# Patient Record
Sex: MALE | Race: WHITE | NOT HISPANIC OR LATINO | URBAN - METROPOLITAN AREA
[De-identification: names, ages, dates, MRNs, and addresses within clinical notes are randomized per-mention and may not be internally consistent; named-entity substitution may affect disease eponyms.]

---

## 2017-01-10 ENCOUNTER — EMERGENCY (EMERGENCY)
Facility: HOSPITAL | Age: 62
LOS: 1 days | Discharge: HOME | End: 2017-01-10
Admitting: EMERGENCY MEDICINE
Payer: COMMERCIAL

## 2017-01-10 DIAGNOSIS — T83.090A OTHER MECHANICAL COMPLICATION OF CYSTOSTOMY CATHETER, INITIAL ENCOUNTER: ICD-10-CM

## 2017-01-10 DIAGNOSIS — R10.30 LOWER ABDOMINAL PAIN, UNSPECIFIED: ICD-10-CM
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
## 2017-01-10 DIAGNOSIS — Z98.89 OTHER SPECIFIED POSTPROCEDURAL STATES: Chronic | ICD-10-CM

## 2017-01-10 DIAGNOSIS — F32.9 MAJOR DEPRESSIVE DISORDER, SINGLE EPISODE, UNSPECIFIED: ICD-10-CM

## 2017-01-10 DIAGNOSIS — Z93.59 OTHER CYSTOSTOMY STATUS: Chronic | ICD-10-CM
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
## 2017-01-10 DIAGNOSIS — I10 ESSENTIAL (PRIMARY) HYPERTENSION: ICD-10-CM
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
## 2017-01-10 PROCEDURE — 51705 CHANGE OF BLADDER TUBE: CPT

## 2017-01-10 PROCEDURE — 99282 EMERGENCY DEPT VISIT SF MDM: CPT | Mod: 25

## 2017-01-10 PROCEDURE — 99283 EMERGENCY DEPT VISIT LOW MDM: CPT | Mod: 25

## 2017-01-27 ENCOUNTER — APPOINTMENT (OUTPATIENT)
Dept: UROLOGY | Facility: CLINIC | Age: 62
End: 2017-01-27

## 2017-01-27 ENCOUNTER — OUTPATIENT (OUTPATIENT)
Dept: OUTPATIENT SERVICES | Facility: HOSPITAL | Age: 62
LOS: 1 days | End: 2017-01-27
Payer: MEDICARE

## 2017-01-27 DIAGNOSIS — Z93.59 OTHER CYSTOSTOMY STATUS: Chronic | ICD-10-CM

## 2017-01-27 DIAGNOSIS — Z98.89 OTHER SPECIFIED POSTPROCEDURAL STATES: Chronic | ICD-10-CM

## 2017-01-27 DIAGNOSIS — R35.0 FREQUENCY OF MICTURITION: ICD-10-CM

## 2017-01-27 PROCEDURE — 52000 CYSTOURETHROSCOPY: CPT

## 2017-02-01 DIAGNOSIS — N35.9 URETHRAL STRICTURE, UNSPECIFIED: ICD-10-CM

## 2017-02-01 DIAGNOSIS — C61 MALIGNANT NEOPLASM OF PROSTATE: ICD-10-CM

## 2017-02-01 DIAGNOSIS — R33.9 RETENTION OF URINE, UNSPECIFIED: ICD-10-CM

## 2017-02-01 DIAGNOSIS — C67.9 MALIGNANT NEOPLASM OF BLADDER, UNSPECIFIED: ICD-10-CM

## 2017-02-08 ENCOUNTER — EMERGENCY (EMERGENCY)
Facility: HOSPITAL | Age: 62
LOS: 1 days | Discharge: HOME | End: 2017-02-08
Admitting: EMERGENCY MEDICINE
Payer: COMMERCIAL

## 2017-02-08 DIAGNOSIS — K94.03 COLOSTOMY MALFUNCTION: ICD-10-CM

## 2017-02-08 DIAGNOSIS — Z98.89 OTHER SPECIFIED POSTPROCEDURAL STATES: Chronic | ICD-10-CM

## 2017-02-08 DIAGNOSIS — Z93.59 OTHER CYSTOSTOMY STATUS: Chronic | ICD-10-CM

## 2017-02-08 DIAGNOSIS — Z79.899 OTHER LONG TERM (CURRENT) DRUG THERAPY: ICD-10-CM

## 2017-02-08 DIAGNOSIS — F32.9 MAJOR DEPRESSIVE DISORDER, SINGLE EPISODE, UNSPECIFIED: ICD-10-CM

## 2017-02-08 DIAGNOSIS — I10 ESSENTIAL (PRIMARY) HYPERTENSION: ICD-10-CM

## 2017-02-08 DIAGNOSIS — R10.2 PELVIC AND PERINEAL PAIN: ICD-10-CM

## 2017-02-08 PROCEDURE — 87086 URINE CULTURE/COLONY COUNT: CPT

## 2017-02-08 PROCEDURE — 81003 URINALYSIS AUTO W/O SCOPE: CPT

## 2017-02-08 PROCEDURE — 51705 CHANGE OF BLADDER TUBE: CPT

## 2017-02-08 PROCEDURE — 99283 EMERGENCY DEPT VISIT LOW MDM: CPT | Mod: 25

## 2017-03-01 ENCOUNTER — APPOINTMENT (OUTPATIENT)
Dept: UROLOGY | Facility: CLINIC | Age: 62
End: 2017-03-01

## 2017-03-01 ENCOUNTER — OUTPATIENT (OUTPATIENT)
Dept: OUTPATIENT SERVICES | Facility: HOSPITAL | Age: 62
LOS: 1 days | End: 2017-03-01
Payer: MEDICARE

## 2017-03-01 VITALS
HEART RATE: 51 BPM | DIASTOLIC BLOOD PRESSURE: 84 MMHG | RESPIRATION RATE: 16 BRPM | SYSTOLIC BLOOD PRESSURE: 143 MMHG | TEMPERATURE: 97.8 F

## 2017-03-01 DIAGNOSIS — R35.0 FREQUENCY OF MICTURITION: ICD-10-CM

## 2017-03-01 DIAGNOSIS — Z93.59 OTHER CYSTOSTOMY STATUS: Chronic | ICD-10-CM

## 2017-03-01 DIAGNOSIS — Z98.89 OTHER SPECIFIED POSTPROCEDURAL STATES: Chronic | ICD-10-CM

## 2017-03-01 PROCEDURE — 51705 CHANGE OF BLADDER TUBE: CPT

## 2017-03-01 PROCEDURE — C2627: CPT

## 2017-03-02 LAB
PSA FREE FLD-MCNC: 26.8 %
PSA FREE SERPL-MCNC: 0.1 NG/ML
PSA SERPL-MCNC: 0.38 NG/ML

## 2017-03-10 DIAGNOSIS — C61 MALIGNANT NEOPLASM OF PROSTATE: ICD-10-CM

## 2017-03-10 DIAGNOSIS — C67.9 MALIGNANT NEOPLASM OF BLADDER, UNSPECIFIED: ICD-10-CM

## 2017-03-15 ENCOUNTER — RX RENEWAL (OUTPATIENT)
Age: 62
End: 2017-03-15

## 2017-03-16 ENCOUNTER — RX RENEWAL (OUTPATIENT)
Age: 62
End: 2017-03-16

## 2017-03-21 ENCOUNTER — RX RENEWAL (OUTPATIENT)
Age: 62
End: 2017-03-21

## 2017-03-22 ENCOUNTER — APPOINTMENT (OUTPATIENT)
Dept: UROLOGY | Facility: CLINIC | Age: 62
End: 2017-03-22

## 2017-03-22 ENCOUNTER — OUTPATIENT (OUTPATIENT)
Dept: OUTPATIENT SERVICES | Facility: HOSPITAL | Age: 62
LOS: 1 days | End: 2017-03-22
Payer: MEDICARE

## 2017-03-22 DIAGNOSIS — Z93.59 OTHER CYSTOSTOMY STATUS: Chronic | ICD-10-CM

## 2017-03-22 DIAGNOSIS — Z98.89 OTHER SPECIFIED POSTPROCEDURAL STATES: Chronic | ICD-10-CM

## 2017-03-22 PROCEDURE — C2627: CPT

## 2017-03-22 PROCEDURE — 51705 CHANGE OF BLADDER TUBE: CPT

## 2017-03-24 ENCOUNTER — APPOINTMENT (OUTPATIENT)
Dept: UROLOGY | Facility: CLINIC | Age: 62
End: 2017-03-24

## 2017-03-24 LAB
APPEARANCE: ABNORMAL
BACTERIA UR CULT: ABNORMAL
BACTERIA: ABNORMAL
BILIRUBIN URINE: NEGATIVE
BLOOD URINE: ABNORMAL
COLOR: YELLOW
GLUCOSE QUALITATIVE U: NORMAL MG/DL
HYALINE CASTS: 0 /LPF
KETONES URINE: NEGATIVE
LEUKOCYTE ESTERASE URINE: ABNORMAL
MICROSCOPIC-UA: NORMAL
NITRITE URINE: POSITIVE
PH URINE: >8.5
PROTEIN URINE: 100 MG/DL
RED BLOOD CELLS URINE: 10 /HPF
SPECIFIC GRAVITY URINE: 1.02
SQUAMOUS EPITHELIAL CELLS: 8 /HPF
TRIPLE PHOSPHATE CRYSTALS: ABNORMAL
UROBILINOGEN URINE: NORMAL MG/DL
WHITE BLOOD CELLS URINE: 381 /HPF

## 2017-03-28 DIAGNOSIS — C67.9 MALIGNANT NEOPLASM OF BLADDER, UNSPECIFIED: ICD-10-CM

## 2017-03-28 DIAGNOSIS — N35.9 URETHRAL STRICTURE, UNSPECIFIED: ICD-10-CM

## 2017-03-28 DIAGNOSIS — C61 MALIGNANT NEOPLASM OF PROSTATE: ICD-10-CM

## 2017-03-28 DIAGNOSIS — R33.9 RETENTION OF URINE, UNSPECIFIED: ICD-10-CM

## 2017-04-21 ENCOUNTER — APPOINTMENT (OUTPATIENT)
Dept: UROLOGY | Facility: CLINIC | Age: 62
End: 2017-04-21

## 2017-04-21 ENCOUNTER — OUTPATIENT (OUTPATIENT)
Dept: OUTPATIENT SERVICES | Facility: HOSPITAL | Age: 62
LOS: 1 days | End: 2017-04-21
Payer: MEDICARE

## 2017-04-21 VITALS — DIASTOLIC BLOOD PRESSURE: 82 MMHG | HEART RATE: 47 BPM | RESPIRATION RATE: 16 BRPM | SYSTOLIC BLOOD PRESSURE: 178 MMHG

## 2017-04-21 DIAGNOSIS — Z98.89 OTHER SPECIFIED POSTPROCEDURAL STATES: Chronic | ICD-10-CM

## 2017-04-21 DIAGNOSIS — Z93.59 OTHER CYSTOSTOMY STATUS: Chronic | ICD-10-CM

## 2017-04-21 DIAGNOSIS — R35.0 FREQUENCY OF MICTURITION: ICD-10-CM

## 2017-04-21 PROCEDURE — 88112 CYTOPATH CELL ENHANCE TECH: CPT | Mod: 26

## 2017-04-21 PROCEDURE — 52000 CYSTOURETHROSCOPY: CPT

## 2017-04-28 LAB — CORE LAB FLUID CYTOLOGY: NORMAL

## 2017-05-01 DIAGNOSIS — C67.9 MALIGNANT NEOPLASM OF BLADDER, UNSPECIFIED: ICD-10-CM

## 2017-05-01 DIAGNOSIS — N32.0 BLADDER-NECK OBSTRUCTION: ICD-10-CM

## 2017-05-10 ENCOUNTER — EMERGENCY (EMERGENCY)
Facility: HOSPITAL | Age: 62
LOS: 1 days | Discharge: HOME | End: 2017-05-10
Admitting: EMERGENCY MEDICINE
Payer: COMMERCIAL

## 2017-05-10 DIAGNOSIS — Z98.89 OTHER SPECIFIED POSTPROCEDURAL STATES: Chronic | ICD-10-CM

## 2017-05-10 DIAGNOSIS — I10 ESSENTIAL (PRIMARY) HYPERTENSION: ICD-10-CM

## 2017-05-10 DIAGNOSIS — T83.090A OTHER MECHANICAL COMPLICATION OF CYSTOSTOMY CATHETER, INITIAL ENCOUNTER: ICD-10-CM

## 2017-05-10 DIAGNOSIS — Z93.59 OTHER CYSTOSTOMY STATUS: Chronic | ICD-10-CM

## 2017-05-10 DIAGNOSIS — Z79.899 OTHER LONG TERM (CURRENT) DRUG THERAPY: ICD-10-CM

## 2017-05-10 DIAGNOSIS — F32.9 MAJOR DEPRESSIVE DISORDER, SINGLE EPISODE, UNSPECIFIED: ICD-10-CM

## 2017-05-10 PROCEDURE — 51705 CHANGE OF BLADDER TUBE: CPT

## 2017-05-10 PROCEDURE — 99282 EMERGENCY DEPT VISIT SF MDM: CPT | Mod: 25

## 2017-05-12 ENCOUNTER — APPOINTMENT (OUTPATIENT)
Dept: UROLOGY | Facility: CLINIC | Age: 62
End: 2017-05-12

## 2017-05-12 ENCOUNTER — OUTPATIENT (OUTPATIENT)
Dept: OUTPATIENT SERVICES | Facility: HOSPITAL | Age: 62
LOS: 1 days | End: 2017-05-12
Payer: MEDICARE

## 2017-05-12 DIAGNOSIS — Z98.89 OTHER SPECIFIED POSTPROCEDURAL STATES: Chronic | ICD-10-CM

## 2017-05-12 DIAGNOSIS — R33.9 RETENTION OF URINE, UNSPECIFIED: ICD-10-CM

## 2017-05-12 DIAGNOSIS — Z93.59 OTHER CYSTOSTOMY STATUS: Chronic | ICD-10-CM

## 2017-05-12 PROCEDURE — 51705 CHANGE OF BLADDER TUBE: CPT

## 2017-05-12 PROCEDURE — C2627: CPT

## 2017-05-15 ENCOUNTER — MEDICATION RENEWAL (OUTPATIENT)
Age: 62
End: 2017-05-15

## 2017-05-15 LAB — BACTERIA UR CULT: ABNORMAL

## 2017-05-29 ENCOUNTER — EMERGENCY (EMERGENCY)
Facility: HOSPITAL | Age: 62
LOS: 1 days | Discharge: HOME | End: 2017-05-29
Admitting: EMERGENCY MEDICINE
Payer: COMMERCIAL

## 2017-05-29 DIAGNOSIS — T83.010A BREAKDOWN (MECHANICAL) OF CYSTOSTOMY CATHETER, INITIAL ENCOUNTER: ICD-10-CM

## 2017-05-29 DIAGNOSIS — Z79.899 OTHER LONG TERM (CURRENT) DRUG THERAPY: ICD-10-CM
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
## 2017-05-29 DIAGNOSIS — Z98.89 OTHER SPECIFIED POSTPROCEDURAL STATES: Chronic | ICD-10-CM

## 2017-05-29 DIAGNOSIS — I10 ESSENTIAL (PRIMARY) HYPERTENSION: ICD-10-CM

## 2017-05-29 DIAGNOSIS — F32.9 MAJOR DEPRESSIVE DISORDER, SINGLE EPISODE, UNSPECIFIED: ICD-10-CM
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
## 2017-05-29 DIAGNOSIS — Z93.59 OTHER CYSTOSTOMY STATUS: ICD-10-CM

## 2017-05-29 DIAGNOSIS — Z93.59 OTHER CYSTOSTOMY STATUS: Chronic | ICD-10-CM
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
## 2017-05-29 PROCEDURE — 81003 URINALYSIS AUTO W/O SCOPE: CPT

## 2017-05-29 PROCEDURE — 87086 URINE CULTURE/COLONY COUNT: CPT

## 2017-05-29 PROCEDURE — 99283 EMERGENCY DEPT VISIT LOW MDM: CPT | Mod: 25

## 2017-05-29 PROCEDURE — 51705 CHANGE OF BLADDER TUBE: CPT

## 2017-05-30 ENCOUNTER — OUTPATIENT (OUTPATIENT)
Dept: OUTPATIENT SERVICES | Facility: HOSPITAL | Age: 62
LOS: 1 days | End: 2017-05-30
Payer: MEDICARE

## 2017-05-30 ENCOUNTER — RX RENEWAL (OUTPATIENT)
Age: 62
End: 2017-05-30

## 2017-05-30 ENCOUNTER — APPOINTMENT (OUTPATIENT)
Dept: UROLOGY | Facility: CLINIC | Age: 62
End: 2017-05-30

## 2017-05-30 VITALS
DIASTOLIC BLOOD PRESSURE: 91 MMHG | SYSTOLIC BLOOD PRESSURE: 159 MMHG | TEMPERATURE: 98.2 F | RESPIRATION RATE: 17 BRPM | HEART RATE: 61 BPM

## 2017-05-30 DIAGNOSIS — R31.0 GROSS HEMATURIA: ICD-10-CM

## 2017-05-30 DIAGNOSIS — Z93.59 OTHER CYSTOSTOMY STATUS: Chronic | ICD-10-CM

## 2017-05-30 DIAGNOSIS — Z98.89 OTHER SPECIFIED POSTPROCEDURAL STATES: Chronic | ICD-10-CM

## 2017-05-30 PROCEDURE — C2627: CPT

## 2017-05-30 PROCEDURE — 51705 CHANGE OF BLADDER TUBE: CPT

## 2017-06-01 DIAGNOSIS — R33.9 RETENTION OF URINE, UNSPECIFIED: ICD-10-CM

## 2017-06-01 DIAGNOSIS — R31.0 GROSS HEMATURIA: ICD-10-CM

## 2017-06-02 ENCOUNTER — APPOINTMENT (OUTPATIENT)
Dept: UROLOGY | Facility: CLINIC | Age: 62
End: 2017-06-02

## 2017-06-16 ENCOUNTER — OUTPATIENT (OUTPATIENT)
Dept: OUTPATIENT SERVICES | Facility: HOSPITAL | Age: 62
LOS: 1 days | End: 2017-06-16
Payer: MEDICARE

## 2017-06-16 ENCOUNTER — APPOINTMENT (OUTPATIENT)
Dept: UROLOGY | Facility: CLINIC | Age: 62
End: 2017-06-16

## 2017-06-16 VITALS
SYSTOLIC BLOOD PRESSURE: 132 MMHG | RESPIRATION RATE: 17 BRPM | DIASTOLIC BLOOD PRESSURE: 72 MMHG | TEMPERATURE: 98.3 F | HEART RATE: 56 BPM

## 2017-06-16 DIAGNOSIS — Z98.89 OTHER SPECIFIED POSTPROCEDURAL STATES: Chronic | ICD-10-CM

## 2017-06-16 DIAGNOSIS — Z93.59 OTHER CYSTOSTOMY STATUS: Chronic | ICD-10-CM

## 2017-06-16 DIAGNOSIS — R35.0 FREQUENCY OF MICTURITION: ICD-10-CM

## 2017-06-16 PROCEDURE — 51705 CHANGE OF BLADDER TUBE: CPT

## 2017-06-16 PROCEDURE — C2627: CPT

## 2017-06-20 DIAGNOSIS — N32.0 BLADDER-NECK OBSTRUCTION: ICD-10-CM

## 2017-06-20 DIAGNOSIS — N35.9 URETHRAL STRICTURE, UNSPECIFIED: ICD-10-CM

## 2017-06-20 DIAGNOSIS — N39.498 OTHER SPECIFIED URINARY INCONTINENCE: ICD-10-CM

## 2017-06-26 ENCOUNTER — RX RENEWAL (OUTPATIENT)
Age: 62
End: 2017-06-26

## 2017-07-04 ENCOUNTER — EMERGENCY (EMERGENCY)
Facility: HOSPITAL | Age: 62
LOS: 1 days | Discharge: HOME | End: 2017-07-04
Admitting: EMERGENCY MEDICINE
Payer: COMMERCIAL

## 2017-07-04 DIAGNOSIS — Z98.89 OTHER SPECIFIED POSTPROCEDURAL STATES: Chronic | ICD-10-CM

## 2017-07-04 DIAGNOSIS — T83.020A DISPLACEMENT OF CYSTOSTOMY CATHETER, INITIAL ENCOUNTER: ICD-10-CM

## 2017-07-04 DIAGNOSIS — Z93.59 OTHER CYSTOSTOMY STATUS: Chronic | ICD-10-CM
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
## 2017-07-04 DIAGNOSIS — F32.9 MAJOR DEPRESSIVE DISORDER, SINGLE EPISODE, UNSPECIFIED: ICD-10-CM

## 2017-07-04 DIAGNOSIS — I10 ESSENTIAL (PRIMARY) HYPERTENSION: ICD-10-CM

## 2017-07-04 DIAGNOSIS — Z79.899 OTHER LONG TERM (CURRENT) DRUG THERAPY: ICD-10-CM

## 2017-07-04 PROCEDURE — 99282 EMERGENCY DEPT VISIT SF MDM: CPT | Mod: 25

## 2017-07-04 PROCEDURE — 51702 INSERT TEMP BLADDER CATH: CPT

## 2017-07-04 PROCEDURE — 51705 CHANGE OF BLADDER TUBE: CPT | Mod: 77

## 2017-07-04 PROCEDURE — 51705 CHANGE OF BLADDER TUBE: CPT

## 2017-07-04 PROCEDURE — 99283 EMERGENCY DEPT VISIT LOW MDM: CPT | Mod: 25

## 2017-07-19 ENCOUNTER — APPOINTMENT (OUTPATIENT)
Dept: UROLOGY | Facility: CLINIC | Age: 62
End: 2017-07-19

## 2017-07-19 ENCOUNTER — OUTPATIENT (OUTPATIENT)
Dept: OUTPATIENT SERVICES | Facility: HOSPITAL | Age: 62
LOS: 1 days | End: 2017-07-19
Payer: MEDICARE

## 2017-07-19 VITALS
RESPIRATION RATE: 17 BRPM | HEART RATE: 48 BPM | DIASTOLIC BLOOD PRESSURE: 72 MMHG | SYSTOLIC BLOOD PRESSURE: 131 MMHG | TEMPERATURE: 97.7 F

## 2017-07-19 DIAGNOSIS — Z93.59 OTHER CYSTOSTOMY STATUS: Chronic | ICD-10-CM

## 2017-07-19 DIAGNOSIS — Z98.89 OTHER SPECIFIED POSTPROCEDURAL STATES: Chronic | ICD-10-CM

## 2017-07-19 DIAGNOSIS — R35.0 FREQUENCY OF MICTURITION: ICD-10-CM

## 2017-07-19 DIAGNOSIS — N39.498 OTHER SPECIFIED URINARY INCONTINENCE: ICD-10-CM

## 2017-07-19 PROCEDURE — 52000 CYSTOURETHROSCOPY: CPT

## 2017-07-26 LAB — CORE LAB FLUID CYTOLOGY: NORMAL

## 2017-07-31 DIAGNOSIS — C67.9 MALIGNANT NEOPLASM OF BLADDER, UNSPECIFIED: ICD-10-CM

## 2017-07-31 DIAGNOSIS — N32.0 BLADDER-NECK OBSTRUCTION: ICD-10-CM

## 2017-07-31 DIAGNOSIS — N39.498 OTHER SPECIFIED URINARY INCONTINENCE: ICD-10-CM

## 2017-08-06 ENCOUNTER — EMERGENCY (EMERGENCY)
Facility: HOSPITAL | Age: 62
LOS: 1 days | Discharge: HOME | End: 2017-08-06
Admitting: EMERGENCY MEDICINE
Payer: COMMERCIAL

## 2017-08-06 DIAGNOSIS — R33.8 OTHER RETENTION OF URINE: ICD-10-CM

## 2017-08-06 DIAGNOSIS — I10 ESSENTIAL (PRIMARY) HYPERTENSION: ICD-10-CM

## 2017-08-06 DIAGNOSIS — Z79.899 OTHER LONG TERM (CURRENT) DRUG THERAPY: ICD-10-CM

## 2017-08-06 DIAGNOSIS — Z98.89 OTHER SPECIFIED POSTPROCEDURAL STATES: Chronic | ICD-10-CM

## 2017-08-06 DIAGNOSIS — Z93.59 OTHER CYSTOSTOMY STATUS: Chronic | ICD-10-CM

## 2017-08-06 DIAGNOSIS — F32.9 MAJOR DEPRESSIVE DISORDER, SINGLE EPISODE, UNSPECIFIED: ICD-10-CM

## 2017-08-06 PROCEDURE — 99283 EMERGENCY DEPT VISIT LOW MDM: CPT | Mod: 25

## 2017-08-06 PROCEDURE — 51702 INSERT TEMP BLADDER CATH: CPT

## 2017-08-07 ENCOUNTER — OUTPATIENT (OUTPATIENT)
Dept: OUTPATIENT SERVICES | Facility: HOSPITAL | Age: 62
LOS: 1 days | End: 2017-08-07
Payer: MEDICARE

## 2017-08-07 ENCOUNTER — APPOINTMENT (OUTPATIENT)
Dept: UROLOGY | Facility: CLINIC | Age: 62
End: 2017-08-07
Payer: MEDICARE

## 2017-08-07 VITALS — HEART RATE: 62 BPM | SYSTOLIC BLOOD PRESSURE: 174 MMHG | RESPIRATION RATE: 17 BRPM | DIASTOLIC BLOOD PRESSURE: 85 MMHG

## 2017-08-07 DIAGNOSIS — Z98.89 OTHER SPECIFIED POSTPROCEDURAL STATES: Chronic | ICD-10-CM

## 2017-08-07 DIAGNOSIS — Z93.59 OTHER CYSTOSTOMY STATUS: Chronic | ICD-10-CM

## 2017-08-07 PROCEDURE — 51705 CHANGE OF BLADDER TUBE: CPT

## 2017-08-07 PROCEDURE — C2627: CPT

## 2017-08-16 ENCOUNTER — OUTPATIENT (OUTPATIENT)
Dept: OUTPATIENT SERVICES | Facility: HOSPITAL | Age: 62
LOS: 1 days | End: 2017-08-16
Payer: MEDICARE

## 2017-08-16 ENCOUNTER — APPOINTMENT (OUTPATIENT)
Dept: UROLOGY | Facility: CLINIC | Age: 62
End: 2017-08-16
Payer: MEDICARE

## 2017-08-16 VITALS
BODY MASS INDEX: 34.27 KG/M2 | TEMPERATURE: 98.3 F | HEIGHT: 72 IN | WEIGHT: 253 LBS | SYSTOLIC BLOOD PRESSURE: 131 MMHG | HEART RATE: 52 BPM | DIASTOLIC BLOOD PRESSURE: 78 MMHG

## 2017-08-16 DIAGNOSIS — Z98.89 OTHER SPECIFIED POSTPROCEDURAL STATES: Chronic | ICD-10-CM

## 2017-08-16 DIAGNOSIS — R35.0 FREQUENCY OF MICTURITION: ICD-10-CM

## 2017-08-16 DIAGNOSIS — Z93.59 OTHER CYSTOSTOMY STATUS: Chronic | ICD-10-CM

## 2017-08-16 PROCEDURE — 51705 CHANGE OF BLADDER TUBE: CPT

## 2017-08-16 PROCEDURE — C2627: CPT

## 2017-08-17 DIAGNOSIS — N31.9 NEUROMUSCULAR DYSFUNCTION OF BLADDER, UNSPECIFIED: ICD-10-CM

## 2017-09-01 ENCOUNTER — EMERGENCY (EMERGENCY)
Facility: HOSPITAL | Age: 62
LOS: 1 days | Discharge: HOME | End: 2017-09-01
Attending: INTERNAL MEDICINE | Admitting: EMERGENCY MEDICINE
Payer: MEDICARE

## 2017-09-01 VITALS
OXYGEN SATURATION: 96 % | DIASTOLIC BLOOD PRESSURE: 79 MMHG | HEART RATE: 60 BPM | TEMPERATURE: 98 F | WEIGHT: 240.08 LBS | SYSTOLIC BLOOD PRESSURE: 183 MMHG | RESPIRATION RATE: 20 BRPM

## 2017-09-01 VITALS
OXYGEN SATURATION: 98 % | SYSTOLIC BLOOD PRESSURE: 168 MMHG | HEART RATE: 60 BPM | TEMPERATURE: 98 F | RESPIRATION RATE: 14 BRPM | DIASTOLIC BLOOD PRESSURE: 72 MMHG

## 2017-09-01 DIAGNOSIS — Z98.89 OTHER SPECIFIED POSTPROCEDURAL STATES: Chronic | ICD-10-CM

## 2017-09-01 DIAGNOSIS — Z93.59 OTHER CYSTOSTOMY STATUS: Chronic | ICD-10-CM

## 2017-09-01 PROCEDURE — 51705 CHANGE OF BLADDER TUBE: CPT

## 2017-09-01 PROCEDURE — 99283 EMERGENCY DEPT VISIT LOW MDM: CPT | Mod: 25

## 2017-09-01 NOTE — ED PROVIDER NOTE - GENITOURINARY, MLM
pus at suprapubic orifice, suprapubic Eubanks catheter. pus at suprapubic orifice, suprapubic Eubanks catheter. some suprapubic tenderness. unable to appreciate bladder size.

## 2017-09-01 NOTE — ED PROVIDER NOTE - NS_ ATTENDINGSCRIBEDETAILS _ED_A_ED_FT
Royal Arceo MD - The scribe's documentation has been prepared under my direction and personally reviewed by me in its entirety. I confirm that the note above accurately reflects all work, treatment, procedures, and medical decision making performed by me.

## 2017-09-01 NOTE — ED PROVIDER NOTE - PSH
H/O cystoscopy  - multiple  History of prostatectomy  robotic, 2011  S/P Appendectomy  40 years ago  Suprapubic catheter  8/2015

## 2017-09-01 NOTE — ED PROVIDER NOTE - CONSTITUTIONAL, MLM
normal... Well appearing, well nourished, awake, alert, oriented to person, place, time/situation and in no apparent distress. W male, Well appearing, well nourished, awake, alert, oriented to person, place, time/situation and uncomfortable appearing.

## 2017-09-01 NOTE — ED PROVIDER NOTE - PMH
Anxiety    HTN (Hypertension)    Major depressive disorder    Prostate Cancer    Urethral stricture    Urinary (tract) obstruction    UTI (urinary tract infection)

## 2017-09-01 NOTE — ED PROVIDER NOTE - OBJECTIVE STATEMENT
61 y/o M presents to the ED for urinary retention and suprapubic Eubanks catheter change today. States that he noted the bag stop filling about 2 hours ago. Goes once a month to [facility] to get it changed. Denies fever, chills or any other complaints at this time. 61 y/o W M w/ chronic indwelling suprapubic Eubanks Catheter presents to the ED for urinary retention today. States that he noted his leg bag stop filling about 2 hours ago. Comes to ED with severe pain Goes once a month to [facility] to get it changed. Denies fever, chills or any other complaints at this time. 63 y/o WM w/ chronic indwelling suprapubic Eubanks Catheter presents to the ED for urinary retention today. States that he noted his leg bag stop filling about 2 hours ago. Comes to ED with severe pain. Goes once a month to [facility] to get it changed. Denies fever, chills or any other complaints at this time.

## 2017-09-01 NOTE — ED PROCEDURE NOTE - CPROC ED URIN DEVICE DETAIL1
The old cystostomy tube was removed. Using strict sterile technique, a new tube was inserted through the stoma into the bladder (see size above).

## 2017-09-01 NOTE — ED PROCEDURE NOTE - CPROC ED FINDINGS1
pus around suprapubic site pus around suprapubic site/positive return of urine in the collection bag/hematuria

## 2017-09-05 ENCOUNTER — EMERGENCY (EMERGENCY)
Facility: HOSPITAL | Age: 62
LOS: 1 days | Discharge: HOME | End: 2017-09-05
Admitting: EMERGENCY MEDICINE
Payer: COMMERCIAL

## 2017-09-05 DIAGNOSIS — N39.0 URINARY TRACT INFECTION, SITE NOT SPECIFIED: ICD-10-CM

## 2017-09-05 DIAGNOSIS — R63.0 ANOREXIA: ICD-10-CM

## 2017-09-05 DIAGNOSIS — Z98.89 OTHER SPECIFIED POSTPROCEDURAL STATES: Chronic | ICD-10-CM

## 2017-09-05 DIAGNOSIS — Z93.59 OTHER CYSTOSTOMY STATUS: Chronic | ICD-10-CM

## 2017-09-05 DIAGNOSIS — F32.9 MAJOR DEPRESSIVE DISORDER, SINGLE EPISODE, UNSPECIFIED: ICD-10-CM

## 2017-09-05 DIAGNOSIS — Z79.899 OTHER LONG TERM (CURRENT) DRUG THERAPY: ICD-10-CM

## 2017-09-05 DIAGNOSIS — I10 ESSENTIAL (PRIMARY) HYPERTENSION: ICD-10-CM

## 2017-09-05 PROCEDURE — 87086 URINE CULTURE/COLONY COUNT: CPT

## 2017-09-05 PROCEDURE — 99283 EMERGENCY DEPT VISIT LOW MDM: CPT | Mod: 25

## 2017-09-05 PROCEDURE — 81003 URINALYSIS AUTO W/O SCOPE: CPT

## 2017-09-05 PROCEDURE — 51705 CHANGE OF BLADDER TUBE: CPT

## 2017-09-05 PROCEDURE — 99283 EMERGENCY DEPT VISIT LOW MDM: CPT

## 2017-09-13 ENCOUNTER — OUTPATIENT (OUTPATIENT)
Dept: OUTPATIENT SERVICES | Facility: HOSPITAL | Age: 62
LOS: 1 days | End: 2017-09-13
Payer: MEDICARE

## 2017-09-13 ENCOUNTER — APPOINTMENT (OUTPATIENT)
Dept: UROLOGY | Facility: CLINIC | Age: 62
End: 2017-09-13
Payer: MEDICARE

## 2017-09-13 VITALS
RESPIRATION RATE: 17 BRPM | DIASTOLIC BLOOD PRESSURE: 68 MMHG | HEART RATE: 56 BPM | TEMPERATURE: 98.5 F | SYSTOLIC BLOOD PRESSURE: 129 MMHG

## 2017-09-13 DIAGNOSIS — R33.9 RETENTION OF URINE, UNSPECIFIED: ICD-10-CM

## 2017-09-13 DIAGNOSIS — C67.9 MALIGNANT NEOPLASM OF BLADDER, UNSPECIFIED: ICD-10-CM

## 2017-09-13 DIAGNOSIS — Z93.59 OTHER CYSTOSTOMY STATUS: Chronic | ICD-10-CM

## 2017-09-13 DIAGNOSIS — R35.0 FREQUENCY OF MICTURITION: ICD-10-CM

## 2017-09-13 DIAGNOSIS — Z98.89 OTHER SPECIFIED POSTPROCEDURAL STATES: Chronic | ICD-10-CM

## 2017-09-13 PROCEDURE — 51705 CHANGE OF BLADDER TUBE: CPT

## 2017-09-13 PROCEDURE — C2627: CPT

## 2017-09-29 LAB
25(OH)D3 SERPL-MCNC: 37.7 NG/ML
ALBUMIN SERPL ELPH-MCNC: 3.5 G/DL
ALP BLD-CCNC: 58 U/L
ALT SERPL-CCNC: 4 U/L
ANION GAP SERPL CALC-SCNC: 15 MMOL/L
AST SERPL-CCNC: 11 U/L
BASOPHILS # BLD AUTO: 0.01 K/UL
BASOPHILS NFR BLD AUTO: 0.1 %
BILIRUB SERPL-MCNC: 0.3 MG/DL
BUN SERPL-MCNC: 21 MG/DL
CALCIUM SERPL-MCNC: 10 MG/DL
CHLORIDE SERPL-SCNC: 103 MMOL/L
CHOLEST SERPL-MCNC: 139 MG/DL
CHOLEST/HDLC SERPL: 3.2 RATIO
CO2 SERPL-SCNC: 21 MMOL/L
CREAT SERPL-MCNC: 1.3 MG/DL
EOSINOPHIL # BLD AUTO: 0.1 K/UL
EOSINOPHIL NFR BLD AUTO: 1.4 %
FERRITIN SERPL-MCNC: 17 NG/ML
FOLATE SERPL-MCNC: 18.2 NG/ML
GGT SERPL-CCNC: 22 U/L
GLUCOSE SERPL-MCNC: 146 MG/DL
HBA1C MFR BLD HPLC: 6.2 %
HCT VFR BLD CALC: 37.5 %
HDLC SERPL-MCNC: 43 MG/DL
HGB BLD-MCNC: 11.7 G/DL
IMM GRANULOCYTES NFR BLD AUTO: 0.3 %
IRON SATN MFR SERPL: 12 %
IRON SERPL-MCNC: 33 UG/DL
LDLC SERPL CALC-MCNC: 72 MG/DL
LYMPHOCYTES # BLD AUTO: 1.59 K/UL
LYMPHOCYTES NFR BLD AUTO: 22.1 %
MAN DIFF?: NORMAL
MCHC RBC-ENTMCNC: 24.3 PG
MCHC RBC-ENTMCNC: 31.2 GM/DL
MCV RBC AUTO: 78 FL
MONOCYTES # BLD AUTO: 0.51 K/UL
MONOCYTES NFR BLD AUTO: 7.1 %
NEUTROPHILS # BLD AUTO: 4.98 K/UL
NEUTROPHILS NFR BLD AUTO: 69 %
PLATELET # BLD AUTO: 222 K/UL
POTASSIUM SERPL-SCNC: 4.2 MMOL/L
PROT SERPL-MCNC: 7.1 G/DL
PSA FREE FLD-MCNC: 22.9
PSA FREE SERPL-MCNC: 0.08 NG/ML
PSA SERPL-MCNC: 0.35 NG/ML
RBC # BLD: 4.81 M/UL
RBC # FLD: 19 %
SODIUM SERPL-SCNC: 139 MMOL/L
TIBC SERPL-MCNC: 286 UG/DL
TRIGL SERPL-MCNC: 119 MG/DL
TSH SERPL-ACNC: 1.27 UIU/ML
UIBC SERPL-MCNC: 253 UG/DL
VIT B12 SERPL-MCNC: 546 PG/ML
WBC # FLD AUTO: 7.21 K/UL

## 2017-10-02 ENCOUNTER — APPOINTMENT (OUTPATIENT)
Dept: FAMILY MEDICINE | Facility: CLINIC | Age: 62
End: 2017-10-02
Payer: MEDICARE

## 2017-10-02 VITALS
HEIGHT: 72 IN | TEMPERATURE: 98.2 F | RESPIRATION RATE: 15 BRPM | BODY MASS INDEX: 33.18 KG/M2 | WEIGHT: 245 LBS | SYSTOLIC BLOOD PRESSURE: 144 MMHG | OXYGEN SATURATION: 97 % | HEART RATE: 58 BPM | DIASTOLIC BLOOD PRESSURE: 80 MMHG

## 2017-10-02 DIAGNOSIS — Z23 ENCOUNTER FOR IMMUNIZATION: ICD-10-CM

## 2017-10-02 DIAGNOSIS — Z00.00 ENCOUNTER FOR GENERAL ADULT MEDICAL EXAMINATION W/OUT ABNORMAL FINDINGS: ICD-10-CM

## 2017-10-02 PROCEDURE — 99396 PREV VISIT EST AGE 40-64: CPT | Mod: 25

## 2017-10-02 PROCEDURE — 90688 IIV4 VACCINE SPLT 0.5 ML IM: CPT

## 2017-10-02 PROCEDURE — 90715 TDAP VACCINE 7 YRS/> IM: CPT

## 2017-10-02 PROCEDURE — 90472 IMMUNIZATION ADMIN EACH ADD: CPT

## 2017-10-02 PROCEDURE — G0008: CPT

## 2017-10-02 RX ORDER — CEPHALEXIN 500 MG/1
500 CAPSULE ORAL
Qty: 28 | Refills: 0 | Status: DISCONTINUED | COMMUNITY
Start: 2017-02-09 | End: 2017-10-02

## 2017-10-02 RX ORDER — SULFAMETHOXAZOLE AND TRIMETHOPRIM 400; 80 MG/1; MG/1
400-80 TABLET ORAL
Qty: 10 | Refills: 0 | Status: DISCONTINUED | COMMUNITY
Start: 2017-05-15 | End: 2017-10-02

## 2017-10-06 ENCOUNTER — EMERGENCY (EMERGENCY)
Facility: HOSPITAL | Age: 62
LOS: 1 days | Discharge: HOME | End: 2017-10-06
Admitting: INTERNAL MEDICINE
Payer: COMMERCIAL

## 2017-10-06 DIAGNOSIS — T83.511A INFECTION AND INFLAMMATORY REACTION DUE TO INDWELLING URETHRAL CATHETER, INITIAL ENCOUNTER: ICD-10-CM

## 2017-10-06 DIAGNOSIS — Z98.89 OTHER SPECIFIED POSTPROCEDURAL STATES: Chronic | ICD-10-CM

## 2017-10-06 DIAGNOSIS — R30.0 DYSURIA: ICD-10-CM

## 2017-10-06 DIAGNOSIS — N39.0 URINARY TRACT INFECTION, SITE NOT SPECIFIED: ICD-10-CM
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
## 2017-10-06 DIAGNOSIS — Z93.59 OTHER CYSTOSTOMY STATUS: Chronic | ICD-10-CM

## 2017-10-06 DIAGNOSIS — Z79.899 OTHER LONG TERM (CURRENT) DRUG THERAPY: ICD-10-CM
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
## 2017-10-06 DIAGNOSIS — I10 ESSENTIAL (PRIMARY) HYPERTENSION: ICD-10-CM
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
## 2017-10-06 PROCEDURE — 99283 EMERGENCY DEPT VISIT LOW MDM: CPT

## 2017-10-06 PROCEDURE — 81003 URINALYSIS AUTO W/O SCOPE: CPT

## 2017-10-06 PROCEDURE — 87086 URINE CULTURE/COLONY COUNT: CPT

## 2017-10-11 ENCOUNTER — APPOINTMENT (OUTPATIENT)
Dept: UROLOGY | Facility: CLINIC | Age: 62
End: 2017-10-11
Payer: MEDICARE

## 2017-10-11 ENCOUNTER — OUTPATIENT (OUTPATIENT)
Dept: OUTPATIENT SERVICES | Facility: HOSPITAL | Age: 62
LOS: 1 days | End: 2017-10-11
Payer: MEDICARE

## 2017-10-11 VITALS — DIASTOLIC BLOOD PRESSURE: 66 MMHG | SYSTOLIC BLOOD PRESSURE: 111 MMHG | HEART RATE: 92 BPM | TEMPERATURE: 98 F

## 2017-10-11 DIAGNOSIS — R35.0 FREQUENCY OF MICTURITION: ICD-10-CM

## 2017-10-11 DIAGNOSIS — Z98.89 OTHER SPECIFIED POSTPROCEDURAL STATES: Chronic | ICD-10-CM

## 2017-10-11 DIAGNOSIS — Z93.59 OTHER CYSTOSTOMY STATUS: Chronic | ICD-10-CM

## 2017-10-11 DIAGNOSIS — R33.9 RETENTION OF URINE, UNSPECIFIED: ICD-10-CM

## 2017-10-11 PROCEDURE — 51705 CHANGE OF BLADDER TUBE: CPT

## 2017-10-11 PROCEDURE — C2627: CPT

## 2017-10-19 DIAGNOSIS — C67.9 MALIGNANT NEOPLASM OF BLADDER, UNSPECIFIED: ICD-10-CM

## 2017-10-19 DIAGNOSIS — R33.9 RETENTION OF URINE, UNSPECIFIED: ICD-10-CM

## 2017-10-28 ENCOUNTER — EMERGENCY (EMERGENCY)
Facility: HOSPITAL | Age: 62
LOS: 1 days | Discharge: HOME | End: 2017-10-28
Admitting: EMERGENCY MEDICINE
Payer: COMMERCIAL

## 2017-10-28 DIAGNOSIS — R33.9 RETENTION OF URINE, UNSPECIFIED: ICD-10-CM

## 2017-10-28 DIAGNOSIS — I10 ESSENTIAL (PRIMARY) HYPERTENSION: ICD-10-CM

## 2017-10-28 DIAGNOSIS — Z93.59 OTHER CYSTOSTOMY STATUS: Chronic | ICD-10-CM

## 2017-10-28 DIAGNOSIS — R10.2 PELVIC AND PERINEAL PAIN: ICD-10-CM

## 2017-10-28 DIAGNOSIS — Z98.89 OTHER SPECIFIED POSTPROCEDURAL STATES: Chronic | ICD-10-CM

## 2017-10-28 DIAGNOSIS — Z79.899 OTHER LONG TERM (CURRENT) DRUG THERAPY: ICD-10-CM

## 2017-10-28 DIAGNOSIS — Z98.890 OTHER SPECIFIED POSTPROCEDURAL STATES: ICD-10-CM

## 2017-10-28 DIAGNOSIS — F32.9 MAJOR DEPRESSIVE DISORDER, SINGLE EPISODE, UNSPECIFIED: ICD-10-CM

## 2017-10-28 PROCEDURE — 99284 EMERGENCY DEPT VISIT MOD MDM: CPT

## 2017-10-28 PROCEDURE — 87086 URINE CULTURE/COLONY COUNT: CPT

## 2017-10-28 PROCEDURE — 81003 URINALYSIS AUTO W/O SCOPE: CPT

## 2017-10-28 PROCEDURE — 99283 EMERGENCY DEPT VISIT LOW MDM: CPT | Mod: 25

## 2017-10-28 PROCEDURE — 51702 INSERT TEMP BLADDER CATH: CPT

## 2017-11-03 ENCOUNTER — APPOINTMENT (OUTPATIENT)
Dept: UROLOGY | Facility: CLINIC | Age: 62
End: 2017-11-03
Payer: MEDICARE

## 2017-11-03 PROCEDURE — 99213 OFFICE O/P EST LOW 20 MIN: CPT

## 2017-11-08 DIAGNOSIS — R31.0 GROSS HEMATURIA: ICD-10-CM

## 2017-11-08 DIAGNOSIS — N32.0 BLADDER-NECK OBSTRUCTION: ICD-10-CM

## 2017-11-15 ENCOUNTER — APPOINTMENT (OUTPATIENT)
Dept: UROLOGY | Facility: CLINIC | Age: 62
End: 2017-11-15
Payer: MEDICARE

## 2017-11-15 ENCOUNTER — OUTPATIENT (OUTPATIENT)
Dept: OUTPATIENT SERVICES | Facility: HOSPITAL | Age: 62
LOS: 1 days | End: 2017-11-15
Payer: MEDICARE

## 2017-11-15 VITALS
DIASTOLIC BLOOD PRESSURE: 64 MMHG | TEMPERATURE: 97.6 F | RESPIRATION RATE: 16 BRPM | HEART RATE: 56 BPM | SYSTOLIC BLOOD PRESSURE: 118 MMHG

## 2017-11-15 DIAGNOSIS — Z98.89 OTHER SPECIFIED POSTPROCEDURAL STATES: Chronic | ICD-10-CM

## 2017-11-15 DIAGNOSIS — R35.0 FREQUENCY OF MICTURITION: ICD-10-CM

## 2017-11-15 DIAGNOSIS — Z93.59 OTHER CYSTOSTOMY STATUS: Chronic | ICD-10-CM

## 2017-11-15 PROCEDURE — C2627: CPT

## 2017-11-15 PROCEDURE — 51705 CHANGE OF BLADDER TUBE: CPT

## 2017-11-21 DIAGNOSIS — N32.0 BLADDER-NECK OBSTRUCTION: ICD-10-CM

## 2017-11-21 DIAGNOSIS — N35.9 URETHRAL STRICTURE, UNSPECIFIED: ICD-10-CM

## 2017-12-03 ENCOUNTER — EMERGENCY (EMERGENCY)
Facility: HOSPITAL | Age: 62
LOS: 1 days | Discharge: HOME | End: 2017-12-03
Admitting: EMERGENCY MEDICINE
Payer: COMMERCIAL

## 2017-12-03 DIAGNOSIS — Z98.89 OTHER SPECIFIED POSTPROCEDURAL STATES: Chronic | ICD-10-CM

## 2017-12-03 DIAGNOSIS — T83.011A BREAKDOWN (MECHANICAL) OF INDWELLING URETHRAL CATHETER, INITIAL ENCOUNTER: ICD-10-CM
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
## 2017-12-03 DIAGNOSIS — Z93.59 OTHER CYSTOSTOMY STATUS: Chronic | ICD-10-CM

## 2017-12-03 DIAGNOSIS — Z79.899 OTHER LONG TERM (CURRENT) DRUG THERAPY: ICD-10-CM
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
## 2017-12-03 DIAGNOSIS — I10 ESSENTIAL (PRIMARY) HYPERTENSION: ICD-10-CM

## 2017-12-03 DIAGNOSIS — F32.9 MAJOR DEPRESSIVE DISORDER, SINGLE EPISODE, UNSPECIFIED: ICD-10-CM

## 2017-12-03 PROCEDURE — 81003 URINALYSIS AUTO W/O SCOPE: CPT

## 2017-12-03 PROCEDURE — 99284 EMERGENCY DEPT VISIT MOD MDM: CPT

## 2017-12-03 PROCEDURE — 87086 URINE CULTURE/COLONY COUNT: CPT

## 2017-12-03 PROCEDURE — 81002 URINALYSIS NONAUTO W/O SCOPE: CPT

## 2017-12-03 PROCEDURE — 51701 INSERT BLADDER CATHETER: CPT

## 2017-12-03 PROCEDURE — 99283 EMERGENCY DEPT VISIT LOW MDM: CPT | Mod: 25

## 2017-12-06 ENCOUNTER — OUTPATIENT (OUTPATIENT)
Dept: OUTPATIENT SERVICES | Facility: HOSPITAL | Age: 62
LOS: 1 days | End: 2017-12-06
Payer: MEDICARE

## 2017-12-06 ENCOUNTER — APPOINTMENT (OUTPATIENT)
Dept: UROLOGY | Facility: CLINIC | Age: 62
End: 2017-12-06
Payer: MEDICARE

## 2017-12-06 DIAGNOSIS — Z98.89 OTHER SPECIFIED POSTPROCEDURAL STATES: Chronic | ICD-10-CM

## 2017-12-06 DIAGNOSIS — R35.0 FREQUENCY OF MICTURITION: ICD-10-CM

## 2017-12-06 DIAGNOSIS — Z93.59 OTHER CYSTOSTOMY STATUS: Chronic | ICD-10-CM

## 2017-12-06 PROCEDURE — 52000 CYSTOURETHROSCOPY: CPT

## 2017-12-06 PROCEDURE — 99213 OFFICE O/P EST LOW 20 MIN: CPT | Mod: 25

## 2017-12-08 DIAGNOSIS — N32.89 OTHER SPECIFIED DISORDERS OF BLADDER: ICD-10-CM

## 2017-12-08 DIAGNOSIS — N21.0 CALCULUS IN BLADDER: ICD-10-CM

## 2017-12-08 DIAGNOSIS — C67.9 MALIGNANT NEOPLASM OF BLADDER, UNSPECIFIED: ICD-10-CM

## 2017-12-08 DIAGNOSIS — N32.0 BLADDER-NECK OBSTRUCTION: ICD-10-CM

## 2017-12-08 LAB
APPEARANCE: ABNORMAL
BACTERIA UR CULT: NORMAL
BACTERIA: ABNORMAL
BILIRUBIN URINE: NEGATIVE
BLOOD URINE: ABNORMAL
COLOR: YELLOW
CORE LAB FLUID CYTOLOGY: NORMAL
GLUCOSE QUALITATIVE U: NEGATIVE
HYALINE CASTS: 0 /LPF
KETONES URINE: NEGATIVE
LEUKOCYTE ESTERASE URINE: ABNORMAL
MICROSCOPIC-UA: NORMAL
NITRITE URINE: NEGATIVE
PH URINE: 6.5
PROTEIN URINE: 300
RED BLOOD CELLS URINE: 20 /HPF
SPECIFIC GRAVITY URINE: 1.02
SQUAMOUS EPITHELIAL CELLS: 2 /HPF
UROBILINOGEN URINE: NEGATIVE
WHITE BLOOD CELLS URINE: >720 /HPF

## 2017-12-21 ENCOUNTER — APPOINTMENT (OUTPATIENT)
Dept: UROLOGY | Facility: CLINIC | Age: 62
End: 2017-12-21
Payer: MEDICARE

## 2017-12-21 ENCOUNTER — OUTPATIENT (OUTPATIENT)
Dept: OUTPATIENT SERVICES | Facility: HOSPITAL | Age: 62
LOS: 1 days | End: 2017-12-21
Payer: MEDICARE

## 2017-12-21 VITALS
TEMPERATURE: 97.4 F | HEART RATE: 56 BPM | SYSTOLIC BLOOD PRESSURE: 146 MMHG | RESPIRATION RATE: 17 BRPM | DIASTOLIC BLOOD PRESSURE: 73 MMHG

## 2017-12-21 DIAGNOSIS — R35.0 FREQUENCY OF MICTURITION: ICD-10-CM

## 2017-12-21 DIAGNOSIS — Z98.89 OTHER SPECIFIED POSTPROCEDURAL STATES: Chronic | ICD-10-CM

## 2017-12-21 DIAGNOSIS — Z01.818 ENCOUNTER FOR OTHER PREPROCEDURAL EXAMINATION: ICD-10-CM

## 2017-12-21 DIAGNOSIS — Z93.59 OTHER CYSTOSTOMY STATUS: Chronic | ICD-10-CM

## 2017-12-21 PROCEDURE — C2627: CPT

## 2017-12-21 PROCEDURE — 51705 CHANGE OF BLADDER TUBE: CPT

## 2017-12-22 ENCOUNTER — APPOINTMENT (OUTPATIENT)
Dept: FAMILY MEDICINE | Facility: CLINIC | Age: 62
End: 2017-12-22
Payer: MEDICARE

## 2017-12-22 ENCOUNTER — APPOINTMENT (OUTPATIENT)
Dept: FAMILY MEDICINE | Facility: CLINIC | Age: 62
End: 2017-12-22

## 2017-12-22 VITALS
HEIGHT: 72 IN | WEIGHT: 246 LBS | RESPIRATION RATE: 16 BRPM | DIASTOLIC BLOOD PRESSURE: 80 MMHG | HEART RATE: 54 BPM | BODY MASS INDEX: 33.32 KG/M2 | TEMPERATURE: 97.8 F | OXYGEN SATURATION: 97 % | SYSTOLIC BLOOD PRESSURE: 151 MMHG

## 2017-12-22 DIAGNOSIS — N32.0 BLADDER-NECK OBSTRUCTION: ICD-10-CM

## 2017-12-22 PROCEDURE — 82962 GLUCOSE BLOOD TEST: CPT

## 2017-12-22 PROCEDURE — 99212 OFFICE O/P EST SF 10 MIN: CPT

## 2017-12-27 LAB — GLUCOSE BLDC GLUCOMTR-MCNC: 103

## 2017-12-29 ENCOUNTER — OUTPATIENT (OUTPATIENT)
Dept: OUTPATIENT SERVICES | Facility: HOSPITAL | Age: 62
LOS: 1 days | End: 2017-12-29
Payer: MEDICARE

## 2017-12-29 VITALS
DIASTOLIC BLOOD PRESSURE: 84 MMHG | TEMPERATURE: 98 F | HEART RATE: 57 BPM | HEIGHT: 71 IN | SYSTOLIC BLOOD PRESSURE: 140 MMHG | RESPIRATION RATE: 16 BRPM | WEIGHT: 240.08 LBS | OXYGEN SATURATION: 97 %

## 2017-12-29 DIAGNOSIS — E66.9 OBESITY, UNSPECIFIED: ICD-10-CM

## 2017-12-29 DIAGNOSIS — N21.0 CALCULUS IN BLADDER: ICD-10-CM

## 2017-12-29 DIAGNOSIS — Z93.59 OTHER CYSTOSTOMY STATUS: Chronic | ICD-10-CM

## 2017-12-29 DIAGNOSIS — Z98.89 OTHER SPECIFIED POSTPROCEDURAL STATES: Chronic | ICD-10-CM

## 2017-12-29 LAB
APPEARANCE UR: SIGNIFICANT CHANGE UP
BACTERIA # UR AUTO: SIGNIFICANT CHANGE UP
BILIRUB UR-MCNC: NEGATIVE — SIGNIFICANT CHANGE UP
BLOOD UR QL VISUAL: HIGH
BUN SERPL-MCNC: 23 MG/DL — SIGNIFICANT CHANGE UP (ref 7–23)
CALCIUM SERPL-MCNC: 9.3 MG/DL — SIGNIFICANT CHANGE UP (ref 8.4–10.5)
CHLORIDE SERPL-SCNC: 102 MMOL/L — SIGNIFICANT CHANGE UP (ref 98–107)
CO2 SERPL-SCNC: 27 MMOL/L — SIGNIFICANT CHANGE UP (ref 22–31)
COLOR SPEC: YELLOW — SIGNIFICANT CHANGE UP
CREAT SERPL-MCNC: 1.31 MG/DL — HIGH (ref 0.5–1.3)
GLUCOSE SERPL-MCNC: 88 MG/DL — SIGNIFICANT CHANGE UP (ref 70–99)
GLUCOSE UR-MCNC: NEGATIVE — SIGNIFICANT CHANGE UP
HBA1C BLD-MCNC: 6.1 % — HIGH (ref 4–5.6)
HCT VFR BLD CALC: 44.3 % — SIGNIFICANT CHANGE UP (ref 39–50)
HGB BLD-MCNC: 13.6 G/DL — SIGNIFICANT CHANGE UP (ref 13–17)
KETONES UR-MCNC: NEGATIVE — SIGNIFICANT CHANGE UP
LEUKOCYTE ESTERASE UR-ACNC: HIGH
MCHC RBC-ENTMCNC: 25 PG — LOW (ref 27–34)
MCHC RBC-ENTMCNC: 30.7 % — LOW (ref 32–36)
MCV RBC AUTO: 81.6 FL — SIGNIFICANT CHANGE UP (ref 80–100)
MUCOUS THREADS # UR AUTO: SIGNIFICANT CHANGE UP
NITRITE UR-MCNC: POSITIVE — HIGH
NRBC # FLD: 0 — SIGNIFICANT CHANGE UP
PH UR: 6 — SIGNIFICANT CHANGE UP (ref 4.6–8)
PLATELET # BLD AUTO: 190 K/UL — SIGNIFICANT CHANGE UP (ref 150–400)
PMV BLD: 10.7 FL — SIGNIFICANT CHANGE UP (ref 7–13)
POTASSIUM SERPL-MCNC: 4.4 MMOL/L — SIGNIFICANT CHANGE UP (ref 3.5–5.3)
POTASSIUM SERPL-SCNC: 4.4 MMOL/L — SIGNIFICANT CHANGE UP (ref 3.5–5.3)
PROT UR-MCNC: 30 MG/DL — HIGH
RBC # BLD: 5.43 M/UL — SIGNIFICANT CHANGE UP (ref 4.2–5.8)
RBC # FLD: 17.3 % — HIGH (ref 10.3–14.5)
RBC CASTS # UR COMP ASSIST: SIGNIFICANT CHANGE UP (ref 0–?)
SODIUM SERPL-SCNC: 142 MMOL/L — SIGNIFICANT CHANGE UP (ref 135–145)
SP GR SPEC: 1.03 — SIGNIFICANT CHANGE UP (ref 1–1.04)
UROBILINOGEN FLD QL: NORMAL MG/DL — SIGNIFICANT CHANGE UP
WBC # BLD: 7.88 K/UL — SIGNIFICANT CHANGE UP (ref 3.8–10.5)
WBC # FLD AUTO: 7.88 K/UL — SIGNIFICANT CHANGE UP (ref 3.8–10.5)
WBC UR QL: >50 — HIGH (ref 0–?)

## 2017-12-29 PROCEDURE — 93010 ELECTROCARDIOGRAM REPORT: CPT

## 2017-12-29 RX ORDER — SODIUM CHLORIDE 9 MG/ML
1000 INJECTION, SOLUTION INTRAVENOUS
Qty: 0 | Refills: 0 | Status: DISCONTINUED | OUTPATIENT
Start: 2018-01-09 | End: 2018-01-24

## 2017-12-29 NOTE — H&P PST ADULT - NEGATIVE CARDIOVASCULAR SYMPTOMS
no chest pain/no palpitations/no dyspnea on exertion/no claudication/no orthopnea/no peripheral edema/no paroxysmal nocturnal dyspnea

## 2017-12-29 NOTE — H&P PST ADULT - GASTROINTESTINAL DETAILS
normal/soft/nontender no bruit/no guarding/no rigidity/no distention/soft/no masses palpable/no rebound tenderness/normal/nontender/bowel sounds normal/no organomegaly

## 2017-12-29 NOTE — H&P PST ADULT - MUSCULOSKELETAL
details… No joint pain, swelling or deformity; no limitation of movement detailed exam normal strength/ROM intact

## 2017-12-29 NOTE — H&P PST ADULT - PMH
Anxiety    HTN (Hypertension)    Major depressive disorder    Prostate Cancer    Urethral stricture    Urinary (tract) obstruction    UTI (urinary tract infection) Anxiety    Diabetes mellitus  pre diabetic  HTN (Hypertension)    Major depressive disorder    Obese    Other calculus in bladder    Prostate Cancer    Urethral stricture    Urinary (tract) obstruction    UTI (urinary tract infection)

## 2017-12-29 NOTE — H&P PST ADULT - RS GEN PE MLT RESP DETAILS PC
breath sounds equal/normal/airway patent breath sounds equal/no rales/respirations non-labored/clear to auscultation bilaterally/good air movement/no chest wall tenderness/no intercostal retractions/no wheezes/no rhonchi/no subcutaneous emphysema

## 2017-12-29 NOTE — H&P PST ADULT - GENITOURINARY COMMENTS
suprapubic cath secondary to ureter stricture, cystoscopy 2 weeks ago identified bladder stones Urinary catheter (SPT) in place

## 2017-12-29 NOTE — H&P PST ADULT - NEGATIVE BREAST SYMPTOMS
no breast tenderness R/no nipple discharge R/no breast lump R/no breast lump L/no nipple discharge L

## 2017-12-29 NOTE — H&P PST ADULT - HISTORY OF PRESENT ILLNESS
63y/o male scheduled for cystoscopy, laser lithopaxy, possible bladder biopsy and fulguration on 1/9/2018.  Pt states, " h/o HTN, prostate ca s/p robotic prostatectomy (2011) and adjuvant XRT has had continuous issues since surgery - mixed urinary incontinence, urethral stricture, hematuria, UTI, s/p multiple cystoscopies, last one in 12/2015, SPT placement (8/2015). Patient had cysto revealing suspicious areas. Plan for repeat Cystoscopy, Bladder Biopsy and Fulguration. 61y/o male scheduled for cystoscopy, laser lithopaxy, possible bladder biopsy and fulguration on 1/9/2018.  Pt states, " hx , prostate ca s/p robotic prostatectomy (2011) and adjuvant XRT has had continuous issues since surgery - mixed urinary incontinence, urethral stricture, hematuria, UTI, s/p multiple cystoscopies, last one in 12/2015, SPT placement (8/2015).  Two weeks ago underwent cystoscopy in the office identified bladder stones."

## 2017-12-29 NOTE — H&P PST ADULT - NEGATIVE GENERAL SYMPTOMS
no malaise/no weight loss/no fever/no polyphagia/no polyuria/no polydipsia/no fatigue/no weight gain/no chills/no sweating/no anorexia

## 2017-12-29 NOTE — H&P PST ADULT - PROBLEM SELECTOR PLAN 1
Pt scheduled for cystoscopy, laser litholopaxy, possible bladder biopsy and fulguration on 1/9/2018.  labs done results pending, ekg done.  Preop teaching done, pt able to verbalize understanding.

## 2017-12-29 NOTE — H&P PST ADULT - NSANTHOSAYNRD_GEN_A_CORE
No. DERICK screening performed.  STOP BANG Legend: 0-2 = LOW Risk; 3-4 = INTERMEDIATE Risk; 5-8 = HIGH Risk

## 2017-12-29 NOTE — H&P PST ADULT - PSH
H/O cystoscopy  - multiple  History of prostatectomy  robotic, 2011, s/p radiation  S/P Appendectomy  40 years ago  Suprapubic catheter  8/2015 H/O cystoscopy  - multiple, last  bladder bx fulguration 2016  History of prostatectomy  robotic, 2011, s/p radiation  S/P Appendectomy  40 years ago  Suprapubic catheter  8/2015

## 2017-12-30 LAB — SPECIMEN SOURCE: SIGNIFICANT CHANGE UP

## 2017-12-31 LAB
-  AMIKACIN: SIGNIFICANT CHANGE UP
-  AMPICILLIN/SULBACTAM: SIGNIFICANT CHANGE UP
-  AMPICILLIN: SIGNIFICANT CHANGE UP
-  AZTREONAM: SIGNIFICANT CHANGE UP
-  CEFAZOLIN: SIGNIFICANT CHANGE UP
-  CEFEPIME: SIGNIFICANT CHANGE UP
-  CEFOXITIN: SIGNIFICANT CHANGE UP
-  CEFTAZIDIME: SIGNIFICANT CHANGE UP
-  CEFTRIAXONE: SIGNIFICANT CHANGE UP
-  CIPROFLOXACIN: SIGNIFICANT CHANGE UP
-  ERTAPENEM: SIGNIFICANT CHANGE UP
-  GENTAMICIN: SIGNIFICANT CHANGE UP
-  IMIPENEM: SIGNIFICANT CHANGE UP
-  LEVOFLOXACIN: SIGNIFICANT CHANGE UP
-  MEROPENEM: SIGNIFICANT CHANGE UP
-  NITROFURANTOIN: SIGNIFICANT CHANGE UP
-  PIPERACILLIN/TAZOBACTAM: SIGNIFICANT CHANGE UP
-  TIGECYCLINE: SIGNIFICANT CHANGE UP
-  TOBRAMYCIN: SIGNIFICANT CHANGE UP
-  TRIMETHOPRIM/SULFAMETHOXAZOLE: SIGNIFICANT CHANGE UP
BACTERIA UR CULT: SIGNIFICANT CHANGE UP
METHOD TYPE: SIGNIFICANT CHANGE UP
ORGANISM # SPEC MICROSCOPIC CNT: SIGNIFICANT CHANGE UP
ORGANISM # SPEC MICROSCOPIC CNT: SIGNIFICANT CHANGE UP

## 2018-01-02 LAB
APPEARANCE: ABNORMAL
BACTERIA UR CULT: NORMAL
BACTERIA: ABNORMAL
BILIRUBIN URINE: NEGATIVE
BLOOD URINE: ABNORMAL
COLOR: YELLOW
GLUCOSE QUALITATIVE U: NEGATIVE MG/DL
HYALINE CASTS: 2 /LPF
KETONES URINE: NEGATIVE
LEUKOCYTE ESTERASE URINE: ABNORMAL
MICROSCOPIC-UA: NORMAL
NITRITE URINE: NEGATIVE
PH URINE: >8.5
PROTEIN URINE: NEGATIVE MG/DL
RED BLOOD CELLS URINE: 2 /HPF
SPECIFIC GRAVITY URINE: 1
SQUAMOUS EPITHELIAL CELLS: 3 /HPF
URINE COMMENTS: NORMAL
UROBILINOGEN URINE: NEGATIVE MG/DL
WHITE BLOOD CELLS URINE: 4 /HPF

## 2018-01-05 ENCOUNTER — RESULT REVIEW (OUTPATIENT)
Age: 63
End: 2018-01-05

## 2018-01-08 NOTE — ASU PATIENT PROFILE, ADULT - PSH
H/O cystoscopy  - multiple, last  bladder bx fulguration 2016  History of prostatectomy  robotic, 2011, s/p radiation  S/P Appendectomy  40 years ago  Suprapubic catheter  8/2015

## 2018-01-08 NOTE — ASU PATIENT PROFILE, ADULT - PMH
Anxiety    Diabetes mellitus  pre diabetic  HTN (Hypertension)    Major depressive disorder    Obese    Other calculus in bladder    Prostate Cancer    Urethral stricture    Urinary (tract) obstruction    UTI (urinary tract infection)

## 2018-01-09 ENCOUNTER — OUTPATIENT (OUTPATIENT)
Dept: OUTPATIENT SERVICES | Facility: HOSPITAL | Age: 63
LOS: 1 days | Discharge: HOME | End: 2018-01-09
Payer: MEDICARE

## 2018-01-09 ENCOUNTER — APPOINTMENT (OUTPATIENT)
Dept: UROLOGY | Facility: HOSPITAL | Age: 63
End: 2018-01-09

## 2018-01-09 ENCOUNTER — RESULT REVIEW (OUTPATIENT)
Age: 63
End: 2018-01-09

## 2018-01-09 ENCOUNTER — TRANSCRIPTION ENCOUNTER (OUTPATIENT)
Age: 63
End: 2018-01-09

## 2018-01-09 VITALS
RESPIRATION RATE: 16 BRPM | DIASTOLIC BLOOD PRESSURE: 71 MMHG | SYSTOLIC BLOOD PRESSURE: 130 MMHG | HEIGHT: 71 IN | OXYGEN SATURATION: 96 % | WEIGHT: 240.08 LBS | HEART RATE: 46 BPM | TEMPERATURE: 98 F

## 2018-01-09 VITALS
RESPIRATION RATE: 20 BRPM | OXYGEN SATURATION: 98 % | DIASTOLIC BLOOD PRESSURE: 77 MMHG | SYSTOLIC BLOOD PRESSURE: 151 MMHG | HEART RATE: 53 BPM

## 2018-01-09 DIAGNOSIS — N21.0 CALCULUS IN BLADDER: ICD-10-CM

## 2018-01-09 DIAGNOSIS — Z93.59 OTHER CYSTOSTOMY STATUS: Chronic | ICD-10-CM

## 2018-01-09 DIAGNOSIS — Z98.89 OTHER SPECIFIED POSTPROCEDURAL STATES: Chronic | ICD-10-CM

## 2018-01-09 LAB
BLD GP AB SCN SERPL QL: NEGATIVE — SIGNIFICANT CHANGE UP
GLUCOSE BLDC GLUCOMTR-MCNC: 95 MG/DL — SIGNIFICANT CHANGE UP (ref 70–99)
RH IG SCN BLD-IMP: POSITIVE — SIGNIFICANT CHANGE UP

## 2018-01-09 PROCEDURE — 52234 CYSTOSCOPY AND TREATMENT: CPT

## 2018-01-09 PROCEDURE — 88307 TISSUE EXAM BY PATHOLOGIST: CPT | Mod: 26

## 2018-01-09 PROCEDURE — 52318 REMOVE BLADDER STONE: CPT

## 2018-01-09 RX ORDER — FENTANYL CITRATE 50 UG/ML
50 INJECTION INTRAVENOUS
Qty: 0 | Refills: 0 | Status: DISCONTINUED | OUTPATIENT
Start: 2018-01-09 | End: 2018-01-09

## 2018-01-09 RX ORDER — ONDANSETRON 8 MG/1
4 TABLET, FILM COATED ORAL ONCE
Qty: 0 | Refills: 0 | Status: DISCONTINUED | OUTPATIENT
Start: 2018-01-09 | End: 2018-01-09

## 2018-01-09 RX ORDER — SODIUM CHLORIDE 9 MG/ML
1000 INJECTION, SOLUTION INTRAVENOUS
Qty: 0 | Refills: 0 | Status: DISCONTINUED | OUTPATIENT
Start: 2018-01-09 | End: 2018-01-24

## 2018-01-09 RX ADMIN — FENTANYL CITRATE 50 MICROGRAM(S): 50 INJECTION INTRAVENOUS at 14:26

## 2018-01-09 RX ADMIN — SODIUM CHLORIDE 150 MILLILITER(S): 9 INJECTION, SOLUTION INTRAVENOUS at 14:00

## 2018-01-09 RX ADMIN — FENTANYL CITRATE 50 MICROGRAM(S): 50 INJECTION INTRAVENOUS at 14:10

## 2018-01-09 NOTE — ASU DISCHARGE PLAN (ADULT/PEDIATRIC). - SPECIAL INSTRUCTIONS
You may take over the counter tylenol and/or ibuprofen as directed for pain.     Drink plenty of fluids. You may notice blood in your urine for several days, which is normal.    FINISH your course of bactrim (antibiotic) for your urinary tract infection.    Dr. Castillo would like to see you in 3 weeks . Please call the office to schedule/confirm an appointment.    [facility]  [address] [place], [place] [address] [phone]

## 2018-01-09 NOTE — ASU DISCHARGE PLAN (ADULT/PEDIATRIC). - MEDICATION SUMMARY - MEDICATIONS TO TAKE
I will START or STAY ON the medications listed below when I get home from the hospital:    lisinopril 10 mg oral tablet  -- 2 tab(s) by mouth once a day in morning ( total of 20 mg in morning )  -- Indication: For home med    lisinopril 10 mg oral tablet  -- 1 tab(s) by mouth once a day (at bedtime)  -- Indication: For home med    traZODone 50 mg oral tablet  -- 2 tab(s) by mouth once a day (at bedtime)  -- Indication: For home med    metFORMIN 500 mg oral tablet  -- 1 tab(s) by mouth once a day (at bedtime)  -- Indication: For home med    atenolol 50 mg oral tablet  -- 1 tab(s) by mouth once a day in morning  -- Indication: For home med    amLODIPine 5 mg oral tablet  -- 1 tab(s) by mouth once a day in morning  -- Indication: For home med    oxybutynin 5 mg oral tablet  -- 1 tab(s) by mouth 3 times a day, As Needed  -- Indication: For home med

## 2018-01-09 NOTE — ASU DISCHARGE PLAN (ADULT/PEDIATRIC). - NOTIFY
Bleeding that does not stop/Fever greater than 101/Persistent Nausea and Vomiting/Unable to Urinate/Pain not relieved by Medications Bleeding that does not stop/Fever greater than 101/Increased Irritability or Sluggishness/Excessive Diarrhea/Inability to Tolerate Liquids or Foods/Unable to Urinate/Pain not relieved by Medications/Persistent Nausea and Vomiting

## 2018-01-09 NOTE — BRIEF OPERATIVE NOTE - PROCEDURE
<<-----Click on this checkbox to enter Procedure Cystoscopy with biopsy and fulguration of bladder  01/09/2018    Active  ERENDIRA  Cystolitholapaxy  01/09/2018    Active  ERENDIRA

## 2018-01-11 LAB
STONE ANALYSIS-IMP: SIGNIFICANT CHANGE UP
SURGICAL PATHOLOGY STUDY: SIGNIFICANT CHANGE UP

## 2018-01-20 ENCOUNTER — EMERGENCY (EMERGENCY)
Facility: HOSPITAL | Age: 63
LOS: 1 days | Discharge: HOME | End: 2018-01-20
Admitting: EMERGENCY MEDICINE
Payer: COMMERCIAL

## 2018-01-20 DIAGNOSIS — I10 ESSENTIAL (PRIMARY) HYPERTENSION: ICD-10-CM

## 2018-01-20 DIAGNOSIS — Z93.59 OTHER CYSTOSTOMY STATUS: Chronic | ICD-10-CM

## 2018-01-20 DIAGNOSIS — N40.0 BENIGN PROSTATIC HYPERPLASIA WITHOUT LOWER URINARY TRACT SYMPTOMS: ICD-10-CM

## 2018-01-20 DIAGNOSIS — Z98.89 OTHER SPECIFIED POSTPROCEDURAL STATES: Chronic | ICD-10-CM

## 2018-01-20 DIAGNOSIS — F32.9 MAJOR DEPRESSIVE DISORDER, SINGLE EPISODE, UNSPECIFIED: ICD-10-CM

## 2018-01-20 DIAGNOSIS — N39.0 URINARY TRACT INFECTION, SITE NOT SPECIFIED: ICD-10-CM

## 2018-01-20 DIAGNOSIS — Z46.6 ENCOUNTER FOR FITTING AND ADJUSTMENT OF URINARY DEVICE: ICD-10-CM

## 2018-01-20 DIAGNOSIS — Z79.899 OTHER LONG TERM (CURRENT) DRUG THERAPY: ICD-10-CM

## 2018-01-20 PROCEDURE — 85027 COMPLETE CBC AUTOMATED: CPT

## 2018-01-20 PROCEDURE — 80048 BASIC METABOLIC PNL TOTAL CA: CPT

## 2018-01-20 PROCEDURE — 99283 EMERGENCY DEPT VISIT LOW MDM: CPT

## 2018-01-20 PROCEDURE — 99284 EMERGENCY DEPT VISIT MOD MDM: CPT

## 2018-01-20 PROCEDURE — 81003 URINALYSIS AUTO W/O SCOPE: CPT

## 2018-01-20 PROCEDURE — 87086 URINE CULTURE/COLONY COUNT: CPT

## 2018-01-22 ENCOUNTER — MESSAGE (OUTPATIENT)
Age: 63
End: 2018-01-22

## 2018-01-24 ENCOUNTER — RX RENEWAL (OUTPATIENT)
Age: 63
End: 2018-01-24

## 2018-01-24 RX ORDER — BLOOD SUGAR DIAGNOSTIC
STRIP MISCELLANEOUS TWICE DAILY
Qty: 1 | Refills: 5 | Status: ACTIVE | COMMUNITY
Start: 2018-01-24 | End: 1900-01-01

## 2018-01-24 RX ORDER — LANCETS
EACH MISCELLANEOUS
Qty: 1 | Refills: 5 | Status: ACTIVE | COMMUNITY
Start: 2018-01-24 | End: 1900-01-01

## 2018-01-26 ENCOUNTER — APPOINTMENT (OUTPATIENT)
Dept: UROLOGY | Facility: CLINIC | Age: 63
End: 2018-01-26
Payer: MEDICARE

## 2018-01-26 ENCOUNTER — OUTPATIENT (OUTPATIENT)
Dept: OUTPATIENT SERVICES | Facility: HOSPITAL | Age: 63
LOS: 1 days | End: 2018-01-26
Payer: MEDICARE

## 2018-01-26 VITALS
TEMPERATURE: 98.5 F | HEART RATE: 60 BPM | DIASTOLIC BLOOD PRESSURE: 78 MMHG | RESPIRATION RATE: 16 BRPM | SYSTOLIC BLOOD PRESSURE: 148 MMHG

## 2018-01-26 DIAGNOSIS — Z93.59 OTHER CYSTOSTOMY STATUS: Chronic | ICD-10-CM

## 2018-01-26 DIAGNOSIS — Z98.89 OTHER SPECIFIED POSTPROCEDURAL STATES: Chronic | ICD-10-CM

## 2018-01-26 PROCEDURE — 51705 CHANGE OF BLADDER TUBE: CPT

## 2018-01-26 PROCEDURE — C2627: CPT

## 2018-01-29 ENCOUNTER — APPOINTMENT (OUTPATIENT)
Dept: UROLOGY | Facility: CLINIC | Age: 63
End: 2018-01-29

## 2018-01-31 ENCOUNTER — APPOINTMENT (OUTPATIENT)
Dept: UROLOGY | Facility: CLINIC | Age: 63
End: 2018-01-31
Payer: MEDICARE

## 2018-01-31 ENCOUNTER — RX RENEWAL (OUTPATIENT)
Age: 63
End: 2018-01-31

## 2018-01-31 ENCOUNTER — MEDICATION RENEWAL (OUTPATIENT)
Age: 63
End: 2018-01-31

## 2018-01-31 DIAGNOSIS — N32.0 BLADDER-NECK OBSTRUCTION: ICD-10-CM

## 2018-01-31 DIAGNOSIS — N35.9 URETHRAL STRICTURE, UNSPECIFIED: ICD-10-CM

## 2018-01-31 PROCEDURE — 99024 POSTOP FOLLOW-UP VISIT: CPT

## 2018-02-09 ENCOUNTER — MESSAGE (OUTPATIENT)
Age: 63
End: 2018-02-09

## 2018-02-16 ENCOUNTER — RX RENEWAL (OUTPATIENT)
Age: 63
End: 2018-02-16

## 2018-02-21 ENCOUNTER — APPOINTMENT (OUTPATIENT)
Dept: UROLOGY | Facility: CLINIC | Age: 63
End: 2018-02-21
Payer: MEDICARE

## 2018-02-21 ENCOUNTER — OUTPATIENT (OUTPATIENT)
Dept: OUTPATIENT SERVICES | Facility: HOSPITAL | Age: 63
LOS: 1 days | End: 2018-02-21
Payer: MEDICARE

## 2018-02-21 VITALS
TEMPERATURE: 97.5 F | DIASTOLIC BLOOD PRESSURE: 72 MMHG | SYSTOLIC BLOOD PRESSURE: 127 MMHG | HEART RATE: 66 BPM | RESPIRATION RATE: 16 BRPM

## 2018-02-21 DIAGNOSIS — R35.0 FREQUENCY OF MICTURITION: ICD-10-CM

## 2018-02-21 DIAGNOSIS — Z98.89 OTHER SPECIFIED POSTPROCEDURAL STATES: Chronic | ICD-10-CM

## 2018-02-21 DIAGNOSIS — Z93.59 OTHER CYSTOSTOMY STATUS: Chronic | ICD-10-CM

## 2018-02-21 PROCEDURE — 51705 CHANGE OF BLADDER TUBE: CPT

## 2018-02-21 PROCEDURE — C2627: CPT

## 2018-02-27 DIAGNOSIS — N32.0 BLADDER-NECK OBSTRUCTION: ICD-10-CM

## 2018-03-02 ENCOUNTER — APPOINTMENT (OUTPATIENT)
Dept: UROLOGY | Facility: CLINIC | Age: 63
End: 2018-03-02
Payer: MEDICARE

## 2018-03-02 ENCOUNTER — APPOINTMENT (OUTPATIENT)
Dept: UROLOGY | Facility: CLINIC | Age: 63
End: 2018-03-02

## 2018-03-02 PROCEDURE — 99214 OFFICE O/P EST MOD 30 MIN: CPT

## 2018-03-08 ENCOUNTER — MEDICATION RENEWAL (OUTPATIENT)
Age: 63
End: 2018-03-08

## 2018-03-09 ENCOUNTER — APPOINTMENT (OUTPATIENT)
Dept: UROLOGY | Facility: CLINIC | Age: 63
End: 2018-03-09
Payer: MEDICARE

## 2018-03-09 PROCEDURE — 99214 OFFICE O/P EST MOD 30 MIN: CPT

## 2018-03-14 ENCOUNTER — APPOINTMENT (OUTPATIENT)
Dept: UROLOGY | Facility: CLINIC | Age: 63
End: 2018-03-14
Payer: MEDICARE

## 2018-03-14 ENCOUNTER — OUTPATIENT (OUTPATIENT)
Dept: OUTPATIENT SERVICES | Facility: HOSPITAL | Age: 63
LOS: 1 days | End: 2018-03-14
Payer: MEDICARE

## 2018-03-14 VITALS — HEART RATE: 64 BPM | DIASTOLIC BLOOD PRESSURE: 78 MMHG | SYSTOLIC BLOOD PRESSURE: 132 MMHG | TEMPERATURE: 97.7 F

## 2018-03-14 DIAGNOSIS — Z93.59 OTHER CYSTOSTOMY STATUS: Chronic | ICD-10-CM

## 2018-03-14 DIAGNOSIS — Z98.89 OTHER SPECIFIED POSTPROCEDURAL STATES: Chronic | ICD-10-CM

## 2018-03-14 DIAGNOSIS — R35.0 FREQUENCY OF MICTURITION: ICD-10-CM

## 2018-03-14 PROCEDURE — 51705 CHANGE OF BLADDER TUBE: CPT

## 2018-03-14 PROCEDURE — C2627: CPT

## 2018-03-15 DIAGNOSIS — N32.0 BLADDER-NECK OBSTRUCTION: ICD-10-CM

## 2018-04-02 ENCOUNTER — APPOINTMENT (OUTPATIENT)
Dept: FAMILY MEDICINE | Facility: CLINIC | Age: 63
End: 2018-04-02

## 2018-04-04 ENCOUNTER — OUTPATIENT (OUTPATIENT)
Dept: OUTPATIENT SERVICES | Facility: HOSPITAL | Age: 63
LOS: 1 days | End: 2018-04-04
Payer: MEDICARE

## 2018-04-04 ENCOUNTER — APPOINTMENT (OUTPATIENT)
Dept: UROLOGY | Facility: CLINIC | Age: 63
End: 2018-04-04
Payer: MEDICARE

## 2018-04-04 VITALS
RESPIRATION RATE: 18 BRPM | SYSTOLIC BLOOD PRESSURE: 144 MMHG | DIASTOLIC BLOOD PRESSURE: 80 MMHG | TEMPERATURE: 98.7 F | HEART RATE: 69 BPM

## 2018-04-04 DIAGNOSIS — Z98.89 OTHER SPECIFIED POSTPROCEDURAL STATES: Chronic | ICD-10-CM

## 2018-04-04 DIAGNOSIS — R35.0 FREQUENCY OF MICTURITION: ICD-10-CM

## 2018-04-04 DIAGNOSIS — Z93.59 OTHER CYSTOSTOMY STATUS: Chronic | ICD-10-CM

## 2018-04-04 PROCEDURE — 51705 CHANGE OF BLADDER TUBE: CPT

## 2018-04-04 PROCEDURE — C2627: CPT

## 2018-04-05 ENCOUNTER — RX RENEWAL (OUTPATIENT)
Age: 63
End: 2018-04-05

## 2018-04-05 DIAGNOSIS — C67.9 MALIGNANT NEOPLASM OF BLADDER, UNSPECIFIED: ICD-10-CM

## 2018-04-05 DIAGNOSIS — N32.0 BLADDER-NECK OBSTRUCTION: ICD-10-CM

## 2018-04-10 ENCOUNTER — RX RENEWAL (OUTPATIENT)
Age: 63
End: 2018-04-10

## 2018-04-16 ENCOUNTER — MEDICATION RENEWAL (OUTPATIENT)
Age: 63
End: 2018-04-16

## 2018-04-18 ENCOUNTER — MESSAGE (OUTPATIENT)
Age: 63
End: 2018-04-18

## 2018-04-25 ENCOUNTER — APPOINTMENT (OUTPATIENT)
Dept: UROLOGY | Facility: CLINIC | Age: 63
End: 2018-04-25
Payer: MEDICARE

## 2018-04-25 ENCOUNTER — OUTPATIENT (OUTPATIENT)
Dept: OUTPATIENT SERVICES | Facility: HOSPITAL | Age: 63
LOS: 1 days | End: 2018-04-25
Payer: MEDICARE

## 2018-04-25 VITALS
HEART RATE: 56 BPM | DIASTOLIC BLOOD PRESSURE: 72 MMHG | SYSTOLIC BLOOD PRESSURE: 132 MMHG | RESPIRATION RATE: 16 BRPM | TEMPERATURE: 97.4 F

## 2018-04-25 DIAGNOSIS — Z93.59 OTHER CYSTOSTOMY STATUS: Chronic | ICD-10-CM

## 2018-04-25 DIAGNOSIS — R35.0 FREQUENCY OF MICTURITION: ICD-10-CM

## 2018-04-25 DIAGNOSIS — Z98.89 OTHER SPECIFIED POSTPROCEDURAL STATES: Chronic | ICD-10-CM

## 2018-04-25 PROCEDURE — 51705 CHANGE OF BLADDER TUBE: CPT

## 2018-04-27 ENCOUNTER — OUTPATIENT (OUTPATIENT)
Dept: OUTPATIENT SERVICES | Facility: HOSPITAL | Age: 63
LOS: 1 days | End: 2018-04-27
Payer: MEDICARE

## 2018-04-27 VITALS
DIASTOLIC BLOOD PRESSURE: 90 MMHG | RESPIRATION RATE: 16 BRPM | WEIGHT: 235.89 LBS | HEIGHT: 70 IN | HEART RATE: 544 BPM | TEMPERATURE: 97 F | SYSTOLIC BLOOD PRESSURE: 150 MMHG

## 2018-04-27 DIAGNOSIS — Z98.89 OTHER SPECIFIED POSTPROCEDURAL STATES: Chronic | ICD-10-CM

## 2018-04-27 DIAGNOSIS — I10 ESSENTIAL (PRIMARY) HYPERTENSION: ICD-10-CM

## 2018-04-27 DIAGNOSIS — C67.9 MALIGNANT NEOPLASM OF BLADDER, UNSPECIFIED: ICD-10-CM

## 2018-04-27 DIAGNOSIS — Z93.59 OTHER CYSTOSTOMY STATUS: Chronic | ICD-10-CM

## 2018-04-27 DIAGNOSIS — E11.9 TYPE 2 DIABETES MELLITUS WITHOUT COMPLICATIONS: ICD-10-CM

## 2018-04-27 LAB
ALBUMIN SERPL ELPH-MCNC: 4 G/DL — SIGNIFICANT CHANGE UP (ref 3.3–5)
ALP SERPL-CCNC: 155 U/L — HIGH (ref 40–120)
ALT FLD-CCNC: 11 U/L — SIGNIFICANT CHANGE UP (ref 4–41)
APPEARANCE UR: SIGNIFICANT CHANGE UP
AST SERPL-CCNC: 12 U/L — SIGNIFICANT CHANGE UP (ref 4–40)
BILIRUB SERPL-MCNC: 0.3 MG/DL — SIGNIFICANT CHANGE UP (ref 0.2–1.2)
BILIRUB UR-MCNC: NEGATIVE — SIGNIFICANT CHANGE UP
BLD GP AB SCN SERPL QL: NEGATIVE — SIGNIFICANT CHANGE UP
BLOOD UR QL VISUAL: HIGH
BUN SERPL-MCNC: 28 MG/DL — HIGH (ref 7–23)
CALCIUM SERPL-MCNC: 9.3 MG/DL — SIGNIFICANT CHANGE UP (ref 8.4–10.5)
CHLORIDE SERPL-SCNC: 103 MMOL/L — SIGNIFICANT CHANGE UP (ref 98–107)
CO2 SERPL-SCNC: 27 MMOL/L — SIGNIFICANT CHANGE UP (ref 22–31)
COLOR SPEC: YELLOW — SIGNIFICANT CHANGE UP
CREAT SERPL-MCNC: 1.38 MG/DL — HIGH (ref 0.5–1.3)
GLUCOSE SERPL-MCNC: 102 MG/DL — HIGH (ref 70–99)
GLUCOSE UR-MCNC: NEGATIVE — SIGNIFICANT CHANGE UP
HBA1C BLD-MCNC: 5.7 % — HIGH (ref 4–5.6)
HCT VFR BLD CALC: 44.8 % — SIGNIFICANT CHANGE UP (ref 39–50)
HGB BLD-MCNC: 13.8 G/DL — SIGNIFICANT CHANGE UP (ref 13–17)
KETONES UR-MCNC: NEGATIVE — SIGNIFICANT CHANGE UP
LEUKOCYTE ESTERASE UR-ACNC: HIGH
MCHC RBC-ENTMCNC: 26.3 PG — LOW (ref 27–34)
MCHC RBC-ENTMCNC: 30.8 % — LOW (ref 32–36)
MCV RBC AUTO: 85.5 FL — SIGNIFICANT CHANGE UP (ref 80–100)
NITRITE UR-MCNC: POSITIVE — HIGH
NRBC # FLD: 0 — SIGNIFICANT CHANGE UP
PH UR: 7.5 — SIGNIFICANT CHANGE UP (ref 4.6–8)
PLATELET # BLD AUTO: 220 K/UL — SIGNIFICANT CHANGE UP (ref 150–400)
PMV BLD: 11.2 FL — SIGNIFICANT CHANGE UP (ref 7–13)
POTASSIUM SERPL-MCNC: 4.3 MMOL/L — SIGNIFICANT CHANGE UP (ref 3.5–5.3)
POTASSIUM SERPL-SCNC: 4.3 MMOL/L — SIGNIFICANT CHANGE UP (ref 3.5–5.3)
PROT SERPL-MCNC: 7.3 G/DL — SIGNIFICANT CHANGE UP (ref 6–8.3)
PROT UR-MCNC: 100 MG/DL — SIGNIFICANT CHANGE UP
RBC # BLD: 5.24 M/UL — SIGNIFICANT CHANGE UP (ref 4.2–5.8)
RBC # FLD: 16.5 % — HIGH (ref 10.3–14.5)
RBC CASTS # UR COMP ASSIST: SIGNIFICANT CHANGE UP (ref 0–?)
RH IG SCN BLD-IMP: POSITIVE — SIGNIFICANT CHANGE UP
SODIUM SERPL-SCNC: 141 MMOL/L — SIGNIFICANT CHANGE UP (ref 135–145)
SP GR SPEC: 1.02 — SIGNIFICANT CHANGE UP (ref 1–1.04)
UROBILINOGEN FLD QL: NORMAL MG/DL — SIGNIFICANT CHANGE UP
WBC # BLD: 7.6 K/UL — SIGNIFICANT CHANGE UP (ref 3.8–10.5)
WBC # FLD AUTO: 7.6 K/UL — SIGNIFICANT CHANGE UP (ref 3.8–10.5)
WBC UR QL: >50 — HIGH (ref 0–?)

## 2018-04-27 PROCEDURE — 93010 ELECTROCARDIOGRAM REPORT: CPT

## 2018-04-27 RX ORDER — TRAZODONE HCL 50 MG
2 TABLET ORAL
Qty: 0 | Refills: 0 | COMMUNITY

## 2018-04-27 RX ORDER — LISINOPRIL 2.5 MG/1
2 TABLET ORAL
Qty: 0 | Refills: 0 | COMMUNITY

## 2018-04-27 RX ORDER — METFORMIN HYDROCHLORIDE 850 MG/1
1 TABLET ORAL
Qty: 0 | Refills: 0 | COMMUNITY

## 2018-04-27 RX ORDER — AMLODIPINE BESYLATE 2.5 MG/1
1 TABLET ORAL
Qty: 0 | Refills: 0 | COMMUNITY

## 2018-04-27 RX ORDER — OXYBUTYNIN CHLORIDE 5 MG
1 TABLET ORAL
Qty: 0 | Refills: 0 | COMMUNITY

## 2018-04-27 RX ORDER — LISINOPRIL 2.5 MG/1
1 TABLET ORAL
Qty: 0 | Refills: 0 | COMMUNITY

## 2018-04-27 RX ORDER — METFORMIN HYDROCHLORIDE 850 MG/1
2 TABLET ORAL
Qty: 0 | Refills: 0 | COMMUNITY

## 2018-04-27 NOTE — H&P PST ADULT - NEGATIVE ENMT SYMPTOMS
no hearing difficulty/no dysphagia/no tinnitus/no vertigo/no sinus symptoms/no ear pain/no nasal congestion

## 2018-04-27 NOTE — H&P PST ADULT - PSH
H/O cystoscopy  - multiple  last cystoscopy, laser litholapaxy on 1/2018  History of prostatectomy  robotic, 2011, s/p radiation  S/P Appendectomy  40 years ago  Suprapubic catheter  8/2015

## 2018-04-27 NOTE — H&P PST ADULT - NEGATIVE CARDIOVASCULAR SYMPTOMS
no claudication/no chest pain/no paroxysmal nocturnal dyspnea/no orthopnea/no peripheral edema/no palpitations/no dyspnea on exertion

## 2018-04-27 NOTE — H&P PST ADULT - HISTORY OF PRESENT ILLNESS
62 y/o male scheduled for radical cystectomy and ileal conduit on 5/10/18 presents to pre surgical testing.  Pt reports he has a history of bladder cancer s/p suprapubic catheter placement 8/2015.  Pt reports he is s/p cystoscopy, laser litholapaxy on 1/2018. Pt reports he gets persistent bladder stones that causes his suprapubic catheter to not drain properly. 62 y/o male scheduled for radical cystectomy and ileal conduit on 5/10/18 presents to pre surgical testing.  Pt reports he has a history of bladder cancer s/p multiple cystoscopies and suprapubic catheter placement 8/2015.  Pt reports he is s/p cystoscopy, laser litholapaxy on 1/2018. Pt reports he gets persistent bladder stones that causes his suprapubic catheter to not drain properly. Pt also reports intermittent bladder spasms that are very painful.

## 2018-04-27 NOTE — H&P PST ADULT - PROBLEM SELECTOR PLAN 3
Pt instructed to take amlodipine and atenolol AM of surgery with a sip of water.   Pt met STOP BANG score for DERICK precaution. OR booking notified via fax.

## 2018-04-27 NOTE — H&P PST ADULT - PSYCHIATRIC COMMENTS
has weekly psych appointments Juan Antonio Estevez has weekly psych appointments with Juan Antonio Estevez

## 2018-04-27 NOTE — H&P PST ADULT - NEGATIVE NEUROLOGICAL SYMPTOMS
no transient paralysis/no paresthesias/no vertigo/no loss of sensation/no headache/no weakness/no generalized seizures/no difficulty walking

## 2018-04-27 NOTE — H&P PST ADULT - NEGATIVE MUSCULOSKELETAL SYMPTOMS
no joint swelling/no back pain/no muscle weakness/no muscle cramps/no arthralgia/no leg pain L/no myalgia/no stiffness/no arm pain L/no arm pain R/no leg pain R/no neck pain

## 2018-04-27 NOTE — H&P PST ADULT - CONSTITUTIONAL DETAILS
well-nourished/well-developed/obese/well-groomed/no distress well-nourished/well-groomed/well-developed

## 2018-04-27 NOTE — H&P PST ADULT - PMH
Anxiety    Diabetes mellitus  pre diabetic  HTN (Hypertension)    Major depressive disorder    Malignant neoplasm of bladder    Obese    Other calculus in bladder    Prostate Cancer    Urethral stricture    Urinary (tract) obstruction    UTI (urinary tract infection)

## 2018-04-27 NOTE — H&P PST ADULT - GASTROINTESTINAL COMMENTS
supra pubic cath to carreno bag draining clear yellow urine supra pubic cath to carreno bag draining clear yellow urine, no drainage at site

## 2018-04-27 NOTE — H&P PST ADULT - PROBLEM SELECTOR PLAN 1
Pt is scheduled for radical cystectomy and ileal conduit on 5/10/18.  Pre op instructions including chlorhexidine wash given and pt able to verbalize understanding.   Pt to obtain medical eval as instructed by surgeon, will obtain document.  Pt instructed to take tovias AM of surgery with a sip of water.

## 2018-04-27 NOTE — H&P PST ADULT - MUSCULOSKELETAL
detailed exam no joint warmth/normal strength/no joint erythema/no calf tenderness/no joint swelling/ROM intact details…

## 2018-04-28 LAB — SPECIMEN SOURCE: SIGNIFICANT CHANGE UP

## 2018-04-29 LAB
METHOD TYPE: SIGNIFICANT CHANGE UP
ORGANISM # SPEC MICROSCOPIC CNT: SIGNIFICANT CHANGE UP
ORGANISM # SPEC MICROSCOPIC CNT: SIGNIFICANT CHANGE UP

## 2018-04-30 LAB
-  AMIKACIN: SIGNIFICANT CHANGE UP
-  AMPICILLIN/SULBACTAM: SIGNIFICANT CHANGE UP
-  AMPICILLIN: SIGNIFICANT CHANGE UP
-  AZTREONAM: SIGNIFICANT CHANGE UP
-  CEFAZOLIN: SIGNIFICANT CHANGE UP
-  CEFEPIME: SIGNIFICANT CHANGE UP
-  CEFOXITIN: SIGNIFICANT CHANGE UP
-  CEFTAZIDIME: SIGNIFICANT CHANGE UP
-  CEFTRIAXONE: SIGNIFICANT CHANGE UP
-  CIPROFLOXACIN: SIGNIFICANT CHANGE UP
-  ERTAPENEM: SIGNIFICANT CHANGE UP
-  GENTAMICIN: SIGNIFICANT CHANGE UP
-  IMIPENEM: SIGNIFICANT CHANGE UP
-  LEVOFLOXACIN: SIGNIFICANT CHANGE UP
-  MEROPENEM: SIGNIFICANT CHANGE UP
-  NITROFURANTOIN: SIGNIFICANT CHANGE UP
-  PIPERACILLIN/TAZOBACTAM: SIGNIFICANT CHANGE UP
-  TIGECYCLINE: SIGNIFICANT CHANGE UP
-  TOBRAMYCIN: SIGNIFICANT CHANGE UP
-  TRIMETHOPRIM/SULFAMETHOXAZOLE: SIGNIFICANT CHANGE UP
BACTERIA UR CULT: SIGNIFICANT CHANGE UP
ORGANISM # SPEC MICROSCOPIC CNT: SIGNIFICANT CHANGE UP

## 2018-05-01 ENCOUNTER — APPOINTMENT (OUTPATIENT)
Dept: FAMILY MEDICINE | Facility: CLINIC | Age: 63
End: 2018-05-01
Payer: MEDICARE

## 2018-05-01 VITALS
RESPIRATION RATE: 17 BRPM | SYSTOLIC BLOOD PRESSURE: 140 MMHG | BODY MASS INDEX: 33.32 KG/M2 | HEIGHT: 72 IN | TEMPERATURE: 97.4 F | WEIGHT: 246 LBS | DIASTOLIC BLOOD PRESSURE: 80 MMHG | OXYGEN SATURATION: 97 % | HEART RATE: 55 BPM

## 2018-05-01 PROCEDURE — 99212 OFFICE O/P EST SF 10 MIN: CPT

## 2018-05-02 ENCOUNTER — RESULT REVIEW (OUTPATIENT)
Age: 63
End: 2018-05-02

## 2018-05-08 DIAGNOSIS — N32.0 BLADDER-NECK OBSTRUCTION: ICD-10-CM

## 2018-05-09 ENCOUNTER — TRANSCRIPTION ENCOUNTER (OUTPATIENT)
Age: 63
End: 2018-05-09

## 2018-05-10 ENCOUNTER — INPATIENT (INPATIENT)
Facility: HOSPITAL | Age: 63
LOS: 4 days | Discharge: NURSING, REHAB OR LONG-TERM CARE | End: 2018-05-15
Attending: SPECIALIST | Admitting: SPECIALIST
Payer: MEDICARE

## 2018-05-10 ENCOUNTER — APPOINTMENT (OUTPATIENT)
Dept: UROLOGY | Facility: HOSPITAL | Age: 63
End: 2018-05-10

## 2018-05-10 ENCOUNTER — RESULT REVIEW (OUTPATIENT)
Age: 63
End: 2018-05-10

## 2018-05-10 VITALS
SYSTOLIC BLOOD PRESSURE: 161 MMHG | OXYGEN SATURATION: 95 % | RESPIRATION RATE: 14 BRPM | DIASTOLIC BLOOD PRESSURE: 81 MMHG | TEMPERATURE: 98 F | HEIGHT: 70 IN | WEIGHT: 235.89 LBS | HEART RATE: 54 BPM

## 2018-05-10 DIAGNOSIS — N32.9 BLADDER DISORDER, UNSPECIFIED: Chronic | ICD-10-CM

## 2018-05-10 DIAGNOSIS — Z93.59 OTHER CYSTOSTOMY STATUS: Chronic | ICD-10-CM

## 2018-05-10 DIAGNOSIS — C67.9 MALIGNANT NEOPLASM OF BLADDER, UNSPECIFIED: ICD-10-CM

## 2018-05-10 DIAGNOSIS — Z98.89 OTHER SPECIFIED POSTPROCEDURAL STATES: Chronic | ICD-10-CM

## 2018-05-10 LAB
BASOPHILS # BLD AUTO: 0.02 K/UL — SIGNIFICANT CHANGE UP (ref 0–0.2)
BASOPHILS NFR BLD AUTO: 0.1 % — SIGNIFICANT CHANGE UP (ref 0–2)
BUN SERPL-MCNC: 21 MG/DL — SIGNIFICANT CHANGE UP (ref 7–23)
CALCIUM SERPL-MCNC: 8.1 MG/DL — LOW (ref 8.4–10.5)
CHLORIDE SERPL-SCNC: 104 MMOL/L — SIGNIFICANT CHANGE UP (ref 98–107)
CO2 SERPL-SCNC: 23 MMOL/L — SIGNIFICANT CHANGE UP (ref 22–31)
CREAT SERPL-MCNC: 1.46 MG/DL — HIGH (ref 0.5–1.3)
EOSINOPHIL # BLD AUTO: 0.02 K/UL — SIGNIFICANT CHANGE UP (ref 0–0.5)
EOSINOPHIL NFR BLD AUTO: 0.1 % — SIGNIFICANT CHANGE UP (ref 0–6)
GLUCOSE BLDC GLUCOMTR-MCNC: 108 MG/DL — HIGH (ref 70–99)
GLUCOSE SERPL-MCNC: 170 MG/DL — HIGH (ref 70–99)
HCT VFR BLD CALC: 40.5 % — SIGNIFICANT CHANGE UP (ref 39–50)
HGB BLD-MCNC: 12.6 G/DL — LOW (ref 13–17)
IMM GRANULOCYTES # BLD AUTO: 0.07 # — SIGNIFICANT CHANGE UP
IMM GRANULOCYTES NFR BLD AUTO: 0.4 % — SIGNIFICANT CHANGE UP (ref 0–1.5)
LYMPHOCYTES # BLD AUTO: 1.01 K/UL — SIGNIFICANT CHANGE UP (ref 1–3.3)
LYMPHOCYTES # BLD AUTO: 6.1 % — LOW (ref 13–44)
MCHC RBC-ENTMCNC: 26.8 PG — LOW (ref 27–34)
MCHC RBC-ENTMCNC: 31.1 % — LOW (ref 32–36)
MCV RBC AUTO: 86 FL — SIGNIFICANT CHANGE UP (ref 80–100)
MONOCYTES # BLD AUTO: 1.2 K/UL — HIGH (ref 0–0.9)
MONOCYTES NFR BLD AUTO: 7.2 % — SIGNIFICANT CHANGE UP (ref 2–14)
NEUTROPHILS # BLD AUTO: 14.26 K/UL — HIGH (ref 1.8–7.4)
NEUTROPHILS NFR BLD AUTO: 86.1 % — HIGH (ref 43–77)
NRBC # FLD: 0 — SIGNIFICANT CHANGE UP
PLATELET # BLD AUTO: 173 K/UL — SIGNIFICANT CHANGE UP (ref 150–400)
PMV BLD: 10 FL — SIGNIFICANT CHANGE UP (ref 7–13)
POTASSIUM SERPL-MCNC: 4.4 MMOL/L — SIGNIFICANT CHANGE UP (ref 3.5–5.3)
POTASSIUM SERPL-SCNC: 4.4 MMOL/L — SIGNIFICANT CHANGE UP (ref 3.5–5.3)
RBC # BLD: 4.71 M/UL — SIGNIFICANT CHANGE UP (ref 4.2–5.8)
RBC # FLD: 17.1 % — HIGH (ref 10.3–14.5)
SODIUM SERPL-SCNC: 139 MMOL/L — SIGNIFICANT CHANGE UP (ref 135–145)
WBC # BLD: 16.58 K/UL — HIGH (ref 3.8–10.5)
WBC # FLD AUTO: 16.58 K/UL — HIGH (ref 3.8–10.5)

## 2018-05-10 PROCEDURE — 88309 TISSUE EXAM BY PATHOLOGIST: CPT | Mod: 26

## 2018-05-10 PROCEDURE — 51590 REMOVE BLADDER/REVISE TRACT: CPT

## 2018-05-10 PROCEDURE — 88305 TISSUE EXAM BY PATHOLOGIST: CPT | Mod: 26

## 2018-05-10 RX ORDER — ONDANSETRON 8 MG/1
4 TABLET, FILM COATED ORAL EVERY 6 HOURS
Qty: 0 | Refills: 0 | Status: DISCONTINUED | OUTPATIENT
Start: 2018-05-10 | End: 2018-05-13

## 2018-05-10 RX ORDER — MORPHINE SULFATE 50 MG/1
4 CAPSULE, EXTENDED RELEASE ORAL
Qty: 0 | Refills: 0 | Status: DISCONTINUED | OUTPATIENT
Start: 2018-05-10 | End: 2018-05-10

## 2018-05-10 RX ORDER — MORPHINE SULFATE 50 MG/1
4 CAPSULE, EXTENDED RELEASE ORAL
Qty: 0 | Refills: 0 | Status: DISCONTINUED | OUTPATIENT
Start: 2018-05-10 | End: 2018-05-11

## 2018-05-10 RX ORDER — SENNA PLUS 8.6 MG/1
2 TABLET ORAL AT BEDTIME
Qty: 0 | Refills: 0 | Status: DISCONTINUED | OUTPATIENT
Start: 2018-05-10 | End: 2018-05-15

## 2018-05-10 RX ORDER — ALVIMOPAN 12 MG/1
12 CAPSULE ORAL
Qty: 0 | Refills: 0 | Status: DISCONTINUED | OUTPATIENT
Start: 2018-05-10 | End: 2018-05-15

## 2018-05-10 RX ORDER — HEPARIN SODIUM 5000 [USP'U]/ML
5000 INJECTION INTRAVENOUS; SUBCUTANEOUS EVERY 8 HOURS
Qty: 0 | Refills: 0 | Status: DISCONTINUED | OUTPATIENT
Start: 2018-05-10 | End: 2018-05-15

## 2018-05-10 RX ORDER — ATENOLOL 25 MG/1
50 TABLET ORAL DAILY
Qty: 0 | Refills: 0 | Status: DISCONTINUED | OUTPATIENT
Start: 2018-05-10 | End: 2018-05-15

## 2018-05-10 RX ORDER — CEFOTETAN DISODIUM 1 G
1 VIAL (EA) INJECTION EVERY 12 HOURS
Qty: 0 | Refills: 0 | Status: COMPLETED | OUTPATIENT
Start: 2018-05-10 | End: 2018-05-13

## 2018-05-10 RX ORDER — ONDANSETRON 8 MG/1
4 TABLET, FILM COATED ORAL ONCE
Qty: 0 | Refills: 0 | Status: DISCONTINUED | OUTPATIENT
Start: 2018-05-10 | End: 2018-05-11

## 2018-05-10 RX ORDER — ATENOLOL 25 MG/1
25 TABLET ORAL AT BEDTIME
Qty: 0 | Refills: 0 | Status: DISCONTINUED | OUTPATIENT
Start: 2018-05-10 | End: 2018-05-15

## 2018-05-10 RX ORDER — MORPHINE SULFATE 50 MG/1
30 CAPSULE, EXTENDED RELEASE ORAL
Qty: 0 | Refills: 0 | Status: DISCONTINUED | OUTPATIENT
Start: 2018-05-10 | End: 2018-05-13

## 2018-05-10 RX ORDER — LOSARTAN POTASSIUM 100 MG/1
100 TABLET, FILM COATED ORAL DAILY
Qty: 0 | Refills: 0 | Status: DISCONTINUED | OUTPATIENT
Start: 2018-05-10 | End: 2018-05-15

## 2018-05-10 RX ORDER — AMLODIPINE BESYLATE 2.5 MG/1
5 TABLET ORAL
Qty: 0 | Refills: 0 | Status: DISCONTINUED | OUTPATIENT
Start: 2018-05-10 | End: 2018-05-15

## 2018-05-10 RX ORDER — MORPHINE SULFATE 50 MG/1
3 CAPSULE, EXTENDED RELEASE ORAL
Qty: 0 | Refills: 0 | Status: DISCONTINUED | OUTPATIENT
Start: 2018-05-10 | End: 2018-05-13

## 2018-05-10 RX ORDER — TRAZODONE HCL 50 MG
150 TABLET ORAL AT BEDTIME
Qty: 0 | Refills: 0 | Status: DISCONTINUED | OUTPATIENT
Start: 2018-05-10 | End: 2018-05-15

## 2018-05-10 RX ORDER — SODIUM CHLORIDE 9 MG/ML
1000 INJECTION, SOLUTION INTRAVENOUS
Qty: 0 | Refills: 0 | Status: DISCONTINUED | OUTPATIENT
Start: 2018-05-10 | End: 2018-05-12

## 2018-05-10 RX ORDER — CLONAZEPAM 1 MG
1 TABLET ORAL
Qty: 0 | Refills: 0 | Status: DISCONTINUED | OUTPATIENT
Start: 2018-05-10 | End: 2018-05-15

## 2018-05-10 RX ORDER — NALOXONE HYDROCHLORIDE 4 MG/.1ML
0.1 SPRAY NASAL
Qty: 0 | Refills: 0 | Status: DISCONTINUED | OUTPATIENT
Start: 2018-05-10 | End: 2018-05-13

## 2018-05-10 RX ORDER — SODIUM CHLORIDE 9 MG/ML
1000 INJECTION, SOLUTION INTRAVENOUS
Qty: 0 | Refills: 0 | Status: DISCONTINUED | OUTPATIENT
Start: 2018-05-10 | End: 2018-05-10

## 2018-05-10 RX ORDER — ONDANSETRON 8 MG/1
4 TABLET, FILM COATED ORAL ONCE
Qty: 0 | Refills: 0 | Status: DISCONTINUED | OUTPATIENT
Start: 2018-05-10 | End: 2018-05-10

## 2018-05-10 RX ADMIN — SENNA PLUS 2 TABLET(S): 8.6 TABLET ORAL at 22:32

## 2018-05-10 RX ADMIN — MORPHINE SULFATE 3 MILLIGRAM(S): 50 CAPSULE, EXTENDED RELEASE ORAL at 22:57

## 2018-05-10 RX ADMIN — ATENOLOL 25 MILLIGRAM(S): 25 TABLET ORAL at 22:32

## 2018-05-10 RX ADMIN — Medication 150 MILLIGRAM(S): at 22:32

## 2018-05-10 RX ADMIN — HEPARIN SODIUM 5000 UNIT(S): 5000 INJECTION INTRAVENOUS; SUBCUTANEOUS at 22:19

## 2018-05-10 RX ADMIN — SODIUM CHLORIDE 125 MILLILITER(S): 9 INJECTION, SOLUTION INTRAVENOUS at 22:19

## 2018-05-10 RX ADMIN — MORPHINE SULFATE 30 MILLILITER(S): 50 CAPSULE, EXTENDED RELEASE ORAL at 22:02

## 2018-05-10 NOTE — ADVANCED PRACTICE NURSE CONSULT - REASON FOR CONSULT
Patient seen on skin care rounds after ostomy care referral received for stoma marking.Chart reviewed. Patient H/O  of bladder cancer s/p multiple cystoscopies and suprapubic catheter placement 8/2015. Pt reports he is s/p cystoscopy, laser litholapaxy on 1/2018. Patient scheduled for radical cystectomy and ileal conduit on 5/10/18. Patient verbalized understanding of surgical type and he was aware that he will wear a pouch.

## 2018-05-10 NOTE — BRIEF OPERATIVE NOTE - PROCEDURE
<<-----Click on this checkbox to enter Procedure Radical cystectomy with creation of ileal conduit  05/10/2018    Active  SGURRAM

## 2018-05-10 NOTE — ADVANCED PRACTICE NURSE CONSULT - ASSESSMENT
Patient A0X4. Patient reported being " scared" about surgery. Emotional support provided. Suprapubic catheter noted. Assessed Abdomen, Scar noted in right lower quadrant(no induration palpated), rectus muscle, creases and folds identified.

## 2018-05-10 NOTE — ADVANCED PRACTICE NURSE CONSULT - RECOMMEDATIONS
Cleanse skin with alcohol, Stoma evangelist placed in RLQ within rectus muscles, away from scars, within patients visual field on flat surface area, and below umbilicus using surgical marking pen. Tegaderm applied.     Will follow up for Post Op teaching.     Please contact Wound Care Service Line if we can be of further assistance (ext [phone]).

## 2018-05-11 DIAGNOSIS — D72.829 ELEVATED WHITE BLOOD CELL COUNT, UNSPECIFIED: ICD-10-CM

## 2018-05-11 DIAGNOSIS — D62 ACUTE POSTHEMORRHAGIC ANEMIA: ICD-10-CM

## 2018-05-11 DIAGNOSIS — F05 DELIRIUM DUE TO KNOWN PHYSIOLOGICAL CONDITION: ICD-10-CM

## 2018-05-11 DIAGNOSIS — F41.9 ANXIETY DISORDER, UNSPECIFIED: ICD-10-CM

## 2018-05-11 DIAGNOSIS — Z29.9 ENCOUNTER FOR PROPHYLACTIC MEASURES, UNSPECIFIED: ICD-10-CM

## 2018-05-11 DIAGNOSIS — F32.9 MAJOR DEPRESSIVE DISORDER, SINGLE EPISODE, UNSPECIFIED: ICD-10-CM

## 2018-05-11 DIAGNOSIS — F33.9 MAJOR DEPRESSIVE DISORDER, RECURRENT, UNSPECIFIED: ICD-10-CM

## 2018-05-11 DIAGNOSIS — R73.03 PREDIABETES: ICD-10-CM

## 2018-05-11 LAB
APTT BLD: 28.6 SEC — SIGNIFICANT CHANGE UP (ref 27.5–37.4)
BASOPHILS # BLD AUTO: 0.01 K/UL — SIGNIFICANT CHANGE UP (ref 0–0.2)
BASOPHILS NFR BLD AUTO: 0.1 % — SIGNIFICANT CHANGE UP (ref 0–2)
BLD GP AB SCN SERPL QL: NEGATIVE — SIGNIFICANT CHANGE UP
BUN SERPL-MCNC: 20 MG/DL — SIGNIFICANT CHANGE UP (ref 7–23)
CALCIUM SERPL-MCNC: 8.2 MG/DL — LOW (ref 8.4–10.5)
CHLORIDE SERPL-SCNC: 102 MMOL/L — SIGNIFICANT CHANGE UP (ref 98–107)
CO2 SERPL-SCNC: 22 MMOL/L — SIGNIFICANT CHANGE UP (ref 22–31)
CREAT SERPL-MCNC: 1.33 MG/DL — HIGH (ref 0.5–1.3)
EOSINOPHIL # BLD AUTO: 0.01 K/UL — SIGNIFICANT CHANGE UP (ref 0–0.5)
EOSINOPHIL NFR BLD AUTO: 0.1 % — SIGNIFICANT CHANGE UP (ref 0–6)
GLUCOSE BLDC GLUCOMTR-MCNC: 130 MG/DL — HIGH (ref 70–99)
GLUCOSE BLDC GLUCOMTR-MCNC: 131 MG/DL — HIGH (ref 70–99)
GLUCOSE SERPL-MCNC: 132 MG/DL — HIGH (ref 70–99)
HCT VFR BLD CALC: 31.9 % — LOW (ref 39–50)
HCT VFR BLD CALC: 33.7 % — LOW (ref 39–50)
HCT VFR BLD CALC: 38.1 % — LOW (ref 39–50)
HGB BLD-MCNC: 10.2 G/DL — LOW (ref 13–17)
HGB BLD-MCNC: 10.7 G/DL — LOW (ref 13–17)
HGB BLD-MCNC: 12 G/DL — LOW (ref 13–17)
IMM GRANULOCYTES # BLD AUTO: 0.05 # — SIGNIFICANT CHANGE UP
IMM GRANULOCYTES NFR BLD AUTO: 0.4 % — SIGNIFICANT CHANGE UP (ref 0–1.5)
INR BLD: 1.07 — SIGNIFICANT CHANGE UP (ref 0.88–1.17)
LYMPHOCYTES # BLD AUTO: 1.3 K/UL — SIGNIFICANT CHANGE UP (ref 1–3.3)
LYMPHOCYTES # BLD AUTO: 9.8 % — LOW (ref 13–44)
MCHC RBC-ENTMCNC: 26.7 PG — LOW (ref 27–34)
MCHC RBC-ENTMCNC: 26.8 PG — LOW (ref 27–34)
MCHC RBC-ENTMCNC: 26.8 PG — LOW (ref 27–34)
MCHC RBC-ENTMCNC: 31.5 % — LOW (ref 32–36)
MCHC RBC-ENTMCNC: 31.8 % — LOW (ref 32–36)
MCHC RBC-ENTMCNC: 32 % — SIGNIFICANT CHANGE UP (ref 32–36)
MCV RBC AUTO: 83.9 FL — SIGNIFICANT CHANGE UP (ref 80–100)
MCV RBC AUTO: 84 FL — SIGNIFICANT CHANGE UP (ref 80–100)
MCV RBC AUTO: 85 FL — SIGNIFICANT CHANGE UP (ref 80–100)
MONOCYTES # BLD AUTO: 0.94 K/UL — HIGH (ref 0–0.9)
MONOCYTES NFR BLD AUTO: 7.1 % — SIGNIFICANT CHANGE UP (ref 2–14)
NEUTROPHILS # BLD AUTO: 11.01 K/UL — HIGH (ref 1.8–7.4)
NEUTROPHILS NFR BLD AUTO: 82.5 % — HIGH (ref 43–77)
NRBC # FLD: 0 — SIGNIFICANT CHANGE UP
PLATELET # BLD AUTO: 161 K/UL — SIGNIFICANT CHANGE UP (ref 150–400)
PLATELET # BLD AUTO: 171 K/UL — SIGNIFICANT CHANGE UP (ref 150–400)
PLATELET # BLD AUTO: 180 K/UL — SIGNIFICANT CHANGE UP (ref 150–400)
PMV BLD: 10.1 FL — SIGNIFICANT CHANGE UP (ref 7–13)
PMV BLD: 10.4 FL — SIGNIFICANT CHANGE UP (ref 7–13)
PMV BLD: 10.6 FL — SIGNIFICANT CHANGE UP (ref 7–13)
POTASSIUM SERPL-MCNC: 4.5 MMOL/L — SIGNIFICANT CHANGE UP (ref 3.5–5.3)
POTASSIUM SERPL-SCNC: 4.5 MMOL/L — SIGNIFICANT CHANGE UP (ref 3.5–5.3)
PROTHROM AB SERPL-ACNC: 12.3 SEC — SIGNIFICANT CHANGE UP (ref 9.8–13.1)
RBC # BLD: 3.8 M/UL — LOW (ref 4.2–5.8)
RBC # BLD: 4.01 M/UL — LOW (ref 4.2–5.8)
RBC # BLD: 4.48 M/UL — SIGNIFICANT CHANGE UP (ref 4.2–5.8)
RBC # FLD: 17.1 % — HIGH (ref 10.3–14.5)
RBC # FLD: 17.2 % — HIGH (ref 10.3–14.5)
RBC # FLD: 17.2 % — HIGH (ref 10.3–14.5)
RH IG SCN BLD-IMP: POSITIVE — SIGNIFICANT CHANGE UP
SODIUM SERPL-SCNC: 139 MMOL/L — SIGNIFICANT CHANGE UP (ref 135–145)
WBC # BLD: 13.32 K/UL — HIGH (ref 3.8–10.5)
WBC # BLD: 15.19 K/UL — HIGH (ref 3.8–10.5)
WBC # BLD: 15.75 K/UL — HIGH (ref 3.8–10.5)
WBC # FLD AUTO: 13.32 K/UL — HIGH (ref 3.8–10.5)
WBC # FLD AUTO: 15.19 K/UL — HIGH (ref 3.8–10.5)
WBC # FLD AUTO: 15.75 K/UL — HIGH (ref 3.8–10.5)

## 2018-05-11 PROCEDURE — 99223 1ST HOSP IP/OBS HIGH 75: CPT

## 2018-05-11 RX ORDER — ACETAMINOPHEN 500 MG
1000 TABLET ORAL ONCE
Qty: 0 | Refills: 0 | Status: DISCONTINUED | OUTPATIENT
Start: 2018-05-11 | End: 2018-05-11

## 2018-05-11 RX ORDER — DEXTROSE 50 % IN WATER 50 %
25 SYRINGE (ML) INTRAVENOUS ONCE
Qty: 0 | Refills: 0 | Status: DISCONTINUED | OUTPATIENT
Start: 2018-05-11 | End: 2018-05-13

## 2018-05-11 RX ORDER — DEXTROSE 50 % IN WATER 50 %
12.5 SYRINGE (ML) INTRAVENOUS ONCE
Qty: 0 | Refills: 0 | Status: DISCONTINUED | OUTPATIENT
Start: 2018-05-11 | End: 2018-05-13

## 2018-05-11 RX ORDER — HALOPERIDOL DECANOATE 100 MG/ML
1 INJECTION INTRAMUSCULAR EVERY 6 HOURS
Qty: 0 | Refills: 0 | Status: DISCONTINUED | OUTPATIENT
Start: 2018-05-11 | End: 2018-05-15

## 2018-05-11 RX ORDER — ACETAMINOPHEN 500 MG
1000 TABLET ORAL ONCE
Qty: 0 | Refills: 0 | Status: COMPLETED | OUTPATIENT
Start: 2018-05-11 | End: 2018-05-11

## 2018-05-11 RX ORDER — ACETAMINOPHEN 500 MG
1000 TABLET ORAL ONCE
Qty: 0 | Refills: 0 | Status: COMPLETED | OUTPATIENT
Start: 2018-05-12 | End: 2018-05-12

## 2018-05-11 RX ORDER — INSULIN LISPRO 100/ML
VIAL (ML) SUBCUTANEOUS EVERY 6 HOURS
Qty: 0 | Refills: 0 | Status: DISCONTINUED | OUTPATIENT
Start: 2018-05-11 | End: 2018-05-13

## 2018-05-11 RX ORDER — SODIUM CHLORIDE 9 MG/ML
1000 INJECTION, SOLUTION INTRAVENOUS
Qty: 0 | Refills: 0 | Status: DISCONTINUED | OUTPATIENT
Start: 2018-05-11 | End: 2018-05-13

## 2018-05-11 RX ORDER — DEXTROSE 50 % IN WATER 50 %
15 SYRINGE (ML) INTRAVENOUS ONCE
Qty: 0 | Refills: 0 | Status: DISCONTINUED | OUTPATIENT
Start: 2018-05-11 | End: 2018-05-13

## 2018-05-11 RX ORDER — GLUCAGON INJECTION, SOLUTION 0.5 MG/.1ML
1 INJECTION, SOLUTION SUBCUTANEOUS ONCE
Qty: 0 | Refills: 0 | Status: DISCONTINUED | OUTPATIENT
Start: 2018-05-11 | End: 2018-05-13

## 2018-05-11 RX ADMIN — SODIUM CHLORIDE 125 MILLILITER(S): 9 INJECTION, SOLUTION INTRAVENOUS at 16:54

## 2018-05-11 RX ADMIN — MORPHINE SULFATE 30 MILLILITER(S): 50 CAPSULE, EXTENDED RELEASE ORAL at 20:30

## 2018-05-11 RX ADMIN — ALVIMOPAN 12 MILLIGRAM(S): 12 CAPSULE ORAL at 07:45

## 2018-05-11 RX ADMIN — Medication 100 GRAM(S): at 16:54

## 2018-05-11 RX ADMIN — SENNA PLUS 2 TABLET(S): 8.6 TABLET ORAL at 21:22

## 2018-05-11 RX ADMIN — ALVIMOPAN 12 MILLIGRAM(S): 12 CAPSULE ORAL at 17:30

## 2018-05-11 RX ADMIN — Medication 1 MILLIGRAM(S): at 17:31

## 2018-05-11 RX ADMIN — ATENOLOL 25 MILLIGRAM(S): 25 TABLET ORAL at 21:22

## 2018-05-11 RX ADMIN — LOSARTAN POTASSIUM 100 MILLIGRAM(S): 100 TABLET, FILM COATED ORAL at 07:06

## 2018-05-11 RX ADMIN — AMLODIPINE BESYLATE 5 MILLIGRAM(S): 2.5 TABLET ORAL at 20:11

## 2018-05-11 RX ADMIN — Medication 150 MILLIGRAM(S): at 21:31

## 2018-05-11 RX ADMIN — HEPARIN SODIUM 5000 UNIT(S): 5000 INJECTION INTRAVENOUS; SUBCUTANEOUS at 07:05

## 2018-05-11 RX ADMIN — Medication 1 MILLIGRAM(S): at 07:06

## 2018-05-11 RX ADMIN — Medication 400 MILLIGRAM(S): at 16:02

## 2018-05-11 RX ADMIN — MORPHINE SULFATE 30 MILLILITER(S): 50 CAPSULE, EXTENDED RELEASE ORAL at 08:31

## 2018-05-11 RX ADMIN — MORPHINE SULFATE 30 MILLILITER(S): 50 CAPSULE, EXTENDED RELEASE ORAL at 02:44

## 2018-05-11 RX ADMIN — ATENOLOL 50 MILLIGRAM(S): 25 TABLET ORAL at 07:06

## 2018-05-11 RX ADMIN — Medication 400 MILLIGRAM(S): at 21:22

## 2018-05-11 RX ADMIN — HEPARIN SODIUM 5000 UNIT(S): 5000 INJECTION INTRAVENOUS; SUBCUTANEOUS at 21:22

## 2018-05-11 RX ADMIN — HEPARIN SODIUM 5000 UNIT(S): 5000 INJECTION INTRAVENOUS; SUBCUTANEOUS at 16:03

## 2018-05-11 RX ADMIN — AMLODIPINE BESYLATE 5 MILLIGRAM(S): 2.5 TABLET ORAL at 07:31

## 2018-05-11 RX ADMIN — MORPHINE SULFATE 3 MILLIGRAM(S): 50 CAPSULE, EXTENDED RELEASE ORAL at 06:24

## 2018-05-11 RX ADMIN — Medication 100 GRAM(S): at 04:28

## 2018-05-11 NOTE — CONSULT NOTE ADULT - SUBJECTIVE AND OBJECTIVE BOX
HPI:  64 y/o male with h/o obesity, anxiety/depression, pre-DM-2, HTN, prostate cancer s/p prostatectomy 2011, bladder cancer s/p cystoscopy, fulguration, cystolitholapaxy on 1/9/18, admitted for radical cystectomy and ileal conduit creation on 5/10/18. Patient seen on POD1, feels ok, c/o incisional pain, no flatus, no nausea/vomiting/chest pain/sob. Patient is asking for toviaz for overactive bladder but  I explained to him that his bladder has been removed.    PAST MEDICAL & SURGICAL HISTORY:  Malignant neoplasm of bladder  Diabetes mellitus: pre diabetic  Obese  Other calculus in bladder  Urethral stricture  Urinary (tract) obstruction  UTI (urinary tract infection)  Major depressive disorder  Anxiety  HTN (Hypertension)  Prostate Cancer  Suprapubic catheter: 8/2015  History of prostatectomy: robotic, 2011, s/p radiation  H/O cystoscopy: - multiple  last cystoscopy, laser litholapaxy on 1/2018  S/P Appendectomy: 40 years ago      Review of Systems:   CONSTITUTIONAL: No fever, weight loss, or fatigue  EYES: No eye pain, visual disturbances, or discharge  ENMT:  No difficulty hearing, tinnitus, vertigo; No sinus or throat pain  NECK: No pain or stiffness  BREASTS: No pain, masses, or nipple discharge  RESPIRATORY: No cough, wheezing, chills or hemoptysis; No shortness of breath  CARDIOVASCULAR: No chest pain, palpitations, dizziness, or leg swelling  GASTROINTESTINAL: No abdominal or epigastric pain. No nausea, vomiting, or hematemesis; No diarrhea or constipation. No melena or hematochezia.  GENITOURINARY: No dysuria, frequency, hematuria, or incontinence  NEUROLOGICAL: No headaches, memory loss, loss of strength, numbness, or tremors  SKIN: No itching, burning, rashes, or lesions   LYMPH NODES: No enlarged glands  ENDOCRINE: No heat or cold intolerance; No hair loss  MUSCULOSKELETAL: No joint pain or swelling; No muscle, back, or extremity pain  PSYCHIATRIC: No depression, anxiety, mood swings, or difficulty sleeping  HEME/LYMPH: No easy bruising, or bleeding gums  ALLERY AND IMMUNOLOGIC: No hives or eczema    Allergies    No Known Allergies    Intolerances    Social History: denies smoking/etoh    FAMILY HISTORY:  No pertinent family history in first degree relatives      Home Medications:  Aleve 220 mg oral tablet: 1 tab(s) orally every 8 hours, As Needed last dose 5/2 (10 May 2018 12:56)  amLODIPine 5 mg oral tablet: 1 tab(s) orally 2 times a day (10 May 2018 12:56)  atenolol 25 mg oral tablet: 1 tab(s) orally once a day (at bedtime) (10 May 2018 12:56)  atenolol 50 mg oral tablet: 1 tab(s) orally once a day in morning (10 May 2018 12:56)  clonazePAM 1 mg oral tablet: 1 tab(s) orally 2 times a day (10 May 2018 12:56)  metFORMIN 500 mg oral tablet: 1 tab(s) orally 2 times a day (10 May 2018 12:56)  olmesartan 40 mg oral tablet: 1 tab(s) orally once a day (10 May 2018 12:56)  Omega-3 oral capsule: orally 2 times a day. Last dose 5/2/18 (10 May 2018 12:56)  Toviaz 8 mg oral tablet, extended release: 1 tab(s) orally once a day (10 May 2018 12:56)  traZODone 50 mg oral tablet: 3 tab(s) orally once a day (at bedtime) (10 May 2018 12:56)  Vitamin B Complex 100: injectable once a day (10 May 2018 12:56)      MEDICATIONS  (STANDING):  alvimopan 12 milliGRAM(s) Oral two times a day  amLODIPine   Tablet 5 milliGRAM(s) Oral <User Schedule>  ATENolol  Tablet 25 milliGRAM(s) Oral at bedtime  ATENolol  Tablet 50 milliGRAM(s) Oral daily  cefoTEtan  IVPB 1 Gram(s) IV Intermittent every 12 hours  clonazePAM Tablet 1 milliGRAM(s) Oral two times a day  dextrose 5%. 1000 milliLiter(s) (50 mL/Hr) IV Continuous <Continuous>  dextrose 50% Injectable 12.5 Gram(s) IV Push once  dextrose 50% Injectable 25 Gram(s) IV Push once  dextrose 50% Injectable 25 Gram(s) IV Push once  heparin  Injectable 5000 Unit(s) SubCutaneous every 8 hours  insulin lispro (HumaLOG) corrective regimen sliding scale   SubCutaneous every 6 hours  lactated ringers. 1000 milliLiter(s) (125 mL/Hr) IV Continuous <Continuous>  losartan 100 milliGRAM(s) Oral daily  morphine PCA (5 mG/mL) 30 milliLiter(s) PCA Continuous PCA Continuous  senna 2 Tablet(s) Oral at bedtime  traZODone 150 milliGRAM(s) Oral at bedtime    MEDICATIONS  (PRN):  dextrose 40% Gel 15 Gram(s) Oral once PRN Blood Glucose LESS THAN 70 milliGRAM(s)/deciliter  glucagon  Injectable 1 milliGRAM(s) IntraMuscular once PRN Glucose LESS THAN 70 milligrams/deciliter  morphine PCA (5 mG/mL) Rescue Clinician Bolus 3 milliGRAM(s) IV Push every 15 minutes PRN for Pain Scale GREATER THAN 6  naloxone Injectable 0.1 milliGRAM(s) IV Push every 3 minutes PRN For ANY of the following changes in patient status:  A. RR LESS THAN 10 breaths per minute, B. Oxygen saturation LESS THAN 90%, C. Sedation score of 6  ondansetron Injectable 4 milliGRAM(s) IV Push every 6 hours PRN Nausea      Vital Signs Last 24 Hrs  T(C): 36.8 (11 May 2018 09:23), Max: 37 (11 May 2018 02:45)  T(F): 98.2 (11 May 2018 09:23), Max: 98.6 (11 May 2018 02:45)  HR: 89 (11 May 2018 09:23) (54 - 92)  BP: 100/62 (11 May 2018 09:23) (100/62 - 161/81)  BP(mean): --  RR: 18 (11 May 2018 09:23) (10 - 20)  SpO2: 96% (11 May 2018 09:23) (94% - 100%)  CAPILLARY BLOOD GLUCOSE      POCT Blood Glucose.: 108 mg/dL (10 May 2018 13:07)    I&O's Summary    10 May 2018 07:01  -  11 May 2018 07:00  --------------------------------------------------------  IN: 625 mL / OUT: 705 mL / NET: -80 mL    11 May 2018 07:01  -  11 May 2018 10:23  --------------------------------------------------------  IN: 0 mL / OUT: 380 mL / NET: -380 mL        PHYSICAL EXAM:  GENERAL: NAD, well-developed  HEAD:  Atraumatic, Normocephalic  EYES: EOMI, PERRLA, conjunctiva and sclera clear  NECK: Supple, No JVD  CHEST/LUNG: Clear to auscultation bilaterally; No wheeze  HEART: Regular rate and rhythm; No murmurs, rubs, or gallops  ABDOMEN: Soft, incisional tenderness, mildly distended, +ileal conduit and POOJA drain  EXTREMITIES:  2+ Peripheral Pulses, No clubbing, cyanosis, or edema  PSYCH: AAOx3  NEUROLOGY: non-focal  SKIN: No rashes or lesions    LABS:                        10.7   15.19 )-----------( 180      ( 11 May 2018 10:00 )             33.7     05-11    139  |  102  |  20  ----------------------------<  132<H>  4.5   |  22  |  1.33<H>    Ca    8.2<L>      11 May 2018 06:30      PT/INR - ( 11 May 2018 10:00 )   PT: 12.3 SEC;   INR: 1.07          PTT - ( 11 May 2018 10:00 )  PTT:28.6 SEC    Microbiology     RADIOLOGY & ADDITIONAL TESTS:    Imaging Personally Reviewed:    Consultant(s) Notes Reviewed:      Care Discussed with Consultants/Other Providers: samir Field, c/w humalog ISS and psych meds

## 2018-05-11 NOTE — CONSULT NOTE ADULT - ASSESSMENT
64 y/o male with h/o obesity, anxiety/depression, pre-DM-2, HTN, prostate cancer s/p prostatectomy 2011, bladder cancer s/p cystoscopy, fulguration, cystolitholapaxy on 1/9/18, s/p radical cystectomy and ileal conduit creation on 5/10/18.

## 2018-05-11 NOTE — BEHAVIORAL HEALTH ASSESSMENT NOTE - AXIS III
obesity, pre-DM-2, HTN, prostate cancer s/p prostatectomy 2011, bladder cancer s/p cystoscopy, fulguration, cystolitholapaxy on 1/9/18

## 2018-05-11 NOTE — CONSULT NOTE ADULT - PROBLEM SELECTOR RECOMMENDATION 7
heparin sc -likely reactive postop  -monitor CBC and temp curve  -incentive spirometry  -on prophylactic abx (cefotetan) per primary team

## 2018-05-11 NOTE — CONSULT NOTE ADULT - PROBLEM SELECTOR RECOMMENDATION 2
-monitor CBC, transfuse prn, keep Hgb>7 -monitor CBC, transfuse prn, keep Hgb>7  -Hgb dropped from 13.8 preop to 10.7 postop

## 2018-05-11 NOTE — BEHAVIORAL HEALTH ASSESSMENT NOTE - OTHER
pt lying down, unable to assess perseverates on his pain impaired due to being distracted by his pain

## 2018-05-11 NOTE — BEHAVIORAL HEALTH ASSESSMENT NOTE - OTHER PAST PSYCHIATRIC HISTORY (INCLUDE DETAILS REGARDING ONSET, COURSE OF ILLNESS, INPATIENT/OUTPATIENT TREATMENT)
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 Reports he was started on klonopin and trazodone then, denies ever taking an anti-depressant

## 2018-05-11 NOTE — BEHAVIORAL HEALTH ASSESSMENT NOTE - HPI (INCLUDE ILLNESS QUALITY, SEVERITY, DURATION, TIMING, CONTEXT, MODIFYING FACTORS, ASSOCIATED SIGNS AND SYMPTOMS)
62 y/o male with h/o obesity, anxiety/depression, pre-DM-2, HTN, prostate cancer s/p prostatectomy 2011, bladder cancer s/p cystoscopy, fulguration, cystolitholapaxy on 1/9/18, admitted for radical cystectomy and ileal conduit creation on 5/10/18. 64 y/o male with h/o obesity, anxiety/depression, pre-DM-2, HTN, prostate cancer s/p prostatectomy 2011, bladder cancer s/p cystoscopy, fulguration, cystolitholapaxy on 1/9/18, admitted for radical cystectomy and ileal conduit creation on 5/10/18. Pt is POD #1 today and on exam c/o being in severe pain. He appears distracted throughout the interview 2/2 pain, at times closing his eyes to rest in bed, then distracted by his phone ringing and thinking it was his girlfriend and trying to call her back. Pt states he initially did not want this surgery, but now agrees that it was probably the best thing for him, upset though that currently he's unable to eat/drink anything and didn't realize the pain would be this severe. Pt reports that prior to the surgery his mood was fine, eating/sleeping well at home, and was taking his psych meds and following up with his therapist and psychiatrist at the [facility] in [place]. Pt denies any current SI/HI, no psychotic sx, and he is currently A&Ox3, although initially said year was 2011 and then corrected himself to 2018. Pt states he feels he "can't think straight in this place" and doesn't feel like his normal self.    [facility] record checked Ref # [id number], pt is prescribed klonopin 1mg po bid, last Rx given 4/4/18 by his psych NP Parish Iverson.

## 2018-05-11 NOTE — CONSULT NOTE ADULT - PROBLEM SELECTOR RECOMMENDATION 9
-s/p radical cystectomy, ileal conduit creation on 5/10/18  -postop management per   -perioperative abx (cefotetan) per primary team  -pain control (PCEA), mIVF, bowel regimen, await GI function, incentive spirometry, DVT prophylaxis -s/p radical cystectomy, ileal conduit creation on 5/10/18  -postop management per   -perioperative abx (cefotetan) per primary team  -pain control (PCA), mIVF, bowel regimen, await GI function, incentive spirometry, DVT prophylaxis

## 2018-05-11 NOTE — CONSULT NOTE ADULT - PROBLEM SELECTOR RECOMMENDATION 3
c/w atenolol , losartan and norvasc, monitor bp, hold antihypertensives for SBP<110  patient's preop EKG (4/27): sinus manuel HR 51, no ischemic change

## 2018-05-11 NOTE — PROVIDER CONTACT NOTE (OTHER) - SITUATION
This morning around 6am patient had dark tea colored urine from urostomy. Now drainage is bright/aniceto blood.

## 2018-05-11 NOTE — BEHAVIORAL HEALTH ASSESSMENT NOTE - SUMMARY
64 y/o male with h/o obesity, anxiety/depression, pre-DM-2, HTN, prostate cancer s/p prostatectomy 2011, bladder cancer s/p cystoscopy, fulguration, cystolitholapaxy on 1/9/18, admitted for radical cystectomy and ileal conduit creation on 5/10/18. 62 y/o male with h/o obesity, anxiety/depression, pre-DM-2, HTN, prostate cancer s/p prostatectomy 2011, bladder cancer s/p cystoscopy, fulguration, cystolitholapaxy on 1/9/18, admitted for radical cystectomy and ileal conduit creation on 5/10/18. Pt currently displaying a picture of a mild delirium with difficulty sustaining attention and some word-finding difficulties in the setting of being post-op and receiving opiate pain medication. He denies any current prominent depressive/anxiety sx and reports that prior to surgery he was doing well, need to corroborate that with his outpatient psychiatrist. Pt denies any SI/HI, psychotic sx and is A&Ox3 at this time.

## 2018-05-11 NOTE — BEHAVIORAL HEALTH ASSESSMENT NOTE - NSBHCONSULTRECOMMENDOTHER_PSY_A_CORE FT
- Avoid use of benzos and anticholinergics as they can worsen delirium    - Monitor EKG for qtc; if qtc >500ms, would need to discontinue use of antipsychotics    - If over the weekend pt is receiving Haldol prns, would start a standing Haldol dose of 1mg po qhs

## 2018-05-11 NOTE — BEHAVIORAL HEALTH ASSESSMENT NOTE - DETAILS
abdominal pain at surgical site He reports h/o suicidal thoughts 3-4 years ago when admitted to inpatient psych but never made any gestures or attempts

## 2018-05-11 NOTE — BEHAVIORAL HEALTH ASSESSMENT NOTE - NSBHCHARTREVIEWLAB_PSY_A_CORE FT
CBC Full  -  ( 11 May 2018 10:00 )  WBC Count : 15.19 K/uL  Hemoglobin : 10.7 g/dL  Hematocrit : 33.7 %  Platelet Count - Automated : 180 K/uL  Mean Cell Volume : 84.0 fL  Mean Cell Hemoglobin : 26.7 pg  Mean Cell Hemoglobin Concentration : 31.8 %  Auto Neutrophil # : x  Auto Lymphocyte # : x  Auto Monocyte # : x  Auto Eosinophil # : x  Auto Basophil # : x  Auto Neutrophil % : x  Auto Lymphocyte % : x  Auto Monocyte % : x  Auto Eosinophil % : x  Auto Basophil % : x    05-11    139  |  102  |  20  ----------------------------<  132<H>  4.5   |  22  |  1.33<H>    Ca    8.2<L>      11 May 2018 06:30

## 2018-05-12 DIAGNOSIS — N18.3 CHRONIC KIDNEY DISEASE, STAGE 3 (MODERATE): ICD-10-CM

## 2018-05-12 LAB
BUN SERPL-MCNC: 17 MG/DL — SIGNIFICANT CHANGE UP (ref 7–23)
CALCIUM SERPL-MCNC: 8 MG/DL — LOW (ref 8.4–10.5)
CHLORIDE SERPL-SCNC: 103 MMOL/L — SIGNIFICANT CHANGE UP (ref 98–107)
CO2 SERPL-SCNC: 27 MMOL/L — SIGNIFICANT CHANGE UP (ref 22–31)
CREAT SERPL-MCNC: 1.47 MG/DL — HIGH (ref 0.5–1.3)
GLUCOSE BLDC GLUCOMTR-MCNC: 116 MG/DL — HIGH (ref 70–99)
GLUCOSE BLDC GLUCOMTR-MCNC: 118 MG/DL — HIGH (ref 70–99)
GLUCOSE BLDC GLUCOMTR-MCNC: 121 MG/DL — HIGH (ref 70–99)
GLUCOSE BLDC GLUCOMTR-MCNC: 140 MG/DL — HIGH (ref 70–99)
GLUCOSE BLDC GLUCOMTR-MCNC: 143 MG/DL — HIGH (ref 70–99)
GLUCOSE SERPL-MCNC: 119 MG/DL — HIGH (ref 70–99)
HCT VFR BLD CALC: 25.3 % — LOW (ref 39–50)
HCT VFR BLD CALC: 27 % — LOW (ref 39–50)
HGB BLD-MCNC: 8.3 G/DL — LOW (ref 13–17)
HGB BLD-MCNC: 8.7 G/DL — LOW (ref 13–17)
MAGNESIUM SERPL-MCNC: 2 MG/DL — SIGNIFICANT CHANGE UP (ref 1.6–2.6)
MCHC RBC-ENTMCNC: 27.4 PG — SIGNIFICANT CHANGE UP (ref 27–34)
MCHC RBC-ENTMCNC: 27.9 PG — SIGNIFICANT CHANGE UP (ref 27–34)
MCHC RBC-ENTMCNC: 32.2 % — SIGNIFICANT CHANGE UP (ref 32–36)
MCHC RBC-ENTMCNC: 32.8 % — SIGNIFICANT CHANGE UP (ref 32–36)
MCV RBC AUTO: 84.9 FL — SIGNIFICANT CHANGE UP (ref 80–100)
MCV RBC AUTO: 85.2 FL — SIGNIFICANT CHANGE UP (ref 80–100)
NRBC # FLD: 0 — SIGNIFICANT CHANGE UP
NRBC # FLD: 0 — SIGNIFICANT CHANGE UP
PLATELET # BLD AUTO: 131 K/UL — LOW (ref 150–400)
PLATELET # BLD AUTO: 143 K/UL — LOW (ref 150–400)
PMV BLD: 10.6 FL — SIGNIFICANT CHANGE UP (ref 7–13)
PMV BLD: 10.9 FL — SIGNIFICANT CHANGE UP (ref 7–13)
POTASSIUM SERPL-MCNC: 4.2 MMOL/L — SIGNIFICANT CHANGE UP (ref 3.5–5.3)
POTASSIUM SERPL-SCNC: 4.2 MMOL/L — SIGNIFICANT CHANGE UP (ref 3.5–5.3)
RBC # BLD: 2.97 M/UL — LOW (ref 4.2–5.8)
RBC # BLD: 3.18 M/UL — LOW (ref 4.2–5.8)
RBC # FLD: 17.4 % — HIGH (ref 10.3–14.5)
RBC # FLD: 17.5 % — HIGH (ref 10.3–14.5)
SODIUM SERPL-SCNC: 138 MMOL/L — SIGNIFICANT CHANGE UP (ref 135–145)
WBC # BLD: 13.6 K/UL — HIGH (ref 3.8–10.5)
WBC # BLD: 14.51 K/UL — HIGH (ref 3.8–10.5)
WBC # FLD AUTO: 13.6 K/UL — HIGH (ref 3.8–10.5)
WBC # FLD AUTO: 14.51 K/UL — HIGH (ref 3.8–10.5)

## 2018-05-12 PROCEDURE — 99233 SBSQ HOSP IP/OBS HIGH 50: CPT

## 2018-05-12 RX ORDER — ACETAMINOPHEN 500 MG
1000 TABLET ORAL ONCE
Qty: 0 | Refills: 0 | Status: COMPLETED | OUTPATIENT
Start: 2018-05-12 | End: 2018-05-12

## 2018-05-12 RX ADMIN — HEPARIN SODIUM 5000 UNIT(S): 5000 INJECTION INTRAVENOUS; SUBCUTANEOUS at 06:48

## 2018-05-12 RX ADMIN — Medication 1000 MILLIGRAM(S): at 18:16

## 2018-05-12 RX ADMIN — Medication 400 MILLIGRAM(S): at 17:46

## 2018-05-12 RX ADMIN — Medication 100 GRAM(S): at 04:30

## 2018-05-12 RX ADMIN — HEPARIN SODIUM 5000 UNIT(S): 5000 INJECTION INTRAVENOUS; SUBCUTANEOUS at 13:46

## 2018-05-12 RX ADMIN — MORPHINE SULFATE 30 MILLILITER(S): 50 CAPSULE, EXTENDED RELEASE ORAL at 20:22

## 2018-05-12 RX ADMIN — AMLODIPINE BESYLATE 5 MILLIGRAM(S): 2.5 TABLET ORAL at 06:50

## 2018-05-12 RX ADMIN — ALVIMOPAN 12 MILLIGRAM(S): 12 CAPSULE ORAL at 17:47

## 2018-05-12 RX ADMIN — Medication 1 MILLIGRAM(S): at 17:49

## 2018-05-12 RX ADMIN — MORPHINE SULFATE 3 MILLIGRAM(S): 50 CAPSULE, EXTENDED RELEASE ORAL at 16:23

## 2018-05-12 RX ADMIN — ALVIMOPAN 12 MILLIGRAM(S): 12 CAPSULE ORAL at 06:48

## 2018-05-12 RX ADMIN — Medication 100 GRAM(S): at 16:29

## 2018-05-12 RX ADMIN — HALOPERIDOL DECANOATE 1 MILLIGRAM(S): 100 INJECTION INTRAMUSCULAR at 16:29

## 2018-05-12 RX ADMIN — HEPARIN SODIUM 5000 UNIT(S): 5000 INJECTION INTRAVENOUS; SUBCUTANEOUS at 23:11

## 2018-05-12 RX ADMIN — Medication 400 MILLIGRAM(S): at 03:09

## 2018-05-12 RX ADMIN — ATENOLOL 25 MILLIGRAM(S): 25 TABLET ORAL at 23:11

## 2018-05-12 RX ADMIN — Medication 1 MILLIGRAM(S): at 06:48

## 2018-05-12 RX ADMIN — LOSARTAN POTASSIUM 100 MILLIGRAM(S): 100 TABLET, FILM COATED ORAL at 12:33

## 2018-05-12 RX ADMIN — MORPHINE SULFATE 3 MILLIGRAM(S): 50 CAPSULE, EXTENDED RELEASE ORAL at 07:08

## 2018-05-12 RX ADMIN — AMLODIPINE BESYLATE 5 MILLIGRAM(S): 2.5 TABLET ORAL at 18:31

## 2018-05-12 RX ADMIN — ATENOLOL 50 MILLIGRAM(S): 25 TABLET ORAL at 06:49

## 2018-05-12 RX ADMIN — Medication 1000 MILLIGRAM(S): at 10:05

## 2018-05-12 RX ADMIN — MORPHINE SULFATE 30 MILLILITER(S): 50 CAPSULE, EXTENDED RELEASE ORAL at 08:23

## 2018-05-12 RX ADMIN — SENNA PLUS 2 TABLET(S): 8.6 TABLET ORAL at 23:10

## 2018-05-12 RX ADMIN — Medication 400 MILLIGRAM(S): at 09:50

## 2018-05-12 RX ADMIN — Medication 150 MILLIGRAM(S): at 23:11

## 2018-05-12 NOTE — PHYSICAL THERAPY INITIAL EVALUATION ADULT - PERTINENT HX OF CURRENT PROBLEM, REHAB EVAL
64 y/o male scheduled for above named surgery.  Patient reports he has a history of bladder cancer s/p multiple cystoscopies and suprapubic catheter placement 8/2015.  Pt reports he is s/p cystoscopy, laser litholapaxy on 1/2018.

## 2018-05-12 NOTE — PHYSICAL THERAPY INITIAL EVALUATION ADULT - PLANNED THERAPY INTERVENTIONS, PT EVAL
balance training/gait training/strengthening/Patient left supine in bed in NAD; call bell in reach; +PCA; nursing staff at bedside./bed mobility training/transfer training

## 2018-05-13 LAB
BUN SERPL-MCNC: 13 MG/DL — SIGNIFICANT CHANGE UP (ref 7–23)
CALCIUM SERPL-MCNC: 8.2 MG/DL — LOW (ref 8.4–10.5)
CHLORIDE SERPL-SCNC: 103 MMOL/L — SIGNIFICANT CHANGE UP (ref 98–107)
CO2 SERPL-SCNC: 25 MMOL/L — SIGNIFICANT CHANGE UP (ref 22–31)
CREAT SERPL-MCNC: 1.22 MG/DL — SIGNIFICANT CHANGE UP (ref 0.5–1.3)
GLUCOSE BLDC GLUCOMTR-MCNC: 117 MG/DL — HIGH (ref 70–99)
GLUCOSE BLDC GLUCOMTR-MCNC: 139 MG/DL — HIGH (ref 70–99)
GLUCOSE SERPL-MCNC: 129 MG/DL — HIGH (ref 70–99)
HCT VFR BLD CALC: 26 % — LOW (ref 39–50)
HGB BLD-MCNC: 8.5 G/DL — LOW (ref 13–17)
MCHC RBC-ENTMCNC: 27.6 PG — SIGNIFICANT CHANGE UP (ref 27–34)
MCHC RBC-ENTMCNC: 32.7 % — SIGNIFICANT CHANGE UP (ref 32–36)
MCV RBC AUTO: 84.4 FL — SIGNIFICANT CHANGE UP (ref 80–100)
NRBC # FLD: 0 — SIGNIFICANT CHANGE UP
PLATELET # BLD AUTO: 144 K/UL — LOW (ref 150–400)
PMV BLD: 10.6 FL — SIGNIFICANT CHANGE UP (ref 7–13)
POTASSIUM SERPL-MCNC: 3.9 MMOL/L — SIGNIFICANT CHANGE UP (ref 3.5–5.3)
POTASSIUM SERPL-SCNC: 3.9 MMOL/L — SIGNIFICANT CHANGE UP (ref 3.5–5.3)
RBC # BLD: 3.08 M/UL — LOW (ref 4.2–5.8)
RBC # FLD: 17.3 % — HIGH (ref 10.3–14.5)
SODIUM SERPL-SCNC: 139 MMOL/L — SIGNIFICANT CHANGE UP (ref 135–145)
WBC # BLD: 14.64 K/UL — HIGH (ref 3.8–10.5)
WBC # FLD AUTO: 14.64 K/UL — HIGH (ref 3.8–10.5)

## 2018-05-13 PROCEDURE — 99233 SBSQ HOSP IP/OBS HIGH 50: CPT

## 2018-05-13 RX ORDER — DEXTROSE 50 % IN WATER 50 %
25 SYRINGE (ML) INTRAVENOUS ONCE
Qty: 0 | Refills: 0 | Status: DISCONTINUED | OUTPATIENT
Start: 2018-05-13 | End: 2018-05-15

## 2018-05-13 RX ORDER — HYDROMORPHONE HYDROCHLORIDE 2 MG/ML
0.5 INJECTION INTRAMUSCULAR; INTRAVENOUS; SUBCUTANEOUS
Qty: 0 | Refills: 0 | Status: DISCONTINUED | OUTPATIENT
Start: 2018-05-13 | End: 2018-05-15

## 2018-05-13 RX ORDER — SODIUM CHLORIDE 9 MG/ML
1000 INJECTION, SOLUTION INTRAVENOUS
Qty: 0 | Refills: 0 | Status: DISCONTINUED | OUTPATIENT
Start: 2018-05-13 | End: 2018-05-15

## 2018-05-13 RX ORDER — DEXTROSE 50 % IN WATER 50 %
12.5 SYRINGE (ML) INTRAVENOUS ONCE
Qty: 0 | Refills: 0 | Status: DISCONTINUED | OUTPATIENT
Start: 2018-05-13 | End: 2018-05-15

## 2018-05-13 RX ORDER — ACETAMINOPHEN 500 MG
650 TABLET ORAL EVERY 6 HOURS
Qty: 0 | Refills: 0 | Status: DISCONTINUED | OUTPATIENT
Start: 2018-05-13 | End: 2018-05-15

## 2018-05-13 RX ORDER — INSULIN LISPRO 100/ML
VIAL (ML) SUBCUTANEOUS
Qty: 0 | Refills: 0 | Status: DISCONTINUED | OUTPATIENT
Start: 2018-05-13 | End: 2018-05-15

## 2018-05-13 RX ORDER — OXYCODONE HYDROCHLORIDE 5 MG/1
5 TABLET ORAL EVERY 4 HOURS
Qty: 0 | Refills: 0 | Status: DISCONTINUED | OUTPATIENT
Start: 2018-05-13 | End: 2018-05-15

## 2018-05-13 RX ORDER — DOCUSATE SODIUM 100 MG
100 CAPSULE ORAL THREE TIMES A DAY
Qty: 0 | Refills: 0 | Status: DISCONTINUED | OUTPATIENT
Start: 2018-05-13 | End: 2018-05-15

## 2018-05-13 RX ORDER — INSULIN LISPRO 100/ML
VIAL (ML) SUBCUTANEOUS AT BEDTIME
Qty: 0 | Refills: 0 | Status: DISCONTINUED | OUTPATIENT
Start: 2018-05-13 | End: 2018-05-15

## 2018-05-13 RX ORDER — HYDROMORPHONE HYDROCHLORIDE 2 MG/ML
1 INJECTION INTRAMUSCULAR; INTRAVENOUS; SUBCUTANEOUS
Qty: 0 | Refills: 0 | Status: DISCONTINUED | OUTPATIENT
Start: 2018-05-13 | End: 2018-05-13

## 2018-05-13 RX ORDER — GLUCAGON INJECTION, SOLUTION 0.5 MG/.1ML
1 INJECTION, SOLUTION SUBCUTANEOUS ONCE
Qty: 0 | Refills: 0 | Status: DISCONTINUED | OUTPATIENT
Start: 2018-05-13 | End: 2018-05-15

## 2018-05-13 RX ORDER — OXYCODONE HYDROCHLORIDE 5 MG/1
10 TABLET ORAL EVERY 4 HOURS
Qty: 0 | Refills: 0 | Status: DISCONTINUED | OUTPATIENT
Start: 2018-05-13 | End: 2018-05-14

## 2018-05-13 RX ORDER — DEXTROSE 50 % IN WATER 50 %
15 SYRINGE (ML) INTRAVENOUS ONCE
Qty: 0 | Refills: 0 | Status: DISCONTINUED | OUTPATIENT
Start: 2018-05-13 | End: 2018-05-15

## 2018-05-13 RX ADMIN — ALVIMOPAN 12 MILLIGRAM(S): 12 CAPSULE ORAL at 17:52

## 2018-05-13 RX ADMIN — OXYCODONE HYDROCHLORIDE 10 MILLIGRAM(S): 5 TABLET ORAL at 08:53

## 2018-05-13 RX ADMIN — Medication 100 GRAM(S): at 16:25

## 2018-05-13 RX ADMIN — SENNA PLUS 2 TABLET(S): 8.6 TABLET ORAL at 22:14

## 2018-05-13 RX ADMIN — ATENOLOL 25 MILLIGRAM(S): 25 TABLET ORAL at 22:14

## 2018-05-13 RX ADMIN — LOSARTAN POTASSIUM 100 MILLIGRAM(S): 100 TABLET, FILM COATED ORAL at 05:43

## 2018-05-13 RX ADMIN — HEPARIN SODIUM 5000 UNIT(S): 5000 INJECTION INTRAVENOUS; SUBCUTANEOUS at 05:43

## 2018-05-13 RX ADMIN — OXYCODONE HYDROCHLORIDE 10 MILLIGRAM(S): 5 TABLET ORAL at 18:54

## 2018-05-13 RX ADMIN — Medication 650 MILLIGRAM(S): at 12:36

## 2018-05-13 RX ADMIN — OXYCODONE HYDROCHLORIDE 10 MILLIGRAM(S): 5 TABLET ORAL at 13:15

## 2018-05-13 RX ADMIN — Medication 100 MILLIGRAM(S): at 22:15

## 2018-05-13 RX ADMIN — AMLODIPINE BESYLATE 5 MILLIGRAM(S): 2.5 TABLET ORAL at 17:52

## 2018-05-13 RX ADMIN — ATENOLOL 50 MILLIGRAM(S): 25 TABLET ORAL at 05:43

## 2018-05-13 RX ADMIN — OXYCODONE HYDROCHLORIDE 10 MILLIGRAM(S): 5 TABLET ORAL at 14:15

## 2018-05-13 RX ADMIN — Medication 1 MILLIGRAM(S): at 17:52

## 2018-05-13 RX ADMIN — OXYCODONE HYDROCHLORIDE 10 MILLIGRAM(S): 5 TABLET ORAL at 20:22

## 2018-05-13 RX ADMIN — Medication 100 GRAM(S): at 04:11

## 2018-05-13 RX ADMIN — HEPARIN SODIUM 5000 UNIT(S): 5000 INJECTION INTRAVENOUS; SUBCUTANEOUS at 13:15

## 2018-05-13 RX ADMIN — OXYCODONE HYDROCHLORIDE 10 MILLIGRAM(S): 5 TABLET ORAL at 09:53

## 2018-05-13 RX ADMIN — ALVIMOPAN 12 MILLIGRAM(S): 12 CAPSULE ORAL at 05:43

## 2018-05-13 RX ADMIN — AMLODIPINE BESYLATE 5 MILLIGRAM(S): 2.5 TABLET ORAL at 07:25

## 2018-05-13 RX ADMIN — Medication 650 MILLIGRAM(S): at 17:52

## 2018-05-13 RX ADMIN — Medication 1 MILLIGRAM(S): at 05:43

## 2018-05-13 RX ADMIN — HEPARIN SODIUM 5000 UNIT(S): 5000 INJECTION INTRAVENOUS; SUBCUTANEOUS at 22:13

## 2018-05-13 RX ADMIN — Medication 150 MILLIGRAM(S): at 22:14

## 2018-05-13 RX ADMIN — Medication 650 MILLIGRAM(S): at 23:54

## 2018-05-13 NOTE — PROVIDER CONTACT NOTE (OTHER) - ACTION/TREATMENT ORDERED:
MD Gaytan from Urology made aware that patient is refusing fingersticks. Pt educated on importance of monitoring blood sugar.

## 2018-05-13 NOTE — PROGRESS NOTE ADULT - ATTENDING COMMENTS
Patient seen and examined. Agree with assessment and plan.  OOB  Advance diet
Patient seen and examined. Agree with assessment and plan.

## 2018-05-14 LAB
BUN SERPL-MCNC: 13 MG/DL — SIGNIFICANT CHANGE UP (ref 7–23)
CALCIUM SERPL-MCNC: 8.5 MG/DL — SIGNIFICANT CHANGE UP (ref 8.4–10.5)
CHLORIDE SERPL-SCNC: 100 MMOL/L — SIGNIFICANT CHANGE UP (ref 98–107)
CO2 SERPL-SCNC: 26 MMOL/L — SIGNIFICANT CHANGE UP (ref 22–31)
CREAT SERPL-MCNC: 1.35 MG/DL — HIGH (ref 0.5–1.3)
GLUCOSE BLDC GLUCOMTR-MCNC: 112 MG/DL — HIGH (ref 70–99)
GLUCOSE BLDC GLUCOMTR-MCNC: 139 MG/DL — HIGH (ref 70–99)
GLUCOSE SERPL-MCNC: 129 MG/DL — HIGH (ref 70–99)
HCT VFR BLD CALC: 27.9 % — LOW (ref 39–50)
HGB BLD-MCNC: 8.8 G/DL — LOW (ref 13–17)
MCHC RBC-ENTMCNC: 26.6 PG — LOW (ref 27–34)
MCHC RBC-ENTMCNC: 31.5 % — LOW (ref 32–36)
MCV RBC AUTO: 84.3 FL — SIGNIFICANT CHANGE UP (ref 80–100)
NRBC # FLD: 0.03 — SIGNIFICANT CHANGE UP
PLATELET # BLD AUTO: 190 K/UL — SIGNIFICANT CHANGE UP (ref 150–400)
PMV BLD: 10.9 FL — SIGNIFICANT CHANGE UP (ref 7–13)
POTASSIUM SERPL-MCNC: 4 MMOL/L — SIGNIFICANT CHANGE UP (ref 3.5–5.3)
POTASSIUM SERPL-SCNC: 4 MMOL/L — SIGNIFICANT CHANGE UP (ref 3.5–5.3)
RBC # BLD: 3.31 M/UL — LOW (ref 4.2–5.8)
RBC # FLD: 17.4 % — HIGH (ref 10.3–14.5)
SODIUM SERPL-SCNC: 137 MMOL/L — SIGNIFICANT CHANGE UP (ref 135–145)
WBC # BLD: 14.8 K/UL — HIGH (ref 3.8–10.5)
WBC # FLD AUTO: 14.8 K/UL — HIGH (ref 3.8–10.5)

## 2018-05-14 PROCEDURE — 99233 SBSQ HOSP IP/OBS HIGH 50: CPT

## 2018-05-14 RX ORDER — OXYCODONE HYDROCHLORIDE 5 MG/1
15 TABLET ORAL EVERY 4 HOURS
Qty: 0 | Refills: 0 | Status: DISCONTINUED | OUTPATIENT
Start: 2018-05-14 | End: 2018-05-15

## 2018-05-14 RX ADMIN — HEPARIN SODIUM 5000 UNIT(S): 5000 INJECTION INTRAVENOUS; SUBCUTANEOUS at 21:35

## 2018-05-14 RX ADMIN — Medication 100 MILLIGRAM(S): at 21:35

## 2018-05-14 RX ADMIN — ATENOLOL 50 MILLIGRAM(S): 25 TABLET ORAL at 06:21

## 2018-05-14 RX ADMIN — OXYCODONE HYDROCHLORIDE 15 MILLIGRAM(S): 5 TABLET ORAL at 19:40

## 2018-05-14 RX ADMIN — HEPARIN SODIUM 5000 UNIT(S): 5000 INJECTION INTRAVENOUS; SUBCUTANEOUS at 06:21

## 2018-05-14 RX ADMIN — Medication 650 MILLIGRAM(S): at 18:10

## 2018-05-14 RX ADMIN — OXYCODONE HYDROCHLORIDE 10 MILLIGRAM(S): 5 TABLET ORAL at 15:30

## 2018-05-14 RX ADMIN — Medication 1 MILLIGRAM(S): at 06:21

## 2018-05-14 RX ADMIN — ALVIMOPAN 12 MILLIGRAM(S): 12 CAPSULE ORAL at 06:22

## 2018-05-14 RX ADMIN — SENNA PLUS 2 TABLET(S): 8.6 TABLET ORAL at 21:35

## 2018-05-14 RX ADMIN — Medication 650 MILLIGRAM(S): at 12:35

## 2018-05-14 RX ADMIN — OXYCODONE HYDROCHLORIDE 5 MILLIGRAM(S): 5 TABLET ORAL at 03:35

## 2018-05-14 RX ADMIN — OXYCODONE HYDROCHLORIDE 5 MILLIGRAM(S): 5 TABLET ORAL at 03:10

## 2018-05-14 RX ADMIN — AMLODIPINE BESYLATE 5 MILLIGRAM(S): 2.5 TABLET ORAL at 06:22

## 2018-05-14 RX ADMIN — HYDROMORPHONE HYDROCHLORIDE 0.5 MILLIGRAM(S): 2 INJECTION INTRAMUSCULAR; INTRAVENOUS; SUBCUTANEOUS at 10:17

## 2018-05-14 RX ADMIN — OXYCODONE HYDROCHLORIDE 15 MILLIGRAM(S): 5 TABLET ORAL at 18:52

## 2018-05-14 RX ADMIN — Medication 150 MILLIGRAM(S): at 21:35

## 2018-05-14 RX ADMIN — Medication 100 MILLIGRAM(S): at 06:21

## 2018-05-14 RX ADMIN — LOSARTAN POTASSIUM 100 MILLIGRAM(S): 100 TABLET, FILM COATED ORAL at 06:22

## 2018-05-14 RX ADMIN — Medication 650 MILLIGRAM(S): at 23:50

## 2018-05-14 RX ADMIN — Medication 100 MILLIGRAM(S): at 14:15

## 2018-05-14 RX ADMIN — Medication 1 MILLIGRAM(S): at 18:10

## 2018-05-14 RX ADMIN — HYDROMORPHONE HYDROCHLORIDE 0.5 MILLIGRAM(S): 2 INJECTION INTRAMUSCULAR; INTRAVENOUS; SUBCUTANEOUS at 10:30

## 2018-05-14 RX ADMIN — ALVIMOPAN 12 MILLIGRAM(S): 12 CAPSULE ORAL at 18:10

## 2018-05-14 RX ADMIN — OXYCODONE HYDROCHLORIDE 10 MILLIGRAM(S): 5 TABLET ORAL at 08:47

## 2018-05-14 RX ADMIN — AMLODIPINE BESYLATE 5 MILLIGRAM(S): 2.5 TABLET ORAL at 18:10

## 2018-05-14 RX ADMIN — HEPARIN SODIUM 5000 UNIT(S): 5000 INJECTION INTRAVENOUS; SUBCUTANEOUS at 14:15

## 2018-05-14 RX ADMIN — Medication 650 MILLIGRAM(S): at 23:49

## 2018-05-14 RX ADMIN — OXYCODONE HYDROCHLORIDE 10 MILLIGRAM(S): 5 TABLET ORAL at 14:45

## 2018-05-14 RX ADMIN — OXYCODONE HYDROCHLORIDE 10 MILLIGRAM(S): 5 TABLET ORAL at 09:30

## 2018-05-14 RX ADMIN — Medication 650 MILLIGRAM(S): at 06:21

## 2018-05-14 RX ADMIN — OXYCODONE HYDROCHLORIDE 5 MILLIGRAM(S): 5 TABLET ORAL at 23:50

## 2018-05-14 RX ADMIN — Medication 650 MILLIGRAM(S): at 06:22

## 2018-05-14 RX ADMIN — ATENOLOL 25 MILLIGRAM(S): 25 TABLET ORAL at 21:35

## 2018-05-14 NOTE — PROGRESS NOTE BEHAVIORAL HEALTH - CASE SUMMARY
Pt seen, agree with above. Pt calm on exam, appears mostly oriented but will not fully cooperate with orientation testing. Pt declines to speak with psychiatry further, will sign off, but please re-consult as needed.

## 2018-05-14 NOTE — PROGRESS NOTE BEHAVIORAL HEALTH - NSBHCONSULTRECOMMENDOTHER_PSY_A_CORE FT
- Avoid use of benzos and anticholinergics as they can worsen delirium    - Monitor EKG for qtc; if qtc >500ms, would need to discontinue use of antipsychotics    - If over the weekend pt is receiving Haldol prns, would start a standing Haldol dose of 1mg po qhs - Avoid use of benzos and anticholinergics as they can worsen delirium    - Monitor EKG for qtc; if qtc >500ms, would need to discontinue use of antipsychotics

## 2018-05-14 NOTE — PROGRESS NOTE BEHAVIORAL HEALTH - NSBHCHARTREVIEWVS_PSY_A_CORE FT
ICU Vital Signs Last 24 Hrs  T(C): 36.7 (14 May 2018 14:13), Max: 37.2 (13 May 2018 22:07)  T(F): 98 (14 May 2018 14:13), Max: 99 (13 May 2018 22:07)  HR: 66 (14 May 2018 14:13) (66 - 76)  BP: 138/60 (14 May 2018 14:13) (113/46 - 156/72)  BP(mean): --  ABP: --  ABP(mean): --  RR: 16 (14 May 2018 14:13) (16 - 18)  SpO2: 96% (14 May 2018 14:13) (95% - 99%)

## 2018-05-14 NOTE — PROGRESS NOTE BEHAVIORAL HEALTH - NSBHFUPINTERVALHXFT_PSY_A_CORE
No PRNs administered. Pt seen this AM reports that he does not need to see psychiatry because he will follow-up with his outpatient psychiatrist. Pt reports that he feels somewhat depressed because he has to go to rehab and he is in pain. Pt denied having suicidal thoughts. Pt oriented to year and month and refused to cooperate any further.

## 2018-05-14 NOTE — PROGRESS NOTE BEHAVIORAL HEALTH - NSBHCONSULTFOLLOWAFTERCARE_PSY_A_CORE FT
On discharge, pt will f/u with current outpt psych treatment @ [facility] in [place]: psychiatrist Barby Murray, therapist Juan Antonio Gill [phone]

## 2018-05-14 NOTE — PROGRESS NOTE BEHAVIORAL HEALTH - SUMMARY
64 y/o male with h/o obesity, anxiety/depression, pre-DM-2, HTN, prostate cancer s/p prostatectomy 2011, bladder cancer s/p cystoscopy, fulguration, cystolitholapaxy on 1/9/18, admitted for radical cystectomy and ileal conduit creation on 5/10/18. Pt currently displaying a picture of a mild delirium with difficulty sustaining attention and some word-finding difficulties in the setting of being post-op and receiving opiate pain medication. He denies any current prominent depressive/anxiety sx and reports that prior to surgery he was doing well, need to corroborate that with his outpatient psychiatrist. Pt denies any SI/HI, psychotic sx and unable to fully assess orientation at this time.

## 2018-05-14 NOTE — PROGRESS NOTE BEHAVIORAL HEALTH - NSBHCONSULTFOLLOWDETAILS_PSY_A_CORE FT
On discharge, pt will f/u with current outpt psych treatment @ [facility] in [place]: psychiatrist Barby Murray, therapist Juan Antonio Gill [phone]

## 2018-05-14 NOTE — PROGRESS NOTE BEHAVIORAL HEALTH - NSBHCHARTREVIEWLAB_PSY_A_CORE FT
Basic Metabolic Panel in AM (05.14.18 @ 05:50)    Sodium, Serum: 137 mmol/L    Potassium, Serum: 4.0 mmol/L    Chloride, Serum: 100 mmol/L    Carbon Dioxide, Serum: 26 mmol/L    Blood Urea Nitrogen, Serum: 13 mg/dL    Creatinine, Serum: 1.35 mg/dL    Glucose, Serum: 129 mg/dL    Calcium, Total Serum: 8.5 mg/dL

## 2018-05-15 ENCOUNTER — TRANSCRIPTION ENCOUNTER (OUTPATIENT)
Age: 63
End: 2018-05-15

## 2018-05-15 VITALS
HEART RATE: 68 BPM | RESPIRATION RATE: 16 BRPM | SYSTOLIC BLOOD PRESSURE: 147 MMHG | DIASTOLIC BLOOD PRESSURE: 67 MMHG | TEMPERATURE: 99 F

## 2018-05-15 LAB
GLUCOSE BLDC GLUCOMTR-MCNC: 133 MG/DL — HIGH (ref 70–99)
GLUCOSE BLDC GLUCOMTR-MCNC: 155 MG/DL — HIGH (ref 70–99)

## 2018-05-15 PROCEDURE — 99233 SBSQ HOSP IP/OBS HIGH 50: CPT

## 2018-05-15 RX ORDER — OXYCODONE HYDROCHLORIDE 5 MG/1
1 TABLET ORAL
Qty: 0 | Refills: 0 | COMMUNITY
Start: 2018-05-15

## 2018-05-15 RX ORDER — DOCUSATE SODIUM 100 MG
1 CAPSULE ORAL
Qty: 0 | Refills: 0 | COMMUNITY
Start: 2018-05-15

## 2018-05-15 RX ORDER — OXYCODONE HYDROCHLORIDE 5 MG/1
10 TABLET ORAL ONCE
Qty: 0 | Refills: 0 | Status: DISCONTINUED | OUTPATIENT
Start: 2018-05-15 | End: 2018-05-15

## 2018-05-15 RX ORDER — SENNA PLUS 8.6 MG/1
2 TABLET ORAL
Qty: 0 | Refills: 0 | COMMUNITY
Start: 2018-05-15

## 2018-05-15 RX ADMIN — OXYCODONE HYDROCHLORIDE 15 MILLIGRAM(S): 5 TABLET ORAL at 13:00

## 2018-05-15 RX ADMIN — OXYCODONE HYDROCHLORIDE 5 MILLIGRAM(S): 5 TABLET ORAL at 08:37

## 2018-05-15 RX ADMIN — OXYCODONE HYDROCHLORIDE 5 MILLIGRAM(S): 5 TABLET ORAL at 00:20

## 2018-05-15 RX ADMIN — Medication 1 MILLIGRAM(S): at 05:52

## 2018-05-15 RX ADMIN — OXYCODONE HYDROCHLORIDE 5 MILLIGRAM(S): 5 TABLET ORAL at 09:15

## 2018-05-15 RX ADMIN — Medication 650 MILLIGRAM(S): at 12:19

## 2018-05-15 RX ADMIN — OXYCODONE HYDROCHLORIDE 15 MILLIGRAM(S): 5 TABLET ORAL at 12:19

## 2018-05-15 RX ADMIN — HEPARIN SODIUM 5000 UNIT(S): 5000 INJECTION INTRAVENOUS; SUBCUTANEOUS at 05:49

## 2018-05-15 RX ADMIN — Medication 100 MILLIGRAM(S): at 05:48

## 2018-05-15 RX ADMIN — ATENOLOL 50 MILLIGRAM(S): 25 TABLET ORAL at 05:47

## 2018-05-15 RX ADMIN — Medication 650 MILLIGRAM(S): at 05:52

## 2018-05-15 RX ADMIN — ALVIMOPAN 12 MILLIGRAM(S): 12 CAPSULE ORAL at 05:48

## 2018-05-15 RX ADMIN — Medication 650 MILLIGRAM(S): at 05:48

## 2018-05-15 RX ADMIN — LOSARTAN POTASSIUM 100 MILLIGRAM(S): 100 TABLET, FILM COATED ORAL at 05:48

## 2018-05-15 NOTE — DISCHARGE NOTE ADULT - CARE PLAN
Principal Discharge DX:	Malignant neoplasm of bladder  Goal:	removal of bladder; ileo conduit creation  Assessment and plan of treatment:	as above; conduit care as instructed; drink plenty of fluids; see your doctor in one week to remove staples and stents Principal Discharge DX:	Malignant neoplasm of bladder  Goal:	removal of bladder; ileo conduit creation  Assessment and plan of treatment:	as above; conduit care as instructed; drink plenty of fluids; empty bulb drain as needed and change dressing daily;see your doctor in one week to remove staples and stents and bulb drain Principal Discharge DX:	Malignant neoplasm of bladder  Goal:	removal of bladder; ileo conduit creation  Assessment and plan of treatment:	as above; conduit care as instructed; drink plenty of fluids; empty bulb drain as needed and change dressing daily;see your doctor in one week to remove staples and stents; change dressing at drain site daily until dry

## 2018-05-15 NOTE — PROGRESS NOTE ADULT - PROBLEM SELECTOR PLAN 6
-c/w psych meds trazodone/clonazepam.   -haldol prn
-c/w psych meds trazodone/clonazepam.   -enhance supervision prn
-c/w psych meds trazodone/clonazepam.   -haldol prn
-c/w psych meds trazodone/clonazepam.   -haldol prn

## 2018-05-15 NOTE — PROGRESS NOTE ADULT - PROBLEM SELECTOR PROBLEM 5
CKD (chronic kidney disease), stage III

## 2018-05-15 NOTE — PROGRESS NOTE ADULT - PROBLEM SELECTOR PROBLEM 2
Postoperative anemia due to acute blood loss

## 2018-05-15 NOTE — DISCHARGE NOTE ADULT - CARE PROVIDER_API CALL
Ismael Esteban), Urology  [address] [place] [address]  Phone: [phone]  Fax: [phone]

## 2018-05-15 NOTE — PROGRESS NOTE ADULT - ASSESSMENT
62 y/o male with h/o obesity, anxiety/depression, pre-DM-2, HTN, prostate cancer s/p prostatectomy 2011, bladder cancer s/p cystoscopy, fulguration, cystolitholapaxy on 1/9/18, s/p radical cystectomy and ileal conduit creation on 5/10/18.
63M POD 3 s/p radical cystectomy, ileal conduit    --d/c PCA, PO pain control  -- continue clears, advance to regular as tolerated  -- monitor GI fxn  --cefotetan through today  -- Out of bed, pain control, incentive spirometry, DVT prophylaxis   -- enhanced supervision, f/up psych, haldol PRN
63M POD 4 s/p radical cystectomy, ileal conduit
63M POD# 5 s/p radical cystectomy, ileal conduit
64 yo M POD #1 cystectomy, ileal conduit
64 yo M POD #2 cystectomy, ileal conduit
62 y/o male with h/o obesity, anxiety/depression, pre-DM-2, HTN, prostate cancer s/p prostatectomy 2011, bladder cancer s/p cystoscopy, fulguration, cystolitholapaxy on 1/9/18, s/p radical cystectomy and ileal conduit creation on 5/10/18.

## 2018-05-15 NOTE — PROGRESS NOTE ADULT - PROBLEM SELECTOR PLAN 8
-likely reactive postop  -monitor CBC and temp curve  -incentive spirometry  -on prophylactic abx (cefotetan) per primary team.

## 2018-05-15 NOTE — PROGRESS NOTE ADULT - PROBLEM SELECTOR PLAN 1
-s/p radical cystectomy, ileal conduit creation on 5/10/18  -postop management per   -perioperative abx (cefotetan) per primary team  -progressing postop, pain control (now off PCA),  mIVF, bowel regimen, passed flatus, diet advanced to regular, bowel regimen, incentive spirometry, DVT prophylaxis.
-s/p radical cystectomy, ileal conduit creation on 5/10/18  -postop management per   -perioperative abx (cefotetan) per primary team  -pain control (PCA), mIVF, bowel regimen, await GI function, incentive spirometry, DVT prophylaxis.
-s/p radical cystectomy, ileal conduit creation on 5/10/18  -postop management per   -perioperative abx (cefotetan) per primary team  -progressing postop, pain control (now off PCA),  mIVF, bowel regimen, passed flatus, diet advanced to regular, bowel regimen, incentive spirometry, DVT prophylaxis.  Pain control as per 
Continue IVF, monitor outputs, color  Continue NPO, entereg,  Continue PCA  OOB and ambulate  Continue cefotetan  f/u medicine
Continue IVF, monitor outputs, color  Continue NPO, entereg, monitor GI function  Continue PCA  Continue cefotetan  AM labs reviewed  Ostomy consult  Hospitalist co-management  OOB, ambulate  DVT prophy, IS
OOB with P.T  f/u med  f/u psych  Rehab placement
OOB with P.T  f/u med  f/u psych  re-eval by [facility]
-s/p radical cystectomy, ileal conduit creation on 5/10/18  -postop management per   -perioperative abx (cefotetan) per primary team  -progressing postop, pain control (now off PCA),  mIVF, bowel regimen, passed flatus, diet advanced to regular, bowel regimen, incentive spirometry, DVT prophylaxis.  Pain control as per

## 2018-05-15 NOTE — PROGRESS NOTE ADULT - PROBLEM SELECTOR PLAN 5
-baseline creat 1.3-1.4 mg/dl  -creat at baseline creat 1.35, avoid nephrotoxins (nsaids, contrast), dose meds as per renal fxn
-baseline creat 1.3-1.4 mg/dl  -creat at baseline creat 1.2, avoid nephrotoxins (nsaids, contrast), dose meds as per renal fxn
-baseline creat 1.3-1.4 mg/dl  -creat at baseline creat 1.35, avoid nephrotoxins (nsaids, contrast), dose meds as per renal fxn
-baseline creat 1.3-1.4 mg/dl  -creat close to baseline, avoid nephrotoxins (nsaids, contrast), dose meds as per renal fxn

## 2018-05-15 NOTE — PROGRESS NOTE ADULT - PROVIDER SPECIALTY LIST ADULT
Hospitalist
Hospitalist
Pain Medicine
Urology
Hospitalist
Hospitalist

## 2018-05-15 NOTE — PROGRESS NOTE ADULT - SUBJECTIVE AND OBJECTIVE BOX
CHIEF COMPLAINT: Patient is a 63y old  male who presents with a chief complaint of "I'm removing my bladder and making an ileal conduit" (27 Apr 2018 10:47)      SUBJECTIVE / OVERNIGHT EVENTS:  pt feels ok, denies chest pain/sob, tolerating clears, c/o incisional pain    MEDICATIONS  (STANDING):  acetaminophen   Tablet. 650 milliGRAM(s) Oral every 6 hours  alvimopan 12 milliGRAM(s) Oral two times a day  amLODIPine   Tablet 5 milliGRAM(s) Oral <User Schedule>  ATENolol  Tablet 25 milliGRAM(s) Oral at bedtime  ATENolol  Tablet 50 milliGRAM(s) Oral daily  cefoTEtan  IVPB 1 Gram(s) IV Intermittent every 12 hours  clonazePAM Tablet 1 milliGRAM(s) Oral two times a day  dextrose 5%. 1000 milliLiter(s) (50 mL/Hr) IV Continuous <Continuous>  dextrose 50% Injectable 12.5 Gram(s) IV Push once  dextrose 50% Injectable 25 Gram(s) IV Push once  dextrose 50% Injectable 25 Gram(s) IV Push once  heparin  Injectable 5000 Unit(s) SubCutaneous every 8 hours  insulin lispro (HumaLOG) corrective regimen sliding scale   SubCutaneous three times a day before meals  insulin lispro (HumaLOG) corrective regimen sliding scale   SubCutaneous at bedtime  losartan 100 milliGRAM(s) Oral daily  senna 2 Tablet(s) Oral at bedtime  traZODone 150 milliGRAM(s) Oral at bedtime    MEDICATIONS  (PRN):  dextrose 40% Gel 15 Gram(s) Oral once PRN Blood Glucose LESS THAN 70 milliGRAM(s)/deciliter  glucagon  Injectable 1 milliGRAM(s) IntraMuscular once PRN Glucose LESS THAN 70 milligrams/deciliter  haloperidol     Tablet 1 milliGRAM(s) Oral every 6 hours PRN agitation or severe anxiety  haloperidol    Injectable 1 milliGRAM(s) IntraMuscular every 6 hours PRN Agitation or severe anxiety  haloperidol    Injectable 1 milliGRAM(s) IV Push every 6 hours PRN agitation or severe anxiety  HYDROmorphone  Injectable 1 milliGRAM(s) IV Push every 3 hours PRN breakthrough pain  oxyCODONE    IR 5 milliGRAM(s) Oral every 4 hours PRN Moderate Pain (4 - 6)  oxyCODONE    IR 10 milliGRAM(s) Oral every 4 hours PRN Severe Pain (7 - 10)      VITALS:  T(F): 98.8 (05-13-18 @ 10:45), Max: 99 (05-12-18 @ 13:48)  HR: 69 (05-13-18 @ 10:45) (68 - 80)  BP: 161/54 (05-13-18 @ 10:45) (124/53 - 161/54)  RR: 16 (05-13-18 @ 10:45) (15 - 18)  SpO2: 95% (05-13-18 @ 10:45)      CAPILLARY BLOOD GLUCOSE    Output     I&O's Summary  T(F): 98.8 (05-13-18 @ 10:45), Max: 99 (05-12-18 @ 13:48)  HR: 69 (05-13-18 @ 10:45) (68 - 80)  BP: 161/54 (05-13-18 @ 10:45) (124/53 - 161/54)  RR: 16 (05-13-18 @ 10:45) (15 - 18)  SpO2: 95% (05-13-18 @ 10:45)    PHYSICAL EXAM:  GENERAL: NAD, well-developed  HEAD:  Atraumatic, Normocephalic  EYES: EOMI, PERRLA, conjunctiva and sclera clear  NECK: Supple, No JVD  CHEST/LUNG: Clear to auscultation bilaterally; No wheeze  HEART: Regular rate and rhythm; No murmurs, rubs, or gallops  ABDOMEN: Soft, incisional tenderness,  distended, +ileal conduit  EXTREMITIES:  2+ Peripheral Pulses, No clubbing, cyanosis, or edema  PSYCH: AAOx3  NEUROLOGY: non-focal  SKIN: No rashes or lesions    LABS:              8.5                  139  | 25   | 13           14.64 >-----------< 144     ------------------------< 129                   26.0                 3.9  | 103  | 1.22                                         Ca 8.2   Mg x     Ph x        MICROBIOLOGY:    RADIOLOGY & ADDITIONAL TESTS:    Imaging Personally Reviewed:    [ ] Consultant(s) Notes Reviewed:  [ ] Care Discussed with Consultants/Other Providers:
Anesthesia Pain Management Service    SUBJECTIVE: "I'm still in some pain"     Pain Scale Score	At rest: _5__ 	With Activity: ___ 	[X ] Refer to charted pain scores    THERAPY:    [X ] IV PCA Morphine		[ X] 5 mg/mL	[ ] 1 mg/mL  [ ] IV PCA Hydromorphone	[ ] 5 mg/mL	[ ] 1 mg/mL  [ ] IV PCA Fentanyl		[ ] 50 micrograms/mL    Demand dose __1_ lockout __6_ (minutes) Continuous Rate _0__ Total: __27_  mg used (in past 24 hours)      MEDICATIONS  (STANDING):  acetaminophen  IVPB. 1000 milliGRAM(s) IV Intermittent once  alvimopan 12 milliGRAM(s) Oral two times a day  amLODIPine   Tablet 5 milliGRAM(s) Oral <User Schedule>  ATENolol  Tablet 25 milliGRAM(s) Oral at bedtime  ATENolol  Tablet 50 milliGRAM(s) Oral daily  cefoTEtan  IVPB 1 Gram(s) IV Intermittent every 12 hours  clonazePAM Tablet 1 milliGRAM(s) Oral two times a day  dextrose 5%. 1000 milliLiter(s) (50 mL/Hr) IV Continuous <Continuous>  dextrose 50% Injectable 12.5 Gram(s) IV Push once  dextrose 50% Injectable 25 Gram(s) IV Push once  dextrose 50% Injectable 25 Gram(s) IV Push once  heparin  Injectable 5000 Unit(s) SubCutaneous every 8 hours  insulin lispro (HumaLOG) corrective regimen sliding scale   SubCutaneous every 6 hours  lactated ringers. 1000 milliLiter(s) (125 mL/Hr) IV Continuous <Continuous>  losartan 100 milliGRAM(s) Oral daily  morphine PCA (5 mG/mL) 30 milliLiter(s) PCA Continuous PCA Continuous  senna 2 Tablet(s) Oral at bedtime  traZODone 150 milliGRAM(s) Oral at bedtime    MEDICATIONS  (PRN):  dextrose 40% Gel 15 Gram(s) Oral once PRN Blood Glucose LESS THAN 70 milliGRAM(s)/deciliter  glucagon  Injectable 1 milliGRAM(s) IntraMuscular once PRN Glucose LESS THAN 70 milligrams/deciliter  morphine PCA (5 mG/mL) Rescue Clinician Bolus 3 milliGRAM(s) IV Push every 15 minutes PRN for Pain Scale GREATER THAN 6  naloxone Injectable 0.1 milliGRAM(s) IV Push every 3 minutes PRN For ANY of the following changes in patient status:  A. RR LESS THAN 10 breaths per minute, B. Oxygen saturation LESS THAN 90%, C. Sedation score of 6  ondansetron Injectable 4 milliGRAM(s) IV Push every 6 hours PRN Nausea      OBJECTIVE: laying in bed     Sedation Score:	[ X] Alert	[ ] Drowsy 	[ ] Arousable	[ ] Asleep	[ ] Unresponsive    Side Effects:	[X ] None	[ ] Nausea	[ ] Vomiting	[ ] Pruritus  		[ ] Other:    Vital Signs Last 24 Hrs  T(C): 36.8 (11 May 2018 09:23), Max: 37 (11 May 2018 02:45)  T(F): 98.2 (11 May 2018 09:23), Max: 98.6 (11 May 2018 02:45)  HR: 89 (11 May 2018 09:23) (54 - 92)  BP: 100/62 (11 May 2018 09:23) (100/62 - 161/81)  BP(mean): --  RR: 18 (11 May 2018 09:23) (10 - 20)  SpO2: 96% (11 May 2018 09:23) (94% - 100%)    ASSESSMENT/ PLAN    Therapy to  be:	[ X] Continue   [ ] Discontinued   [ ] Change to prn Analgesics    Documentation and Verification of current medications:   [X] Done	[ ] Not done, not elligible    Comments: NPO continue current therapy
Anesthesia Pain Management Service    SUBJECTIVE: Patient is doing well with IV PCA and no significant problems reported.    Pain Scale Score	At rest: ___ 	With Activity: ___ 	[X ] Refer to charted pain scores    THERAPY:    [X ] IV PCA Morphine		[ X] 5 mg/mL	[ ] 1 mg/mL  [ ] IV PCA Hydromorphone	[ ] 5 mg/mL	[ ] 1 mg/mL  [ ] IV PCA Fentanyl		[ ] 50 micrograms/mL    Demand dose __1_ lockout __6_ (minutes) Continuous Rate _0__ Total: 28mg___  Daily      MEDICATIONS  (STANDING):  acetaminophen   Tablet. 650 milliGRAM(s) Oral every 6 hours  alvimopan 12 milliGRAM(s) Oral two times a day  amLODIPine   Tablet 5 milliGRAM(s) Oral <User Schedule>  ATENolol  Tablet 25 milliGRAM(s) Oral at bedtime  ATENolol  Tablet 50 milliGRAM(s) Oral daily  cefoTEtan  IVPB 1 Gram(s) IV Intermittent every 12 hours  clonazePAM Tablet 1 milliGRAM(s) Oral two times a day  dextrose 5%. 1000 milliLiter(s) (50 mL/Hr) IV Continuous <Continuous>  dextrose 50% Injectable 12.5 Gram(s) IV Push once  dextrose 50% Injectable 25 Gram(s) IV Push once  dextrose 50% Injectable 25 Gram(s) IV Push once  heparin  Injectable 5000 Unit(s) SubCutaneous every 8 hours  insulin lispro (HumaLOG) corrective regimen sliding scale   SubCutaneous every 6 hours  losartan 100 milliGRAM(s) Oral daily  senna 2 Tablet(s) Oral at bedtime  traZODone 150 milliGRAM(s) Oral at bedtime    MEDICATIONS  (PRN):  dextrose 40% Gel 15 Gram(s) Oral once PRN Blood Glucose LESS THAN 70 milliGRAM(s)/deciliter  glucagon  Injectable 1 milliGRAM(s) IntraMuscular once PRN Glucose LESS THAN 70 milligrams/deciliter  haloperidol     Tablet 1 milliGRAM(s) Oral every 6 hours PRN agitation or severe anxiety  haloperidol    Injectable 1 milliGRAM(s) IntraMuscular every 6 hours PRN Agitation or severe anxiety  haloperidol    Injectable 1 milliGRAM(s) IV Push every 6 hours PRN agitation or severe anxiety  oxyCODONE    IR 5 milliGRAM(s) Oral every 4 hours PRN Moderate Pain (4 - 6)  oxyCODONE    IR 10 milliGRAM(s) Oral every 4 hours PRN Severe Pain (7 - 10)      OBJECTIVE:    Sedation Score:	[ X] Alert	[ ] Drowsy 	[ ] Arousable	[ ] Asleep	[ ] Unresponsive    Side Effects:	[X ] None	[ ] Nausea	[ ] Vomiting	[ ] Pruritus  		[ ] Other:    Vital Signs Last 24 Hrs  T(C): 36.9 (13 May 2018 05:32), Max: 37.4 (12 May 2018 09:49)  T(F): 98.4 (13 May 2018 05:32), Max: 99.4 (12 May 2018 09:49)  HR: 69 (13 May 2018 07:23) (68 - 80)  BP: 146/58 (13 May 2018 07:23) (124/53 - 150/62)  BP(mean): --  RR: 17 (13 May 2018 05:32) (15 - 18)  SpO2: 94% (13 May 2018 07:23) (94% - 97%)    ASSESSMENT/ PLAN    Therapy to  be:	[ ] Continue   [X ] Discontinued   [ X] Change to prn Analgesics    Documentation and Verification of current medications:   [X] Done	[ ] Not done, not elligible    Comments: PRN Oral/IV opioids and/or Adjuvant medication to be ordered at this point.
Anesthesia Pain Management Service    SUBJECTIVE: Patient is doing well with IV PCA and no significant problems reported.    Pain Scale Score	At rest: ___ 	With Activity: ___ 	[X ] Refer to charted pain scores    THERAPY:    [X ] IV PCA Morphine		[ X] 5 mg/mL	[ ] 1 mg/mL  [ ] IV PCA Hydromorphone	[ ] 5 mg/mL	[ ] 1 mg/mL  [ ] IV PCA Fentanyl		[ ] 50 micrograms/mL    Demand dose __1_ lockout __6_ (minutes) Continuous Rate _0__ Total: _21mg__  Daily      MEDICATIONS  (STANDING):  acetaminophen  IVPB. 1000 milliGRAM(s) IV Intermittent once  alvimopan 12 milliGRAM(s) Oral two times a day  amLODIPine   Tablet 5 milliGRAM(s) Oral <User Schedule>  ATENolol  Tablet 25 milliGRAM(s) Oral at bedtime  ATENolol  Tablet 50 milliGRAM(s) Oral daily  cefoTEtan  IVPB 1 Gram(s) IV Intermittent every 12 hours  clonazePAM Tablet 1 milliGRAM(s) Oral two times a day  dextrose 5%. 1000 milliLiter(s) (50 mL/Hr) IV Continuous <Continuous>  dextrose 50% Injectable 12.5 Gram(s) IV Push once  dextrose 50% Injectable 25 Gram(s) IV Push once  dextrose 50% Injectable 25 Gram(s) IV Push once  heparin  Injectable 5000 Unit(s) SubCutaneous every 8 hours  insulin lispro (HumaLOG) corrective regimen sliding scale   SubCutaneous every 6 hours  lactated ringers. 1000 milliLiter(s) (125 mL/Hr) IV Continuous <Continuous>  losartan 100 milliGRAM(s) Oral daily  morphine PCA (5 mG/mL) 30 milliLiter(s) PCA Continuous PCA Continuous  senna 2 Tablet(s) Oral at bedtime  traZODone 150 milliGRAM(s) Oral at bedtime    MEDICATIONS  (PRN):  dextrose 40% Gel 15 Gram(s) Oral once PRN Blood Glucose LESS THAN 70 milliGRAM(s)/deciliter  glucagon  Injectable 1 milliGRAM(s) IntraMuscular once PRN Glucose LESS THAN 70 milligrams/deciliter  haloperidol     Tablet 1 milliGRAM(s) Oral every 6 hours PRN agitation or severe anxiety  haloperidol    Injectable 1 milliGRAM(s) IntraMuscular every 6 hours PRN Agitation or severe anxiety  haloperidol    Injectable 1 milliGRAM(s) IV Push every 6 hours PRN agitation or severe anxiety  morphine PCA (5 mG/mL) Rescue Clinician Bolus 3 milliGRAM(s) IV Push every 15 minutes PRN for Pain Scale GREATER THAN 6  naloxone Injectable 0.1 milliGRAM(s) IV Push every 3 minutes PRN For ANY of the following changes in patient status:  A. RR LESS THAN 10 breaths per minute, B. Oxygen saturation LESS THAN 90%, C. Sedation score of 6  ondansetron Injectable 4 milliGRAM(s) IV Push every 6 hours PRN Nausea      OBJECTIVE:    Sedation Score:	[ X] Alert	[ ] Drowsy 	[ ] Arousable	[ ] Asleep	[ ] Unresponsive    Side Effects:	[X ] None	[ ] Nausea	[ ] Vomiting	[ ] Pruritus  		[ ] Other:    Vital Signs Last 24 Hrs  T(C): 36.9 (12 May 2018 06:27), Max: 37.2 (11 May 2018 21:20)  T(F): 98.4 (12 May 2018 06:27), Max: 98.9 (11 May 2018 21:20)  HR: 67 (12 May 2018 06:27) (66 - 89)  BP: 110/47 (12 May 2018 06:27) (100/62 - 140/61)  BP(mean): --  RR: 18 (12 May 2018 06:27) (18 - 18)  SpO2: 95% (12 May 2018 06:27) (95% - 96%)    ASSESSMENT/ PLAN    Therapy to  be:	[ X] Continue   [ ] Discontinued   [ ] Change to prn Analgesics    Documentation and Verification of current medications:   [X] Done	[ ] Not done, not elligible    Comments:
Anesthesia Pain Management Service- Attending Addendum    SUBJECTIVE: Pt doing well with IV PCA without problems reported.    Therapy:	  [ X] IV PCA	   [ ] Epidural           [ ] s/p Spinal Opoid              [ ] Postpartum infusion	  [ ] Patient controlled regional anesthesia (PCRA)    [ ] prn Analgesics    Allergies    No Known Allergies    Intolerances      MEDICATIONS  (STANDING):  acetaminophen  IVPB. 1000 milliGRAM(s) IV Intermittent once  alvimopan 12 milliGRAM(s) Oral two times a day  amLODIPine   Tablet 5 milliGRAM(s) Oral <User Schedule>  ATENolol  Tablet 25 milliGRAM(s) Oral at bedtime  ATENolol  Tablet 50 milliGRAM(s) Oral daily  cefoTEtan  IVPB 1 Gram(s) IV Intermittent every 12 hours  clonazePAM Tablet 1 milliGRAM(s) Oral two times a day  dextrose 5%. 1000 milliLiter(s) (50 mL/Hr) IV Continuous <Continuous>  dextrose 50% Injectable 12.5 Gram(s) IV Push once  dextrose 50% Injectable 25 Gram(s) IV Push once  dextrose 50% Injectable 25 Gram(s) IV Push once  heparin  Injectable 5000 Unit(s) SubCutaneous every 8 hours  insulin lispro (HumaLOG) corrective regimen sliding scale   SubCutaneous every 6 hours  lactated ringers. 1000 milliLiter(s) (125 mL/Hr) IV Continuous <Continuous>  losartan 100 milliGRAM(s) Oral daily  morphine PCA (5 mG/mL) 30 milliLiter(s) PCA Continuous PCA Continuous  senna 2 Tablet(s) Oral at bedtime  traZODone 150 milliGRAM(s) Oral at bedtime    MEDICATIONS  (PRN):  dextrose 40% Gel 15 Gram(s) Oral once PRN Blood Glucose LESS THAN 70 milliGRAM(s)/deciliter  glucagon  Injectable 1 milliGRAM(s) IntraMuscular once PRN Glucose LESS THAN 70 milligrams/deciliter  haloperidol     Tablet 1 milliGRAM(s) Oral every 6 hours PRN agitation or severe anxiety  haloperidol    Injectable 1 milliGRAM(s) IntraMuscular every 6 hours PRN Agitation or severe anxiety  haloperidol    Injectable 1 milliGRAM(s) IV Push every 6 hours PRN agitation or severe anxiety  morphine PCA (5 mG/mL) Rescue Clinician Bolus 3 milliGRAM(s) IV Push every 15 minutes PRN for Pain Scale GREATER THAN 6  naloxone Injectable 0.1 milliGRAM(s) IV Push every 3 minutes PRN For ANY of the following changes in patient status:  A. RR LESS THAN 10 breaths per minute, B. Oxygen saturation LESS THAN 90%, C. Sedation score of 6  ondansetron Injectable 4 milliGRAM(s) IV Push every 6 hours PRN Nausea      OBJECTIVE:   [X] No new signs     [ ] Other:    Side Effects:  [X ] None			[ ] Other:    Assessment of Catheter Site:		[ ] Intact		[ ] Other:    ASSESSMENT/PLAN  [ X] Continue current therapy    [ ] Therapy changed to:    [ ] IV PCA       [ ] Epidural     [ ] prn Analgesics     Comments:
CHIEF COMPLAINT: Patient is a 63y old  male who presents with a chief complaint of "I'm removing my bladder and making an ileal conduit" (27 Apr 2018 10:47)      SUBJECTIVE / OVERNIGHT EVENTS:  pt feels ok, denies chest pain or sob, no flatus      MEDICATIONS  (STANDING):  acetaminophen  IVPB. 1000 milliGRAM(s) IV Intermittent once  alvimopan 12 milliGRAM(s) Oral two times a day  amLODIPine   Tablet 5 milliGRAM(s) Oral <User Schedule>  ATENolol  Tablet 25 milliGRAM(s) Oral at bedtime  ATENolol  Tablet 50 milliGRAM(s) Oral daily  cefoTEtan  IVPB 1 Gram(s) IV Intermittent every 12 hours  clonazePAM Tablet 1 milliGRAM(s) Oral two times a day  dextrose 5%. 1000 milliLiter(s) (50 mL/Hr) IV Continuous <Continuous>  dextrose 50% Injectable 12.5 Gram(s) IV Push once  dextrose 50% Injectable 25 Gram(s) IV Push once  dextrose 50% Injectable 25 Gram(s) IV Push once  heparin  Injectable 5000 Unit(s) SubCutaneous every 8 hours  insulin lispro (HumaLOG) corrective regimen sliding scale   SubCutaneous every 6 hours  lactated ringers. 1000 milliLiter(s) (125 mL/Hr) IV Continuous <Continuous>  losartan 100 milliGRAM(s) Oral daily  morphine PCA (5 mG/mL) 30 milliLiter(s) PCA Continuous PCA Continuous  senna 2 Tablet(s) Oral at bedtime  traZODone 150 milliGRAM(s) Oral at bedtime    MEDICATIONS  (PRN):  dextrose 40% Gel 15 Gram(s) Oral once PRN Blood Glucose LESS THAN 70 milliGRAM(s)/deciliter  glucagon  Injectable 1 milliGRAM(s) IntraMuscular once PRN Glucose LESS THAN 70 milligrams/deciliter  haloperidol     Tablet 1 milliGRAM(s) Oral every 6 hours PRN agitation or severe anxiety  haloperidol    Injectable 1 milliGRAM(s) IntraMuscular every 6 hours PRN Agitation or severe anxiety  haloperidol    Injectable 1 milliGRAM(s) IV Push every 6 hours PRN agitation or severe anxiety  morphine PCA (5 mG/mL) Rescue Clinician Bolus 3 milliGRAM(s) IV Push every 15 minutes PRN for Pain Scale GREATER THAN 6  naloxone Injectable 0.1 milliGRAM(s) IV Push every 3 minutes PRN For ANY of the following changes in patient status:  A. RR LESS THAN 10 breaths per minute, B. Oxygen saturation LESS THAN 90%, C. Sedation score of 6  ondansetron Injectable 4 milliGRAM(s) IV Push every 6 hours PRN Nausea      VITALS:  T(F): 98.4 (05-12-18 @ 06:27), Max: 98.9 (05-11-18 @ 21:20)  HR: 67 (05-12-18 @ 06:27) (66 - 88)  BP: 110/47 (05-12-18 @ 06:27) (105/51 - 140/61)  RR: 18 (05-12-18 @ 06:27) (18 - 18)  SpO2: 95% (05-12-18 @ 06:27)      CAPILLARY BLOOD GLUCOSE    Output     I&O's Summary  T(F): 98.4 (05-12-18 @ 06:27), Max: 98.9 (05-11-18 @ 21:20)  HR: 67 (05-12-18 @ 06:27) (66 - 88)  BP: 110/47 (05-12-18 @ 06:27) (105/51 - 140/61)  RR: 18 (05-12-18 @ 06:27) (18 - 18)  SpO2: 95% (05-12-18 @ 06:27)    PHYSICAL EXAM:  GENERAL: NAD, well-developed  HEAD:  Atraumatic, Normocephalic  EYES: EOMI, PERRLA, conjunctiva and sclera clear  NECK: Supple, No JVD  CHEST/LUNG: Clear to auscultation bilaterally; No wheeze  HEART: Regular rate and rhythm; No murmurs, rubs, or gallops  ABDOMEN: Soft, incisional tenderness, mildly distended, +ileal conduit  EXTREMITIES:  2+ Peripheral Pulses, No clubbing, cyanosis, or edema  PSYCH: AAOx3  NEUROLOGY: non-focal  SKIN: No rashes or lesions    LABS:              8.7                  138  | 27   | 17           13.60 >-----------< 143     ------------------------< 119                   27.0                 4.2  | 103  | 1.47                                         Ca 8.0   Mg 2.0   Ph x            INR: 1.07 ;    PT: 12.3 SEC;    PTT: 28.6 SEC      MICROBIOLOGY:    RADIOLOGY & ADDITIONAL TESTS:    Imaging Personally Reviewed:    [ ] Consultant(s) Notes Reviewed:  [ ] Care Discussed with Consultants/Other Providers:
Overnight events:  None    Subjective:  Pt c/o incisional pain, no N/V, no flatus yet, not OOB yet    Objective:    Vital signs  T(C): , Max: 37 (05-11-18 @ 02:45)  HR: 92 (05-11-18 @ 07:04)  BP: 153/68 (05-11-18 @ 07:04)  SpO2: 97% (05-11-18 @ 06:22)  Wt(kg): --    Output   IC: 480 tea colored  POOJA: 225 serosang      Gen: NAD  Abd: staples c/d/i, softly distended, nontender, stoma pink with stents visible      Labs                        12.0   13.32 )-----------( 171      ( 11 May 2018 06:30 )             38.1     11 May 2018 06:30    139    |  102    |  20     ----------------------------<  132    4.5     |  22     |  1.33     Ca    8.2        11 May 2018 06:30
POD #4    Afeb 113/46  66  95%RA    Pt has no c/o; no agitation (seen by psych); claims he can't walk    Abd- soft NT ND; + flatus     wounds C&D      POOJA 3.5    Shavon reg diet
POD #5    Afeb VSS    Pt has no c/o  Abd- soft NT ND; + flatus    wounds C&D       POOJA 2.5
Patient is a 63y old  Male who presents with a chief complaint of "I'm removing my bladder and making an ileal conduit" (27 Apr 2018 10:47)      SUBJECTIVE / OVERNIGHT EVENTS: Feels better today. No complaints.    MEDICATIONS  (STANDING):  acetaminophen   Tablet. 650 milliGRAM(s) Oral every 6 hours  alvimopan 12 milliGRAM(s) Oral two times a day  amLODIPine   Tablet 5 milliGRAM(s) Oral <User Schedule>  ATENolol  Tablet 25 milliGRAM(s) Oral at bedtime  ATENolol  Tablet 50 milliGRAM(s) Oral daily  clonazePAM Tablet 1 milliGRAM(s) Oral two times a day  dextrose 5%. 1000 milliLiter(s) (50 mL/Hr) IV Continuous <Continuous>  dextrose 50% Injectable 12.5 Gram(s) IV Push once  dextrose 50% Injectable 25 Gram(s) IV Push once  dextrose 50% Injectable 25 Gram(s) IV Push once  docusate sodium 100 milliGRAM(s) Oral three times a day  heparin  Injectable 5000 Unit(s) SubCutaneous every 8 hours  insulin lispro (HumaLOG) corrective regimen sliding scale   SubCutaneous three times a day before meals  insulin lispro (HumaLOG) corrective regimen sliding scale   SubCutaneous at bedtime  losartan 100 milliGRAM(s) Oral daily  senna 2 Tablet(s) Oral at bedtime  traZODone 150 milliGRAM(s) Oral at bedtime    MEDICATIONS  (PRN):  dextrose 40% Gel 15 Gram(s) Oral once PRN Blood Glucose LESS THAN 70 milliGRAM(s)/deciliter  glucagon  Injectable 1 milliGRAM(s) IntraMuscular once PRN Glucose LESS THAN 70 milligrams/deciliter  haloperidol     Tablet 1 milliGRAM(s) Oral every 6 hours PRN agitation or severe anxiety  haloperidol    Injectable 1 milliGRAM(s) IntraMuscular every 6 hours PRN Agitation or severe anxiety  haloperidol    Injectable 1 milliGRAM(s) IV Push every 6 hours PRN agitation or severe anxiety  HYDROmorphone  Injectable 0.5 milliGRAM(s) IV Push every 3 hours PRN Severe Pain (7 - 10)  oxyCODONE    IR 5 milliGRAM(s) Oral every 4 hours PRN Moderate Pain (4 - 6)  oxyCODONE    IR 15 milliGRAM(s) Oral every 4 hours PRN Severe Pain (7 - 10)      Vital Signs Last 24 Hrs  T(C): 37.4 (15 May 2018 09:10), Max: 37.5 (14 May 2018 21:30)  T(F): 99.3 (15 May 2018 09:10), Max: 99.5 (14 May 2018 21:30)  HR: 68 (15 May 2018 09:10) (59 - 71)  BP: 147/67 (15 May 2018 09:10) (125/55 - 147/67)  BP(mean): --  RR: 16 (15 May 2018 09:10) (16 - 16)  SpO2: 96% (15 May 2018 05:43) (96% - 98%)  CAPILLARY BLOOD GLUCOSE      POCT Blood Glucose.: 155 mg/dL (15 May 2018 08:49)  POCT Blood Glucose.: 112 mg/dL (14 May 2018 17:38)    I&O's Summary    14 May 2018 07:01  -  15 May 2018 07:00  --------------------------------------------------------  IN: 0 mL / OUT: 1612.5 mL / NET: -1612.5 mL    15 May 2018 07:01  -  15 May 2018 11:10  --------------------------------------------------------  IN: 0 mL / OUT: 682.5 mL / NET: -682.5 mL        PHYSICAL EXAM:  GENERAL: NAD, well-developed  HEAD:  Atraumatic, Normocephalic  EYES: EOMI, PERRLA, conjunctiva and sclera clear  NECK: Supple, No JVD  CHEST/LUNG: Clear to auscultation bilaterally; No wheeze  HEART: Regular rate and rhythm; No murmurs, rubs, or gallops  ABDOMEN: Soft, Nontender, Nondistended; Bowel sounds present, (+) Ileal conduit  EXTREMITIES:  2+ Peripheral Pulses, No clubbing, cyanosis, or edema  PSYCH: AAOx3  NEUROLOGY: non-focal  SKIN: No rashes or lesions    LABS:                        8.8    14.80 )-----------( 190      ( 14 May 2018 05:50 )             27.9     05-14    137  |  100  |  13  ----------------------------<  129<H>  4.0   |  26  |  1.35<H>    Ca    8.5      14 May 2018 05:50                RADIOLOGY & ADDITIONAL TESTS:    Imaging Personally Reviewed:    Consultant(s) Notes Reviewed:      Care Discussed with Consultants/Other Providers:
Subjective    Patient seen and examined. Continues to note poor pain control with PCA. Tolerating clears without n/v.  Ambulating with PT, recommended for LUZ MARINA.     Objective    Vital signs  T(F): , Max: 99.4 (05-12-18 @ 09:49)  HR: 69 (05-13-18 @ 07:23)  BP: 146/58 (05-13-18 @ 07:23)  SpO2: 94% (05-13-18 @ 07:23)  Wt(kg): --    Output     05-12 @ 07:01  -  05-13 @ 07:00  --------------------------------------------------------  IN: 0 mL / OUT: 4562.5 mL / NET: -4562.5 mL        General: NAD  Abdomen: soft/non-tender/non-distended, incisions c/d/i  : stoma mild congestion, stents in place, urine light pink    Labs      05-13 @ 06:20    WBC 14.64 / Hct 26.0  / SCr 1.22     05-12 @ 12:30    WBC 14.51 / Hct 25.3  / SCr --
Subjective:  Patient feels well, no Gifx yet. Patient denies nausea. Conduit output remains light red.    Objectives:  T(C): 37.4 (05-12-18 @ 09:49), Max: 37.4 (05-12-18 @ 09:49)  HR: 72 (05-12-18 @ 09:49) (66 - 72)  BP: 124/59 (05-12-18 @ 09:49) (105/51 - 124/59)  RR: 15 (05-12-18 @ 09:49) (15 - 18)  SpO2: 95% (05-12-18 @ 09:49) (95% - 95%)  Wt(kg): --    05-11 @ 07:01  -  05-12 @ 07:00  --------------------------------------------------------  IN:  Total IN: 0 mL    OUT:    Bulb: 85 mL    Urostomy: 2370 mL  Total OUT: 2455 mL    Total NET: -2455 mL      05-12 @ 07:01  -  05-12 @ 10:42  --------------------------------------------------------  IN:  Total IN: 0 mL    OUT:    Bulb: 2.5 mL    Urostomy: 400 mL  Total OUT: 402.5 mL    Total NET: -402.5 mL          Physcial Exam  GENERAL: NAD, well-developed  ABDOMEN: Soft, Nontender, mildly distended.  GENITOURINARY: conduit draining light red urine.      LABS:                        8.7    13.60 )-----------( 143      ( 12 May 2018 06:35 )             27.0     05-12    138  |  103  |  17  ----------------------------<  119<H>  4.2   |  27  |  1.47<H>    Ca    8.0<L>      12 May 2018 06:35  Mg     2.0     05-12      CAPILLARY BLOOD GLUCOSE      POCT Blood Glucose.: 121 mg/dL (12 May 2018 06:43)    PT/INR - ( 11 May 2018 10:00 )   PT: 12.3 SEC;   INR: 1.07          PTT - ( 11 May 2018 10:00 )  PTT:28.6 SEC  CAPILLARY BLOOD GLUCOSE      POCT Blood Glucose.: 121 mg/dL (12 May 2018 06:43)        ASSESSMENT:  63y Male with Malignant neoplasm of urinary bladder  Malignant neoplasm of overlapping sites of bladder  Malignant neoplasm of overlapping sites of bladder  CKD (chronic kidney disease), stage III  Recurrent major depressive disorder, remission status unspecified  Delirium due to multiple etiologies  Leukocytosis  Prophylactic measure  Major depressive disorder  Anxiety  Pre-diabetes  Postoperative anemia due to acute blood loss  HTN (Hypertension)  Diabetes mellitus  Malignant neoplasm of bladder  Radical cystectomy with creation of ileal conduit
Patient is a 63y old  Male who presents with a chief complaint of "I'm removing my bladder and making an ileal conduit" (27 Apr 2018 10:47)      SUBJECTIVE / OVERNIGHT EVENTS: C/O pain at surgical site, denies any CP or SOB. Passing gas, no BM  MEDICATIONS  (STANDING):  acetaminophen   Tablet. 650 milliGRAM(s) Oral every 6 hours  alvimopan 12 milliGRAM(s) Oral two times a day  amLODIPine   Tablet 5 milliGRAM(s) Oral <User Schedule>  ATENolol  Tablet 25 milliGRAM(s) Oral at bedtime  ATENolol  Tablet 50 milliGRAM(s) Oral daily  clonazePAM Tablet 1 milliGRAM(s) Oral two times a day  dextrose 5%. 1000 milliLiter(s) (50 mL/Hr) IV Continuous <Continuous>  dextrose 50% Injectable 12.5 Gram(s) IV Push once  dextrose 50% Injectable 25 Gram(s) IV Push once  dextrose 50% Injectable 25 Gram(s) IV Push once  docusate sodium 100 milliGRAM(s) Oral three times a day  heparin  Injectable 5000 Unit(s) SubCutaneous every 8 hours  insulin lispro (HumaLOG) corrective regimen sliding scale   SubCutaneous three times a day before meals  insulin lispro (HumaLOG) corrective regimen sliding scale   SubCutaneous at bedtime  losartan 100 milliGRAM(s) Oral daily  senna 2 Tablet(s) Oral at bedtime  traZODone 150 milliGRAM(s) Oral at bedtime    MEDICATIONS  (PRN):  dextrose 40% Gel 15 Gram(s) Oral once PRN Blood Glucose LESS THAN 70 milliGRAM(s)/deciliter  glucagon  Injectable 1 milliGRAM(s) IntraMuscular once PRN Glucose LESS THAN 70 milligrams/deciliter  haloperidol     Tablet 1 milliGRAM(s) Oral every 6 hours PRN agitation or severe anxiety  haloperidol    Injectable 1 milliGRAM(s) IntraMuscular every 6 hours PRN Agitation or severe anxiety  haloperidol    Injectable 1 milliGRAM(s) IV Push every 6 hours PRN agitation or severe anxiety  HYDROmorphone  Injectable 0.5 milliGRAM(s) IV Push every 3 hours PRN Severe Pain (7 - 10)  oxyCODONE    IR 5 milliGRAM(s) Oral every 4 hours PRN Moderate Pain (4 - 6)  oxyCODONE    IR 10 milliGRAM(s) Oral every 4 hours PRN Severe Pain (7 - 10)      Vital Signs Last 24 Hrs  T(C): 36.6 (14 May 2018 09:24), Max: 37.3 (13 May 2018 13:11)  T(F): 97.9 (14 May 2018 09:24), Max: 99.1 (13 May 2018 13:11)  HR: 68 (14 May 2018 09:24) (66 - 76)  BP: 123/50 (14 May 2018 09:24) (113/46 - 161/54)  BP(mean): --  RR: 16 (14 May 2018 09:24) (16 - 18)  SpO2: 98% (14 May 2018 09:24) (95% - 99%)  CAPILLARY BLOOD GLUCOSE      POCT Blood Glucose.: 139 mg/dL (14 May 2018 08:24)  POCT Blood Glucose.: 117 mg/dL (13 May 2018 12:30)    I&O's Summary    13 May 2018 07:01  -  14 May 2018 07:00  --------------------------------------------------------  IN: 0 mL / OUT: 2443.5 mL / NET: -2443.5 mL    14 May 2018 07:01  -  14 May 2018 10:08  --------------------------------------------------------  IN: 0 mL / OUT: 402.5 mL / NET: -402.5 mL        PHYSICAL EXAM:  GENERAL: NAD, well-developed  HEAD:  Atraumatic, Normocephalic  EYES: EOMI, PERRLA, conjunctiva and sclera clear  NECK: Supple, No JVD  CHEST/LUNG: Clear to auscultation bilaterally; No wheeze  HEART: Regular rate and rhythm; No murmurs, rubs, or gallops  ABDOMEN: Soft, mildly tender at periumbilical area , Nondistended; Bowel sounds present  EXTREMITIES:  2+ Peripheral Pulses, No clubbing, cyanosis, or edema  PSYCH: AAOx3  NEUROLOGY: non-focal  SKIN: No rashes or lesions    LABS:                        8.8    14.80 )-----------( 190      ( 14 May 2018 05:50 )             27.9     05-14    137  |  100  |  13  ----------------------------<  129<H>  4.0   |  26  |  1.35<H>    Ca    8.5      14 May 2018 05:50                RADIOLOGY & ADDITIONAL TESTS:    Imaging Personally Reviewed:    Consultant(s) Notes Reviewed:      Care Discussed with Consultants/Other Providers:

## 2018-05-15 NOTE — PROGRESS NOTE ADULT - PROBLEM SELECTOR PROBLEM 1
Malignant neoplasm of bladder

## 2018-05-15 NOTE — DISCHARGE NOTE ADULT - HOSPITAL COURSE
Pt had cystectomy, ileo conduit 5 days ago; did well; had one episode of agitation post op, then none since;  passed gas a few days ago and advanced to reg diet; slow with ambulation, needs P.T.;  d/c today to rehab; f/u in one week to remove staples and stents in conduit. Pt had cystectomy, ileo conduit 5 days ago; did well; had one episode of agitation post op, then none since;  passed gas a few days ago and advanced to reg diet; slow with ambulation, needs P.T.;  d/c today to rehab; f/u in one week to remove staples and stents in conduit, and bulb drain. Pt had cystectomy, ileo conduit 5 days ago; did well; had one episode of agitation post op, then none since;  passed gas a few days ago and advanced to reg diet; slow with ambulation, needs P.T.;  d/c today to rehab; f/u in one week to remove staples and stents in conduit, POOJA drain removed

## 2018-05-15 NOTE — DISCHARGE NOTE ADULT - NS AS DC FOLLOWUP STROKE INST
carenotes on cystectomy, ileal conduit, d/c medication and side effects pamphlet, current A1C and consistent carb meal plan/Smoking Cessation

## 2018-05-15 NOTE — DISCHARGE NOTE ADULT - MEDICATION SUMMARY - MEDICATIONS TO STOP TAKING
I will STOP taking the medications listed below when I get home from the hospital:    Aleve 220 mg oral tablet  -- 1 tab(s) by mouth every 8 hours, As Needed last dose 5/2 I will STOP taking the medications listed below when I get home from the hospital:  None

## 2018-05-15 NOTE — PROGRESS NOTE ADULT - PROBLEM SELECTOR PLAN 3
c/w atenolol , losartan and norvasc, monitor bp, hold antihypertensives for SBP<110  patient's preop EKG (4/27): sinus manuel HR 51, no ischemic change.

## 2018-05-15 NOTE — DISCHARGE NOTE ADULT - NS AS ACTIVITY OBS
Walking-Outdoors allowed/No Heavy lifting/straining/Showering allowed/Walking-Indoors allowed/Stairs allowed

## 2018-05-15 NOTE — DISCHARGE NOTE ADULT - INSTRUCTIONS
as tolerated Notify Dr Esteban if you experience increase in pain not relieved with pain medication, any redness, drainage or swelling around incision or if develop a fever >101.0  Drink plenty of fluids.  No heavy lifting or straining.  Use over the counter stool softeners to assist with constipation which can be a side effect of your pain medication.  Notify Dr Esteban if an bright red blood in urine.  Make follow up appointment for stent and staple  removal.  Continue to follow consistent carb diet.  Follow up with PMD for continued management of your diabetes.

## 2018-05-15 NOTE — PROGRESS NOTE ADULT - PROBLEM SELECTOR PROBLEM 7
Major depressive disorder

## 2018-05-15 NOTE — PROGRESS NOTE ADULT - PROBLEM SELECTOR PLAN 4
-c/w humalog ISS, monitor FS  -A1c 5.7%.

## 2018-05-15 NOTE — PROGRESS NOTE ADULT - PROBLEM SELECTOR PLAN 2
-monitor CBC, transfuse prn, keep Hgb>7  -baseline Hgb 13.8 preop, Hgb relatively stable 8.8  no indication for transfusion
-monitor CBC, transfuse prn, keep Hgb>7  -baseline Hgb 13.8 preop, Hgb relatively stable 8.5  no indication for transfusion
-monitor CBC, transfuse prn, keep Hgb>7  -baseline Hgb 13.8 preop, Hgb relatively stable 8.8 yesterday  no indication for transfusion
-monitor CBC, transfuse prn, keep Hgb>7  -baseline Hgb 13.8 preop, Hgb trending down from 10.2 to 8.7 today

## 2018-05-15 NOTE — DISCHARGE NOTE ADULT - CONDITIONS AT DISCHARGE
abdominal staples KARIME clean dry and intact, tolerating diet, oob with assist, abdominal staples KARIME clean dry and intact, RLQ ileal conduit in place, stents intact, appliance intact abdominal staples KARIME clean dry and intact, tolerating diet, oob with assist, abdominal staples KARIME clean dry and intact, RLQ ileal conduit in place, stents intact, appliance intact, LLQ luciana to self suction with brown/tan fluid, dressing intact abdominal staples KARIME clean dry and intact, tolerating diet, oob with assist, abdominal staples KARIME clean dry and intact, RLQ ileal conduit in place, stents intact, appliance intact, LLQ dressing intact at d/cd POOJA site

## 2018-05-15 NOTE — DISCHARGE NOTE ADULT - MEDICATION SUMMARY - MEDICATIONS TO TAKE
I will START or STAY ON the medications listed below when I get home from the hospital:    oxyCODONE 5 mg oral tablet  -- 1 tab(s) by mouth every 4 hours, As needed, Moderate Pain (4 - 6)  -- Indication: For Pain    oxyCODONE 15 mg oral tablet  -- 1 tab(s) by mouth every 4 hours, As needed, Severe Pain (7 - 10)  -- Indication: For same    olmesartan 40 mg oral tablet  -- 1 tab(s) by mouth once a day  -- Indication: For home med    clonazePAM 1 mg oral tablet  -- 1 tab(s) by mouth 2 times a day  -- Indication: For home med    traZODone 50 mg oral tablet  -- 3 tab(s) by mouth once a day (at bedtime)  -- Indication: For home med    metFORMIN 500 mg oral tablet  -- 1 tab(s) by mouth 2 times a day  -- Indication: For home med    atenolol 25 mg oral tablet  -- 1 tab(s) by mouth once a day (at bedtime)  -- Indication: For home med    atenolol 50 mg oral tablet  -- 1 tab(s) by mouth once a day in morning  -- Indication: For home med    amLODIPine 5 mg oral tablet  -- 1 tab(s) by mouth 2 times a day  -- Indication: For home med    docusate sodium 100 mg oral capsule  -- 1 cap(s) by mouth 3 times a day  -- Indication: For bowels    senna oral tablet  -- 2 tab(s) by mouth once a day (at bedtime)  -- Indication: For bowels    Omega-3 oral capsule  -- orally 2 times a day. Last dose 5/2/18  -- Indication: For home med    Toviaz 8 mg oral tablet, extended release  -- 1 tab(s) by mouth once a day  -- Indication: For home med    Vitamin B Complex 100  -- injectable once a day  -- Indication: For home med

## 2018-05-15 NOTE — DISCHARGE NOTE ADULT - PATIENT PORTAL LINK FT
You can access the [facility] Patient Portal, offered by [facility], by registering with the following website: [email or web address]

## 2018-05-15 NOTE — DISCHARGE NOTE ADULT - PLAN OF CARE
removal of bladder; ileo conduit creation as above; conduit care as instructed; drink plenty of fluids; see your doctor in one week to remove staples and stents as above; conduit care as instructed; drink plenty of fluids; empty bulb drain as needed and change dressing daily;see your doctor in one week to remove staples and stents and bulb drain as above; conduit care as instructed; drink plenty of fluids; empty bulb drain as needed and change dressing daily;see your doctor in one week to remove staples and stents; change dressing at drain site daily until dry

## 2018-05-16 ENCOUNTER — CLINICAL ADVICE (OUTPATIENT)
Age: 63
End: 2018-05-16

## 2018-05-16 LAB — SURGICAL PATHOLOGY STUDY: SIGNIFICANT CHANGE UP

## 2018-05-19 ENCOUNTER — OTHER (OUTPATIENT)
Age: 63
End: 2018-05-19

## 2018-05-22 ENCOUNTER — INPATIENT (INPATIENT)
Facility: HOSPITAL | Age: 63
LOS: 1 days | Discharge: HOME HEALTH | End: 2018-05-24
Attending: SPECIALIST | Admitting: SPECIALIST
Payer: MEDICARE

## 2018-05-22 ENCOUNTER — APPOINTMENT (OUTPATIENT)
Dept: UROLOGY | Facility: CLINIC | Age: 63
End: 2018-05-22
Payer: MEDICARE

## 2018-05-22 VITALS
HEART RATE: 73 BPM | OXYGEN SATURATION: 96 % | RESPIRATION RATE: 18 BRPM | HEIGHT: 72 IN | TEMPERATURE: 98 F | DIASTOLIC BLOOD PRESSURE: 61 MMHG | SYSTOLIC BLOOD PRESSURE: 155 MMHG | WEIGHT: 220.46 LBS

## 2018-05-22 VITALS — HEART RATE: 73 BPM | RESPIRATION RATE: 17 BRPM | SYSTOLIC BLOOD PRESSURE: 152 MMHG | DIASTOLIC BLOOD PRESSURE: 82 MMHG

## 2018-05-22 DIAGNOSIS — R39.81 FUNCTIONAL URINARY INCONTINENCE: ICD-10-CM

## 2018-05-22 DIAGNOSIS — N32.9 BLADDER DISORDER, UNSPECIFIED: Chronic | ICD-10-CM

## 2018-05-22 DIAGNOSIS — Z93.59 OTHER CYSTOSTOMY STATUS: Chronic | ICD-10-CM

## 2018-05-22 DIAGNOSIS — Z98.89 OTHER SPECIFIED POSTPROCEDURAL STATES: Chronic | ICD-10-CM

## 2018-05-22 PROCEDURE — 99024 POSTOP FOLLOW-UP VISIT: CPT

## 2018-05-22 RX ORDER — SENNA PLUS 8.6 MG/1
2 TABLET ORAL AT BEDTIME
Qty: 0 | Refills: 0 | Status: DISCONTINUED | OUTPATIENT
Start: 2018-05-22 | End: 2018-05-24

## 2018-05-22 RX ORDER — ACETAMINOPHEN 500 MG
650 TABLET ORAL EVERY 6 HOURS
Qty: 0 | Refills: 0 | Status: DISCONTINUED | OUTPATIENT
Start: 2018-05-22 | End: 2018-05-24

## 2018-05-22 RX ORDER — INSULIN LISPRO 100/ML
VIAL (ML) SUBCUTANEOUS
Qty: 0 | Refills: 0 | Status: DISCONTINUED | OUTPATIENT
Start: 2018-05-22 | End: 2018-05-24

## 2018-05-22 RX ORDER — OXYCODONE HYDROCHLORIDE 5 MG/1
5 TABLET ORAL EVERY 6 HOURS
Qty: 0 | Refills: 0 | Status: DISCONTINUED | OUTPATIENT
Start: 2018-05-22 | End: 2018-05-22

## 2018-05-22 RX ORDER — ATENOLOL 25 MG/1
25 TABLET ORAL AT BEDTIME
Qty: 0 | Refills: 0 | Status: DISCONTINUED | OUTPATIENT
Start: 2018-05-22 | End: 2018-05-24

## 2018-05-22 RX ORDER — DEXTROSE 50 % IN WATER 50 %
15 SYRINGE (ML) INTRAVENOUS ONCE
Qty: 0 | Refills: 0 | Status: DISCONTINUED | OUTPATIENT
Start: 2018-05-22 | End: 2018-05-24

## 2018-05-22 RX ORDER — OXYCODONE HYDROCHLORIDE 5 MG/1
5 TABLET ORAL EVERY 6 HOURS
Qty: 0 | Refills: 0 | Status: DISCONTINUED | OUTPATIENT
Start: 2018-05-22 | End: 2018-05-24

## 2018-05-22 RX ORDER — ATENOLOL 25 MG/1
50 TABLET ORAL DAILY
Qty: 0 | Refills: 0 | Status: DISCONTINUED | OUTPATIENT
Start: 2018-05-22 | End: 2018-05-24

## 2018-05-22 RX ORDER — GLUCAGON INJECTION, SOLUTION 0.5 MG/.1ML
1 INJECTION, SOLUTION SUBCUTANEOUS ONCE
Qty: 0 | Refills: 0 | Status: DISCONTINUED | OUTPATIENT
Start: 2018-05-22 | End: 2018-05-24

## 2018-05-22 RX ORDER — SODIUM CHLORIDE 9 MG/ML
1000 INJECTION, SOLUTION INTRAVENOUS
Qty: 0 | Refills: 0 | Status: DISCONTINUED | OUTPATIENT
Start: 2018-05-22 | End: 2018-05-24

## 2018-05-22 RX ORDER — DEXTROSE 50 % IN WATER 50 %
25 SYRINGE (ML) INTRAVENOUS ONCE
Qty: 0 | Refills: 0 | Status: DISCONTINUED | OUTPATIENT
Start: 2018-05-22 | End: 2018-05-24

## 2018-05-22 RX ORDER — TRAZODONE HCL 50 MG
150 TABLET ORAL AT BEDTIME
Qty: 0 | Refills: 0 | Status: DISCONTINUED | OUTPATIENT
Start: 2018-05-22 | End: 2018-05-24

## 2018-05-22 RX ORDER — TRAZODONE HCL 50 MG
50 TABLET ORAL AT BEDTIME
Qty: 0 | Refills: 0 | Status: DISCONTINUED | OUTPATIENT
Start: 2018-05-22 | End: 2018-05-22

## 2018-05-22 RX ORDER — DEXTROSE 50 % IN WATER 50 %
12.5 SYRINGE (ML) INTRAVENOUS ONCE
Qty: 0 | Refills: 0 | Status: DISCONTINUED | OUTPATIENT
Start: 2018-05-22 | End: 2018-05-24

## 2018-05-22 RX ORDER — CLONAZEPAM 1 MG
1 TABLET ORAL
Qty: 0 | Refills: 0 | Status: DISCONTINUED | OUTPATIENT
Start: 2018-05-22 | End: 2018-05-23

## 2018-05-22 RX ORDER — LOSARTAN POTASSIUM 100 MG/1
100 TABLET, FILM COATED ORAL DAILY
Qty: 0 | Refills: 0 | Status: DISCONTINUED | OUTPATIENT
Start: 2018-05-22 | End: 2018-05-24

## 2018-05-22 RX ORDER — HEPARIN SODIUM 5000 [USP'U]/ML
5000 INJECTION INTRAVENOUS; SUBCUTANEOUS EVERY 8 HOURS
Qty: 0 | Refills: 0 | Status: DISCONTINUED | OUTPATIENT
Start: 2018-05-22 | End: 2018-05-24

## 2018-05-22 RX ORDER — AMLODIPINE BESYLATE 2.5 MG/1
5 TABLET ORAL
Qty: 0 | Refills: 0 | Status: DISCONTINUED | OUTPATIENT
Start: 2018-05-22 | End: 2018-05-24

## 2018-05-22 RX ADMIN — OXYCODONE HYDROCHLORIDE 5 MILLIGRAM(S): 5 TABLET ORAL at 18:04

## 2018-05-22 RX ADMIN — ATENOLOL 25 MILLIGRAM(S): 25 TABLET ORAL at 22:32

## 2018-05-22 RX ADMIN — Medication 650 MILLIGRAM(S): at 15:20

## 2018-05-22 RX ADMIN — HEPARIN SODIUM 5000 UNIT(S): 5000 INJECTION INTRAVENOUS; SUBCUTANEOUS at 14:25

## 2018-05-22 RX ADMIN — SENNA PLUS 2 TABLET(S): 8.6 TABLET ORAL at 22:32

## 2018-05-22 RX ADMIN — Medication 650 MILLIGRAM(S): at 14:25

## 2018-05-22 RX ADMIN — OXYCODONE HYDROCHLORIDE 5 MILLIGRAM(S): 5 TABLET ORAL at 18:50

## 2018-05-22 RX ADMIN — Medication 1 MILLIGRAM(S): at 18:04

## 2018-05-22 RX ADMIN — Medication 150 MILLIGRAM(S): at 23:24

## 2018-05-22 RX ADMIN — AMLODIPINE BESYLATE 5 MILLIGRAM(S): 2.5 TABLET ORAL at 22:32

## 2018-05-22 RX ADMIN — HEPARIN SODIUM 5000 UNIT(S): 5000 INJECTION INTRAVENOUS; SUBCUTANEOUS at 22:32

## 2018-05-22 NOTE — PHYSICAL THERAPY INITIAL EVALUATION ADULT - PERTINENT HX OF CURRENT PROBLEM, REHAB EVAL
Patient  is a 63 year old male with a history of cystectomy of ileal conduit 5/10/2018 and was discharged to rehab. Patient reports feeling weaker during rehab and was especially concerned with lack of ostomy care while at rehab center.

## 2018-05-22 NOTE — ADVANCED PRACTICE NURSE CONSULT - REASON FOR CONSULT
Patient seen on skin care rounds after wound care referral received for assessment of skin impairment and recommendations of topical management. Chart reviewed: Sha Ac, BMI 29.9kg/m2. Patient H/O DM, malignant neoplasm of bladder, obesity, UTI, major depressive disorder, anxiety, HTN, prostate CA, cystectomy of ileal conduit 5/10/2018 and was discharged to rehab. As per H&P, patient reports feeling weaker during rehab and was especially concerned with lack of ostomy care while at rehab center, stating he feels dressing was always soaked. Admitted with failure to thrive. Patient known to [facility] service line from previous admission and ostomy care at that time.

## 2018-05-22 NOTE — CHART NOTE - NSCHARTNOTEFT_GEN_A_CORE
Patient spilled some tea on his right hand, he had some mild erythema by his wrist, no blisters, he feels better after cold compress was applied to area.

## 2018-05-22 NOTE — PHYSICAL THERAPY INITIAL EVALUATION ADULT - PATIENT PROFILE REVIEW, REHAB EVAL
PT orders received-->no formal activity order. Consult with RUBIO CARTER-->pt OK to participate in PT evaluation./yes

## 2018-05-22 NOTE — PHYSICAL THERAPY INITIAL EVALUATION ADULT - ADDITIONAL COMMENTS
Patient reports he lives alone in an apartment, no steps to negotiate. Patient reports he was previously independent in all ADLs and did not use an assistive device for ambulation.     Patient was left semi-supine in bed as found, all lines/tubes intact and call bacon within reach, RUBIO miner

## 2018-05-22 NOTE — PHYSICAL THERAPY INITIAL EVALUATION ADULT - GAIT DEVIATIONS NOTED, PT EVAL
increased time in double stance/decreased velocity of limb motion/decreased krystin/decreased stride length/decreased step length/decreased weight-shifting ability

## 2018-05-22 NOTE — ADVANCED PRACTICE NURSE CONSULT - ASSESSMENT
A&Ox3, difficulty with focusing on one topic at a time, in bed during assessment, PT services worked with patient after evaluation, continent of stool, ileal conduit in place RLQ. Skin warm, dry with increased moisture in intertriginous folds, adequate skin turgor.    Left lower quadrant, wound of previous POOJA site: measures 0.8mkm7dav4.3cm (unable to determine complete anatomical depth due to necrotic tissue). Wound base tissue type 100% yellow, dry, slough that is firmly attached to wound base. Wound borders are irregular. Periwound skin with circumferential blanchable erythema that extends 0.5cm, no induration, no increased warmth, no erythema. No drainage noted, dry wound base. Goal of care: add moisture to create environment of autolytic removal of slough, monitor for changes in tissue type, monitor erythema in periwound skin, protect periwound skin.    Midline abdominal incision from below umbilicus to pubic area measures 13cm in length with approximately 24 staples intact. Entire incisional line with circumferential blanchable erythema that extends 2cm, no increased warmth, At proximal end of incision line between staples 1 and 2 there is an opening in the epidermis that measures [id number]. When patient moves/or exerts self there is a moderate amount of serosanguinous-purulent drainage noted from this open area, no odor from drainage. Able to express scant drainage upon palpation of periwound skin between staples 1 and 2. There is induration noted in periwound skin of proximal end of wound along patients left side between staples 1-3 that extends 0.5cm. Goal of care: monitor erythema in periwound skin, manage exudate, monitor exudate, recommend imaging to assess for abscess formation.    Ileal conduit: RLQ: stoma size is 7/8", stoma moist, red, with superficial yellow necrosis that is located from 6-8 o'clock on stoma. The apex (opening) of the stoma points upward toward 1-2 o'clock and the stoma is retracted below skin level. Peristomal skin with circumferential mucocutaneous separation: from 9-3 o'clock the separation is 3.5cm deep and then from 3-9 o'clock the separation is 2cm deep. Circumferential blanchable erythema in peristomal skin that extends 1cm. Attempted to treat mucocutaneous separation as a wound but unable to provide seal due to apex of stoma direction and retraction of stoma. At this time used a light convex pouch with belt to assist with drainage of urine into pouching system instead of into mucocutaneous separation.     BRIAN Moreno and MD Plascencia at bedside during assessment, findings and recommendations discussed with urology team. A&Ox3, difficulty with focusing on one topic at a time, in bed during assessment, PT services worked with patient after evaluation, continent of stool, ileal conduit in place RLQ. Skin warm, dry with increased moisture in intertriginous folds, adequate skin turgor.    Left lower quadrant, wound of previous POOJA site: measures 0.2cil0eet3.3cm (unable to determine complete anatomical depth due to necrotic tissue). Wound base tissue type 100% yellow, dry, slough that is firmly attached to wound base. Wound borders are irregular. Periwound skin with circumferential blanchable erythema that extends 0.5cm, no induration, no increased warmth, no erythema. No drainage noted, dry wound base. Goal of care: add moisture to create environment of autolytic removal of slough, monitor for changes in tissue type, monitor erythema in periwound skin, protect periwound skin.    Midline abdominal incision from below umbilicus to pubic area measures 13cm in length with approximately 24 staples intact. Entire incisional line with circumferential blanchable erythema that extends 2cm, no increased warmth, At proximal end of incision line between staples 1 and 2 there is an opening in the epidermis that measures [id number]. When patient moves/or exerts self there is a moderate amount of serosanguinous-purulent drainage noted from this open area, no odor from drainage. Able to express scant drainage upon palpation of periwound skin between staples 1 and 2. There is induration noted in periwound skin of proximal end of wound along patients left side between staples 1-3 that extends 0.5cm. Goal of care: monitor erythema in periwound skin, manage exudate, monitor exudate, recommend imaging to assess for abscess formation.    Ileal conduit: RLQ: stoma size is 7/8", stoma moist, red, with superficial yellow necrosis that is located from 6-8 o'clock on stoma. The apex (opening) of the stoma points upward toward 1-2 o'clock and the stoma is retracted below skin level. Peristomal skin with circumferential mucocutaneous separation: from 9-3 o'clock the separation is 3.5cm deep and then from 3-9 o'clock the separation is 2cm deep. Circumferential blanchable erythema in peristomal skin that extends 1cm. Attempted to treat mucocutaneous separation as a wound but unable to provide seal due to apex of stoma direction and retraction of stoma. At this time used a light convex pouch with belt to assist with drainage of urine into pouching system instead of into mucocutaneous separation.     BRIAN Ramirez and MD Plascencia at bedside during assessment, findings and recommendations discussed with urology team.

## 2018-05-22 NOTE — H&P ADULT - HISTORY OF PRESENT ILLNESS
Pt is a 62 yo male with a history of cystectomy of ileal conduit 5/10/2018 and was discharged to rehab. Patient reports feeling weaker during rehab and was especially concerned with lack of ostomy care while at rehab center, stating he feels dressing was always soaked. Patient denies hematuria, constipation, fevers, states pain is currently under control.

## 2018-05-22 NOTE — ADVANCED PRACTICE NURSE CONSULT - RECOMMEDATIONS
LLQ: Cleanse with SAF-clens, rinse with NS, pat dry. Apply Liquid barrier film to periwound skin. Apply Medihoney gel to wound base, cover with foam with border. Change daily.    Midline incision Cleanse with NS, pat dry. Apply Liquid barrier film to periwound skin. Apply Gauze to open area (proximal end of wound) to assess for color of drainage for 24 hour, cover with thin film. Change daily.  *Alternative dressing after assessment of drainage for 24 hours complete and drainage is specified can apply Aquacel AG hydrofiber and cover with foam with border. Change daily.    Ileal conduit:  Stoma size: 7/8"  Appliance used:       Stoma powder- item #[id number]        Liquid barrier film        One piece Coloplast soft convex urostomy pouch- item #[id number]      Belt- item # 2611 Recommend CBC.    Recommend imaging to R/O abscess formation in abdomen (moderate amount of drainage noted from proximal end of surgical incision line)    LLQ: Cleanse with SAF-clens, rinse with NS, pat dry. Apply Liquid barrier film to periwound skin. Apply Medihoney gel to wound base, cover with foam with border. Change daily.    Midline incision Cleanse with NS, pat dry. Apply Liquid barrier film to periwound skin. Apply Gauze to open area (proximal end of wound) to assess for color of drainage for 24 hour, cover with thin film. Change daily.  *Alternative dressing after assessment of drainage for 24 hours complete and drainage is specified can apply Aquacel AG hydrofiber and cover with foam with border. Change daily.    Ileal conduit:  Stoma size: 7/8"  Appliance used:       Stoma powder- item #[id number]        Liquid barrier film        One piece Coloplast soft convex urostomy pouch- item #[id number]      Belt- item # [id number]    Ostomy team will continue to follow patient for ostomy care, in meantime if further assistance is needed call ([phone]).

## 2018-05-22 NOTE — H&P ADULT - ASSESSMENT
Pt is a 62 yo male with cystectomy s/p ileal conduit admitted for failure to thrive.  - PT consult  - Ostomy teaching.  - OOB/ambulate  - Check gifx

## 2018-05-22 NOTE — PHYSICAL THERAPY INITIAL EVALUATION ADULT - GENERAL OBSERVATIONS, REHAB EVAL
Patient was received semi-supine in bed in NAD, +[place]. Patient was received semi-supine in bed in NAD, +[place]. Pt anxious.

## 2018-05-23 DIAGNOSIS — E11.9 TYPE 2 DIABETES MELLITUS WITHOUT COMPLICATIONS: ICD-10-CM

## 2018-05-23 DIAGNOSIS — F32.9 MAJOR DEPRESSIVE DISORDER, SINGLE EPISODE, UNSPECIFIED: ICD-10-CM

## 2018-05-23 DIAGNOSIS — F41.9 ANXIETY DISORDER, UNSPECIFIED: ICD-10-CM

## 2018-05-23 DIAGNOSIS — C67.9 MALIGNANT NEOPLASM OF BLADDER, UNSPECIFIED: ICD-10-CM

## 2018-05-23 DIAGNOSIS — N39.0 URINARY TRACT INFECTION, SITE NOT SPECIFIED: ICD-10-CM

## 2018-05-23 DIAGNOSIS — I10 ESSENTIAL (PRIMARY) HYPERTENSION: ICD-10-CM

## 2018-05-23 DIAGNOSIS — Z29.9 ENCOUNTER FOR PROPHYLACTIC MEASURES, UNSPECIFIED: ICD-10-CM

## 2018-05-23 LAB — GLUCOSE BLDC GLUCOMTR-MCNC: 131 MG/DL — HIGH (ref 70–99)

## 2018-05-23 PROCEDURE — 99223 1ST HOSP IP/OBS HIGH 75: CPT

## 2018-05-23 RX ORDER — CLONAZEPAM 1 MG
1 TABLET ORAL THREE TIMES A DAY
Qty: 0 | Refills: 0 | Status: DISCONTINUED | OUTPATIENT
Start: 2018-05-23 | End: 2018-05-24

## 2018-05-23 RX ORDER — DOCUSATE SODIUM 100 MG
100 CAPSULE ORAL THREE TIMES A DAY
Qty: 0 | Refills: 0 | Status: DISCONTINUED | OUTPATIENT
Start: 2018-05-23 | End: 2018-05-24

## 2018-05-23 RX ORDER — POLYETHYLENE GLYCOL 3350 17 G/17G
17 POWDER, FOR SOLUTION ORAL DAILY
Qty: 0 | Refills: 0 | Status: DISCONTINUED | OUTPATIENT
Start: 2018-05-23 | End: 2018-05-24

## 2018-05-23 RX ADMIN — Medication 100 MILLIGRAM(S): at 13:32

## 2018-05-23 RX ADMIN — SENNA PLUS 2 TABLET(S): 8.6 TABLET ORAL at 21:45

## 2018-05-23 RX ADMIN — LOSARTAN POTASSIUM 100 MILLIGRAM(S): 100 TABLET, FILM COATED ORAL at 05:24

## 2018-05-23 RX ADMIN — ATENOLOL 50 MILLIGRAM(S): 25 TABLET ORAL at 05:24

## 2018-05-23 RX ADMIN — Medication 1 MILLIGRAM(S): at 05:24

## 2018-05-23 RX ADMIN — HEPARIN SODIUM 5000 UNIT(S): 5000 INJECTION INTRAVENOUS; SUBCUTANEOUS at 05:24

## 2018-05-23 RX ADMIN — Medication 100 MILLIGRAM(S): at 21:45

## 2018-05-23 RX ADMIN — Medication 650 MILLIGRAM(S): at 09:27

## 2018-05-23 RX ADMIN — Medication 1 MILLIGRAM(S): at 14:41

## 2018-05-23 RX ADMIN — AMLODIPINE BESYLATE 5 MILLIGRAM(S): 2.5 TABLET ORAL at 07:13

## 2018-05-23 RX ADMIN — AMLODIPINE BESYLATE 5 MILLIGRAM(S): 2.5 TABLET ORAL at 19:43

## 2018-05-23 RX ADMIN — Medication 1 MILLIGRAM(S): at 21:45

## 2018-05-23 RX ADMIN — POLYETHYLENE GLYCOL 3350 17 GRAM(S): 17 POWDER, FOR SOLUTION ORAL at 13:32

## 2018-05-23 RX ADMIN — ATENOLOL 25 MILLIGRAM(S): 25 TABLET ORAL at 21:45

## 2018-05-23 RX ADMIN — Medication 150 MILLIGRAM(S): at 21:45

## 2018-05-23 RX ADMIN — HEPARIN SODIUM 5000 UNIT(S): 5000 INJECTION INTRAVENOUS; SUBCUTANEOUS at 21:45

## 2018-05-23 RX ADMIN — Medication 650 MILLIGRAM(S): at 10:20

## 2018-05-23 NOTE — CONSULT NOTE ADULT - SUBJECTIVE AND OBJECTIVE BOX
Patient is a 63y old  Male who presents with a chief complaint of Failure to thrive (22 May 2018 13:26).      HPI:  Pt is a 64 yo male with a history of cystectomy of ileal conduit 5/10/2018 and was discharged to rehab. Patient reports feeling weaker during rehab and was especially concerned with lack of ostomy care while at rehab center, stating he feels dressing was always soaked. Patient denies hematuria, constipation, fevers, states pain is currently under control. Pt remains afebrile since admission. Currently only c/o Ostomy leaking.  Overall feeling better.      History limited due to: [ ] Dementia  [ ] Delirium  [ ] Condition    Pain Symptoms if applicable:             	                         none	     Pain:	                            0	     Location:	  Modifying factors:	  Associated symptoms:	    Function: [x ] Independent  [ ] Assistance  [ ] Total care  [ ] Non-ambulatory    Allergies    No Known Allergies    Intolerances        HOME MEDICATIONS: [ ] Reviewed    MEDICATIONS  (STANDING):  amLODIPine   Tablet 5 milliGRAM(s) Oral <User Schedule>  ATENolol  Tablet 25 milliGRAM(s) Oral at bedtime  ATENolol  Tablet 50 milliGRAM(s) Oral daily  clonazePAM Tablet 1 milliGRAM(s) Oral two times a day  dextrose 5%. 1000 milliLiter(s) (50 mL/Hr) IV Continuous <Continuous>  dextrose 50% Injectable 12.5 Gram(s) IV Push once  dextrose 50% Injectable 25 Gram(s) IV Push once  dextrose 50% Injectable 25 Gram(s) IV Push once  heparin  Injectable 5000 Unit(s) SubCutaneous every 8 hours  insulin lispro (HumaLOG) corrective regimen sliding scale   SubCutaneous Before meals and at bedtime  losartan 100 milliGRAM(s) Oral daily  senna 2 Tablet(s) Oral at bedtime  traZODone 150 milliGRAM(s) Oral at bedtime    MEDICATIONS  (PRN):  acetaminophen   Tablet. 650 milliGRAM(s) Oral every 6 hours PRN Mild to moderate pain  dextrose 40% Gel 15 Gram(s) Oral once PRN Blood Glucose LESS THAN 70 milliGRAM(s)/deciliter  glucagon  Injectable 1 milliGRAM(s) IntraMuscular once PRN Glucose LESS THAN 70 milligrams/deciliter  oxyCODONE    IR 5 milliGRAM(s) Oral every 6 hours PRN Severe Pain (7 - 10)      PAST MEDICAL & SURGICAL HISTORY:  Malignant neoplasm of bladder  Diabetes mellitus: pre diabetic  Obese  Other calculus in bladder  Urethral stricture  Urinary (tract) obstruction  UTI (urinary tract infection)  Major depressive disorder  Anxiety  HTN (Hypertension)  Prostate Cancer  Suprapubic catheter: 8/2015  History of prostatectomy: robotic, 2011, s/p radiation  H/O cystoscopy: - multiple  last cystoscopy, laser litholapaxy on 1/2018  S/P Appendectomy: 40 years ago  [x ] Reviewed     SOCIAL HISTORY:  Residence: [ ] [facility]  [ ] SNF  [x ] Community  [ ] Substance abuse: denies  [ ] Tobacco: denies  [ ] Alcohol use: denies    FAMILY HISTORY:  No pertinent family history in first degree relatives  [ ] No pertinent family history in first degree relatives     REVIEW OF SYSTEMS:    CONSTITUTIONAL: No fever, weight loss, or fatigue  EYES: No eye pain, visual disturbances, or discharge  ENMT:  No difficulty hearing, tinnitus, vertigo; No sinus or throat pain  NECK: No pain or stiffness  BREASTS: No pain, masses, or nipple discharge  RESPIRATORY: No cough, wheezing, chills or hemoptysis; No shortness of breath  CARDIOVASCULAR: No chest pain, palpitations, dizziness, or leg swelling  GASTROINTESTINAL: No abdominal or epigastric pain. No nausea, vomiting, or hematemesis; No diarrhea or constipation. No melena or hematochezia. (+) ostomy leaking  GENITOURINARY: (+) Ileal conduit, (+) ostomy leaking  NEUROLOGICAL: No headaches, memory loss, loss of strength, numbness, or tremors  SKIN: No itching, burning, rashes, or lesions   LYMPH NODES: No enlarged glands  ENDOCRINE: No heat or cold intolerance; No hair loss  MUSCULOSKELETAL: No muscle or back pain  PSYCHIATRIC: (+) depression due to current medical issues,(+)  anxiety, No mood swings, or difficulty sleeping.  HEME/LYMPH: No easy bruising, or bleeding gums  ALLERGY AND IMMUNOLOGIC: No hives or eczema    [  ] All other ROS negative  [  ] Unable to obtain due to poor mental status    Vital Signs Last 24 Hrs  T(C): 36.6 (23 May 2018 09:39), Max: 36.9 (22 May 2018 22:20)  T(F): 97.9 (23 May 2018 09:39), Max: 98.4 (22 May 2018 22:20)  HR: 62 (23 May 2018 09:39) (58 - 73)  BP: 123/47 (23 May 2018 09:39) (121/70 - 156/74)  BP(mean): --  RR: 16 (23 May 2018 09:39) (16 - 18)  SpO2: 99% (23 May 2018 09:39) (96% - 100%)    PHYSICAL EXAM:    GENERAL: NAD, well-groomed, well-developed  HEAD:  Atraumatic, Normocephalic  EYES: EOMI, PERRLA, conjunctiva and sclera clear  ENMT: Moist mucous membranes  NECK: Supple, No JVD  RESPIRATORY: Clear to auscultation bilaterally; No rales, rhonchi, wheezing, or rubs  CARDIOVASCULAR: Regular rate and rhythm; No murmurs, rubs, or gallops  GASTROINTESTINAL: Soft, Nontender, Nondistended; Bowel sounds present  GENITOURINARY: Not examined  EXTREMITIES:  2+ Peripheral Pulses, No clubbing, cyanosis, or edema  NERVOUS SYSTEM:  Alert & Oriented X3; Moving all 4 extremities; No gross sensory deficits  HEME/LYMPH: No lymphadenopathy noted  SKIN: No rashes or lesions; Incisions C/D/I    LABS:              CAPILLARY BLOOD GLUCOSE      POCT Blood Glucose.: 131 mg/dL (23 May 2018 08:24)      RADIOLOGY & ADDITIONAL STUDIES:    EKG:   Personally Reviewed:  [ ] YES     Imaging:   Personally Reviewed:  [ ] YES               Consultant(s) notes reviewed:    Care Discussed with Consultant(s)/Other Providers:    Advanced Directives: [ ] DNR  [ ] No feeding tube  [ ] MOLST in chart  [ ] MOLST completed today  [ ] Unknown

## 2018-05-23 NOTE — PROGRESS NOTE ADULT - SUBJECTIVE AND OBJECTIVE BOX
Afeb VSS    Pt has multiple questions and concerns; answered as best we could    Abd- soft NT ND   Staples ooze when pt sits up    IC - 1500 cc;  has new bag on; does not appear to be leaking

## 2018-05-23 NOTE — CONSULT NOTE ADULT - ASSESSMENT
Pt is a 64 yo male with cystectomy s/p ileal conduit admitted for leaking ostomy. No acute medical issues at this time.

## 2018-05-23 NOTE — CONSULT NOTE ADULT - PROBLEM SELECTOR RECOMMENDATION 9
S/P Cystectomy with IC, c/w leaking ostomy.  management as per .  Ostomy Nurse consults  PT   Encourage ambulation

## 2018-05-24 ENCOUNTER — TRANSCRIPTION ENCOUNTER (OUTPATIENT)
Age: 63
End: 2018-05-24

## 2018-05-24 VITALS — WEIGHT: 219.8 LBS

## 2018-05-24 LAB — GLUCOSE BLDC GLUCOMTR-MCNC: 144 MG/DL — HIGH (ref 70–99)

## 2018-05-24 PROCEDURE — 99232 SBSQ HOSP IP/OBS MODERATE 35: CPT

## 2018-05-24 RX ORDER — FESOTERODINE FUMARATE 8 MG/1
1 TABLET, FILM COATED, EXTENDED RELEASE ORAL
Qty: 0 | Refills: 0 | COMMUNITY

## 2018-05-24 RX ADMIN — AMLODIPINE BESYLATE 5 MILLIGRAM(S): 2.5 TABLET ORAL at 07:20

## 2018-05-24 RX ADMIN — Medication 650 MILLIGRAM(S): at 10:30

## 2018-05-24 RX ADMIN — Medication 650 MILLIGRAM(S): at 01:00

## 2018-05-24 RX ADMIN — Medication 1 MILLIGRAM(S): at 07:20

## 2018-05-24 RX ADMIN — Medication 650 MILLIGRAM(S): at 02:00

## 2018-05-24 RX ADMIN — ATENOLOL 50 MILLIGRAM(S): 25 TABLET ORAL at 07:20

## 2018-05-24 RX ADMIN — LOSARTAN POTASSIUM 100 MILLIGRAM(S): 100 TABLET, FILM COATED ORAL at 07:20

## 2018-05-24 RX ADMIN — Medication 650 MILLIGRAM(S): at 11:53

## 2018-05-24 NOTE — PROGRESS NOTE ADULT - SUBJECTIVE AND OBJECTIVE BOX
Patient is a 63y old  Male who presents with a chief complaint of Failure to thrive (22 May 2018 13:26)      SUBJECTIVE / OVERNIGHT EVENTS: No complaints. Ambulating.    MEDICATIONS  (STANDING):  amLODIPine   Tablet 5 milliGRAM(s) Oral <User Schedule>  ATENolol  Tablet 25 milliGRAM(s) Oral at bedtime  ATENolol  Tablet 50 milliGRAM(s) Oral daily  clonazePAM Tablet 1 milliGRAM(s) Oral three times a day  dextrose 5%. 1000 milliLiter(s) (50 mL/Hr) IV Continuous <Continuous>  dextrose 50% Injectable 12.5 Gram(s) IV Push once  dextrose 50% Injectable 25 Gram(s) IV Push once  dextrose 50% Injectable 25 Gram(s) IV Push once  docusate sodium 100 milliGRAM(s) Oral three times a day  heparin  Injectable 5000 Unit(s) SubCutaneous every 8 hours  insulin lispro (HumaLOG) corrective regimen sliding scale   SubCutaneous Before meals and at bedtime  losartan 100 milliGRAM(s) Oral daily  polyethylene glycol 3350 17 Gram(s) Oral daily  senna 2 Tablet(s) Oral at bedtime  traZODone 150 milliGRAM(s) Oral at bedtime    MEDICATIONS  (PRN):  acetaminophen   Tablet. 650 milliGRAM(s) Oral every 6 hours PRN Mild to moderate pain  dextrose 40% Gel 15 Gram(s) Oral once PRN Blood Glucose LESS THAN 70 milliGRAM(s)/deciliter  glucagon  Injectable 1 milliGRAM(s) IntraMuscular once PRN Glucose LESS THAN 70 milligrams/deciliter  oxyCODONE    IR 5 milliGRAM(s) Oral every 6 hours PRN Severe Pain (7 - 10)      Vital Signs Last 24 Hrs  T(C): 36.8 (24 May 2018 09:45), Max: 36.8 (24 May 2018 07:18)  T(F): 98.2 (24 May 2018 09:45), Max: 98.3 (24 May 2018 07:18)  HR: 57 (24 May 2018 09:45) (57 - 64)  BP: 122/53 (24 May 2018 09:45) (122/53 - 167/64)  BP(mean): --  RR: 16 (24 May 2018 09:45) (15 - 16)  SpO2: 99% (24 May 2018 09:45) (99% - 99%)  CAPILLARY BLOOD GLUCOSE      POCT Blood Glucose.: 144 mg/dL (24 May 2018 08:41)    I&O's Summary    23 May 2018 07:01  -  24 May 2018 07:00  --------------------------------------------------------  IN: 0 mL / OUT: 2550 mL / NET: -2550 mL    24 May 2018 07:01  -  24 May 2018 11:41  --------------------------------------------------------  IN: 0 mL / OUT: 400 mL / NET: -400 mL        PHYSICAL EXAM:  GENERAL: NAD, well-developed  HEAD:  Atraumatic, Normocephalic  EYES: EOMI, PERRLA, conjunctiva and sclera clear  NECK: Supple, No JVD  CHEST/LUNG: Clear to auscultation bilaterally; No wheeze  HEART: Regular rate and rhythm; No murmurs, rubs, or gallops  ABDOMEN: Soft, Nontender, Nondistended; Bowel sounds present, (+) IC, no leaking  EXTREMITIES:  2+ Peripheral Pulses, No clubbing, cyanosis, or edema  PSYCH: AAOx3  NEUROLOGY: non-focal  SKIN: No rashes or lesions    LABS:                    RADIOLOGY & ADDITIONAL TESTS:    Imaging Personally Reviewed:    Consultant(s) Notes Reviewed:      Care Discussed with Consultants/Other Providers:

## 2018-05-24 NOTE — DISCHARGE NOTE ADULT - HOME CARE AGENCY
[facility] - [phone]  Nurse to visit the day after hospital discharge

## 2018-05-24 NOTE — DIETITIAN INITIAL EVALUATION ADULT. - OTHER INFO
Pt states that his appetite has been good and he has been eating well. Pt was admitted for non-healing wound. Pt is "pre-diabetic" and is being followed by PCP. Consult was requested for instruction of high protein diet. Pt verbalized good understanding of it and written information was given to him. Pt had no complaints of GI distress nor of difficulty chewing or swallowing.

## 2018-05-24 NOTE — DIETITIAN INITIAL EVALUATION ADULT. - NUTRITION DIAGNOSTIC TERMINOLOGY #1
10/6/2017 Good morning. My name is Marichuy Marie. I work for [facility]s Outpatient case management department with . I wanted to call and review your disaster plan with you. I also wanted to go over some crucial information and provide you with emergency phone numbers for your [place]. Marichuy Marie RN, [facility] [place]    Emergencypreparedness- [] Please be sure you bring all of your medications with you. Bring at least a five day supply. It is best to bring your medication bottles, in case you are displaced for a longer period of time, therefore you can get your medication refilled from your temporary location.   [] Please bring sure to bring any DME that you may need. This includes walkers, wheelchairs, shower chairs, nebulizer machine, etc.   [] If you are a diabetic- be sure to bring your glucometer and all glucometer monitoring supplies.   [] If you have high blood pressure- be sure to bring your blood pressure cuff so you can continue to monitor.   [] If you have CHF-be sure to bring your scale so you can continue to monitor.  [] If on PEG feeding- be sure to bring tube feedings and feeding supplies.  [] If on oxygen- be sure to bring all of your oxygen supplies: Cannula, portable tanks, concentrator, etc. Also contact you Oxygen Supply Company to find out the nearest location of an oxygen supply company to where you will be located. ([employer]: [phone], [facility]: [phone], [facility]:[phone], [employer]: [phone]).   [] If you receive hemodialysis- you should already have a plan in place with your dialysis center. If you do not know where you need to evacuate to in order to be close to a dialysis center- reach out to your dialysis center for further direction. ([facility]  service line: [phone], [facility] service line [phone])   Office of Emergency Preparedness Phone number:  [] [facility]: [phone]  [] [facility]: [phone]  [] [facility]  [place]: [phone]  [] [facility] [place]: [phone]  [] [facility] [place]: [phone]  [] [facility]: [phone]  [] [facility]: [phone]  [] [facility]: [phone]  [] [facility]: [phone]  [] [facility]: [phone]  [] [facility]: [phone]  [] [facility] [place]: [phone]  [] [facility]: [phone]      Nutrient

## 2018-05-24 NOTE — DISCHARGE NOTE ADULT - CONDITIONS AT DISCHARGE
Right lower quadrant with ileal conduit clean, dry and intact, draining clear mucus yellow urine.  Abdominal midline healing incision clean, dry and intact.  Tolerated po and fluids.

## 2018-05-24 NOTE — PROGRESS NOTE ADULT - SUBJECTIVE AND OBJECTIVE BOX
Overnight events:  None    Subjective:  Pt offers no complaints, appliance has not leaked, he was able to change it with wound care today    Objective:    Vital signs  T(C): , Max: 36.8 (05-24-18 @ 07:18)  HR: 57 (05-24-18 @ 09:45)  BP: 122/53 (05-24-18 @ 09:45)  SpO2: 99% (05-24-18 @ 09:45)  Wt(kg): --    Output   IC: 400      Gen: NAD  Abd: wound healing without infection, soft, nontender, stoma pink

## 2018-05-24 NOTE — DISCHARGE NOTE ADULT - PROVIDER TOKENS
TOKEN:'[id number]',FREE:[LAST:[[facility]],PHONE:[phone]],FAX:[(   )    -],ADDRESS:[[address] [place] [address]]]

## 2018-05-24 NOTE — DISCHARGE NOTE ADULT - CARE PROVIDER_API CALL
Ismael Esteban), Urology  [address] [address]  Phone: [phone]  Fax: [phone]    [facility],   [address] [place] [address]  Phone: [phone]  Fax: (   )    -

## 2018-05-24 NOTE — PROGRESS NOTE ADULT - ASSESSMENT
Pt is a 62 yo male with cystectomy s/p ileal conduit admitted for leaking ostomy. No acute medical issues at this time.

## 2018-05-24 NOTE — DISCHARGE NOTE ADULT - PLAN OF CARE
Pain control Change ostomy appliance as directed  Call Dr. Esteban to schedule a follow up appointment.  Call the office if you have fever greater than 101, no urine in bag, pain not relieved with pain medication, nausea/vomiting. Continue current home medications and follow up with your primary care provider

## 2018-05-24 NOTE — ADVANCED PRACTICE NURSE CONSULT - RECOMMEDATIONS
Recommend follow up care at [facility] ([phone], [address] [place] [address]) for wound/incisional care or surgical team for incisional care.    LLQ: Cleanse with SAF-clens, rinse with NS, pat dry. Apply Liquid barrier film to periwound skin. Apply Medihoney gel to wound base, cover with foam with border. Change daily.    Midline incision Cleanse with NS, pat dry. Apply Liquid barrier film to periwound skin. Apply Aquacel hydrofiber and cover with foam with border. Change every other day.    Ileal conduit:  Stoma size: oval: 1"x3/4"    -Cleanse with water, pat dry.  -Apply Stoma powder (item #7906) and 'seal' in with Liquid barrier film to peristomal skin on erythema  -Apply thin layer of Coloplast barrier paste circumferentially in peristomal skin.  - Use One piece Coloplast soft convex urostomy pouch (item #98759) with  Skin barrier ring (item # [id number])  -Picture frame pouch with Coloplast hydrocolloid.   - Use Coloplast Ostomy Belt (item # 4247).  -Change every 3-4 days and PRN. Recommend follow up care at [facility] ([phone], [address] [place] [address]) for wound/incisional care or surgical team for incisional care.    LLQ: Cleanse with SAF-clens, rinse with NS, pat dry. Apply Liquid barrier film to periwound skin. Apply Medihoney gel to wound base, cover with foam with border. Change every 2-3 days.    Midline incision Cleanse with NS, pat dry. Apply Liquid barrier film to periwound skin. Apply Aquacel hydrofiber and cover with foam with border. Change every 2-3 days.    Ileal conduit:  Stoma size: oval: 1"x3/4"    -Cleanse with water, pat dry.  -Apply Stoma powder (item #7906) and 'seal' in with Liquid barrier film to peristomal skin on erythema  -Apply thin layer of Coloplast barrier paste circumferentially in peristomal skin.  - Use One piece Coloplast soft convex urostomy pouch (item #32027) with  Skin barrier ring (item # [id number])  -Picture frame pouch with Coloplast hydrocolloid.   - Use Coloplast Ostomy Belt (item # 4247).  -Change every 3-4 days and PRN.

## 2018-05-24 NOTE — DISCHARGE NOTE ADULT - PATIENT PORTAL LINK FT
You can access the [facility] Patient Portal, offered by [facility], by registering with the following website: [email or web address]

## 2018-05-24 NOTE — DISCHARGE NOTE ADULT - HOSPITAL COURSE
62 yo male with a history of cystectomy of ileal conduit 5/10/2018 and was discharged to rehab. Patient reports feeling weaker during rehab and was especially concerned with lack of ostomy care while at rehab center, stating he feels dressing was always soaked. Patient denies hematuria, constipation, fevers, states pain is currently under control.  Pt admitted for wound care.  Was evaluated by wound care specialists, was able to change his own appliance and was d/c to home on 5/24/18 to f/u with Dr. Esteban and [facility].  I-stop checked.

## 2018-05-24 NOTE — DISCHARGE NOTE ADULT - INSTRUCTIONS
Drink 3 cans of glucerna a day in addition to consistent carbohydrate diet Call MD for any c/o bloody urine or no urine to ileal conduit, nausea, vomit, fever and a return appointment.  Take over the counter stool softener to prevent constipation that can be a side effect from taking pain medication.

## 2018-05-24 NOTE — PROGRESS NOTE ADULT - ASSESSMENT
62 yo M admitted with leaking from IC, now appliance changed by wound care no further leaking, discharge today with home care

## 2018-05-24 NOTE — DISCHARGE NOTE ADULT - CARE PLAN
Principal Discharge DX:	Malignant neoplasm of bladder  Goal:	Pain control  Assessment and plan of treatment:	Change ostomy appliance as directed  Call Dr. Esteban to schedule a follow up appointment.  Call the office if you have fever greater than 101, no urine in bag, pain not relieved with pain medication, nausea/vomiting.  Secondary Diagnosis:	Diabetes mellitus  Assessment and plan of treatment:	Continue current home medications and follow up with your primary care provider  Secondary Diagnosis:	Essential hypertension  Assessment and plan of treatment:	Continue current home medications and follow up with your primary care provider  Secondary Diagnosis:	Anxiety  Assessment and plan of treatment:	Continue current home medications and follow up with your primary care provider  Secondary Diagnosis:	Major depressive disorder  Assessment and plan of treatment:	Continue current home medications and follow up with your primary care provider

## 2018-05-24 NOTE — DISCHARGE NOTE ADULT - MEDICATION SUMMARY - MEDICATIONS TO STOP TAKING
Cassy APPLE [place]ila   5/1/2017   Anticoagulation Therapy Visit    Description:  61 year old female   Provider:  Eliz Hartman NP   Department:  [facility] Anti Coagulation           INR as of 5/1/2017     Today's INR 2.2      Anticoagulation Summary as of 5/1/2017     INR goal 2.0-3.0   Today's INR 2.2   Full instructions 7.5 mg on Mon, Wed, Fri; 5 mg all other days   Next INR check 5/30/2017    Indications   Long-term (current) use of anticoagulants [Z79.01] [Z79.01]  Pulmonary embolism and infarction (H) [I26.99]  Deep vein thrombosis (DVT) (H) [I82.409] [I82.409]         May 2017 Details    Sun Mon Tue Wed Thu Fri Sat      1      7.5 mg   See details      2      5 mg         3      7.5 mg         4      5 mg         5      7.5 mg         6      5 mg           7      5 mg         8      7.5 mg         9      5 mg         10      7.5 mg         11      5 mg         12      7.5 mg         13      5 mg           14      5 mg         15      7.5 mg         16      5 mg         17      7.5 mg         18      5 mg         19      7.5 mg         20      5 mg           21      5 mg         22      7.5 mg         23      5 mg         24      7.5 mg         25      5 mg         26      7.5 mg         27      5 mg           28      5 mg         29      7.5 mg         30            31                   Date Details   05/01 This INR check       Date of next INR:  5/30/2017         How to take your warfarin dose     To take:  5 mg Take 1 of the 5 mg tablets.    To take:  7.5 mg Take 1.5 of the 5 mg tablets.            I will STOP taking the medications listed below when I get home from the hospital:    oxyCODONE 5 mg oral tablet  -- 1 tab(s) by mouth every 4 hours, As needed, Moderate Pain (4 - 6)    oxyCODONE 15 mg oral tablet  -- 1 tab(s) by mouth every 4 hours, As needed, Severe Pain (7 - 10)

## 2018-05-24 NOTE — DISCHARGE NOTE ADULT - MEDICATION SUMMARY - MEDICATIONS TO TAKE
I will START or STAY ON the medications listed below when I get home from the hospital:    oxyCODONE-acetaminophen 5 mg-325 mg oral tablet  -- 1 tab(s) by mouth every 6 hours MDD:4  -- Caution federal law prohibits the transfer of this drug to any person other  than the person for whom it was prescribed.  May cause drowsiness.  Alcohol may intensify this effect.  Use care when operating dangerous machinery.  This prescription cannot be refilled.  This product contains acetaminophen.  Do not use  with any other product containing acetaminophen to prevent possible liver damage.  Using more of this medication than prescribed may cause serious breathing problems.    -- Indication: For Pain    olmesartan 40 mg oral tablet  -- 1 tab(s) by mouth once a day  -- Indication: For Home med    clonazePAM 1 mg oral tablet  -- 1 tab(s) by mouth 2 times a day  -- Indication: For Home med    traZODone 50 mg oral tablet  -- 3 tab(s) by mouth once a day (at bedtime)  -- Indication: For Home med    metFORMIN 500 mg oral tablet  -- 1 tab(s) by mouth 2 times a day  -- Indication: For Home med    atenolol 25 mg oral tablet  -- 1 tab(s) by mouth once a day (at bedtime)  -- Indication: For Home med    atenolol 50 mg oral tablet  -- 1 tab(s) by mouth once a day in morning  -- Indication: For Home med    amLODIPine 5 mg oral tablet  -- 1 tab(s) by mouth 2 times a day  -- Indication: For Home med    docusate sodium 100 mg oral capsule  -- 1 cap(s) by mouth 3 times a day  -- Indication: For Constipation    senna oral tablet  -- 2 tab(s) by mouth once a day (at bedtime)  -- Indication: For Constipation    Omega-3 oral capsule  -- orally 2 times a day. Last dose 5/2/18  -- Indication: For Home med    Vitamin B Complex 100  -- injectable once a day  -- Indication: For Home med I will START or STAY ON the medications listed below when I get home from the hospital:    oxyCODONE-acetaminophen 5 mg-325 mg oral tablet  -- 1 tab(s) by mouth every 6 hours MDD:4  -- Caution federal law prohibits the transfer of this drug to any person other  than the person for whom it was prescribed.  May cause drowsiness.  Alcohol may intensify this effect.  Use care when operating dangerous machinery.  This prescription cannot be refilled.  This product contains acetaminophen.  Do not use  with any other product containing acetaminophen to prevent possible liver damage.  Using more of this medication than prescribed may cause serious breathing problems.    -- Indication: For As needed for pain    olmesartan 40 mg oral tablet  -- 1 tab(s) by mouth once a day  -- Indication: For Home med    clonazePAM 1 mg oral tablet  -- 1 tab(s) by mouth 2 times a day  -- Indication: For Home med    traZODone 50 mg oral tablet  -- 3 tab(s) by mouth once a day (at bedtime)  -- Indication: For Home med    metFORMIN 500 mg oral tablet  -- 1 tab(s) by mouth 2 times a day  -- Indication: For Home med    atenolol 25 mg oral tablet  -- 1 tab(s) by mouth once a day (at bedtime)  -- Indication: For Home med    atenolol 50 mg oral tablet  -- 1 tab(s) by mouth once a day in morning  -- Indication: For Home med    amLODIPine 5 mg oral tablet  -- 1 tab(s) by mouth 2 times a day  -- Indication: For Home med    docusate sodium 100 mg oral capsule  -- 1 cap(s) by mouth 3 times a day  -- Indication: For Constipation    senna oral tablet  -- 2 tab(s) by mouth once a day (at bedtime)  -- Indication: For Constipation    Omega-3 oral capsule  -- orally 2 times a day. Last dose 5/2/18  -- Indication: For Home med    Vitamin B Complex 100  -- injectable once a day  -- Indication: For Home med

## 2018-05-24 NOTE — ADVANCED PRACTICE NURSE CONSULT - ASSESSMENT
Ileal conduit: RLQ: stoma size oval 1"x3/4", stoma moist, red (superficial yellow necrosis noted on previous admission has autolytically sloughed off exposing 100% red, moist stoma). The apex (opening) of the stoma points upward toward 1-2 o'clock and the stoma is retracted below skin level. Peristomal skin with circumferential mucocutaneous separation: from 9-3 o'clock the separation is 3.5cm deep and then from 3-9 o'clock the separation is 2cm deep. Circumferential blanchable erythema in peristomal skin that extends 0.3cm (improved since previous assessment). Induration in peristomal skin circumferentially is also improving. At this time ostomy team recommends to not pack mucocutaneous separation to allow for drainage of the serosanguinous fluid that is draining from the tissue on the mucocutaneous separation. No hematuria noted, the blood tinged drainage in ostomy pouch is noted from the serosanguinous drainage of the mucocutaneous separation.   Patient able to empty pouching system and attached self to drainage bag for nighttime hours: he was able to teach back those two concepts. Patient remains unable to cut pouching system, able to assist with application of barrier ring to pouching system and re-apply pouch. He was taught the concepts of stoma powder/liquid barrier film and barrier paste application and was able to teach back verbally how to apply those products. During teaching sessions patient continues to be easily distracted and change the conversation to things other than the pouching system, re-focused patient during teaching session. Plan for discharge home, will need continued assistance with pouching system change. Supplies provided for 2-3 weeks and outpatient follow up information provided to patient.

## 2018-05-29 ENCOUNTER — APPOINTMENT (OUTPATIENT)
Dept: UROLOGY | Facility: CLINIC | Age: 63
End: 2018-05-29
Payer: MEDICARE

## 2018-05-29 PROCEDURE — 99024 POSTOP FOLLOW-UP VISIT: CPT

## 2018-05-30 ENCOUNTER — APPOINTMENT (OUTPATIENT)
Dept: FAMILY MEDICINE | Facility: CLINIC | Age: 63
End: 2018-05-30
Payer: MEDICARE

## 2018-05-30 VITALS
OXYGEN SATURATION: 98 % | SYSTOLIC BLOOD PRESSURE: 138 MMHG | RESPIRATION RATE: 16 BRPM | DIASTOLIC BLOOD PRESSURE: 80 MMHG | HEART RATE: 59 BPM | TEMPERATURE: 97.6 F

## 2018-05-30 PROCEDURE — 99496 TRANSJ CARE MGMT HIGH F2F 7D: CPT | Mod: 25

## 2018-05-30 PROCEDURE — 82962 GLUCOSE BLOOD TEST: CPT

## 2018-05-30 NOTE — ASSESSMENT
[FreeTextEntry1] : He presents to office for followup after his hospital admission for a cystectomy. Patient states that he has been monitoring his blood sugars which have been good. Patient states that he is feeling still very weak decreased appetite and some lightheadedness and dizziness especially when he gets up in the morning and goes from sitting to standing. Patient will be followed with the visiting nurse coming 2-3 days before changing  of his stoma bag and educating patient.\par \par \par Pt to monitor BS daily\par Pt to monitor BP daily\par Will keep pt on same dosage of BP meds however if BP lower will decrease meds\par Pt will have VN monitor BP weekly

## 2018-05-30 NOTE — HISTORY OF PRESENT ILLNESS
[de-identified] : He presents to office for followup after his hospital admission for a cystectomy. Patient states that he has been monitoring his blood sugars which have been good. Patient states that he is feeling still very weak decreased appetite and some lightheadedness and dizziness especially when he gets up in the morning and goes from sitting to standing. Patient will be followed with the visiting nurse coming 2-3 days before changing  of his stoma bag and educating patient.

## 2018-05-30 NOTE — REVIEW OF SYSTEMS
[Fatigue] : fatigue [Abdominal Pain] : abdominal pain [Dizziness] : dizziness [Anxiety] : anxiety [Depression] : depression [Negative] : Respiratory [FreeTextEntry8] : s

## 2018-05-30 NOTE — PHYSICAL EXAM
[No Acute Distress] : no acute distress [Clear to Auscultation] : lungs were clear to auscultation bilaterally [Regular Rhythm] : with a regular rhythm [No Rash] : no rash [Normal Gait] : normal gait [Coordination Grossly Intact] : coordination grossly intact [No Focal Deficits] : no focal deficits [Deep Tendon Reflexes (DTR)] : deep tendon reflexes were 2+ and symmetric [Alert and Oriented x3] : oriented to person, place, and time [de-identified] :  Urostomy bag noted leaking no redness    [de-identified] : depressed affect and mood

## 2018-06-03 LAB — GLUCOSE BLDC GLUCOMTR-MCNC: 127

## 2018-06-22 ENCOUNTER — APPOINTMENT (OUTPATIENT)
Dept: UROLOGY | Facility: CLINIC | Age: 63
End: 2018-06-22
Payer: MEDICARE

## 2018-06-22 PROCEDURE — 99024 POSTOP FOLLOW-UP VISIT: CPT

## 2018-07-20 ENCOUNTER — APPOINTMENT (OUTPATIENT)
Dept: UROLOGY | Facility: CLINIC | Age: 63
End: 2018-07-20
Payer: MEDICARE

## 2018-07-20 ENCOUNTER — LABORATORY RESULT (OUTPATIENT)
Age: 63
End: 2018-07-20

## 2018-07-20 PROBLEM — C67.9 MALIGNANT NEOPLASM OF BLADDER, UNSPECIFIED: Chronic | Status: ACTIVE | Noted: 2018-04-27

## 2018-07-20 PROBLEM — N21.0 CALCULUS IN BLADDER: Chronic | Status: ACTIVE | Noted: 2017-12-29

## 2018-07-20 PROBLEM — E66.9 OBESITY, UNSPECIFIED: Chronic | Status: ACTIVE | Noted: 2017-12-29

## 2018-07-20 PROCEDURE — 99024 POSTOP FOLLOW-UP VISIT: CPT

## 2018-07-24 ENCOUNTER — APPOINTMENT (OUTPATIENT)
Dept: ULTRASOUND IMAGING | Facility: IMAGING CENTER | Age: 63
End: 2018-07-24

## 2018-07-24 ENCOUNTER — APPOINTMENT (OUTPATIENT)
Dept: UROLOGY | Facility: CLINIC | Age: 63
End: 2018-07-24
Payer: MEDICARE

## 2018-07-24 LAB
ALBUMIN SERPL ELPH-MCNC: 3.4 G/DL
ALP BLD-CCNC: 223 U/L
ALT SERPL-CCNC: 71 U/L
ANION GAP SERPL CALC-SCNC: 14 MMOL/L
AST SERPL-CCNC: 32 U/L
BASOPHILS # BLD AUTO: 0 K/UL
BASOPHILS NFR BLD AUTO: 0 %
BILIRUB SERPL-MCNC: 0.2 MG/DL
BUN SERPL-MCNC: 26 MG/DL
CALCIUM SERPL-MCNC: 9.4 MG/DL
CHLORIDE SERPL-SCNC: 108 MMOL/L
CO2 SERPL-SCNC: 22 MMOL/L
CREAT SERPL-MCNC: 1.81 MG/DL
EOSINOPHIL # BLD AUTO: 0.26 K/UL
EOSINOPHIL NFR BLD AUTO: 2.7 %
GLUCOSE SERPL-MCNC: 121 MG/DL
HCT VFR BLD CALC: 29 %
HGB BLD-MCNC: 8.9 G/DL
LYMPHOCYTES # BLD AUTO: 1.09 K/UL
LYMPHOCYTES NFR BLD AUTO: 11.5 %
MAN DIFF?: NORMAL
MCHC RBC-ENTMCNC: 23.9 PG
MCHC RBC-ENTMCNC: 30.7 GM/DL
MCV RBC AUTO: 78 FL
MONOCYTES # BLD AUTO: 0.5 K/UL
MONOCYTES NFR BLD AUTO: 5.3 %
NEUTROPHILS # BLD AUTO: 7.52 K/UL
NEUTROPHILS NFR BLD AUTO: 79.6 %
PLATELET # BLD AUTO: 416 K/UL
POTASSIUM SERPL-SCNC: 5.2 MMOL/L
PROT SERPL-MCNC: 6.9 G/DL
RBC # BLD: 3.72 M/UL
RBC # FLD: 17.7 %
SODIUM SERPL-SCNC: 144 MMOL/L
WBC # FLD AUTO: 9.45 K/UL

## 2018-07-24 PROCEDURE — 99024 POSTOP FOLLOW-UP VISIT: CPT

## 2018-07-26 ENCOUNTER — APPOINTMENT (OUTPATIENT)
Dept: FAMILY MEDICINE | Facility: CLINIC | Age: 63
End: 2018-07-26
Payer: MEDICARE

## 2018-07-26 VITALS — SYSTOLIC BLOOD PRESSURE: 112 MMHG | DIASTOLIC BLOOD PRESSURE: 80 MMHG

## 2018-07-26 VITALS — SYSTOLIC BLOOD PRESSURE: 128 MMHG | DIASTOLIC BLOOD PRESSURE: 80 MMHG

## 2018-07-26 PROCEDURE — 82962 GLUCOSE BLOOD TEST: CPT

## 2018-07-26 PROCEDURE — 99214 OFFICE O/P EST MOD 30 MIN: CPT | Mod: 25

## 2018-07-26 NOTE — HISTORY OF PRESENT ILLNESS
[de-identified] : Patient presents to office for followup on his blood pressure and blood sugar. Patient s/p Cystectomy and ileal conduit. 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  Patient currently on Macrobid  . Patient currently taking atenolol twice a day and amlodipine twice a day.  Patient will be having a dilatation of left nephrostomy and dilatation of stricture in the next couple weeks. Patient extremely depressed and fatigued over  another surgery.Has been monitoring BS with readings in the 150's

## 2018-07-26 NOTE — ASSESSMENT
[FreeTextEntry1] : Patient presents to office for followup on his blood pressure and blood sugar. Patient s/p Cystectomy and ileal conduit. 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  Patient currently on Macrobid  . Patient currently taking atenolol twice a day and amlodipine twice a day.  Patient will be having a dilatation of left nephrostomy and dilatation of stricture in the next couple weeks. Patient extremely depressed and fatigued over  another surgery.Has been monitoring BS with readings in the 150's\par \par \par BP low today will decrease Amlodipine to nightly\par Pt to RTO in 2-3 weeks

## 2018-07-26 NOTE — REVIEW OF SYSTEMS
[Fatigue] : fatigue [Anxiety] : anxiety [Depression] : depression [Negative] : Respiratory [FreeTextEntry8] : s/p cystectomy

## 2018-07-26 NOTE — PHYSICAL EXAM
[No Acute Distress] : no acute distress [Clear to Auscultation] : lungs were clear to auscultation bilaterally [Regular Rhythm] : with a regular rhythm [Normal Gait] : normal gait [Alert and Oriented x3] : oriented to person, place, and time [de-identified] : stoma noted with urine  [de-identified] : depressed affect and mood

## 2018-07-30 ENCOUNTER — APPOINTMENT (OUTPATIENT)
Dept: UROLOGY | Facility: CLINIC | Age: 63
End: 2018-07-30
Payer: MEDICARE

## 2018-07-30 VITALS — OXYGEN SATURATION: 99 % | HEART RATE: 56 BPM | DIASTOLIC BLOOD PRESSURE: 85 MMHG | SYSTOLIC BLOOD PRESSURE: 145 MMHG

## 2018-07-30 DIAGNOSIS — R94.5 ABNORMAL RESULTS OF LIVER FUNCTION STUDIES: ICD-10-CM

## 2018-07-30 DIAGNOSIS — R53.83 OTHER FATIGUE: ICD-10-CM

## 2018-07-30 PROCEDURE — 99213 OFFICE O/P EST LOW 20 MIN: CPT | Mod: 24

## 2018-07-31 PROBLEM — R53.83 FATIGUE, UNSPECIFIED TYPE: Status: ACTIVE | Noted: 2018-07-20

## 2018-07-31 LAB — GLUCOSE BLDC GLUCOMTR-MCNC: 127

## 2018-08-01 ENCOUNTER — APPOINTMENT (OUTPATIENT)
Dept: INTERVENTIONAL RADIOLOGY/VASCULAR | Facility: CLINIC | Age: 63
End: 2018-08-01
Payer: MEDICARE

## 2018-08-01 VITALS
WEIGHT: 248 LBS | SYSTOLIC BLOOD PRESSURE: 96 MMHG | HEART RATE: 71 BPM | RESPIRATION RATE: 16 BRPM | HEIGHT: 71 IN | OXYGEN SATURATION: 96 % | DIASTOLIC BLOOD PRESSURE: 60 MMHG | BODY MASS INDEX: 34.72 KG/M2

## 2018-08-01 DIAGNOSIS — Z80.0 FAMILY HISTORY OF MALIGNANT NEOPLASM OF DIGESTIVE ORGANS: ICD-10-CM

## 2018-08-01 PROBLEM — R94.5 ABNORMAL LFTS: Status: ACTIVE | Noted: 2018-08-01

## 2018-08-01 LAB
ALBUMIN SERPL ELPH-MCNC: 3.9 G/DL
ALP BLD-CCNC: 219 U/L
ALT SERPL-CCNC: 19 U/L
ANION GAP SERPL CALC-SCNC: 17 MMOL/L
AST SERPL-CCNC: 12 U/L
BILIRUB SERPL-MCNC: <0.2 MG/DL
BUN SERPL-MCNC: 35 MG/DL
CALCIUM SERPL-MCNC: 10.6 MG/DL
CHLORIDE SERPL-SCNC: 104 MMOL/L
CO2 SERPL-SCNC: 22 MMOL/L
CREAT SERPL-MCNC: 1.59 MG/DL
GLUCOSE SERPL-MCNC: 113 MG/DL
POTASSIUM SERPL-SCNC: 4.8 MMOL/L
PROT SERPL-MCNC: 7.9 G/DL
SODIUM SERPL-SCNC: 143 MMOL/L

## 2018-08-01 PROCEDURE — 99215 OFFICE O/P EST HI 40 MIN: CPT

## 2018-08-01 RX ORDER — CIPROFLOXACIN HYDROCHLORIDE 500 MG/1
500 TABLET, FILM COATED ORAL TWICE DAILY
Qty: 14 | Refills: 0 | Status: COMPLETED | COMMUNITY
Start: 2018-05-02 | End: 2018-08-01

## 2018-08-01 RX ORDER — CIPROFLOXACIN HYDROCHLORIDE 500 MG/1
500 TABLET, FILM COATED ORAL TWICE DAILY
Qty: 6 | Refills: 0 | Status: COMPLETED | COMMUNITY
Start: 2018-05-01 | End: 2018-08-01

## 2018-08-01 RX ORDER — OXYBUTYNIN CHLORIDE 10 MG/1
10 TABLET, EXTENDED RELEASE ORAL
Qty: 30 | Refills: 11 | Status: COMPLETED | COMMUNITY
Start: 2018-03-02 | End: 2018-08-01

## 2018-08-01 RX ORDER — NITROFURANTOIN (MONOHYDRATE/MACROCRYSTALS) 25; 75 MG/1; MG/1
100 CAPSULE ORAL TWICE DAILY
Qty: 20 | Refills: 0 | Status: COMPLETED | COMMUNITY
Start: 2018-07-24 | End: 2018-08-01

## 2018-08-01 RX ORDER — SULFAMETHOXAZOLE AND TRIMETHOPRIM 800; 160 MG/1; MG/1
800-160 TABLET ORAL
Qty: 14 | Refills: 0 | Status: COMPLETED | COMMUNITY
Start: 2018-01-05 | End: 2018-08-01

## 2018-08-01 RX ORDER — LEVOFLOXACIN 500 MG/1
500 TABLET, FILM COATED ORAL
Qty: 7 | Refills: 0 | Status: COMPLETED | COMMUNITY
Start: 2018-01-25 | End: 2018-08-01

## 2018-08-01 RX ORDER — OMEGA-3-ACID ETHYL ESTERS CAPSULES 1 G/1
1 CAPSULE, LIQUID FILLED ORAL
Qty: 120 | Refills: 0 | Status: ACTIVE | COMMUNITY
Start: 2017-11-30

## 2018-08-01 RX ORDER — MAGNESIUM HYDROXIDE 1200 MG/15ML
0.9 LIQUID ORAL
Qty: 2000 | Refills: 2 | Status: COMPLETED | COMMUNITY
Start: 2018-03-08 | End: 2018-08-01

## 2018-08-01 RX ORDER — FESOTERODINE FUMARATE 8 MG/1
8 TABLET, FILM COATED, EXTENDED RELEASE ORAL DAILY
Qty: 30 | Refills: 3 | Status: COMPLETED | COMMUNITY
Start: 2018-03-14 | End: 2018-08-01

## 2018-08-01 RX ORDER — OLMESARTAN MEDOXOMIL 40 MG/1
40 TABLET, FILM COATED ORAL
Qty: 1 | Refills: 2 | Status: COMPLETED | COMMUNITY
Start: 2018-04-23 | End: 2018-08-01

## 2018-08-02 ENCOUNTER — MESSAGE (OUTPATIENT)
Age: 63
End: 2018-08-02

## 2018-08-02 ENCOUNTER — OUTPATIENT (OUTPATIENT)
Dept: OUTPATIENT SERVICES | Facility: HOSPITAL | Age: 63
LOS: 1 days | End: 2018-08-02

## 2018-08-02 VITALS
SYSTOLIC BLOOD PRESSURE: 114 MMHG | WEIGHT: 222.01 LBS | HEIGHT: 71 IN | DIASTOLIC BLOOD PRESSURE: 58 MMHG | TEMPERATURE: 99 F | OXYGEN SATURATION: 99 % | HEART RATE: 72 BPM | RESPIRATION RATE: 16 BRPM

## 2018-08-02 DIAGNOSIS — N13.30 UNSPECIFIED HYDRONEPHROSIS: ICD-10-CM

## 2018-08-02 DIAGNOSIS — I10 ESSENTIAL (PRIMARY) HYPERTENSION: ICD-10-CM

## 2018-08-02 DIAGNOSIS — E11.9 TYPE 2 DIABETES MELLITUS WITHOUT COMPLICATIONS: ICD-10-CM

## 2018-08-02 DIAGNOSIS — Z93.59 OTHER CYSTOSTOMY STATUS: Chronic | ICD-10-CM

## 2018-08-02 DIAGNOSIS — Z98.89 OTHER SPECIFIED POSTPROCEDURAL STATES: Chronic | ICD-10-CM

## 2018-08-02 DIAGNOSIS — Z85.51 PERSONAL HISTORY OF MALIGNANT NEOPLASM OF BLADDER: Chronic | ICD-10-CM

## 2018-08-02 DIAGNOSIS — Z93.6 OTHER ARTIFICIAL OPENINGS OF URINARY TRACT STATUS: Chronic | ICD-10-CM

## 2018-08-02 LAB
APPEARANCE UR: SIGNIFICANT CHANGE UP
BACTERIA # UR AUTO: SIGNIFICANT CHANGE UP
BILIRUB UR-MCNC: NEGATIVE — SIGNIFICANT CHANGE UP
BLOOD UR QL VISUAL: HIGH
BUN SERPL-MCNC: 41 MG/DL — HIGH (ref 7–23)
CALCIUM SERPL-MCNC: 9.9 MG/DL — SIGNIFICANT CHANGE UP (ref 8.4–10.5)
CHLORIDE SERPL-SCNC: 99 MMOL/L — SIGNIFICANT CHANGE UP (ref 98–107)
CO2 SERPL-SCNC: 20 MMOL/L — LOW (ref 22–31)
COLOR SPEC: YELLOW — SIGNIFICANT CHANGE UP
CREAT SERPL-MCNC: 2.72 MG/DL — HIGH (ref 0.5–1.3)
GLUCOSE SERPL-MCNC: 169 MG/DL — HIGH (ref 70–99)
GLUCOSE UR-MCNC: NEGATIVE — SIGNIFICANT CHANGE UP
HBA1C BLD-MCNC: 5.8 % — HIGH (ref 4–5.6)
HCT VFR BLD CALC: 28.5 % — LOW (ref 39–50)
HGB BLD-MCNC: 8.5 G/DL — LOW (ref 13–17)
HYALINE CASTS # UR AUTO: SIGNIFICANT CHANGE UP (ref 0–?)
KETONES UR-MCNC: NEGATIVE — SIGNIFICANT CHANGE UP
LEUKOCYTE ESTERASE UR-ACNC: HIGH
MCHC RBC-ENTMCNC: 22.8 PG — LOW (ref 27–34)
MCHC RBC-ENTMCNC: 29.8 % — LOW (ref 32–36)
MCV RBC AUTO: 76.6 FL — LOW (ref 80–100)
MUCOUS THREADS # UR AUTO: SIGNIFICANT CHANGE UP
NITRITE UR-MCNC: NEGATIVE — SIGNIFICANT CHANGE UP
NON-SQ EPI CELLS # UR AUTO: 1 — SIGNIFICANT CHANGE UP
NRBC # FLD: 0.02 — SIGNIFICANT CHANGE UP
PH UR: 7 — SIGNIFICANT CHANGE UP (ref 4.6–8)
PLATELET # BLD AUTO: 272 K/UL — SIGNIFICANT CHANGE UP (ref 150–400)
PMV BLD: 10.9 FL — SIGNIFICANT CHANGE UP (ref 7–13)
POTASSIUM SERPL-MCNC: 3.8 MMOL/L — SIGNIFICANT CHANGE UP (ref 3.5–5.3)
POTASSIUM SERPL-SCNC: 3.8 MMOL/L — SIGNIFICANT CHANGE UP (ref 3.5–5.3)
PROT UR-MCNC: 100 MG/DL — SIGNIFICANT CHANGE UP
RBC # BLD: 3.72 M/UL — LOW (ref 4.2–5.8)
RBC # FLD: 17.3 % — HIGH (ref 10.3–14.5)
RBC CASTS # UR COMP ASSIST: >50 — HIGH (ref 0–?)
SODIUM SERPL-SCNC: 135 MMOL/L — SIGNIFICANT CHANGE UP (ref 135–145)
SP GR SPEC: 1.01 — SIGNIFICANT CHANGE UP (ref 1–1.04)
SQUAMOUS # UR AUTO: SIGNIFICANT CHANGE UP
UROBILINOGEN FLD QL: NORMAL MG/DL — SIGNIFICANT CHANGE UP
WBC # BLD: 16.58 K/UL — HIGH (ref 3.8–10.5)
WBC # FLD AUTO: 16.58 K/UL — HIGH (ref 3.8–10.5)
WBC CLUMPS #/AREA URNS HPF: PRESENT — HIGH (ref 0–?)
WBC UR QL: >50 — HIGH (ref 0–?)
YEAST BUDDING # UR COMP ASSIST: SIGNIFICANT CHANGE UP

## 2018-08-02 RX ORDER — OLMESARTAN MEDOXOMIL 5 MG/1
1 TABLET, FILM COATED ORAL
Qty: 0 | Refills: 0 | COMMUNITY

## 2018-08-02 RX ORDER — ATENOLOL 25 MG/1
1 TABLET ORAL
Qty: 0 | Refills: 0 | COMMUNITY

## 2018-08-02 RX ORDER — TRAZODONE HCL 50 MG
3 TABLET ORAL
Qty: 0 | Refills: 0 | COMMUNITY

## 2018-08-02 RX ORDER — METFORMIN HYDROCHLORIDE 850 MG/1
1 TABLET ORAL
Qty: 0 | Refills: 0 | COMMUNITY

## 2018-08-02 RX ORDER — CLONAZEPAM 1 MG
1 TABLET ORAL
Qty: 0 | Refills: 0 | COMMUNITY

## 2018-08-02 RX ORDER — OMEGA-3 ACID ETHYL ESTERS 1 G
0 CAPSULE ORAL
Qty: 0 | Refills: 0 | COMMUNITY

## 2018-08-02 RX ORDER — AMLODIPINE BESYLATE 2.5 MG/1
1 TABLET ORAL
Qty: 0 | Refills: 0 | COMMUNITY

## 2018-08-02 RX ORDER — SODIUM CHLORIDE 9 MG/ML
3 INJECTION INTRAMUSCULAR; INTRAVENOUS; SUBCUTANEOUS EVERY 8 HOURS
Qty: 0 | Refills: 0 | Status: DISCONTINUED | OUTPATIENT
Start: 2018-08-08 | End: 2018-08-10

## 2018-08-02 NOTE — H&P PST ADULT - NEGATIVE NEUROLOGICAL SYMPTOMS
no paresthesias/no generalized seizures/no weakness/no vertigo/no loss of sensation/no difficulty walking/no transient paralysis/no headache

## 2018-08-02 NOTE — H&P PST ADULT - PSH
H/O cystoscopy  - multiple  last cystoscopy, laser litholapaxy on 1/2018  History of bladder cancer  bladder removal May 10, 2018  History of prostatectomy  robotic, 2011, s/p radiation  S/P Appendectomy  40 years ago  Suprapubic catheter  8/2015 H/O cystoscopy  - multiple  last cystoscopy, laser litholapaxy on 1/2018  History of bladder cancer  bladder removal May 10, 2018  History of prostatectomy  robotic, 2011, s/p radiation  Nephrostomy status  Left, 8/1/2018  S/P Appendectomy  40 years ago  S/P ileal conduit  May 2018  Suprapubic catheter  8/2015

## 2018-08-02 NOTE — H&P PST ADULT - MUSCULOSKELETAL
detailed exam no joint warmth/no calf tenderness/normal strength/no joint swelling/no joint erythema/ROM intact details…

## 2018-08-02 NOTE — H&P PST ADULT - PROBLEM SELECTOR PLAN 1
Pt is scheduled for dilation of left nephrostomy tract, renal endoscopy and laser incision of stricture, JJ stent on 8/8/18.  Pre-op instructions given, patient verbalized understanding.   Pepcid given to patient for GI prophylaxis.     Medical evaluation requested by surgeon.  Pt scheduled to see PCP 8/6/18.  Pending medical evaluation.     Notified Salomón BERGERON at [facility] that patient has elevated temp 99.3 at PST visit.   DERICK precautions. Pt >3 criteria on STOP-Bang questionnaire.  OR booking notified.

## 2018-08-02 NOTE — H&P PST ADULT - NEGATIVE OPHTHALMOLOGIC SYMPTOMS
no blurred vision L/no photophobia/no pain R/no blurred vision R/no diplopia/no lacrimation L/no lacrimation R/no pain L

## 2018-08-02 NOTE — H&P PST ADULT - HISTORY OF PRESENT ILLNESS
62 y/o male scheduled for dilation of left nephrostomy tract, renal endoscopy and laser incision of stricture, JJ stent on 8/8/18 presents to pre surgical testing. 64 y/o male presents to PST for unspecified hydronephrosis.   Pt is scheduled for dilation of left nephrostomy tract, renal endoscopy and laser incision of stricture, JJ stent on 8/8/18.  Pt is a poor historian.

## 2018-08-02 NOTE — H&P PST ADULT - NEGATIVE CARDIOVASCULAR SYMPTOMS
no palpitations/no chest pain/no claudication/no peripheral edema/no dyspnea on exertion/no orthopnea/no paroxysmal nocturnal dyspnea

## 2018-08-02 NOTE — H&P PST ADULT - PMH
Anxiety    Diabetes mellitus  pre diabetic  HTN (Hypertension)    Major depressive disorder    Malignant neoplasm of bladder    Obese    Other calculus in bladder    Prostate Cancer    Urethral stricture    Urinary (tract) obstruction    UTI (urinary tract infection) Anxiety    Diabetes mellitus  pre diabetic  HTN (Hypertension)    Major depressive disorder    Malignant neoplasm of bladder    Obese    Other calculus in bladder    Prostate Cancer    Unspecified hydronephrosis    Urethral stricture    Urinary (tract) obstruction    UTI (urinary tract infection)

## 2018-08-02 NOTE — H&P PST ADULT - NEGATIVE ENMT SYMPTOMS
no tinnitus/no vertigo/no hearing difficulty/no ear pain/no nasal congestion/no dysphagia/no sinus symptoms

## 2018-08-02 NOTE — H&P PST ADULT - PROBLEM SELECTOR PLAN 2
PMH diabetes, OR booking notified.     Patient instructed to hold metformin the night before and the day of surgery, pt verbalized understanding.

## 2018-08-02 NOTE — H&P PST ADULT - PSYCHIATRIC COMMENTS
has monly psych appointments with Dr. Juan Antonio Estevez has monthly psych appointments with Dr. Juan Antonio Estevez

## 2018-08-02 NOTE — H&P PST ADULT - LAB RESULTS AND INTERPRETATION
CBC, BMP, HgA1C done, pending results.  Pt sent to [facility]rology [facility] to collect UA and urine culture.  Spoke to Salomón BERGERON.

## 2018-08-02 NOTE — H&P PST ADULT - NEGATIVE MUSCULOSKELETAL SYMPTOMS
no muscle weakness/no arthralgia/no neck pain/no myalgia/no muscle cramps/no joint swelling/no stiffness/no arm pain R/no leg pain L/no arm pain L/no back pain/no leg pain R

## 2018-08-02 NOTE — H&P PST ADULT - LYMPHATIC
supraclavicular L/supraclavicular R/anterior cervical R/posterior cervical R/posterior cervical L/anterior cervical L

## 2018-08-02 NOTE — H&P PST ADULT - RS GEN PE MLT RESP DETAILS PC
respirations non-labored/clear to auscultation bilaterally/good air movement/airway patent/breath sounds equal respirations non-labored/no rales/no wheezes/airway patent/breath sounds equal/no rhonchi/clear to auscultation bilaterally/good air movement

## 2018-08-03 LAB — SPECIMEN SOURCE: SIGNIFICANT CHANGE UP

## 2018-08-05 LAB
-  AMIKACIN: SIGNIFICANT CHANGE UP
-  AMPICILLIN/SULBACTAM: SIGNIFICANT CHANGE UP
-  AMPICILLIN: SIGNIFICANT CHANGE UP
-  AZTREONAM: SIGNIFICANT CHANGE UP
-  CEFAZOLIN: SIGNIFICANT CHANGE UP
-  CEFEPIME: SIGNIFICANT CHANGE UP
-  CEFOXITIN: SIGNIFICANT CHANGE UP
-  CEFTAZIDIME: SIGNIFICANT CHANGE UP
-  CEFTRIAXONE: SIGNIFICANT CHANGE UP
-  CIPROFLOXACIN: SIGNIFICANT CHANGE UP
-  ERTAPENEM: SIGNIFICANT CHANGE UP
-  GENTAMICIN: SIGNIFICANT CHANGE UP
-  IMIPENEM: SIGNIFICANT CHANGE UP
-  LEVOFLOXACIN: SIGNIFICANT CHANGE UP
-  MEROPENEM: SIGNIFICANT CHANGE UP
-  PIPERACILLIN/TAZOBACTAM: SIGNIFICANT CHANGE UP
-  TIGECYCLINE: SIGNIFICANT CHANGE UP
-  TOBRAMYCIN: SIGNIFICANT CHANGE UP
-  TRIMETHOPRIM/SULFAMETHOXAZOLE: SIGNIFICANT CHANGE UP
BACTERIA UR CULT: SIGNIFICANT CHANGE UP
METHOD TYPE: SIGNIFICANT CHANGE UP
ORGANISM # SPEC MICROSCOPIC CNT: SIGNIFICANT CHANGE UP
ORGANISM # SPEC MICROSCOPIC CNT: SIGNIFICANT CHANGE UP

## 2018-08-06 ENCOUNTER — APPOINTMENT (OUTPATIENT)
Dept: FAMILY MEDICINE | Facility: CLINIC | Age: 63
End: 2018-08-06
Payer: MEDICARE

## 2018-08-06 VITALS
DIASTOLIC BLOOD PRESSURE: 58 MMHG | SYSTOLIC BLOOD PRESSURE: 100 MMHG | TEMPERATURE: 98.2 F | RESPIRATION RATE: 16 BRPM | HEART RATE: 66 BPM | OXYGEN SATURATION: 96 %

## 2018-08-06 VITALS — WEIGHT: 222 LBS | BODY MASS INDEX: 30.96 KG/M2

## 2018-08-06 VITALS — SYSTOLIC BLOOD PRESSURE: 108 MMHG | DIASTOLIC BLOOD PRESSURE: 60 MMHG

## 2018-08-06 PROCEDURE — 99215 OFFICE O/P EST HI 40 MIN: CPT

## 2018-08-06 NOTE — PHYSICAL EXAM

## 2018-08-06 NOTE — RESULTS/DATA
[] : results reviewed [de-identified] : Hb 8.5.   [de-identified] : Glu 169 , BUN 41, Creat 2.7  [de-identified] : samir jackson

## 2018-08-06 NOTE — REVIEW OF SYSTEMS
[Fatigue] : fatigue [Recent Change In Weight] : ~T recent weight change [Anxiety] : anxiety [Depression] : depression [Negative] : Heme/Lymph [FreeTextEntry2] : 25 pound weight loss in 2 months

## 2018-08-06 NOTE — ASSESSMENT
[Patient Optimized for Surgery] : Patient optimized for surgery [No Further Testing Recommended] : no further testing recommended [FreeTextEntry7] : pt instructed can take BP meds am of surgery hold Metformin

## 2018-08-06 NOTE — HISTORY OF PRESENT ILLNESS
[Diabetes] : diabetes [FreeTextEntry1] : nephrostomy, [place] of ureteral stricture [FreeTextEntry2] : 8/8/18 [FreeTextEntry3] :  [FreeTextEntry4] : Patient presents to office for preop medical clearance. Patient is status post cystectomy and ileal conduit in May 2018.  Patient was recently found to have serum creatinine elevated and ultrasound revealed left hydronephrosis. The patient here for medical clearance for a left sided nephrostomy and laser incision of stricture. Pt feelig  very weak and fatigued today. No fever or chills which he had been experiencing few weeks ago.  [FreeTextEntry5] : hypertension and diabetes

## 2018-08-07 ENCOUNTER — FORM ENCOUNTER (OUTPATIENT)
Age: 63
End: 2018-08-07

## 2018-08-07 NOTE — ASU PATIENT PROFILE, ADULT - PSH
Bladder disease  Bladder Removed  H/O cystoscopy  - multiple  last cystoscopy, laser litholapaxy on 1/2018  History of bladder cancer  bladder removal May 10, 2018  History of prostatectomy  robotic, 2011, s/p radiation  Nephrostomy status  Left, 8/1/2018  S/P Appendectomy  40 years ago  S/P ileal conduit  May 2018  Suprapubic catheter  8/2015

## 2018-08-07 NOTE — ASU PATIENT PROFILE, ADULT - PMH
Anxiety    Diabetes mellitus  pre diabetic  HTN (Hypertension)    Major depressive disorder    Malignant neoplasm of bladder    Obese    Other calculus in bladder    Prostate Cancer    Unspecified hydronephrosis    Urethral stricture    Urinary (tract) obstruction    UTI (urinary tract infection)

## 2018-08-08 ENCOUNTER — APPOINTMENT (OUTPATIENT)
Dept: UROLOGY | Facility: HOSPITAL | Age: 63
End: 2018-08-08

## 2018-08-08 ENCOUNTER — INPATIENT (INPATIENT)
Facility: HOSPITAL | Age: 63
LOS: 1 days | Discharge: HOME | End: 2018-08-10
Attending: SPECIALIST | Admitting: SPECIALIST
Payer: MEDICARE

## 2018-08-08 VITALS
HEART RATE: 78 BPM | SYSTOLIC BLOOD PRESSURE: 108 MMHG | OXYGEN SATURATION: 97 % | WEIGHT: 222.01 LBS | TEMPERATURE: 99 F | RESPIRATION RATE: 17 BRPM | DIASTOLIC BLOOD PRESSURE: 84 MMHG | HEIGHT: 71 IN

## 2018-08-08 DIAGNOSIS — Z93.6 OTHER ARTIFICIAL OPENINGS OF URINARY TRACT STATUS: Chronic | ICD-10-CM

## 2018-08-08 DIAGNOSIS — Z98.89 OTHER SPECIFIED POSTPROCEDURAL STATES: Chronic | ICD-10-CM

## 2018-08-08 DIAGNOSIS — Z93.59 OTHER CYSTOSTOMY STATUS: Chronic | ICD-10-CM

## 2018-08-08 DIAGNOSIS — N32.9 BLADDER DISORDER, UNSPECIFIED: Chronic | ICD-10-CM

## 2018-08-08 DIAGNOSIS — N13.30 UNSPECIFIED HYDRONEPHROSIS: ICD-10-CM

## 2018-08-08 DIAGNOSIS — Z85.51 PERSONAL HISTORY OF MALIGNANT NEOPLASM OF BLADDER: Chronic | ICD-10-CM

## 2018-08-08 LAB
ANISOCYTOSIS BLD QL: SLIGHT — SIGNIFICANT CHANGE UP
APTT BLD: 29.5 SEC — SIGNIFICANT CHANGE UP (ref 27.5–37.4)
BASOPHILS # BLD AUTO: 0 K/UL — SIGNIFICANT CHANGE UP (ref 0–0.2)
BASOPHILS NFR BLD AUTO: 0 % — SIGNIFICANT CHANGE UP (ref 0–2)
BASOPHILS NFR SPEC: 0 % — SIGNIFICANT CHANGE UP (ref 0–2)
BLASTS # FLD: 0 % — SIGNIFICANT CHANGE UP (ref 0–0)
BUN SERPL-MCNC: 28 MG/DL — HIGH (ref 7–23)
CALCIUM SERPL-MCNC: 9.1 MG/DL — SIGNIFICANT CHANGE UP (ref 8.4–10.5)
CHLORIDE SERPL-SCNC: 102 MMOL/L — SIGNIFICANT CHANGE UP (ref 98–107)
CO2 SERPL-SCNC: 18 MMOL/L — LOW (ref 22–31)
CREAT SERPL-MCNC: 1.99 MG/DL — HIGH (ref 0.5–1.3)
DACRYOCYTES BLD QL SMEAR: SLIGHT — SIGNIFICANT CHANGE UP
ELLIPTOCYTES BLD QL SMEAR: SLIGHT — SIGNIFICANT CHANGE UP
EOSINOPHIL # BLD AUTO: 0.01 K/UL — SIGNIFICANT CHANGE UP (ref 0–0.5)
EOSINOPHIL NFR BLD AUTO: 0.2 % — SIGNIFICANT CHANGE UP (ref 0–6)
EOSINOPHIL NFR FLD: 1.7 % — SIGNIFICANT CHANGE UP (ref 0–6)
GIANT PLATELETS BLD QL SMEAR: PRESENT — SIGNIFICANT CHANGE UP
GLUCOSE BLDC GLUCOMTR-MCNC: 211 MG/DL — HIGH (ref 70–99)
GLUCOSE SERPL-MCNC: 156 MG/DL — HIGH (ref 70–99)
HCT VFR BLD CALC: 25.6 % — LOW (ref 39–50)
HGB BLD-MCNC: 7.8 G/DL — LOW (ref 13–17)
IMM GRANULOCYTES # BLD AUTO: 0.04 # — SIGNIFICANT CHANGE UP
IMM GRANULOCYTES NFR BLD AUTO: 0.8 % — SIGNIFICANT CHANGE UP (ref 0–1.5)
INR BLD: 1.3 — HIGH (ref 0.88–1.17)
LYMPHOCYTES # BLD AUTO: 0.35 K/UL — LOW (ref 1–3.3)
LYMPHOCYTES # BLD AUTO: 7.4 % — LOW (ref 13–44)
LYMPHOCYTES NFR SPEC AUTO: 8.7 % — LOW (ref 13–44)
MCHC RBC-ENTMCNC: 23.1 PG — LOW (ref 27–34)
MCHC RBC-ENTMCNC: 30.5 % — LOW (ref 32–36)
MCV RBC AUTO: 75.7 FL — LOW (ref 80–100)
METAMYELOCYTES # FLD: 0.9 % — SIGNIFICANT CHANGE UP (ref 0–1)
MICROCYTES BLD QL: SLIGHT — SIGNIFICANT CHANGE UP
MONOCYTES # BLD AUTO: 0.04 K/UL — SIGNIFICANT CHANGE UP (ref 0–0.9)
MONOCYTES NFR BLD AUTO: 0.8 % — LOW (ref 2–14)
MONOCYTES NFR BLD: 0 % — LOW (ref 2–9)
MYELOCYTES NFR BLD: 0 % — SIGNIFICANT CHANGE UP (ref 0–0)
NEUTROPHIL AB SER-ACNC: 71.3 % — SIGNIFICANT CHANGE UP (ref 43–77)
NEUTROPHILS # BLD AUTO: 4.3 K/UL — SIGNIFICANT CHANGE UP (ref 1.8–7.4)
NEUTROPHILS NFR BLD AUTO: 90.8 % — HIGH (ref 43–77)
NEUTS BAND # BLD: 17.4 % — HIGH (ref 0–6)
NRBC # FLD: 0 — SIGNIFICANT CHANGE UP
OTHER - HEMATOLOGY %: 0 — SIGNIFICANT CHANGE UP
PLATELET # BLD AUTO: 171 K/UL — SIGNIFICANT CHANGE UP (ref 150–400)
PLATELET COUNT - ESTIMATE: NORMAL — SIGNIFICANT CHANGE UP
PMV BLD: 10.5 FL — SIGNIFICANT CHANGE UP (ref 7–13)
POIKILOCYTOSIS BLD QL AUTO: SLIGHT — SIGNIFICANT CHANGE UP
POTASSIUM SERPL-MCNC: 4.2 MMOL/L — SIGNIFICANT CHANGE UP (ref 3.5–5.3)
POTASSIUM SERPL-SCNC: 4.2 MMOL/L — SIGNIFICANT CHANGE UP (ref 3.5–5.3)
PROMYELOCYTES # FLD: 0 % — SIGNIFICANT CHANGE UP (ref 0–0)
PROTHROM AB SERPL-ACNC: 15 SEC — HIGH (ref 9.8–13.1)
RBC # BLD: 3.38 M/UL — LOW (ref 4.2–5.8)
RBC # FLD: 17.4 % — HIGH (ref 10.3–14.5)
SODIUM SERPL-SCNC: 136 MMOL/L — SIGNIFICANT CHANGE UP (ref 135–145)
VARIANT LYMPHS # BLD: 0 % — SIGNIFICANT CHANGE UP
WBC # BLD: 4.74 K/UL — SIGNIFICANT CHANGE UP (ref 3.8–10.5)
WBC # FLD AUTO: 4.74 K/UL — SIGNIFICANT CHANGE UP (ref 3.8–10.5)

## 2018-08-08 PROCEDURE — 50433 PLMT NEPHROURETERAL CATHETER: CPT | Mod: LT

## 2018-08-08 RX ORDER — SODIUM CHLORIDE 9 MG/ML
1000 INJECTION, SOLUTION INTRAVENOUS
Qty: 0 | Refills: 0 | Status: DISCONTINUED | OUTPATIENT
Start: 2018-08-08 | End: 2018-08-10

## 2018-08-08 RX ORDER — DEXTROSE 50 % IN WATER 50 %
12.5 SYRINGE (ML) INTRAVENOUS ONCE
Qty: 0 | Refills: 0 | Status: DISCONTINUED | OUTPATIENT
Start: 2018-08-08 | End: 2018-08-10

## 2018-08-08 RX ORDER — GLUCAGON INJECTION, SOLUTION 0.5 MG/.1ML
1 INJECTION, SOLUTION SUBCUTANEOUS ONCE
Qty: 0 | Refills: 0 | Status: DISCONTINUED | OUTPATIENT
Start: 2018-08-08 | End: 2018-08-10

## 2018-08-08 RX ORDER — SODIUM CHLORIDE 9 MG/ML
1000 INJECTION, SOLUTION INTRAVENOUS ONCE
Qty: 0 | Refills: 0 | Status: COMPLETED | OUTPATIENT
Start: 2018-08-08 | End: 2018-08-08

## 2018-08-08 RX ORDER — SENNA PLUS 8.6 MG/1
2 TABLET ORAL AT BEDTIME
Qty: 0 | Refills: 0 | Status: DISCONTINUED | OUTPATIENT
Start: 2018-08-08 | End: 2018-08-10

## 2018-08-08 RX ORDER — INSULIN LISPRO 100/ML
VIAL (ML) SUBCUTANEOUS
Qty: 0 | Refills: 0 | Status: DISCONTINUED | OUTPATIENT
Start: 2018-08-08 | End: 2018-08-10

## 2018-08-08 RX ORDER — DEXTROSE 50 % IN WATER 50 %
25 SYRINGE (ML) INTRAVENOUS ONCE
Qty: 0 | Refills: 0 | Status: DISCONTINUED | OUTPATIENT
Start: 2018-08-08 | End: 2018-08-10

## 2018-08-08 RX ORDER — HEPARIN SODIUM 5000 [USP'U]/ML
5000 INJECTION INTRAVENOUS; SUBCUTANEOUS EVERY 8 HOURS
Qty: 0 | Refills: 0 | Status: DISCONTINUED | OUTPATIENT
Start: 2018-08-08 | End: 2018-08-10

## 2018-08-08 RX ORDER — AMLODIPINE BESYLATE 2.5 MG/1
10 TABLET ORAL DAILY
Qty: 0 | Refills: 0 | Status: DISCONTINUED | OUTPATIENT
Start: 2018-08-08 | End: 2018-08-10

## 2018-08-08 RX ORDER — LISINOPRIL 2.5 MG/1
20 TABLET ORAL DAILY
Qty: 0 | Refills: 0 | Status: DISCONTINUED | OUTPATIENT
Start: 2018-08-08 | End: 2018-08-08

## 2018-08-08 RX ORDER — SODIUM CHLORIDE 9 MG/ML
1000 INJECTION, SOLUTION INTRAVENOUS
Qty: 0 | Refills: 0 | Status: DISCONTINUED | OUTPATIENT
Start: 2018-08-08 | End: 2018-08-09

## 2018-08-08 RX ORDER — ATENOLOL 25 MG/1
50 TABLET ORAL
Qty: 0 | Refills: 0 | Status: DISCONTINUED | OUTPATIENT
Start: 2018-08-08 | End: 2018-08-10

## 2018-08-08 RX ORDER — DEXTROSE 50 % IN WATER 50 %
15 SYRINGE (ML) INTRAVENOUS ONCE
Qty: 0 | Refills: 0 | Status: DISCONTINUED | OUTPATIENT
Start: 2018-08-08 | End: 2018-08-10

## 2018-08-08 RX ORDER — INSULIN LISPRO 100/ML
VIAL (ML) SUBCUTANEOUS AT BEDTIME
Qty: 0 | Refills: 0 | Status: DISCONTINUED | OUTPATIENT
Start: 2018-08-08 | End: 2018-08-10

## 2018-08-08 RX ORDER — HYDROMORPHONE HYDROCHLORIDE 2 MG/ML
0.5 INJECTION INTRAMUSCULAR; INTRAVENOUS; SUBCUTANEOUS
Qty: 0 | Refills: 0 | Status: DISCONTINUED | OUTPATIENT
Start: 2018-08-08 | End: 2018-08-08

## 2018-08-08 RX ORDER — OXYCODONE AND ACETAMINOPHEN 5; 325 MG/1; MG/1
1 TABLET ORAL EVERY 4 HOURS
Qty: 0 | Refills: 0 | Status: DISCONTINUED | OUTPATIENT
Start: 2018-08-08 | End: 2018-08-09

## 2018-08-08 RX ORDER — SODIUM CHLORIDE 9 MG/ML
1000 INJECTION, SOLUTION INTRAVENOUS
Qty: 0 | Refills: 0 | Status: DISCONTINUED | OUTPATIENT
Start: 2018-08-08 | End: 2018-08-08

## 2018-08-08 RX ORDER — DOCUSATE SODIUM 100 MG
100 CAPSULE ORAL THREE TIMES A DAY
Qty: 0 | Refills: 0 | Status: DISCONTINUED | OUTPATIENT
Start: 2018-08-08 | End: 2018-08-10

## 2018-08-08 RX ORDER — TRAZODONE HCL 50 MG
150 TABLET ORAL AT BEDTIME
Qty: 0 | Refills: 0 | Status: DISCONTINUED | OUTPATIENT
Start: 2018-08-08 | End: 2018-08-10

## 2018-08-08 RX ORDER — ACETAMINOPHEN 500 MG
650 TABLET ORAL EVERY 6 HOURS
Qty: 0 | Refills: 0 | Status: DISCONTINUED | OUTPATIENT
Start: 2018-08-08 | End: 2018-08-10

## 2018-08-08 RX ORDER — LISINOPRIL 2.5 MG/1
20 TABLET ORAL
Qty: 0 | Refills: 0 | Status: DISCONTINUED | OUTPATIENT
Start: 2018-08-08 | End: 2018-08-10

## 2018-08-08 RX ORDER — CLONAZEPAM 1 MG
1 TABLET ORAL THREE TIMES A DAY
Qty: 0 | Refills: 0 | Status: DISCONTINUED | OUTPATIENT
Start: 2018-08-08 | End: 2018-08-10

## 2018-08-08 RX ORDER — PIPERACILLIN AND TAZOBACTAM 4; .5 G/20ML; G/20ML
3.38 INJECTION, POWDER, LYOPHILIZED, FOR SOLUTION INTRAVENOUS EVERY 8 HOURS
Qty: 0 | Refills: 0 | Status: DISCONTINUED | OUTPATIENT
Start: 2018-08-08 | End: 2018-08-10

## 2018-08-08 RX ORDER — OXYCODONE AND ACETAMINOPHEN 5; 325 MG/1; MG/1
2 TABLET ORAL EVERY 6 HOURS
Qty: 0 | Refills: 0 | Status: DISCONTINUED | OUTPATIENT
Start: 2018-08-08 | End: 2018-08-09

## 2018-08-08 RX ADMIN — AMLODIPINE BESYLATE 10 MILLIGRAM(S): 2.5 TABLET ORAL at 18:21

## 2018-08-08 RX ADMIN — SODIUM CHLORIDE 3 MILLILITER(S): 9 INJECTION INTRAMUSCULAR; INTRAVENOUS; SUBCUTANEOUS at 14:53

## 2018-08-08 RX ADMIN — HYDROMORPHONE HYDROCHLORIDE 0.5 MILLIGRAM(S): 2 INJECTION INTRAMUSCULAR; INTRAVENOUS; SUBCUTANEOUS at 13:53

## 2018-08-08 RX ADMIN — ATENOLOL 50 MILLIGRAM(S): 25 TABLET ORAL at 18:21

## 2018-08-08 RX ADMIN — Medication 150 MILLIGRAM(S): at 22:09

## 2018-08-08 RX ADMIN — OXYCODONE AND ACETAMINOPHEN 2 TABLET(S): 5; 325 TABLET ORAL at 17:55

## 2018-08-08 RX ADMIN — HYDROMORPHONE HYDROCHLORIDE 0.5 MILLIGRAM(S): 2 INJECTION INTRAMUSCULAR; INTRAVENOUS; SUBCUTANEOUS at 13:38

## 2018-08-08 RX ADMIN — SODIUM CHLORIDE 100 MILLILITER(S): 9 INJECTION, SOLUTION INTRAVENOUS at 13:12

## 2018-08-08 RX ADMIN — SODIUM CHLORIDE 100 MILLILITER(S): 9 INJECTION, SOLUTION INTRAVENOUS at 22:09

## 2018-08-08 RX ADMIN — OXYCODONE AND ACETAMINOPHEN 2 TABLET(S): 5; 325 TABLET ORAL at 23:20

## 2018-08-08 RX ADMIN — PIPERACILLIN AND TAZOBACTAM 25 GRAM(S): 4; .5 INJECTION, POWDER, LYOPHILIZED, FOR SOLUTION INTRAVENOUS at 15:06

## 2018-08-08 RX ADMIN — SODIUM CHLORIDE 2000 MILLILITER(S): 9 INJECTION, SOLUTION INTRAVENOUS at 13:34

## 2018-08-08 RX ADMIN — SODIUM CHLORIDE 3 MILLILITER(S): 9 INJECTION INTRAMUSCULAR; INTRAVENOUS; SUBCUTANEOUS at 21:54

## 2018-08-08 RX ADMIN — HEPARIN SODIUM 5000 UNIT(S): 5000 INJECTION INTRAVENOUS; SUBCUTANEOUS at 15:07

## 2018-08-08 RX ADMIN — OXYCODONE AND ACETAMINOPHEN 2 TABLET(S): 5; 325 TABLET ORAL at 16:40

## 2018-08-08 RX ADMIN — PIPERACILLIN AND TAZOBACTAM 25 GRAM(S): 4; .5 INJECTION, POWDER, LYOPHILIZED, FOR SOLUTION INTRAVENOUS at 22:09

## 2018-08-08 RX ADMIN — LISINOPRIL 20 MILLIGRAM(S): 2.5 TABLET ORAL at 22:09

## 2018-08-08 RX ADMIN — SODIUM CHLORIDE 2000 MILLILITER(S): 9 INJECTION, SOLUTION INTRAVENOUS at 14:19

## 2018-08-08 RX ADMIN — Medication 1 MILLIGRAM(S): at 22:09

## 2018-08-08 RX ADMIN — OXYCODONE AND ACETAMINOPHEN 2 TABLET(S): 5; 325 TABLET ORAL at 22:23

## 2018-08-08 RX ADMIN — Medication 4: at 16:54

## 2018-08-08 NOTE — CHART NOTE - NSCHARTNOTEFT_GEN_A_CORE
Immediately following IR procedure patient developed rigors, tachycardia to 120s and fever to 101, blood pressures adequate so far. Cultures sent from bladder, blood x2, nephrostomy tube. Start on Zosyn. Urology case will be canceled today. Will follow closely.

## 2018-08-08 NOTE — ASU PREOP CHECKLIST - SELECT TESTS ORDERED
BMP/CBC POCT Blood Glucose/BMP/CBC 180/POCT Blood Glucose/EKG/BMP/CBC FS =180 @ 0655/EKG/CBC/INR/POCT Blood Glucose/PT/PTT/BMP

## 2018-08-09 DIAGNOSIS — Z29.9 ENCOUNTER FOR PROPHYLACTIC MEASURES, UNSPECIFIED: ICD-10-CM

## 2018-08-09 DIAGNOSIS — F32.9 MAJOR DEPRESSIVE DISORDER, SINGLE EPISODE, UNSPECIFIED: ICD-10-CM

## 2018-08-09 DIAGNOSIS — N13.5 CROSSING VESSEL AND STRICTURE OF URETER WITHOUT HYDRONEPHROSIS: ICD-10-CM

## 2018-08-09 LAB
GLUCOSE BLDC GLUCOMTR-MCNC: 117 MG/DL — HIGH (ref 70–99)
GLUCOSE BLDC GLUCOMTR-MCNC: 163 MG/DL — HIGH (ref 70–99)
SPECIMEN SOURCE: SIGNIFICANT CHANGE UP
SPECIMEN SOURCE: SIGNIFICANT CHANGE UP

## 2018-08-09 PROCEDURE — 99231 SBSQ HOSP IP/OBS SF/LOW 25: CPT

## 2018-08-09 PROCEDURE — 99223 1ST HOSP IP/OBS HIGH 75: CPT

## 2018-08-09 RX ORDER — OXYCODONE HYDROCHLORIDE 5 MG/1
5 TABLET ORAL EVERY 6 HOURS
Qty: 0 | Refills: 0 | Status: DISCONTINUED | OUTPATIENT
Start: 2018-08-09 | End: 2018-08-10

## 2018-08-09 RX ORDER — ACETAMINOPHEN 500 MG
650 TABLET ORAL ONCE
Qty: 0 | Refills: 0 | Status: COMPLETED | OUTPATIENT
Start: 2018-08-09 | End: 2018-08-09

## 2018-08-09 RX ADMIN — OXYCODONE AND ACETAMINOPHEN 2 TABLET(S): 5; 325 TABLET ORAL at 06:19

## 2018-08-09 RX ADMIN — OXYCODONE HYDROCHLORIDE 5 MILLIGRAM(S): 5 TABLET ORAL at 21:09

## 2018-08-09 RX ADMIN — ATENOLOL 50 MILLIGRAM(S): 25 TABLET ORAL at 06:19

## 2018-08-09 RX ADMIN — HEPARIN SODIUM 5000 UNIT(S): 5000 INJECTION INTRAVENOUS; SUBCUTANEOUS at 13:35

## 2018-08-09 RX ADMIN — Medication 650 MILLIGRAM(S): at 17:34

## 2018-08-09 RX ADMIN — AMLODIPINE BESYLATE 10 MILLIGRAM(S): 2.5 TABLET ORAL at 06:19

## 2018-08-09 RX ADMIN — LISINOPRIL 20 MILLIGRAM(S): 2.5 TABLET ORAL at 17:32

## 2018-08-09 RX ADMIN — PIPERACILLIN AND TAZOBACTAM 25 GRAM(S): 4; .5 INJECTION, POWDER, LYOPHILIZED, FOR SOLUTION INTRAVENOUS at 07:15

## 2018-08-09 RX ADMIN — SODIUM CHLORIDE 3 MILLILITER(S): 9 INJECTION INTRAMUSCULAR; INTRAVENOUS; SUBCUTANEOUS at 06:18

## 2018-08-09 RX ADMIN — OXYCODONE AND ACETAMINOPHEN 2 TABLET(S): 5; 325 TABLET ORAL at 07:15

## 2018-08-09 RX ADMIN — OXYCODONE HYDROCHLORIDE 5 MILLIGRAM(S): 5 TABLET ORAL at 22:00

## 2018-08-09 RX ADMIN — Medication 150 MILLIGRAM(S): at 21:09

## 2018-08-09 RX ADMIN — Medication 1 MILLIGRAM(S): at 06:18

## 2018-08-09 RX ADMIN — PIPERACILLIN AND TAZOBACTAM 25 GRAM(S): 4; .5 INJECTION, POWDER, LYOPHILIZED, FOR SOLUTION INTRAVENOUS at 21:21

## 2018-08-09 RX ADMIN — Medication 1 MILLIGRAM(S): at 13:35

## 2018-08-09 RX ADMIN — SODIUM CHLORIDE 3 MILLILITER(S): 9 INJECTION INTRAMUSCULAR; INTRAVENOUS; SUBCUTANEOUS at 13:35

## 2018-08-09 RX ADMIN — Medication 2: at 08:30

## 2018-08-09 RX ADMIN — PIPERACILLIN AND TAZOBACTAM 25 GRAM(S): 4; .5 INJECTION, POWDER, LYOPHILIZED, FOR SOLUTION INTRAVENOUS at 13:35

## 2018-08-09 RX ADMIN — ATENOLOL 50 MILLIGRAM(S): 25 TABLET ORAL at 17:32

## 2018-08-09 RX ADMIN — SODIUM CHLORIDE 3 MILLILITER(S): 9 INJECTION INTRAMUSCULAR; INTRAVENOUS; SUBCUTANEOUS at 21:06

## 2018-08-09 NOTE — PROGRESS NOTE ADULT - PROBLEM SELECTOR PLAN 1
Continue NT to drainage  IVL  Monitor VS  Continue Zosyn  F/U cultures  F/U labs  DVT prophy  OOB, ambulate Continue NT to drainage  IVL  Monitor VS -   IV antibiotics pending culture results  Continue Zosyn  F/U cultures  F/U labs  DVT prophy  OOB, ambulate

## 2018-08-09 NOTE — CONSULT NOTE ADULT - PROBLEM SELECTOR RECOMMENDATION 3
A1C WNL   in Am.  Continue FSSS  Monitor FS  Metformin will be contraindicated if his GFR<30 A1C WNL-5.8   in Am.  Continue FSSS  Monitor FS  Metformin will be contraindicated if his GFR<30

## 2018-08-09 NOTE — CONSULT NOTE ADULT - SUBJECTIVE AND OBJECTIVE BOX
Patient is a 63y old  Male who presents with a chief complaint of fever and rigors after NT by IR    HPI: 63 y.o. M with h/o Bladder ca, s/p cystectomy with Ileal conduit, anxiety/depression, DM, HTN, admitted for fever and rigors after IR procedure.  pt was started on Zosyn. Since admission, no more fever or chills. Currently pt has no complaints.       History limited due to: [ ] Dementia  [ ] Delirium  [ ] Condition    Pain Symptoms if applicable:             	                         none	    Pain:	                            0	     Location:	  Modifying factors:	  Associated symptoms:	    Function: [x ] Independent  [ ] Assistance  [ ] Total care  [ ] Non-ambulatory    Allergies    No Known Allergies    Intolerances        HOME MEDICATIONS: [x ] Reviewed    MEDICATIONS  (STANDING):  amLODIPine   Tablet 10 milliGRAM(s) Oral daily  ATENolol  Tablet 50 milliGRAM(s) Oral two times a day  clonazePAM Tablet 1 milliGRAM(s) Oral three times a day  dextrose 5%. 1000 milliLiter(s) (50 mL/Hr) IV Continuous <Continuous>  dextrose 50% Injectable 12.5 Gram(s) IV Push once  dextrose 50% Injectable 25 Gram(s) IV Push once  dextrose 50% Injectable 25 Gram(s) IV Push once  heparin  Injectable 5000 Unit(s) SubCutaneous every 8 hours  insulin lispro (HumaLOG) corrective regimen sliding scale   SubCutaneous three times a day before meals  insulin lispro (HumaLOG) corrective regimen sliding scale   SubCutaneous at bedtime  lisinopril 20 milliGRAM(s) Oral two times a day  piperacillin/tazobactam IVPB. 3.375 Gram(s) IV Intermittent every 8 hours  sodium chloride 0.9% lock flush 3 milliLiter(s) IV Push every 8 hours  traZODone 150 milliGRAM(s) Oral at bedtime    MEDICATIONS  (PRN):  acetaminophen   Tablet. 650 milliGRAM(s) Oral every 6 hours PRN Mild Pain (1 - 3)  dextrose 40% Gel 15 Gram(s) Oral once PRN Blood Glucose LESS THAN 70 milliGRAM(s)/deciliter  docusate sodium 100 milliGRAM(s) Oral three times a day PRN Constipation  glucagon  Injectable 1 milliGRAM(s) IntraMuscular once PRN Glucose LESS THAN 70 milligrams/deciliter  oxyCODONE    5 mG/acetaminophen 325 mG 1 Tablet(s) Oral every 4 hours PRN Moderate Pain  oxyCODONE    5 mG/acetaminophen 325 mG 2 Tablet(s) Oral every 6 hours PRN Severe Pain  senna 2 Tablet(s) Oral at bedtime PRN Constipation      PAST MEDICAL & SURGICAL HISTORY:  Unspecified hydronephrosis  Malignant neoplasm of bladder  Diabetes mellitus: pre diabetic  Obese  Other calculus in bladder  Urethral stricture  Urinary (tract) obstruction  UTI (urinary tract infection)  Major depressive disorder  Anxiety  HTN (Hypertension)  Prostate Cancer  Bladder disease: Bladder Removed  Nephrostomy status: Left, 8/1/2018  S/P ileal conduit: May 2018  History of bladder cancer: bladder removal May 10, 2018  Suprapubic catheter: 8/2015  History of prostatectomy: robotic, 2011, s/p radiation  H/O cystoscopy: - multiple  last cystoscopy, laser litholapaxy on 1/2018  S/P Appendectomy: 40 years ago  [ x] Reviewed     SOCIAL HISTORY:  Residence: [ ] [facility]  [ ] [facility]  [ x] Community  [ ] Substance abuse: denies  [ ] Tobacco: denies  [ ] Alcohol use: denies    FAMILY HISTORY:  No pertinent family history in first degree relatives  [ ] No pertinent family history in first degree relatives     REVIEW OF SYSTEMS:    CONSTITUTIONAL: (+) Chills, fever  EYES: No eye pain, visual disturbances, or discharge  ENMT:  No difficulty hearing, tinnitus, vertigo; No sinus or throat pain  NECK: No pain or stiffness  BREASTS: No pain, masses, or nipple discharge  RESPIRATORY: No cough, wheezing, chills or hemoptysis; No shortness of breath  CARDIOVASCULAR: No chest pain, palpitations, dizziness, or leg swelling  GASTROINTESTINAL: No abdominal or epigastric pain. No nausea, vomiting, or hematemesis; No diarrhea or constipation. No melena or hematochezia.  GENITOURINARY: (+) Ileal conduit  NEUROLOGICAL: No headaches, memory loss, loss of strength, numbness, or tremors  SKIN: No itching, burning, rashes, or lesions   LYMPH NODES: No enlarged glands  ENDOCRINE: No heat or cold intolerance; No hair loss  MUSCULOSKELETAL: No muscle or back pain  PSYCHIATRIC: No depression, anxiety, mood swings, or difficulty sleeping  HEME/LYMPH: No easy bruising, or bleeding gums  ALLERGY AND IMMUNOLOGIC: No hives or eczema    [  ] All other ROS negative  [  ] Unable to obtain due to poor mental status    Vital Signs Last 24 Hrs  T(C): 37.2 (09 Aug 2018 09:50), Max: 38.5 (08 Aug 2018 11:00)  T(F): 98.9 (09 Aug 2018 09:50), Max: 101.3 (08 Aug 2018 11:00)  HR: 84 (09 Aug 2018 09:50) (76 - 107)  BP: 119/666 (09 Aug 2018 09:50) (119/666 - 174/62)  BP(mean): --  RR: 17 (09 Aug 2018 09:50) (15 - 22)  SpO2: 97% (09 Aug 2018 09:50) (94% - 98%)    PHYSICAL EXAM:    GENERAL: NAD, well-groomed, well-developed  HEAD:  Atraumatic, Normocephalic  EYES: EOMI, PERRLA, conjunctiva and sclera clear  ENMT: Moist mucous membranes  NECK: Supple, No JVD  RESPIRATORY: Clear to auscultation bilaterally; No rales, rhonchi, wheezing, or rubs  CARDIOVASCULAR: Regular rate and rhythm; No murmurs, rubs, or gallops  GASTROINTESTINAL: Soft, Nontender, Nondistended; Bowel sounds present  GENITOURINARY: (+) Ileal conduit  EXTREMITIES:  2+ Peripheral Pulses, No clubbing, cyanosis, or edema  NERVOUS SYSTEM:  Alert & Oriented X3; Moving all 4 extremities; No gross sensory deficits  HEME/LYMPH: No lymphadenopathy noted  SKIN: No rashes or lesions; Incisions C/D/I    LABS:                        7.8    4.74  )-----------( 171      ( 08 Aug 2018 12:36 )             25.6     08-08    136  |  102  |  28<H>  ----------------------------<  156<H>  4.2   |  18<L>  |  1.99<H>    Ca    9.1      08 Aug 2018 12:36      PT/INR - ( 08 Aug 2018 08:30 )   PT: 15.0 SEC;   INR: 1.30          PTT - ( 08 Aug 2018 08:30 )  PTT:29.5 SEC    CAPILLARY BLOOD GLUCOSE      POCT Blood Glucose.: 163 mg/dL (09 Aug 2018 08:11)      RADIOLOGY & ADDITIONAL STUDIES:    EKG:   Personally Reviewed:  [ ] YES     Imaging:   Personally Reviewed:  [ ] YES               Consultant(s) notes reviewed:    Care Discussed with Consultant(s)/Other Providers:    Advanced Directives: [ ] DNR  [ ] No feeding tube  [ ] MOLST in chart  [ ] MOLST completed today  [x ] Unknown

## 2018-08-09 NOTE — CONSULT NOTE ADULT - ASSESSMENT
63 y.o. M with h/o bladder ca, s/p cystectomy with ileal conduit, DM2, HTN, Anxiety/depression admitted for fever/Chills after IR procedure, r/o bacteremia.

## 2018-08-10 ENCOUNTER — TRANSCRIPTION ENCOUNTER (OUTPATIENT)
Age: 63
End: 2018-08-10

## 2018-08-10 VITALS
TEMPERATURE: 99 F | SYSTOLIC BLOOD PRESSURE: 142 MMHG | DIASTOLIC BLOOD PRESSURE: 78 MMHG | OXYGEN SATURATION: 96 % | HEART RATE: 95 BPM | RESPIRATION RATE: 16 BRPM

## 2018-08-10 LAB — BACTERIA UR CULT: SIGNIFICANT CHANGE UP

## 2018-08-10 PROCEDURE — 99233 SBSQ HOSP IP/OBS HIGH 50: CPT

## 2018-08-10 RX ORDER — DOCUSATE SODIUM 100 MG
1 CAPSULE ORAL
Qty: 0 | Refills: 0 | COMMUNITY
Start: 2018-08-10

## 2018-08-10 RX ORDER — SENNA PLUS 8.6 MG/1
2 TABLET ORAL
Qty: 0 | Refills: 0 | DISCHARGE
Start: 2018-08-10

## 2018-08-10 RX ORDER — CEFPODOXIME PROXETIL 100 MG
1 TABLET ORAL
Qty: 14 | Refills: 0 | OUTPATIENT
Start: 2018-08-10 | End: 2018-08-16

## 2018-08-10 RX ORDER — OXYCODONE HYDROCHLORIDE 5 MG/1
1 TABLET ORAL
Qty: 8 | Refills: 0 | OUTPATIENT
Start: 2018-08-10 | End: 2018-08-11

## 2018-08-10 RX ORDER — METFORMIN HYDROCHLORIDE 850 MG/1
1 TABLET ORAL
Qty: 0 | Refills: 0 | COMMUNITY

## 2018-08-10 RX ADMIN — Medication 1 MILLIGRAM(S): at 13:52

## 2018-08-10 RX ADMIN — PIPERACILLIN AND TAZOBACTAM 25 GRAM(S): 4; .5 INJECTION, POWDER, LYOPHILIZED, FOR SOLUTION INTRAVENOUS at 13:52

## 2018-08-10 RX ADMIN — LISINOPRIL 20 MILLIGRAM(S): 2.5 TABLET ORAL at 06:55

## 2018-08-10 RX ADMIN — PIPERACILLIN AND TAZOBACTAM 25 GRAM(S): 4; .5 INJECTION, POWDER, LYOPHILIZED, FOR SOLUTION INTRAVENOUS at 06:55

## 2018-08-10 RX ADMIN — ATENOLOL 50 MILLIGRAM(S): 25 TABLET ORAL at 06:55

## 2018-08-10 RX ADMIN — Medication 1 MILLIGRAM(S): at 08:57

## 2018-08-10 RX ADMIN — OXYCODONE HYDROCHLORIDE 5 MILLIGRAM(S): 5 TABLET ORAL at 10:50

## 2018-08-10 RX ADMIN — OXYCODONE HYDROCHLORIDE 5 MILLIGRAM(S): 5 TABLET ORAL at 09:58

## 2018-08-10 RX ADMIN — Medication 100 MILLIGRAM(S): at 10:02

## 2018-08-10 RX ADMIN — SODIUM CHLORIDE 3 MILLILITER(S): 9 INJECTION INTRAMUSCULAR; INTRAVENOUS; SUBCUTANEOUS at 06:52

## 2018-08-10 RX ADMIN — SODIUM CHLORIDE 3 MILLILITER(S): 9 INJECTION INTRAMUSCULAR; INTRAVENOUS; SUBCUTANEOUS at 13:52

## 2018-08-10 NOTE — PROGRESS NOTE ADULT - SUBJECTIVE AND OBJECTIVE BOX
63y Male s/p left sided percutaneous nephroureterostomy  on 8/8/18 in Interventional Radiology.     Patient seen and examined bedside resting comfortably.  No complaints offered.    T(F): 99.8 (08-09-18 @ 13:45), Max: 101 (08-08-18 @ 17:00)  HR: 88 (08-09-18 @ 13:45) (76 - 107)  BP: 125/69 (08-09-18 @ 13:45) (119/66 - 162/70)  RR: 18 (08-09-18 @ 13:45) (16 - 22)  SpO2: 98% (08-09-18 @ 13:45) (96% - 98%)      LABS:                        7.8    4.74  )-----------( 171      ( 08 Aug 2018 12:36 )             25.6     08-08    136  |  102  |  28<H>  ----------------------------<  156<H>  4.2   |  18<L>  |  1.99<H>    Ca    9.1      08 Aug 2018 12:36      PT/INR - ( 08 Aug 2018 08:30 )   PT: 15.0 SEC;   INR: 1.30          PTT - ( 08 Aug 2018 08:30 )  PTT:29.5 SEC  I&O's Detail    08 Aug 2018 07:01  -  09 Aug 2018 07:00  --------------------------------------------------------  IN:    Lactated Ringers IV Bolus: 2000 mL    lactated ringers.: 600 mL    Oral Fluid: 120 mL  Total IN: 2720 mL    OUT:    Nephrostomy Tube: 1040 mL    Urostomy: 1370 mL  Total OUT: 2410 mL    Total NET: 310 mL      09 Aug 2018 07:01  -  09 Aug 2018 14:18  --------------------------------------------------------  IN:  Total IN: 0 mL    OUT:    Nephrostomy Tube: 375 mL    Urostomy: 350 mL  Total OUT: 725 mL    Total NET: -725 mL            PHYSICAL EXAM:  General: Nontoxic, in NAD  Neuro:  Alert & oriented x 3  Catheter in tact
Overnight events:  Pt febrile after placement of left NR in IR, stable BP    Subjective:  Pt offers no complaints, wants to go home, refusing heparin and AM labs    Objective:    Vital signs  T(C): , Max: 38.5 (08-08-18 @ 11:00)  HR: 85 (08-09-18 @ 06:11)  BP: 149/60 (08-09-18 @ 06:11)  SpO2: 97% (08-09-18 @ 06:11)  Wt(kg): --    Output   IC: 650  NT: 625 yellow      Gen: NAD  Abd: stoma pink, soft, nontender      Labs: P                 IR Cx: P  Blood Cx: P
Overnight events:  Pt febrile to 101.4 at 4:30pm yesterday with stable BP, cultures NGTD    Subjective:  Pt has no physical complaints, wants to go home, refusing blood draws and FS    Objective:    Vital signs  T(C): , Max: 38.6 (08-09-18 @ 16:30)  HR: 66 (08-10-18 @ 06:52)  BP: 153/98 (08-10-18 @ 06:52)  SpO2: 98% (08-10-18 @ 06:52)  Wt(kg): --    Output   IC: 575  NT: 750      Gen: NAD  Abd: stoma pink, soft, nontender, left NT dressing c/d/i      Labs: none        NT Cx:   Culture - Urine (08.08.18 @ 15:59)    Culture - Urine:   NO GROWTH TO DATE    Specimen Source: NEPHROSTOMY - LEFT    Blood Cx:   Culture - Blood (08.08.18 @ 15:42)    Culture - Blood:   NO ORGANISMS ISOLATED  NO ORGANISMS ISOLATED AT 24 HOURS    Specimen Source: BLOOD PERIPHERAL
Subjective    Interm: Immediately following IR procedure patient developed rigors, tachycardia to 120s and fever to 101, blood pressures adequate so far. Cultures sent from bladder, blood x2, nephrostomy tube. Start on Zosyn. Urology case will be canceled today. Will follow closely.    Patient seen on floor. Feeling well, denies chills/rigors since the immediate postoperative period.     Objective    Vital signs  T(F): , Max: 101.3 (08-08-18 @ 11:00)  HR: 102 (08-08-18 @ 17:57)  BP: 135/75 (08-08-18 @ 17:57)  SpO2: 97% (08-08-18 @ 17:57)  Wt(kg): --    Output     OUT:    Nephrostomy Tube: 415 mL    Urostomy: 720 mL  Total OUT: 1135 mL    Total NET: -1135 mL          Physical Exam  Gen: NAD, AAOx3  Abd: soft, nt, ND, ostomy draining clear pink urine, nephrostomy tube draining clear pink urine    Labs      08-08 @ 12:36    WBC 4.74  / Hct 25.6  / SCr 1.99       Urine Cx: pending  Blood Cx: pending    Imaging
Patient is a 63y old  Male who presents with a chief complaint of fever/rigors after the procedure    SUBJECTIVE / OVERNIGHT EVENTS:  Only c/o mild pain at left NT site. denies fever or chills.    MEDICATIONS  (STANDING):  amLODIPine   Tablet 10 milliGRAM(s) Oral daily  ATENolol  Tablet 50 milliGRAM(s) Oral two times a day  clonazePAM Tablet 1 milliGRAM(s) Oral three times a day  dextrose 5%. 1000 milliLiter(s) (50 mL/Hr) IV Continuous <Continuous>  dextrose 50% Injectable 12.5 Gram(s) IV Push once  dextrose 50% Injectable 25 Gram(s) IV Push once  dextrose 50% Injectable 25 Gram(s) IV Push once  heparin  Injectable 5000 Unit(s) SubCutaneous every 8 hours  insulin lispro (HumaLOG) corrective regimen sliding scale   SubCutaneous three times a day before meals  insulin lispro (HumaLOG) corrective regimen sliding scale   SubCutaneous at bedtime  lisinopril 20 milliGRAM(s) Oral two times a day  piperacillin/tazobactam IVPB. 3.375 Gram(s) IV Intermittent every 8 hours  sodium chloride 0.9% lock flush 3 milliLiter(s) IV Push every 8 hours  traZODone 150 milliGRAM(s) Oral at bedtime    MEDICATIONS  (PRN):  acetaminophen   Tablet. 650 milliGRAM(s) Oral every 6 hours PRN Mild Pain (1 - 3)  dextrose 40% Gel 15 Gram(s) Oral once PRN Blood Glucose LESS THAN 70 milliGRAM(s)/deciliter  docusate sodium 100 milliGRAM(s) Oral three times a day PRN Constipation  glucagon  Injectable 1 milliGRAM(s) IntraMuscular once PRN Glucose LESS THAN 70 milligrams/deciliter  oxyCODONE    IR 5 milliGRAM(s) Oral every 6 hours PRN moderate to severe  senna 2 Tablet(s) Oral at bedtime PRN Constipation      Vital Signs Last 24 Hrs  T(C): 37.2 (10 Aug 2018 09:23), Max: 38.6 (09 Aug 2018 16:30)  T(F): 98.9 (10 Aug 2018 09:23), Max: 101.4 (09 Aug 2018 16:30)  HR: 69 (10 Aug 2018 09:23) (66 - 88)  BP: 145/82 (10 Aug 2018 09:23) (111/54 - 153/98)  BP(mean): --  RR: 17 (10 Aug 2018 09:23) (17 - 19)  SpO2: 98% (10 Aug 2018 09:23) (96% - 99%)  CAPILLARY BLOOD GLUCOSE  141 (10 Aug 2018 08:05)      POCT Blood Glucose.: 117 mg/dL (09 Aug 2018 12:11)    I&O's Summary    09 Aug 2018 07:01  -  10 Aug 2018 07:00  --------------------------------------------------------  IN: 0 mL / OUT: 2750 mL / NET: -2750 mL        PHYSICAL EXAM:  GENERAL: NAD, well-developed  HEAD:  Atraumatic, Normocephalic  EYES: EOMI, PERRLA, conjunctiva and sclera clear  NECK: Supple, No JVD  CHEST/LUNG: Clear to auscultation bilaterally; No wheeze  HEART: Regular rate and rhythm; No murmurs, rubs, or gallops  ABDOMEN: Soft, Nontender, Nondistended; Bowel sounds present  : (+) Ileal conduit, left NT  EXTREMITIES:  2+ Peripheral Pulses, No clubbing, cyanosis, or edema  PSYCH: AAOx3  NEUROLOGY: non-focal  SKIN: No rashes or lesions    LABS:                        7.8    4.74  )-----------( 171      ( 08 Aug 2018 12:36 )             25.6     08-08    136  |  102  |  28<H>  ----------------------------<  156<H>  4.2   |  18<L>  |  1.99<H>    Ca    9.1      08 Aug 2018 12:36    Culture - Blood (08.08.18 @ 15:42)    Culture - Blood:   NO ORGANISMS ISOLATED  NO ORGANISMS ISOLATED AT 24 HOURS    Specimen Source: BLOOD PERIPHERAL        Specimen Source: NEPHROSTOMY - LEFT (08.08.18 @ 15:59)  Culture - Urine:   NO GROWTH AT 24 HOURS (08.08.18 @ 15:59)                RADIOLOGY & ADDITIONAL TESTS:    Imaging Personally Reviewed:    Consultant(s) Notes Reviewed:      Care Discussed with Consultants/Other Providers:

## 2018-08-10 NOTE — PROGRESS NOTE ADULT - PROBLEM SELECTOR PLAN 1
All cultures negative so far. Clinically not septic.  Still febrile yesterday, asymptomatic. No rigors.  ? fever due to the procedure.  -Continue Zosyn  -f/u cultures

## 2018-08-10 NOTE — DISCHARGE NOTE ADULT - CONDITIONS AT DISCHARGE
Pt. is afebrile and offers no complaints. In no acute distress. Left nephrostomy tube capped- patent. Right ileal conduit patent and draining adequate amounts of urine. Pt is ambulating  tolerating diet well, and voiding in adequate amounts.

## 2018-08-10 NOTE — PROGRESS NOTE ADULT - PROBLEM SELECTOR PLAN 1
Continue NT to drainage  IVL  Monitor VS   Continue Zosyn pending final culture results  F/U cultures  DVT prophy  OOB, ambulate

## 2018-08-10 NOTE — DISCHARGE NOTE ADULT - MEDICATION SUMMARY - MEDICATIONS TO STOP TAKING
I will STOP taking the medications listed below when I get home from the hospital:  None I will STOP taking the medications listed below when I get home from the hospital:    metFORMIN 500 mg oral tablet  -- 1 tab(s) by mouth 2 times a day

## 2018-08-10 NOTE — PROGRESS NOTE ADULT - ASSESSMENT
63 year old man who developed fever, tachycardia, rigors following IR placement of nephroureterostomy tube.    -continue Abx with Zosyn  -monitor for fevers  -trend vitals  -Abd US ordered this week outpatient, proceed with study in house if time permits  -follow up cultures
64 yo M s/p cystectomy IC 5/2018, febrile after IR placement of left NT on 8/8/18
64 yo M s/p cystectomy IC 5/2018, febrile after IR placement of left NT on 8/8/18
S/P left sided percutaneous nephroureterostomy tube placement complicated by transient urosepsis, which improved with supportive therapy.    nephroureterostomy tube may be capped once acute infection resolves  continue care per urology service  will follow as needed
63 y.o. M with h/o bladder ca, s/p cystectomy with ileal conduit, DM2, HTN, Anxiety/depression admitted for fever/Chills after IR procedure, r/o bacteremia.

## 2018-08-10 NOTE — PROGRESS NOTE ADULT - PROBLEM SELECTOR PLAN 3
Blood sugar well controlled.  -D/c metformin at discharge due to worsening renal function.  -f/u with PCP for diabetic control Blood sugar well controlled. A1C 5.8  -D/c metformin at discharge due to worsening renal function.  -diet control  -f/u with PCP for diabetic control

## 2018-08-10 NOTE — DISCHARGE NOTE ADULT - PLAN OF CARE
Resolution of Infection Take full course of antibiotics   Follow up on Take full course of antibiotics   Follow up on Monday 8/13 for your surgery with Dr. Esteban   [phone] Take full course of antibiotics   Follow up on Monday 8/13 for your surgery with Dr. Esteban   [phone]  The nephrostomy tube is capped, you don't need to touch the dressing. Continue current home medications and follow up with your primary care provider Do not restart your metformin, just follow a consistent carbohydrate diet and follow up with your primary care provider

## 2018-08-10 NOTE — DISCHARGE NOTE ADULT - CARE PROVIDER_API CALL
Ismael Esteban), Urology  [address] [place] [address]  Phone: [phone]  Fax: [phone]

## 2018-08-10 NOTE — DISCHARGE NOTE ADULT - HOSPITAL COURSE
Admitted on 8/8/18 and underwent Left Nephrostomy Tube placement in preparation for an Antegrade nephrostomy that same day.  Pt developed fever, chills, and tachycardia immediately after the IR placement of Nephrostomy tube.  Pt was admitted to [address] [place] [address] and had blood and nephrostomy cultures sent which are currently negative.  Pt treated w Zosyn and will .. Admitted on 8/8/18 and underwent Left Nephrostomy Tube placement in preparation for an Antegrade nephrostomy that same day.  Pt developed fever, chills, and tachycardia immediately after the IR placement of Nephrostomy tube.  Pt was admitted to [address] [place] [address] and had blood and nephrostomy cultures sent which are currently negative.  Pt treated w Zosyn and will take 7 days of Vantin as an outpatient.  He is currently stable will follow up on Monday 8/13 at [facility] for his scheduled procedure  with Dr. Esteban.   Call [phone] for any questions or details. Admitted on 8/8/18 and underwent Left Nephrostomy Tube placement in preparation for an Antegrade nephrostomy that same day.  Pt developed fever, chills, and tachycardia immediately after the IR placement of Nephrostomy tube.  Pt was admitted to [address] [place] [address] and had blood and nephrostomy cultures sent which are currently negative, pt remained afebrile throughout the remainder of his hospital course.  Pt treated w Zosyn and will take 7 days of Vantin as an outpatient.  The nephrostomy tube was capped prior to d/c.He is currently stable will follow up on Monday 8/13 at [facility] for his scheduled procedure  with Dr. Esteban.   Call [phone] for any questions or details.

## 2018-08-10 NOTE — DISCHARGE NOTE ADULT - INSTRUCTIONS
Continue previous diet Make a follow up appointment with Dr. Esteban. Call him if you develop a fever, or if there is any pain not relieved by medication. Ileal conduit care and nephrostomy tube care as instructed.

## 2018-08-10 NOTE — DISCHARGE NOTE ADULT - PATIENT PORTAL LINK FT
You can access the [facility] Patient Portal, offered by [facility], by registering with the following website: [email or web address]

## 2018-08-10 NOTE — DISCHARGE NOTE ADULT - MEDICATION SUMMARY - MEDICATIONS TO TAKE
I will START or STAY ON the medications listed below when I get home from the hospital:    vitamin b complex daily PO  -- Indication: For Home    oxyCODONE 5 mg oral tablet  -- 1 tab(s) by mouth every 6 hours, As needed, moderate to severe pain MDD:4  -- Indication: For Post procedure pain    lisinopril 20 mg oral tablet  -- 1 tab(s) by mouth 2 times a day  -- Indication: For Home    clonazePAM 1 mg oral tablet  -- 1 tab(s) by mouth 3 times a day  -- Indication: For Home    traZODone 150 mg oral tablet  -- 3 tab(s) by mouth once a day (at bedtime)  -- Indication: For Home    metFORMIN 500 mg oral tablet  -- 1 tab(s) by mouth 2 times a day  -- Indication: For Home    atenolol 50 mg oral tablet  -- 1 tab(s) by mouth 2 times a day  -- Indication: For Home    amLODIPine 5 mg oral tablet  -- 2 tab(s) by mouth once a day  -- Indication: For Home    cefpodoxime 200 mg oral tablet  -- 1 tab(s) by mouth every 12 hours Finish the entire prescription  -- Finish all this medication unless otherwise directed by prescriber.  Take with food or milk.    -- Indication: For Antibiotics for Infection    senna oral tablet  -- 2 tab(s) by mouth once a day (at bedtime), As needed, Constipation  -- Indication: For constipation avidance    docusate sodium 100 mg oral capsule  -- 1 cap(s) by mouth 3 times a day, As needed, Constipation  -- Indication: For constipation avidance    Omega-3 oral capsule  -- 1 tab(s) by mouth once a day  -- Indication: For Home I will START or STAY ON the medications listed below when I get home from the hospital:    vitamin b complex daily PO  -- Indication: For Home    oxyCODONE 5 mg oral tablet  -- 1 tab(s) by mouth every 6 hours, As needed, moderate to severe pain MDD:4  -- Indication: For Post procedure pain    lisinopril 20 mg oral tablet  -- 1 tab(s) by mouth 2 times a day  -- Indication: For Home    clonazePAM 1 mg oral tablet  -- 1 tab(s) by mouth 3 times a day  -- Indication: For Home    traZODone 150 mg oral tablet  -- 3 tab(s) by mouth once a day (at bedtime)  -- Indication: For Home    atenolol 50 mg oral tablet  -- 1 tab(s) by mouth 2 times a day  -- Indication: For Home    amLODIPine 5 mg oral tablet  -- 2 tab(s) by mouth once a day  -- Indication: For Home    cefpodoxime 200 mg oral tablet  -- 1 tab(s) by mouth every 12 hours Finish the entire prescription  -- Finish all this medication unless otherwise directed by prescriber.  Take with food or milk.    -- Indication: For Antibiotics for Infection    senna oral tablet  -- 2 tab(s) by mouth once a day (at bedtime), As needed, Constipation  -- Indication: For constipation aviodance    docusate sodium 100 mg oral capsule  -- 1 cap(s) by mouth 3 times a day, As needed, Constipation  -- Indication: For constipation aviodance    Omega-3 oral capsule  -- 1 tab(s) by mouth once a day  -- Indication: For Home

## 2018-08-10 NOTE — DISCHARGE NOTE ADULT - NS AS DC FOLLOWUP STROKE INST
nephrostomy tube, fever, ileal conduit, cefpodoxime, oxy ir/Influenza vaccination (VIS Pub Date: August 7, 2015)/Smoking Cessation

## 2018-08-10 NOTE — DISCHARGE NOTE ADULT - CARE PLAN
Principal Discharge DX:	UTI (urinary tract infection)  Goal:	Resolution of Infection  Assessment and plan of treatment:	Take full course of antibiotics   Follow up on Principal Discharge DX:	UTI (urinary tract infection)  Goal:	Resolution of Infection  Assessment and plan of treatment:	Take full course of antibiotics   Follow up on Monday 8/13 for your surgery with Dr. Esteban   [phone] Principal Discharge DX:	UTI (urinary tract infection)  Goal:	Resolution of Infection  Assessment and plan of treatment:	Take full course of antibiotics   Follow up on Monday 8/13 for your surgery with Dr. Esteban   [phone]  The nephrostomy tube is capped, you don't need to touch the dressing.  Secondary Diagnosis:	Major depressive disorder  Assessment and plan of treatment:	Continue current home medications and follow up with your primary care provider  Secondary Diagnosis:	Diabetes mellitus  Assessment and plan of treatment:	Do not restart your metformin, just follow a consistent carbohydrate diet and follow up with your primary care provider

## 2018-08-11 ENCOUNTER — APPOINTMENT (OUTPATIENT)
Dept: ULTRASOUND IMAGING | Facility: HOSPITAL | Age: 63
End: 2018-08-11

## 2018-08-13 LAB — BACTERIA BLD CULT: SIGNIFICANT CHANGE UP

## 2018-08-14 ENCOUNTER — TRANSCRIPTION ENCOUNTER (OUTPATIENT)
Age: 63
End: 2018-08-14

## 2018-08-14 NOTE — ASU PATIENT PROFILE, ADULT - PSH
H/O cystoscopy  - multiple  last cystoscopy, laser litholapaxy on 1/2018  History of bladder cancer  bladder removal May 10, 2018  History of prostatectomy  robotic, 2011, s/p radiation  Nephrostomy status  Left, 8/1/2018  S/P Appendectomy  40 years ago  S/P ileal conduit  May 2018  Suprapubic catheter  8/2015

## 2018-08-15 ENCOUNTER — INPATIENT (INPATIENT)
Facility: HOSPITAL | Age: 63
LOS: 0 days | Discharge: HOME | End: 2018-08-16
Attending: SPECIALIST | Admitting: SPECIALIST
Payer: MEDICARE

## 2018-08-15 ENCOUNTER — APPOINTMENT (OUTPATIENT)
Dept: UROLOGY | Facility: HOSPITAL | Age: 63
End: 2018-08-15

## 2018-08-15 VITALS
TEMPERATURE: 98 F | RESPIRATION RATE: 16 BRPM | WEIGHT: 222.01 LBS | SYSTOLIC BLOOD PRESSURE: 152 MMHG | HEIGHT: 71 IN | DIASTOLIC BLOOD PRESSURE: 67 MMHG | HEART RATE: 51 BPM | OXYGEN SATURATION: 96 %

## 2018-08-15 DIAGNOSIS — Z93.6 OTHER ARTIFICIAL OPENINGS OF URINARY TRACT STATUS: Chronic | ICD-10-CM

## 2018-08-15 DIAGNOSIS — Z85.51 PERSONAL HISTORY OF MALIGNANT NEOPLASM OF BLADDER: Chronic | ICD-10-CM

## 2018-08-15 DIAGNOSIS — Z98.89 OTHER SPECIFIED POSTPROCEDURAL STATES: Chronic | ICD-10-CM

## 2018-08-15 DIAGNOSIS — Z93.59 OTHER CYSTOSTOMY STATUS: Chronic | ICD-10-CM

## 2018-08-15 DIAGNOSIS — N32.9 BLADDER DISORDER, UNSPECIFIED: Chronic | ICD-10-CM

## 2018-08-15 DIAGNOSIS — N13.30 UNSPECIFIED HYDRONEPHROSIS: ICD-10-CM

## 2018-08-15 LAB
BLD GP AB SCN SERPL QL: NEGATIVE — SIGNIFICANT CHANGE UP
GAS PNL BLDV: 139 MMOL/L — SIGNIFICANT CHANGE UP (ref 136–146)
GLUCOSE BLDV-MCNC: 120 — HIGH (ref 70–99)
HCT VFR BLDV CALC: 29.1 % — LOW (ref 39–51)
HGB BLDV-MCNC: 9.4 G/DL — LOW (ref 13–17)
POTASSIUM BLDV-SCNC: 4.2 MMOL/L — SIGNIFICANT CHANGE UP (ref 3.4–4.5)
RH IG SCN BLD-IMP: POSITIVE — SIGNIFICANT CHANGE UP

## 2018-08-15 PROCEDURE — 50395: CPT | Mod: 59

## 2018-08-15 PROCEDURE — 50382 CHANGE URETER STENT PERCUT: CPT

## 2018-08-15 PROCEDURE — 50557 KIDNEY ENDOSCOPY & TREATMENT: CPT

## 2018-08-15 RX ORDER — ATENOLOL 25 MG/1
50 TABLET ORAL
Qty: 0 | Refills: 0 | Status: DISCONTINUED | OUTPATIENT
Start: 2018-08-15 | End: 2018-08-16

## 2018-08-15 RX ORDER — SODIUM CHLORIDE 9 MG/ML
1000 INJECTION, SOLUTION INTRAVENOUS
Qty: 0 | Refills: 0 | Status: DISCONTINUED | OUTPATIENT
Start: 2018-08-15 | End: 2018-08-15

## 2018-08-15 RX ORDER — ONDANSETRON 8 MG/1
4 TABLET, FILM COATED ORAL ONCE
Qty: 0 | Refills: 0 | Status: DISCONTINUED | OUTPATIENT
Start: 2018-08-15 | End: 2018-08-15

## 2018-08-15 RX ORDER — SENNA PLUS 8.6 MG/1
2 TABLET ORAL AT BEDTIME
Qty: 0 | Refills: 0 | Status: DISCONTINUED | OUTPATIENT
Start: 2018-08-15 | End: 2018-08-16

## 2018-08-15 RX ORDER — OXYCODONE HYDROCHLORIDE 5 MG/1
5 TABLET ORAL EVERY 4 HOURS
Qty: 0 | Refills: 0 | Status: DISCONTINUED | OUTPATIENT
Start: 2018-08-15 | End: 2018-08-16

## 2018-08-15 RX ORDER — ASPIRIN/CALCIUM CARB/MAGNESIUM 324 MG
81 TABLET ORAL DAILY
Qty: 0 | Refills: 0 | Status: DISCONTINUED | OUTPATIENT
Start: 2018-08-15 | End: 2018-08-16

## 2018-08-15 RX ORDER — LISINOPRIL 2.5 MG/1
20 TABLET ORAL EVERY 12 HOURS
Qty: 0 | Refills: 0 | Status: DISCONTINUED | OUTPATIENT
Start: 2018-08-15 | End: 2018-08-16

## 2018-08-15 RX ORDER — FENTANYL CITRATE 50 UG/ML
25 INJECTION INTRAVENOUS
Qty: 0 | Refills: 0 | Status: DISCONTINUED | OUTPATIENT
Start: 2018-08-15 | End: 2018-08-15

## 2018-08-15 RX ORDER — CEFAZOLIN SODIUM 1 G
1000 VIAL (EA) INJECTION EVERY 8 HOURS
Qty: 0 | Refills: 0 | Status: DISCONTINUED | OUTPATIENT
Start: 2018-08-15 | End: 2018-08-16

## 2018-08-15 RX ORDER — ACETAMINOPHEN 500 MG
650 TABLET ORAL EVERY 6 HOURS
Qty: 0 | Refills: 0 | Status: DISCONTINUED | OUTPATIENT
Start: 2018-08-15 | End: 2018-08-16

## 2018-08-15 RX ORDER — AMLODIPINE BESYLATE 2.5 MG/1
10 TABLET ORAL DAILY
Qty: 0 | Refills: 0 | Status: DISCONTINUED | OUTPATIENT
Start: 2018-08-15 | End: 2018-08-16

## 2018-08-15 RX ORDER — FENTANYL CITRATE 50 UG/ML
50 INJECTION INTRAVENOUS
Qty: 0 | Refills: 0 | Status: DISCONTINUED | OUTPATIENT
Start: 2018-08-15 | End: 2018-08-15

## 2018-08-15 RX ORDER — OXYCODONE HYDROCHLORIDE 5 MG/1
10 TABLET ORAL EVERY 4 HOURS
Qty: 0 | Refills: 0 | Status: DISCONTINUED | OUTPATIENT
Start: 2018-08-15 | End: 2018-08-16

## 2018-08-15 RX ORDER — DOCUSATE SODIUM 100 MG
100 CAPSULE ORAL THREE TIMES A DAY
Qty: 0 | Refills: 0 | Status: DISCONTINUED | OUTPATIENT
Start: 2018-08-15 | End: 2018-08-16

## 2018-08-15 RX ORDER — ONDANSETRON 8 MG/1
4 TABLET, FILM COATED ORAL EVERY 6 HOURS
Qty: 0 | Refills: 0 | Status: DISCONTINUED | OUTPATIENT
Start: 2018-08-15 | End: 2018-08-16

## 2018-08-15 RX ORDER — HEPARIN SODIUM 5000 [USP'U]/ML
5000 INJECTION INTRAVENOUS; SUBCUTANEOUS EVERY 8 HOURS
Qty: 0 | Refills: 0 | Status: DISCONTINUED | OUTPATIENT
Start: 2018-08-15 | End: 2018-08-16

## 2018-08-15 RX ORDER — TRAZODONE HCL 50 MG
450 TABLET ORAL AT BEDTIME
Qty: 0 | Refills: 0 | Status: DISCONTINUED | OUTPATIENT
Start: 2018-08-15 | End: 2018-08-16

## 2018-08-15 RX ADMIN — Medication 450 MILLIGRAM(S): at 21:31

## 2018-08-15 RX ADMIN — SODIUM CHLORIDE 100 MILLILITER(S): 9 INJECTION, SOLUTION INTRAVENOUS at 13:50

## 2018-08-15 RX ADMIN — SODIUM CHLORIDE 125 MILLILITER(S): 9 INJECTION, SOLUTION INTRAVENOUS at 18:08

## 2018-08-15 RX ADMIN — Medication 81 MILLIGRAM(S): at 21:26

## 2018-08-15 RX ADMIN — Medication 100 MILLIGRAM(S): at 21:30

## 2018-08-15 NOTE — BRIEF OPERATIVE NOTE - PROCEDURE
<<-----Click on this checkbox to enter Procedure Surgical removal of stricture of ureter  08/15/2018    Active  [id number]

## 2018-08-15 NOTE — BRIEF OPERATIVE NOTE - OPERATION/FINDINGS
Dilation of L nephrostomy tract, laser incision of ureteral stricture, insertion of L ureteral stent, removal of L NT

## 2018-08-16 ENCOUNTER — TRANSCRIPTION ENCOUNTER (OUTPATIENT)
Age: 63
End: 2018-08-16

## 2018-08-16 VITALS
OXYGEN SATURATION: 98 % | SYSTOLIC BLOOD PRESSURE: 124 MMHG | TEMPERATURE: 99 F | DIASTOLIC BLOOD PRESSURE: 61 MMHG | HEART RATE: 61 BPM | RESPIRATION RATE: 17 BRPM

## 2018-08-16 DIAGNOSIS — N35.9 URETHRAL STRICTURE, UNSPECIFIED: ICD-10-CM

## 2018-08-16 RX ORDER — CEPHALEXIN 500 MG
1 CAPSULE ORAL
Qty: 6 | Refills: 0 | OUTPATIENT
Start: 2018-08-16 | End: 2018-08-18

## 2018-08-16 RX ORDER — CLONAZEPAM 1 MG
1 TABLET ORAL ONCE
Qty: 0 | Refills: 0 | Status: DISCONTINUED | OUTPATIENT
Start: 2018-08-16 | End: 2018-08-16

## 2018-08-16 RX ORDER — OXYCODONE HYDROCHLORIDE 5 MG/1
1 TABLET ORAL
Qty: 8 | Refills: 0 | OUTPATIENT
Start: 2018-08-16 | End: 2018-08-17

## 2018-08-16 RX ORDER — ACETAMINOPHEN 500 MG
2 TABLET ORAL
Qty: 0 | Refills: 0 | COMMUNITY
Start: 2018-08-16

## 2018-08-16 RX ORDER — ASPIRIN/CALCIUM CARB/MAGNESIUM 324 MG
1 TABLET ORAL
Qty: 0 | Refills: 0 | COMMUNITY
Start: 2018-08-16

## 2018-08-16 RX ADMIN — Medication 100 MILLIGRAM(S): at 06:17

## 2018-08-16 RX ADMIN — ATENOLOL 50 MILLIGRAM(S): 25 TABLET ORAL at 06:16

## 2018-08-16 RX ADMIN — AMLODIPINE BESYLATE 10 MILLIGRAM(S): 2.5 TABLET ORAL at 06:16

## 2018-08-16 RX ADMIN — Medication 1 MILLIGRAM(S): at 07:04

## 2018-08-16 RX ADMIN — Medication 100 MILLIGRAM(S): at 06:16

## 2018-08-16 RX ADMIN — LISINOPRIL 20 MILLIGRAM(S): 2.5 TABLET ORAL at 06:16

## 2018-08-16 NOTE — DISCHARGE NOTE ADULT - MEDICATION SUMMARY - MEDICATIONS TO STOP TAKING
I will STOP taking the medications listed below when I get home from the hospital:    cefpodoxime 200 mg oral tablet  -- 1 tab(s) by mouth every 12 hours Finish the entire prescription  -- Finish all this medication unless otherwise directed by prescriber.  Take with food or milk.

## 2018-08-16 NOTE — DISCHARGE NOTE ADULT - PLAN OF CARE
Pain control No heavy lifting or straining No heavy lifting or straining  You may have intermittent pink tinged urine.  This is normal and due to the stent in your ureter.   If your urine becomes bright red or with clots, please call the office.  Change bandage on back if soiled.  Call Dr. Esteban to schedule a follow up appointment in 4 weeks. Continue current home medications and follow up with your primary care provider

## 2018-08-16 NOTE — DISCHARGE NOTE ADULT - CARE PROVIDER_API CALL
Ismael Esteban), Urology  [address] [place] [address]  Phone: [phone]  Fax: [phone]

## 2018-08-16 NOTE — DISCHARGE NOTE ADULT - PATIENT PORTAL LINK FT
You can access the [facility] Patient Portal, offered by [facility], by registering with the following website: [email or web address]

## 2018-08-16 NOTE — PROGRESS NOTE ADULT - SUBJECTIVE AND OBJECTIVE BOX
Subjective    Post op check    Patient feeling fine post op. No nausea/vomiting, pain controlled. Patient wanting to leave but did not secure ride or observation at home.      Objective    Vital signs  T(F): , Max: 98.2 (08-15-18 @ 21:26)  HR: 55 (08-15-18 @ 21:26)  BP: 152/68 (08-15-18 @ 21:26)  SpO2: 99% (08-15-18 @ 21:26)  Wt(kg): --    Output     OUT:    Urostomy: 875 mL  Total OUT: 875 mL    Total NET: -875 mL          Physical Exam  Gen: NAD  Abd: soft, NT, ND  : Urostomy 550    Labs    Imaging

## 2018-08-16 NOTE — DISCHARGE NOTE ADULT - CARE PLAN
Principal Discharge DX:	Urethral stricture  Goal:	Pain control  Assessment and plan of treatment:	No heavy lifting or straining  Secondary Diagnosis:	HTN (hypertension)  Secondary Diagnosis:	Anxiety Principal Discharge DX:	Urethral stricture  Goal:	Pain control  Assessment and plan of treatment:	No heavy lifting or straining  You may have intermittent pink tinged urine.  This is normal and due to the stent in your ureter.   If your urine becomes bright red or with clots, please call the office.  Change bandage on back if soiled.  Call Dr. Esteban to schedule a follow up appointment in 4 weeks.  Secondary Diagnosis:	HTN (hypertension)  Assessment and plan of treatment:	Continue current home medications and follow up with your primary care provider  Secondary Diagnosis:	Anxiety  Assessment and plan of treatment:	Continue current home medications and follow up with your primary care provider

## 2018-08-16 NOTE — DISCHARGE NOTE ADULT - INSTRUCTIONS
Keep well hydrated Keep well hydrated, consistent carbohydrate diet Maintain incision with clean and dry, call MD with any signs of infection such as fever, redness or drainage from site. Call MD with any signs of infection ie. fever/shaking chills, cloudy foul-smelling urine, or with signs of bleeding, or persistent nausea, or with increased unrelieved flank pain. Continue to drink plenty of fluids and avoid straining as well as constipation which may be a side effect from taking narcotic pain meds. Follow-up with MD in office for post-op check as well as with PMD as advised for continuity of care. Continue Diabetes management, diet and glucose monitoring, know your A1C blood level and follow up with PMD for continuity of care. Remember hand hygiene, skin inspection for prevention of infection.

## 2018-08-16 NOTE — DISCHARGE NOTE ADULT - MEDICATION SUMMARY - MEDICATIONS TO TAKE
I will START or STAY ON the medications listed below when I get home from the hospital:    vitamin b complex daily PO  -- Indication: For Home med    acetaminophen 325 mg oral tablet  -- 2 tab(s) by mouth every 6 hours, As needed, Mild Pain (1 - 3)  -- Indication: For Pain    oxyCODONE 5 mg oral tablet  -- 1 tab(s) by mouth every 6 hours, As needed, moderate to severe pain MDD:4  -- Indication: For Pain    aspirin 81 mg oral delayed release tablet  -- 1 tab(s) by mouth once a day  -- Indication: For Home med    lisinopril 20 mg oral tablet  -- 1 tab(s) by mouth 2 times a day  -- Indication: For Home med    clonazePAM 1 mg oral tablet  -- 1 tab(s) by mouth 3 times a day  -- Indication: For Home med    traZODone 150 mg oral tablet  -- 3 tab(s) by mouth once a day (at bedtime)  -- Indication: For Home med    atenolol 50 mg oral tablet  -- 1 tab(s) by mouth 2 times a day  -- Indication: For Home med    amLODIPine 5 mg oral tablet  -- 2 tab(s) by mouth once a day  -- Indication: For Home med    cephalexin 500 mg oral capsule  -- 1 cap(s) by mouth every 12 hours   -- Finish all this medication unless otherwise directed by prescriber.    -- Indication: For Antibiotic    senna oral tablet  -- 2 tab(s) by mouth once a day (at bedtime), As needed, Constipation  -- Indication: For Home med     docusate sodium 100 mg oral capsule  -- 1 cap(s) by mouth 3 times a day, As needed, Constipation  -- Indication: For Home med    Omega-3 oral capsule  -- 1 tab(s) by mouth once a day  -- Indication: For Home med

## 2018-08-16 NOTE — PROGRESS NOTE ADULT - ASSESSMENT
64 yo M POD #1 dilation of left nephrostomy tract, renal endoscopy, laser incision of ureteral stricture, stent placement

## 2018-08-16 NOTE — DISCHARGE NOTE ADULT - NS AS DC FOLLOWUP STROKE INST
Medline and carenotes for surgical procedure, Diabetes review,  as well as DC Medications and side effects literature for patient reference.

## 2018-08-16 NOTE — PROVIDER CONTACT NOTE (OTHER) - BACKGROUND
Pt admitted on 8/15/18 for Lt nephrostomy tube removal and Lt stent placement. PMH of pre-DM, hydronephrosis, UTI, prostate cancer, and HTN.

## 2018-08-16 NOTE — DISCHARGE NOTE ADULT - CONDITIONS AT DISCHARGE
DSD to incision NT site CDI, Pt len po diet well. VS stable, afebrile. RLQ Ileal conduit  functioning, appliance intact. Pt seen by MD and cleared for Dc to home as per safe Dc plan.

## 2018-08-16 NOTE — DISCHARGE NOTE ADULT - NS AS ACTIVITY OBS
Walking-Outdoors allowed/Showering allowed/No Heavy lifting/straining/Walking-Indoors allowed/Stairs allowed

## 2018-08-16 NOTE — PROGRESS NOTE ADULT - ASSESSMENT
63 y.o. male s/p dilation of left nephrostomy tract, laser removal of stricture and stent placement.  -discharge planning tomorrow

## 2018-08-16 NOTE — PROVIDER CONTACT NOTE (OTHER) - ASSESSMENT
Pt is refusing 10pm BP meds despite BP being 152/64 and HR of 55. Pt is also refusing venodynes, incentive spirometer, Heparin, 2AM VS, and hourly rounding.

## 2018-08-16 NOTE — PROGRESS NOTE ADULT - SUBJECTIVE AND OBJECTIVE BOX
Overnight events:  none    Subjective:  Pt offers no complaints    Objective:    Vital signs  T(C): , Max: 36.8 (08-15-18 @ 21:26)  HR: 57 (08-16-18 @ 06:06)  BP: 154/65 (08-16-18 @ 06:06)  SpO2: 99% (08-16-18 @ 06:06)  Wt(kg): --    Output   IC: 550 yellow      Gen; NAD  Abd: stoma pink, soft, nontender

## 2018-08-16 NOTE — PROGRESS NOTE ADULT - SUBJECTIVE AND OBJECTIVE BOX
ANESTHESIA POSTOP CHECK    63y Male POSTOP DAY 1 S/P     [ X] NO APPARENT ANESTHESIA COMPLICATIONS      Comments:

## 2018-08-16 NOTE — DISCHARGE NOTE ADULT - HOSPITAL COURSE
64 yo M underwent laser incision of stricture, ureteral stent placement, removal of nephrostomy tube on 8/15/18.  Postoperative course uneventful, pain controlled, tolerating diet, ambulating.  Pt d/c to f/u with Dr. Esteban.  I-shira checked.

## 2018-08-20 ENCOUNTER — APPOINTMENT (OUTPATIENT)
Dept: ULTRASOUND IMAGING | Facility: HOSPITAL | Age: 63
End: 2018-08-20

## 2018-08-27 ENCOUNTER — RX RENEWAL (OUTPATIENT)
Age: 63
End: 2018-08-27

## 2018-08-29 ENCOUNTER — FORM ENCOUNTER (OUTPATIENT)
Age: 63
End: 2018-08-29

## 2018-08-30 ENCOUNTER — OUTPATIENT (OUTPATIENT)
Dept: OUTPATIENT SERVICES | Facility: HOSPITAL | Age: 63
LOS: 1 days | End: 2018-08-30
Payer: COMMERCIAL

## 2018-08-30 ENCOUNTER — APPOINTMENT (OUTPATIENT)
Dept: ULTRASOUND IMAGING | Facility: HOSPITAL | Age: 63
End: 2018-08-30
Payer: MEDICARE

## 2018-08-30 DIAGNOSIS — Z93.6 OTHER ARTIFICIAL OPENINGS OF URINARY TRACT STATUS: Chronic | ICD-10-CM

## 2018-08-30 DIAGNOSIS — Z98.89 OTHER SPECIFIED POSTPROCEDURAL STATES: Chronic | ICD-10-CM

## 2018-08-30 DIAGNOSIS — N32.9 BLADDER DISORDER, UNSPECIFIED: Chronic | ICD-10-CM

## 2018-08-30 DIAGNOSIS — Z93.59 OTHER CYSTOSTOMY STATUS: Chronic | ICD-10-CM

## 2018-08-30 DIAGNOSIS — R94.5 ABNORMAL RESULTS OF LIVER FUNCTION STUDIES: ICD-10-CM

## 2018-08-30 DIAGNOSIS — Z85.51 PERSONAL HISTORY OF MALIGNANT NEOPLASM OF BLADDER: Chronic | ICD-10-CM

## 2018-08-30 PROCEDURE — 76700 US EXAM ABDOM COMPLETE: CPT

## 2018-08-30 PROCEDURE — 76700 US EXAM ABDOM COMPLETE: CPT | Mod: 26

## 2018-09-04 ENCOUNTER — RX RENEWAL (OUTPATIENT)
Age: 63
End: 2018-09-04

## 2018-09-13 ENCOUNTER — MEDICATION RENEWAL (OUTPATIENT)
Age: 63
End: 2018-09-13

## 2018-09-18 ENCOUNTER — APPOINTMENT (OUTPATIENT)
Dept: UROLOGY | Facility: CLINIC | Age: 63
End: 2018-09-18
Payer: MEDICARE

## 2018-09-18 ENCOUNTER — OUTPATIENT (OUTPATIENT)
Dept: OUTPATIENT SERVICES | Facility: HOSPITAL | Age: 63
LOS: 1 days | End: 2018-09-18
Payer: MEDICARE

## 2018-09-18 VITALS
HEART RATE: 65 BPM | DIASTOLIC BLOOD PRESSURE: 70 MMHG | TEMPERATURE: 97.6 F | SYSTOLIC BLOOD PRESSURE: 112 MMHG | RESPIRATION RATE: 16 BRPM

## 2018-09-18 DIAGNOSIS — Z93.6 OTHER ARTIFICIAL OPENINGS OF URINARY TRACT STATUS: Chronic | ICD-10-CM

## 2018-09-18 DIAGNOSIS — Z85.51 PERSONAL HISTORY OF MALIGNANT NEOPLASM OF BLADDER: Chronic | ICD-10-CM

## 2018-09-18 DIAGNOSIS — Z93.59 OTHER CYSTOSTOMY STATUS: Chronic | ICD-10-CM

## 2018-09-18 DIAGNOSIS — N32.9 BLADDER DISORDER, UNSPECIFIED: Chronic | ICD-10-CM

## 2018-09-18 DIAGNOSIS — Z98.89 OTHER SPECIFIED POSTPROCEDURAL STATES: Chronic | ICD-10-CM

## 2018-09-18 DIAGNOSIS — R35.0 FREQUENCY OF MICTURITION: ICD-10-CM

## 2018-09-18 PROCEDURE — 52000 CYSTOURETHROSCOPY: CPT

## 2018-09-19 DIAGNOSIS — N13.30 UNSPECIFIED HYDRONEPHROSIS: ICD-10-CM

## 2018-09-19 LAB
ANION GAP SERPL CALC-SCNC: 17 MMOL/L
BUN SERPL-MCNC: 38 MG/DL
CALCIUM SERPL-MCNC: 10 MG/DL
CHLORIDE SERPL-SCNC: 103 MMOL/L
CO2 SERPL-SCNC: 23 MMOL/L
CREAT SERPL-MCNC: 1.95 MG/DL
GLUCOSE SERPL-MCNC: 152 MG/DL
POTASSIUM SERPL-SCNC: 4.6 MMOL/L
SODIUM SERPL-SCNC: 143 MMOL/L

## 2018-10-19 ENCOUNTER — APPOINTMENT (OUTPATIENT)
Dept: UROLOGY | Facility: CLINIC | Age: 63
End: 2018-10-19
Payer: MEDICARE

## 2018-10-19 PROCEDURE — 99213 OFFICE O/P EST LOW 20 MIN: CPT

## 2018-10-22 LAB
ANION GAP SERPL CALC-SCNC: 18 MMOL/L
BACTERIA SPEC CULT: ABNORMAL
BUN SERPL-MCNC: 29 MG/DL
CALCIUM SERPL-MCNC: 9.6 MG/DL
CHLORIDE SERPL-SCNC: 105 MMOL/L
CO2 SERPL-SCNC: 21 MMOL/L
CREAT SERPL-MCNC: 1.89 MG/DL
GLUCOSE SERPL-MCNC: 116 MG/DL
POTASSIUM SERPL-SCNC: 4.8 MMOL/L
SODIUM SERPL-SCNC: 144 MMOL/L

## 2018-10-26 ENCOUNTER — APPOINTMENT (OUTPATIENT)
Dept: UROLOGY | Facility: CLINIC | Age: 63
End: 2018-10-26
Payer: MEDICARE

## 2018-10-26 ENCOUNTER — INPATIENT (INPATIENT)
Facility: HOSPITAL | Age: 63
LOS: 3 days | Discharge: HOME HEALTH | End: 2018-10-30
Attending: INTERNAL MEDICINE | Admitting: INTERNAL MEDICINE
Payer: MEDICARE

## 2018-10-26 VITALS
TEMPERATURE: 97 F | HEART RATE: 60 BPM | RESPIRATION RATE: 16 BRPM | OXYGEN SATURATION: 100 % | SYSTOLIC BLOOD PRESSURE: 133 MMHG | DIASTOLIC BLOOD PRESSURE: 69 MMHG

## 2018-10-26 DIAGNOSIS — Z98.89 OTHER SPECIFIED POSTPROCEDURAL STATES: Chronic | ICD-10-CM

## 2018-10-26 DIAGNOSIS — Z85.51 PERSONAL HISTORY OF MALIGNANT NEOPLASM OF BLADDER: Chronic | ICD-10-CM

## 2018-10-26 DIAGNOSIS — Z93.6 OTHER ARTIFICIAL OPENINGS OF URINARY TRACT STATUS: Chronic | ICD-10-CM

## 2018-10-26 DIAGNOSIS — N32.9 BLADDER DISORDER, UNSPECIFIED: Chronic | ICD-10-CM

## 2018-10-26 DIAGNOSIS — Z93.59 OTHER CYSTOSTOMY STATUS: Chronic | ICD-10-CM

## 2018-10-26 LAB
ALBUMIN SERPL ELPH-MCNC: 2.8 G/DL — LOW (ref 3.3–5)
ALP SERPL-CCNC: 289 U/L — HIGH (ref 40–120)
ALT FLD-CCNC: 20 U/L — SIGNIFICANT CHANGE UP (ref 4–41)
AST SERPL-CCNC: 24 U/L — SIGNIFICANT CHANGE UP (ref 4–40)
BASE EXCESS BLDV CALC-SCNC: -0.7 MMOL/L — SIGNIFICANT CHANGE UP
BASOPHILS # BLD AUTO: 0.03 K/UL — SIGNIFICANT CHANGE UP (ref 0–0.2)
BASOPHILS NFR BLD AUTO: 0.3 % — SIGNIFICANT CHANGE UP (ref 0–2)
BILIRUB SERPL-MCNC: 0.2 MG/DL — SIGNIFICANT CHANGE UP (ref 0.2–1.2)
BLOOD GAS VENOUS - CREATININE: 2.24 MG/DL — HIGH (ref 0.5–1.3)
BUN SERPL-MCNC: 41 MG/DL — HIGH (ref 7–23)
CALCIUM SERPL-MCNC: 9.7 MG/DL — SIGNIFICANT CHANGE UP (ref 8.4–10.5)
CHLORIDE BLDV-SCNC: 107 MMOL/L — SIGNIFICANT CHANGE UP (ref 96–108)
CHLORIDE SERPL-SCNC: 103 MMOL/L — SIGNIFICANT CHANGE UP (ref 98–107)
CK SERPL-CCNC: 51 U/L — SIGNIFICANT CHANGE UP (ref 30–200)
CO2 SERPL-SCNC: 16 MMOL/L — LOW (ref 22–31)
CREAT SERPL-MCNC: 2.11 MG/DL — HIGH (ref 0.5–1.3)
EOSINOPHIL # BLD AUTO: 0.19 K/UL — SIGNIFICANT CHANGE UP (ref 0–0.5)
EOSINOPHIL NFR BLD AUTO: 1.8 % — SIGNIFICANT CHANGE UP (ref 0–6)
GAS PNL BLDV: 135 MMOL/L — LOW (ref 136–146)
GLUCOSE BLDV-MCNC: 91 — SIGNIFICANT CHANGE UP (ref 70–99)
GLUCOSE SERPL-MCNC: 93 MG/DL — SIGNIFICANT CHANGE UP (ref 70–99)
HCO3 BLDV-SCNC: 23 MMOL/L — SIGNIFICANT CHANGE UP (ref 20–27)
HCT VFR BLD CALC: 27.2 % — LOW (ref 39–50)
HCT VFR BLDV CALC: 25.5 % — LOW (ref 39–51)
HGB BLD-MCNC: 8.1 G/DL — LOW (ref 13–17)
HGB BLDV-MCNC: 8.2 G/DL — LOW (ref 13–17)
IMM GRANULOCYTES # BLD AUTO: 0.14 # — SIGNIFICANT CHANGE UP
IMM GRANULOCYTES NFR BLD AUTO: 1.3 % — SIGNIFICANT CHANGE UP (ref 0–1.5)
LACTATE BLDV-MCNC: 1.1 MMOL/L — SIGNIFICANT CHANGE UP (ref 0.5–2)
LYMPHOCYTES # BLD AUTO: 1.67 K/UL — SIGNIFICANT CHANGE UP (ref 1–3.3)
LYMPHOCYTES # BLD AUTO: 16.1 % — SIGNIFICANT CHANGE UP (ref 13–44)
MCHC RBC-ENTMCNC: 23.2 PG — LOW (ref 27–34)
MCHC RBC-ENTMCNC: 29.8 % — LOW (ref 32–36)
MCV RBC AUTO: 77.9 FL — LOW (ref 80–100)
MONOCYTES # BLD AUTO: 0.82 K/UL — SIGNIFICANT CHANGE UP (ref 0–0.9)
MONOCYTES NFR BLD AUTO: 7.9 % — SIGNIFICANT CHANGE UP (ref 2–14)
NEUTROPHILS # BLD AUTO: 7.54 K/UL — HIGH (ref 1.8–7.4)
NEUTROPHILS NFR BLD AUTO: 72.6 % — SIGNIFICANT CHANGE UP (ref 43–77)
NRBC # FLD: 0 — SIGNIFICANT CHANGE UP
PCO2 BLDV: 43 MMHG — SIGNIFICANT CHANGE UP (ref 41–51)
PH BLDV: 7.36 PH — SIGNIFICANT CHANGE UP (ref 7.32–7.43)
PLATELET # BLD AUTO: 338 K/UL — SIGNIFICANT CHANGE UP (ref 150–400)
PMV BLD: 10 FL — SIGNIFICANT CHANGE UP (ref 7–13)
PO2 BLDV: 28 MMHG — LOW (ref 35–40)
POTASSIUM BLDV-SCNC: 5.1 MMOL/L — HIGH (ref 3.4–4.5)
POTASSIUM SERPL-MCNC: 5.4 MMOL/L — HIGH (ref 3.5–5.3)
POTASSIUM SERPL-SCNC: 5.4 MMOL/L — HIGH (ref 3.5–5.3)
PROT SERPL-MCNC: 7.5 G/DL — SIGNIFICANT CHANGE UP (ref 6–8.3)
RBC # BLD: 3.49 M/UL — LOW (ref 4.2–5.8)
RBC # FLD: 19.3 % — HIGH (ref 10.3–14.5)
SAO2 % BLDV: 44.1 % — LOW (ref 60–85)
SODIUM SERPL-SCNC: 135 MMOL/L — SIGNIFICANT CHANGE UP (ref 135–145)
WBC # BLD: 10.39 K/UL — SIGNIFICANT CHANGE UP (ref 3.8–10.5)
WBC # FLD AUTO: 10.39 K/UL — SIGNIFICANT CHANGE UP (ref 3.8–10.5)

## 2018-10-26 PROCEDURE — 74176 CT ABD & PELVIS W/O CONTRAST: CPT | Mod: 26

## 2018-10-26 PROCEDURE — 99213 OFFICE O/P EST LOW 20 MIN: CPT

## 2018-10-26 RX ORDER — SODIUM CHLORIDE 9 MG/ML
1000 INJECTION INTRAMUSCULAR; INTRAVENOUS; SUBCUTANEOUS ONCE
Qty: 0 | Refills: 0 | Status: COMPLETED | OUTPATIENT
Start: 2018-10-26 | End: 2018-10-26

## 2018-10-26 RX ORDER — ACETAMINOPHEN 500 MG
1000 TABLET ORAL ONCE
Qty: 0 | Refills: 0 | Status: COMPLETED | OUTPATIENT
Start: 2018-10-26 | End: 2018-10-26

## 2018-10-26 RX ORDER — VANCOMYCIN HCL 1 G
1000 VIAL (EA) INTRAVENOUS ONCE
Qty: 0 | Refills: 0 | Status: COMPLETED | OUTPATIENT
Start: 2018-10-26 | End: 2018-10-26

## 2018-10-26 RX ORDER — MORPHINE SULFATE 50 MG/1
4 CAPSULE, EXTENDED RELEASE ORAL ONCE
Qty: 0 | Refills: 0 | Status: DISCONTINUED | OUTPATIENT
Start: 2018-10-26 | End: 2018-10-26

## 2018-10-26 RX ORDER — PIPERACILLIN AND TAZOBACTAM 4; .5 G/20ML; G/20ML
3.38 INJECTION, POWDER, LYOPHILIZED, FOR SOLUTION INTRAVENOUS ONCE
Qty: 0 | Refills: 0 | Status: COMPLETED | OUTPATIENT
Start: 2018-10-26 | End: 2018-10-26

## 2018-10-26 RX ADMIN — SODIUM CHLORIDE 1000 MILLILITER(S): 9 INJECTION INTRAMUSCULAR; INTRAVENOUS; SUBCUTANEOUS at 18:57

## 2018-10-26 RX ADMIN — PIPERACILLIN AND TAZOBACTAM 200 GRAM(S): 4; .5 INJECTION, POWDER, LYOPHILIZED, FOR SOLUTION INTRAVENOUS at 19:32

## 2018-10-26 RX ADMIN — Medication 1000 MILLIGRAM(S): at 22:26

## 2018-10-26 RX ADMIN — SODIUM CHLORIDE 1000 MILLILITER(S): 9 INJECTION INTRAMUSCULAR; INTRAVENOUS; SUBCUTANEOUS at 20:00

## 2018-10-26 RX ADMIN — Medication 250 MILLIGRAM(S): at 21:03

## 2018-10-26 RX ADMIN — Medication 400 MILLIGRAM(S): at 18:57

## 2018-10-26 RX ADMIN — PIPERACILLIN AND TAZOBACTAM 3.38 GRAM(S): 4; .5 INJECTION, POWDER, LYOPHILIZED, FOR SOLUTION INTRAVENOUS at 20:12

## 2018-10-26 RX ADMIN — Medication 1000 MILLIGRAM(S): at 19:15

## 2018-10-26 NOTE — CONSULT NOTE ADULT - ASSESSMENT
This is a 63 year old male with a PMHx of bladder cancer s/p bladder resection with ileal conduit, HTN, DM pw left sided medial thigh abscess for several days., s/p I&D in the ED.     - will need VNS for daily packing changes.   - no need for abx   - dispo per ED       [id number]

## 2018-10-26 NOTE — ED ADULT TRIAGE NOTE - CHIEF COMPLAINT QUOTE
Pt states that he has abscess to left groin x few days.  Pt sent by urologist for eval.  PMH bladder cancer, DM HTN

## 2018-10-26 NOTE — ED PROVIDER NOTE - MEDICAL DECISION MAKING DETAILS
63 year old male with a PMHx of bladder cancer s/p bladder resection with ileal conduit, HTN, DM pw left sided groin abscess for several days.  Plan: ct pelvis - assess groin abscess

## 2018-10-26 NOTE — ED PROVIDER NOTE - ATTENDING CONTRIBUTION TO CARE
63M p/w L groin swelling x days.  Initially had a penile abscess that had been seen by Uro by Dr Esteban that had impv spont.  Subsequently – pt with L side large swelling, went to Uro Dr Esteban again, who referred pt to ED for eval.  Abscess to L groin 13 x 6cm, not involving scrotum or extending to rectum.  DDx includes abscess, simple cellulitis, hernia.  Plan to get CT to eval, will need to be noncon due to pt's CRI.  Rx pain meds, fluids, abx, likely will need surgical c/s.  Plan for at least CDU for obs O/N.  VS:  unremarkable    GEN - NAD; malaise; A+O x3   HEAD - NC/AT     ENT - PEERL, EOMI, mucous membranes ailyn, no discharge      NECK: Neck supple, non-tender without lymphadenopathy, no masses, no JVD  PULM - CTA b/l,  symmetric breath sounds  COR -  normal heart sounds    ABD - , ND, NT, soft,  RLQ ileostomy site c/d/i draining yellow urine.   Abscess to L groin 13 x 6cm, not involving scrotum or extending to rectum.  [place]  BACK - no CVA tenderness, nontender spine     EXTREMS - no edema, no deformity, warm and well perfused    SKIN - no rash or bruising      NEUROLOGIC - alert, CN 2-12 intact, sensation nl, motor no focal deficit. 63M p/w L groin swelling x days.  Initially had a penile abscess that had been seen by Uro by Dr Esteban that had impv spont.  Subsequently – pt with L side large swelling, went to Uro Dr Esteban again, who referred pt to ED for eval.  Abscess to L groin 13 x 6cm, not involving scrotum or extending to rectum.  DDx includes abscess, simple cellulitis, hernia.  Plan to get CT to eval, will need to be noncon due to pt's CRI.  Rx pain meds, fluids, abx, likely will need surgical c/s.  Plan for at least CDU for obs O/N.  Update - Surg drained a large amount of pus from abscess.  OM with pelvic fluid collection noted on CT - will need admission for further w/u and treat.   VS:  unremarkable    GEN - NAD; malaise; A+O x3   HEAD - NC/AT     ENT - PEERL, EOMI, mucous membranes ailyn, no discharge      NECK: Neck supple, non-tender without lymphadenopathy, no masses, no JVD  PULM - CTA b/l,  symmetric breath sounds  COR -  normal heart sounds    ABD - , ND, NT, soft,  RLQ ileostomy site c/d/i draining yellow urine.   Abscess to L groin 13 x 6cm, not involving scrotum or extending to rectum.  [place]  BACK - no CVA tenderness, nontender spine     EXTREMS - no edema, no deformity, warm and well perfused    SKIN - no rash or bruising      NEUROLOGIC - alert, CN 2-12 intact, sensation nl, motor no focal deficit.

## 2018-10-26 NOTE — CONSULT NOTE ADULT - SUBJECTIVE AND OBJECTIVE BOX
[facility] General Surgery Consultation     Patient is a 63y old  Male who presents with a chief complaint of left thigh swelling     HPI:  This is a 63 year old male with a PMHx of bladder cancer s/p bladder resection with ileal conduit, HTN, DM pw left sided medial thigh abscess for several days. He denies trauma, no fevers or chills. He states that he had a similar swelling at the bottom of his abdominal wall which popped open. Pt states that he saw his urologist Dr. Esteban who sent him to the ED for further evaluation. Denies n/v/f/c, CP, SOB, abdominal pain.    PAST MEDICAL & SURGICAL HISTORY:  Unspecified hydronephrosis  Malignant neoplasm of bladder  Diabetes mellitus: pre diabetic  Obese  Other calculus in bladder  Urethral stricture  Urinary (tract) obstruction  UTI (urinary tract infection)  Major depressive disorder  Anxiety  HTN (Hypertension)  Prostate Cancer  Bladder disease: Bladder Removed  Nephrostomy status: Left, 8/1/2018  S/P ileal conduit: May 2018  History of bladder cancer: bladder removal May 10, 2018  Suprapubic catheter: 8/2015  History of prostatectomy: robotic, 2011, s/p radiation  H/O cystoscopy: - multiple  last cystoscopy, laser litholapaxy on 1/2018  S/P Appendectomy: 40 years ago    FAMILY HISTORY:  No pertinent family history in first degree relatives    SOCIAL HISTORY: Never smoker    MEDICATIONS:   vitamin b complex daily PO  acetaminophen 325 mg oral tablet  aspirin 81 mg oral delayed release tablet  lisinopril 20 mg oral tablet  clonazePAM 1 mg oral tablet  traZODone 150 mg oral tablet  atenolol 50 mg oral tablet  amLODIPine 5 mg oral tablet  senna oral tablet  docusate sodium 100 mg oral capsule  Omega-3 oral capsule      Allergies: No Known Allergies    Vital Signs Last 24 Hrs  T(C): 36.6 (26 Oct 2018 21:16), Max: 36.6 (26 Oct 2018 21:16)  T(F): 97.8 (26 Oct 2018 21:16), Max: 97.8 (26 Oct 2018 21:16)  HR: 54 (26 Oct 2018 21:16) (54 - 60)  BP: 132/75 (26 Oct 2018 21:16) (132/75 - 133/69)  BP(mean): --  RR: 16 (26 Oct 2018 21:16) (16 - 16)  SpO2: 100% (26 Oct 2018 21:16) (100% - 100%)    Gen: NAD  Resp: CTA b/l   CV: RRR  Abd: soft, ND, NTTP, ileal conduit.   Ext: left medial thigh swelling with erythema, mildly tender to palpation, incised and drained, please see separate procedure note.                             8.1    10.39 )-----------( 338      ( 26 Oct 2018 18:24 )             27.2     10-26    135  |  103  |  41<H>  ----------------------------<  93  5.4<H>   |  16<L>  |  2.11<H>    Ca    9.7      26 Oct 2018 17:40    TPro  7.5  /  Alb  2.8<L>  /  TBili  0.2  /  DBili  x   /  AST  24  /  ALT  20  /  AlkPhos  289<H>  10-26        Radiographic Findings:   CT abd/pelvis:  LOWER CHEST: Trace bilateral pleural effusions with adjacent atelectasis.   Trace pericardial effusion.    LIVER: Within normal limits.  BILE DUCTS: Normal caliber.  GALLBLADDER: Within normal limits.  SPLEEN: Within normal limits.  PANCREAS: Within normal limits.  ADRENALS: Within normal limits.  KIDNEYS/URETERS: Moderate left and mild right hydronephrosis to the level   of the ileal conduit. No urolithiasis.    BLADDER: Status post cystectomy with ileal conduit.  REPRODUCTIVE ORGANS: Status post prostatectomy.     BOWEL: No bowel obstruction. Status post ileal conduit formation.  PERITONEUM: No ascites.  VESSELS:  Atherosclerotic disease.  RETROPERITONEUM: No lymphadenopathy.    ABDOMINAL WALL: Postsurgical ventral abdominal wall changes. There is a   small gas-liquid pocket in the anterior abdominal wall below the level of   the umbilicus which measures 1.7 x 1.5 cm. Left medial upper thigh fluid   collection with a connection to the abductor musculature. This measures   11 x 5.8 x 7.6 cm. This may be connected to the pubic symphysis   collection.  BONES: Erosion of the pubic symphysis with and adjacent fluid collection   measuring 6.3 x 5.1 cm.. Sclerosis of the L3 vertebral body, new from   prior study.    IMPRESSION: Study is limited by lack of intravenous contrast.     Pubic symphyseal osteomyelitis with a thick-walled fluid collection as   measured above.   Left groin abscess with involvement of the left abductor muscle.   Questionable connection to the pubic symphyseal collection.  Tiny gas-liquid collection in the anterior abdominal wall. Correlate for   recent surgery versus infected collection.    Moderate left and mild right hydronephrosis. Patient is status post ileal   conduit formation.

## 2018-10-26 NOTE — PROCEDURE NOTE - PROCEDURE
<<-----Click on this checkbox to enter Procedure Incision and drainage abscess  10/26/2018    Active  [facility]

## 2018-10-26 NOTE — ED ADULT NURSE REASSESSMENT NOTE - NS ED NURSE REASSESS COMMENT FT1
Pt rcvd following shift change, started by day shift intake RN - vitally stable, afebrile, came to ED for c/o L sided groin abscess for multiple days.  Saw outpatient Urologist who sent to ED for further eval and possible I&D.  PMHx Bladder CA s/p resection and ileal conduit, HTN, DM2.  Denies abd pain/nausea/vomiting/fevers/chills/cp/sob.  IV in place to L AC #[id number] by prior RN. Previously medicated w/ IVF and antibiotics, Pt awaiting surg c/s, poss I&D.  Will monitor.

## 2018-10-26 NOTE — ED PROVIDER NOTE - OBJECTIVE STATEMENT
63 year old male with a PMHx of bladder cancer s/p bladder resection with ileal conduit, HTN, DM pw left sided groin abscess for several days. Pt states that he saw his urologist Dr. Esteban who sent him to the ED for further evaluation. Denies n/v/f/c, CP, SOB, abdominal pain.

## 2018-10-27 DIAGNOSIS — R93.5 ABNORMAL FINDINGS ON DIAGNOSTIC IMAGING OF OTHER ABDOMINAL REGIONS, INCLUDING RETROPERITONEUM: ICD-10-CM

## 2018-10-27 DIAGNOSIS — F41.9 ANXIETY DISORDER, UNSPECIFIED: ICD-10-CM

## 2018-10-27 DIAGNOSIS — C67.9 MALIGNANT NEOPLASM OF BLADDER, UNSPECIFIED: ICD-10-CM

## 2018-10-27 DIAGNOSIS — M86.9 OSTEOMYELITIS, UNSPECIFIED: ICD-10-CM

## 2018-10-27 DIAGNOSIS — E11.9 TYPE 2 DIABETES MELLITUS WITHOUT COMPLICATIONS: ICD-10-CM

## 2018-10-27 DIAGNOSIS — F32.9 MAJOR DEPRESSIVE DISORDER, SINGLE EPISODE, UNSPECIFIED: ICD-10-CM

## 2018-10-27 DIAGNOSIS — L02.214 CUTANEOUS ABSCESS OF GROIN: ICD-10-CM

## 2018-10-27 DIAGNOSIS — Z29.9 ENCOUNTER FOR PROPHYLACTIC MEASURES, UNSPECIFIED: ICD-10-CM

## 2018-10-27 DIAGNOSIS — I10 ESSENTIAL (PRIMARY) HYPERTENSION: ICD-10-CM

## 2018-10-27 LAB
ALBUMIN SERPL ELPH-MCNC: 3 G/DL — LOW (ref 3.3–5)
ALP SERPL-CCNC: 281 U/L — HIGH (ref 40–120)
ALT FLD-CCNC: 16 U/L — SIGNIFICANT CHANGE UP (ref 4–41)
AST SERPL-CCNC: 8 U/L — SIGNIFICANT CHANGE UP (ref 4–40)
BASOPHILS # BLD AUTO: 0.04 K/UL — SIGNIFICANT CHANGE UP (ref 0–0.2)
BASOPHILS NFR BLD AUTO: 0.5 % — SIGNIFICANT CHANGE UP (ref 0–2)
BILIRUB SERPL-MCNC: 0.2 MG/DL — SIGNIFICANT CHANGE UP (ref 0.2–1.2)
BUN SERPL-MCNC: 33 MG/DL — HIGH (ref 7–23)
CALCIUM SERPL-MCNC: 9.5 MG/DL — SIGNIFICANT CHANGE UP (ref 8.4–10.5)
CHLORIDE SERPL-SCNC: 104 MMOL/L — SIGNIFICANT CHANGE UP (ref 98–107)
CO2 SERPL-SCNC: 21 MMOL/L — LOW (ref 22–31)
CREAT SERPL-MCNC: 1.9 MG/DL — HIGH (ref 0.5–1.3)
EOSINOPHIL # BLD AUTO: 0.19 K/UL — SIGNIFICANT CHANGE UP (ref 0–0.5)
EOSINOPHIL NFR BLD AUTO: 2.3 % — SIGNIFICANT CHANGE UP (ref 0–6)
GLUCOSE SERPL-MCNC: 113 MG/DL — HIGH (ref 70–99)
HCT VFR BLD CALC: 27 % — LOW (ref 39–50)
HGB BLD-MCNC: 8.2 G/DL — LOW (ref 13–17)
IMM GRANULOCYTES # BLD AUTO: 0.07 # — SIGNIFICANT CHANGE UP
IMM GRANULOCYTES NFR BLD AUTO: 0.8 % — SIGNIFICANT CHANGE UP (ref 0–1.5)
LYMPHOCYTES # BLD AUTO: 1.05 K/UL — SIGNIFICANT CHANGE UP (ref 1–3.3)
LYMPHOCYTES # BLD AUTO: 12.6 % — LOW (ref 13–44)
MAGNESIUM SERPL-MCNC: 1.9 MG/DL — SIGNIFICANT CHANGE UP (ref 1.6–2.6)
MCHC RBC-ENTMCNC: 22.8 PG — LOW (ref 27–34)
MCHC RBC-ENTMCNC: 30.4 % — LOW (ref 32–36)
MCV RBC AUTO: 75 FL — LOW (ref 80–100)
MONOCYTES # BLD AUTO: 0.59 K/UL — SIGNIFICANT CHANGE UP (ref 0–0.9)
MONOCYTES NFR BLD AUTO: 7.1 % — SIGNIFICANT CHANGE UP (ref 2–14)
NEUTROPHILS # BLD AUTO: 6.42 K/UL — SIGNIFICANT CHANGE UP (ref 1.8–7.4)
NEUTROPHILS NFR BLD AUTO: 76.7 % — SIGNIFICANT CHANGE UP (ref 43–77)
NRBC # FLD: 0 — SIGNIFICANT CHANGE UP
PHOSPHATE SERPL-MCNC: 3.2 MG/DL — SIGNIFICANT CHANGE UP (ref 2.5–4.5)
PLATELET # BLD AUTO: 328 K/UL — SIGNIFICANT CHANGE UP (ref 150–400)
PMV BLD: 10.1 FL — SIGNIFICANT CHANGE UP (ref 7–13)
POTASSIUM SERPL-MCNC: 4.7 MMOL/L — SIGNIFICANT CHANGE UP (ref 3.5–5.3)
POTASSIUM SERPL-SCNC: 4.7 MMOL/L — SIGNIFICANT CHANGE UP (ref 3.5–5.3)
PROT SERPL-MCNC: 7.4 G/DL — SIGNIFICANT CHANGE UP (ref 6–8.3)
RBC # BLD: 3.6 M/UL — LOW (ref 4.2–5.8)
RBC # FLD: 18.6 % — HIGH (ref 10.3–14.5)
SODIUM SERPL-SCNC: 139 MMOL/L — SIGNIFICANT CHANGE UP (ref 135–145)
SPECIMEN SOURCE: SIGNIFICANT CHANGE UP
SPECIMEN SOURCE: SIGNIFICANT CHANGE UP
WBC # BLD: 8.36 K/UL — SIGNIFICANT CHANGE UP (ref 3.8–10.5)
WBC # FLD AUTO: 8.36 K/UL — SIGNIFICANT CHANGE UP (ref 3.8–10.5)

## 2018-10-27 PROCEDURE — 12345: CPT | Mod: NC,GC

## 2018-10-27 PROCEDURE — 99232 SBSQ HOSP IP/OBS MODERATE 35: CPT | Mod: GC

## 2018-10-27 PROCEDURE — 99223 1ST HOSP IP/OBS HIGH 75: CPT | Mod: GC

## 2018-10-27 RX ORDER — ATENOLOL 25 MG/1
1 TABLET ORAL
Qty: 0 | Refills: 0 | COMMUNITY

## 2018-10-27 RX ORDER — INSULIN LISPRO 100/ML
VIAL (ML) SUBCUTANEOUS AT BEDTIME
Qty: 0 | Refills: 0 | Status: DISCONTINUED | OUTPATIENT
Start: 2018-10-27 | End: 2018-10-30

## 2018-10-27 RX ORDER — SENNA PLUS 8.6 MG/1
2 TABLET ORAL AT BEDTIME
Qty: 0 | Refills: 0 | Status: DISCONTINUED | OUTPATIENT
Start: 2018-10-27 | End: 2018-10-30

## 2018-10-27 RX ORDER — DOCUSATE SODIUM 100 MG
100 CAPSULE ORAL THREE TIMES A DAY
Qty: 0 | Refills: 0 | Status: DISCONTINUED | OUTPATIENT
Start: 2018-10-27 | End: 2018-10-30

## 2018-10-27 RX ORDER — AMLODIPINE BESYLATE 2.5 MG/1
10 TABLET ORAL DAILY
Qty: 0 | Refills: 0 | Status: DISCONTINUED | OUTPATIENT
Start: 2018-10-27 | End: 2018-10-30

## 2018-10-27 RX ORDER — DEXTROSE 50 % IN WATER 50 %
12.5 SYRINGE (ML) INTRAVENOUS ONCE
Qty: 0 | Refills: 0 | Status: DISCONTINUED | OUTPATIENT
Start: 2018-10-27 | End: 2018-10-30

## 2018-10-27 RX ORDER — DEXTROSE 50 % IN WATER 50 %
25 SYRINGE (ML) INTRAVENOUS ONCE
Qty: 0 | Refills: 0 | Status: DISCONTINUED | OUTPATIENT
Start: 2018-10-27 | End: 2018-10-30

## 2018-10-27 RX ORDER — SODIUM CHLORIDE 9 MG/ML
1000 INJECTION, SOLUTION INTRAVENOUS
Qty: 0 | Refills: 0 | Status: DISCONTINUED | OUTPATIENT
Start: 2018-10-27 | End: 2018-10-30

## 2018-10-27 RX ORDER — PIPERACILLIN AND TAZOBACTAM 4; .5 G/20ML; G/20ML
3.38 INJECTION, POWDER, LYOPHILIZED, FOR SOLUTION INTRAVENOUS EVERY 8 HOURS
Qty: 0 | Refills: 0 | Status: DISCONTINUED | OUTPATIENT
Start: 2018-10-27 | End: 2018-10-29

## 2018-10-27 RX ORDER — INFLUENZA VIRUS VACCINE 15; 15; 15; 15 UG/.5ML; UG/.5ML; UG/.5ML; UG/.5ML
0.5 SUSPENSION INTRAMUSCULAR ONCE
Qty: 0 | Refills: 0 | Status: COMPLETED | OUTPATIENT
Start: 2018-10-27 | End: 2018-10-27

## 2018-10-27 RX ORDER — CLONAZEPAM 1 MG
1 TABLET ORAL THREE TIMES A DAY
Qty: 0 | Refills: 0 | Status: DISCONTINUED | OUTPATIENT
Start: 2018-10-27 | End: 2018-10-30

## 2018-10-27 RX ORDER — TRAZODONE HCL 50 MG
450 TABLET ORAL AT BEDTIME
Qty: 0 | Refills: 0 | Status: DISCONTINUED | OUTPATIENT
Start: 2018-10-27 | End: 2018-10-30

## 2018-10-27 RX ORDER — OXYCODONE AND ACETAMINOPHEN 5; 325 MG/1; MG/1
1 TABLET ORAL EVERY 4 HOURS
Qty: 0 | Refills: 0 | Status: DISCONTINUED | OUTPATIENT
Start: 2018-10-27 | End: 2018-10-30

## 2018-10-27 RX ORDER — INSULIN LISPRO 100/ML
VIAL (ML) SUBCUTANEOUS
Qty: 0 | Refills: 0 | Status: DISCONTINUED | OUTPATIENT
Start: 2018-10-27 | End: 2018-10-30

## 2018-10-27 RX ORDER — OMEGA-3 ACID ETHYL ESTERS 1 G
2 CAPSULE ORAL DAILY
Qty: 0 | Refills: 0 | Status: DISCONTINUED | OUTPATIENT
Start: 2018-10-27 | End: 2018-10-30

## 2018-10-27 RX ORDER — DEXTROSE 50 % IN WATER 50 %
15 SYRINGE (ML) INTRAVENOUS ONCE
Qty: 0 | Refills: 0 | Status: DISCONTINUED | OUTPATIENT
Start: 2018-10-27 | End: 2018-10-30

## 2018-10-27 RX ORDER — VANCOMYCIN HCL 1 G
1000 VIAL (EA) INTRAVENOUS EVERY 24 HOURS
Qty: 0 | Refills: 0 | Status: DISCONTINUED | OUTPATIENT
Start: 2018-10-27 | End: 2018-10-28

## 2018-10-27 RX ORDER — GLUCAGON INJECTION, SOLUTION 0.5 MG/.1ML
1 INJECTION, SOLUTION SUBCUTANEOUS ONCE
Qty: 0 | Refills: 0 | Status: DISCONTINUED | OUTPATIENT
Start: 2018-10-27 | End: 2018-10-30

## 2018-10-27 RX ORDER — LISINOPRIL 2.5 MG/1
20 TABLET ORAL EVERY 12 HOURS
Qty: 0 | Refills: 0 | Status: DISCONTINUED | OUTPATIENT
Start: 2018-10-27 | End: 2018-10-30

## 2018-10-27 RX ADMIN — PIPERACILLIN AND TAZOBACTAM 25 GRAM(S): 4; .5 INJECTION, POWDER, LYOPHILIZED, FOR SOLUTION INTRAVENOUS at 06:27

## 2018-10-27 RX ADMIN — LISINOPRIL 20 MILLIGRAM(S): 2.5 TABLET ORAL at 06:27

## 2018-10-27 RX ADMIN — OXYCODONE AND ACETAMINOPHEN 1 TABLET(S): 5; 325 TABLET ORAL at 16:51

## 2018-10-27 RX ADMIN — MORPHINE SULFATE 4 MILLIGRAM(S): 50 CAPSULE, EXTENDED RELEASE ORAL at 00:15

## 2018-10-27 RX ADMIN — MORPHINE SULFATE 4 MILLIGRAM(S): 50 CAPSULE, EXTENDED RELEASE ORAL at 00:00

## 2018-10-27 RX ADMIN — PIPERACILLIN AND TAZOBACTAM 25 GRAM(S): 4; .5 INJECTION, POWDER, LYOPHILIZED, FOR SOLUTION INTRAVENOUS at 13:31

## 2018-10-27 RX ADMIN — PIPERACILLIN AND TAZOBACTAM 25 GRAM(S): 4; .5 INJECTION, POWDER, LYOPHILIZED, FOR SOLUTION INTRAVENOUS at 21:53

## 2018-10-27 RX ADMIN — Medication 250 MILLIGRAM(S): at 06:27

## 2018-10-27 RX ADMIN — AMLODIPINE BESYLATE 10 MILLIGRAM(S): 2.5 TABLET ORAL at 06:27

## 2018-10-27 RX ADMIN — OXYCODONE AND ACETAMINOPHEN 1 TABLET(S): 5; 325 TABLET ORAL at 17:51

## 2018-10-27 NOTE — PROGRESS NOTE ADULT - PROBLEM SELECTOR PLAN 7
Very anxious on exam.   - Continue home Clonazepam  ISTOP Reference #: [id number]  10/15/2018 clonazepam 1 mg tablet 90 tabs for 30 days Parish Gill NP

## 2018-10-27 NOTE — H&P ADULT - PROBLEM SELECTOR PLAN 7
Very anxious on exam.   - Continue home Clonazepam  ISTOP Reference #: [id number]  10/15/2018 clonazepam 1 mg tablet 90 tabs for 30 days Parish Gill NP

## 2018-10-27 NOTE — ED ADULT NURSE REASSESSMENT NOTE - NS ED NURSE REASSESS COMMENT FT1
Pt s/p surg c/s, I&D performed.  Pt medicated for pain, vitally stable, in no acute distress. Pt admitted to MED/SURG - awaiting admission orders/bed assignment.  Will monitor.

## 2018-10-27 NOTE — H&P ADULT - ASSESSMENT
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

## 2018-10-27 NOTE — PROGRESS NOTE ADULT - PROBLEM SELECTOR PLAN 2
(1) Has pubic symphyseal collection measuring 6.3 x 5.1 cm w/ questionable connection to groin abscess  (2) Tiny gas-liquid collection in the anterior abdominal wall. Correlate for   recent surgery versus infected collection.    -Await input from surgery regarding need for further I&D.  - Continue antibiotics for now  - Will likely need second drainage of pubic symphysis collection for sample of tissue/culture

## 2018-10-27 NOTE — PROVIDER CONTACT NOTE (OTHER) - BACKGROUND
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

## 2018-10-27 NOTE — CONSULT NOTE ADULT - ATTENDING COMMENTS
agree with consult resident's assessment and recommendations
63M with prostate cancer. bladder cancer, previous Radiation Therapy, cystectomy and ileal conduit placement.  His medical history is remarkable for  DM, HTN, HLD, obesity    The CT scan demonstrates: Erosion of the pubic symphysis with and adjacent fluid collection   measuring 6.3 x 5.1 cm - IR drainage of the fluid collection would be both therapeutic and diagnostic. He will require prolonged antibiotic therapy for pubic symphysis osteomyelitis      Suggest:  Continue Zosyn/Vanco  10/26-->  blood cultures x2: PICC line placement if blood cultures negative x 72 hours  check ESR/CRP  6 weeks duration of IV antbiotics anticipated

## 2018-10-27 NOTE — H&P ADULT - NSHPREVIEWOFSYSTEMS_GEN_ALL_CORE
CONSTITUTIONAL: No fever, chills, + weight loss  EYES: No visual changes   ENMT: No difficulty hearing  RESPIRATORY: No shortness of breath.   CARDIOVASCULAR: No chest pain or palpitations.   GASTROINTESTINAL: No abdominal or epigastric pain. Has ileal conduit  GENITOURINARY: No dysuria, frequency, hematuria or discharge  NEUROLOGICAL: No headaches  SKIN: No rashes   LYMPH NODES: Groin was enlarged  PSYCHIATRIC: Anxiety and difficulty sleeping. CONSTITUTIONAL: No fever, chills, + weight loss  EYES: No visual changes, no jaundice  ENMT: No difficulty hearing, no tinnitus   RESPIRATORY: No shortness of breath, no cough  CARDIOVASCULAR: No chest pain or palpitations.   GASTROINTESTINAL: No abdominal or epigastric pain. Has ileal conduit  GENITOURINARY: No dysuria, frequency, hematuria or discharge  NEUROLOGICAL: No headaches, no focal weakness  SKIN: No rashes, no wounds, no itching  LYMPH NODES: Groin was enlarged  PSYCHIATRIC: Anxiety and difficulty sleeping.  ENDOCRINE: No heat or cold intolerance

## 2018-10-27 NOTE — CONSULT NOTE ADULT - ASSESSMENT
63M   bladder cancer s/p ileal conduit  left groin and pub symphysis abscesses with associated OM   s/p I&D without cultures sent   blood cultures in lab   on vancomycin and Zosyn   based on size should have culture and antibiotics 63M s/p prostatectomy for prostate cancer, radical cystectomy with ileal conduit creation in May for bladder obstruction   Left groin and pub symphysis abscesses with associated OM. s/p I&D of left groin/thigh without cultures sent.     Recommend   -continue Vancomycin and Zosyn   -check Vancomycin level tomorrow   -f/u blood cultures in lab   -needs further I&D of pubic symphysis collection with culture   -ESR CRP ordered for tomorrow     Discussed with Dr Romo and primary tea [id number]

## 2018-10-27 NOTE — H&P ADULT - PROBLEM SELECTOR PLAN 9
DVT ppx: SCDs given possible intervention on second fluid collection  Diet: CC diet/DASH    Roxana Pepe MD  Internal Medicine, PGY-2  Pager [phone]

## 2018-10-27 NOTE — H&P ADULT - PROBLEM SELECTOR PLAN 2
(1) Has pubic symphyseal collection measuring 6.3 x 5.1 cm w/ questionable connection to groin abscess  (2) Tiny gas-liquid collection in the anterior abdominal wall. Correlate for   recent surgery versus infected collection.    Please touch base with surgery in AM regarding both findings and if surgical intervention will be required for second abscess and tiny gas-liquid collection.  - Continue antibiotics for now  - May need second drainage of pubic symphysis collection for sample of tissue/culture (1) Has pubic symphyseal collection measuring 6.3 x 5.1 cm w/ questionable connection to groin abscess  (2) Tiny gas-liquid collection in the anterior abdominal wall. Correlate for   recent surgery versus infected collection.    Please touch base with surgery in AM regarding both findings and if surgical intervention will be required for second abscess and tiny gas-liquid collection.  - Continue antibiotics for now  - Will likely need second drainage of pubic symphysis collection for sample of tissue/culture

## 2018-10-27 NOTE — H&P ADULT - NSHPLABSRESULTS_GEN_ALL_CORE
IMPRESSION: Study is limited by lack of intravenous contrast.     Pubic symphyseal osteomyelitis with a thick-walled fluid collection as   measured above.   Left groin abscess with involvement of the left abductor muscle.   Questionable connection to the pubic symphyseal collection.  Tiny gas-liquid collection in the anterior abdominal wall. Correlate for   recent surgery versus infected collection.    Moderate left and mild right hydronephrosis. Patient is status post ileal   conduit formation. 8.1    10.39 )-----------( 338      ( 26 Oct 2018 18:24 )             27.2     CBC Full  -  ( 26 Oct 2018 18:24 )  WBC Count : 10.39 K/uL  Hemoglobin : 8.1 g/dL  Hematocrit : 27.2 %  Platelet Count - Automated : 338 K/uL  Mean Cell Volume : 77.9 fL  Mean Cell Hemoglobin : 23.2 pg  Mean Cell Hemoglobin Concentration : 29.8 %  Auto Neutrophil # : 7.54 K/uL  Auto Lymphocyte # : 1.67 K/uL  Auto Monocyte # : 0.82 K/uL  Auto Eosinophil # : 0.19 K/uL  Auto Basophil # : 0.03 K/uL  Auto Neutrophil % : 72.6 %  Auto Lymphocyte % : 16.1 %  Auto Monocyte % : 7.9 %  Auto Eosinophil % : 1.8 %  Auto Basophil % : 0.3 %    10-26    135  |  103  |  41<H>  ----------------------------<  93  5.4<H>   |  16<L>  |  2.11<H>    Ca    9.7      26 Oct 2018 17:40    TPro  7.5  /  Alb  2.8<L>  /  TBili  0.2  /  DBili  x   /  AST  24  /  ALT  20  /  AlkPhos  289<H>  10-26      CARDIAC MARKERS ( 26 Oct 2018 17:40 )  x     / x     / 51 u/L / x     / x        VBG lactate: Blood Gas Venous - Lactate: 1.1 mmol/L (10-26-18 @ 18:24)          IMPRESSION: Study is limited by lack of intravenous contrast.     Pubic symphyseal osteomyelitis with a thick-walled fluid collection as   measured above.   Left groin abscess with involvement of the left abductor muscle.   Questionable connection to the pubic symphyseal collection.  Tiny gas-liquid collection in the anterior abdominal wall. Correlate for   recent surgery versus infected collection.    Moderate left and mild right hydronephrosis. Patient is status post ileal   conduit formation.

## 2018-10-27 NOTE — PROGRESS NOTE ADULT - PROBLEM SELECTOR PLAN 1
CT scan with erosion of the pubic symphysis with and adjacent fluid collection   measuring 6.3 x 5.1 cm. Sclerosis of the L3 vertebral body, new from   prior study.  - Holding off MRI given evidence of osteomyelitis on CT  - Continue vancomycin and zosyn (may need to renally dose zosyn once weight is obtained).   - ID consult and orthopedic surgery consult in AM given CT evidence  - Will likely drainage and sampling of second collection (cytology and cultures given cancer history and infection concern)  - blood cultures pending.

## 2018-10-27 NOTE — PROGRESS NOTE ADULT - PROBLEM SELECTOR PLAN 9
DVT ppx: SCDs given possible intervention on second fluid collection  Diet: CC diet/DASH    Roxana Pepe MD  Internal Medicine, PGY-2  Pager [phone]

## 2018-10-27 NOTE — PROGRESS NOTE ADULT - PROBLEM SELECTOR PLAN 4
Has history of high grade cancer s/p surgery, radiation and multiple surgeries  - Will need urology consult in AM given multiple fluid collections and possible post surgical changes in abdomen  - Has sclerosis of L3 (unclear etiology, please t/b with urology)

## 2018-10-27 NOTE — H&P ADULT - PROBLEM SELECTOR PLAN 4
Has history of high grade cancer s/p surgery, radiation and multiple surgeries  - Will need urology consult in AM given multiple fluid collections and possible post surgical changes in abdomen Has history of high grade cancer s/p surgery, radiation and multiple surgeries  - Will need urology consult in AM given multiple fluid collections and possible post surgical changes in abdomen  - Has sclerosis of L3 (unclear etiology, please t/b with urology)

## 2018-10-27 NOTE — H&P ADULT - PROBLEM SELECTOR PLAN 1
CT scan with erosion of the pubic symphysis with and adjacent fluid collection   measuring 6.3 x 5.1 cm. Sclerosis of the L3 vertebral body, new from   prior study.  - Pending MRI w/o contrast. Patient is able to sign consent and denies any metal.   - Continue vancomycin and zosyn (may need to renally dose zosyn once weight is obtained).   - Consider ID consult in AM  - Consider orthopedic surgery consult for further evaluation  - Unclear if culture was sent from I+D (no specimen received), blood cultures pending. CT scan with erosion of the pubic symphysis with and adjacent fluid collection   measuring 6.3 x 5.1 cm. Sclerosis of the L3 vertebral body, new from   prior study.  - Holding off MRI given evidence of osteomyelitis on CT  - Continue vancomycin and zosyn (may need to renally dose zosyn once weight is obtained).   - ID consult and orthopedic surgery consult in AM given CT evidence  - Will likely drainage and sampling of second collection (cytology and cultures given cancer history and infection concern)  - Unclear if culture was sent from I+D (no specimen received), blood cultures pending.

## 2018-10-27 NOTE — CONSULT NOTE ADULT - SUBJECTIVE AND OBJECTIVE BOX
HPI:  64 yo M PM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     Initially patient had noticed a penile cystic lesion about a week ago which had self-drained with blood. He then noted swelling two days ago. He ha snot had blood, pus or discharge from his penis. He denied any dysuria, rashes, ulcers or fevers. He reports seeing his outpatient urologist who told him to go to the emergency department.     While in the ED: T97.3, 60, 133/69, 18, 100% RA. He received a 1 L IVF, tylenol, vancomycin, zosyn and morphine. He had a non-con CT with pubic symphyseal osteomyelitis with a thick walled fluid collection, left groin abscess with involvement of L abductor muscle, questionable connection to the pubic symphyseal collection, tiny gas-liquid collection of anterior abdominal wall. Moderate L and mild R hydronephrosis w/ ileal conduit. He was evaluated by surgery who performed an I+D for the thigh abscess by surgery. (27 Oct 2018 03:39)      PAST MEDICAL & SURGICAL HISTORY:  Unspecified hydronephrosis  Malignant neoplasm of bladder  Diabetes mellitus: pre diabetic  Obese  Other calculus in bladder  Urethral stricture  Urinary (tract) obstruction  UTI (urinary tract infection)  Major depressive disorder  Anxiety  HTN (Hypertension)  Prostate Cancer  Bladder disease: Bladder Removed  Nephrostomy status: Left, 8/1/2018  S/P ileal conduit: May 2018  History of bladder cancer: bladder removal May 10, 2018  Suprapubic catheter: 8/2015  History of prostatectomy: robotic, 2011, s/p radiation  H/O cystoscopy: - multiple  last cystoscopy, laser litholapaxy on 1/2018  S/P Appendectomy: 40 years ago      Allergies    No Known Allergies    Intolerances        ANTIMICROBIALS:  piperacillin/tazobactam IVPB. 3.375 every 8 hours  vancomycin  IVPB 1000 every 24 hours      OTHER MEDS:  amLODIPine   Tablet 10 milliGRAM(s) Oral daily  clonazePAM Tablet 1 milliGRAM(s) Oral three times a day PRN  dextrose 40% Gel 15 Gram(s) Oral once PRN  dextrose 5%. 1000 milliLiter(s) IV Continuous <Continuous>  dextrose 50% Injectable 12.5 Gram(s) IV Push once  dextrose 50% Injectable 25 Gram(s) IV Push once  dextrose 50% Injectable 25 Gram(s) IV Push once  docusate sodium 100 milliGRAM(s) Oral three times a day PRN  glucagon  Injectable 1 milliGRAM(s) IntraMuscular once PRN  influenza   Vaccine 0.5 milliLiter(s) IntraMuscular once  insulin lispro (HumaLOG) corrective regimen sliding scale   SubCutaneous three times a day before meals  insulin lispro (HumaLOG) corrective regimen sliding scale   SubCutaneous at bedtime  lisinopril 20 milliGRAM(s) Oral every 12 hours  Nephro-kunal 1 Tablet(s) Oral daily  omega-3-Acid Ethyl Esters 2 Gram(s) Oral daily  senna 2 Tablet(s) Oral at bedtime PRN  traZODone 450 milliGRAM(s) Oral at bedtime      SOCIAL HISTORY:    FAMILY HISTORY:  No pertinent family history in first degree relatives    No pertinent family history in relation to chief complaint     ROS:  Unobtainable because:   All other systems negative   Constitutional: no fever, no chills, no weight loss, no night sweats  HEENT:  no vision changes, no sore throat, no rhinorrhea  Cardiovascular:  no chest pain, no palpitation  Respiratory:  no SOB, no cough  GI:  no abd pain, no vomiting, no diarrhea  urinary: no dysuria, no hematuria, no flank pain  musculoskeletal:  no joint pain, no joint swelling  skin:  no rash  neurology:  no headache, no seizure, no change in mental status  heme: no bleeding    Physical Exam:  General:    NAD, non toxic, A&O x3  HEENT:   no oropharyngeal lesions, no LAD, neck supple  Cardiovascular:    regular rate and rhythm   Respiratory: nonlabored on room air, clear bilaterally, no wheezing  abd:   soft, bowel sounds present, not tender, no hepatosplenomegaly  :     no CVAT, no spurapubic tenderness, no carreno  Musculoskeletal : no joint swelling  Skin:    no rash  Neurologic:     no focal deficits  Vascular: no edema, no phlebitis   Psych: normal affect    Drug Dosing Weight  Height (cm): 182.88 (27 Oct 2018 03:36)  Weight (kg): 101.6 (27 Oct 2018 03:36)  BMI (kg/m2): 30.4 (27 Oct 2018 03:36)  BSA (m2): 2.24 (27 Oct 2018 03:36)    Vital Signs Last 24 Hrs  T(F): 98 (10-27-18 @ 06:21), Max: 98 (10-27-18 @ 00:27)    Vital Signs Last 24 Hrs  HR: 55 (10-27-18 @ 06:21) (54 - 62)  BP: 145/65 (10-27-18 @ 06:21) (132/75 - 151/78)  RR: 18 (10-27-18 @ 06:21)  SpO2: 98% (10-27-18 @ 06:21) (98% - 100%)  Wt(kg): --                          8.1    10.39 )-----------( 338      ( 26 Oct 2018 18:24 )             27.2       10-26    135  |  103  |  41<H>  ----------------------------<  93  5.4<H>   |  16<L>  |  2.11<H>    Ca    9.7      26 Oct 2018 17:40    TPro  7.5  /  Alb  2.8<L>  /  TBili  0.2  /  DBili  x   /  AST  24  /  ALT  20  /  AlkPhos  289<H>  10-26          MICROBIOLOGY:  v              RADIOLOGY: HPI:  63M with history of Prostate cancer s/p prostatectomy with resultant urinary obstruction at the bladder neck, eventually underwent radical cystectomy and ileal conduit creation 5/2018 with findings of Urothelial carcinoma on pathology. In August required incision of a Left ureter stricture with stent placement which has since been removed.   One week ago developed a small pimple-like lesion to his suprapubic region which popped, bled and got better on its own. A few days ago he developed swelling     HTN, HLD, T2DM, who presents with groin abscess.     Initially patient had noticed a penile cystic lesion about a week ago which had self-drained with blood. He then noted swelling two days ago. He ha snot had blood, pus or discharge from his penis. He denied any dysuria, rashes, ulcers or fevers. He reports seeing his outpatient urologist who told him to go to the emergency department.     While in the ED: T97.3, 60, 133/69, 18, 100% RA. He received a 1 L IVF, tylenol, vancomycin, zosyn and morphine. He had a non-con CT with pubic symphyseal osteomyelitis with a thick walled fluid collection, left groin abscess with involvement of L abductor muscle, questionable connection to the pubic symphyseal collection, tiny gas-liquid collection of anterior abdominal wall. Moderate L and mild R hydronephrosis w/ ileal conduit. He was evaluated by surgery who performed an I+D for the thigh abscess by surgery. (27 Oct 2018 03:39)      PAST MEDICAL & SURGICAL HISTORY:  Unspecified hydronephrosis  Malignant neoplasm of bladder  Diabetes mellitus: pre diabetic  Obese  Other calculus in bladder  Urethral stricture  Urinary (tract) obstruction  UTI (urinary tract infection)  Major depressive disorder  Anxiety  HTN (Hypertension)  Prostate Cancer  Bladder disease: Bladder Removed  Nephrostomy status: Left, 8/1/2018  S/P ileal conduit: May 2018  History of bladder cancer: bladder removal May 10, 2018  Suprapubic catheter: 8/2015  History of prostatectomy: robotic, 2011, s/p radiation  H/O cystoscopy: - multiple  last cystoscopy, laser litholapaxy on 1/2018  S/P Appendectomy: 40 years ago      Allergies    No Known Allergies    Intolerances        ANTIMICROBIALS:  piperacillin/tazobactam IVPB. 3.375 every 8 hours  vancomycin  IVPB 1000 every 24 hours      OTHER MEDS:  amLODIPine   Tablet 10 milliGRAM(s) Oral daily  clonazePAM Tablet 1 milliGRAM(s) Oral three times a day PRN  dextrose 40% Gel 15 Gram(s) Oral once PRN  dextrose 5%. 1000 milliLiter(s) IV Continuous <Continuous>  dextrose 50% Injectable 12.5 Gram(s) IV Push once  dextrose 50% Injectable 25 Gram(s) IV Push once  dextrose 50% Injectable 25 Gram(s) IV Push once  docusate sodium 100 milliGRAM(s) Oral three times a day PRN  glucagon  Injectable 1 milliGRAM(s) IntraMuscular once PRN  influenza   Vaccine 0.5 milliLiter(s) IntraMuscular once  insulin lispro (HumaLOG) corrective regimen sliding scale   SubCutaneous three times a day before meals  insulin lispro (HumaLOG) corrective regimen sliding scale   SubCutaneous at bedtime  lisinopril 20 milliGRAM(s) Oral every 12 hours  Nephro-kunal 1 Tablet(s) Oral daily  omega-3-Acid Ethyl Esters 2 Gram(s) Oral daily  senna 2 Tablet(s) Oral at bedtime PRN  traZODone 450 milliGRAM(s) Oral at bedtime      SOCIAL HISTORY:    FAMILY HISTORY:  No pertinent family history in first degree relatives    No pertinent family history in relation to chief complaint     ROS:  Unobtainable because:   All other systems negative   Constitutional: no fever, no chills, no weight loss, no night sweats  HEENT:  no vision changes, no sore throat, no rhinorrhea  Cardiovascular:  no chest pain, no palpitation  Respiratory:  no SOB, no cough  GI:  no abd pain, no vomiting, no diarrhea  urinary: no dysuria, no hematuria, no flank pain  musculoskeletal:  no joint pain, no joint swelling  skin:  no rash  neurology:  no headache, no seizure, no change in mental status  heme: no bleeding    Physical Exam:  General:    NAD, non toxic, A&O x3  HEENT:   no oropharyngeal lesions, no LAD, neck supple  Cardiovascular:    regular rate and rhythm   Respiratory: nonlabored on room air, clear bilaterally, no wheezing  abd:   soft, bowel sounds present, not tender, no hepatosplenomegaly  :     no CVAT, no spurapubic tenderness, no carreno  Musculoskeletal : no joint swelling  Skin:    no rash  Neurologic:     no focal deficits  Vascular: no edema, no phlebitis   Psych: normal affect    Drug Dosing Weight  Height (cm): 182.88 (27 Oct 2018 03:36)  Weight (kg): 101.6 (27 Oct 2018 03:36)  BMI (kg/m2): 30.4 (27 Oct 2018 03:36)  BSA (m2): 2.24 (27 Oct 2018 03:36)    Vital Signs Last 24 Hrs  T(F): 98 (10-27-18 @ 06:21), Max: 98 (10-27-18 @ 00:27)    Vital Signs Last 24 Hrs  HR: 55 (10-27-18 @ 06:21) (54 - 62)  BP: 145/65 (10-27-18 @ 06:21) (132/75 - 151/78)  RR: 18 (10-27-18 @ 06:21)  SpO2: 98% (10-27-18 @ 06:21) (98% - 100%)  Wt(kg): --                          8.1    10.39 )-----------( 338      ( 26 Oct 2018 18:24 )             27.2       10-26    135  |  103  |  41<H>  ----------------------------<  93  5.4<H>   |  16<L>  |  2.11<H>    Ca    9.7      26 Oct 2018 17:40    TPro  7.5  /  Alb  2.8<L>  /  TBili  0.2  /  DBili  x   /  AST  24  /  ALT  20  /  AlkPhos  289<H>  10-26          MICROBIOLOGY:  v              RADIOLOGY: HPI:  63M with history of Prostate cancer s/p prostatectomy with resultant urinary obstruction at the bladder neck, eventually underwent radical cystectomy and ileal conduit creation 5/2018 with findings of Urothelial carcinoma on pathology. In August required incision of a Left ureter stricture with stent placement which has since been removed.   One week ago developed a small pimple-like lesion to his suprapubic region which popped, bled and got better on its own. A few days ago he developed swelling     HTN, HLD, T2DM, who presents with groin abscess.     Initially patient had noticed a penile cystic lesion about a week ago which had self-drained with blood. He then noted swelling two days ago. He ha snot had blood, pus or discharge from his penis. He denied any dysuria, rashes, ulcers or fevers. He reports seeing his outpatient urologist who told him to go to the emergency department.     While in the ED: T97.3, 60, 133/69, 18, 100% RA. He received a 1 L IVF, tylenol, vancomycin, zosyn and morphine. He had a non-con CT with pubic symphyseal osteomyelitis with a thick walled fluid collection, left groin abscess with involvement of L abductor muscle, questionable connection to the pubic symphyseal collection, tiny gas-liquid collection of anterior abdominal wall. Moderate L and mild R hydronephrosis w/ ileal conduit. He was evaluated by surgery who performed an I+D for the thigh abscess by surgery. (27 Oct 2018 03:39)      PAST MEDICAL & SURGICAL HISTORY:  Unspecified hydronephrosis  Malignant neoplasm of bladder  Diabetes mellitus: pre diabetic  Obese  Other calculus in bladder  Urethral stricture  Urinary (tract) obstruction  UTI (urinary tract infection)  Major depressive disorder  Anxiety  HTN (Hypertension)  Prostate Cancer  Bladder disease: Bladder Removed  Nephrostomy status: Left, 8/1/2018  S/P ileal conduit: May 2018  History of bladder cancer: bladder removal May 10, 2018  Suprapubic catheter: 8/2015  History of prostatectomy: robotic, 2011, s/p radiation  H/O cystoscopy: - multiple  last cystoscopy, laser litholapaxy on 1/2018  S/P Appendectomy: 40 years ago      Allergies  No Known Allergies        ANTIMICROBIALS:  piperacillin/tazobactam IVPB. 3.375 every 8 hours  vancomycin  IVPB 1000 every 24 hours      OTHER MEDS:  amLODIPine   Tablet 10 milliGRAM(s) Oral daily  clonazePAM Tablet 1 milliGRAM(s) Oral three times a day PRN  dextrose 40% Gel 15 Gram(s) Oral once PRN  dextrose 5%. 1000 milliLiter(s) IV Continuous <Continuous>  dextrose 50% Injectable 12.5 Gram(s) IV Push once  dextrose 50% Injectable 25 Gram(s) IV Push once  dextrose 50% Injectable 25 Gram(s) IV Push once  docusate sodium 100 milliGRAM(s) Oral three times a day PRN  glucagon  Injectable 1 milliGRAM(s) IntraMuscular once PRN  influenza   Vaccine 0.5 milliLiter(s) IntraMuscular once  insulin lispro (HumaLOG) corrective regimen sliding scale   SubCutaneous three times a day before meals  insulin lispro (HumaLOG) corrective regimen sliding scale   SubCutaneous at bedtime  lisinopril 20 milliGRAM(s) Oral every 12 hours  Nephro-kunal 1 Tablet(s) Oral daily  omega-3-Acid Ethyl Esters 2 Gram(s) Oral daily  senna 2 Tablet(s) Oral at bedtime PRN  traZODone 450 milliGRAM(s) Oral at bedtime    SOCIAL HISTORY:    FAMILY HISTORY:  No pertinent family history in first degree relatives    No pertinent family history in relation to chief complaint     ROS:  Unobtainable because:   All other systems negative   Constitutional: no fever, no chills, no weight loss, no night sweats  HEENT:  no vision changes, no sore throat, no rhinorrhea  Cardiovascular:  no chest pain, no palpitation  Respiratory:  no SOB, no cough  GI:  no abd pain, no vomiting, no diarrhea  urinary: no dysuria, no hematuria, no flank pain  musculoskeletal:  no joint pain, no joint swelling  skin:  no rash  neurology:  no headache, no seizure, no change in mental status  heme: no bleeding    Physical Exam:  General:    NAD, non toxic, A&O x3  HEENT:   no oropharyngeal lesions, no LAD, neck supple  Cardiovascular:    regular rate and rhythm   Respiratory: nonlabored on room air, clear bilaterally, no wheezing  abd:   soft, bowel sounds present, not tender, no hepatosplenomegaly  :     induration in superpubic region, functing urostomy; no CVAT, no spurapubic tenderness, no carreno  Musculoskeletal : no joint swelling  Skin:    no rash  Neurologic:     no focal deficits  Vascular: no edema, no phlebitis   Psych: normal affect    Drug Dosing Weight  Height (cm): 182.88 (27 Oct 2018 03:36)  Weight (kg): 101.6 (27 Oct 2018 03:36)  BMI (kg/m2): 30.4 (27 Oct 2018 03:36)  BSA (m2): 2.24 (27 Oct 2018 03:36)    Vital Signs Last 24 Hrs  T(F): 98 (10-27-18 @ 06:21), Max: 98 (10-27-18 @ 00:27)    Vital Signs Last 24 Hrs  HR: 55 (10-27-18 @ 06:21) (54 - 62)  BP: 145/65 (10-27-18 @ 06:21) (132/75 - 151/78)  RR: 18 (10-27-18 @ 06:21)  SpO2: 98% (10-27-18 @ 06:21) (98% - 100%)  Wt(kg): --                          8.1    10.39 )-----------( 338      ( 26 Oct 2018 18:24 )             27.2       10-26    135  |  103  |  41<H>  ----------------------------<  93  5.4<H>   |  16<L>  |  2.11<H>    Ca    9.7      26 Oct 2018 17:40    TPro  7.5  /  Alb  2.8<L>  /  TBili  0.2  /  DBili  x   /  AST  24  /  ALT  20  /  AlkPhos  289<H>  10-26          MICROBIOLOGY:  none    RADIOLOGY: images viewed  < from: CT Abdomen and Pelvis No Cont (10.26.18 @ 20:25) >  FINDINGS:    LOWER CHEST: Trace bilateral pleural effusions with adjacent atelectasis.   Trace pericardial effusion.    LIVER: Within normal limits.  BILE DUCTS: Normal caliber.  GALLBLADDER: Within normal limits.  SPLEEN: Within normal limits.  PANCREAS: Within normal limits.  ADRENALS: Within normal limits.  KIDNEYS/URETERS: Moderate left and mild right hydronephrosis to the level   of the ileal conduit. No urolithiasis.    BLADDER: Status post cystectomy with ileal conduit.  REPRODUCTIVE ORGANS: Status post prostatectomy.     BOWEL: No bowel obstruction. Status post ileal conduit formation.  PERITONEUM: No ascites.  VESSELS:  Atherosclerotic disease.  RETROPERITONEUM: No lymphadenopathy.    ABDOMINAL WALL: Postsurgical ventral abdominal wall changes. There is a   small gas-liquid pocket in the anterior abdominal wall below the level of   the umbilicus which measures 1.7 x 1.5 cm. Left medial upper thigh fluid   collection with a connection to the abductor musculature. This measures   11 x 5.8 x 7.6 cm. This may be connected to the pubic symphysis   collection.  BONES: Erosion of the pubic symphysis with and adjacent fluid collection   measuring 6.3 x 5.1 cm.. Sclerosis of the L3 vertebral body, new from   prior study.    IMPRESSION: Study is limited by lack of intravenous contrast.     Pubic symphyseal osteomyelitis with a thick-walled fluid collection as   measured above.   Left groin abscess with involvement of the left abductor muscle.   Questionable connection to the pubic symphyseal collection.  Tiny gas-liquid collection in the anterior abdominal wall. Correlate for   recent surgery versus infected collection.    Moderate left and mild right hydronephrosis. Patient is status post ileal   conduit formation.    < end of copied text > HPI:  63M with history of Prostate cancer s/p prostatectomy and radiation therapy with resultant urinary obstruction at the bladder neck, eventually underwent radical cystectomy and ileal conduit creation 5/2018 with findings of Urothelial carcinoma on pathology. In August required incision of a Left ureter stricture with stent placement (since removed).   One week ago he developed a small pimple-like lesion to his suprapubic region which popped, bled and got better on its own. A few days ago he developed swelling to his left medial thigh/groin that was not very painful but uncomfortable and irritated by clothes. CT here with a left upper thigh collection involving the adductor muscle and another collection at the pubic symphysis with associated osteomyelitis. No fevers or chills. Left thigh abscess drained by surgery but no cultures sent.   Feels fine today, just very tired from a lack of sleep.     PAST MEDICAL & SURGICAL HISTORY:  Unspecified hydronephrosis  Malignant neoplasm of bladder  Diabetes mellitus: pre diabetic  Obese  Other calculus in bladder  Urethral stricture  Urinary (tract) obstruction  UTI (urinary tract infection)  Major depressive disorder  Anxiety  HTN (Hypertension)  Prostate Cancer  Bladder disease: Bladder Removed  Nephrostomy status: Left, 8/1/2018  S/P ileal conduit: May 2018  History of bladder cancer: bladder removal May 10, 2018  Suprapubic catheter: 8/2015  History of prostatectomy: robotic, 2011, s/p radiation  H/O cystoscopy: - multiple  last cystoscopy, laser litholapaxy on 1/2018  S/P Appendectomy: 40 years ago      Allergies  No Known Allergies        ANTIMICROBIALS:  piperacillin/tazobactam IVPB. 3.375 every 8 hours  vancomycin  IVPB 1000 every 24 hours      OTHER MEDS:  amLODIPine   Tablet 10 milliGRAM(s) Oral daily  clonazePAM Tablet 1 milliGRAM(s) Oral three times a day PRN  dextrose 40% Gel 15 Gram(s) Oral once PRN  dextrose 5%. 1000 milliLiter(s) IV Continuous <Continuous>  dextrose 50% Injectable 12.5 Gram(s) IV Push once  dextrose 50% Injectable 25 Gram(s) IV Push once  dextrose 50% Injectable 25 Gram(s) IV Push once  docusate sodium 100 milliGRAM(s) Oral three times a day PRN  glucagon  Injectable 1 milliGRAM(s) IntraMuscular once PRN  influenza   Vaccine 0.5 milliLiter(s) IntraMuscular once  insulin lispro (HumaLOG) corrective regimen sliding scale   SubCutaneous three times a day before meals  insulin lispro (HumaLOG) corrective regimen sliding scale   SubCutaneous at bedtime  lisinopril 20 milliGRAM(s) Oral every 12 hours  Nephro-kunal 1 Tablet(s) Oral daily  omega-3-Acid Ethyl Esters 2 Gram(s) Oral daily  senna 2 Tablet(s) Oral at bedtime PRN  traZODone 450 milliGRAM(s) Oral at bedtime    SOCIAL HISTORY: Worked as a . Lives alone, has a girlfriend. Nonsmoker.     FAMILY HISTORY:  No pertinent family history in relation to chief complaint     ROS:  All other systems negative   Constitutional: no fever, no chills, no weight loss, no night sweats  HEENT:  no vision changes, no sore throat, no rhinorrhea  Cardiovascular:  no chest pain, no palpitation  Respiratory:  no SOB, no cough  GI:  no abd pain, no vomiting, no diarrhea  urinary: urinates through ileal conduit. no hematuria, no suprapubic pain, no flank pain  musculoskeletal:  no joint pain, no joint swelling  skin:  wound left thigh, still somewhat painful   neurology:  no headache, no change in mental status  heme: no bleeding    Physical Exam:  General:    NAD, non toxic, A&O x3  HEENT:   no oropharyngeal lesions, no LAD, neck supple  Cardiovascular:    regular rate and rhythm   Respiratory: nonlabored on room air, clear bilaterally, no wheezing  abd:   soft, bowel sounds present, not tender, no hepatosplenomegaly  :     induration in superpubic region, functing urostomy; no CVAT, no spurapubic tenderness, no carreno  Musculoskeletal : no joint swelling  Skin:    no rash. left upper medial thigh with wound s/p I&D, packed, residual palpable induration and firmness   Neurologic:     no focal deficits  Vascular: no edema, no phlebitis   Psych: normal affect    Drug Dosing Weight  Height (cm): 182.88 (27 Oct 2018 03:36)  Weight (kg): 101.6 (27 Oct 2018 03:36)  BMI (kg/m2): 30.4 (27 Oct 2018 03:36)  BSA (m2): 2.24 (27 Oct 2018 03:36)    Vital Signs Last 24 Hrs  T(F): 98 (10-27-18 @ 06:21), Max: 98 (10-27-18 @ 00:27)    Vital Signs Last 24 Hrs  HR: 55 (10-27-18 @ 06:21) (54 - 62)  BP: 145/65 (10-27-18 @ 06:21) (132/75 - 151/78)  RR: 18 (10-27-18 @ 06:21)  SpO2: 98% (10-27-18 @ 06:21) (98% - 100%)  Wt(kg): --                          8.1    10.39 )-----------( 338      ( 26 Oct 2018 18:24 )             27.2       10-26    135  |  103  |  41<H>  ----------------------------<  93  5.4<H>   |  16<L>  |  2.11<H>    Ca    9.7      26 Oct 2018 17:40    TPro  7.5  /  Alb  2.8<L>  /  TBili  0.2  /  DBili  x   /  AST  24  /  ALT  20  /  AlkPhos  289<H>  10-26          MICROBIOLOGY:  none    RADIOLOGY: images viewed  < from: CT Abdomen and Pelvis No Cont (10.26.18 @ 20:25) >  FINDINGS:    LOWER CHEST: Trace bilateral pleural effusions with adjacent atelectasis.   Trace pericardial effusion.    LIVER: Within normal limits.  BILE DUCTS: Normal caliber.  GALLBLADDER: Within normal limits.  SPLEEN: Within normal limits.  PANCREAS: Within normal limits.  ADRENALS: Within normal limits.  KIDNEYS/URETERS: Moderate left and mild right hydronephrosis to the level   of the ileal conduit. No urolithiasis.    BLADDER: Status post cystectomy with ileal conduit.  REPRODUCTIVE ORGANS: Status post prostatectomy.     BOWEL: No bowel obstruction. Status post ileal conduit formation.  PERITONEUM: No ascites.  VESSELS:  Atherosclerotic disease.  RETROPERITONEUM: No lymphadenopathy.    ABDOMINAL WALL: Postsurgical ventral abdominal wall changes. There is a   small gas-liquid pocket in the anterior abdominal wall below the level of   the umbilicus which measures 1.7 x 1.5 cm. Left medial upper thigh fluid   collection with a connection to the abductor musculature. This measures   11 x 5.8 x 7.6 cm. This may be connected to the pubic symphysis   collection.  BONES: Erosion of the pubic symphysis with and adjacent fluid collection   measuring 6.3 x 5.1 cm.. Sclerosis of the L3 vertebral body, new from   prior study.    IMPRESSION: Study is limited by lack of intravenous contrast.     Pubic symphyseal osteomyelitis with a thick-walled fluid collection as   measured above.   Left groin abscess with involvement of the left abductor muscle.   Questionable connection to the pubic symphyseal collection.  Tiny gas-liquid collection in the anterior abdominal wall. Correlate for   recent surgery versus infected collection.    Moderate left and mild right hydronephrosis. Patient is status post ileal   conduit formation.    < end of copied text >

## 2018-10-27 NOTE — H&P ADULT - HISTORY OF PRESENT ILLNESS
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     Initially patient had noticed a penile cystic lesion about a week ago which had self-drained with blood. He then noted swelling two days ago. He ha snot had blood, pus or discharge from his penis. He denied any dysuria, rashes, ulcers or fevers. He reports seeing his outpatient urologist who told him to go to the emergency department.     While in the ED: T97.3, 60, 133/69, 18, 100% RA. 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     Initially patient had noticed a penile cystic lesion about a week ago which had self-drained with blood. He then noted swelling two days ago. He ha snot had blood, pus or discharge from his penis. He denied any dysuria, rashes, ulcers or fevers. He reports seeing his outpatient urologist who told him to go to the emergency department.     While in the ED: T97.3, 60, 133/69, 18, 100% RA. He received a 1 L IVF, tylenol, vancomycin, zosyn and morphine. He had a non-con CT with pubic symphyseal osteomyelitis with a thick walled fluid collection, left groin abscess with involvement of L abductor muscle, questionable connection to the pubic symphyseal collection, tiny gas-liquid collection of anterior abdominal wall. Moderate L and mild R hydronephrosis w/ ileal conduit. He was evaluated by surgery who performed an I+D for the thigh abscess by surgery. 64 yo M PM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     Initially patient had noticed a penile cystic lesion about a week ago which had self-drained with blood. He then noted swelling two days ago. He ha snot had blood, pus or discharge from his penis. He denied any dysuria, rashes, ulcers or fevers. He reports seeing his outpatient urologist who told him to go to the emergency department.     While in the ED: T97.3, 60, 133/69, 18, 100% RA. He received a 1 L IVF, tylenol, vancomycin, zosyn and morphine. He had a non-con CT with pubic symphyseal osteomyelitis with a thick walled fluid collection, left groin abscess with involvement of L abductor muscle, questionable connection to the pubic symphyseal collection, tiny gas-liquid collection of anterior abdominal wall. Moderate L and mild R hydronephrosis w/ ileal conduit. He was evaluated by surgery who performed an I+D for the thigh abscess by surgery.

## 2018-10-27 NOTE — ED ADULT NURSE REASSESSMENT NOTE - NS ED NURSE REASSESS COMMENT FT1
Pt in no acute distress. Hand off report given to [facility] RN.  Awaiting admission bed.  Vitally stable.  Dressing CDI to L Groin abscess s/p I&D by Surg team.  Pt has ext tube from Urostomy bag to large drainage urinary bag to bedside.  Transported in stretcher to [facility] by EDT

## 2018-10-27 NOTE — H&P ADULT - PROBLEM SELECTOR PLAN 3
Initially came in for groin abscess w/ connection to the abductor musculature. Measuring 11 x 5.8 x 7.6 cm. Now s/p I+D by surgery.

## 2018-10-27 NOTE — H&P ADULT - NSHPPHYSICALEXAM_GEN_ALL_CORE
Vital Signs Last 24 Hrs  T(C): 36.7 (27 Oct 2018 03:36), Max: 36.7 (27 Oct 2018 00:27)  T(F): 98 (27 Oct 2018 03:36), Max: 98 (27 Oct 2018 00:27)  HR: 58 (27 Oct 2018 03:36) (54 - 62)  BP: 141/56 (27 Oct 2018 03:36) (132/75 - 151/78)  BP(mean): --  RR: 18 (27 Oct 2018 03:36) (14 - 18)  SpO2: 99% (27 Oct 2018 03:36) (99% - 100%)  I&O's Summary      General: No acute distress, well groomed, well developed, comfortable appearing  Head: Atraumatic, normocephalic  Eyes: Conjunctiva and sclera clear  ENMT: No cervical or supraclavicular lymphadenopathy, moist mucous membranes  Chest/lung: Normal appearance, no accessory muscle use, speaking full sentences, clear to auscultation bilaterally, no rales/rhonchi or wheezing  Cardio: Regular rate and rhythm, no murmurs, rubs or gallops.   Abdomen: Soft, nontender, nondistended, bowel sounds present, no guarding or rigidity, ileal conduit with yellow urine  : carreno catheter  Extremities: No edema   Neuro: answering questions appropriately   Psych: anxious  Skin: No rashes or lesions  Groin: wrapped with tape after I+D with blood Vital Signs Last 24 Hrs  T(C): 36.7 (27 Oct 2018 03:36), Max: 36.7 (27 Oct 2018 00:27)  T(F): 98 (27 Oct 2018 03:36), Max: 98 (27 Oct 2018 00:27)  HR: 58 (27 Oct 2018 03:36) (54 - 62)  BP: 141/56 (27 Oct 2018 03:36) (132/75 - 151/78)  BP(mean): --  RR: 18 (27 Oct 2018 03:36) (14 - 18)  SpO2: 99% (27 Oct 2018 03:36) (99% - 100%)  I&O's Summary      General: No acute distress, well groomed, well developed, comfortable appearing  Head: Atraumatic, normocephalic  Eyes: Conjunctiva and sclera clear  ENMT: No cervical or supraclavicular lymphadenopathy, moist mucous membranes  Chest/lung: Normal appearance, no accessory muscle use, speaking full sentences, clear to auscultation bilaterally, no rales/rhonchi or wheezing  Cardio: Regular rate and rhythm, no murmurs, rubs or gallops.   Abdomen: Soft, nontender, nondistended, bowel sounds present, no guarding or rigidity, ileal conduit with yellow urine  : carreno catheter  Extremities: No edema, warm and well perfused   Neuro: answering questions appropriately   Psych: anxious, no depression  Skin: No rashes or lesions  Groin: wrapped with tape after I+D with blood

## 2018-10-28 ENCOUNTER — TRANSCRIPTION ENCOUNTER (OUTPATIENT)
Age: 63
End: 2018-10-28

## 2018-10-28 LAB
CRP SERPL-MCNC: 83.5 MG/L — HIGH
ERYTHROCYTE [SEDIMENTATION RATE] IN BLOOD: > 140 MM/HR — HIGH (ref 1–15)
HBA1C BLD-MCNC: 6 % — HIGH (ref 4–5.6)
VANCOMYCIN TROUGH SERPL-MCNC: 9.4 UG/ML — LOW (ref 10–20)

## 2018-10-28 PROCEDURE — 99232 SBSQ HOSP IP/OBS MODERATE 35: CPT

## 2018-10-28 PROCEDURE — 99233 SBSQ HOSP IP/OBS HIGH 50: CPT

## 2018-10-28 RX ORDER — VANCOMYCIN HCL 1 G
750 VIAL (EA) INTRAVENOUS EVERY 12 HOURS
Qty: 0 | Refills: 0 | Status: DISCONTINUED | OUTPATIENT
Start: 2018-10-28 | End: 2018-10-30

## 2018-10-28 RX ORDER — VANCOMYCIN HCL 1 G
1500 VIAL (EA) INTRAVENOUS DAILY
Qty: 0 | Refills: 0 | Status: DISCONTINUED | OUTPATIENT
Start: 2018-10-28 | End: 2018-10-28

## 2018-10-28 RX ADMIN — PIPERACILLIN AND TAZOBACTAM 25 GRAM(S): 4; .5 INJECTION, POWDER, LYOPHILIZED, FOR SOLUTION INTRAVENOUS at 22:01

## 2018-10-28 RX ADMIN — OXYCODONE AND ACETAMINOPHEN 1 TABLET(S): 5; 325 TABLET ORAL at 15:08

## 2018-10-28 RX ADMIN — PIPERACILLIN AND TAZOBACTAM 25 GRAM(S): 4; .5 INJECTION, POWDER, LYOPHILIZED, FOR SOLUTION INTRAVENOUS at 14:19

## 2018-10-28 RX ADMIN — LISINOPRIL 20 MILLIGRAM(S): 2.5 TABLET ORAL at 06:03

## 2018-10-28 RX ADMIN — AMLODIPINE BESYLATE 10 MILLIGRAM(S): 2.5 TABLET ORAL at 06:04

## 2018-10-28 RX ADMIN — PIPERACILLIN AND TAZOBACTAM 25 GRAM(S): 4; .5 INJECTION, POWDER, LYOPHILIZED, FOR SOLUTION INTRAVENOUS at 06:05

## 2018-10-28 RX ADMIN — LISINOPRIL 20 MILLIGRAM(S): 2.5 TABLET ORAL at 22:02

## 2018-10-28 RX ADMIN — Medication 250 MILLIGRAM(S): at 18:12

## 2018-10-28 RX ADMIN — OXYCODONE AND ACETAMINOPHEN 1 TABLET(S): 5; 325 TABLET ORAL at 14:38

## 2018-10-28 NOTE — DISCHARGE NOTE ADULT - ADDITIONAL INSTRUCTIONS
Ertapenem 1 gram IVSS 10/29 -->  Continue Vanco  10/26-->  6 weeks duration of IV antbiotics though Decmber 7, 2018  weekly ESR,CRP, CBC, CMP, Vanco trough 6 weeks duration of IV antbiotics though Decmber 7, 2018  Please call to make a follow up appt with Dr Demetrius Shook in 2-3 weeks after discharge from the hospital.   Follow up with PCP within 1 week of discharge from the hosptial

## 2018-10-28 NOTE — DISCHARGE NOTE ADULT - MEDICATION SUMMARY - MEDICATIONS TO TAKE
I will START or STAY ON the medications listed below when I get home from the hospital:    vitamin b complex daily PO  -- Indication: For Supplement    CBC, SMA 18, Vanco trough weekly, sent results to Dr Demetrius Shook  -- Indication: For Blood work    lisinopril 20 mg oral tablet  -- 1 tab(s) by mouth 2 times a day  -- Indication: For HTN (Hypertension)    clonazePAM 1 mg oral tablet  -- 1 tab(s) by mouth 3 times a day  -- Indication: For Anxiety    traZODone 150 mg oral tablet  -- 3 tab(s) by mouth once a day (at bedtime)  -- Indication: For Major depressive disorder    amLODIPine 10 mg oral tablet  -- 1 tab(s) by mouth once a day  -- Indication: For HTN (Hypertension)    ertapenem 1 g injection  -- 1 gram(s) injectable once a day   -- Indication: For Osteomyelitis    vancomycin 750 mg intravenous injection  -- 750 milligram(s) intravenous every 12 hours  -- Indication: For Osteomyelitis    senna oral tablet  -- 2 tab(s) by mouth once a day (at bedtime), As needed, Constipation  -- Indication: For Constipation    docusate sodium 100 mg oral capsule  -- 1 cap(s) by mouth 3 times a day, As needed, Constipation  -- Indication: For Constipation    Omega-3 oral capsule  -- 1 tab(s) by mouth once a day  -- Indication: For Supplement    multivitamin  -- 1 tab(s) by mouth once a day  -- Indication: For Supplement

## 2018-10-28 NOTE — PROGRESS NOTE ADULT - PROBLEM SELECTOR PLAN 2
(1) Has pubic symphyseal collection measuring 6.3 x 5.1 cm w/ questionable connection to groin abscess  (2) Tiny gas-liquid collection in the anterior abdominal wall. Correlate for   recent surgery versus infected collection.    - Continue antibiotics for now as above

## 2018-10-28 NOTE — DISCHARGE NOTE ADULT - CARE PROVIDER_API CALL
Ismael Esteban), Urology  [address] [place] [address]  Phone: [phone]  Fax: [phone] Ismael Esteban), Urology  [address] [place] [address]  Phone: [phone]  Fax: [phone]    Sebas Mcadams), Dietitian nutritionist; Surgery; Surgical Critical Care  [address] [place] [address]  [address] [address]  Phone: [phone]  Fax: [phone]    Boone Romo), Geriatric Medicine; Infectious Disease; Internal Medicine  [address] [place] [address]  Phone: [phone]  Fax: [phone]

## 2018-10-28 NOTE — DISCHARGE NOTE ADULT - SECONDARY DIAGNOSIS.
Groin abscess Malignant neoplasm of bladder Diabetes mellitus HTN (Hypertension) Major depressive disorder Anxiety

## 2018-10-28 NOTE — PROGRESS NOTE ADULT - PROBLEM SELECTOR PLAN 4
Has history of high grade cancer s/p surgery, radiation and multiple surgeries  - Appreciate Urology recommendations

## 2018-10-28 NOTE — PROGRESS NOTE ADULT - PROBLEM SELECTOR PLAN 7
Very anxious on exam.   - Continue home Clonazepam  [facility] Reference #: [id number]  10/15/2018 clonazepam 1 mg tablet 90 tabs for 30 days Parish Gill NP

## 2018-10-28 NOTE — DISCHARGE NOTE ADULT - CARE PLAN
Principal Discharge DX:	Osteomyelitis  Goal:	resolution of infection  Assessment and plan of treatment:	Complete antibiotics as ordered  Secondary Diagnosis:	Groin abscess  Goal:	wound healing  Assessment and plan of treatment:	Wound care as follow: _________  Secondary Diagnosis:	Malignant neoplasm of bladder  Goal:	symptoms management  Secondary Diagnosis:	Diabetes mellitus  Goal:	Glucose control  Assessment and plan of treatment:	Follow consistent carbohydrate diet. Hg A1c 6% -well controlled DM Principal Discharge DX:	Osteomyelitis  Goal:	resolution of infection  Assessment and plan of treatment:	Complete antibiotics as ordered: Ertapenem 1 g daily and Vancomycin 750 mg 2 x day plan to complete 6 weeks of antibiotics. Please call to make a follow up appt with Dr Demetrius Shook in 2-3 weeks   *****Weeky CBC, SMA18 and Vanco trough, results to be sent to Dr Demetrius Shook.  Secondary Diagnosis:	Groin abscess  Goal:	wound healing  Assessment and plan of treatment:	Wound care as follow:  Secondary Diagnosis:	Malignant neoplasm of bladder  Goal:	symptoms management  Secondary Diagnosis:	Diabetes mellitus  Goal:	Glucose control  Assessment and plan of treatment:	Follow consistent carbohydrate diet. Hg A1c 6% -well controlled DM Principal Discharge DX:	Osteomyelitis  Goal:	resolution of infection  Assessment and plan of treatment:	Complete antibiotics as ordered: Ertapenem 1 g daily and Vancomycin 750 mg 2 x day plan to complete 6 weeks of antibiotics. Please call to make a follow up appt with Dr Demetrius Shook in 2-3 weeks   *****Weeky CBC, SMA18, ESR, CRP and Vanco trough, results to be sent to Dr Demetrius Shook.  Ertapenem 1 gram IVSS 10/29 -->  Continue Vanco  10/26-->  6 weeks duration of IV antbiotics though [facility] 7, 2018  Secondary Diagnosis:	Groin abscess  Goal:	wound healing  Assessment and plan of treatment:	Wound care as follow: Left groin irrigate with NS, lightly pack with moist packing 2 x day, cover with DSD  Secondary Diagnosis:	Malignant neoplasm of bladder  Goal:	symptoms management  Secondary Diagnosis:	Diabetes mellitus  Goal:	Glucose control  Assessment and plan of treatment:	Follow consistent carbohydrate diet. Hg A1c 6% -well controlled DM Principal Discharge DX:	Osteomyelitis  Goal:	resolution of infection  Assessment and plan of treatment:	You were treated with IV antibiotics during your hospital stay. Complete antibiotics as ordered: Ertapenem 1 g daily and Vancomycin 750 mg 2 x day plan to complete 6 weeks of antibiotics. Please call to make a follow up appt with Dr Demetrius Shook in 2-3 weeks after discharge from the hospital.   *****Weeky CBC, SMA18, ESR, CRP and Vanco trough, results to be sent to Dr Demetrius Shook.  Follow up with PCP within one week of discharge from the hospital.  Secondary Diagnosis:	Groin abscess  Goal:	wound healing  Assessment and plan of treatment:	You were seen by the surgery team during hospitalization and wound care was consulted. Wound care as follow: Left groin irrigate with NS, lightly pack with moist packing 2 x day, cover with DSD  Secondary Diagnosis:	Malignant neoplasm of bladder  Goal:	symptoms management  Secondary Diagnosis:	Diabetes mellitus  Goal:	Glucose control  Assessment and plan of treatment:	Follow consistent carbohydrate diet. Hg A1c 6% -well controlled DM Principal Discharge DX:	Osteomyelitis  Goal:	resolution of infection  Assessment and plan of treatment:	You were treated with IV antibiotics during your hospital stay. Complete antibiotics as ordered: Ertapenem 1 g daily and Vancomycin 750 mg 2 x day plan to complete 6 weeks of antibiotics. Please call to make a follow up appt with Dr Demetrius Shook in 2-3 weeks after discharge from the hospital.   *****Weeky CBC, SMA18, ESR, CRP and Vanco trough, results to be sent to Dr Demetrius Shook.  Follow up with PCP within one week of discharge from the hospital.  Secondary Diagnosis:	Groin abscess  Goal:	wound healing  Assessment and plan of treatment:	You were seen by the surgery team during hospitalization and wound care was consulted. Wound care as follow: Left groin irrigate with NS, lightly pack with moist packing 2 x day, cover with DSD  Secondary Diagnosis:	Malignant neoplasm of bladder  Goal:	symptoms management  Assessment and plan of treatment:	Urology was consulted during hospitalization and recommend no further inpatient intervention  Secondary Diagnosis:	Diabetes mellitus  Goal:	Glucose control  Assessment and plan of treatment:	Follow consistent carbohydrate diet. Hg A1c 6% -well controlled DM Principal Discharge DX:	Osteomyelitis  Goal:	resolution of infection  Assessment and plan of treatment:	You were treated with IV antibiotics during your hospital stay. Complete antibiotics as ordered: Ertapenem 1 g daily and Vancomycin 750 mg 2 x day plan to complete 6 weeks of antibiotics. Please call to make a follow up appt with Dr Demetrius Shook in 2-3 weeks after discharge from the hospital.   *****Weeky CBC, SMA18, ESR, CRP and Vanco trough, results to be sent to Dr Demetrius Shook.  Follow up with PCP within one week of discharge from the hospital.  Secondary Diagnosis:	Groin abscess  Goal:	wound healing  Assessment and plan of treatment:	You were seen by the surgery team during hospitalization and wound care was consulted. Wound care as follow: Topical Recommendations: Left medial thigh: Clean with NS. Pat dry. Apply Liquid barrier film to periwound skin. Lightly pack depth with Aquacel AG, leave 2 inches out, Cover with silicone foam with borders. Change daily. Please call to make a follow up appt with Dr Demetrius Shook in 2-3 weeks after discharge from the hospital. Follow up with PCP within one week of discharge from the hospital.  Secondary Diagnosis:	Malignant neoplasm of bladder  Goal:	symptoms management  Assessment and plan of treatment:	Urology was consulted during hospitalization and recommend no further inpatient intervention  Secondary Diagnosis:	Diabetes mellitus  Goal:	Glucose control  Assessment and plan of treatment:	Follow consistent carbohydrate diet. Hg A1c 6% -well controlled DM  Continue your medication regimen and a consistent carbohydrate diet (Meaning eating the same amount of carbohydrates at the same time each day). Monitor blood glucose levels throughout the day before meals and at bedtime. Record blood sugars and bring to outpatient providers appointment in order to be reviewed by your doctor for management modifications. Monitor for signs/symptoms of low blood glucose, such as, dizziness, altered mental status, or cool/clammy skin. In addition, monitor for signs/symptoms of high blood glucose, such as, feeling hot, dry, fatigued, or with increased thirst/urination. Make regular podiatry appointments in order to have feet checked for wounds and uncontrolled toe nail growth to prevent infections, as well as, appointments with an ophthalmologist to monitor your vision.  Secondary Diagnosis:	HTN (Hypertension)  Assessment and plan of treatment:	Continue blood pressure medication regimen as directed. Monitor for any visual changes, headaches or dizziness.  Monitor blood pressure regularly.  Follow up with your PCP for further management for high blood pressure.  Secondary Diagnosis:	Major depressive disorder  Assessment and plan of treatment:	Continue your medications as directed and follow up with your PCP and psychiatrist for further evaluation and medical management. If you are ever in need of immediate psychiatric assistance you may reach out to the [facility] [phone].  Secondary Diagnosis:	Anxiety  Assessment and plan of treatment:	Continue your medications as directed and follow up with your PCP and psychiatrist for further evaluation and medical management. If you are ever in need of immediate psychiatric assistance you may reach out to the [facility] [phone]. Principal Discharge DX:	Osteomyelitis  Goal:	resolution of infection  Assessment and plan of treatment:	You were treated with IV antibiotics during your hospital stay. Complete antibiotics as ordered: Ertapenem 1 g daily and Vancomycin 750 mg 2 x day plan to complete 6 weeks of antibiotics via PICC line. Please call to make a follow up appt with Dr Demetrius Shook in 2-3 weeks after discharge from the hospital.   *****Weeky CBC, SMA18, ESR, CRP and Vanco trough, results to be sent to Dr Demetrius Shook.  Follow up with PCP within one week of discharge from the hospital.  Secondary Diagnosis:	Groin abscess  Goal:	wound healing  Assessment and plan of treatment:	You were seen by the surgery team during hospitalization and wound care was consulted. Wound care as follow: Topical Recommendations: Left medial thigh: Clean with NS. Pat dry. Apply Liquid barrier film to periwound skin. Lightly pack depth with Aquacel AG, leave 2 inches out, Cover with silicone foam with borders. Change daily. Please call to make a follow up appt with Dr Demetrius Shook in 2-3 weeks after discharge from the hospital. Follow up with PCP within one week of discharge from the hospital.  Secondary Diagnosis:	Malignant neoplasm of bladder  Goal:	symptoms management  Assessment and plan of treatment:	Urology was consulted during hospitalization and recommend no further inpatient intervention  Secondary Diagnosis:	Diabetes mellitus  Goal:	Glucose control  Assessment and plan of treatment:	Follow consistent carbohydrate diet. Hg A1c 6% -well controlled DM  Continue your medication regimen and a consistent carbohydrate diet (Meaning eating the same amount of carbohydrates at the same time each day). Monitor blood glucose levels throughout the day before meals and at bedtime. Record blood sugars and bring to outpatient providers appointment in order to be reviewed by your doctor for management modifications. Monitor for signs/symptoms of low blood glucose, such as, dizziness, altered mental status, or cool/clammy skin. In addition, monitor for signs/symptoms of high blood glucose, such as, feeling hot, dry, fatigued, or with increased thirst/urination. Make regular podiatry appointments in order to have feet checked for wounds and uncontrolled toe nail growth to prevent infections, as well as, appointments with an ophthalmologist to monitor your vision.  Secondary Diagnosis:	HTN (Hypertension)  Assessment and plan of treatment:	Continue blood pressure medication regimen as directed. Monitor for any visual changes, headaches or dizziness.  Monitor blood pressure regularly.  Follow up with your PCP for further management for high blood pressure.  Secondary Diagnosis:	Major depressive disorder  Assessment and plan of treatment:	Continue your medications as directed and follow up with your PCP and psychiatrist for further evaluation and medical management. If you are ever in need of immediate psychiatric assistance you may reach out to the [facility] [phone].  Secondary Diagnosis:	Anxiety  Assessment and plan of treatment:	Continue your medications as directed and follow up with your PCP and psychiatrist for further evaluation and medical management. If you are ever in need of immediate psychiatric assistance you may reach out to the [facility] [phone].

## 2018-10-28 NOTE — DISCHARGE NOTE ADULT - PATIENT PORTAL LINK FT
You can access the [facility] Patient Portal, offered by [facility], by registering with the following website: [email or web address]

## 2018-10-28 NOTE — DISCHARGE NOTE ADULT - PLAN OF CARE
resolution of infection Complete antibiotics as ordered wound healing Wound care as follow: _________ symptoms management Glucose control Follow consistent carbohydrate diet. Hg A1c 6% -well controlled DM Complete antibiotics as ordered: Ertapenem 1 g daily and Vancomycin 750 mg 2 x day plan to complete 6 weeks of antibiotics. Please call to make a follow up appt with Dr Demetrius Shook in 2-3 weeks   *****Weeky CBC, SMA18 and Vanco trough, results to be sent to Dr Demetrius Shook. Wound care as follow: Complete antibiotics as ordered: Ertapenem 1 g daily and Vancomycin 750 mg 2 x day plan to complete 6 weeks of antibiotics. Please call to make a follow up appt with Dr Demetrius Shook in 2-3 weeks   *****Weeky CBC, SMA18, ESR, CRP and Vanco trough, results to be sent to Dr Demetrius Shook.  Ertapenem 1 gram IVSS 10/29 -->  Continue Vanco  10/26-->  6 weeks duration of IV antbiotics though Decmber 7, 2018 Wound care as follow: Left groin irrigate with NS, lightly pack with moist packing 2 x day, cover with DSD You were treated with IV antibiotics during your hospital stay. Complete antibiotics as ordered: Ertapenem 1 g daily and Vancomycin 750 mg 2 x day plan to complete 6 weeks of antibiotics. Please call to make a follow up appt with Dr Demetrius Shook in 2-3 weeks after discharge from the hospital.   *****Weeky CBC, SMA18, ESR, CRP and Vanco trough, results to be sent to Dr Demetrius Shook.  Follow up with PCP within one week of discharge from the hospital. You were seen by the surgery team during hospitalization and wound care was consulted. Wound care as follow: Left groin irrigate with NS, lightly pack with moist packing 2 x day, cover with DSD Urology was consulted during hospitalization and recommend no further inpatient intervention You were seen by the surgery team during hospitalization and wound care was consulted. Wound care as follow: Topical Recommendations: Left medial thigh: Clean with NS. Pat dry. Apply Liquid barrier film to periwound skin. Lightly pack depth with Aquacel AG, leave 2 inches out, Cover with silicone foam with borders. Change daily. Please call to make a follow up appt with Dr Demetrius Shook in 2-3 weeks after discharge from the hospital. Follow up with PCP within one week of discharge from the hospital. Follow consistent carbohydrate diet. Hg A1c 6% -well controlled DM  Continue your medication regimen and a consistent carbohydrate diet (Meaning eating the same amount of carbohydrates at the same time each day). Monitor blood glucose levels throughout the day before meals and at bedtime. Record blood sugars and bring to outpatient providers appointment in order to be reviewed by your doctor for management modifications. Monitor for signs/symptoms of low blood glucose, such as, dizziness, altered mental status, or cool/clammy skin. In addition, monitor for signs/symptoms of high blood glucose, such as, feeling hot, dry, fatigued, or with increased thirst/urination. Make regular podiatry appointments in order to have feet checked for wounds and uncontrolled toe nail growth to prevent infections, as well as, appointments with an ophthalmologist to monitor your vision. Continue blood pressure medication regimen as directed. Monitor for any visual changes, headaches or dizziness.  Monitor blood pressure regularly.  Follow up with your PCP for further management for high blood pressure. Continue your medications as directed and follow up with your PCP and psychiatrist for further evaluation and medical management. If you are ever in need of immediate psychiatric assistance you may reach out to the [facility] [phone]. Continue your medications as directed and follow up with your PCP and psychiatrist for further evaluation and medical management. If you are ever in need of immediate psychiatric assistance you may reach out to the [facility] [phone]. You were treated with IV antibiotics during your hospital stay. Complete antibiotics as ordered: Ertapenem 1 g daily and Vancomycin 750 mg 2 x day plan to complete 6 weeks of antibiotics via PICC line. Please call to make a follow up appt with Dr Demetrius Shook in 2-3 weeks after discharge from the hospital.   *****Weeky CBC, SMA18, ESR, CRP and Vanco trough, results to be sent to Dr Demetrius Shook.  Follow up with PCP within one week of discharge from the hospital.

## 2018-10-28 NOTE — PROGRESS NOTE ADULT - PROBLEM SELECTOR PLAN 1
CT scan with erosion of the pubic symphysis with and adjacent fluid collection   measuring 6.3 x 5.1 cm. Sclerosis of the L3 vertebral body, new from   prior study.  - Continue vancomycin and zosyn   - Plan IR drainage and sampling of second collection tomorrow

## 2018-10-28 NOTE — CHART NOTE - NSCHARTNOTEFT_GEN_A_CORE
ADS NIGHT COVERAGE:    VT subtherapeutic at 9.4. S/w pharmacy re: dosage with elevated Cr. Recommended 1500mg q24.     S. Karlos ADS PA-C  [id number]

## 2018-10-28 NOTE — PROGRESS NOTE ADULT - PROBLEM SELECTOR PLAN 9
DVT ppx: SCDs given possible intervention on second fluid collection  Diet: CC diet/DASH    Roxana Pepe MD  Internal Medicine, PGY-2  Pager [phone]

## 2018-10-28 NOTE — DISCHARGE NOTE ADULT - CARE PROVIDERS DIRECT ADDRESSES
[email or web address]

## 2018-10-28 NOTE — CHART NOTE - NSCHARTNOTEFT_GEN_A_CORE
PRE-INTERVENTIONAL RADIOLOGY PROCEDURE NOTE    Patient Age: 62 y/o    Patient Gender: Male     Procedure: I&D of pubic symphysis collection with culture     Diagnosis / Indication: Erosion of the pubic symphysis with and adjacent fluid collection   measuring 6.3 x 5.1 cm.    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     Patient aware? Yes           Pertinent labs:                       8.2    8.36  )-----------( 328      ( 27 Oct 2018 12:00 )             27.0     10-27    139  |  104  |  33<H>  ----------------------------<  113<H>  4.7   |  21<L>  |  1.90<H>    Ca    9.5      27 Oct 2018 12:00  Phos  3.2     10-27  Mg     1.9     10-27    TPro  7.4  /  Alb  3.0<L>  /  TBili  0.2  /  DBili  x   /  AST  8   /  ALT  16  /  AlkPhos  281<H>  10-27            Delfina Hastings [id number] PRE-INTERVENTIONAL RADIOLOGY PROCEDURE NOTE    Patient Age: 64 y/o    Patient Gender: Male     Procedure: Pic line for IV ABx 6 weeks     Diagnosis / Indication: Osteomyelitis     Interventional Radiology Attending Physician:     Ordering Attending Physician: Dr Danny Marcano     Pertinent medical history: 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     Patient aware? Yes           Pertinent labs:                                               8.1    8.26  )-----------( 332      ( 29 Oct 2018 05:45 )             27.1     10-29    139  |  104  |  31<H>  ----------------------------<  122<H>  4.2   |  22  |  2.18<H>    Ca    9.8      29 Oct 2018 05:45    TPro  7.3  /  Alb  3.1<L>  /  TBili  < 0.2<L>  /  DBili  x   /  AST  10  /  ALT  11  /  AlkPhos  292<H>  10-29      PT/INR - ( 29 Oct 2018 05:45 )   PT: 12.4 SEC;   INR: 1.11                Delfina Hastings [id number]

## 2018-10-28 NOTE — DISCHARGE NOTE ADULT - HOME CARE AGENCY
[facility], Phone [phone].  Initial visit by nurse/therapist will be made day after discharge.  If you have any problems, please contact the Homecare agency directly.    IV infusion set up with [facility] at home [phone]

## 2018-10-28 NOTE — DISCHARGE NOTE ADULT - HOSPITAL COURSE
62 yo M PMHx high grade cancer (TCC w/ squamous differentiation) s/p laparoscopic prostatectomy c/b recalcitrant bladder neck contracture which failed over 8 procedures and previously managed with suprapubic catheter now s/p cystectomy and ileal conduit, hydronephrosis w/ uterero-ileal stricture (stent removed), and most recent surgery in August for ureteral stricture, HTN, HLD, T2DM, who presents with groin abscess. 62 yo M PMHx high grade cancer (TCC w/ squamous differentiation) s/p laparoscopic prostatectomy c/b recalcitrant bladder neck contracture which failed over 8 procedures and previously managed with suprapubic catheter now s/p cystectomy and ileal conduit, hydronephrosis w/ uterero-ileal stricture (stent removed), and most recent surgery in August for ureteral stricture, HTN, HLD, T2DM, who presents with groin abscess.    Osteomyelitis.    -CT w/erosion of the pubic symphysis with and adjacent fluid collection  6.3 x 5.1 cm. Sclerosis of the L3 vertebral body, new from prior study.  - vancomycin/zosyn     Left sided medial thigh abscess   -s/p I&D in the ED.   - will need VNS for daily packing changes.    - blood cultures  NTD    Abnormal abdominal CT scan.    -Pubic symphyseal collection measuring 6.3 x 5.1 cm w/ questionable connection to groin abscess. Tiny gas-liquid collection in the anterior abdominal wall.  - Discussed with Surgery and Urology, no further intervention     Groin abscess.    -groin abscess w/ connection to the abductor musculature. Measuring 11 x 5.8 x 7.6 cm. s/p I+D by surgery no cx sent    Malignant neoplasm of bladder.    - Has history of high grade cancer s/p surgery, radiation and multiple surgeries  - urology consult ----given multiple fluid collections and possible post surgical changes in abdomen  - Has sclerosis of L3 (unclear etiology, please t/b with urology).     Diabetes mellitus. Hg A1c 6%  - FSGs pre-meal with SSI.     Major depressive disorder.   -  trazodone continued    Anxiety.    - Clonazepam continued     HTN   - lisinopril 20 BID and amlodipine 10.     Dispo: home with VNS 62 yo M PMHx high grade cancer (TCC w/ squamous differentiation) s/p laparoscopic prostatectomy c/b recalcitrant bladder neck contracture which failed over 8 procedures and previously managed with suprapubic catheter now s/p cystectomy and ileal conduit, hydronephrosis w/ uterero-ileal stricture (stent removed), and most recent surgery in August for ureteral stricture, HTN, HLD, T2DM, who presents with groin abscess.    Osteomyelitis.    -CT w/erosion of the pubic symphysis with and adjacent fluid collection  6.3 x 5.1 cm. Sclerosis of the L3 vertebral body, new from prior study.  - vancomycin/zosyn     Left sided medial thigh abscess   -s/p I&D in the ED.   - will need VNS for daily packing changes.    - blood cultures  NTD    Abnormal abdominal CT scan.    -Pubic symphyseal collection measuring 6.3 x 5.1 cm w/ questionable connection to groin abscess. Tiny gas-liquid collection in the anterior abdominal wall.  - Discussed CT finding with Surgery team, as per surgery no surgical intervention at this time as changes likely secondary to procedure. As per Urology note, no further intervention at this time    Groin abscess.    -groin abscess w/ connection to the abductor musculature. Measuring 11 x 5.8 x 7.6 cm. s/p I+D by surgery no cx sent    Malignant neoplasm of bladder.    - Has history of high grade cancer s/p surgery, radiation and multiple surgeries  - urology consult ----given multiple fluid collections and possible post surgical changes in abdomen  - Has sclerosis of L3 (unclear etiology, please t/b with urology).     Diabetes mellitus. Hg A1c 6%  - FSGs pre-meal with SSI.     Major depressive disorder.   -  trazodone continued during hospitalization    Anxiety.    - Clonazepam continued during hospitalization     HTN   - lisinopril 20 BID and amlodipine 10.     Dispo: home with VNS

## 2018-10-29 LAB
ALBUMIN SERPL ELPH-MCNC: 3.1 G/DL — LOW (ref 3.3–5)
ALP SERPL-CCNC: 292 U/L — HIGH (ref 40–120)
ALT FLD-CCNC: 11 U/L — SIGNIFICANT CHANGE UP (ref 4–41)
AST SERPL-CCNC: 10 U/L — SIGNIFICANT CHANGE UP (ref 4–40)
BASOPHILS # BLD AUTO: 0.02 K/UL — SIGNIFICANT CHANGE UP (ref 0–0.2)
BASOPHILS NFR BLD AUTO: 0.2 % — SIGNIFICANT CHANGE UP (ref 0–2)
BILIRUB SERPL-MCNC: < 0.2 MG/DL — LOW (ref 0.2–1.2)
BUN SERPL-MCNC: 31 MG/DL — HIGH (ref 7–23)
CALCIUM SERPL-MCNC: 9.8 MG/DL — SIGNIFICANT CHANGE UP (ref 8.4–10.5)
CHLORIDE SERPL-SCNC: 104 MMOL/L — SIGNIFICANT CHANGE UP (ref 98–107)
CO2 SERPL-SCNC: 22 MMOL/L — SIGNIFICANT CHANGE UP (ref 22–31)
CREAT SERPL-MCNC: 2.18 MG/DL — HIGH (ref 0.5–1.3)
EOSINOPHIL # BLD AUTO: 0.39 K/UL — SIGNIFICANT CHANGE UP (ref 0–0.5)
EOSINOPHIL NFR BLD AUTO: 4.7 % — SIGNIFICANT CHANGE UP (ref 0–6)
GLUCOSE SERPL-MCNC: 122 MG/DL — HIGH (ref 70–99)
HCT VFR BLD CALC: 27.1 % — LOW (ref 39–50)
HGB BLD-MCNC: 8.1 G/DL — LOW (ref 13–17)
IMM GRANULOCYTES # BLD AUTO: 0.1 # — SIGNIFICANT CHANGE UP
IMM GRANULOCYTES NFR BLD AUTO: 1.2 % — SIGNIFICANT CHANGE UP (ref 0–1.5)
INR BLD: 1.11 — SIGNIFICANT CHANGE UP (ref 0.88–1.17)
LYMPHOCYTES # BLD AUTO: 1.71 K/UL — SIGNIFICANT CHANGE UP (ref 1–3.3)
LYMPHOCYTES # BLD AUTO: 20.7 % — SIGNIFICANT CHANGE UP (ref 13–44)
MCHC RBC-ENTMCNC: 22.8 PG — LOW (ref 27–34)
MCHC RBC-ENTMCNC: 29.9 % — LOW (ref 32–36)
MCV RBC AUTO: 76.3 FL — LOW (ref 80–100)
MONOCYTES # BLD AUTO: 0.57 K/UL — SIGNIFICANT CHANGE UP (ref 0–0.9)
MONOCYTES NFR BLD AUTO: 6.9 % — SIGNIFICANT CHANGE UP (ref 2–14)
NEUTROPHILS # BLD AUTO: 5.47 K/UL — SIGNIFICANT CHANGE UP (ref 1.8–7.4)
NEUTROPHILS NFR BLD AUTO: 66.3 % — SIGNIFICANT CHANGE UP (ref 43–77)
NRBC # FLD: 0 — SIGNIFICANT CHANGE UP
PLATELET # BLD AUTO: 332 K/UL — SIGNIFICANT CHANGE UP (ref 150–400)
PMV BLD: 9.8 FL — SIGNIFICANT CHANGE UP (ref 7–13)
POTASSIUM SERPL-MCNC: 4.2 MMOL/L — SIGNIFICANT CHANGE UP (ref 3.5–5.3)
POTASSIUM SERPL-SCNC: 4.2 MMOL/L — SIGNIFICANT CHANGE UP (ref 3.5–5.3)
PROT SERPL-MCNC: 7.3 G/DL — SIGNIFICANT CHANGE UP (ref 6–8.3)
PROTHROM AB SERPL-ACNC: 12.4 SEC — SIGNIFICANT CHANGE UP (ref 9.8–13.1)
RBC # BLD: 3.55 M/UL — LOW (ref 4.2–5.8)
RBC # FLD: 18.5 % — HIGH (ref 10.3–14.5)
SODIUM SERPL-SCNC: 139 MMOL/L — SIGNIFICANT CHANGE UP (ref 135–145)
WBC # BLD: 8.26 K/UL — SIGNIFICANT CHANGE UP (ref 3.8–10.5)
WBC # FLD AUTO: 8.26 K/UL — SIGNIFICANT CHANGE UP (ref 3.8–10.5)

## 2018-10-29 PROCEDURE — 99233 SBSQ HOSP IP/OBS HIGH 50: CPT

## 2018-10-29 RX ORDER — ERTAPENEM SODIUM 1 G/1
1000 INJECTION, POWDER, LYOPHILIZED, FOR SOLUTION INTRAMUSCULAR; INTRAVENOUS EVERY 24 HOURS
Qty: 0 | Refills: 0 | Status: DISCONTINUED | OUTPATIENT
Start: 2018-10-29 | End: 2018-10-30

## 2018-10-29 RX ORDER — AMLODIPINE BESYLATE 2.5 MG/1
2 TABLET ORAL
Qty: 0 | Refills: 0 | DISCHARGE
Start: 2018-10-29

## 2018-10-29 RX ORDER — VANCOMYCIN HCL 1 G
750 VIAL (EA) INTRAVENOUS
Qty: 1 | Refills: 0 | OUTPATIENT
Start: 2018-10-29 | End: 2018-12-07

## 2018-10-29 RX ORDER — AMLODIPINE BESYLATE 2.5 MG/1
1 TABLET ORAL
Qty: 0 | Refills: 0 | DISCHARGE
Start: 2018-10-29

## 2018-10-29 RX ORDER — AMLODIPINE BESYLATE 2.5 MG/1
2 TABLET ORAL
Qty: 0 | Refills: 0 | COMMUNITY

## 2018-10-29 RX ORDER — ERTAPENEM SODIUM 1 G/1
1 INJECTION, POWDER, LYOPHILIZED, FOR SOLUTION INTRAMUSCULAR; INTRAVENOUS
Qty: 42 | Refills: 0 | OUTPATIENT
Start: 2018-10-29 | End: 2018-12-09

## 2018-10-29 RX ADMIN — AMLODIPINE BESYLATE 10 MILLIGRAM(S): 2.5 TABLET ORAL at 05:33

## 2018-10-29 RX ADMIN — Medication 100 MILLIGRAM(S): at 12:45

## 2018-10-29 RX ADMIN — PIPERACILLIN AND TAZOBACTAM 25 GRAM(S): 4; .5 INJECTION, POWDER, LYOPHILIZED, FOR SOLUTION INTRAVENOUS at 05:19

## 2018-10-29 RX ADMIN — Medication 2 GRAM(S): at 12:45

## 2018-10-29 RX ADMIN — LISINOPRIL 20 MILLIGRAM(S): 2.5 TABLET ORAL at 18:26

## 2018-10-29 RX ADMIN — ERTAPENEM SODIUM 120 MILLIGRAM(S): 1 INJECTION, POWDER, LYOPHILIZED, FOR SOLUTION INTRAMUSCULAR; INTRAVENOUS at 16:00

## 2018-10-29 RX ADMIN — Medication 250 MILLIGRAM(S): at 05:32

## 2018-10-29 RX ADMIN — Medication 250 MILLIGRAM(S): at 17:29

## 2018-10-29 RX ADMIN — LISINOPRIL 20 MILLIGRAM(S): 2.5 TABLET ORAL at 05:33

## 2018-10-29 NOTE — PROGRESS NOTE ADULT - PROBLEM SELECTOR PLAN 8
DVT ppx: SCDs given possible intervention on second fluid collection  Diet: CC diet/DASH    Roxana Pepe MD  Internal Medicine, PGY-2  Pager [phone]

## 2018-10-29 NOTE — PROGRESS NOTE ADULT - PROBLEM SELECTOR PLAN 1
CT scan with erosion of the pubic symphysis with and adjacent fluid collection   measuring 6.3 x 5.1 cm. Sclerosis of the L3 vertebral body, new from   prior study.  - ID Vanco/ertapenem as per ID bcx NGTD- IR PICC line in AM   - IR unable to drain abscess  - wound care CT scan with erosion of the pubic symphysis with and adjacent fluid collection   measuring 6.3 x 5.1 cm. Sclerosis of the L3 vertebral body, new from   prior study.  - ID Vanco/ertapenem as per ID bcx NGTD- IR PICC line in AM. vanco trough subtherapeutic increased to 750mg  BID   - IR unable to drain abscess  - wound care

## 2018-10-29 NOTE — PROGRESS NOTE ADULT - PROBLEM SELECTOR PLAN 6
Very anxious on exam.   - Continue home Clonazepam  ISTOP Reference #: [id number]  10/15/2018 clonazepam 1 mg tablet 90 tabs for 30 days Parish Gill NP

## 2018-10-30 VITALS
TEMPERATURE: 98 F | RESPIRATION RATE: 18 BRPM | OXYGEN SATURATION: 98 % | SYSTOLIC BLOOD PRESSURE: 135 MMHG | HEART RATE: 59 BPM | DIASTOLIC BLOOD PRESSURE: 83 MMHG

## 2018-10-30 LAB — VANCOMYCIN TROUGH SERPL-MCNC: 14.4 UG/ML — SIGNIFICANT CHANGE UP (ref 10–20)

## 2018-10-30 PROCEDURE — 99232 SBSQ HOSP IP/OBS MODERATE 35: CPT

## 2018-10-30 PROCEDURE — 99239 HOSP IP/OBS DSCHRG MGMT >30: CPT

## 2018-10-30 PROCEDURE — 76937 US GUIDE VASCULAR ACCESS: CPT | Mod: 26

## 2018-10-30 PROCEDURE — 77001 FLUOROGUIDE FOR VEIN DEVICE: CPT | Mod: 26,GC

## 2018-10-30 PROCEDURE — 36569 INSJ PICC 5 YR+ W/O IMAGING: CPT

## 2018-10-30 RX ADMIN — Medication 250 MILLIGRAM(S): at 06:39

## 2018-10-30 RX ADMIN — Medication 2 GRAM(S): at 12:27

## 2018-10-30 RX ADMIN — AMLODIPINE BESYLATE 10 MILLIGRAM(S): 2.5 TABLET ORAL at 06:30

## 2018-10-30 RX ADMIN — LISINOPRIL 20 MILLIGRAM(S): 2.5 TABLET ORAL at 06:30

## 2018-10-30 NOTE — CHART NOTE - NSCHARTNOTEFT_GEN_A_CORE
Abdominal CT scan: Pubic symphyseal collection measuring 6.3 x 5.1 cm w/ questionable connection to groin abscess. Tiny gas-liquid collection in the anterior abdominal wall.  - Discussed CT finding with Surgery team, as per surgery no surgical intervention at this time as changes likely secondary to procedure. As per Urology, no further intervention at this time. Patient to follow up as outpatient.

## 2018-10-30 NOTE — PROGRESS NOTE ADULT - SUBJECTIVE AND OBJECTIVE BOX
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 3.375 every 8 hours  vancomycin  IVPB 750 every 12 hours    OTHER MEDS:  MEDICATIONS  (STANDING):  amLODIPine   Tablet 10 daily  clonazePAM Tablet 1 three times a day PRN  dextrose 40% Gel 15 once PRN  dextrose 50% Injectable 12.5 once  dextrose 50% Injectable 25 once  dextrose 50% Injectable 25 once  docusate sodium 100 three times a day PRN  glucagon  Injectable 1 once PRN  influenza   Vaccine 0.5 once  insulin lispro (HumaLOG) corrective regimen sliding scale  three times a day before meals  insulin lispro (HumaLOG) corrective regimen sliding scale  at bedtime  lisinopril 20 every 12 hours  oxyCODONE    5 mG/acetaminophen 325 mG 1 every 4 hours PRN  senna 2 at bedtime PRN  traZODone 450 at bedtime      Vital Signs Last 24 Hrs  T(C): 36.3 (28 Oct 2018 05:17), Max: 36.8 (27 Oct 2018 12:59)  T(F): 97.3 (28 Oct 2018 05:17), Max: 98.2 (27 Oct 2018 12:59)  HR: 85 (28 Oct 2018 05:17) (58 - 85)  BP: 127/55 (28 Oct 2018 05:17) (120/65 - 137/65)  BP(mean): --  RR: 18 (28 Oct 2018 05:17) (18 - 18)  SpO2: 98% (28 Oct 2018 05:17) (96% - 98%)    PHYSICAL EXAM:  General: WN/WD NAD, Non-toxic  Neurology: A&Ox3, nonfocal  Respiratory: no resp distress  Abdominal: Soft, Non-tender, non-distended, urostomy in place  Extremities: No edema, + peripheral pulses  Line Sites: Clear  Skin: No rash                        8.2    8.36  )-----------( 328      ( 27 Oct 2018 12:00 )             27.0   WBC Count: 8.36 (10-27 @ 12:00)  WBC Count: 10.39 (10-26 @ 18:24)    10-27    139  |  104  |  33<H>  ----------------------------<  113<H>  4.7   |  21<L>  |  1.90<H>  Creatinine: 1.90 (10-27 @ 12:00)    Creatinine: 2.11 (10-26 @ 17:40)     Ca    9.5      27 Oct 2018 12:00  Phos  3.2     10-27  Mg     1.9     10-27    TPro  7.4  /  Alb  3.0<L>  /  TBili  0.2  /  DBili  x   /  AST  8   /  ALT  16  /  AlkPhos  281<H>  10-27          MICROBIOLOGY:  BLOOD PERIPHERAL  10-26-18 --  --  --      Vancomycin Level, Trough: 9.4 ug/mL (10-28-18 @ 03:50)    RADIOLOGY:  < from: CT Abdomen and Pelvis No Cont (10.26.18 @ 20:25) >  FINDINGS:    LOWER CHEST: Trace bilateral pleural effusions with adjacent atelectasis.   Trace pericardial effusion.    LIVER: Within normal limits.  BILE DUCTS: Normal caliber.  GALLBLADDER: Within normal limits.  SPLEEN: Within normal limits.  PANCREAS: Within normal limits.  ADRENALS: Within normal limits.  KIDNEYS/URETERS: Moderate left and mild right hydronephrosis to the level   of the ileal conduit. No urolithiasis.    BLADDER: Status post cystectomy with ileal conduit.  REPRODUCTIVE ORGANS: Status post prostatectomy.     BOWEL: No bowel obstruction. Status post ileal conduit formation.  PERITONEUM: No ascites.  VESSELS:  Atherosclerotic disease.  RETROPERITONEUM: No lymphadenopathy.    ABDOMINAL WALL: Postsurgical ventral abdominal wall changes. There is a   small gas-liquid pocket in the anterior abdominal wall below the level of   the umbilicus which measures 1.7 x 1.5 cm. Left medial upper thigh fluid   collection with a connection to the abductor musculature. This measures   11 x 5.8 x 7.6 cm. This may be connected to the pubic symphysis   collection.  BONES: Erosion of the pubic symphysis with and adjacent fluid collection   measuring 6.3 x 5.1 cm.. Sclerosis of the L3 vertebral body, new from   prior study.    IMPRESSION: Study is limited by lack of intravenous contrast.     Pubic symphyseal osteomyelitis with a thick-walled fluid collection as   measured above.   Left groin abscess with involvement of the left abductor muscle.   Questionable connection to the pubic symphyseal collection.  Tiny gas-liquid collection in the anterior abdominal wall. Correlate for   recent surgery versus infected collection.    Moderate left and mild right hydronephrosis. Patient is status post ileal   conduit formation.    < end of copied text >      Boone Romo MD; Division of Infectious Disease; Pager: [phone]; nights and weekends: [phone]
Follow Up:  pubic symphysis osteo    Interval History/ROS: anxious abot arrangeents    Allergies  No Known Allergies    ANTIMICROBIALS:  ertapenem  IVPB 1000 every 24 hours  vancomycin  IVPB 750 every 12 hours    OTHER MEDS:  MEDICATIONS  (STANDING):  amLODIPine   Tablet 10 daily  clonazePAM Tablet 1 three times a day PRN  dextrose 40% Gel 15 once PRN  dextrose 50% Injectable 12.5 once  dextrose 50% Injectable 25 once  dextrose 50% Injectable 25 once  docusate sodium 100 three times a day PRN  glucagon  Injectable 1 once PRN  insulin lispro (HumaLOG) corrective regimen sliding scale  three times a day before meals  insulin lispro (HumaLOG) corrective regimen sliding scale  at bedtime  lisinopril 20 every 12 hours  oxyCODONE    5 mG/acetaminophen 325 mG 1 every 4 hours PRN  senna 2 at bedtime PRN  traZODone 450 at bedtime    Vital Signs Last 24 Hrs  T(C): 36.6 (30 Oct 2018 06:27), Max: 36.6 (30 Oct 2018 06:27)  T(F): 97.9 (30 Oct 2018 06:27), Max: 97.9 (30 Oct 2018 06:27)  HR: 59 (30 Oct 2018 06:27) (58 - 62)  BP: 135/83 (30 Oct 2018 06:27) (121/59 - 151/69)  BP(mean): --  RR: 18 (30 Oct 2018 06:27) (18 - 18)  SpO2: 98% (30 Oct 2018 06:27) (98% - 98%)    PHYSICAL EXAM:  General: WN/WD NAD, Non-toxic  Neurology: A&Ox3, nonfocal  Respiratory: Clear to auscultation bilaterally  CV: RRR, S1S2, no murmurs, rubs or gallops  Abdominal: Soft, Non-tender, non-distended, ileostomy functioning. superpubic induration  : No edema,   Line Sites: Clear  Skin: No rash                        8.1    8.26  )-----------( 332      ( 29 Oct 2018 05:45 )             27.1   WBC Count: 8.26 (10-29 @ 05:45)  WBC Count: 8.36 (10-27 @ 12:00)  WBC Count: 10.39 (10-26 @ 18:24)    10-29    139  |  104  |  31<H>  ----------------------------<  122<H>  4.2   |  22  |  2.18<H>  Creatinine: 2.18 (10-29 @ 05:45)    Creatinine: 1.90 (10-27 @ 12:00)    Creatinine: 2.11 (10-26 @ 17:40)     Ca    9.8      29 Oct 2018 05:45    TPro  7.3  /  Alb  3.1<L>  /  TBili  < 0.2<L>  /  DBili  x   /  AST  10  /  ALT  11  /  AlkPhos  292<H>  10-29    MICROBIOLOGY:  BLOOD PERIPHERAL  10-26-18 --  --  --  Vancomycin Level, Trough: 14.4 ug/mL (10-30-18 @ 04:40)    RADIOLOGY:  < from: CT Abdomen and Pelvis No Cont (10.26.18 @ 20:25) >  FINDINGS:    LOWER CHEST: Trace bilateral pleural effusions with adjacent atelectasis.   Trace pericardial effusion.    LIVER: Within normal limits.  BILE DUCTS: Normal caliber.  GALLBLADDER: Within normal limits.  SPLEEN: Within normal limits.  PANCREAS: Within normal limits.  ADRENALS: Within normal limits.  KIDNEYS/URETERS: Moderate left and mild right hydronephrosis to the level   of the ileal conduit. No urolithiasis.    BLADDER: Status post cystectomy with ileal conduit.  REPRODUCTIVE ORGANS: Status post prostatectomy.     BOWEL: No bowel obstruction. Status post ileal conduit formation.  PERITONEUM: No ascites.  VESSELS:  Atherosclerotic disease.  RETROPERITONEUM: No lymphadenopathy.    ABDOMINAL WALL: Postsurgical ventral abdominal wall changes. There is a   small gas-liquid pocket in the anterior abdominal wall below the level of   the umbilicus which measures 1.7 x 1.5 cm. Left medial upper thigh fluid   collection with a connection to the abductor musculature. This measures   11 x 5.8 x 7.6 cm. This may be connected to the pubic symphysis   collection.  BONES: Erosion of the pubic symphysis with and adjacent fluid collection   measuring 6.3 x 5.1 cm.. Sclerosis of the L3 vertebral body, new from   prior study.    IMPRESSION: Study is limited by lack of intravenous contrast.     Pubic symphyseal osteomyelitis with a thick-walled fluid collection as   measured above.   Left groin abscess with involvement of the left abductor muscle.   Questionable connection to the pubic symphyseal collection.  Tiny gas-liquid collection in the anterior abdominal wall. Correlate for   recent surgery versus infected collection.    Moderate left and mild right hydronephrosis. Patient is status post ileal   conduit formation.    < end of copied text >      Boone Romo MD; Division of Infectious Disease; Pager: [phone]; nights and weekends: [phone]
Patient is a 63y old  Male who presents with a chief complaint of Abscess (27 Oct 2018 09:15)        SUBJECTIVE / OVERNIGHT EVENTS: Pt tolerated I&D well. No current pain. Denies any symptom other than being very tired.      MEDICATIONS  (STANDING):  amLODIPine   Tablet 10 milliGRAM(s) Oral daily  dextrose 5%. 1000 milliLiter(s) (50 mL/Hr) IV Continuous <Continuous>  dextrose 50% Injectable 12.5 Gram(s) IV Push once  dextrose 50% Injectable 25 Gram(s) IV Push once  dextrose 50% Injectable 25 Gram(s) IV Push once  influenza   Vaccine 0.5 milliLiter(s) IntraMuscular once  insulin lispro (HumaLOG) corrective regimen sliding scale   SubCutaneous three times a day before meals  insulin lispro (HumaLOG) corrective regimen sliding scale   SubCutaneous at bedtime  lisinopril 20 milliGRAM(s) Oral every 12 hours  Nephro-kunal 1 Tablet(s) Oral daily  omega-3-Acid Ethyl Esters 2 Gram(s) Oral daily  piperacillin/tazobactam IVPB. 3.375 Gram(s) IV Intermittent every 8 hours  traZODone 450 milliGRAM(s) Oral at bedtime  vancomycin  IVPB 1000 milliGRAM(s) IV Intermittent every 24 hours    MEDICATIONS  (PRN):  clonazePAM Tablet 1 milliGRAM(s) Oral three times a day PRN anxiety  dextrose 40% Gel 15 Gram(s) Oral once PRN Blood Glucose LESS THAN 70 milliGRAM(s)/deciLiter  docusate sodium 100 milliGRAM(s) Oral three times a day PRN Constipation  glucagon  Injectable 1 milliGRAM(s) IntraMuscular once PRN Glucose <70 milliGRAM(s)/deciLiter  senna 2 Tablet(s) Oral at bedtime PRN Constipation      Vital Signs Last 24 Hrs  T(C): 36.7 (27 Oct 2018 06:21), Max: 36.7 (27 Oct 2018 00:27)  T(F): 98 (27 Oct 2018 06:21), Max: 98 (27 Oct 2018 00:27)  HR: 55 (27 Oct 2018 06:21) (54 - 62)  BP: 145/65 (27 Oct 2018 06:21) (132/75 - 151/78)  BP(mean): --  RR: 18 (27 Oct 2018 06:21) (14 - 18)  SpO2: 98% (27 Oct 2018 06:21) (98% - 100%)  CAPILLARY BLOOD GLUCOSE      POCT Blood Glucose.: 137 mg/dL (27 Oct 2018 08:40)  POCT Blood Glucose.: 116 mg/dL (26 Oct 2018 15:47)    I&O's Summary    26 Oct 2018 07:01  -  27 Oct 2018 07:00  --------------------------------------------------------  IN: 0 mL / OUT: 625 mL / NET: -625 mL    27 Oct 2018 07:01  -  27 Oct 2018 10:15  --------------------------------------------------------  IN: 0 mL / OUT: 200 mL / NET: -200 mL          PHYSICAL EXAM  GENERAL: NAD, well-developed  HEAD:  Atraumatic, Normocephalic  EYES: EOMI, PERRLA, conjunctiva and sclera clear  NECK: Supple, No JVD  CHEST/LUNG: Clear to auscultation bilaterally; No wheeze  HEART: Regular rate and rhythm; No murmurs, rubs, or gallops  ABDOMEN: Soft, Nontender, Nondistended; Bowel sounds present; ostomy healthy-appearing with conduit bag containing pale yellow urine  EXTREMITIES:  2+ Peripheral Pulses, No clubbing, cyanosis, or edema  PSYCH: AAOx3  SKIN: No rashes or lesions    LABS:                        8.1    10.39 )-----------( 338      ( 26 Oct 2018 18:24 )             27.2     10-26    135  |  103  |  41<H>  ----------------------------<  93  5.4<H>   |  16<L>  |  2.11<H>    Ca    9.7      26 Oct 2018 17:40    TPro  7.5  /  Alb  2.8<L>  /  TBili  0.2  /  DBili  x   /  AST  24  /  ALT  20  /  AlkPhos  289<H>  10-26      CARDIAC MARKERS ( 26 Oct 2018 17:40 )  x     / x     / 51 u/L / x     / x
Patient is a 63y old  Male who presents with a chief complaint of Abscess (28 Oct 2018 08:15)        SUBJECTIVE / OVERNIGHT EVENTS: Pt comfortable without complaint.      MEDICATIONS  (STANDING):  amLODIPine   Tablet 10 milliGRAM(s) Oral daily  dextrose 5%. 1000 milliLiter(s) (50 mL/Hr) IV Continuous <Continuous>  dextrose 50% Injectable 12.5 Gram(s) IV Push once  dextrose 50% Injectable 25 Gram(s) IV Push once  dextrose 50% Injectable 25 Gram(s) IV Push once  influenza   Vaccine 0.5 milliLiter(s) IntraMuscular once  insulin lispro (HumaLOG) corrective regimen sliding scale   SubCutaneous three times a day before meals  insulin lispro (HumaLOG) corrective regimen sliding scale   SubCutaneous at bedtime  lisinopril 20 milliGRAM(s) Oral every 12 hours  Nephro-kunal 1 Tablet(s) Oral daily  omega-3-Acid Ethyl Esters 2 Gram(s) Oral daily  piperacillin/tazobactam IVPB. 3.375 Gram(s) IV Intermittent every 8 hours  traZODone 450 milliGRAM(s) Oral at bedtime  vancomycin  IVPB 750 milliGRAM(s) IV Intermittent every 12 hours    MEDICATIONS  (PRN):  clonazePAM Tablet 1 milliGRAM(s) Oral three times a day PRN anxiety  dextrose 40% Gel 15 Gram(s) Oral once PRN Blood Glucose LESS THAN 70 milliGRAM(s)/deciLiter  docusate sodium 100 milliGRAM(s) Oral three times a day PRN Constipation  glucagon  Injectable 1 milliGRAM(s) IntraMuscular once PRN Glucose <70 milliGRAM(s)/deciLiter  oxyCODONE    5 mG/acetaminophen 325 mG 1 Tablet(s) Oral every 4 hours PRN moderate and severe pain  senna 2 Tablet(s) Oral at bedtime PRN Constipation      Vital Signs Last 24 Hrs  T(C): 36.3 (28 Oct 2018 05:17), Max: 36.8 (27 Oct 2018 12:59)  T(F): 97.3 (28 Oct 2018 05:17), Max: 98.2 (27 Oct 2018 12:59)  HR: 85 (28 Oct 2018 05:17) (58 - 85)  BP: 127/55 (28 Oct 2018 05:17) (120/65 - 137/65)  BP(mean): --  RR: 18 (28 Oct 2018 05:17) (18 - 18)  SpO2: 98% (28 Oct 2018 05:17) (96% - 98%)  CAPILLARY BLOOD GLUCOSE      POCT Blood Glucose.: 119 mg/dL (28 Oct 2018 09:11)    I&O's Summary    27 Oct 2018 07:01  -  28 Oct 2018 07:00  --------------------------------------------------------  IN: 0 mL / OUT: 200 mL / NET: -200 mL          PHYSICAL EXAM  GENERAL: NAD, well-developed  HEAD:  Atraumatic, Normocephalic  EYES: EOMI, PERRLA, conjunctiva and sclera clear  NECK: Supple, No JVD  CHEST/LUNG: Clear to auscultation bilaterally; No wheeze  HEART: Regular rate and rhythm; No murmurs, rubs, or gallops  ABDOMEN: Soft, Nontender, Nondistended; Bowel sounds present; ostomy health appearing, urine pale yellow in bag  EXTREMITIES:  2+ Peripheral Pulses, No clubbing, cyanosis, or edema  PSYCH: AAOx3  SKIN: No rashes or lesions    LABS:                        8.2    8.36  )-----------( 328      ( 27 Oct 2018 12:00 )             27.0     10-27    139  |  104  |  33<H>  ----------------------------<  113<H>  4.7   |  21<L>  |  1.90<H>    Ca    9.5      27 Oct 2018 12:00  Phos  3.2     10-27  Mg     1.9     10-27    TPro  7.4  /  Alb  3.0<L>  /  TBili  0.2  /  DBili  x   /  AST  8   /  ALT  16  /  AlkPhos  281<H>  10-27      CARDIAC MARKERS ( 26 Oct 2018 17:40 )  x     / x     / 51 u/L / x     / x              Culture - Blood (collected 26 Oct 2018 20:09)  Source: BLOOD VENOUS  Preliminary Report (27 Oct 2018 20:09):    NO ORGANISMS ISOLATED    NO ORGANISMS ISOLATED AT 24 HOURS    Culture - Blood (collected 26 Oct 2018 20:09)  Source: BLOOD PERIPHERAL  Preliminary Report (27 Oct 2018 20:09):    NO ORGANISMS ISOLATED    NO ORGANISMS ISOLATED AT 24 HOURS        RADIOLOGY & ADDITIONAL TESTS:    Imaging Personally Reviewed:  Consultant(s) Notes Reviewed:    Care Discussed with Consultants/Other Providers:
Patient is a 63y old  Male who presents with a chief complaint of Abscess (29 Oct 2018 12:05)      SUBJECTIVE / OVERNIGHT EVENTS: Pt upset was told he was supposed to have surgery and now being told no need for surgery. denies pain/fever/SOB/N/V    MEDICATIONS  (STANDING):  amLODIPine   Tablet 10 milliGRAM(s) Oral daily  dextrose 5%. 1000 milliLiter(s) (50 mL/Hr) IV Continuous <Continuous>  dextrose 50% Injectable 12.5 Gram(s) IV Push once  dextrose 50% Injectable 25 Gram(s) IV Push once  dextrose 50% Injectable 25 Gram(s) IV Push once  ertapenem  IVPB 1000 milliGRAM(s) IV Intermittent every 24 hours  influenza   Vaccine 0.5 milliLiter(s) IntraMuscular once  insulin lispro (HumaLOG) corrective regimen sliding scale   SubCutaneous three times a day before meals  insulin lispro (HumaLOG) corrective regimen sliding scale   SubCutaneous at bedtime  lisinopril 20 milliGRAM(s) Oral every 12 hours  Nephro-kunal 1 Tablet(s) Oral daily  omega-3-Acid Ethyl Esters 2 Gram(s) Oral daily  traZODone 450 milliGRAM(s) Oral at bedtime  vancomycin  IVPB 750 milliGRAM(s) IV Intermittent every 12 hours    MEDICATIONS  (PRN):  clonazePAM Tablet 1 milliGRAM(s) Oral three times a day PRN anxiety  dextrose 40% Gel 15 Gram(s) Oral once PRN Blood Glucose LESS THAN 70 milliGRAM(s)/deciLiter  docusate sodium 100 milliGRAM(s) Oral three times a day PRN Constipation  glucagon  Injectable 1 milliGRAM(s) IntraMuscular once PRN Glucose <70 milliGRAM(s)/deciLiter  oxyCODONE    5 mG/acetaminophen 325 mG 1 Tablet(s) Oral every 4 hours PRN moderate and severe pain  senna 2 Tablet(s) Oral at bedtime PRN Constipation        CAPILLARY BLOOD GLUCOSE      POCT Blood Glucose.: 124 mg/dL (29 Oct 2018 09:14)    I&O's Summary      T(C): 36.5 (10-29-18 @ 12:28), Max: 36.5 (10-29-18 @ 12:28)  HR: 66 (10-29-18 @ 12:28) (54 - 66)  BP: 116/58 (10-29-18 @ 12:28) (116/58 - 143/70)  RR: 17 (10-29-18 @ 12:28) (17 - 18)  SpO2: 99% (10-29-18 @ 12:28) (97% - 99%)    PHYSICAL EXAM:  GENERAL: NAD, well-developed  HEAD:  Atraumatic, Normocephalic  EYES: EOMI, PERRLA, conjunctiva and sclera clear  NECK: Supple, No JVD  CHEST/LUNG: Clear to auscultation bilaterally; No wheeze  HEART: Regular rate and rhythm; No murmurs, rubs, or gallops  ABDOMEN: Soft, Nontender, Nondistended; Bowel sounds present +ileal conduit   EXTREMITIES:  +LT groin wound no overt erythema   PSYCH: AAOx3  NEUROLOGY: non-focal  SKIN: No rashes or lesions    LABS:                        8.1    8.26  )-----------( 332      ( 29 Oct 2018 05:45 )             27.1     10-29    139  |  104  |  31<H>  ----------------------------<  122<H>  4.2   |  22  |  2.18<H>    Ca    9.8      29 Oct 2018 05:45    TPro  7.3  /  Alb  3.1<L>  /  TBili  < 0.2<L>  /  DBili  x   /  AST  10  /  ALT  11  /  AlkPhos  292<H>  10-29    PT/INR - ( 29 Oct 2018 05:45 )   PT: 12.4 SEC;   INR: 1.11            Culture - Blood (collected 26 Oct 2018 20:09)  Source: BLOOD VENOUS  Preliminary Report (28 Oct 2018 20:09):    NO ORGANISMS ISOLATED    NO ORGANISMS ISOLATED AT 48 HRS.    Culture - Blood (collected 26 Oct 2018 20:09)  Source: BLOOD PERIPHERAL  Preliminary Report (28 Oct 2018 20:09):    NO ORGANISMS ISOLATED    NO ORGANISMS ISOLATED AT 48 HRS.                RADIOLOGY & ADDITIONAL TESTS:    Imaging Personally Reviewed:< from: CT Abdomen and Pelvis No Cont (10.26.18 @ 20:25) >  IMPRESSION: Study is limited by lack of intravenous contrast.     Pubic symphyseal osteomyelitis with a thick-walled fluid collection as   measured above.   Left groin abscess with involvement of the left abductor muscle.   Questionable connection to the pubic symphyseal collection.  Tiny gas-liquid collection in the anterior abdominal wall. Correlate for   recent surgery versus infected collection.    Moderate left and mild right hydronephrosis. Patient is status post ileal   conduit formation.    < end of copied text >      Consultant(s) Notes Reviewed:      Care Discussed with Consultants/Other Providers:
Patient is a 63y old  Male who presents with a chief complaint of Abscess (30 Oct 2018 12:45)      SUBJECTIVE / OVERNIGHT EVENTS: pt in NAD. s/p PICC line. no CP/SOB/afebrile    MEDICATIONS  (STANDING):  amLODIPine   Tablet 10 milliGRAM(s) Oral daily  dextrose 5%. 1000 milliLiter(s) (50 mL/Hr) IV Continuous <Continuous>  dextrose 50% Injectable 12.5 Gram(s) IV Push once  dextrose 50% Injectable 25 Gram(s) IV Push once  dextrose 50% Injectable 25 Gram(s) IV Push once  ertapenem  IVPB 1000 milliGRAM(s) IV Intermittent every 24 hours  insulin lispro (HumaLOG) corrective regimen sliding scale   SubCutaneous three times a day before meals  insulin lispro (HumaLOG) corrective regimen sliding scale   SubCutaneous at bedtime  lisinopril 20 milliGRAM(s) Oral every 12 hours  Nephro-kunal 1 Tablet(s) Oral daily  omega-3-Acid Ethyl Esters 2 Gram(s) Oral daily  traZODone 450 milliGRAM(s) Oral at bedtime  vancomycin  IVPB 750 milliGRAM(s) IV Intermittent every 12 hours    MEDICATIONS  (PRN):  clonazePAM Tablet 1 milliGRAM(s) Oral three times a day PRN anxiety  dextrose 40% Gel 15 Gram(s) Oral once PRN Blood Glucose LESS THAN 70 milliGRAM(s)/deciLiter  docusate sodium 100 milliGRAM(s) Oral three times a day PRN Constipation  glucagon  Injectable 1 milliGRAM(s) IntraMuscular once PRN Glucose <70 milliGRAM(s)/deciLiter  oxyCODONE    5 mG/acetaminophen 325 mG 1 Tablet(s) Oral every 4 hours PRN moderate and severe pain  senna 2 Tablet(s) Oral at bedtime PRN Constipation        CAPILLARY BLOOD GLUCOSE      POCT Blood Glucose.: 137 mg/dL (30 Oct 2018 08:42)    I&O's Summary    29 Oct 2018 07:01  -  30 Oct 2018 07:00  --------------------------------------------------------  IN: 0 mL / OUT: 2150 mL / NET: -2150 mL        T(C): 36.6 (10-30-18 @ 06:27), Max: 36.6 (10-30-18 @ 06:27)  HR: 59 (10-30-18 @ 06:27) (58 - 62)  BP: 135/83 (10-30-18 @ 06:27) (121/59 - 151/69)  RR: 18 (10-30-18 @ 06:27) (18 - 18)  SpO2: 98% (10-30-18 @ 06:27) (98% - 98%)    PHYSICAL EXAM:  GENERAL: NAD, well-developed  HEAD:  Atraumatic, Normocephalic  EYES: EOMI, PERRLA, conjunctiva and sclera clear  NECK: Supple, No JVD  CHEST/LUNG: Clear to auscultation bilaterally; No wheeze  HEART: Regular rate and rhythm; No murmurs, rubs, or gallops  ABDOMEN: Soft, Nontender, Nondistended; Bowel sounds present +ileal conduit   EXTREMITIES:  +LT groin wound no overt erythema no crepitus  PSYCH: AAOx3  NEUROLOGY: non-focal      LABS:                        8.1    8.26  )-----------( 332      ( 29 Oct 2018 05:45 )             27.1     10-29    139  |  104  |  31<H>  ----------------------------<  122<H>  4.2   |  22  |  2.18<H>    Ca    9.8      29 Oct 2018 05:45    TPro  7.3  /  Alb  3.1<L>  /  TBili  < 0.2<L>  /  DBili  x   /  AST  10  /  ALT  11  /  AlkPhos  292<H>  10-29    PT/INR - ( 29 Oct 2018 05:45 )   PT: 12.4 SEC;   INR: 1.11                      RADIOLOGY & ADDITIONAL TESTS:    Imaging Personally Reviewed:    Consultant(s) Notes Reviewed:      Care Discussed with Consultants/Other Providers:
SUBJECTIVE:  No acute overnight events. Patient's left groin abscess drained producing approx 500 cc of pus. He reports improved pain and denies fever, chills, N/V. He reports that many members of the internal medicine team have told him he will have surgery on Monday.    OBJECTIVE:  Vital Signs Last 24 Hrs  T(C): 36.8 (27 Oct 2018 12:59), Max: 36.8 (27 Oct 2018 12:59)  T(F): 98.2 (27 Oct 2018 12:59), Max: 98.2 (27 Oct 2018 12:59)  HR: 59 (27 Oct 2018 12:59) (54 - 62)  BP: 137/65 (27 Oct 2018 12:59) (132/75 - 151/78)  BP(mean): --  RR: 18 (27 Oct 2018 12:59) (14 - 18)  SpO2: 98% (27 Oct 2018 12:59) (98% - 100%)    I&O's Detail    26 Oct 2018 07:01  -  27 Oct 2018 07:00  --------------------------------------------------------  IN:  Total IN: 0 mL    OUT:    Urostomy: 625 mL  Total OUT: 625 mL    Total NET: -625 mL      27 Oct 2018 07:01  -  27 Oct 2018 17:02  --------------------------------------------------------  IN:  Total IN: 0 mL    OUT:    Urostomy: 200 mL  Total OUT: 200 mL    Total NET: -200 mL          Inpatient Medications:  MEDICATIONS  (STANDING):  amLODIPine   Tablet 10 milliGRAM(s) Oral daily  dextrose 5%. 1000 milliLiter(s) (50 mL/Hr) IV Continuous <Continuous>  dextrose 50% Injectable 12.5 Gram(s) IV Push once  dextrose 50% Injectable 25 Gram(s) IV Push once  dextrose 50% Injectable 25 Gram(s) IV Push once  influenza   Vaccine 0.5 milliLiter(s) IntraMuscular once  insulin lispro (HumaLOG) corrective regimen sliding scale   SubCutaneous three times a day before meals  insulin lispro (HumaLOG) corrective regimen sliding scale   SubCutaneous at bedtime  lisinopril 20 milliGRAM(s) Oral every 12 hours  Nephro-kunal 1 Tablet(s) Oral daily  omega-3-Acid Ethyl Esters 2 Gram(s) Oral daily  piperacillin/tazobactam IVPB. 3.375 Gram(s) IV Intermittent every 8 hours  traZODone 450 milliGRAM(s) Oral at bedtime  vancomycin  IVPB 1000 milliGRAM(s) IV Intermittent every 24 hours    MEDICATIONS  (PRN):  clonazePAM Tablet 1 milliGRAM(s) Oral three times a day PRN anxiety  dextrose 40% Gel 15 Gram(s) Oral once PRN Blood Glucose LESS THAN 70 milliGRAM(s)/deciLiter  docusate sodium 100 milliGRAM(s) Oral three times a day PRN Constipation  glucagon  Injectable 1 milliGRAM(s) IntraMuscular once PRN Glucose <70 milliGRAM(s)/deciLiter  oxyCODONE    5 mG/acetaminophen 325 mG 1 Tablet(s) Oral every 4 hours PRN moderate and severe pain  senna 2 Tablet(s) Oral at bedtime PRN Constipation      Physical Examination:  GEN: NAD, resting quietly  NEURO: AAOx3, CN II-XII grossly intact, no focal deficits  PULM: symmetric chest rise bilaterally, no increased WOB  CV: regular rate, peripheral pulses intact  ABD: soft, NTND, ostomy bag containing urine; L medial thigh incision packed, no purulence, no surrounding erythema, no induration, minimal TTP  EXTR: no cyanosis or edema, moving all extremities    LABS:                        8.2    8.36  )-----------( 328      ( 27 Oct 2018 12:00 )             27.0       10-27    139  |  104  |  33<H>  ----------------------------<  113<H>  4.7   |  21<L>  |  1.90<H>    Ca    9.5      27 Oct 2018 12:00  Phos  3.2     10-27  Mg     1.9     10-27    TPro  7.4  /  Alb  3.0<L>  /  TBili  0.2  /  DBili  x   /  AST  8   /  ALT  16  /  AlkPhos  281<H>  10-27      CULTURES:  NEPHROSTOMY - LEFT  08-08 @ 15:59 --  --  --      BLOOD PERIPHERAL  08-08 @ 15:42 --    NO ORGANISMS ISOLATED  --      ILEAL CONDUIT  08-02 @ 16:16 --  --  Klebsiella pneumoniae    IMAGING:  no new imaging. CT A/P findings noted in original consult note
Follow Up:  osteomyelitis of pubic symphysis    Interval History/ROS: patient not seen    Allergies  No Known Allergies        ANTIMICROBIALS:  ertapenem  IVPB 1000 every 24 hours  vancomycin  IVPB 750 every 12 hours      OTHER MEDS:  MEDICATIONS  (STANDING):  amLODIPine   Tablet 10 daily  clonazePAM Tablet 1 three times a day PRN  dextrose 40% Gel 15 once PRN  dextrose 50% Injectable 12.5 once  dextrose 50% Injectable 25 once  dextrose 50% Injectable 25 once  docusate sodium 100 three times a day PRN  glucagon  Injectable 1 once PRN  influenza   Vaccine 0.5 once  insulin lispro (HumaLOG) corrective regimen sliding scale  three times a day before meals  insulin lispro (HumaLOG) corrective regimen sliding scale  at bedtime  lisinopril 20 every 12 hours  oxyCODONE    5 mG/acetaminophen 325 mG 1 every 4 hours PRN  senna 2 at bedtime PRN  traZODone 450 at bedtime      Vital Signs Last 24 Hrs  T(C): 36.4 (29 Oct 2018 05:17), Max: 36.4 (29 Oct 2018 05:17)  T(F): 97.5 (29 Oct 2018 05:17), Max: 97.5 (29 Oct 2018 05:17)  HR: 54 (29 Oct 2018 05:17) (54 - 58)  BP: 143/70 (29 Oct 2018 05:17) (125/62 - 143/70)  BP(mean): --  RR: 18 (29 Oct 2018 05:17) (18 - 18)  SpO2: 97% (29 Oct 2018 05:17) (97% - 100%)    PHYSICAL EXAM:  not seen                          8.1    8.26  )-----------( 332      ( 29 Oct 2018 05:45 )             27.1   WBC Count: 8.26 (10-29 @ 05:45)  WBC Count: 8.36 (10-27 @ 12:00)  WBC Count: 10.39 (10-26 @ 18:24)    10-29    139  |  104  |  31<H>  ----------------------------<  122<H>  4.2   |  22  |  2.18<H>  Creatinine: 2.18 (10-29 @ 05:45)    Creatinine: 1.90 (10-27 @ 12:00)    Creatinine: 2.11 (10-26 @ 17:40)      Ca    9.8      29 Oct 2018 05:45    TPro  7.3  /  Alb  3.1<L>  /  TBili  < 0.2<L>  /  DBili  x   /  AST  10  /  ALT  11  /  AlkPhos  292<H>  10-29    MICROBIOLOGY:  BLOOD PERIPHERAL  10-26-18 --  --  --      RADIOLOGY:  < from: CT Abdomen and Pelvis No Cont (10.26.18 @ 20:25) >  Pubic symphyseal osteomyelitis with a thick-walled fluid collection as   measured above.   Left groin abscess with involvement of the left abductor muscle.   Questionable connection to the pubic symphyseal collection.  Tiny gas-liquid collection in the anterior abdominal wall. Correlate for   recent surgery versus infected collection.    Moderate left and mild right hydronephrosis. Patient is status post ileal   conduit formation.    < end of copied text >      Boone Romo MD; Division of Infectious Disease; Pager: [phone]; nights and weekends: [phone]

## 2018-10-30 NOTE — PROVIDER CONTACT NOTE (MEDICATION) - SITUATION
Patient states he has own trazadone at this bedside because "the hospital does not know the right dosage".

## 2018-10-30 NOTE — PROGRESS NOTE ADULT - ATTENDING COMMENTS
consider wound care nurse consult    may f/u with me as outpatient    The Acute Care Surgery (B Team) Attending Group Practice:  Dr. Eron Ramos, Dr. Sebas Mcadams, Dr. Moreno Sainz, Dr. Dilma Mckeon, Dr. Musa Holley    urgent issues - spectra [phone] or [phone]  nonurgent issues - [phone]  patient appointments or afterhours - [phone]
discharge home 36 min with outpt f/u with ID

## 2018-10-30 NOTE — PROGRESS NOTE ADULT - PROVIDER SPECIALTY LIST ADULT
Hospitalist
Infectious Disease
Infectious Disease
Surgery
Infectious Disease

## 2018-10-30 NOTE — PROGRESS NOTE ADULT - PROBLEM SELECTOR PLAN 1
CT scan with erosion of the pubic symphysis with and adjacent fluid collection   measuring 6.3 x 5.1 cm. Sclerosis of the L3 vertebral body, new from   prior study.  - ID Vanco/ertapenem as per ID bcx NGTD- IR PICC line in AM. vanco trough subtherapeutic increased to 750mg  BID   - IR unable to drain abscess  - wound care- as per wound care CT scan with erosion of the pubic symphysis with and adjacent fluid collection   measuring 6.3 x 5.1 cm. Sclerosis of the L3 vertebral body, new from   prior study.  - ID Vanco/ertapenem as per ID bcx NGTD- IR PICC line in AM. vanco trough-14.4  subtherapeutic increased to 750mg  BID   - IR unable to drain abscess  - wound care- as per wound care

## 2018-10-30 NOTE — PROGRESS NOTE ADULT - ASSESSMENT
63M with prostate cancer. bladder cancer, previous Radiation Therapy, cystectomy and ileal conduit placement.  His medical history is remarkable for  DM, HTN, HLD, obesity    The CT scan demonstrates: Erosion of the pubic symphysis with and adjacent fluid collection   measuring 6.3 x 5.1 cm -. He will require prolonged antibiotic therapy for pubic symphysis osteomyelitis  10/28/18: ESR/CRP = > 140 mm/hr/ 83.5 mg/L    unable to undergo IR aspiration      Suggest:  DisContinue Zosyn 10/26 --> 10/29  Ertapenem 1 gram IVSS 10/29 -->  Continue Vanco  10/26-->  Place  PICC line     6 weeks duration of IV antbiotics though Decmber 7, 2018  weekly ESR,CRP, CBC, CMP, Vanco trough    discussed with NP
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
63M with prostate cancer. bladder cancer, previous Radiation Therapy, cystectomy and ileal conduit placement.  His medical history is remarkable for  DM, HTN, HLD, obesity    The CT scan demonstrates: Erosion of the pubic symphysis with and adjacent fluid collection   measuring 6.3 x 5.1 cm -. He will require prolonged antibiotic therapy for pubic symphysis osteomyelitis  10/28/18: ESR/CRP = > 140 mm/hr/ 83.5 mg/L    unable to undergo IR aspiration - as per IR not accessible  radiation effect may be contributing factor to this pubic bone osteo - patient at risk for future complications      Suggest:  DisContinue Zosyn 10/26 --> 10/29  Ertapenem 1 gram IVSS 10/29 -->  Continue Vanco  10/26-->  Place  PICC line     6 weeks duration of IV antbiotics though December 7, 2018  weekly ESR,CRP, CBC, CMP, Vanco trough    I gave my contact info to Mr Calvin and discussed medical care issues
63M with prostate cancer. bladder cancer, previous Radiation Therapy, cystectomy and ileal conduit placement.  His medical history is remarkable for  DM, HTN, HLD, obesity    The CT scan demonstrates: Erosion of the pubic symphysis with and adjacent fluid collection   measuring 6.3 x 5.1 cm -. He will require prolonged antibiotic therapy for pubic symphysis osteomyelitis  10/28/18: Sedimentation Rate, Erythrocyte: > 140 mm/hr;  C-Reactive Protein, Serum: 83.5 mg/L      Suggest:  Continue Zosyn/Vanco  10/26-->  I agree with increase Vancomycin dose:   blood cultures x2: PICC line placement if blood cultures negative x 72 hours   IR drainage of the fluid collection would be both therapeutic and diagnostic- anticipated 10/29   6 weeks duration of IV antbiotics anticipated.
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
64 yo M with a hx of bladder cancer s/p bladder resection with ileal conduit, HTN, DM, and left groin abscess s/p I&D. Afebrile, no leukocytosis, clinically improving.    -no further surgical intervention indicated  -no need for continued antibiotics as no signs of cellulitis  -consult urology if concerns about pubic symphysis collection persist as it is around the location of the patient's previous surgical intervention  -care per primary team  -please reconsult surgery should additional concerns arise    Patient discussed with Dr. Mcadams

## 2018-10-30 NOTE — ADVANCED PRACTICE NURSE CONSULT - ASSESSMENT
A&Ox4,up and loraine independently, RLQ urostomy pouch intact (patient independent in care). Anterior abdominal wall (pubis region) with intact scab and palpated scar tissue that extends 0.5cm, (no increased warmth, no erythema, no induration) patient reports previous abscess three weeks ago that was drained in [facility] (information could not be confirmed by documentation)Skin warm, dry with increased moisture in intertriginous folds.     Left medial thigh abscess (s/p I&D on 10/27) linear wound measuring 0.2cmx1.8cmx4.8cm. Unable to assess tissue type due to narrow opening. Moderate serosanguinous drainage. NO odor. Periwound skin with induration circumferentially that extends 0.5cm. No increased warmth, no erythema. Goals of care: Monitor for tissue type changes, lightly pack depth, decreased bioburden. A&Ox4,up and loraine independently, RLQ urostomy pouch intact (patient independent in care). Anterior abdominal wall (pubis region) with intact scab and palpated scar tissue that extends 0.5cm, (no increased warmth, no erythema, no induration) patient reports previous abscess three weeks ago that was drained in [facility] (information could not be confirmed by documentation)Skin warm, dry with increased moisture in intertriginous folds.     Left medial thigh abscess (s/p I&D on 10/27) linear wound measuring 0.2cmx1.8cmx4.8cm. Unable to assess tissue type due to narrow opening. Moderate serosanguinous drainage. NO odor. Periwound skin with induration circumferentially that extends 0.5cm. No increased warmth, no erythema. Goals of care: Monitor for tissue type changes, lightly pack depth, decreased bioburden.     Findings discussed with CHRISTINA Sandoval

## 2018-10-30 NOTE — PROGRESS NOTE ADULT - PROBLEM SELECTOR PLAN 6
Very anxious on exam.   - Continue home Clonazepam  ISTOP Reference #: [id number]  10/15/2018 clonazepam 1 mg tablet 90 tabs for 30 days Parish Gill NP

## 2018-10-30 NOTE — PROGRESS NOTE ADULT - PROBLEM SELECTOR PLAN 8
DVT ppx: SCDs given possible intervention on second fluid collection  Diet: CC diet/DASH    Roxana Pepe MD  Internal Medicine, PGY-2  Pager [phone]

## 2018-10-30 NOTE — ADVANCED PRACTICE NURSE CONSULT - RECOMMEDATIONS
Nutrition consult to optimize nutrition  Surgery to continue to monitor patient, r/o abscess of anterior abdominal wall, below umbilicus (CT reports gas liquid pocket infection vs. surgical changes).     Topical Recommendations    Left medial thigh: Clean with NS. Pat dry. Apply Liquid barrier film to periwound skin. Lightly pack depth with Aquacel AG, leave 2 inches out, Cover with silicone foam with borders. Change daily.     Please contact Wound Care Service Line if we can be of further assistance (ext [phone]). Nutrition consult to optimize nutrition  Surgery to continue to monitor patient  Will recommend r/o abscess of anterior abdominal wall, below umbilicus (CT reports gas liquid pocket infection vs. surgical changes), clinically presenting as a stable scab     Topical Recommendations    Left medial thigh: Clean with NS. Pat dry. Apply Liquid barrier film to periwound skin. Lightly pack depth with Aquacel AG, leave 2 inches out, Cover with silicone foam with borders. Change daily.     Please contact Wound Care Service Line if we can be of further assistance (ext [phone]).

## 2018-10-30 NOTE — ADVANCED PRACTICE NURSE CONSULT - REASON FOR CONSULT
Patient seen on skin care rounds after wound care referral received for assessment of skin impairment and recommendations of topical management. Chart reviewed: Serum albumin 3.1g/dl, total protein 7.3, BMI 30.4kg/m2, Sedimentation rate greater than 40, hemoglobin A1C 6%, UA+, C reactive protein 83.5, CT of the ABD and pelvis reports Left groin abscess with involvement of the left abductor muscle, Questionable connection to the pubic symphyseal collection, tiny gas-liquid collection in the anterior abdominal wall. Correlate for recent surgery versus infected collection.Lalo. Patient interviewed, reports the onset of abscess was last Wednesday and presented as swelling, reports that three weeks ago he had an abscess in lower anterior wall (pubis area) that was drained in the EDU. Patient H/O of bladder cancer s/p bladder resection with ileal conduit, HTN, DM. Patient admitted with swelling of the left thigh s/p I&D abscess. Patient is being seen by surgery for left thigh abscess and ID for + osteo of pubic symphysis and adjacent fluid collection. Patient seen on skin care rounds after wound care referral received for assessment of skin impairment and recommendations of topical management. Chart reviewed: Serum albumin 3.1g/dl, total protein 7.3, BMI 30.4kg/m2, Sedimentation rate greater than 140, hemoglobin A1C 6%, UA+, C reactive protein 83.5, CT of the ABD and pelvis reports left groin abscess with involvement of the left abductor muscle, Questionable connection to the pubic symphyseal collection, tiny gas-liquid collection in the anterior abdominal wall. Correlate for recent surgery versus infected collection.Lalo. Patient interviewed, reports the onset of left medial thigh abscess was last Wednesday and presented with swelling, reports that three weeks ago he had an abscess in lower anterior wall (pubis area) that was drained in the EDU. Patient H/O of bladder cancer s/p bladder resection with ileal conduit, HTN, DM. Patient admitted with swelling of the left thigh s/p I&D abscess. Patient is being seen by surgery for left thigh abscess and ID for + osteo of pubic symphysis and adjacent fluid collection.

## 2018-10-30 NOTE — PROVIDER CONTACT NOTE (MEDICATION) - ASSESSMENT
Patient does not have actual bottle medication came in, only the daily medication container with assorted pills. Patient will not give bottle to me in order to have pharmacy verify it.

## 2018-10-31 LAB
BACTERIA BLD CULT: SIGNIFICANT CHANGE UP
BACTERIA BLD CULT: SIGNIFICANT CHANGE UP

## 2018-11-01 ENCOUNTER — MESSAGE (OUTPATIENT)
Age: 63
End: 2018-11-01

## 2018-11-01 ENCOUNTER — RX RENEWAL (OUTPATIENT)
Age: 63
End: 2018-11-01

## 2018-11-02 ENCOUNTER — APPOINTMENT (OUTPATIENT)
Dept: UROLOGY | Facility: CLINIC | Age: 63
End: 2018-11-02

## 2018-11-07 NOTE — H&P PST ADULT - LYMPHATIC
posterior cervical L/anterior cervical R/supraclavicular L/supraclavicular R/anterior cervical L/posterior cervical R No

## 2018-11-14 ENCOUNTER — APPOINTMENT (OUTPATIENT)
Dept: SURGERY | Facility: CLINIC | Age: 63
End: 2018-11-14
Payer: MEDICARE

## 2018-11-14 VITALS
WEIGHT: 222 LBS | DIASTOLIC BLOOD PRESSURE: 73 MMHG | HEART RATE: 55 BPM | HEIGHT: 71 IN | BODY MASS INDEX: 31.08 KG/M2 | SYSTOLIC BLOOD PRESSURE: 137 MMHG | TEMPERATURE: 97.7 F

## 2018-11-14 PROCEDURE — 99214 OFFICE O/P EST MOD 30 MIN: CPT

## 2018-12-07 ENCOUNTER — OUTPATIENT (OUTPATIENT)
Dept: OUTPATIENT SERVICES | Facility: HOSPITAL | Age: 63
LOS: 1 days | End: 2018-12-07
Payer: MEDICARE

## 2018-12-07 ENCOUNTER — APPOINTMENT (OUTPATIENT)
Dept: UROLOGY | Facility: CLINIC | Age: 63
End: 2018-12-07
Payer: MEDICARE

## 2018-12-07 ENCOUNTER — OTHER (OUTPATIENT)
Age: 63
End: 2018-12-07

## 2018-12-07 DIAGNOSIS — R35.0 FREQUENCY OF MICTURITION: ICD-10-CM

## 2018-12-07 DIAGNOSIS — N32.9 BLADDER DISORDER, UNSPECIFIED: Chronic | ICD-10-CM

## 2018-12-07 DIAGNOSIS — Z93.6 OTHER ARTIFICIAL OPENINGS OF URINARY TRACT STATUS: Chronic | ICD-10-CM

## 2018-12-07 DIAGNOSIS — Z85.51 PERSONAL HISTORY OF MALIGNANT NEOPLASM OF BLADDER: Chronic | ICD-10-CM

## 2018-12-07 DIAGNOSIS — Z98.89 OTHER SPECIFIED POSTPROCEDURAL STATES: Chronic | ICD-10-CM

## 2018-12-07 DIAGNOSIS — Z93.59 OTHER CYSTOSTOMY STATUS: Chronic | ICD-10-CM

## 2018-12-07 PROCEDURE — 76775 US EXAM ABDO BACK WALL LIM: CPT | Mod: 26

## 2018-12-07 PROCEDURE — 76775 US EXAM ABDO BACK WALL LIM: CPT

## 2018-12-07 PROCEDURE — 99213 OFFICE O/P EST LOW 20 MIN: CPT | Mod: 25

## 2018-12-11 DIAGNOSIS — Z93.6 OTHER ARTIFICIAL OPENINGS OF URINARY TRACT STATUS: ICD-10-CM

## 2018-12-11 DIAGNOSIS — N13.30 UNSPECIFIED HYDRONEPHROSIS: ICD-10-CM

## 2018-12-17 ENCOUNTER — APPOINTMENT (OUTPATIENT)
Dept: TRAUMA SURGERY | Facility: CLINIC | Age: 63
End: 2018-12-17
Payer: MEDICARE

## 2018-12-17 VITALS
TEMPERATURE: 98 F | DIASTOLIC BLOOD PRESSURE: 92 MMHG | HEART RATE: 51 BPM | WEIGHT: 222 LBS | HEIGHT: 71 IN | BODY MASS INDEX: 31.08 KG/M2 | SYSTOLIC BLOOD PRESSURE: 168 MMHG

## 2018-12-17 PROCEDURE — 99212 OFFICE O/P EST SF 10 MIN: CPT

## 2019-01-01 ENCOUNTER — FORM ENCOUNTER (OUTPATIENT)
Age: 64
End: 2019-01-01

## 2019-01-02 ENCOUNTER — APPOINTMENT (OUTPATIENT)
Dept: NUCLEAR MEDICINE | Facility: HOSPITAL | Age: 64
End: 2019-01-02
Payer: MEDICARE

## 2019-01-02 ENCOUNTER — OUTPATIENT (OUTPATIENT)
Dept: OUTPATIENT SERVICES | Facility: HOSPITAL | Age: 64
LOS: 1 days | End: 2019-01-02

## 2019-01-02 DIAGNOSIS — Z93.59 OTHER CYSTOSTOMY STATUS: Chronic | ICD-10-CM

## 2019-01-02 DIAGNOSIS — Z93.6 OTHER ARTIFICIAL OPENINGS OF URINARY TRACT STATUS: Chronic | ICD-10-CM

## 2019-01-02 DIAGNOSIS — N13.30 UNSPECIFIED HYDRONEPHROSIS: ICD-10-CM

## 2019-01-02 DIAGNOSIS — Z98.89 OTHER SPECIFIED POSTPROCEDURAL STATES: Chronic | ICD-10-CM

## 2019-01-02 DIAGNOSIS — N32.9 BLADDER DISORDER, UNSPECIFIED: Chronic | ICD-10-CM

## 2019-01-02 DIAGNOSIS — Z85.51 PERSONAL HISTORY OF MALIGNANT NEOPLASM OF BLADDER: Chronic | ICD-10-CM

## 2019-01-02 PROCEDURE — 78708 K FLOW/FUNCT IMAGE W/DRUG: CPT | Mod: 26

## 2019-01-04 ENCOUNTER — APPOINTMENT (OUTPATIENT)
Dept: CT IMAGING | Facility: HOSPITAL | Age: 64
End: 2019-01-04

## 2019-01-06 ENCOUNTER — EMERGENCY (EMERGENCY)
Facility: HOSPITAL | Age: 64
LOS: 1 days | Discharge: HOME | End: 2019-01-06
Attending: EMERGENCY MEDICINE | Admitting: EMERGENCY MEDICINE
Payer: COMMERCIAL

## 2019-01-06 VITALS
HEART RATE: 49 BPM | RESPIRATION RATE: 18 BRPM | OXYGEN SATURATION: 98 % | SYSTOLIC BLOOD PRESSURE: 175 MMHG | DIASTOLIC BLOOD PRESSURE: 92 MMHG

## 2019-01-06 VITALS
SYSTOLIC BLOOD PRESSURE: 168 MMHG | TEMPERATURE: 98 F | DIASTOLIC BLOOD PRESSURE: 80 MMHG | HEIGHT: 71 IN | RESPIRATION RATE: 18 BRPM | OXYGEN SATURATION: 98 % | WEIGHT: 195.11 LBS | HEART RATE: 64 BPM

## 2019-01-06 DIAGNOSIS — Z98.89 OTHER SPECIFIED POSTPROCEDURAL STATES: Chronic | ICD-10-CM

## 2019-01-06 DIAGNOSIS — Z85.51 PERSONAL HISTORY OF MALIGNANT NEOPLASM OF BLADDER: Chronic | ICD-10-CM

## 2019-01-06 DIAGNOSIS — Z93.59 OTHER CYSTOSTOMY STATUS: Chronic | ICD-10-CM

## 2019-01-06 DIAGNOSIS — Z93.6 OTHER ARTIFICIAL OPENINGS OF URINARY TRACT STATUS: Chronic | ICD-10-CM

## 2019-01-06 DIAGNOSIS — M25.519 PAIN IN UNSPECIFIED SHOULDER: ICD-10-CM

## 2019-01-06 DIAGNOSIS — N32.9 BLADDER DISORDER, UNSPECIFIED: Chronic | ICD-10-CM

## 2019-01-06 PROCEDURE — 73030 X-RAY EXAM OF SHOULDER: CPT | Mod: 26,LT

## 2019-01-06 PROCEDURE — 73200 CT UPPER EXTREMITY W/O DYE: CPT | Mod: 26,LT

## 2019-01-06 PROCEDURE — 99284 EMERGENCY DEPT VISIT MOD MDM: CPT

## 2019-01-06 PROCEDURE — 99284 EMERGENCY DEPT VISIT MOD MDM: CPT | Mod: 25

## 2019-01-06 PROCEDURE — 73200 CT UPPER EXTREMITY W/O DYE: CPT

## 2019-01-06 PROCEDURE — 73030 X-RAY EXAM OF SHOULDER: CPT

## 2019-01-06 RX ORDER — CLONAZEPAM 1 MG
1 TABLET ORAL
Qty: 0 | Refills: 0 | COMMUNITY

## 2019-01-06 RX ORDER — OXYCODONE AND ACETAMINOPHEN 5; 325 MG/1; MG/1
1 TABLET ORAL EVERY 4 HOURS
Qty: 0 | Refills: 0 | Status: DISCONTINUED | OUTPATIENT
Start: 2019-01-06 | End: 2019-01-06

## 2019-01-06 RX ADMIN — OXYCODONE AND ACETAMINOPHEN 1 TABLET(S): 5; 325 TABLET ORAL at 10:15

## 2019-01-06 NOTE — ED ADULT NURSE NOTE - NSIMPLEMENTINTERV_GEN_ALL_ED
Implemented All Fall Risk Interventions:  [place] to call system. Call bell, personal items and telephone within reach. Instruct patient to call for assistance. Room bathroom lighting operational. Non-slip footwear when patient is off stretcher. Physically safe environment: no spills, clutter or unnecessary equipment. Stretcher in lowest position, wheels locked, appropriate side rails in place. Provide visual cue, wrist band, yellow gown, etc. Monitor gait and stability. Monitor for mental status changes and reorient to person, place, and time. Review medications for side effects contributing to fall risk. Reinforce activity limits and safety measures with patient and family.

## 2019-01-06 NOTE — ED PROVIDER NOTE - PHYSICAL EXAMINATION
Gen:  alert, awake, no acute distress  Head:  normocephalic, small frontal hematoma, scabbed abrasion, no infectin no bleeding  HEENT: PERRLA, EOMI, normal nose, normal oropharynx, no tonsillar edema, erythema, or exudate  CV:  rrr, nl S1, S2, no m/r/g  Pulm:  lungs CTA b/l  Abd: s/nt/nd, +BS  MSK:  lue without deformity, loss rom left shoulder secondary to pain, +ttp anterior shoulder and proximal humerus, pulses normal  Neuro:  grossly intact, no focal deficits  Skin:  clear, dry, intact  Psych: AOx3, normal affect, no apparent risk to self or others

## 2019-01-06 NOTE — ED ADULT TRIAGE NOTE - CHIEF COMPLAINT QUOTE
Pt c/o left shoulder pain since yesterday after a trip and fall down 5 steps. Pt PMS in tact. Pt states, "I landed on the shoulder, I tried ice and I took 3 Advils 2 hours ago." Pt c/o left shoulder pain since yesterday after a trip and fall down 5 steps. Pt PMS in tact. Pt states, "I landed on the shoulder, I tried ice and I took 3 Advils 2 hours ago." Pt states, "I banged my forehead and have a little bump but no pain." Pt denies LOC or use of blood thinners.

## 2019-01-06 NOTE — ED PROVIDER NOTE - OBJECTIVE STATEMENT
Pt slipped and fell yesterday morning. hit head, no loc. Fell onto left shouder, he is right hand dominant. Pt c/o severe left shoudler pain, slightly improved with ibuprofen that he took last dose this am. c/o pain worse with activity, has loss rom, no numbness or tingling.   tdap in last 10 years.

## 2019-01-06 NOTE — ED PROVIDER NOTE - NS ED ROS FT
Except as otherwise indicated in HPI:  CONSTITUTIONAL: Neg  HEENT: neg  CV: neg  Resp: neg  GI: Neg  : Neg  MSK: shoulder pain  SKIN: abrasion  NEURO: Neg  PSYCHIATRIC: Neg  Heme/Onc: Neg

## 2019-01-06 NOTE — ED ADULT NURSE NOTE - CHIEF COMPLAINT QUOTE
Pt c/o left shoulder pain since yesterday after a trip and fall down 5 steps. Pt PMS in tact. Pt states, "I landed on the shoulder, I tried ice and I took 3 Advils 2 hours ago." Pt states, "I banged my forehead and have a little bump but no pain." Pt denies LOC or use of blood thinners.

## 2019-01-06 NOTE — ED PROVIDER NOTE - PROGRESS NOTE DETAILS
case d/w radiology, concern for sublux/disloc, recommend ct results d/w pt, sling given, understands importance of following up with ortho

## 2019-01-10 ENCOUNTER — APPOINTMENT (OUTPATIENT)
Dept: ORTHOPEDIC SURGERY | Facility: CLINIC | Age: 64
End: 2019-01-10

## 2019-01-10 ENCOUNTER — APPOINTMENT (OUTPATIENT)
Dept: ORTHOPEDIC SURGERY | Facility: CLINIC | Age: 64
End: 2019-01-10
Payer: MEDICARE

## 2019-01-10 VITALS
HEIGHT: 71 IN | SYSTOLIC BLOOD PRESSURE: 146 MMHG | BODY MASS INDEX: 27.3 KG/M2 | HEART RATE: 62 BPM | WEIGHT: 195 LBS | DIASTOLIC BLOOD PRESSURE: 73 MMHG

## 2019-01-10 DIAGNOSIS — Z85.9 PERSONAL HISTORY OF MALIGNANT NEOPLASM, UNSPECIFIED: ICD-10-CM

## 2019-01-10 DIAGNOSIS — Z60.2 PROBLEMS RELATED TO LIVING ALONE: ICD-10-CM

## 2019-01-10 DIAGNOSIS — Z78.9 OTHER SPECIFIED HEALTH STATUS: ICD-10-CM

## 2019-01-10 PROCEDURE — 99204 OFFICE O/P NEW MOD 45 MIN: CPT

## 2019-01-10 SDOH — SOCIAL STABILITY - SOCIAL INSECURITY: PROBLEMS RELATED TO LIVING ALONE: Z60.2

## 2019-01-11 PROBLEM — Z78.9 CURRENT NON-SMOKER: Status: ACTIVE | Noted: 2019-01-10

## 2019-01-11 PROBLEM — Z78.9 EXERCISES OCCASIONALLY: Status: ACTIVE | Noted: 2019-01-10

## 2019-01-11 PROBLEM — Z78.9 DOES NOT USE ILLICIT DRUGS: Status: ACTIVE | Noted: 2019-01-10

## 2019-01-11 PROBLEM — Z78.9 CURRENT NON-DRINKER OF ALCOHOL: Status: ACTIVE | Noted: 2019-01-10

## 2019-01-11 PROBLEM — Z85.9 HISTORY OF MALIGNANT NEOPLASM: Status: RESOLVED | Noted: 2019-01-10 | Resolved: 2019-01-11

## 2019-01-11 NOTE — HISTORY OF PRESENT ILLNESS
[de-identified] : RHD 62 y/o M fell on the steps in front of his house with his L arm out. He had an immediate pain in his upper L arm. He used ice and took NSAIDs that day. He went to [facility] ER the following day due to increased pain. He had a CT scan taken which showed a fx. He was given a sling and told to followup with orthopedics. He was also given a prescription for Tramadol which has provided minimal relief. He complains of constant pain. He has had difficulty with ADL's, including getting dressed, and showering. He has been using ice with some relief. He denies n/t and radiation of pain.

## 2019-01-11 NOTE — DISCUSSION/SUMMARY
[de-identified] : Discussed findings of today's exam and possible causes of patient's pain.  Educated patient on their diagnosis of left shoulder glenoid fossa comminuted minimally displaced fracture.  Reviewed possible courses of treatment, and we collaboratively decided best course of treatment at this time will include conservative management. I advised the patient that I may nonsurgical provider, I advised him that the fracture he has involving the glenoid fossa is fairly significant with several large comminuted pieces, there is normal alignment of the glenohumeral joint with the humerus on the fossa at this time, however cannot ensure that this injury does not require surgical intervention. Patient states he has had several surgeries over the years, and he is not interested in any surgical intervention at this time. I advised him we can opts for nonsurgical management at this time and continue with close interval radiologic evaluation of the shoulder, if the comminuted fracture remains with a near approximation he did conceivably have normal function of his left shoulder with nonsurgical management. Patient did not wish to have a surgical intervention with one of my shoulder or trauma associates at this time. Patient states the tramadol provided is not providing any relief, he states he does have some relief from the oxycodone.  A prescription of percocet has been prescribed, I informed the patient that this is a narcotic pain medication and that it causes sedation and it should not be taken while operating machinery, or while caring for children/family members alone.  I also advised the patient that all narcotic pain medications have addictive potential and therefore should be taken as little as possible, and for the shortest duration needed. Patient will follow up in 2 weeks for repeat x-ray and reassessment. He is advised to maintain his arm and his shoulder sling until that time. He may do hand wrist and elbow range of motion as tolerated, but no active range of motion of the shoulder at this time.  Patient appreciates and agrees with current plan.\par Using the website [email or web address]., the [place] prescription monitoring program registry was searched for this patient. The confidential drug utilization report was reviewed prior to the controlled substance prescription being given to the patient. Reference #: [id number]\par \par This note was generated using dragon medical dictation software.  A reasonable effort has been made for proofreading its contents, but typos may still remain.  If there are any questions or points of clarification needed please notify my office.\par

## 2019-01-11 NOTE — PHYSICAL EXAM
[de-identified] : Constitutional: Well-nourished, well-developed, No acute distress\par Respiratory:  Good respiratory effort, no SOB\par Lymphatic: No regional lymphadenopathy, no lymphedema\par Psychiatric: Pleasant and normal affect, alert and oriented x3\par Skin: Clean dry and intact B/L UE/LE\par Musculoskeletal: normal except where as noted in regional exam\par \par Left Shoulder:\par \par APPEARANCE: + swelling/ecchymoses of the anterolateral shoulder, no marked deformities or malalignment\par POSITIVE TENDERNESS: Moderately around the anterior, lateral and posterior shoulder areas\par NONTENDER: AC joint \par ROM: Significantly limited in all directions due to pain\par RESISTIVE TESTIN/5 flexion/extension, IR/ER, abduction \par Vasc: 2+ radial pulse\par Neuro: AIN, PIN, Ulnar nerve intact to motor\par Sensation: Intact to light touch throughout [de-identified] : I reviewed and clinically correlated the following outside imaging studies,\par \par [facility]\par EXAM: CT SHOULDER ONLY LT \par \par PROCEDURE DATE: 01/06/2019 \par \par INTERPRETATION: CT SHOULDER ONLY LT dated 1/6/2019 11:30 AM \par \par INDICATION: Trauma. Shoulder pain. \par \par COMPARISON: Shoulder radiographs dated 1/6/2019 \par \par TECHNIQUE: CT imaging of the shoulder was performed. The data was \par reformatted in the axial, coronal, and sagittal planes. Additionally, 3-D \par reformatted imaging was created at a separate workstation. \par \par FINDINGS: \par \par OSSEOUS STRUCTURES: There is a comminuted, intra-articular, and mildly \par displaced fracture of the glenoid articular surface. One nondisplaced \par fracture line extends transversely along the base of the coracoid at its \par junction with the glenoid. No additional fractures identified. There is \par moderate productive change at the acromioclavicular joint. There is no \par glenohumeral dislocation. There is mild productive change at the posterior \par aspect of the glenohumeral joint space. There is moderately decreased bone \par mineralization. \par SYNOVIUM/ JOINT FLUID: There is moderate fluid in the glenohumeral joint and \par subacromial/subdeltoid bursa. This fluid is moderately complex in keeping \par with hemarthrosis. \par TENDONS: Evaluation the tendons is limited without intra-articular contrast. \par No large full-thickness tear is noted. \par MUSCLES: There is no intramuscular hematoma. \par NEUROVASCULAR STRUCTURES: Mild scattered vascular calcifications within the \par aorta. \par INTRATHORACIC SOFT TISSUES: Mild atelectatic change. \par SUBCUTANEOUS SOFT TISSUES: There is soft tissue swelling within the axillary \par region. \par \par 3-D reformatted imaging confirms these findings. \par \par IMPRESSION: \par \par Comminuted, mildly displaced, intra-articular fracture of the glenoid with \par extension into the coracoid base. \par No shoulder dislocation. \par Complex glenohumeral joint effusion and fluid in the subacromial/subdeltoid \par bursa. \par Axillary soft tissue swelling.

## 2019-01-14 ENCOUNTER — OUTPATIENT (OUTPATIENT)
Dept: OUTPATIENT SERVICES | Facility: HOSPITAL | Age: 64
LOS: 1 days | End: 2019-01-14
Payer: COMMERCIAL

## 2019-01-14 ENCOUNTER — APPOINTMENT (OUTPATIENT)
Dept: CT IMAGING | Facility: HOSPITAL | Age: 64
End: 2019-01-14

## 2019-01-14 DIAGNOSIS — Z98.89 OTHER SPECIFIED POSTPROCEDURAL STATES: Chronic | ICD-10-CM

## 2019-01-14 DIAGNOSIS — Z93.6 OTHER ARTIFICIAL OPENINGS OF URINARY TRACT STATUS: Chronic | ICD-10-CM

## 2019-01-14 DIAGNOSIS — N32.9 BLADDER DISORDER, UNSPECIFIED: Chronic | ICD-10-CM

## 2019-01-14 DIAGNOSIS — Z85.51 PERSONAL HISTORY OF MALIGNANT NEOPLASM OF BLADDER: Chronic | ICD-10-CM

## 2019-01-14 DIAGNOSIS — M86.9 OSTEOMYELITIS, UNSPECIFIED: ICD-10-CM

## 2019-01-14 DIAGNOSIS — Z93.59 OTHER CYSTOSTOMY STATUS: Chronic | ICD-10-CM

## 2019-01-14 PROCEDURE — 72192 CT PELVIS W/O DYE: CPT

## 2019-01-14 PROCEDURE — 72192 CT PELVIS W/O DYE: CPT | Mod: 26

## 2019-01-15 ENCOUNTER — CLINICAL ADVICE (OUTPATIENT)
Age: 64
End: 2019-01-15

## 2019-01-17 ENCOUNTER — APPOINTMENT (OUTPATIENT)
Dept: UROLOGY | Facility: CLINIC | Age: 64
End: 2019-01-17
Payer: MEDICARE

## 2019-01-17 VITALS
WEIGHT: 195 LBS | HEART RATE: 57 BPM | BODY MASS INDEX: 27.3 KG/M2 | RESPIRATION RATE: 14 BRPM | HEIGHT: 71 IN | OXYGEN SATURATION: 99 % | DIASTOLIC BLOOD PRESSURE: 80 MMHG | SYSTOLIC BLOOD PRESSURE: 144 MMHG | TEMPERATURE: 98 F

## 2019-01-17 DIAGNOSIS — T83.011A BREAKDOWN (MECHANICAL) OF INDWELLING URETHRAL CATHETER, INITIAL ENCOUNTER: ICD-10-CM

## 2019-01-17 DIAGNOSIS — R93.89 ABNORMAL FINDINGS ON DIAGNOSTIC IMAGING OF OTHER SPECIFIED BODY STRUCTURES: ICD-10-CM

## 2019-01-17 PROCEDURE — 99213 OFFICE O/P EST LOW 20 MIN: CPT

## 2019-01-22 ENCOUNTER — APPOINTMENT (OUTPATIENT)
Dept: UROLOGY | Facility: CLINIC | Age: 64
End: 2019-01-22

## 2019-01-22 PROBLEM — T83.011A: Status: RESOLVED | Noted: 2017-11-03 | Resolved: 2019-01-22

## 2019-01-22 NOTE — PHYSICAL EXAM
[General Appearance - Well Developed] : well developed [General Appearance - Well Nourished] : well nourished [General Appearance - In No Acute Distress] : no acute distress [Abdomen Soft] : soft [Abdomen Tenderness] : non-tender [Costovertebral Angle Tenderness] : no ~M costovertebral angle tenderness [Edema] : no peripheral edema [Exaggerated Use Of Accessory Muscles For Inspiration] : no accessory muscle use [Oriented To Time, Place, And Person] : oriented to person, place, and time [Normal Station and Gait] : the gait and station were normal for the patient's age [FreeTextEntry1] : frustrated

## 2019-01-22 NOTE — HISTORY OF PRESENT ILLNESS
[FreeTextEntry1] : 62yo gentleman with cc of ? bony mets. Pt with complicated  hx and has been seen by me for sometime. he has hx of prostate ca s/p RALRP followed by salvage RT and ADT (for ? iliac node). He had issues with BNC with multiple dilations and attempts at fix. These were not succesful and ultimately bladder managed with SPT. He had stones and hematuria with cath in place and eventually was dx with TCC. Most recently underwent cystectomy with ileal conduit with Dr Esteban  in May. This was complicated by pubic osteo. L kidney has atrophied from presumed ureteral stricture. \par \par Developed R leg pain several weeks ago. Pain was constant. Pain is much improved but still present. Noted it mostly when he slept. Exacerbated at rest. He underwent CT which shows bony lesions concerning for mets. last PSA was 0.35 in 9/2017.

## 2019-01-22 NOTE — ASSESSMENT
[FreeTextEntry1] : Discussed findings likely representative of metastatic prostate cancer. Discussed that there are multiple treatment options. Need to eval first with PSA and bone scan and then likely begin ADT. Discussed with both pt and his partner. Mr Calvin also asked me to call his son to explain which I did and left a message. \par --PSA\par --Bone scan

## 2019-01-24 PROBLEM — S42.142A CLOSED FRACTURE OF GLENOID CAVITY AND NECK OF LEFT SCAPULA, INITIAL ENCOUNTER: Noted: 2019-01-11

## 2019-01-24 LAB — PSA SERPL-MCNC: 198.3 NG/ML

## 2019-01-25 ENCOUNTER — APPOINTMENT (OUTPATIENT)
Dept: ORTHOPEDIC SURGERY | Facility: CLINIC | Age: 64
End: 2019-01-25
Payer: MEDICARE

## 2019-01-25 VITALS
HEIGHT: 71 IN | HEART RATE: 55 BPM | BODY MASS INDEX: 27.3 KG/M2 | SYSTOLIC BLOOD PRESSURE: 167 MMHG | WEIGHT: 195 LBS | DIASTOLIC BLOOD PRESSURE: 80 MMHG

## 2019-01-25 DIAGNOSIS — S42.142A DISPLACED FRACTURE OF GLENOID CAVITY OF SCAPULA, LEFT SHOULDER, INITIAL ENCOUNTER FOR CLOSED FRACTURE: ICD-10-CM

## 2019-01-25 DIAGNOSIS — S42.152A DISPLACED FRACTURE OF GLENOID CAVITY OF SCAPULA, LEFT SHOULDER, INITIAL ENCOUNTER FOR CLOSED FRACTURE: ICD-10-CM

## 2019-01-25 PROCEDURE — 99213 OFFICE O/P EST LOW 20 MIN: CPT

## 2019-01-25 PROCEDURE — 73030 X-RAY EXAM OF SHOULDER: CPT | Mod: LT

## 2019-01-25 NOTE — HISTORY OF PRESENT ILLNESS
[de-identified] : Patient is here today following up after a glenoid cavity and neck of left scapula fracture. Patient was not entirely compliant with immobilization in his sling stating that he would take it off at home. He states that in general he feels like it is getting better. He took Oxycodone intermittently for pain but states that Aspirin seemed to help him more. He states that his arm would bother him more when he was outside. Patient expressed concern over his returned cancer diagnosis. This condition is being managed by another physician and he is schedule to have a full body scan on Monday.\par

## 2019-01-25 NOTE — PHYSICAL EXAM
[de-identified] : Constitutional: Well-nourished, well-developed, No acute distress\par Respiratory:  Good respiratory effort, no SOB\par Lymphatic: No regional lymphadenopathy, no lymphedema\par Psychiatric: Pleasant and normal affect, alert and oriented x3\par Skin: Clean dry and intact B/L UE/LE\par Musculoskeletal: normal except where as noted in regional exam\par \par Left Shoulder:\par \par APPEARANCE: no swelling/ecchymoses of the anterolateral shoulder, no marked deformities or malalignment\par POSITIVE TENDERNESS: Moderately around the anterior, lateral and posterior shoulder areas\par NONTENDER: AC joint \par ROM: Significantly limited in all directions due to stiffness \par RESISTIVE TESTIN/5 flexion/extension, IR/ER, abduction \par Vasc: 2+ radial pulse\par Neuro: AIN, PIN, Ulnar nerve intact to motor\par Sensation: Intact to light touch throughout [de-identified] : The following radiographs were ordered and read by me during this patient's visit. I reviewed each radiograph in detail with the patient and discussed the findings as highlighted below. \par \par 3 views of the left shoulder were obtained today that show a comminuted glenoid fossa fracture, with routine healing.  There is no malalignment. There is no joint dislocation, or degenerative changes seen. No other obvious osseous abnormality. Otherwise unremarkable.\par \par

## 2019-01-25 NOTE — DISCUSSION/SUMMARY
[de-identified] : Patient was seen today for a continue management of left glenoid comminuted fracture. He has no malalignment seen on x-ray, he has routine healing thus far. At this time patient may discontinue wearing his shoulder sling at home, recommend continuing wearing while out of the house for the next 2 weeks. Advised patient he can do any activity below 90° of flexion/abduction, and limit lifting to 5 pounds or less in the left upper extremity. Recommend followup in 2 weeks for a repeat x-ray, at which time we can start to allow increased range of motion and lifting capacity of the arm. Patient states he does not need a refill of his narcotic pain medication today, he will notify my office if he does.  Patient appreciates and agrees with current plan.\par \par This note was generated using dragon medical dictation software.  A reasonable effort has been made for proofreading its contents, but typos may still remain.  If there are any questions or points of clarification needed please notify my office.

## 2019-01-28 ENCOUNTER — RX RENEWAL (OUTPATIENT)
Age: 64
End: 2019-01-28

## 2019-01-29 ENCOUNTER — APPOINTMENT (OUTPATIENT)
Dept: UROLOGY | Facility: CLINIC | Age: 64
End: 2019-01-29
Payer: MEDICARE

## 2019-01-29 ENCOUNTER — OUTPATIENT (OUTPATIENT)
Dept: OUTPATIENT SERVICES | Facility: HOSPITAL | Age: 64
LOS: 1 days | End: 2019-01-29
Payer: MEDICARE

## 2019-01-29 DIAGNOSIS — N32.9 BLADDER DISORDER, UNSPECIFIED: Chronic | ICD-10-CM

## 2019-01-29 DIAGNOSIS — Z93.6 OTHER ARTIFICIAL OPENINGS OF URINARY TRACT STATUS: Chronic | ICD-10-CM

## 2019-01-29 DIAGNOSIS — Z93.59 OTHER CYSTOSTOMY STATUS: Chronic | ICD-10-CM

## 2019-01-29 DIAGNOSIS — Z85.51 PERSONAL HISTORY OF MALIGNANT NEOPLASM OF BLADDER: Chronic | ICD-10-CM

## 2019-01-29 DIAGNOSIS — Z98.89 OTHER SPECIFIED POSTPROCEDURAL STATES: Chronic | ICD-10-CM

## 2019-01-29 PROCEDURE — 99214 OFFICE O/P EST MOD 30 MIN: CPT

## 2019-01-29 PROCEDURE — 96402 CHEMO HORMON ANTINEOPL SQ/IM: CPT

## 2019-01-29 RX ORDER — DEGARELIX 120 MG
120 KIT SUBCUTANEOUS
Qty: 2 | Refills: 0 | Status: COMPLETED | OUTPATIENT
Start: 2019-01-29 | End: 2019-01-29

## 2019-01-29 RX ORDER — DEGARELIX 120 MG
120 KIT SUBCUTANEOUS
Qty: 0 | Refills: 0 | Status: COMPLETED | OUTPATIENT
Start: 2019-01-29

## 2019-01-29 RX ADMIN — DEGARELIX 0 MG: KIT at 00:00

## 2019-01-30 DIAGNOSIS — R35.0 FREQUENCY OF MICTURITION: ICD-10-CM

## 2019-01-30 NOTE — ASSESSMENT
[FreeTextEntry1] : discussed the situation that he has metastatic prostate cancer - bone scan (as baseline) pending but CT clearly shows mets. \par Reviewed his prognosis and average lifespan. Noted that ADT is the therapy of choice and how to follow by PSA thereafter.Also reviewed side effects. He will be getting a second opinion but wants to start therapy today

## 2019-01-30 NOTE — HISTORY OF PRESENT ILLNESS
[FreeTextEntry1] :  Complicated  history. He had RALP for high grade cancer followed by adjuvant radiation therapy. 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 Did well and went to [facility]. \par More recently saw JK as not feeling well; noted creatinine up to 1.8 and USG noted new left hydro. No pain. 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 stent out.\par Seen in October with an abscess inner thigh of left leg. 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 Just completing 6 weeks IV antibiotics. \par Noted hydro on CT done Inhouse and Cr @2. No back or flank pain. renal scan notes this kidney dead.\par he then called having right leg pain - i was concerned that the osteomyelitis+/-abscess was coming back so ordered a CT scan - this noted osteoblastic bone lesions  and PSA is @200.

## 2019-01-31 ENCOUNTER — RX RENEWAL (OUTPATIENT)
Age: 64
End: 2019-01-31

## 2019-02-01 ENCOUNTER — APPOINTMENT (OUTPATIENT)
Dept: UROLOGY | Facility: CLINIC | Age: 64
End: 2019-02-01

## 2019-02-05 ENCOUNTER — FORM ENCOUNTER (OUTPATIENT)
Age: 64
End: 2019-02-05

## 2019-02-05 ENCOUNTER — APPOINTMENT (OUTPATIENT)
Dept: ORTHOPEDIC SURGERY | Facility: CLINIC | Age: 64
End: 2019-02-05
Payer: MEDICARE

## 2019-02-05 DIAGNOSIS — S42.152D DISPLACED FRACTURE OF GLENOID CAVITY OF SCAPULA, LEFT SHOULDER, SUBSEQUENT ENCOUNTER FOR FRACTURE WITH ROUTINE HEALING: ICD-10-CM

## 2019-02-05 DIAGNOSIS — S42.142D DISPLACED FRACTURE OF GLENOID CAVITY OF SCAPULA, LEFT SHOULDER, SUBSEQUENT ENCOUNTER FOR FRACTURE WITH ROUTINE HEALING: ICD-10-CM

## 2019-02-05 PROCEDURE — 73030 X-RAY EXAM OF SHOULDER: CPT | Mod: LT,RT

## 2019-02-05 PROCEDURE — 99214 OFFICE O/P EST MOD 30 MIN: CPT

## 2019-02-06 ENCOUNTER — APPOINTMENT (OUTPATIENT)
Dept: NUCLEAR MEDICINE | Facility: IMAGING CENTER | Age: 64
End: 2019-02-06
Payer: MEDICARE

## 2019-02-06 ENCOUNTER — OUTPATIENT (OUTPATIENT)
Dept: OUTPATIENT SERVICES | Facility: HOSPITAL | Age: 64
LOS: 1 days | End: 2019-02-06
Payer: MEDICARE

## 2019-02-06 ENCOUNTER — RX RENEWAL (OUTPATIENT)
Age: 64
End: 2019-02-06

## 2019-02-06 DIAGNOSIS — R93.89 ABNORMAL FINDINGS ON DIAGNOSTIC IMAGING OF OTHER SPECIFIED BODY STRUCTURES: ICD-10-CM

## 2019-02-06 DIAGNOSIS — Z93.6 OTHER ARTIFICIAL OPENINGS OF URINARY TRACT STATUS: Chronic | ICD-10-CM

## 2019-02-06 DIAGNOSIS — Z98.89 OTHER SPECIFIED POSTPROCEDURAL STATES: Chronic | ICD-10-CM

## 2019-02-06 DIAGNOSIS — Z93.59 OTHER CYSTOSTOMY STATUS: Chronic | ICD-10-CM

## 2019-02-06 DIAGNOSIS — C61 MALIGNANT NEOPLASM OF PROSTATE: ICD-10-CM

## 2019-02-06 DIAGNOSIS — Z85.51 PERSONAL HISTORY OF MALIGNANT NEOPLASM OF BLADDER: Chronic | ICD-10-CM

## 2019-02-06 DIAGNOSIS — N32.9 BLADDER DISORDER, UNSPECIFIED: Chronic | ICD-10-CM

## 2019-02-06 PROCEDURE — 78320: CPT | Mod: 26

## 2019-02-06 PROCEDURE — A9561: CPT

## 2019-02-06 PROCEDURE — 78999 UNLISTED MISC PX DX NUC MED: CPT

## 2019-02-06 PROCEDURE — 78306 BONE IMAGING WHOLE BODY: CPT | Mod: 26

## 2019-02-06 PROCEDURE — 78306 BONE IMAGING WHOLE BODY: CPT

## 2019-02-08 DIAGNOSIS — C79.51 SECONDARY MALIGNANT NEOPLASM OF BONE: ICD-10-CM

## 2019-02-08 DIAGNOSIS — C61 MALIGNANT NEOPLASM OF PROSTATE: ICD-10-CM

## 2019-02-08 NOTE — DISCUSSION/SUMMARY
[de-identified] : Patient was seen today for evaluation of right shoulder pain status post fall, he has a new leaf and minimally displaced humeral neck fracture of the right shoulder/humerus. Patient has been given a shoulder sling to wear for support. He is advised he should maintain the shoulder in the sling 24/7, may remove for bathing purposes only. Patient will followup for repeat x-ray in 2 weeks.  He is having more pain in his right shoulder than he was after his left shoulder glenoid fossa fracture. At this time a refill of oxycodone has been sent to the pharmacy to help with pain control.\par Patient was also seen for followup of his left shoulder, x-rays today show improve calcification and no increased displacement of the glenoid fossa fracture. He has improved range of motion. At this time he will be started on home physical therapy to address his left shoulder dysfunction, he will follow a nonoperative protocol which has been provided today.  \par We will try to obtain visiting nursing services authorization for the patient as he has a urostomy bag and is now having significant difficulty changing the bag as he is slowly recovering from his left glenoid fossa fracture, and has a newly sustained right humerus fracture.  Patient appreciates and agrees with current plan.\par \par This note was generated using dragon medical dictation software.  A reasonable effort has been made for proofreading its contents, but typos may still remain.  If there are any questions or points of clarification needed please notify my office.\par \par Using the website [email or web address]., the [place] prescription monitoring program registry was searched for this patient. The confidential drug utilization report was reviewed prior to the controlled substance prescription being given to the patient. Reference #:[id number] \par

## 2019-02-08 NOTE — PHYSICAL EXAM
[de-identified] : Constitutional: Well-nourished, well-developed, No acute distress\par Respiratory:  Good respiratory effort, no SOB\par Lymphatic: No regional lymphadenopathy, no lymphedema\par Psychiatric: Pleasant and normal affect, alert and oriented x3\par Skin: Clean dry and intact B/L UE/LE\par Musculoskeletal: normal except where as noted in regional exam\par \par Left Shoulder:\par \par APPEARANCE: no swelling/ecchymoses of the anterolateral shoulder, no marked deformities or malalignment\par POSITIVE TENDERNESS: Mild around the anterior, lateral and posterior shoulder areas\par NONTENDER: AC joint \par ROM: Mildly limited in all directions due to stiffness, flexion 80 deg, abduction 60 deg\par RESISTIVE TESTIN/5 flexion/extension, IR/ER, abduction \par Vasc: 2+ radial pulse\par Neuro: AIN, PIN, Ulnar nerve intact to motor\par Sensation: Intact to light touch throughout\par \par Right Shoulder:\par \par APPEARANCE: + swelling, no ecchymoses of the anterolateral shoulder, no marked deformities or malalignment\par POSITIVE TENDERNESS: Moderately around the anterior, lateral and posterior shoulder areas\par NONTENDER: AC joint \par ROM: Significantly limited in all directions due to pain\par RESISTIVE TESTIN/5 flexion/extension, IR/ER, abduction \par Vasc: 2+ radial pulse\par Neuro: AIN, PIN, Ulnar nerve intact to motor\par Sensation: Intact to light touch throughout [de-identified] : The following radiographs were ordered and read by me during this patient's visit. I reviewed each radiograph in detail with the patient and discussed the findings as highlighted below. \par \par 3 views of the left shoulder were obtained today that show a comminuted glenoid fossa fracture, with routine healing.  There is no malalignment. There is no joint dislocation, or degenerative changes seen. No other obvious osseous abnormality. Otherwise unremarkable.\par \par 3 views of the right shoulder were obtained today that show a minimally displaced humeral neck fracture.  There is no malalignment. There is no joint dislocation, or degenerative changes seen. No other obvious osseous abnormality. Otherwise unremarkable.\par

## 2019-02-08 NOTE — HISTORY OF PRESENT ILLNESS
[de-identified] : Patient is here for left shoulder pain follow up. He states that his left arm is feeling good. Patient states he is having gradually improving range of motion and function with his left shoulder/arm. He has been following the guidelines as recommended at last visit.\par \par He is also complaining of right shoulder pain. He states that he fell on Monday and has been unable to use his right arm.  He is having sharp pain, worse than his left shoulder injury after that fall, but has had no evaluation or workup of the right shoulder injury as of yet. He has pain that radiates from the shoulder into the upper arm, no symptoms past the elbow, no numbness/tingling of the right upper extremity.\par He has several other comorbid conditions and currently has a urostomy bag that he is unable to change because of the pain in his right arm and limited function of his left arm.

## 2019-02-11 ENCOUNTER — MESSAGE (OUTPATIENT)
Age: 64
End: 2019-02-11

## 2019-02-15 ENCOUNTER — CLINICAL ADVICE (OUTPATIENT)
Age: 64
End: 2019-02-15

## 2019-02-19 ENCOUNTER — RX RENEWAL (OUTPATIENT)
Age: 64
End: 2019-02-19

## 2019-02-19 PROBLEM — S42.211A CLOSED FRACTURE OF NECK OF RIGHT HUMERUS, INITIAL ENCOUNTER: Noted: 2019-02-19

## 2019-02-19 PROBLEM — S42.211A CLOSED FRACTURE OF NECK OF RIGHT HUMERUS, INITIAL ENCOUNTER: Status: RESOLVED | Noted: 2019-02-05 | Resolved: 2019-02-19

## 2019-02-20 ENCOUNTER — APPOINTMENT (OUTPATIENT)
Dept: ORTHOPEDIC SURGERY | Facility: CLINIC | Age: 64
End: 2019-02-20
Payer: MEDICARE

## 2019-02-20 VITALS
SYSTOLIC BLOOD PRESSURE: 151 MMHG | HEIGHT: 71 IN | HEART RATE: 54 BPM | DIASTOLIC BLOOD PRESSURE: 75 MMHG | WEIGHT: 195 LBS | BODY MASS INDEX: 27.3 KG/M2

## 2019-02-20 DIAGNOSIS — S42.211A UNSPECIFIED DISPLACED FRACTURE OF SURGICAL NECK OF RIGHT HUMERUS, INITIAL ENCOUNTER FOR CLOSED FRACTURE: ICD-10-CM

## 2019-02-20 PROCEDURE — 73030 X-RAY EXAM OF SHOULDER: CPT | Mod: RT

## 2019-02-20 PROCEDURE — 99213 OFFICE O/P EST LOW 20 MIN: CPT

## 2019-02-20 NOTE — PHYSICAL EXAM
[de-identified] : Constitutional: Well-nourished, well-developed, No acute distress\par Respiratory:  Good respiratory effort, no SOB\par Lymphatic: No regional lymphadenopathy, no lymphedema\par Psychiatric: Pleasant and normal affect, alert and oriented x3\par Skin: Clean dry and intact B/L UE/LE\par Musculoskeletal: normal except where as noted in regional exam\par \par Right Shoulder:\par \par APPEARANCE: + swelling, no ecchymoses of the anterolateral shoulder, no marked deformities or malalignment\par POSITIVE TENDERNESS: Moderately around the anterior, lateral and posterior shoulder areas\par NONTENDER: AC joint \par ROM: Significantly limited in all directions due to pain\par RESISTIVE TESTIN/5 flexion/extension, IR/ER, abduction \par Vasc: 2+ radial pulse\par Neuro: AIN, PIN, Ulnar nerve intact to motor\par Sensation: Intact to light touch throughout [de-identified] : The following radiographs were ordered and read by me during this patient's visit. I reviewed each radiograph in detail with the patient and discussed the findings as highlighted below. \par \par 3 views of the right shoulder were obtained today that show a displaced humeral neck fracture, there is increased displacement of the fracture site compared to xrays 2 weeks ago.  There is now noted malalignment. There is no joint dislocation, or degenerative changes seen. No other obvious osseous abnormality. Otherwise unremarkable.\par

## 2019-02-20 NOTE — DISCUSSION/SUMMARY
[de-identified] : Patient was seen today for continued management of right humeral neck fracture. Due to poor compliance with sling immobilization and recommendations at last visit, as well as having a new trauma since last visit, patient has increased displacement of the fracture site when compared to last examination 2 weeks ago. He has been advised of the significant importance of maintaining immobilization of his arm in a shoulder sling as if he does not do so his nonsurgical fracture may require surgical intervention. Patient is very hesitant and does not wish to have any surgical intervention, and has stated clear understanding of when he should be wearing his sling at this time. Patient advised he should be wearing it when in the home unless he will be seated for a prolonged time, as well as anytime he leaves the house. Patient educated on how to properly wear his sling along with a coat to maintain immobilization. Patient advised that he needs to limit the amount of Percocet that he is taking, he has needed two refills since last evaluation, he states that he is taking it more sparingly now as he has less pain than he did at the onset. He will call my office if he needs a refill of that medication. Recommend followup in 2 weeks for repeat x-ray and reevaluation. Patient advised that he cannot start physical therapy for his right shoulder as he still needs immobilization, particularly with increased displacement since last evaluation.  Patient appreciates and agrees with current plan.\par \par This note was generated using dragon medical dictation software.  A reasonable effort has been made for proofreading its contents, but typos may still remain.  If there are any questions or points of clarification needed please notify my office.

## 2019-02-20 NOTE — HISTORY OF PRESENT ILLNESS
[de-identified] : Patient is here today following up after a Closed fracture of neck of right humerus,. Patient has not been compliant with immobilization in the arm sling as instructed and states that he has not been wearing it around the house, nor has he been wearing outside the house as he "cannot wear a coat and the sling".  Patient had one trauma to the area since last visit, he bumped it against a wall. Patient has once again expressed concern regarding not getting PT for his right arm. It was reiterated to him once again that his right arm is a new fracture that needs time to heal before we can begin PT for it. \par

## 2019-02-28 RX ORDER — BLOOD-GLUCOSE METER
W/DEVICE KIT MISCELLANEOUS
Qty: 1 | Refills: 0 | Status: ACTIVE | COMMUNITY
Start: 2018-01-24 | End: 1900-01-01

## 2019-03-01 ENCOUNTER — APPOINTMENT (OUTPATIENT)
Dept: UROLOGY | Facility: CLINIC | Age: 64
End: 2019-03-01
Payer: MEDICARE

## 2019-03-01 ENCOUNTER — OUTPATIENT (OUTPATIENT)
Dept: OUTPATIENT SERVICES | Facility: HOSPITAL | Age: 64
LOS: 1 days | End: 2019-03-01
Payer: MEDICARE

## 2019-03-01 DIAGNOSIS — Z85.51 PERSONAL HISTORY OF MALIGNANT NEOPLASM OF BLADDER: Chronic | ICD-10-CM

## 2019-03-01 DIAGNOSIS — Z98.89 OTHER SPECIFIED POSTPROCEDURAL STATES: Chronic | ICD-10-CM

## 2019-03-01 DIAGNOSIS — Z93.6 OTHER ARTIFICIAL OPENINGS OF URINARY TRACT STATUS: Chronic | ICD-10-CM

## 2019-03-01 DIAGNOSIS — Z93.59 OTHER CYSTOSTOMY STATUS: Chronic | ICD-10-CM

## 2019-03-01 DIAGNOSIS — R35.0 FREQUENCY OF MICTURITION: ICD-10-CM

## 2019-03-01 DIAGNOSIS — N32.9 BLADDER DISORDER, UNSPECIFIED: Chronic | ICD-10-CM

## 2019-03-01 PROCEDURE — 99213 OFFICE O/P EST LOW 20 MIN: CPT

## 2019-03-01 PROCEDURE — 96402 CHEMO HORMON ANTINEOPL SQ/IM: CPT

## 2019-03-01 RX ORDER — GOSERELIN ACETATE 10.8 MG/1
10.8 IMPLANT SUBCUTANEOUS
Qty: 1 | Refills: 0 | Status: COMPLETED | OUTPATIENT
Start: 2019-03-01

## 2019-03-01 RX ADMIN — GOSERELIN ACETATE 10.8 MG: 10.8 IMPLANT SUBCUTANEOUS at 00:00

## 2019-03-01 NOTE — HISTORY OF PRESENT ILLNESS
[FreeTextEntry1] :  Complicated  history. He had RALP for high grade cancer followed by adjuvant radiation therapy. 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 Did well and went to [facility]. \par More recently saw JK as not feeling well; noted creatinine up to 1.8 and USG noted new left hydro. No pain. 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 stent out.\par Seen in October with an abscess inner thigh of left leg. 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 Just completing 6 weeks IV antibiotics. \par Noted hydro on CT done Inhouse and Cr @2. No back or flank pain. renal scan notes this kidney dead.\par he then called having right leg pain - i was concerned that the osteomyelitis+/-abscess was coming back so ordered a CT scan - this noted osteoblastic bone lesions  and PSA is @200.\par Bone scan confirmed diffuse mets - started ADT 4 weeks ago - no side effects

## 2019-03-04 LAB
ALBUMIN SERPL ELPH-MCNC: 3.9 G/DL
ALP BLD-CCNC: 3141 U/L
ALT SERPL-CCNC: 12 U/L
ANION GAP SERPL CALC-SCNC: 17 MMOL/L
AST SERPL-CCNC: 14 U/L
BILIRUB SERPL-MCNC: 0.2 MG/DL
BUN SERPL-MCNC: 35 MG/DL
CALCIUM SERPL-MCNC: 9.5 MG/DL
CHLORIDE SERPL-SCNC: 106 MMOL/L
CO2 SERPL-SCNC: 20 MMOL/L
CREAT SERPL-MCNC: 1.97 MG/DL
GLUCOSE SERPL-MCNC: 101 MG/DL
POTASSIUM SERPL-SCNC: 5 MMOL/L
PROT SERPL-MCNC: 7 G/DL
PSA SERPL-MCNC: 167 NG/ML
SODIUM SERPL-SCNC: 142 MMOL/L

## 2019-03-05 NOTE — BEHAVIORAL HEALTH ASSESSMENT NOTE - NSBHCHARTREVIEWINVESTIGATE_PSY_A_CORE FT
Patient position supine. Patient prepped and draped per unit standard. Safety straps applied: Yes < from: 12 Lead ECG (04.27.18 @ 11:17) >      Ventricular Rate 51 BPM    Atrial Rate 51 BPM    P-R Interval 168 ms    QRS Duration 100 ms    Q-T Interval 446 ms    QTC Calculation(Bezet) 411 ms    P Axis 55 degrees    R Axis -24 degrees    T Axis 27 degrees    Diagnosis Line Sinus bradycardia  Otherwise normal ECG

## 2019-03-06 ENCOUNTER — APPOINTMENT (OUTPATIENT)
Dept: ORTHOPEDIC SURGERY | Facility: CLINIC | Age: 64
End: 2019-03-06
Payer: MEDICARE

## 2019-03-06 VITALS
HEIGHT: 71 IN | HEART RATE: 56 BPM | SYSTOLIC BLOOD PRESSURE: 150 MMHG | DIASTOLIC BLOOD PRESSURE: 80 MMHG | BODY MASS INDEX: 27.3 KG/M2 | WEIGHT: 195 LBS

## 2019-03-06 DIAGNOSIS — S42.211D UNSPECIFIED DISPLACED FRACTURE OF SURGICAL NECK OF RIGHT HUMERUS, SUBSEQUENT ENCOUNTER FOR FRACTURE WITH ROUTINE HEALING: ICD-10-CM

## 2019-03-06 DIAGNOSIS — C61 MALIGNANT NEOPLASM OF PROSTATE: ICD-10-CM

## 2019-03-06 PROCEDURE — 73030 X-RAY EXAM OF SHOULDER: CPT | Mod: RT

## 2019-03-06 PROCEDURE — 99213 OFFICE O/P EST LOW 20 MIN: CPT

## 2019-03-06 NOTE — DISCUSSION/SUMMARY
[de-identified] : Patient was seen today for continued management of right proximal humerus neck fracture. There is no new displacement from last x-ray, there is improved callus formation at the fracture site. At this time patient is able to start his physical therapy protocol which has been provided today. He is already scheduled for her at home physical therapy and the therapist will follow protocol as provided. Recommend followup in one month for reassessment.  Patient appreciates and agrees with current plan.\par \par This note was generated using dragon medical dictation software.  A reasonable effort has been made for proofreading its contents, but typos may still remain.  If there are any questions or points of clarification needed please notify my office.

## 2019-03-06 NOTE — PHYSICAL EXAM
[de-identified] : Constitutional: Well-nourished, well-developed, No acute distress\par Respiratory:  Good respiratory effort, no SOB\par Lymphatic: No regional lymphadenopathy, no lymphedema\par Psychiatric: Pleasant and normal affect, alert and oriented x3\par Skin: Clean dry and intact B/L UE/LE\par Musculoskeletal: normal except where as noted in regional exam\par \par Right Shoulder:\par \par APPEARANCE: no swelling, no ecchymoses of the anterolateral shoulder, no marked deformities or malalignment\par POSITIVE TENDERNESS: mild around the anterior, lateral and posterior shoulder areas\par NONTENDER: AC joint \par ROM: Flexion to 45 deg, abd to 30 deg, Full IR, ER 10 deg\par RESISTIVE TESTING: 3/5 flexion/extension, IR/ER, abduction \par Vasc: 2+ radial pulse\par Neuro: AIN, PIN, Ulnar nerve intact to motor\par Sensation: Intact to light touch throughout [de-identified] : The following radiographs were ordered and read by me during this patient's visit. I reviewed each radiograph in detail with the patient and discussed the findings as highlighted below. \par \par 3 views of the right shoulder were obtained today that show a displaced humeral neck fracture, there is no increased displacement of the fracture site compared to previous xrays, there is now increased malalignment from last xray.  There is good callus formation at the fracture. There is no joint dislocation, or degenerative changes seen. No other obvious osseous abnormality. Otherwise unremarkable.\par

## 2019-03-22 ENCOUNTER — APPOINTMENT (OUTPATIENT)
Dept: UROLOGY | Facility: CLINIC | Age: 64
End: 2019-03-22
Payer: MEDICARE

## 2019-03-22 DIAGNOSIS — M86.9 OSTEOMYELITIS, UNSPECIFIED: ICD-10-CM

## 2019-03-22 PROCEDURE — 99213 OFFICE O/P EST LOW 20 MIN: CPT

## 2019-03-25 PROBLEM — M86.9 OSTEOMYELITIS OF PELVIS: Status: ACTIVE | Noted: 2019-01-03

## 2019-03-25 NOTE — HISTORY OF PRESENT ILLNESS
[FreeTextEntry1] :  Complicated  history. He had RALP for high grade cancer followed by adjuvant radiation therapy. 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 Did well initially; he then noted creatinine up to 1.8 and USG noted new left hydro. No pain. 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 stent out.\par Seen in October with an abscess inner thigh of left leg. 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 Just completing 6 weeks IV antibiotics. before this became apparent he had bloody discharge from small opening at bottom of the midline old incision. \par Noted hydro on CT done Inhouse and Cr @2. No back or flank pain. renal scan notes this kidney dead.\par he then called having right leg pain - i was concerned that the osteomyelitis+/-abscess was coming back so ordered a CT scan - this noted osteoblastic bone lesions  and PSA is @200.\par Bone scan confirmed diffuse mets - started ADT 4 weeks ago - no side effects\par \par here today as 2 days ago while in [place] he noticed a lump on the inner thigh as below. It was not painful or tender. yesterday it spontaneously drained - looked like old blood and was NOT pus. The lump is gone. No pelvic pain or drainage via penis.

## 2019-03-25 NOTE — PHYSICAL EXAM
[General Appearance - Well Developed] : well developed [General Appearance - Well Nourished] : well nourished [Abdomen Soft] : soft [Abdomen Tenderness] : non-tender [Abdomen Mass (___ Cm)] : no abdominal mass palpated [Penis Abnormality] : normal circumcised penis [Scrotum] : the scrotum was normal [Edema] : no peripheral edema [] : no respiratory distress [Exaggerated Use Of Accessory Muscles For Inspiration] : no accessory muscle use [Oriented To Time, Place, And Person] : oriented to person, place, and time [FreeTextEntry1] : area on inner thigh with NO swelling or indruation; NO erythema. Could see small opening where drained.  [No Focal Deficits] : no focal deficits [Inguinal Lymph Nodes Enlarged Bilaterally] : inguinal

## 2019-03-25 NOTE — ASSESSMENT
[FreeTextEntry1] : reviewed last CT Scan - some fluid in the pelvis; i had wanted to repeat given what happened before but he adamantly refused.\par will come back if re-occurs else keep next appointment.

## 2019-03-26 ENCOUNTER — RX RENEWAL (OUTPATIENT)
Age: 64
End: 2019-03-26

## 2019-03-28 ENCOUNTER — OTHER (OUTPATIENT)
Age: 64
End: 2019-03-28

## 2019-04-04 ENCOUNTER — OUTPATIENT (OUTPATIENT)
Dept: OUTPATIENT SERVICES | Facility: HOSPITAL | Age: 64
LOS: 1 days | End: 2019-04-04
Payer: COMMERCIAL

## 2019-04-04 ENCOUNTER — APPOINTMENT (OUTPATIENT)
Dept: CT IMAGING | Facility: HOSPITAL | Age: 64
End: 2019-04-04
Payer: MEDICARE

## 2019-04-04 DIAGNOSIS — Z98.89 OTHER SPECIFIED POSTPROCEDURAL STATES: Chronic | ICD-10-CM

## 2019-04-04 DIAGNOSIS — M86.9 OSTEOMYELITIS, UNSPECIFIED: ICD-10-CM

## 2019-04-04 DIAGNOSIS — Z85.51 PERSONAL HISTORY OF MALIGNANT NEOPLASM OF BLADDER: Chronic | ICD-10-CM

## 2019-04-04 DIAGNOSIS — Z93.6 OTHER ARTIFICIAL OPENINGS OF URINARY TRACT STATUS: Chronic | ICD-10-CM

## 2019-04-04 DIAGNOSIS — N32.9 BLADDER DISORDER, UNSPECIFIED: Chronic | ICD-10-CM

## 2019-04-04 DIAGNOSIS — Z93.59 OTHER CYSTOSTOMY STATUS: Chronic | ICD-10-CM

## 2019-04-04 PROCEDURE — 72192 CT PELVIS W/O DYE: CPT | Mod: 26

## 2019-04-04 PROCEDURE — 72192 CT PELVIS W/O DYE: CPT

## 2019-04-08 ENCOUNTER — RX CHANGE (OUTPATIENT)
Age: 64
End: 2019-04-08

## 2019-04-28 ENCOUNTER — RX RENEWAL (OUTPATIENT)
Age: 64
End: 2019-04-28

## 2019-05-07 ENCOUNTER — RX RENEWAL (OUTPATIENT)
Age: 64
End: 2019-05-07

## 2019-05-07 RX ORDER — ALCOHOL ANTISEPTIC PADS
PADS, MEDICATED (EA) TOPICAL
Qty: 1 | Refills: 5 | Status: ACTIVE | COMMUNITY
Start: 2019-05-07 | End: 1900-01-01

## 2019-05-07 RX ORDER — CHOLECALCIFEROL (VITAMIN D3) 10(400)/ML
DROPS ORAL
Qty: 100 | Refills: 5 | Status: ACTIVE | COMMUNITY
Start: 2019-05-07 | End: 1900-01-01

## 2019-06-07 ENCOUNTER — APPOINTMENT (OUTPATIENT)
Dept: UROLOGY | Facility: CLINIC | Age: 64
End: 2019-06-07
Payer: MEDICARE

## 2019-06-07 ENCOUNTER — OUTPATIENT (OUTPATIENT)
Dept: OUTPATIENT SERVICES | Facility: HOSPITAL | Age: 64
LOS: 1 days | End: 2019-06-07
Payer: MEDICARE

## 2019-06-07 DIAGNOSIS — R35.0 FREQUENCY OF MICTURITION: ICD-10-CM

## 2019-06-07 DIAGNOSIS — Z98.89 OTHER SPECIFIED POSTPROCEDURAL STATES: Chronic | ICD-10-CM

## 2019-06-07 DIAGNOSIS — Z93.59 OTHER CYSTOSTOMY STATUS: Chronic | ICD-10-CM

## 2019-06-07 DIAGNOSIS — Z93.6 OTHER ARTIFICIAL OPENINGS OF URINARY TRACT STATUS: Chronic | ICD-10-CM

## 2019-06-07 DIAGNOSIS — N32.9 BLADDER DISORDER, UNSPECIFIED: Chronic | ICD-10-CM

## 2019-06-07 DIAGNOSIS — Z85.51 PERSONAL HISTORY OF MALIGNANT NEOPLASM OF BLADDER: Chronic | ICD-10-CM

## 2019-06-07 PROCEDURE — 99213 OFFICE O/P EST LOW 20 MIN: CPT

## 2019-06-07 PROCEDURE — 96402 CHEMO HORMON ANTINEOPL SQ/IM: CPT

## 2019-06-07 NOTE — HISTORY OF PRESENT ILLNESS
[FreeTextEntry1] :  Complicated  history. He had RALP for high grade cancer followed by adjuvant radiation therapy. 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 Did well initially; he then noted creatinine up to 1.8 and USG noted new left hydro. No pain. 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 stent out.\par Seen in October with an abscess inner thigh of left leg. 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 Just completing 6 weeks IV antibiotics. before this became apparent he had bloody discharge from small opening at bottom of the midline old incision. \par Noted hydro on CT done Inhouse and Cr @2. No back or flank pain. renal scan notes this kidney dead.\par he then called having right leg pain - i was concerned that the osteomyelitis+/-abscess was coming back so ordered a CT scan - this noted osteoblastic bone lesions  and PSA is @200.\par Bone scan confirmed diffuse mets - started ADT with Xtandi - first \par leg pains have all gone. Some fatigue in afternoon but no hot flashes\par No problems with his stoma - changing bags without issue\par

## 2019-06-10 DIAGNOSIS — C79.51 SECONDARY MALIGNANT NEOPLASM OF BONE: ICD-10-CM

## 2019-06-10 DIAGNOSIS — Z93.6 OTHER ARTIFICIAL OPENINGS OF URINARY TRACT STATUS: ICD-10-CM

## 2019-06-10 DIAGNOSIS — C61 MALIGNANT NEOPLASM OF PROSTATE: ICD-10-CM

## 2019-06-10 LAB
PSA SERPL-MCNC: 1.15 NG/ML
TESTOST SERPL-MCNC: 20.5 NG/DL

## 2019-07-25 NOTE — H&P ADULT - NSHPPOADEEPVENOUSTHROMB_GEN_A_CORE
no Isotretinoin Counseling: Patient should get monthly blood tests, not donate blood, not drive at night if vision affected, not share medication, and not undergo elective surgery for 6 months after tx completed. Side effects reviewed, pt to contact office should one occur.

## 2019-08-13 ENCOUNTER — RX RENEWAL (OUTPATIENT)
Age: 64
End: 2019-08-13

## 2019-08-15 ENCOUNTER — RX RENEWAL (OUTPATIENT)
Age: 64
End: 2019-08-15

## 2019-08-25 ENCOUNTER — RX RENEWAL (OUTPATIENT)
Age: 64
End: 2019-08-25

## 2019-08-25 RX ORDER — BLOOD-GLUCOSE METER
KIT MISCELLANEOUS
Qty: 1 | Refills: 0 | Status: ACTIVE | COMMUNITY
Start: 2019-08-25 | End: 1900-01-01

## 2019-09-04 NOTE — H&P PST ADULT - MALLAMPATI CLASS
Refill call regarding Exjade at [facility]. Will prepare for shipment on  to arrive 9/10 with pt consent. Copay 5.00 CCOF 1202 EXP .  Patient has not started any new medications, no missed doses/ side effects. Patient did not have any concerns or questions for the pharmacist at this time.  & address verified.  ~9 days on hand.   Class III - visualization of the soft palate and the base of the uvula

## 2019-09-13 ENCOUNTER — OUTPATIENT (OUTPATIENT)
Dept: OUTPATIENT SERVICES | Facility: HOSPITAL | Age: 64
LOS: 1 days | End: 2019-09-13
Payer: MEDICARE

## 2019-09-13 ENCOUNTER — APPOINTMENT (OUTPATIENT)
Dept: UROLOGY | Facility: CLINIC | Age: 64
End: 2019-09-13
Payer: MEDICARE

## 2019-09-13 VITALS — DIASTOLIC BLOOD PRESSURE: 84 MMHG | RESPIRATION RATE: 18 BRPM | SYSTOLIC BLOOD PRESSURE: 160 MMHG | HEART RATE: 51 BPM

## 2019-09-13 DIAGNOSIS — N32.9 BLADDER DISORDER, UNSPECIFIED: Chronic | ICD-10-CM

## 2019-09-13 DIAGNOSIS — Z85.51 PERSONAL HISTORY OF MALIGNANT NEOPLASM OF BLADDER: Chronic | ICD-10-CM

## 2019-09-13 DIAGNOSIS — Z93.59 OTHER CYSTOSTOMY STATUS: Chronic | ICD-10-CM

## 2019-09-13 DIAGNOSIS — Z93.6 OTHER ARTIFICIAL OPENINGS OF URINARY TRACT STATUS: Chronic | ICD-10-CM

## 2019-09-13 DIAGNOSIS — R35.0 FREQUENCY OF MICTURITION: ICD-10-CM

## 2019-09-13 DIAGNOSIS — Z98.89 OTHER SPECIFIED POSTPROCEDURAL STATES: Chronic | ICD-10-CM

## 2019-09-13 PROCEDURE — 99213 OFFICE O/P EST LOW 20 MIN: CPT | Mod: 25

## 2019-09-13 PROCEDURE — 96402 CHEMO HORMON ANTINEOPL SQ/IM: CPT

## 2019-09-13 RX ORDER — GOSERELIN ACETATE 10.8 MG/1
10.8 IMPLANT SUBCUTANEOUS
Qty: 1 | Refills: 0 | Status: COMPLETED | OUTPATIENT
Start: 2019-09-13

## 2019-09-13 RX ADMIN — GOSERELIN ACETATE 0 MG: 10.8 IMPLANT SUBCUTANEOUS at 00:00

## 2019-09-13 NOTE — HISTORY OF PRESENT ILLNESS
[FreeTextEntry1] :  Complicated  history. He had RALP for high grade cancer followed by adjuvant radiation therapy. 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 Did well initially; he then noted creatinine up to 1.8 and USG noted new left hydro. No pain. 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 stent out.\par Seen in October with an abscess inner thigh of left leg. 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 Just completing 6 weeks IV antibiotics. before this became apparent he had bloody discharge from small opening at bottom of the midline old incision. \par Noted hydro on CT done Inhouse and Cr @2. No back or flank pain. renal scan notes this kidney dead.\par he then called having right leg pain - i was concerned that the osteomyelitis+/-abscess was coming back so ordered a CT scan - this noted osteoblastic bone lesions  and PSA is @200.\par Bone scan confirmed diffuse mets - started ADT with Xtandi - first \par leg pains have all gone. Some fatigue in afternoon but no hot flashes\par No problems with his stoma - changing bags without issue every week\par Latest 0.75 \par

## 2019-09-13 NOTE — PHYSICAL EXAM
[General Appearance - Well Developed] : well developed [Abdomen Tenderness] : non-tender [Abdomen Soft] : soft [General Appearance - Well Nourished] : well nourished [Abdomen Hernia] : no hernia was discovered [Abdomen Mass (___ Cm)] : no abdominal mass palpated [Scrotum] : the scrotum was normal [Edema] : no peripheral edema [] : no respiratory distress [Exaggerated Use Of Accessory Muscles For Inspiration] : no accessory muscle use [Oriented To Time, Place, And Person] : oriented to person, place, and time [Normal Station and Gait] : the gait and station were normal for the patient's age [Sensation] : the sensory exam was normal to light touch and pinprick [Inguinal Lymph Nodes Enlarged Bilaterally] : inguinal [FreeTextEntry1] : nice ermias bud

## 2019-09-16 LAB
ALBUMIN SERPL ELPH-MCNC: 3.7 G/DL
ALP BLD-CCNC: 104 U/L
ALT SERPL-CCNC: 8 U/L
ANION GAP SERPL CALC-SCNC: 20 MMOL/L
AST SERPL-CCNC: 10 U/L
BILIRUB SERPL-MCNC: 0.2 MG/DL
BUN SERPL-MCNC: 47 MG/DL
CALCIUM SERPL-MCNC: 9.9 MG/DL
CHLORIDE SERPL-SCNC: 107 MMOL/L
CO2 SERPL-SCNC: 16 MMOL/L
CREAT SERPL-MCNC: 2.79 MG/DL
GLUCOSE SERPL-MCNC: 114 MG/DL
POTASSIUM SERPL-SCNC: 4.8 MMOL/L
PROT SERPL-MCNC: 7.4 G/DL
PSA SERPL-MCNC: 0.09 NG/ML
SODIUM SERPL-SCNC: 143 MMOL/L

## 2019-09-18 ENCOUNTER — CHART COPY (OUTPATIENT)
Age: 64
End: 2019-09-18

## 2019-09-18 DIAGNOSIS — C79.51 SECONDARY MALIGNANT NEOPLASM OF BONE: ICD-10-CM

## 2019-09-18 DIAGNOSIS — C61 MALIGNANT NEOPLASM OF PROSTATE: ICD-10-CM

## 2019-09-20 LAB
ANION GAP SERPL CALC-SCNC: 14 MMOL/L
BUN SERPL-MCNC: 53 MG/DL
CALCIUM SERPL-MCNC: 9.8 MG/DL
CHLORIDE SERPL-SCNC: 109 MMOL/L
CO2 SERPL-SCNC: 19 MMOL/L
CREAT SERPL-MCNC: 3.07 MG/DL
GLUCOSE SERPL-MCNC: 122 MG/DL
POTASSIUM SERPL-SCNC: 4.3 MMOL/L
SODIUM SERPL-SCNC: 142 MMOL/L

## 2019-09-27 ENCOUNTER — APPOINTMENT (OUTPATIENT)
Dept: UROLOGY | Facility: CLINIC | Age: 64
End: 2019-09-27
Payer: MEDICARE

## 2019-09-27 ENCOUNTER — OUTPATIENT (OUTPATIENT)
Dept: OUTPATIENT SERVICES | Facility: HOSPITAL | Age: 64
LOS: 1 days | End: 2019-09-27
Payer: MEDICARE

## 2019-09-27 DIAGNOSIS — Z98.89 OTHER SPECIFIED POSTPROCEDURAL STATES: Chronic | ICD-10-CM

## 2019-09-27 DIAGNOSIS — Z85.51 PERSONAL HISTORY OF MALIGNANT NEOPLASM OF BLADDER: Chronic | ICD-10-CM

## 2019-09-27 DIAGNOSIS — Z93.59 OTHER CYSTOSTOMY STATUS: Chronic | ICD-10-CM

## 2019-09-27 DIAGNOSIS — Z93.6 OTHER ARTIFICIAL OPENINGS OF URINARY TRACT STATUS: Chronic | ICD-10-CM

## 2019-09-27 DIAGNOSIS — N32.9 BLADDER DISORDER, UNSPECIFIED: Chronic | ICD-10-CM

## 2019-09-27 DIAGNOSIS — R35.0 FREQUENCY OF MICTURITION: ICD-10-CM

## 2019-09-27 PROCEDURE — 76775 US EXAM ABDO BACK WALL LIM: CPT | Mod: 26

## 2019-09-27 PROCEDURE — 76775 US EXAM ABDO BACK WALL LIM: CPT

## 2019-09-27 PROCEDURE — 99213 OFFICE O/P EST LOW 20 MIN: CPT | Mod: 25

## 2019-09-30 LAB
ANION GAP SERPL CALC-SCNC: 14 MMOL/L
BUN SERPL-MCNC: 45 MG/DL
CALCIUM SERPL-MCNC: 10.1 MG/DL
CHLORIDE SERPL-SCNC: 109 MMOL/L
CO2 SERPL-SCNC: 19 MMOL/L
CREAT SERPL-MCNC: 2.71 MG/DL
GLUCOSE SERPL-MCNC: 111 MG/DL
POTASSIUM SERPL-SCNC: 4.7 MMOL/L
SODIUM SERPL-SCNC: 142 MMOL/L

## 2019-09-30 NOTE — HISTORY OF PRESENT ILLNESS
[FreeTextEntry1] :  Complicated  history. He had RALP for high grade cancer followed by adjuvant radiation therapy. 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 Did well initially; he then noted creatinine up to 1.8 and USG noted new left hydro. No pain. 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 stent out.\par Seen in October with an abscess inner thigh of left leg. 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 Just completing 6 weeks IV antibiotics. before this became apparent he had bloody discharge from small opening at bottom of the midline old incision. \par Noted hydro on CT done Inhouse and Cr @2. No back or flank pain. renal scan notes this kidney dead.\par he then called having right leg pain - i was concerned that the osteomyelitis+/-abscess was coming back so ordered a CT scan - this noted osteoblastic bone lesions  and PSA is @200.\par Bone scan confirmed diffuse mets - started ADT with Xtandi - first \par leg pains have all gone. Some fatigue in afternoon but no hot flashes\par No problems with his stoma - changing bags without issue every week \par Latest PSA  0.09 however CR up to 28.\par also notes more blood from open on leg so wearing a diaper\par

## 2019-09-30 NOTE — ASSESSMENT
[FreeTextEntry1] : USG noted BILATERAL: Hydronephrosis\par plan for CT Scan to evalauate boith issues -

## 2019-10-02 PROBLEM — Z60.2 PERSON LIVING ALONE: Status: ACTIVE | Noted: 2019-01-10

## 2019-10-04 DIAGNOSIS — N13.30 UNSPECIFIED HYDRONEPHROSIS: ICD-10-CM

## 2019-10-06 ENCOUNTER — FORM ENCOUNTER (OUTPATIENT)
Age: 64
End: 2019-10-06

## 2019-10-07 ENCOUNTER — APPOINTMENT (OUTPATIENT)
Dept: CT IMAGING | Facility: HOSPITAL | Age: 64
End: 2019-10-07
Payer: MEDICARE

## 2019-10-07 ENCOUNTER — OUTPATIENT (OUTPATIENT)
Dept: OUTPATIENT SERVICES | Facility: HOSPITAL | Age: 64
LOS: 1 days | End: 2019-10-07
Payer: COMMERCIAL

## 2019-10-07 DIAGNOSIS — Z93.6 OTHER ARTIFICIAL OPENINGS OF URINARY TRACT STATUS: Chronic | ICD-10-CM

## 2019-10-07 DIAGNOSIS — N13.30 UNSPECIFIED HYDRONEPHROSIS: ICD-10-CM

## 2019-10-07 DIAGNOSIS — Z85.51 PERSONAL HISTORY OF MALIGNANT NEOPLASM OF BLADDER: Chronic | ICD-10-CM

## 2019-10-07 DIAGNOSIS — Z93.59 OTHER CYSTOSTOMY STATUS: Chronic | ICD-10-CM

## 2019-10-07 DIAGNOSIS — N32.9 BLADDER DISORDER, UNSPECIFIED: Chronic | ICD-10-CM

## 2019-10-07 DIAGNOSIS — Z98.89 OTHER SPECIFIED POSTPROCEDURAL STATES: Chronic | ICD-10-CM

## 2019-10-07 PROCEDURE — 74176 CT ABD & PELVIS W/O CONTRAST: CPT | Mod: 26

## 2019-10-07 PROCEDURE — 74176 CT ABD & PELVIS W/O CONTRAST: CPT

## 2019-10-11 ENCOUNTER — APPOINTMENT (OUTPATIENT)
Dept: UROLOGY | Facility: CLINIC | Age: 64
End: 2019-10-11
Payer: MEDICARE

## 2019-10-11 PROCEDURE — 99214 OFFICE O/P EST MOD 30 MIN: CPT

## 2019-10-11 NOTE — HISTORY OF PRESENT ILLNESS
[FreeTextEntry1] :  Complicated  history. He had RALP for high grade cancer followed by adjuvant radiation therapy. 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 Did well initially; he then noted creatinine up to 1.8 and USG noted new left hydro. No pain. 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 stent out.\par Seen in October with an abscess inner thigh of left leg. 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 Just completing 6 weeks IV antibiotics. before this became apparent he had bloody discharge from small opening at bottom of the midline old incision. \par Noted hydro on CT done Inhouse and Cr @2. No back or flank pain. renal scan notes this kidney dead.\par he then called having right leg pain - i was concerned that the osteomyelitis+/-abscess was coming back so ordered a CT scan - this noted osteoblastic bone lesions  and PSA is @200.\par Bone scan confirmed diffuse mets - started ADT with Xtandi - first \par leg pains have all gone. Some fatigue in afternoon but no hot flashes\par No problems with his stoma - changing bags without issue every week \par Latest PSA  0.09 however CR up to 2.8. CT notes new right hydronephrosis and slight worsening of the left also notes more blood from open on leg so wearing a diaper, though better since last visit. Feels fine. \par

## 2019-10-24 ENCOUNTER — APPOINTMENT (OUTPATIENT)
Dept: INTERVENTIONAL RADIOLOGY/VASCULAR | Facility: CLINIC | Age: 64
End: 2019-10-24

## 2019-10-25 ENCOUNTER — APPOINTMENT (OUTPATIENT)
Dept: INTERVENTIONAL RADIOLOGY/VASCULAR | Facility: CLINIC | Age: 64
End: 2019-10-25
Payer: MEDICARE

## 2019-10-25 VITALS
DIASTOLIC BLOOD PRESSURE: 81 MMHG | BODY MASS INDEX: 29.4 KG/M2 | SYSTOLIC BLOOD PRESSURE: 143 MMHG | OXYGEN SATURATION: 98 % | HEART RATE: 51 BPM | HEIGHT: 71 IN | WEIGHT: 210 LBS | RESPIRATION RATE: 18 BRPM

## 2019-10-25 PROCEDURE — 99215 OFFICE O/P EST HI 40 MIN: CPT

## 2019-11-01 ENCOUNTER — OUTPATIENT (OUTPATIENT)
Dept: OUTPATIENT SERVICES | Facility: HOSPITAL | Age: 64
LOS: 1 days | End: 2019-11-01
Payer: MEDICARE

## 2019-11-01 VITALS
HEART RATE: 61 BPM | WEIGHT: 237 LBS | SYSTOLIC BLOOD PRESSURE: 140 MMHG | OXYGEN SATURATION: 99 % | HEIGHT: 71 IN | DIASTOLIC BLOOD PRESSURE: 84 MMHG | TEMPERATURE: 99 F | RESPIRATION RATE: 16 BRPM

## 2019-11-01 DIAGNOSIS — Z93.6 OTHER ARTIFICIAL OPENINGS OF URINARY TRACT STATUS: Chronic | ICD-10-CM

## 2019-11-01 DIAGNOSIS — N13.30 UNSPECIFIED HYDRONEPHROSIS: ICD-10-CM

## 2019-11-01 DIAGNOSIS — N32.9 BLADDER DISORDER, UNSPECIFIED: Chronic | ICD-10-CM

## 2019-11-01 DIAGNOSIS — Z98.89 OTHER SPECIFIED POSTPROCEDURAL STATES: Chronic | ICD-10-CM

## 2019-11-01 DIAGNOSIS — Z85.51 PERSONAL HISTORY OF MALIGNANT NEOPLASM OF BLADDER: Chronic | ICD-10-CM

## 2019-11-01 DIAGNOSIS — Z93.59 OTHER CYSTOSTOMY STATUS: Chronic | ICD-10-CM

## 2019-11-01 LAB
ANION GAP SERPL CALC-SCNC: 14 MMO/L — SIGNIFICANT CHANGE UP (ref 7–14)
BUN SERPL-MCNC: 55 MG/DL — HIGH (ref 7–23)
CALCIUM SERPL-MCNC: 10.3 MG/DL — SIGNIFICANT CHANGE UP (ref 8.4–10.5)
CHLORIDE SERPL-SCNC: 109 MMOL/L — HIGH (ref 98–107)
CO2 SERPL-SCNC: 17 MMOL/L — LOW (ref 22–31)
CREAT SERPL-MCNC: 2.9 MG/DL — HIGH (ref 0.5–1.3)
GLUCOSE SERPL-MCNC: 99 MG/DL — SIGNIFICANT CHANGE UP (ref 70–99)
HBA1C BLD-MCNC: 5.4 % — SIGNIFICANT CHANGE UP (ref 4–5.6)
HCT VFR BLD CALC: 31.5 % — LOW (ref 39–50)
HGB BLD-MCNC: 9.3 G/DL — LOW (ref 13–17)
MCHC RBC-ENTMCNC: 24.7 PG — LOW (ref 27–34)
MCHC RBC-ENTMCNC: 29.5 % — LOW (ref 32–36)
MCV RBC AUTO: 83.6 FL — SIGNIFICANT CHANGE UP (ref 80–100)
NRBC # FLD: 0 K/UL — SIGNIFICANT CHANGE UP (ref 0–0)
PLATELET # BLD AUTO: 246 K/UL — SIGNIFICANT CHANGE UP (ref 150–400)
PMV BLD: 10.4 FL — SIGNIFICANT CHANGE UP (ref 7–13)
POTASSIUM SERPL-MCNC: 4.3 MMOL/L — SIGNIFICANT CHANGE UP (ref 3.5–5.3)
POTASSIUM SERPL-SCNC: 4.3 MMOL/L — SIGNIFICANT CHANGE UP (ref 3.5–5.3)
RBC # BLD: 3.77 M/UL — LOW (ref 4.2–5.8)
RBC # FLD: 20 % — HIGH (ref 10.3–14.5)
SODIUM SERPL-SCNC: 140 MMOL/L — SIGNIFICANT CHANGE UP (ref 135–145)
WBC # BLD: 14.78 K/UL — HIGH (ref 3.8–10.5)
WBC # FLD AUTO: 14.78 K/UL — HIGH (ref 3.8–10.5)

## 2019-11-01 PROCEDURE — 93010 ELECTROCARDIOGRAM REPORT: CPT

## 2019-11-01 RX ORDER — LISINOPRIL 2.5 MG/1
1 TABLET ORAL
Qty: 0 | Refills: 0 | DISCHARGE

## 2019-11-01 NOTE — H&P PST ADULT - HISTORY OF PRESENT ILLNESS
64 year old male with hx of bladder cancer s/p cystectomy and ilieal conduit in 5/2018. Pt with diagnosis of hydronephrosis. Presents today for presurgical evaluation for ...    Pt poor historian. 64 year old male with hx of bladder cancer s/p cystectomy and ilieal conduit in 5/2018. Pt with diagnosis of hydronephrosis. Presents today for presurgical evaluation for Bilateral Nephrostomy Tubes/Ureteral Stents Via Ileal Conduit scheduled on 11/6/19.     Pt poor historian.

## 2019-11-01 NOTE — H&P PST ADULT - NEGATIVE ENMT SYMPTOMS
no throat pain/no hearing difficulty/no ear pain/no sinus symptoms/no dysphagia/no tinnitus/no vertigo

## 2019-11-01 NOTE — H&P PST ADULT - MUSCULOSKELETAL
details… detailed exam no joint swelling/no joint warmth/normal strength/ROM intact/no joint erythema

## 2019-11-01 NOTE — H&P PST ADULT - NSICDXPASTSURGICALHX_GEN_ALL_CORE_FT
PAST SURGICAL HISTORY:  Bladder disease Bladder Removed    H/O cystoscopy - multiple  last cystoscopy, laser litholapaxy on 1/2018    History of bladder cancer bladder removal May 10, 2018    History of prostatectomy robotic, 2011, s/p radiation    Nephrostomy status Left, 8/1/2018    S/P Appendectomy 40 years ago    S/P ileal conduit May 2018    Suprapubic catheter 8/2015

## 2019-11-01 NOTE — H&P PST ADULT - NSICDXPASTMEDICALHX_GEN_ALL_CORE_FT
PAST MEDICAL HISTORY:  Anxiety     Diabetes mellitus     HTN (Hypertension)     Major depressive disorder     Malignant neoplasm of bladder     Obese     Other calculus in bladder     Prostate Cancer     Unspecified hydronephrosis     Urethral stricture     Urinary (tract) obstruction     UTI (urinary tract infection)

## 2019-11-01 NOTE — H&P PST ADULT - NSICDXPROBLEM_GEN_ALL_CORE_FT
PROBLEM DIAGNOSES  Problem: Hydronephrosis  Assessment and Plan: Pt scheduled for surgery on 11/6/19.  Pre-op instructions provided. Pt verbalized understanding.   Pt instructed to take his blood pressure medication the morning of surgery and to hold his metformin the night before and the morning of surgery.   OR booking notified of diabetes  and DERICK precautions.

## 2019-11-02 PROBLEM — E11.9 TYPE 2 DIABETES MELLITUS WITHOUT COMPLICATIONS: Chronic | Status: ACTIVE | Noted: 2017-12-29

## 2019-11-05 ENCOUNTER — FORM ENCOUNTER (OUTPATIENT)
Age: 64
End: 2019-11-05

## 2019-11-06 ENCOUNTER — OUTPATIENT (OUTPATIENT)
Dept: OUTPATIENT SERVICES | Facility: HOSPITAL | Age: 64
LOS: 1 days | End: 2019-11-06
Payer: MEDICARE

## 2019-11-06 DIAGNOSIS — Z98.89 OTHER SPECIFIED POSTPROCEDURAL STATES: Chronic | ICD-10-CM

## 2019-11-06 DIAGNOSIS — Z93.59 OTHER CYSTOSTOMY STATUS: Chronic | ICD-10-CM

## 2019-11-06 DIAGNOSIS — Z93.6 OTHER ARTIFICIAL OPENINGS OF URINARY TRACT STATUS: Chronic | ICD-10-CM

## 2019-11-06 DIAGNOSIS — N13.30 UNSPECIFIED HYDRONEPHROSIS: ICD-10-CM

## 2019-11-06 DIAGNOSIS — N32.9 BLADDER DISORDER, UNSPECIFIED: Chronic | ICD-10-CM

## 2019-11-06 DIAGNOSIS — Z85.51 PERSONAL HISTORY OF MALIGNANT NEOPLASM OF BLADDER: Chronic | ICD-10-CM

## 2019-11-06 LAB — GLUCOSE BLDC GLUCOMTR-MCNC: 136 MG/DL — HIGH (ref 70–99)

## 2019-11-06 PROCEDURE — 50430 NJX PX NFROSGRM &/URTRGRM: CPT | Mod: 50

## 2019-11-06 PROCEDURE — 53899 UNLISTED PX URINARY SYSTEM: CPT

## 2019-11-13 DIAGNOSIS — N13.1 HYDRONEPHROSIS WITH URETERAL STRICTURE, NOT ELSEWHERE CLASSIFIED: ICD-10-CM

## 2019-11-13 DIAGNOSIS — Z90.6 ACQUIRED ABSENCE OF OTHER PARTS OF URINARY TRACT: ICD-10-CM

## 2019-11-15 ENCOUNTER — APPOINTMENT (OUTPATIENT)
Dept: UROLOGY | Facility: CLINIC | Age: 64
End: 2019-11-15
Payer: MEDICARE

## 2019-11-15 DIAGNOSIS — N35.919 UNSPECIFIED URETHRAL STRICTURE, MALE, UNSPECIFIED SITE: ICD-10-CM

## 2019-11-15 DIAGNOSIS — N13.30 UNSPECIFIED HYDRONEPHROSIS: ICD-10-CM

## 2019-11-15 PROCEDURE — 99212 OFFICE O/P EST SF 10 MIN: CPT

## 2019-11-18 LAB
BUN SERPL-MCNC: 52 MG/DL
CALCIUM SERPL-MCNC: 9.2 MG/DL
CHLORIDE SERPL-SCNC: 108 MMOL/L
CO2 SERPL-SCNC: NORMAL
CREAT SERPL-MCNC: 3.6 MG/DL
GLUCOSE SERPL-MCNC: 121 MG/DL
POTASSIUM SERPL-SCNC: 5.5 MMOL/L
SODIUM SERPL-SCNC: 140 MMOL/L

## 2019-11-18 NOTE — ASSESSMENT
[FreeTextEntry1] : will get BMP to see if improved. then will tray antegrade in offcie and doesn't want to go back to IR

## 2019-11-18 NOTE — HISTORY OF PRESENT ILLNESS
[FreeTextEntry1] :  Complicated  history. He had RALP for high grade cancer followed by adjuvant radiation therapy. 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 Did well initially; he then noted creatinine up to 1.8 and USG noted new left hydro. No pain. 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 stent out.\par Seen in October with an abscess inner thigh of left leg. 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 Just completing 6 weeks IV antibiotics. before this became apparent he had bloody discharge from small opening at bottom of the midline old incision. \par Noted hydro on CT done Inhouse and Cr @2. No back or flank pain. renal scan notes this kidney dead.\par he then called having right leg pain - i was concerned that the osteomyelitis+/-abscess was coming back so ordered a CT scan - this noted osteoblastic bone lesions  and PSA is @200.\par Bone scan confirmed diffuse mets - started ADT with Xtandi - first \par leg pains have all gone. Some fatigue in afternoon but no hot flashes\par No problems with his stoma - changing bags without issue every week \par Latest PSA  0.09 however CR up to 2.8. CT notes new right hydronephrosis and slight worsening of the left also notes more blood from open on leg so wearing a diaper, though better since last visit. Feels fine. \par he didn't want more surgey so planned for IR to place antegrade upside down NT - able on the right but not on the left - has NT. Not happy and doesn't feel well. making urine with no fevers or chills. Urine clear.

## 2019-11-21 ENCOUNTER — INPATIENT (INPATIENT)
Facility: HOSPITAL | Age: 64
LOS: 1 days | Discharge: HOME HEALTH | DRG: 683 | End: 2019-11-23
Attending: FAMILY MEDICINE | Admitting: HOSPITALIST
Payer: COMMERCIAL

## 2019-11-21 VITALS
HEART RATE: 55 BPM | RESPIRATION RATE: 18 BRPM | SYSTOLIC BLOOD PRESSURE: 159 MMHG | DIASTOLIC BLOOD PRESSURE: 75 MMHG | HEIGHT: 72 IN | WEIGHT: 210.1 LBS | TEMPERATURE: 97 F | OXYGEN SATURATION: 98 %

## 2019-11-21 DIAGNOSIS — Z98.89 OTHER SPECIFIED POSTPROCEDURAL STATES: Chronic | ICD-10-CM

## 2019-11-21 DIAGNOSIS — Z93.59 OTHER CYSTOSTOMY STATUS: Chronic | ICD-10-CM

## 2019-11-21 DIAGNOSIS — Z93.6 OTHER ARTIFICIAL OPENINGS OF URINARY TRACT STATUS: Chronic | ICD-10-CM

## 2019-11-21 DIAGNOSIS — N32.9 BLADDER DISORDER, UNSPECIFIED: Chronic | ICD-10-CM

## 2019-11-21 DIAGNOSIS — K94.13 ENTEROSTOMY MALFUNCTION: ICD-10-CM

## 2019-11-21 DIAGNOSIS — Z85.51 PERSONAL HISTORY OF MALIGNANT NEOPLASM OF BLADDER: Chronic | ICD-10-CM

## 2019-11-21 LAB
ALBUMIN SERPL ELPH-MCNC: 2.3 G/DL — LOW (ref 3.3–5)
ALP SERPL-CCNC: 82 U/L — SIGNIFICANT CHANGE UP (ref 40–120)
ALT FLD-CCNC: 9 U/L — LOW (ref 10–45)
ANION GAP SERPL CALC-SCNC: 16 MMOL/L — SIGNIFICANT CHANGE UP (ref 5–17)
APPEARANCE UR: ABNORMAL
APTT BLD: 21.7 SEC — LOW (ref 27.5–36.3)
AST SERPL-CCNC: 36 U/L — SIGNIFICANT CHANGE UP (ref 10–40)
BACTERIA # UR AUTO: ABNORMAL /HPF
BASOPHILS # BLD AUTO: 0.03 K/UL — SIGNIFICANT CHANGE UP (ref 0–0.2)
BASOPHILS NFR BLD AUTO: 0.4 % — SIGNIFICANT CHANGE UP (ref 0–2)
BILIRUB SERPL-MCNC: 0.5 MG/DL — SIGNIFICANT CHANGE UP (ref 0.2–1.2)
BILIRUB UR-MCNC: NEGATIVE — SIGNIFICANT CHANGE UP
BUN SERPL-MCNC: 56 MG/DL — HIGH (ref 7–23)
CALCIUM SERPL-MCNC: 9.2 MG/DL — SIGNIFICANT CHANGE UP (ref 8.4–10.5)
CHLORIDE SERPL-SCNC: 111 MMOL/L — HIGH (ref 96–108)
CO2 SERPL-SCNC: 17 MMOL/L — LOW (ref 22–31)
COLOR SPEC: YELLOW — SIGNIFICANT CHANGE UP
COMMENT - URINE: SIGNIFICANT CHANGE UP
CREAT SERPL-MCNC: 3.72 MG/DL — HIGH (ref 0.5–1.3)
DIFF PNL FLD: ABNORMAL
EOSINOPHIL # BLD AUTO: 0.13 K/UL — SIGNIFICANT CHANGE UP (ref 0–0.5)
EOSINOPHIL NFR BLD AUTO: 1.6 % — SIGNIFICANT CHANGE UP (ref 0–6)
GLUCOSE BLDC GLUCOMTR-MCNC: 156 MG/DL — HIGH (ref 70–99)
GLUCOSE SERPL-MCNC: 133 MG/DL — HIGH (ref 70–99)
GLUCOSE UR QL: NEGATIVE — SIGNIFICANT CHANGE UP
HCT VFR BLD CALC: 23.2 % — LOW (ref 39–50)
HGB BLD-MCNC: 7.1 G/DL — LOW (ref 13–17)
IMM GRANULOCYTES NFR BLD AUTO: 0.7 % — SIGNIFICANT CHANGE UP (ref 0–1.5)
INR BLD: 0.97 RATIO — SIGNIFICANT CHANGE UP (ref 0.88–1.16)
KETONES UR-MCNC: NEGATIVE — SIGNIFICANT CHANGE UP
LEUKOCYTE ESTERASE UR-ACNC: ABNORMAL
LYMPHOCYTES # BLD AUTO: 0.8 K/UL — LOW (ref 1–3.3)
LYMPHOCYTES # BLD AUTO: 9.6 % — LOW (ref 13–44)
MCHC RBC-ENTMCNC: 25.9 PG — LOW (ref 27–34)
MCHC RBC-ENTMCNC: 30.6 GM/DL — LOW (ref 32–36)
MCV RBC AUTO: 84.7 FL — SIGNIFICANT CHANGE UP (ref 80–100)
MONOCYTES # BLD AUTO: 0.59 K/UL — SIGNIFICANT CHANGE UP (ref 0–0.9)
MONOCYTES NFR BLD AUTO: 7.1 % — SIGNIFICANT CHANGE UP (ref 2–14)
NEUTROPHILS # BLD AUTO: 6.73 K/UL — SIGNIFICANT CHANGE UP (ref 1.8–7.4)
NEUTROPHILS NFR BLD AUTO: 80.6 % — HIGH (ref 43–77)
NITRITE UR-MCNC: NEGATIVE — SIGNIFICANT CHANGE UP
NRBC # BLD: 0 /100 WBCS — SIGNIFICANT CHANGE UP (ref 0–0)
OB PNL STL: NEGATIVE — SIGNIFICANT CHANGE UP
PH UR: 6.5 — SIGNIFICANT CHANGE UP (ref 5–8)
PLATELET # BLD AUTO: 296 K/UL — SIGNIFICANT CHANGE UP (ref 150–400)
POTASSIUM SERPL-MCNC: 4.9 MMOL/L — SIGNIFICANT CHANGE UP (ref 3.5–5.3)
POTASSIUM SERPL-SCNC: 4.9 MMOL/L — SIGNIFICANT CHANGE UP (ref 3.5–5.3)
PROT SERPL-MCNC: 7.1 G/DL — SIGNIFICANT CHANGE UP (ref 6–8.3)
PROT UR-MCNC: 30 MG/DL
PROTHROM AB SERPL-ACNC: 10.8 SEC — SIGNIFICANT CHANGE UP (ref 10–12.9)
RBC # BLD: 2.74 M/UL — LOW (ref 4.2–5.8)
RBC # FLD: 19.9 % — HIGH (ref 10.3–14.5)
RBC CASTS # UR COMP ASSIST: ABNORMAL /HPF (ref 0–4)
SODIUM SERPL-SCNC: 144 MMOL/L — SIGNIFICANT CHANGE UP (ref 135–145)
SP GR SPEC: 1.01 — SIGNIFICANT CHANGE UP (ref 1.01–1.02)
UROBILINOGEN FLD QL: NEGATIVE — SIGNIFICANT CHANGE UP
WBC # BLD: 8.34 K/UL — SIGNIFICANT CHANGE UP (ref 3.8–10.5)
WBC # FLD AUTO: 8.34 K/UL — SIGNIFICANT CHANGE UP (ref 3.8–10.5)
WBC UR QL: ABNORMAL /HPF (ref 0–5)

## 2019-11-21 PROCEDURE — 74176 CT ABD & PELVIS W/O CONTRAST: CPT | Mod: 26

## 2019-11-21 PROCEDURE — 99223 1ST HOSP IP/OBS HIGH 75: CPT

## 2019-11-21 PROCEDURE — 99285 EMERGENCY DEPT VISIT HI MDM: CPT

## 2019-11-21 RX ORDER — TRAZODONE HCL 50 MG
150 TABLET ORAL AT BEDTIME
Refills: 0 | Status: DISCONTINUED | OUTPATIENT
Start: 2019-11-21 | End: 2019-11-21

## 2019-11-21 RX ORDER — LISINOPRIL 2.5 MG/1
1 TABLET ORAL
Qty: 0 | Refills: 0 | DISCHARGE

## 2019-11-21 RX ORDER — CLONAZEPAM 1 MG
1 TABLET ORAL THREE TIMES A DAY
Refills: 0 | Status: DISCONTINUED | OUTPATIENT
Start: 2019-11-21 | End: 2019-11-23

## 2019-11-21 RX ORDER — SODIUM CHLORIDE 9 MG/ML
1000 INJECTION, SOLUTION INTRAVENOUS
Refills: 0 | Status: DISCONTINUED | OUTPATIENT
Start: 2019-11-21 | End: 2019-11-23

## 2019-11-21 RX ORDER — HEPARIN SODIUM 5000 [USP'U]/ML
5000 INJECTION INTRAVENOUS; SUBCUTANEOUS EVERY 8 HOURS
Refills: 0 | Status: DISCONTINUED | OUTPATIENT
Start: 2019-11-21 | End: 2019-11-23

## 2019-11-21 RX ORDER — AMLODIPINE BESYLATE 2.5 MG/1
10 TABLET ORAL DAILY
Refills: 0 | Status: DISCONTINUED | OUTPATIENT
Start: 2019-11-21 | End: 2019-11-23

## 2019-11-21 RX ORDER — TRAZODONE HCL 50 MG
150 TABLET ORAL ONCE
Refills: 0 | Status: COMPLETED | OUTPATIENT
Start: 2019-11-21 | End: 2019-11-21

## 2019-11-21 RX ORDER — INFLUENZA VIRUS VACCINE 15; 15; 15; 15 UG/.5ML; UG/.5ML; UG/.5ML; UG/.5ML
0.5 SUSPENSION INTRAMUSCULAR ONCE
Refills: 0 | Status: COMPLETED | OUTPATIENT
Start: 2019-11-21 | End: 2019-11-23

## 2019-11-21 RX ORDER — TRAZODONE HCL 50 MG
4 TABLET ORAL
Qty: 0 | Refills: 0 | DISCHARGE

## 2019-11-21 RX ORDER — ATENOLOL 25 MG/1
50 TABLET ORAL
Refills: 0 | Status: DISCONTINUED | OUTPATIENT
Start: 2019-11-21 | End: 2019-11-23

## 2019-11-21 RX ORDER — TRAZODONE HCL 50 MG
600 TABLET ORAL AT BEDTIME
Refills: 0 | Status: DISCONTINUED | OUTPATIENT
Start: 2019-11-22 | End: 2019-11-23

## 2019-11-21 RX ORDER — TRAZODONE HCL 50 MG
3 TABLET ORAL
Qty: 0 | Refills: 0 | DISCHARGE

## 2019-11-21 RX ORDER — ATENOLOL 25 MG/1
0 TABLET ORAL
Qty: 0 | Refills: 0 | DISCHARGE

## 2019-11-21 RX ORDER — AMLODIPINE BESYLATE 2.5 MG/1
10 TABLET ORAL DAILY
Refills: 0 | Status: DISCONTINUED | OUTPATIENT
Start: 2019-11-21 | End: 2019-11-21

## 2019-11-21 RX ORDER — TRAZODONE HCL 50 MG
300 TABLET ORAL AT BEDTIME
Refills: 0 | Status: DISCONTINUED | OUTPATIENT
Start: 2019-11-21 | End: 2019-11-21

## 2019-11-21 RX ADMIN — AMLODIPINE BESYLATE 10 MILLIGRAM(S): 2.5 TABLET ORAL at 22:07

## 2019-11-21 RX ADMIN — Medication 150 MILLIGRAM(S): at 22:07

## 2019-11-21 RX ADMIN — Medication 150 MILLIGRAM(S): at 21:00

## 2019-11-21 RX ADMIN — Medication 300 MILLIGRAM(S): at 22:07

## 2019-11-21 NOTE — ED PROVIDER NOTE - ATTENDING CONTRIBUTION TO CARE
Dr. Badillo: I performed a face to face bedside interview with patient regarding history of present illness, review of symptoms and past medical history. I completed an independent physical exam.  I have discussed patient's plan of care with PA.   I agree with note as stated above, having amended the EMR as needed to reflect my findings.   This includes HISTORY OF PRESENT ILLNESS, HIV, PAST MEDICAL/SURGICAL/FAMILY/SOCIAL HISTORY, ALLERGIES AND HOME MEDICATIONS, REVIEW OF SYSTEMS, PHYSICAL EXAM, and any PROGRESS NOTES during the time I functioned as the attending physician for this patient.    see mdm

## 2019-11-21 NOTE — H&P ADULT - NSHPREVIEWOFSYSTEMS_GEN_ALL_CORE
REVIEW OF SYSTEMS:    CONSTITUTIONAL: No weakness, fevers or chills  EYES/ENT: No visual changes;  No vertigo or throat pain   NECK: No pain or stiffness  RESPIRATORY: No cough, wheezing, hemoptysis; No shortness of breath  CARDIOVASCULAR: No chest pain or palpitations  GASTROINTESTINAL: No abdominal or epigastric pain. No nausea, vomiting, or hematemesis; No diarrhea or constipation. No melena or hematochezia.  GENITOURINARY: Bilateral nephrostomy tubes, right one fell out  NEUROLOGICAL: No numbness or weakness  SKIN: No itching, burning, rashes, or lesions   MSK: no joint pain, no joint swelling  All other review of systems is negative unless indicated above.

## 2019-11-21 NOTE — PROVIDER CONTACT NOTE (OTHER) - SITUATION
Pt stated he takes 600mg of Trazodone at night for sleep and due to not receiving this dose in hospital pt refusing Blood pressure medication. Pt blood pressure 177/64 and heart rate 55.

## 2019-11-21 NOTE — H&P ADULT - NSHPPHYSICALEXAM_GEN_ALL_CORE
.  VITAL SIGNS:  T(C): 36.8 (11-21-19 @ 12:15), Max: 36.8 (11-21-19 @ 12:15)  T(F): 98.3 (11-21-19 @ 12:15), Max: 98.3 (11-21-19 @ 12:15)  HR: 56 (11-21-19 @ 12:15) (55 - 56)  BP: 157/79 (11-21-19 @ 12:15) (157/79 - 159/75)  BP(mean): --  RR: 20 (11-21-19 @ 12:15) (18 - 20)  SpO2: 99% (11-21-19 @ 12:15) (98% - 99%)  Wt(kg): --    PHYSICAL EXAM:    Constitutional: WDWN resting comfortably in bed; NAD  Head: NC/AT  Eyes: PERRLA, EOMI, clear conjunctiva  ENT: no nasal discharge; no oropharyngeal erythema or exudates; dry oral mucosa  Neck: supple; no JVD or thyromegaly  Respiratory: CTA B/L; no W/R/R, no retractions  Cardiac: +S1/S2; RRR; no M/R/G; PMI non-displaced  Gastrointestinal: soft, NT/ND; no rebound or guarding; +BS, no hepatosplenomegaly  : left nephrostomy tube in place, site C/D/I draining clear yellow urine. Right CVA with gauze covered white previous nephrostomy was, site clean, dry, no drainage. No CVA t enderness b/l  Extremities: WWP, no clubbing or cyanosis; no peripheral edema  Vascular: 2+ radial, DP/PT pulses B/L  Lymphatic: no submandibular or cervical LAD  Neurologic: AAOx3; answers questions appropriately, follows commands, moves all extremities

## 2019-11-21 NOTE — H&P ADULT - HISTORY OF PRESENT ILLNESS
64 year old male with hx of bladder cancer s/p cystectomy and ileal conduit in 5/2018, bilateral hydronephrosis s/p placement of bilateral nephrostomy tubes/ureteral stents via ileal conduit on 11/6/19. Pt states the right nephrostomy tube fell out probably 2-3 days ago, but not exactly sure. He had an appointment last week with his urologist, Dr. Ismael Esteban and had dressing change today so it fell out sometime between then. He also states he was prescribed Abx by Dr. Esteban about a week ago because he felt weak. Med rec showed cipro prescribed on 11/15 for 7 days. He is currently taking 4 tabs of Xtandi at bedtime for bladder CA, takes Aleve occasionally for pain, about once a week, denies use of any herbal supplements. He denies fevers, chills, nausea, vomiting, abdominal pain, back pain, diarrhea, constipation. 64 year old male with hx of bladder cancer s/p cystectomy and ileal conduit in 5/2018, hydronephrosis s/p placement of bilateral nephrostomy tubes/ureteral stents via ileal conduit on 11/6/19 presents after right nephrostomy tube fell out. Pt says the tube fell out probably 2-3 days ago, but not exactly sure. He had an appointment last week with his urologist, Dr. Ismael Esteban and had dressing change today so it fell out sometime between then. He also states he was prescribed Abx by Dr. Esteban about a week ago because he felt weak. Med rec showed cipro prescribed on 11/15 for 7 days. He is currently taking 4 tabs of Xtandi at bedtime for bladder CA, takes Aleve occasionally for pain, about once a week, denies use of any herbal supplements. He denies fevers, chills, nausea, vomiting, abdominal pain, back pain, diarrhea, constipation.

## 2019-11-21 NOTE — ED PROVIDER NOTE - PROGRESS NOTE DETAILS
Pt found to be anemic,  exam by NP student Froylan, chaperone RN Maame, +non thrombosed ext hemorrhoids, brown stool Transfer center called for transfer as Dr. Ingram (urology) rec transfer to [facility] for nephrostomy replacement by IR. JOSE/re Orta and Landen from transfer center, Dr Esteban in the OR, transfer not approved until he is done and is able to review results. D/w Dr. Esteban, pt's tube that fell out is not a nephrostomy tube but was placed to keep ileostomy patent. As the ileostomy is patent right now, tube does not need to be replaced and thus pt does not need transfer to [facility]. Recommends admission to  for ARF work up. 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 Pt agreed with admission. discussed his increasing creatinine despite the nephrostomy tube.

## 2019-11-21 NOTE — ED ADULT TRIAGE NOTE - CHIEF COMPLAINT QUOTE
My nephrostomy tube was dislodged My nephrostomy tube was dislodged since yesterday (Patient has 2)  Nephrologist: Dr Esteban ([facility])

## 2019-11-21 NOTE — ED PROVIDER NOTE - OBJECTIVE STATEMENT
64 year old male with hx of bladder cancer s/p cystectomy and ilieal conduit in 5/2018. Pt had b/l nephrostomy tubes placed 11/6 for hydronephrosis. Pt p/w right nephrostomy tube that fell out. pt is poor historian and not sure when it fell out. pt had an appointment last week with Dr. Ismael Esteban urology and had dressing change today so it fell out sometime between then.

## 2019-11-21 NOTE — CONSULT NOTE ADULT - SUBJECTIVE AND OBJECTIVE BOX
NEPHROLOGY CONSULTATION    CHIEF COMPLAINT: right nephrostomy tube fell out    HPI:  Pt is 64 year old male with hx of bladder cancer s/p cystectomy and ileal conduit in 5/2018, hydronephrosis s/p placement of bilateral nephrostomy tubes/ureteral stents via ileal conduit 11/6/19 presents after right nephrostomy tube fell out. Pt says the tube fell out probably 2-3 days ago, but not exactly sure. He had an appointment last week with his urologist, Dr. Ismael Esteban and had dressing change today so it fell out sometime between these days. He also states he was prescribed Abx by Dr. Esteban about a week ago because he felt weak. Med rec showed cipro prescribed on 11/15 for 7 days. He is currently taking 4 tabs of Xtandi at bedtime for bladder CA, takes Aleve occasionally for pain, about once a week, denies use of any herbal supplements. No CP, SOB, fevers, chills, nausea, vomiting, abdominal pain, back pain, diarrhea, constipation. Noted to have anemia, BARON. On ARB and Metformin as op.    ROS:  as above    Allergies:  No Known Allergies    PAST MEDICAL & SURGICAL HISTORY:  Unspecified hydronephrosis  Diabetes mellitus  Obese  Other calculus in bladder  Urethral stricture  Urinary (tract) obstruction  UTI (urinary tract infection)  Major depressive disorder  Anxiety  HTN (Hypertension)  Prostate Cancer  Bladder disease: Bladder Removed  Nephrostomy status: Left, 8/1/2018  S/P ileal conduit: May 2018  History of bladder cancer: bladder removal May 10, 2018  Suprapubic catheter: 8/2015  History of prostatectomy: robotic, 2011, s/p radiation  H/O cystoscopy: - multiple  last cystoscopy, laser litholapaxy on 1/2018  S/P Appendectomy: 40 years ago    SOCIAL HISTORY:  negative    FAMILY HISTORY:  FH: colon cancer    MEDICATIONS  (STANDING):  amLODIPine   Tablet 10 milliGRAM(s) Oral daily  ATENolol  Tablet 50 milliGRAM(s) Oral two times a day  heparin  Injectable 5000 Unit(s) SubCutaneous every 8 hours  influenza   Vaccine 0.5 milliLiter(s) IntraMuscular once  traZODone 150 milliGRAM(s) Oral at bedtime    Home Medications:  amLODIPine 5 mg oral tablet: 2 tab(s) orally once a day (21 Nov 2019 14:59)  atenolol 50 mg oral tablet: 50 milligram(s) orally 2 times a day (21 Nov 2019 13:30)  irbesartan 300 mg oral tablet: 1 tab(s) orally once a day (21 Nov 2019 13:30)  KlonoPIN 1 mg oral tablet: 1 tab(s) orally 3 times a day (21 Nov 2019 13:30)  metFORMIN 500 mg oral tablet: 1 tab(s) orally 2 times a day (21 Nov 2019 13:10)  multivitamin: 1 tab(s) orally once a day (21 Nov 2019 13:10)  Omega-3 oral capsule: 1 tab(s) orally once a day (21 Nov 2019 13:10)  traZODone 150 mg oral tablet: 1 tab(s) orally once a day (at bedtime) (21 Nov 2019 14:57)  Vascepa 1 g oral capsule: 2 cap(s) orally 2 times a day (21 Nov 2019 15:00)  vitamin b complex daily PO:  (21 Nov 2019 13:10)  Xtandi 40 mg oral capsule: 4 cap(s) orally once a day (at bedtime) (21 Nov 2019 15:00)    Vital Signs Last 24 Hrs  T(C): 36.8 (11-21-19 @ 15:26), Max: 36.8 (11-21-19 @ 12:15)  T(F): 98.3 (11-21-19 @ 15:26), Max: 98.3 (11-21-19 @ 12:15)  HR: 54 (11-21-19 @ 15:26) (54 - 56)  BP: 159/72 (11-21-19 @ 15:26) (157/79 - 159/75)  RR: 16 (11-21-19 @ 15:26) (16 - 20)  SpO2: 100% (11-21-19 @ 15:26) (98% - 100%)    LABS:                        7.1    8.34  )-----------( 296      ( 21 Nov 2019 10:55 )             23.2     11-21    144  |  111<H>  |  56<H>  ----------------------------<  133<H>  4.9   |  17<L>  |  3.72<H>    Ca    9.2      21 Nov 2019 10:55    TPro  7.1  /  Alb  2.3<L>  /  TBili  0.5  /  DBili  x   /  AST  36  /  ALT  9<L>  /  AlkPhos  82  11-21    LIVER FUNCTIONS - ( 21 Nov 2019 10:55 )  Alb: 2.3 g/dL / Pro: 7.1 g/dL / ALK PHOS: 82 U/L / ALT: 9 U/L / AST: 36 U/L / GGT: x           PT/INR - ( 21 Nov 2019 10:55 )   PT: 10.8 sec;   INR: 0.97 ratio       PTT - ( 21 Nov 2019 10:55 )  PTT:21.7 sec    A/P: NEPHROLOGY CONSULTATION    CHIEF COMPLAINT: right nephrostomy tube fell out    HPI:  Pt is 64 year old male with hx of bladder cancer s/p cystectomy and ileal conduit in 5/2018, hydronephrosis s/p placement of bilateral nephrostomy tubes/ureteral stents via ileal conduit 11/6/19 presents after right nephrostomy tube fell out. Pt says the tube fell out probably 2-3 days ago, but not exactly sure. He had an appointment last week with his urologist, Dr. Ismael Esteban and had dressing change today so it fell out sometime between these days. He also states he was prescribed Abx by Dr. Esteban about a week ago because he felt weak. Med rec showed cipro prescribed on 11/15 for 7 days. He is currently taking 4 tabs of Xtandi at bedtime for bladder CA, takes Aleve occasionally for pain, about once a week, denies use of any herbal supplements. No CP, SOB, fevers, chills, nausea, vomiting, abdominal pain, back pain, diarrhea, constipation. Noted to have anemia, BARON on CKD 3/4. On ARB and Metformin as op.    ROS:  as above    Allergies:  No Known Allergies    PAST MEDICAL & SURGICAL HISTORY:  Unspecified hydronephrosis  Diabetes mellitus  Obese  Other calculus in bladder  Urethral stricture  Urinary (tract) obstruction  UTI (urinary tract infection)  Major depressive disorder  Anxiety  HTN (Hypertension)  Prostate Cancer  Bladder disease: Bladder Removed  Nephrostomy status: Left, 8/1/2018  S/P ileal conduit: May 2018  History of bladder cancer: bladder removal May 10, 2018  Suprapubic catheter: 8/2015  History of prostatectomy: robotic, 2011, s/p radiation  H/O cystoscopy: - multiple  last cystoscopy, laser litholapaxy on 1/2018  S/P Appendectomy: 40 years ago  CKD 3/4    SOCIAL HISTORY:  negative    FAMILY HISTORY:  FH: colon cancer    MEDICATIONS  (STANDING):  amLODIPine   Tablet 10 milliGRAM(s) Oral daily  ATENolol  Tablet 50 milliGRAM(s) Oral two times a day  heparin  Injectable 5000 Unit(s) SubCutaneous every 8 hours  influenza   Vaccine 0.5 milliLiter(s) IntraMuscular once  traZODone 150 milliGRAM(s) Oral at bedtime    Home Medications:  amLODIPine 5 mg oral tablet: 2 tab(s) orally once a day (21 Nov 2019 14:59)  atenolol 50 mg oral tablet: 50 milligram(s) orally 2 times a day (21 Nov 2019 13:30)  irbesartan 300 mg oral tablet: 1 tab(s) orally once a day (21 Nov 2019 13:30)  KlonoPIN 1 mg oral tablet: 1 tab(s) orally 3 times a day (21 Nov 2019 13:30)  metFORMIN 500 mg oral tablet: 1 tab(s) orally 2 times a day (21 Nov 2019 13:10)  multivitamin: 1 tab(s) orally once a day (21 Nov 2019 13:10)  Omega-3 oral capsule: 1 tab(s) orally once a day (21 Nov 2019 13:10)  traZODone 150 mg oral tablet: 1 tab(s) orally once a day (at bedtime) (21 Nov 2019 14:57)  Vascepa 1 g oral capsule: 2 cap(s) orally 2 times a day (21 Nov 2019 15:00)  vitamin b complex daily PO:  (21 Nov 2019 13:10)  Xtandi 40 mg oral capsule: 4 cap(s) orally once a day (at bedtime) (21 Nov 2019 15:00)    Vital Signs Last 24 Hrs  T(C): 36.8 (11-21-19 @ 15:26), Max: 36.8 (11-21-19 @ 12:15)  T(F): 98.3 (11-21-19 @ 15:26), Max: 98.3 (11-21-19 @ 12:15)  HR: 54 (11-21-19 @ 15:26) (54 - 56)  BP: 159/72 (11-21-19 @ 15:26) (157/79 - 159/75)  RR: 16 (11-21-19 @ 15:26) (16 - 20)  SpO2: 100% (11-21-19 @ 15:26) (98% - 100%)    Card S1S2  Lungs b/l air entry  Abd soft, NT, ND  Extr no edema  AO x 3    LABS:                        7.1    8.34  )-----------( 296      ( 21 Nov 2019 10:55 )             23.2     11-21    144  |  111<H>  |  56<H>  ----------------------------<  133<H>  4.9   |  17<L>  |  3.72<H>    Ca    9.2      21 Nov 2019 10:55    TPro  7.1  /  Alb  2.3<L>  /  TBili  0.5  /  DBili  x   /  AST  36  /  ALT  9<L>  /  AlkPhos  82  11-21    LIVER FUNCTIONS - ( 21 Nov 2019 10:55 )  Alb: 2.3 g/dL / Pro: 7.1 g/dL / ALK PHOS: 82 U/L / ALT: 9 U/L / AST: 36 U/L / GGT: x           PT/INR - ( 21 Nov 2019 10:55 )   PT: 10.8 sec;   INR: 0.97 ratio       PTT - ( 21 Nov 2019 10:55 )  PTT:21.7 sec    A/P:    Progressive decline in renal fx in setting of bladder/prostate ca  Chronic R hydro  BARON on CKD 3/4 (Cr 1.97 - 3/1/19, Cr ~ 3 Sept-Nov 2019)  No ACE/ARB, Metformin  No NSAID's  Favor intervention for R hydro if possible  Gentle IVF   f/u  CBC, BMP in am

## 2019-11-21 NOTE — ED PROVIDER NOTE - CLINICAL SUMMARY MEDICAL DECISION MAKING FREE TEXT BOX
Dr. Badillo: 64M h/o DM, HTN, bladder neoplasm s/p bilateral nephrostomy tubes by  Dr. Esteban at [facility] in the summer, s/p ileostomy at the same time, h/o prostate ca not on chemo p/w nephrostomy tube falling out, unsure how. No fevers or chills, no dysuria, no abdo pain. On exam pt is well appearing, nad, rrr, ctab, abdo soft/nt/nd, +ileostomy in place with viable stoma with output. +left nephrostomy in place with output. Right nephrostomy at bedside, site c/d/i. Will check renal function and d/w pt's urologist Dr. Esteban (paged).

## 2019-11-21 NOTE — ED ADULT NURSE NOTE - PMH
Anxiety    Diabetes mellitus    HTN (Hypertension)    Major depressive disorder    Malignant neoplasm of bladder    Obese    Other calculus in bladder    Prostate Cancer    Unspecified hydronephrosis    Urethral stricture    Urinary (tract) obstruction    UTI (urinary tract infection)

## 2019-11-21 NOTE — ED PROVIDER NOTE - NOTES
1140 spoke with Dr. Ingram and we do not have IR here and nephrostomy tube needs to be replaced. cr is elevated and h/h dec

## 2019-11-21 NOTE — ED ADULT NURSE NOTE - OBJECTIVE STATEMENT
Pt presents to ED with c/o right nephrostomy dislodgement. Pt states "the tube came out a few days ago". Left nephrostomy tube in place as well as urostomy on right abdomen, pt states both are draining urine.

## 2019-11-21 NOTE — H&P ADULT - ASSESSMENT
64 year old male with hx of bladder cancer s/p cystectomy and ileal conduit in 5/2018, bilateral hydronephrosis s/p placement of bilateral nephrostomy tubes/ureteral stents via ileal conduit on 11/6/19.    #BARON: Baseline Cr 2.1, now at 3.72 64 year old male with PMHx HTN, HLD, pre-diabetes, depression, anxiety, prostate CA and bladder cancer s/p cystectomy and ileal conduit in 2018, hydronephrosis s/p placement of bilateral nephrostomy tubes/ureteral stents via ileal conduit on 19 presents after right nephrostomy tube fell out, found to have worsening renal failure    #BARON: Baseline Cr 2.1, now at 3.72. CT A/P showed severe chronic right hydroureteronephrosis, left hydro decreased  - less likely post-obstructive causing worsening renal failure given stable findings of hydro  - obtain urine lytes  - hold home Irbesartan and metformin  - lytic lesions sme from prior, with history of prostate CA, though in setting of worsening anemia and BARON, will obtain SPEP, UPEP    #B/l hydroutertonephrosis: now with left nephrostomy as right one fell out. Dr. Ismael Esteban contacted in ED, does not believe that the right one needs to be replaced  - stable findings on CT A/P  - continue to monitor output  - Urology consulted    #Bladder CA/ Prostate CA: on Xtandi at home  - no renal dosage adjustment needed, metabolized by liver  - continue Xtandi - will need Urology or Oncology to verify med    #Normocytic Anemia: no active bleeding, FOBT negative, suspects anemia due to CKD/ chronic disease  - check iron panel, vitamin b12, foalte  - keep active T&S  - transfuse for HgB<7    #DVT ppx:  - lovenox    CAPRINI SCORE [CLOT]    AGE RELATED RISK FACTORS                                                       MOBILITY RELATED FACTORS  [ ] Age 41-60 years                                            (1 Point)                  [ ] Bed rest                                                        (1 Point)  [x ] Age: 61-74 years                                           (2 Points)                 [ ] Plaster cast                                                   (2 Points)  [ ] Age= 75 years                                              (3 Points)                 [ ] Bed bound for more than 72 hours                 (2 Points)    DISEASE RELATED RISK FACTORS                                               GENDER SPECIFIC FACTORS  [ ] Edema in the lower extremities                       (1 Point)                  [ ] Pregnancy                                                     (1 Point)  [ ] Varicose veins                                               (1 Point)                  [ ] Post-partum < 6 weeks                                   (1 Point)             [x ] BMI > 25 Kg/m2                                            (1 Point)                  [ ] Hormonal therapy  or oral contraception          (1 Point)                 [ ] Sepsis (in the previous month)                        (1 Point)                  [ ] History of pregnancy complications                 (1 point)  [ ] Pneumonia or serious lung disease                                               [ ] Unexplained or recurrent                     (1 Point)           (in the previous month)                               (1 Point)  [ ] Abnormal pulmonary function test                     (1 Point)                 SURGERY RELATED RISK FACTORS  [ ] Acute myocardial infarction                              (1 Point)                 [ ]  Section                                             (1 Point)  [ ] Congestive heart failure (in the previous month)  (1 Point)               [ ] Minor surgery                                                  (1 Point)   [ ] Inflammatory bowel disease                             (1 Point)                 [ ] Arthroscopic surgery                                        (2 Points)  [ ] Central venous access                                      (2 Points)                [ ] General surgery lasting more than 45 minutes   (2 Points)       [ ] Stroke (in the previous month)                          (5 Points)               [ ] Elective arthroplasty                                         (5 Points)                                                                                                                                               HEMATOLOGY RELATED FACTORS                                                 TRAUMA RELATED RISK FACTORS  [ ] Prior episodes of VTE                                     (3 Points)                [ ] Fracture of the hip, pelvis, or leg                       (5 Points)  [ ] Positive family history for VTE                         (3 Points)                 [ ] Acute spinal cord injury (in the previous month)  (5 Points)  [ ] Prothrombin 14836 A                                     (3 Points)                 [ ] Paralysis  (less than 1 month)                             (5 Points)  [ ] Factor V Leiden                                             (3 Points)                  [ ] Multiple Trauma within 1 month                        (5 Points)  [ ] Lupus anticoagulants                                     (3 Points)                                                           [ ] Anticardiolipin antibodies                               (3 Points)                                                       [ ] High homocysteine in the blood                      (3 Points)                                             [ ] Other congenital or acquired thrombophilia      (3 Points)                                                [ ] Heparin induced thrombocytopenia                  (3 Points)                                          Total Score [  3   ]    Caprini Score 0 - 2:  Low Risk, No VTE Prophylaxis required for most patients, encourage ambulation  Caprini Score 3 - 6:  At Risk, pharmacologic VTE prophylaxis is indicated for most patients (in the absence of a contraindication)  Caprini Score Greater than or = 7:  High Risk, pharmacologic VTE prophylaxis is indicated for most patients (in the absence of a contraindication) 64 year old male with PMHx HTN, HLD, pre-diabetes, depression, anxiety, prostate CA and bladder cancer s/p cystectomy and ileal conduit in 2018, hydronephrosis s/p placement of bilateral nephrostomy tubes/ureteral stents via ileal conduit on 19 presents after right nephrostomy tube fell out, found to have worsening renal failure    #BARON: Baseline Cr 2.1, now at 3.72. CT A/P showed severe chronic right hydroureteronephrosis, left hydro decreased  - less likely post-obstructive causing worsening renal failure given stable findings of hydro  - obtain urine lytes  - hold home Irbesartan and metformin  - lytic lesions sme from prior, with history of prostate CA, though in setting of worsening anemia and BARON, will obtain SPEP, UPEP    #B/l hydroutertonephrosis: now with left nephrostomy as right one fell out. Dr. Ismael Esteban contacted in ED, does not believe that the right one needs to be replaced  - stable findings on CT A/P  - continue to monitor output  - Urology consulted    #Bladder CA/ Prostate CA: on Xtandi at home  - no renal dosage adjustment needed, metabolized by liver  - continue Xtandi - will need Urology or Oncology to verify med    #Normocytic Anemia: no active bleeding, FOBT negative, suspects anemia due to CKD/ chronic disease  - check iron panel, vitamin b12, foalte  - keep active T&S  - transfuse for HgB<7    #DVT ppx:  - HSQ    CAPRINI SCORE [CLOT]    AGE RELATED RISK FACTORS                                                       MOBILITY RELATED FACTORS  [ ] Age 41-60 years                                            (1 Point)                  [ ] Bed rest                                                        (1 Point)  [x ] Age: 61-74 years                                           (2 Points)                 [ ] Plaster cast                                                   (2 Points)  [ ] Age= 75 years                                              (3 Points)                 [ ] Bed bound for more than 72 hours                 (2 Points)    DISEASE RELATED RISK FACTORS                                               GENDER SPECIFIC FACTORS  [ ] Edema in the lower extremities                       (1 Point)                  [ ] Pregnancy                                                     (1 Point)  [ ] Varicose veins                                               (1 Point)                  [ ] Post-partum < 6 weeks                                   (1 Point)             [x ] BMI > 25 Kg/m2                                            (1 Point)                  [ ] Hormonal therapy  or oral contraception          (1 Point)                 [ ] Sepsis (in the previous month)                        (1 Point)                  [ ] History of pregnancy complications                 (1 point)  [ ] Pneumonia or serious lung disease                                               [ ] Unexplained or recurrent                     (1 Point)           (in the previous month)                               (1 Point)  [ ] Abnormal pulmonary function test                     (1 Point)                 SURGERY RELATED RISK FACTORS  [ ] Acute myocardial infarction                              (1 Point)                 [ ]  Section                                             (1 Point)  [ ] Congestive heart failure (in the previous month)  (1 Point)               [ ] Minor surgery                                                  (1 Point)   [ ] Inflammatory bowel disease                             (1 Point)                 [ ] Arthroscopic surgery                                        (2 Points)  [ ] Central venous access                                      (2 Points)                [ ] General surgery lasting more than 45 minutes   (2 Points)       [ ] Stroke (in the previous month)                          (5 Points)               [ ] Elective arthroplasty                                         (5 Points)                                                                                                                                               HEMATOLOGY RELATED FACTORS                                                 TRAUMA RELATED RISK FACTORS  [ ] Prior episodes of VTE                                     (3 Points)                [ ] Fracture of the hip, pelvis, or leg                       (5 Points)  [ ] Positive family history for VTE                         (3 Points)                 [ ] Acute spinal cord injury (in the previous month)  (5 Points)  [ ] Prothrombin 63144 A                                     (3 Points)                 [ ] Paralysis  (less than 1 month)                             (5 Points)  [ ] Factor V Leiden                                             (3 Points)                  [ ] Multiple Trauma within 1 month                        (5 Points)  [ ] Lupus anticoagulants                                     (3 Points)                                                           [ ] Anticardiolipin antibodies                               (3 Points)                                                       [ ] High homocysteine in the blood                      (3 Points)                                             [ ] Other congenital or acquired thrombophilia      (3 Points)                                                [ ] Heparin induced thrombocytopenia                  (3 Points)                                          Total Score [  3   ]    Caprini Score 0 - 2:  Low Risk, No VTE Prophylaxis required for most patients, encourage ambulation  Caprini Score 3 - 6:  At Risk, pharmacologic VTE prophylaxis is indicated for most patients (in the absence of a contraindication)  Caprini Score Greater than or = 7:  High Risk, pharmacologic VTE prophylaxis is indicated for most patients (in the absence of a contraindication) 64 year old male with PMHx HTN, HLD, pre-diabetes, depression, anxiety, prostate CA and bladder cancer s/p cystectomy and ileal conduit in 2018, hydronephrosis s/p placement of bilateral nephrostomy tubes/ureteral stents via ileal conduit on 19 presents after right nephrostomy tube fell out, found to have worsening renal failure    #BARON: Baseline Cr 2.1, now at 3.72. CT A/P showed severe chronic right hydroureteronephrosis, left hydro decreased  - less likely post-obstructive causing worsening renal failure given stable findings of hydro  - obtain urine lytes  - hold home Irbesartan and metformin  - lytic lesions sme from prior, with history of prostate CA, though in setting of worsening anemia and BARON, will obtain SPEP, UPEP    #B/l hydroutertonephrosis: now with left nephrostomy as right one fell out. Dr. Ismael Esteban contacted in ED, does not believe that the right one needs to be replaced  - stable findings on CT A/P  - continue to monitor output  - Urology consulted    #Bladder CA/ Prostate CA: on Xtandi at home  - no renal dosage adjustment needed, metabolized by liver  - continue Xtandi - will need Urology or Oncology to verify med    #Normocytic Anemia: no active bleeding, FOBT negative, suspects anemia due to CKD/ chronic disease  - check iron panel, vitamin b12, foalte  - keep active T&S  - transfuse for HgB<7    #Prediabetes: on metformin 500mg BID, HbA1C 5.4 on 2019   - daily FSG/BG    #DVT ppx:  - HSQ    CAPRINI SCORE [CLOT]    AGE RELATED RISK FACTORS                                                       MOBILITY RELATED FACTORS  [ ] Age 41-60 years                                            (1 Point)                  [ ] Bed rest                                                        (1 Point)  [x ] Age: 61-74 years                                           (2 Points)                 [ ] Plaster cast                                                   (2 Points)  [ ] Age= 75 years                                              (3 Points)                 [ ] Bed bound for more than 72 hours                 (2 Points)    DISEASE RELATED RISK FACTORS                                               GENDER SPECIFIC FACTORS  [ ] Edema in the lower extremities                       (1 Point)                  [ ] Pregnancy                                                     (1 Point)  [ ] Varicose veins                                               (1 Point)                  [ ] Post-partum < 6 weeks                                   (1 Point)             [x ] BMI > 25 Kg/m2                                            (1 Point)                  [ ] Hormonal therapy  or oral contraception          (1 Point)                 [ ] Sepsis (in the previous month)                        (1 Point)                  [ ] History of pregnancy complications                 (1 point)  [ ] Pneumonia or serious lung disease                                               [ ] Unexplained or recurrent                     (1 Point)           (in the previous month)                               (1 Point)  [ ] Abnormal pulmonary function test                     (1 Point)                 SURGERY RELATED RISK FACTORS  [ ] Acute myocardial infarction                              (1 Point)                 [ ]  Section                                             (1 Point)  [ ] Congestive heart failure (in the previous month)  (1 Point)               [ ] Minor surgery                                                  (1 Point)   [ ] Inflammatory bowel disease                             (1 Point)                 [ ] Arthroscopic surgery                                        (2 Points)  [ ] Central venous access                                      (2 Points)                [ ] General surgery lasting more than 45 minutes   (2 Points)       [ ] Stroke (in the previous month)                          (5 Points)               [ ] Elective arthroplasty                                         (5 Points)                                                                                                                                               HEMATOLOGY RELATED FACTORS                                                 TRAUMA RELATED RISK FACTORS  [ ] Prior episodes of VTE                                     (3 Points)                [ ] Fracture of the hip, pelvis, or leg                       (5 Points)  [ ] Positive family history for VTE                         (3 Points)                 [ ] Acute spinal cord injury (in the previous month)  (5 Points)  [ ] Prothrombin 30886 A                                     (3 Points)                 [ ] Paralysis  (less than 1 month)                             (5 Points)  [ ] Factor V Leiden                                             (3 Points)                  [ ] Multiple Trauma within 1 month                        (5 Points)  [ ] Lupus anticoagulants                                     (3 Points)                                                           [ ] Anticardiolipin antibodies                               (3 Points)                                                       [ ] High homocysteine in the blood                      (3 Points)                                             [ ] Other congenital or acquired thrombophilia      (3 Points)                                                [ ] Heparin induced thrombocytopenia                  (3 Points)                                          Total Score [  3   ]    Caprini Score 0 - 2:  Low Risk, No VTE Prophylaxis required for most patients, encourage ambulation  Caprini Score 3 - 6:  At Risk, pharmacologic VTE prophylaxis is indicated for most patients (in the absence of a contraindication)  Caprini Score Greater than or = 7:  High Risk, pharmacologic VTE prophylaxis is indicated for most patients (in the absence of a contraindication) 64 year old male with PMHx HTN, HLD, pre-diabetes, depression, anxiety, prostate CA and bladder cancer s/p cystectomy and ileal conduit in 2018, hydronephrosis s/p placement of bilateral nephrostomy tubes/ureteral stents via ileal conduit on 19 presents after right nephrostomy tube fell out, found to have worsening renal failure    #BARON on CKD stage 3: Baseline Cr 1.9-2.0, now at 3.72. CT A/P showed severe chronic right hydroureteronephrosis, left hydro decreased  - less likely post-obstructive causing worsening renal failure given stable findings of hydro  - obtain urine lytes  - hold home Irbesartan and metformin  - lytic lesions sme from prior, with history of prostate CA, though in setting of worsening anemia and BARON, will obtain SPEP, UPEP    #B/l hydroutertonephrosis: now with left nephrostomy as right one fell out. Dr. Ismael Esteban contacted in ED, does not believe that the right one needs to be replaced  - stable findings on CT A/P  - continue to monitor output  - Urology consulted    #Bladder CA/ Prostate CA: on Xtandi at home  - no renal dosage adjustment needed, metabolized by liver  - continue Xtandi - will need Urology or Oncology to verify med    #Normocytic Anemia: no active bleeding, FOBT negative, suspects anemia due to CKD/ chronic disease  - check iron panel, vitamin b12, foalte  - keep active T&S  - transfuse for HgB<7    #Prediabetes: on metformin 500mg BID, HbA1C 5.4 on 2019   - daily FSG/BG    #DVT ppx:  - HSQ    CAPRINI SCORE [CLOT]    AGE RELATED RISK FACTORS                                                       MOBILITY RELATED FACTORS  [ ] Age 41-60 years                                            (1 Point)                  [ ] Bed rest                                                        (1 Point)  [x ] Age: 61-74 years                                           (2 Points)                 [ ] Plaster cast                                                   (2 Points)  [ ] Age= 75 years                                              (3 Points)                 [ ] Bed bound for more than 72 hours                 (2 Points)    DISEASE RELATED RISK FACTORS                                               GENDER SPECIFIC FACTORS  [ ] Edema in the lower extremities                       (1 Point)                  [ ] Pregnancy                                                     (1 Point)  [ ] Varicose veins                                               (1 Point)                  [ ] Post-partum < 6 weeks                                   (1 Point)             [x ] BMI > 25 Kg/m2                                            (1 Point)                  [ ] Hormonal therapy  or oral contraception          (1 Point)                 [ ] Sepsis (in the previous month)                        (1 Point)                  [ ] History of pregnancy complications                 (1 point)  [ ] Pneumonia or serious lung disease                                               [ ] Unexplained or recurrent                     (1 Point)           (in the previous month)                               (1 Point)  [ ] Abnormal pulmonary function test                     (1 Point)                 SURGERY RELATED RISK FACTORS  [ ] Acute myocardial infarction                              (1 Point)                 [ ]  Section                                             (1 Point)  [ ] Congestive heart failure (in the previous month)  (1 Point)               [ ] Minor surgery                                                  (1 Point)   [ ] Inflammatory bowel disease                             (1 Point)                 [ ] Arthroscopic surgery                                        (2 Points)  [ ] Central venous access                                      (2 Points)                [ ] General surgery lasting more than 45 minutes   (2 Points)       [ ] Stroke (in the previous month)                          (5 Points)               [ ] Elective arthroplasty                                         (5 Points)                                                                                                                                               HEMATOLOGY RELATED FACTORS                                                 TRAUMA RELATED RISK FACTORS  [ ] Prior episodes of VTE                                     (3 Points)                [ ] Fracture of the hip, pelvis, or leg                       (5 Points)  [ ] Positive family history for VTE                         (3 Points)                 [ ] Acute spinal cord injury (in the previous month)  (5 Points)  [ ] Prothrombin 61792 A                                     (3 Points)                 [ ] Paralysis  (less than 1 month)                             (5 Points)  [ ] Factor V Leiden                                             (3 Points)                  [ ] Multiple Trauma within 1 month                        (5 Points)  [ ] Lupus anticoagulants                                     (3 Points)                                                           [ ] Anticardiolipin antibodies                               (3 Points)                                                       [ ] High homocysteine in the blood                      (3 Points)                                             [ ] Other congenital or acquired thrombophilia      (3 Points)                                                [ ] Heparin induced thrombocytopenia                  (3 Points)                                          Total Score [  3   ]    Caprini Score 0 - 2:  Low Risk, No VTE Prophylaxis required for most patients, encourage ambulation  Caprini Score 3 - 6:  At Risk, pharmacologic VTE prophylaxis is indicated for most patients (in the absence of a contraindication)  Caprini Score Greater than or = 7:  High Risk, pharmacologic VTE prophylaxis is indicated for most patients (in the absence of a contraindication)

## 2019-11-21 NOTE — ED ADULT NURSE NOTE - CHIEF COMPLAINT QUOTE
My nephrostomy tube was dislodged since yesterday (Patient has 2)  Nephrologist: Dr Esteban ([facility])

## 2019-11-21 NOTE — H&P ADULT - NSHPLABSRESULTS_GEN_ALL_CORE
.  LABS:                         7.1    8.34  )-----------( 296      ( 21 Nov 2019 10:55 )             23.2     11-21    144  |  111<H>  |  56<H>  ----------------------------<  133<H>  4.9   |  17<L>  |  3.72<H>    Ca    9.2      21 Nov 2019 10:55    TPro  7.1  /  Alb  2.3<L>  /  TBili  0.5  /  DBili  x   /  AST  36  /  ALT  9<L>  /  AlkPhos  82  11-21    PT/INR - ( 21 Nov 2019 10:55 )   PT: 10.8 sec;   INR: 0.97 ratio         PTT - ( 21 Nov 2019 10:55 )  PTT:21.7 sec              RADIOLOGY, EKG & ADDITIONAL TESTS: Reviewed.     < from: CT Abdomen and Pelvis No Cont (11.21.19 @ 12:22) >      EXAM:  CT ABDOMEN AND PELVIS      PROCEDURE DATE:  11/21/2019        INTERPRETATION:  History: Bladder cancer. Right nephrostomy tube failure.    CT abdomen pelvis no oral or IV contrast. Prior 10/7/2019.  There is no significant change in marked chronic right   hydroureteronephrosis with associated thinning of the cortical mantle. No   obstructing calculi. The appearance of the ileal conduit is unchanged. A   left nephrostomy catheter is noted in situ. There is significant decrease   in the chronic left hydroureteronephrosis with associated marked cortical   thinning. Mild left hydroureteronephrosis persists.  Status post cystectomy and prostatectomy. A nonspecific fluid collection   in the prostatectomy bed is unchanged. Infected collection not excluded   by the imaging appearance alone. This is associated with destructive   changes in the pubic symphysis similar to prior. This may represent   osteomyelitis and/or radiation changes.  No new unusual fluid or gas collections. No obstructed or inflamed bowel.  Nonspecific subcentimeter retroperitoneal lymph nodes similar to prior.   Remainder study unchanged as well.  Diffuse osseous metastases similar to prior. T9 compression deformity is   not included on the prior study but has a chronic appearance      Impression:  Stable severe chronic right hydroureteronephrosis.  Status post left nephrostomy tube with decrease in chronic left   hydroureteronephrosis.  Stable nonspecific fluid collection the prostatectomy bed associated with   lytic changes in the pubic symphysis.    < end of copied text >

## 2019-11-21 NOTE — ED PROVIDER NOTE - CARE PLAN
Principal Discharge DX:	Nephrostomy complication Principal Discharge DX:	Nephrostomy complication  Secondary Diagnosis:	Anemia, unspecified type Principal Discharge DX:	Ileostomy dysfunction  Secondary Diagnosis:	Anemia, unspecified type  Secondary Diagnosis:	Acute renal failure, unspecified acute renal failure type

## 2019-11-22 DIAGNOSIS — C61 MALIGNANT NEOPLASM OF PROSTATE: ICD-10-CM

## 2019-11-22 DIAGNOSIS — T83.022A DISPLACEMENT OF NEPHROSTOMY CATHETER, INITIAL ENCOUNTER: ICD-10-CM

## 2019-11-22 LAB
% ALBUMIN: 45.1 % — SIGNIFICANT CHANGE UP
% ALPHA 1: 6.7 % — SIGNIFICANT CHANGE UP
% ALPHA 2: 14 % — SIGNIFICANT CHANGE UP
% BETA: 14 % — SIGNIFICANT CHANGE UP
% GAMMA: 20.2 % — SIGNIFICANT CHANGE UP
ALBUMIN SERPL ELPH-MCNC: 2.8 G/DL — LOW (ref 3.6–5.5)
ALBUMIN/GLOB SERPL ELPH: 0.8 RATIO — SIGNIFICANT CHANGE UP
ALPHA1 GLOB SERPL ELPH-MCNC: 0.4 G/DL — SIGNIFICANT CHANGE UP (ref 0.1–0.4)
ALPHA2 GLOB SERPL ELPH-MCNC: 0.9 G/DL — SIGNIFICANT CHANGE UP (ref 0.5–1)
ANION GAP SERPL CALC-SCNC: 13 MMOL/L — SIGNIFICANT CHANGE UP (ref 5–17)
B-GLOBULIN SERPL ELPH-MCNC: 0.9 G/DL — SIGNIFICANT CHANGE UP (ref 0.5–1)
BLD GP AB SCN SERPL QL: SIGNIFICANT CHANGE UP
BUN SERPL-MCNC: 54 MG/DL — HIGH (ref 7–23)
CALCIUM SERPL-MCNC: 9.3 MG/DL — SIGNIFICANT CHANGE UP (ref 8.4–10.5)
CHLORIDE SERPL-SCNC: 110 MMOL/L — HIGH (ref 96–108)
CO2 SERPL-SCNC: 18 MMOL/L — LOW (ref 22–31)
CREAT ?TM UR-MCNC: 39 MG/DL — SIGNIFICANT CHANGE UP
CREAT SERPL-MCNC: 3.64 MG/DL — HIGH (ref 0.5–1.3)
CREATININE, URINE RESULT: 42 MG/DL — SIGNIFICANT CHANGE UP
FERRITIN SERPL-MCNC: 47 NG/ML — SIGNIFICANT CHANGE UP (ref 30–400)
FOLATE SERPL-MCNC: 8.1 NG/ML — SIGNIFICANT CHANGE UP
GAMMA GLOBULIN: 1.3 G/DL — SIGNIFICANT CHANGE UP (ref 0.6–1.6)
GLUCOSE SERPL-MCNC: 116 MG/DL — HIGH (ref 70–99)
HCT VFR BLD CALC: 23.2 % — LOW (ref 39–50)
HCV AB S/CO SERPL IA: 0.21 S/CO — SIGNIFICANT CHANGE UP (ref 0–0.99)
HCV AB SERPL-IMP: SIGNIFICANT CHANGE UP
HGB BLD-MCNC: 7.1 G/DL — LOW (ref 13–17)
INTERPRETATION SERPL IFE-IMP: SIGNIFICANT CHANGE UP
IRON SATN MFR SERPL: 23 UG/DL — LOW (ref 45–165)
IRON SATN MFR SERPL: 8 % — LOW (ref 16–55)
KAPPA LC SER QL IFE: 11.91 MG/DL — HIGH (ref 0.33–1.94)
KAPPA/LAMBDA FREE LIGHT CHAIN RATIO, SERUM: 2.33 RATIO — HIGH (ref 0.26–1.65)
LAMBDA LC SER QL IFE: 5.12 MG/DL — HIGH (ref 0.57–2.63)
MAGNESIUM SERPL-MCNC: 1.7 MG/DL — SIGNIFICANT CHANGE UP (ref 1.6–2.6)
MCHC RBC-ENTMCNC: 25.6 PG — LOW (ref 27–34)
MCHC RBC-ENTMCNC: 30.6 GM/DL — LOW (ref 32–36)
MCV RBC AUTO: 83.8 FL — SIGNIFICANT CHANGE UP (ref 80–100)
NRBC # BLD: 0 /100 WBCS — SIGNIFICANT CHANGE UP (ref 0–0)
PLATELET # BLD AUTO: 277 K/UL — SIGNIFICANT CHANGE UP (ref 150–400)
POTASSIUM SERPL-MCNC: 4.4 MMOL/L — SIGNIFICANT CHANGE UP (ref 3.5–5.3)
POTASSIUM SERPL-SCNC: 4.4 MMOL/L — SIGNIFICANT CHANGE UP (ref 3.5–5.3)
PROT ?TM UR-MCNC: 28 MG/DL — HIGH (ref 0–12)
PROT PATTERN SERPL ELPH-IMP: SIGNIFICANT CHANGE UP
PROT SERPL-MCNC: 6.3 G/DL — SIGNIFICANT CHANGE UP (ref 6–8.3)
PROT SERPL-MCNC: 6.3 G/DL — SIGNIFICANT CHANGE UP (ref 6–8.3)
RBC # BLD: 2.77 M/UL — LOW (ref 4.2–5.8)
RBC # FLD: 19.4 % — HIGH (ref 10.3–14.5)
SODIUM SERPL-SCNC: 141 MMOL/L — SIGNIFICANT CHANGE UP (ref 135–145)
SODIUM UR-SCNC: 71 MMOL/L — SIGNIFICANT CHANGE UP
TIBC SERPL-MCNC: 289 UG/DL — SIGNIFICANT CHANGE UP (ref 220–430)
TRANSFERRIN SERPL-MCNC: 239 MG/DL — SIGNIFICANT CHANGE UP (ref 200–360)
UIBC SERPL-MCNC: 266 UG/DL — SIGNIFICANT CHANGE UP (ref 110–370)
VIT B12 SERPL-MCNC: 702 PG/ML — SIGNIFICANT CHANGE UP (ref 232–1245)
WBC # BLD: 8.47 K/UL — SIGNIFICANT CHANGE UP (ref 3.8–10.5)
WBC # FLD AUTO: 8.47 K/UL — SIGNIFICANT CHANGE UP (ref 3.8–10.5)

## 2019-11-22 PROCEDURE — 99233 SBSQ HOSP IP/OBS HIGH 50: CPT | Mod: GC

## 2019-11-22 RX ORDER — CEFUROXIME AXETIL 250 MG
250 TABLET ORAL EVERY 12 HOURS
Refills: 0 | Status: DISCONTINUED | OUTPATIENT
Start: 2019-11-22 | End: 2019-11-23

## 2019-11-22 RX ORDER — HYDRALAZINE HCL 50 MG
25 TABLET ORAL EVERY 12 HOURS
Refills: 0 | Status: DISCONTINUED | OUTPATIENT
Start: 2019-11-22 | End: 2019-11-23

## 2019-11-22 RX ADMIN — Medication 600 MILLIGRAM(S): at 21:36

## 2019-11-22 RX ADMIN — Medication 25 MILLIGRAM(S): at 21:35

## 2019-11-22 RX ADMIN — ATENOLOL 50 MILLIGRAM(S): 25 TABLET ORAL at 17:36

## 2019-11-22 RX ADMIN — Medication 250 MILLIGRAM(S): at 17:36

## 2019-11-22 RX ADMIN — SODIUM CHLORIDE 75 MILLILITER(S): 9 INJECTION, SOLUTION INTRAVENOUS at 17:36

## 2019-11-22 RX ADMIN — HEPARIN SODIUM 5000 UNIT(S): 5000 INJECTION INTRAVENOUS; SUBCUTANEOUS at 13:21

## 2019-11-22 RX ADMIN — Medication 1 MILLIGRAM(S): at 19:23

## 2019-11-22 NOTE — PROGRESS NOTE ADULT - SUBJECTIVE AND OBJECTIVE BOX
Patient is a 64y old  Male who presents with a chief complaint of nephrostomy tube fell out (2019 13:19)      Patient seen and examined at bedside, no events overnight, in no acute distress.     ALLERGIES:  No Known Allergies    MEDICATIONS:  amLODIPine   Tablet 10 milliGRAM(s) Oral daily  ATENolol  Tablet 50 milliGRAM(s) Oral two times a day  clonazePAM  Tablet 1 milliGRAM(s) Oral three times a day PRN  heparin  Injectable 5000 Unit(s) SubCutaneous every 8 hours  influenza   Vaccine 0.5 milliLiter(s) IntraMuscular once  sodium chloride 0.45%. 1000 milliLiter(s) IV Continuous <Continuous>  traZODone 600 milliGRAM(s) Oral at bedtime    Vital Signs Last 24 Hrs  T(C): 36.2 (2019 06:40), Max: 36.8 (2019 15:26)  T(F): 97.2 (2019 06:40), Max: 98.3 (2019 15:26)  HR: 52 (2019 06:40) (52 - 55)  BP: 155/72 (2019 06:40) (155/72 - 177/64)  BP(mean): --  RR: 18 (2019 06:40) (16 - 18)  SpO2: 100% (2019 06:40) (100% - 100%)  I&O's Summary    2019 07:  -  2019 07:00  --------------------------------------------------------  IN: 0 mL / OUT: 200 mL / NET: -200 mL    2019 07:01  -  2019 14:25  --------------------------------------------------------  IN: 780 mL / OUT: 0 mL / NET: 780 mL          PAST MEDICAL & SURGICAL HISTORY:  Unspecified hydronephrosis  Malignant neoplasm of bladder  Diabetes mellitus  Obese  Other calculus in bladder  Urethral stricture  Urinary (tract) obstruction  UTI (urinary tract infection)  Major depressive disorder  Anxiety  HTN (Hypertension)  Prostate Cancer  Bladder disease: Bladder Removed  Nephrostomy status: Left, 2018  S/P ileal conduit: May 2018  History of bladder cancer: bladder removal May 10, 2018  Suprapubic catheter: 2015  History of prostatectomy: robotic, , s/p radiation  H/O cystoscopy: - multiple  last cystoscopy, laser litholapaxy on 2018  S/P Appendectomy: 40 years ago      Home Medications:  amLODIPine 5 mg oral tablet: 2 tab(s) orally once a day (2019 14:59)  atenolol 50 mg oral tablet: 50 milligram(s) orally 2 times a day (2019 13:30)  irbesartan 300 mg oral tablet: 1 tab(s) orally once a day (2019 13:30)  KlonoPIN 1 mg oral tablet: 1 tab(s) orally 3 times a day (2019 13:30)  metFORMIN 500 mg oral tablet: 1 tab(s) orally 2 times a day (2019 13:10)  multivitamin: 1 tab(s) orally once a day (2019 13:10)  Omega-3 oral capsule: 1 tab(s) orally once a day (2019 13:10)  traZODone 150 mg oral tablet: 1 tab(s) orally once a day (at bedtime) (2019 14:57)  Vascepa 1 g oral capsule: 2 cap(s) orally 2 times a day (2019 15:00)  vitamin b complex daily PO:  (2019 13:10)  Xtandi 40 mg oral capsule: 4 cap(s) orally once a day (at bedtime) (2019 15:00)        PHYSICAL EXAM:  General: NAD, A/O x3  ENT: MMM, no thrush  Neck: Supple, No JVD  Lungs: Clear to percussion bilaterally, non labored breathing  Cardio: RRR, S1/S2, No murmurs  Abdomen: Soft, Nontender, Nondistended; Bowel sounds present  : left nephrostomy tube in place, site C/D/I draining clear yellow urine. Right CVA with gauze covered white previous nephrostomy was, site clean, dry, no drainage  Extremities: No clubbing, cyanosis, or edema    LABS:                        7.1    8.47  )-----------( 277      ( 2019 06:44 )             23.2         141  |  110  |  54  ----------------------------<  116  4.4   |  18  |  3.64    Ca    9.3      2019 06:44  Mg     1.7         TPro  6.3  /  Alb  x   /  TBili  x   /  DBili  x   /  AST  x   /  ALT  x   /  AlkPhos  x       PT/INR - ( 2019 10:55 )   PT: 10.8 sec;   INR: 0.97 ratio         PTT - ( 2019 10:55 )  PTT:21.7 sec    POCT Blood Glucose.: 156 mg/dL (2019 14:39)    11 [id number] 5.4    Urinalysis Basic - ( 2019 18:00 )    Color: Yellow / Appearance: very cloudy / S.010 / pH: x  Gluc: x / Ketone: Negative  / Bili: Negative / Urobili: Negative   Blood: x / Protein: 30 mg/dL / Nitrite: Negative   Leuk Esterase: Moderate / RBC: 5-10 /HPF / WBC 26-50 /HPF   Sq Epi: x / Non Sq Epi: x / Bacteria: Many /HPF    RADIOLOGY & ADDITIONAL TESTS:    Care Discussed with Consultants/Other Providers:

## 2019-11-22 NOTE — CONSULT NOTE ADULT - PROBLEM SELECTOR RECOMMENDATION 2
right nephrostomy dislodged, apparently there were plans to have it removed soon. no flank pain or fever. CT with stable chronic right hydro. If renal function stable consider discharge with Monday follow-up with Dr. Esteban. If creatinine rises could consider transfer to [facility] for ? nephrostomy reinsertion

## 2019-11-22 NOTE — PROGRESS NOTE ADULT - ASSESSMENT
64 year old male with PMHx HTN, HLD, pre-diabetes, depression, anxiety, prostate CA and bladder cancer s/p cystectomy and ileal conduit in 5/2018, hydronephrosis s/p placement of bilateral nephrostomy tubes/ureteral stents via ileal conduit on 11/6/19 presents after right nephrostomy tube fell out, found to have worsening renal failure    #BARON on CKD stage 3: Baseline Cr 1.9-2.0, now at 3.64. CT A/P showed severe chronic right hydroureteronephrosis, left hydro decreased  - less likely post-obstructive causing worsening renal failure given stable findings of hydro  - hold home Irbesartan and metformin  - lytic lesions same from prior, with history of prostate CA, though in setting of worsening anemia and BARON,   will TAMI Epstein - results pending   - Urology and Nephrology consult appreciated: pt. with rising creatinine,   Tx to [facility] for nephrostomy tube replacement     #B/l hydroutertonephrosis: now with left nephrostomy as right one fell   - stable findings on CT A/P  - continue to monitor output  - Urology and Nephrology recommend replacement of right nephrostomy tube due to rising creatinine  - Awaiting for pt's nephrologist - Dr. Bejarano at [facility] o arrange for accept pt. for transfer     #Bladder CA/ Prostate CA: on Xtandi at home  - no renal dosage adjustment needed, metabolized by liver  - continue Xtandi     #UTI - pt. finished Abx course prior this admission     #Normocytic Anemia: no active bleeding, FOBT negative, suspects anemia due to CKD/ chronic disease  - check iron panel, vitamin b12, foalte  - keep active T&S  - transfuse for HgB<7    #Prediabetes: on metformin 500mg BID, HbA1C 5.4 on Nov 1, 2019   - d/c metformin    - daily FSG/BG    #DVT ppx:  - HSQ 64 year old male with PMHx HTN, HLD, pre-diabetes, depression, anxiety, prostate CA and bladder cancer s/p cystectomy and ileal conduit in 5/2018, hydronephrosis s/p placement of bilateral nephrostomy tubes/ureteral stents via ileal conduit on 11/6/19 presents after right nephrostomy tube fell out, found to have worsening renal failure    #BARON on CKD stage 3: Baseline Cr 1.9-2.0, now at 3.64. CT A/P showed severe chronic right hydroureteronephrosis, left hydro decreased  - less likely post-obstructive causing worsening renal failure given stable findings of hydro  - hold home Irbesartan and metformin  - lytic lesions same from prior, with history of prostate CA, though in setting of worsening anemia and BARON,   will TAMI Epstein - results pending   - Urology and Nephrology consult appreciated: pt. with rising creatinine,   Tx to [facility] for nephrostomy tube replacement     #B/l hydroutertonephrosis: now with left nephrostomy as right one fell   - stable findings on CT A/P  - continue to monitor output  - Urology and Nephrology recommend replacement of right nephrostomy tube due to rising creatinine  - Awaiting for pt's nephrologist - Dr. Bejarano at [facility] o arrange for accept pt. for transfer     #Bladder CA/ Prostate CA: on Xtandi at home  - no renal dosage adjustment needed, metabolized by liver  - continue Xtandi     #UTI - start ceftin 250 mg po q12hrs x 7 days      #Normocytic Anemia: no active bleeding, FOBT negative, suspects anemia due to CKD/ chronic disease  - check iron panel, vitamin b12, foalte  - keep active T&S  - transfuse for HgB<7    #Prediabetes: on metformin 500mg BID, HbA1C 5.4 on Nov 1, 2019   - d/c metformin    - daily FSG/BG    #DVT ppx:  - HSQ 64 year old male with PMHx HTN, HLD, pre-diabetes, depression, anxiety, prostate CA and bladder cancer s/p cystectomy and ileal conduit in 5/2018, hydronephrosis s/p placement of bilateral nephrostomy tubes/ureteral stents via ileal conduit on 11/6/19 presents after right nephrostomy tube fell out, found to have worsening renal failure    #BARON on CKD stage 3: Baseline Cr 1.9-2.0, now at 3.64. CT A/P showed severe chronic right hydroureteronephrosis, left hydro decreased  - less likely post-obstructive causing worsening renal failure given stable findings of hydro  - hold home Irbesartan and metformin  - lytic lesions same from prior, with history of prostate CA, though in setting of worsening anemia and BARON,   will TAMI Epstein - results pending   - Urology and Nephrology consult appreciated: pt. with rising creatinine,   Tx to [facility] for nephrostomy tube replacement     #B/l hydroutertonephrosis: now with left nephrostomy as right one fell   - stable findings on CT A/P  - continue to monitor output  - Urology and Nephrology recommend replacement of right nephrostomy tube due to rising creatinine  - Awaiting for pt's nephrologist - Dr. Esteban at [facility] to accept pt. for transfer     #Bladder CA/ Prostate CA: on Xtandi at home  - no renal dosage adjustment needed, metabolized by liver  - continue Xtandi     #UTI - start ceftin 250 mg po q12hrs x 7 days      #Normocytic Anemia: no active bleeding, FOBT negative, suspects anemia due to CKD/ chronic disease  - check iron panel, vitamin b12, foalte  - keep active T&S  - transfuse for HgB<7    #Prediabetes: on metformin 500mg BID, HbA1C 5.4 on Nov 1, 2019   - d/c metformin    - daily FSG/BG    #DVT ppx:  - HSQ

## 2019-11-22 NOTE — CONSULT NOTE ADULT - SUBJECTIVE AND OBJECTIVE BOX
Patient is a 64y old  Male who presents with a chief complaint of nephrostomy tube fell out (2019 16:58)      HPI:  64 year old male with hx of bladder cancer s/p cystectomy and ileal conduit in 2018, hydronephrosis s/p placement of bilateral nephrostomy tubes/ureteral stents via ileal conduit on 19 presents after right nephrostomy tube fell out. Pt says the tube fell out probably 2-3 days ago, but not exactly sure. He had an appointment last week with his urologist, Dr. Ismael Esteban and had dressing change today so it fell out sometime between then. He also states he was prescribed Abx by Dr. Esteban about a week ago because he felt weak. Med rec showed cipro prescribed on 11/15 for 7 days. He is currently taking 4 tabs of Xtandi at bedtime for PROSTATE CA, takes Aleve occasionally for pain, about once a week, denies use of any herbal supplements. He denies fevers, chills, nausea, vomiting, abdominal pain, back pain, diarrhea, constipation. (2019 14:41)    denies pain since nephrostomy dislodged      PAST MEDICAL & SURGICAL HISTORY:  Unspecified hydronephrosis  Malignant neoplasm of bladder  Diabetes mellitus  Obese  Other calculus in bladder  Urethral stricture  Urinary (tract) obstruction  UTI (urinary tract infection)  Major depressive disorder  Anxiety  HTN (Hypertension)  Prostate Cancer  Bladder disease: Bladder Removed  Nephrostomy status: Left, 2018  S/P ileal conduit: May 2018  History of bladder cancer: bladder removal May 10, 2018  Suprapubic catheter: 2015  History of prostatectomy: robotic, , s/p radiation  H/O cystoscopy: - multiple  last cystoscopy, laser litholapaxy on 2018  S/P Appendectomy: 40 years ago      REVIEW OF SYSTEMS:    CONSTITUTIONAL:  no fever or chills  HEENT: No visual changes  ENDO: No sweating  NECK: No pain or stiffness  MUSCULOSKELETAL: No back pain, no joint pain  RESPIRATORY: No shortness of breath  CARDIOVASCULAR: No chest pain  GASTROINTESTINAL: No abdominal or epigastric pain. No nausea, vomiting,  No diarrhea or constipation.   NEUROLOGICAL: No mental status changes  PSYCH: No depression, no mood changes  SKIN: No itching      MEDICATIONS  (STANDING):  amLODIPine   Tablet 10 milliGRAM(s) Oral daily  ATENolol  Tablet 50 milliGRAM(s) Oral two times a day  heparin  Injectable 5000 Unit(s) SubCutaneous every 8 hours  influenza   Vaccine 0.5 milliLiter(s) IntraMuscular once  sodium chloride 0.45%. 1000 milliLiter(s) (75 mL/Hr) IV Continuous <Continuous>  traZODone 600 milliGRAM(s) Oral at bedtime    MEDICATIONS  (PRN):  clonazePAM  Tablet 1 milliGRAM(s) Oral three times a day PRN Anxiety      Allergies    No Known Allergies    Intolerances        SOCIAL HISTORY: No illicit drug use    FAMILY HISTORY:  FH: colon cancer        T(C): 36.2 (19 @ 06:40), Max: 36.8 (19 @ 12:15)  T(F): 97.2 (19 @ 06:40), Max: 98.3 (19 @ 12:15)  HR: 52 (19 @ 06:40) (52 - 56)  BP: 155/72 (19 @ 06:40) (155/72 - 177/64)  RR: 18 (19 @ 06:40) (16 - 20)  SpO2: 100% (19 @ 06:40) (98% - 100%)      PHYSICAL EXAM:    Constitutional: alert, no acute distress  Back: No CVA tenderness  Respiratory: No accessory respiratory muscle use  Abd: Soft, NT/ND  no organomegaly  no hernia right flank site clean, left nephrostomy draining, RLQ ileal stoma draining clear urine  : penis/testes benign  Extremities: no edema  Neurological: A/O x 3  Psychiatric: Normal mood, normal affect  Skin: No rashes      19 @ 07:01  -  19 @ 07:00  --------------------------------------------------------  IN:  Total IN: 0 mL    OUT:    Nephrostomy Tube: 200 mL  Total OUT: 200 mL    Total NET: -200 mL        19 @ 07:01  -  19 @ 07:00  --------------------------------------------------------  IN: 0 mL / OUT: 200 mL / NET: -200 mL          Weight (kg): 105.9 (19 @ 15:26)    LABS:                        7.1    8.47  )-----------( 277      ( 2019 06:44 )             23.2       PT/INR - ( 2019 10:55 )   PT: 10.8 sec;   INR: 0.97 ratio         PTT - ( 2019 10:55 )  PTT:21.7 sec  Urinalysis Basic - ( 2019 18:00 )    Color: Yellow / Appearance: very cloudy / S.010 / pH: x  Gluc: x / Ketone: Negative  / Bili: Negative / Urobili: Negative   Blood: x / Protein: 30 mg/dL / Nitrite: Negative   Leuk Esterase: Moderate / RBC: 5-10 /HPF / WBC 26-50 /HPF   Sq Epi: x / Non Sq Epi: x / Bacteria: Many /HPF            RADIOLOGY & ADDITIONAL STUDIES: CT reviewed

## 2019-11-22 NOTE — PROGRESS NOTE ADULT - SUBJECTIVE AND OBJECTIVE BOX
Denies CP, SOB    Vital Signs Last 24 Hrs  T(C): 36.8 (19 @ 17:45), Max: 36.8 (19 @ 17:45)  T(F): 98.2 (19 @ 17:45), Max: 98.2 (19 @ 17:45)  HR: 50 (19 @ 17:45) (50 - 55)  BP: 159/69 (19 @ 17:45) (155/72 - 177/64)  RR: 16 (19 @ 17:45) (16 - 18)  SpO2: 98% (19 @ 17:45) (98% - 100%)    Card S1S2  Lungs b/l air entry  Abd soft, NT, ND  Extr no edema  AO x 3                        7.1    8.47  )-----------( 277      ( 2019 06:44 )             23.2     2019 06:44    141    |  110    |  54     ----------------------------<  116    4.4     |  18     |  3.64     Ca    9.3        2019 06:44  Mg     1.7       2019 06:44    TPro  6.3    /  Alb  x      /  TBili  x      /  DBili  x      /  AST  x      /  ALT  x      /  AlkPhos  x      2019 06:44    LIVER FUNCTIONS - ( 2019 06:44 )  Alb: x     / Pro: 6.3 g/dL / ALK PHOS: x     / ALT: x     / AST: x     / GGT: x           PT/INR - ( 2019 10:55 )   PT: 10.8 sec;   INR: 0.97 ratio      Urinalysis Basic - ( 2019 18:00 )    Color: Yellow / Appearance: very cloudy / S.010 / pH: x  Gluc: x / Ketone: Negative  / Bili: Negative / Urobili: Negative   Blood: x / Protein: 30 mg/dL / Nitrite: Negative   Leuk Esterase: Moderate / RBC: 5-10 /HPF / WBC 26-50 /HPF   Sq Epi: x / Non Sq Epi: x / Bacteria: Many /HPF    amLODIPine   Tablet 10 milliGRAM(s) Oral daily  ATENolol  Tablet 50 milliGRAM(s) Oral two times a day  cefuroxime   Tablet 250 milliGRAM(s) Oral every 12 hours  clonazePAM  Tablet 1 milliGRAM(s) Oral three times a day PRN  heparin  Injectable 5000 Unit(s) SubCutaneous every 8 hours  influenza   Vaccine 0.5 milliLiter(s) IntraMuscular once  sodium chloride 0.45%. 1000 milliLiter(s) IV Continuous <Continuous>  traZODone 600 milliGRAM(s) Oral at bedtime    A/P:    Progressive decline in renal fx in setting of bladder/prostate ca  Severe R hydro  BARON on CKD 3/ (Cr 1.97 - 3/1/19, Cr ~ 3 Sept-2019)  No ACE/ARB, Metformin  No NSAID's  Favor intervention for R hydro if possible  Call placed to op , Dr. Etseban by medicine team, call back pending  Gentle IVF   f/u  CBC, BMP in am  Bld tx as needed

## 2019-11-22 NOTE — PROGRESS NOTE ADULT - ATTENDING COMMENTS
I have personally seen and examined patient on the above date.  I discussed the case with You and I agree with findings and plan as detailed per note above, which I have amended where appropriate.      Left message for Dr. Esteban to call back, not able to reached  [phone]

## 2019-11-23 ENCOUNTER — TRANSCRIPTION ENCOUNTER (OUTPATIENT)
Age: 64
End: 2019-11-23

## 2019-11-23 VITALS
OXYGEN SATURATION: 100 % | DIASTOLIC BLOOD PRESSURE: 58 MMHG | SYSTOLIC BLOOD PRESSURE: 139 MMHG | RESPIRATION RATE: 16 BRPM

## 2019-11-23 LAB
ANION GAP SERPL CALC-SCNC: 14 MMOL/L — SIGNIFICANT CHANGE UP (ref 5–17)
BUN SERPL-MCNC: 51 MG/DL — HIGH (ref 7–23)
CALCIUM SERPL-MCNC: 9.4 MG/DL — SIGNIFICANT CHANGE UP (ref 8.4–10.5)
CHLORIDE SERPL-SCNC: 110 MMOL/L — HIGH (ref 96–108)
CO2 SERPL-SCNC: 18 MMOL/L — LOW (ref 22–31)
CREAT SERPL-MCNC: 3.49 MG/DL — HIGH (ref 0.5–1.3)
DEPRECATED KAPPA LC FREE/LAMBDA SER: 11.17 — HIGH (ref 2.04–10.37)
GLUCOSE SERPL-MCNC: 126 MG/DL — HIGH (ref 70–99)
HCT VFR BLD CALC: 24 % — LOW (ref 39–50)
HGB BLD-MCNC: 7.6 G/DL — LOW (ref 13–17)
KAPPA LC UR-MCNC: 172 MG/L — HIGH (ref 1.35–24.19)
LAMBDA LC UR-MCNC: 15.4 MG/L — HIGH (ref 0.24–6.66)
MCHC RBC-ENTMCNC: 26.4 PG — LOW (ref 27–34)
MCHC RBC-ENTMCNC: 31.7 GM/DL — LOW (ref 32–36)
MCV RBC AUTO: 83.3 FL — SIGNIFICANT CHANGE UP (ref 80–100)
NRBC # BLD: 0 /100 WBCS — SIGNIFICANT CHANGE UP (ref 0–0)
PLATELET # BLD AUTO: 273 K/UL — SIGNIFICANT CHANGE UP (ref 150–400)
POTASSIUM SERPL-MCNC: 4.4 MMOL/L — SIGNIFICANT CHANGE UP (ref 3.5–5.3)
POTASSIUM SERPL-SCNC: 4.4 MMOL/L — SIGNIFICANT CHANGE UP (ref 3.5–5.3)
RBC # BLD: 2.88 M/UL — LOW (ref 4.2–5.8)
RBC # FLD: 18.7 % — HIGH (ref 10.3–14.5)
SODIUM SERPL-SCNC: 142 MMOL/L — SIGNIFICANT CHANGE UP (ref 135–145)
WBC # BLD: 7.22 K/UL — SIGNIFICANT CHANGE UP (ref 3.8–10.5)
WBC # FLD AUTO: 7.22 K/UL — SIGNIFICANT CHANGE UP (ref 3.8–10.5)

## 2019-11-23 PROCEDURE — 83540 ASSAY OF IRON: CPT

## 2019-11-23 PROCEDURE — 86334 IMMUNOFIX E-PHORESIS SERUM: CPT

## 2019-11-23 PROCEDURE — 85610 PROTHROMBIN TIME: CPT

## 2019-11-23 PROCEDURE — 99285 EMERGENCY DEPT VISIT HI MDM: CPT | Mod: 25

## 2019-11-23 PROCEDURE — 84300 ASSAY OF URINE SODIUM: CPT

## 2019-11-23 PROCEDURE — 85730 THROMBOPLASTIN TIME PARTIAL: CPT

## 2019-11-23 PROCEDURE — 84165 PROTEIN E-PHORESIS SERUM: CPT

## 2019-11-23 PROCEDURE — 83521 IG LIGHT CHAINS FREE EACH: CPT

## 2019-11-23 PROCEDURE — 36000 PLACE NEEDLE IN VEIN: CPT

## 2019-11-23 PROCEDURE — 80053 COMPREHEN METABOLIC PANEL: CPT

## 2019-11-23 PROCEDURE — 84155 ASSAY OF PROTEIN SERUM: CPT

## 2019-11-23 PROCEDURE — 86850 RBC ANTIBODY SCREEN: CPT

## 2019-11-23 PROCEDURE — 86901 BLOOD TYPING SEROLOGIC RH(D): CPT

## 2019-11-23 PROCEDURE — 82272 OCCULT BLD FECES 1-3 TESTS: CPT

## 2019-11-23 PROCEDURE — G0378: CPT

## 2019-11-23 PROCEDURE — 99239 HOSP IP/OBS DSCHRG MGMT >30: CPT | Mod: GC

## 2019-11-23 PROCEDURE — 82728 ASSAY OF FERRITIN: CPT

## 2019-11-23 PROCEDURE — 83550 IRON BINDING TEST: CPT

## 2019-11-23 PROCEDURE — 84466 ASSAY OF TRANSFERRIN: CPT

## 2019-11-23 PROCEDURE — 86803 HEPATITIS C AB TEST: CPT

## 2019-11-23 PROCEDURE — 81001 URINALYSIS AUTO W/SCOPE: CPT

## 2019-11-23 PROCEDURE — 82570 ASSAY OF URINE CREATININE: CPT

## 2019-11-23 PROCEDURE — 82746 ASSAY OF FOLIC ACID SERUM: CPT

## 2019-11-23 PROCEDURE — 90686 IIV4 VACC NO PRSV 0.5 ML IM: CPT

## 2019-11-23 PROCEDURE — 84166 PROTEIN E-PHORESIS/URINE/CSF: CPT

## 2019-11-23 PROCEDURE — 83735 ASSAY OF MAGNESIUM: CPT

## 2019-11-23 PROCEDURE — 80048 BASIC METABOLIC PNL TOTAL CA: CPT

## 2019-11-23 PROCEDURE — 82607 VITAMIN B-12: CPT

## 2019-11-23 PROCEDURE — 82962 GLUCOSE BLOOD TEST: CPT

## 2019-11-23 PROCEDURE — 85027 COMPLETE CBC AUTOMATED: CPT

## 2019-11-23 PROCEDURE — 74176 CT ABD & PELVIS W/O CONTRAST: CPT

## 2019-11-23 PROCEDURE — 86900 BLOOD TYPING SEROLOGIC ABO: CPT

## 2019-11-23 RX ORDER — METFORMIN HYDROCHLORIDE 850 MG/1
1 TABLET ORAL
Qty: 0 | Refills: 0 | DISCHARGE

## 2019-11-23 RX ORDER — IRBESARTAN 75 MG/1
1 TABLET ORAL
Qty: 0 | Refills: 0 | DISCHARGE

## 2019-11-23 RX ORDER — HYDRALAZINE HCL 50 MG
1 TABLET ORAL
Qty: 60 | Refills: 0
Start: 2019-11-23 | End: 2019-12-22

## 2019-11-23 RX ORDER — CEFUROXIME AXETIL 250 MG
1 TABLET ORAL
Qty: 8 | Refills: 0
Start: 2019-11-23 | End: 2019-11-26

## 2019-11-23 RX ADMIN — INFLUENZA VIRUS VACCINE 0.5 MILLILITER(S): 15; 15; 15; 15 SUSPENSION INTRAMUSCULAR at 14:01

## 2019-11-23 RX ADMIN — ATENOLOL 50 MILLIGRAM(S): 25 TABLET ORAL at 08:56

## 2019-11-23 RX ADMIN — AMLODIPINE BESYLATE 10 MILLIGRAM(S): 2.5 TABLET ORAL at 08:56

## 2019-11-23 RX ADMIN — Medication 250 MILLIGRAM(S): at 08:56

## 2019-11-23 NOTE — PROGRESS NOTE ADULT - PROBLEM SELECTOR PLAN 2
creat appear stable with good urine output. patient to follow up with Dr. Ismael Esteban as outpatient

## 2019-11-23 NOTE — DISCHARGE NOTE PROVIDER - HOSPITAL COURSE
64 year old male with PMHx HTN, HLD, pre-diabetes, depression, anxiety, prostate CA and bladder cancer s/p cystectomy and ileal conduit in 5/2018, hydronephrosis s/p placement of bilateral nephrostomy tubes/ureteral stents via ileal conduit on 11/6/19 presents after right nephrostomy tube fell out, found to have worsening renal failure. Pt. also noted to have UTI, started on renal dose ceftin - will continue for 4 more days. Pt's baseline creatinine used to be 2.0, however, after nephrostomy stent his new baseline is 3.7, as per pt's urologist - Dr. Esteban.  Creatinine trending down after d/c nephrotoxic drugs - metformin and lisinopril. Hemoglobin also improved. Pt. does not need right nephrostomy tube reinsertion, as pe Dr. Esteban. Pt. is stable     to be discharged home with home care and follow up with PCP and Dr. Esteban within one week.

## 2019-11-23 NOTE — DISCHARGE NOTE PROVIDER - NSDCCPCAREPLAN_GEN_ALL_CORE_FT
PRINCIPAL DISCHARGE DIAGNOSIS  Diagnosis: Ileostomy dysfunction  Assessment and Plan of Treatment: ileastomy bag changed      SECONDARY DISCHARGE DIAGNOSES  Diagnosis: Acute UTI  Assessment and Plan of Treatment: ceftin 250 mg po bid for 4 more days    Diagnosis: Acute renal failure, unspecified acute renal failure type  Assessment and Plan of Treatment: improved after d/c metformin and ARB    Diagnosis: Anemia, unspecified type  Assessment and Plan of Treatment: improved, H trending up

## 2019-11-23 NOTE — DISCHARGE NOTE PROVIDER - NSDCFUSCHEDAPPT_GEN_ALL_CORE_FT
GAIL FORBES ; 12/20/2019 ; [facility]  GAIL FORBES ; 12/20/2019 ; [facility] [address] [place] [address]

## 2019-11-23 NOTE — PROGRESS NOTE ADULT - ASSESSMENT
64 year old male with PMHx HTN, HLD, pre-diabetes, depression, anxiety, prostate CA and bladder cancer s/p cystectomy and ileal conduit in 5/2018, hydronephrosis s/p placement of bilateral nephrostomy tubes/ureteral stents via ileal conduit on 11/6/19 presents after right nephrostomy tube fell out, found to have worsening renal failure.    #BARON on CKD stage 3   Creatinine trending down today - 3.49 from 3.7  Pt's baseline used to be 2.0, however, after nephrostomy stent her new baseline is 3.7,   as per pt's urologist - Dr. Esteban  - lytic lesions same from prior, will follow up outpatient   - Urology and Nephrology consult appreciated   - Follow up with Dr. Esteban on discharge        #B/l hydroureteronephrosis: now with left nephrostomy as right one fell   - stable findings on CT A/P  - no need to replace right nephrostomy tube, as per pt's nephrologist - Dr. sEteban   - continue to monitor output  - Urology and Nephrology consult appreciated   - outpatient follow up with Dr. Esteban within one week       #Bladder CA/ Prostate CA: on Xtandi at home  - no renal dosage adjustment needed, metabolized by liver  - continue Xtandi     #UTI - started on ceftin 250 mg po q12hrs - will continue for 4 more days       #Normocytic Anemia: no active bleeding, FOBT negative, suspects anemia due to CKD/ chronic disease  H improved - 7.6 today from 7.1    #Prediabetes: on metformin 500mg BID at home, HbA1C 5.4 on Nov 1, 2019   - pt. does not need metformin, will d/c     - daily FSG/BG    #Dispo: d/c home today with home care

## 2019-11-23 NOTE — DISCHARGE NOTE PROVIDER - CARE PROVIDER_API CALL
Ismael Esteban)  Urology  [address] [place] [address]  Phone: [phone]  Fax: [phone]  Follow Up Time:     REHAN Wilder  Phone: (   )    -  Fax: (   )    -  Follow Up Time:

## 2019-11-23 NOTE — PROGRESS NOTE ADULT - REASON FOR ADMISSION
nephrostomy tube fell out

## 2019-11-23 NOTE — DISCHARGE NOTE PROVIDER - NSDCMRMEDTOKEN_GEN_ALL_CORE_FT
amLODIPine 5 mg oral tablet: 2 tab(s) orally once a day  atenolol 50 mg oral tablet: 50 milligram(s) orally 2 times a day  cefuroxime 250 mg oral tablet: 1 tab(s) orally every 12 hours  hydrALAZINE 25 mg oral tablet: 1 tab(s) orally every 12 hours  Hold fo SBP&lt;100   KlonoPIN 1 mg oral tablet: 1 tab(s) orally 3 times a day  multivitamin: 1 tab(s) orally once a day  Omega-3 oral capsule: 1 tab(s) orally once a day  traZODone 150 mg oral tablet: 1 tab(s) orally once a day (at bedtime)  Vascepa 1 g oral capsule: 2 cap(s) orally 2 times a day  vitamin b complex daily PO:   Xtandi 40 mg oral capsule: 4 cap(s) orally once a day (at bedtime)

## 2019-11-23 NOTE — DISCHARGE NOTE PROVIDER - PROVIDER TOKENS
PROVIDER:[TOKEN:[[id number]]],FREE:[LAST:[[employer]],PHONE:[(   )    -],FAX:[(   )    -],ADDRESS:[PCP]]

## 2019-11-23 NOTE — DISCHARGE NOTE NURSING/CASE MANAGEMENT/SOCIAL WORK - PATIENT PORTAL LINK FT
You can access the FollowMyHealth Patient Portal offered by [facility] by registering at the following website: [email or web address]. By joining [facility]’s FollowMyHealth portal, you will also be able to view your health information using other applications (apps) compatible with our system.

## 2019-11-23 NOTE — PROGRESS NOTE ADULT - ATTENDING COMMENTS
I have personally seen and examined patient on the above date.  I discussed the case with You and I agree with findings and plan as detailed per note above, which I have amended where appropriate.  Case discussed with Dr. Esteban pt urology, will need follow up early next week

## 2019-11-23 NOTE — PROGRESS NOTE ADULT - ASSESSMENT
BARON on CKD 3/4 (Cr 1.97 - 3/1/19, Cr ~ 3 Sept-Nov 2019)  No ACE/ARB, Metformin  No NSAID's    Improving creatinine trend. For D/c with out pt  f/u. Needs to have close monitoring of renal function as out pt by PCP.  D/w patient regarding need for out patient nephrology follow up.

## 2019-11-23 NOTE — PROGRESS NOTE ADULT - SUBJECTIVE AND OBJECTIVE BOX
Patient is a 64y old  Male who presents with a chief complaint of nephrostomy tube fell out (2019 18:41)    HPI:  64 year old male with hx of bladder cancer s/p cystectomy and ileal conduit in 2018, hydronephrosis s/p placement of bilateral nephrostomy tubes/ureteral stents via ileal conduit on 19 presents after right nephrostomy tube fell out. Pt says the tube fell out probably 2-3 days ago, but not exactly sure. He had an appointment last week with his urologist, Dr. Ismael Esteban and had dressing change today so it fell out sometime between then. He also states he was prescribed Abx by Dr. Esteban about a week ago because he felt weak. Med rec showed cipro prescribed on 11/15 for 7 days. He is currently taking 4 tabs of Xtandi at bedtime for metastatic prostate cancer takes Aleve occasionally for pain, about once a week, denies use of any herbal supplements. He denies fevers, chills, nausea, vomiting, abdominal pain, back pain, diarrhea, constipation. (2019 14:41)    no pain    Interval Events:  Patient seen and examined at bedside.    MEDICATIONS:  MEDICATIONS  (STANDING):  amLODIPine   Tablet 10 milliGRAM(s) Oral daily  ATENolol  Tablet 50 milliGRAM(s) Oral two times a day  cefuroxime   Tablet 250 milliGRAM(s) Oral every 12 hours  heparin  Injectable 5000 Unit(s) SubCutaneous every 8 hours  hydrALAZINE 25 milliGRAM(s) Oral every 12 hours  influenza   Vaccine 0.5 milliLiter(s) IntraMuscular once  sodium chloride 0.45%. 1000 milliLiter(s) (75 mL/Hr) IV Continuous <Continuous>  traZODone 600 milliGRAM(s) Oral at bedtime    MEDICATIONS  (PRN):  clonazePAM  Tablet 1 milliGRAM(s) Oral three times a day PRN Anxiety      Allergies    No Known Allergies    Intolerances        T(C): 36.2 (19 @ 06:00), Max: 36.8 (19 @ 12:15)  T(F): 97.2 (19 @ 06:00), Max: 98.3 (19 @ 12:15)  HR: 50 (19 @ 06:00) (49 - 60)  BP: 138/64 (19 @ 06:00) (138/64 - 177/64)  RR: 15 (19 @ 06:00) (15 - 20)  SpO2: 100% (19 @ 06:00) (98% - 100%)          19 @ 07:01  -  19 @ 07:00  --------------------------------------------------------  IN:  Total IN: 0 mL    OUT:    Nephrostomy Tube: 200 mL  Total OUT: 200 mL    Total NET: -200 mL      19 @ 07:01  -  19 @ 07:00  --------------------------------------------------------  IN:  Total IN: 0 mL    OUT:    Nephrostomy Tube: 1400 mL  Total OUT: 1400 mL    Total NET: -1400 mL          LABS:      CBC Full  -  ( 2019 08:00 )  WBC Count : 7.22 K/uL  RBC Count : 2.88 M/uL  Hemoglobin : 7.6 g/dL  Hematocrit : 24.0 %  Platelet Count - Automated : 273 K/uL  Mean Cell Volume : 83.3 fl  Mean Cell Hemoglobin : 26.4 pg  Mean Cell Hemoglobin Concentration : 31.7 gm/dL  Auto Neutrophil # : x  Auto Lymphocyte # : x  Auto Monocyte # : x  Auto Eosinophil # : x  Auto Basophil # : x  Auto Neutrophil % : x  Auto Lymphocyte % : x  Auto Monocyte % : x  Auto Eosinophil % : x  Auto Basophil % : x        141  |  110<H>  |  54<H>  ----------------------------<  116<H>  4.4   |  18<L>  |  3.64<H>    Ca    9.3      2019 06:44  Mg     1.7         TPro  6.3  /  Alb  2.8<L>  /  TBili  x   /  DBili  x   /  AST  x   /  ALT  x   /  AlkPhos  x   11-22    PT/INR - ( 2019 10:55 )   PT: 10.8 sec;   INR: 0.97 ratio         PTT - ( 2019 10:55 )  PTT:21.7 sec    Urinalysis Basic - ( 2019 18:00 )    Color: Yellow / Appearance: very cloudy / S.010 / pH: x  Gluc: x / Ketone: Negative  / Bili: Negative / Urobili: Negative   Blood: x / Protein: 30 mg/dL / Nitrite: Negative   Leuk Esterase: Moderate / RBC: 5-10 /HPF / WBC 26-50 /HPF   Sq Epi: x / Non Sq Epi: x / Bacteria: Many /HPF              Physical Exam    Constitutional: alert, no acute distress    Abdomen: soft, nontender, nondistended, no HSM, left nephrostomy draining dark urine, right-sided ileal conduit yellow urine    Genitourinary: no bladder distention

## 2019-11-23 NOTE — PROGRESS NOTE ADULT - SUBJECTIVE AND OBJECTIVE BOX
Rip CP, SOB    MEDICATIONS  (STANDING):  amLODIPine   Tablet 10 milliGRAM(s) Oral daily  ATENolol  Tablet 50 milliGRAM(s) Oral two times a day  cefuroxime   Tablet 250 milliGRAM(s) Oral every 12 hours  heparin  Injectable 5000 Unit(s) SubCutaneous every 8 hours  hydrALAZINE 25 milliGRAM(s) Oral every 12 hours  sodium chloride 0.45%. 1000 milliLiter(s) (75 mL/Hr) IV Continuous <Continuous>  traZODone 600 milliGRAM(s) Oral at bedtime    MEDICATIONS  (PRN):  clonazePAM  Tablet 1 milliGRAM(s) Oral three times a day PRN Anxiety    T(C): 36.2 (19 @ 06:00), Max: 36.8 (19 @ 15:26)  HR: 50 (19 @ 06:00) (49 - 60)  BP: 139/58 (19 @ 08:57) (138/64 - 177/64)  RR: 16 (19 @ 08:57)  SpO2: 100% (19 @ 08:57)  Wt(kg): --  I&O's Detail    2019 07:  -  2019 07:00  --------------------------------------------------------  IN:    Oral Fluid: 480 mL    Other: 300 mL    sodium chloride 0.45%.: 150 mL  Total IN: 930 mL    OUT:    Nephrostomy Tube: 1400 mL  Total OUT: 1400 mL    Total NET: -470 mL      2019 07:  -  2019 14:20  --------------------------------------------------------  IN:    Oral Fluid: 1600 mL  Total IN: 1600 mL    OUT:    Nephrostomy Tube: 800 mL  Total OUT: 800 mL    Total NET: 800 mL          Card S1S2  Lungs b/l air entry  Abd soft, NT, ND  Extr no edema  AO x 3                         LABORATORY:                        7.6    7.22  )-----------( 273      ( 2019 08:00 )             24.0         142  |  110<H>  |  51<H>  ----------------------------<  126<H>  4.4   |  18<L>  |  3.49<H>    Ca    9.4      2019 08:00  Mg     1.7         TPro  6.3  /  Alb  2.8<L>  /  TBili  x   /  DBili  x   /  AST  x   /  ALT  x   /  AlkPhos  x       Sodium, Serum: 142 mmol/L ( @ 08:00)  Sodium, Serum: 141 mmol/L ( @ 06:44)    Potassium, Serum: 4.4 mmol/L ( @ 08:00)  Potassium, Serum: 4.4 mmol/L ( @ 06:44)    Hemoglobin: 7.6 g/dL ( @ 08:00)  Hemoglobin: 7.1 g/dL ( @ 06:44)  Hemoglobin: 7.1 g/dL ( @ 10:55)    Creatinine, Serum 3.49 ( @ 08:00)  Creatinine, Serum 3.64 ( @ 06:44)  Creatinine, Serum 3.72 ( @ 10:55)        LIVER FUNCTIONS - ( 2019 06:44 )  Alb: 2.8 g/dL / Pro: 6.3 g/dL / ALK PHOS: x     / ALT: x     / AST: x     / GGT: x           Urinalysis Basic - ( 2019 18:00 )    Color: Yellow / Appearance: very cloudy / S.010 / pH: x  Gluc: x / Ketone: Negative  / Bili: Negative / Urobili: Negative   Blood: x / Protein: 30 mg/dL / Nitrite: Negative   Leuk Esterase: Moderate / RBC: 5-10 /HPF / WBC 26-50 /HPF   Sq Epi: x / Non Sq Epi: x / Bacteria: Many /HPF

## 2019-11-23 NOTE — PROGRESS NOTE ADULT - SUBJECTIVE AND OBJECTIVE BOX
Patient is a 64y old  Male who presents with a chief complaint of nephrostomy tube fell out (2019 08:35)      Patient seen and examined at bedside, no events overnight, in no acute distress.     ALLERGIES:  No Known Allergies    MEDICATIONS:  amLODIPine   Tablet 10 milliGRAM(s) Oral daily  ATENolol  Tablet 50 milliGRAM(s) Oral two times a day  cefuroxime   Tablet 250 milliGRAM(s) Oral every 12 hours  clonazePAM  Tablet 1 milliGRAM(s) Oral three times a day PRN  heparin  Injectable 5000 Unit(s) SubCutaneous every 8 hours  hydrALAZINE 25 milliGRAM(s) Oral every 12 hours  influenza   Vaccine 0.5 milliLiter(s) IntraMuscular once  sodium chloride 0.45%. 1000 milliLiter(s) IV Continuous <Continuous>  traZODone 600 milliGRAM(s) Oral at bedtime    Vital Signs Last 24 Hrs  T(C): 36.2 (2019 06:00), Max: 36.8 (2019 17:45)  T(F): 97.2 (2019 06:00), Max: 98.2 (2019 17:45)  HR: 50 (2019 06:00) (49 - 60)  BP: 139/58 (2019 08:57) (138/64 - 171/64)  BP(mean): 78 (2019 08:57) (78 - 78)  RR: 16 (2019 08:57) (15 - 16)  SpO2: 100% (2019 08:57) (98% - 100%)  I&O's Summary    2019 07:  -  2019 07:00  --------------------------------------------------------  IN: 930 mL / OUT: 1400 mL / NET: -470 mL    2019 07:  -  2019 10:33  --------------------------------------------------------  IN: 800 mL / OUT: 0 mL / NET: 800 mL          PAST MEDICAL & SURGICAL HISTORY:  Unspecified hydronephrosis  Malignant neoplasm of bladder  Diabetes mellitus  Obese  Other calculus in bladder  Urethral stricture  Urinary (tract) obstruction  UTI (urinary tract infection)  Major depressive disorder  Anxiety  HTN (Hypertension)  Prostate Cancer  Bladder disease: Bladder Removed  Nephrostomy status: Left, 2018  S/P ileal conduit: May 2018  History of bladder cancer: bladder removal May 10, 2018  Suprapubic catheter: 2015  History of prostatectomy: robotic, , s/p radiation  H/O cystoscopy: - multiple  last cystoscopy, laser litholapaxy on 2018  S/P Appendectomy: 40 years ago      Home Medications:  amLODIPine 5 mg oral tablet: 2 tab(s) orally once a day (2019 14:59)  atenolol 50 mg oral tablet: 50 milligram(s) orally 2 times a day (2019 13:30)  irbesartan 300 mg oral tablet: 1 tab(s) orally once a day (2019 13:30)  KlonoPIN 1 mg oral tablet: 1 tab(s) orally 3 times a day (2019 13:30)  metFORMIN 500 mg oral tablet: 1 tab(s) orally 2 times a day (2019 13:10)  multivitamin: 1 tab(s) orally once a day (2019 13:10)  Omega-3 oral capsule: 1 tab(s) orally once a day (2019 13:10)  traZODone 150 mg oral tablet: 1 tab(s) orally once a day (at bedtime) (2019 14:57)  Vascepa 1 g oral capsule: 2 cap(s) orally 2 times a day (2019 15:00)  vitamin b complex daily PO:  (2019 13:10)  Xtandi 40 mg oral capsule: 4 cap(s) orally once a day (at bedtime) (2019 15:00)      PHYSICAL EXAM:  General: NAD, A/O x3  ENT: MMM, no thrush  Neck: Supple, No JVD  Lungs: Clear to percussion bilaterally, non labored breathing  Cardio: RRR, S1/S2, No murmurs  Abdomen: Soft, Nontender, Nondistended; Bowel sounds present  : left nephrostomy tube in place, site C/D/I draining clear yellow urine. Right CVA with gauze covered   white previous nephrostomy was, site clean, dry, no drainage    Extremities: No clubbing, cyanosis, or edema    LABS:                        7.6    7.22  )-----------( 273      ( 2019 08:00 )             24.0         142  |  110  |  51  ----------------------------<  126  4.4   |  18  |  3.49    Ca    9.4      2019 08:00  Mg     1.7         TPro  6.3  /  Alb  2.8  /  TBili  x   /  DBili  x   /  AST  x   /  ALT  x   /  AlkPhos  x       PT/INR - ( 2019 10:55 )   PT: 10.8 sec;   INR: 0.97 ratio         PTT - ( 2019 10:55 )  PTT:21.7 sec     [id number] 5.4    Urinalysis Basic - ( 2019 18:00 )    Color: Yellow / Appearance: very cloudy / S.010 / pH: x  Gluc: x / Ketone: Negative  / Bili: Negative / Urobili: Negative   Blood: x / Protein: 30 mg/dL / Nitrite: Negative   Leuk Esterase: Moderate / RBC: 5-10 /HPF / WBC 26-50 /HPF   Sq Epi: x / Non Sq Epi: x / Bacteria: Many /HPF    RADIOLOGY & ADDITIONAL TESTS: no new tests     Care Discussed with Consultants/Other Providers: Hospitalist

## 2019-11-24 LAB
% GAMMA, URINE: 17.1 % — SIGNIFICANT CHANGE UP
ALBUMIN 24H MFR UR ELPH: 20.4 % — SIGNIFICANT CHANGE UP
ALPHA1 GLOB 24H MFR UR ELPH: 24.2 % — SIGNIFICANT CHANGE UP
ALPHA2 GLOB 24H MFR UR ELPH: 15.5 % — SIGNIFICANT CHANGE UP
B-GLOBULIN 24H MFR UR ELPH: 22.8 % — SIGNIFICANT CHANGE UP
INTERPRETATION 24H UR IFE-IMP: SIGNIFICANT CHANGE UP
INTERPRETATION 24H UR IFE-IMP: SIGNIFICANT CHANGE UP
M PROTEIN 24H UR ELPH-MRATE: SIGNIFICANT CHANGE UP
PROT ?TM UR-MCNC: 28 MG/DL — HIGH (ref 0–12)
PROT PATTERN 24H UR ELPH-IMP: SIGNIFICANT CHANGE UP
TOTAL VOLUME - 24 HOUR: SIGNIFICANT CHANGE UP ML
URINE CREATININE CALCULATION: SIGNIFICANT CHANGE UP G/24 H (ref 1–2)

## 2019-11-27 ENCOUNTER — APPOINTMENT (OUTPATIENT)
Dept: FAMILY MEDICINE | Facility: CLINIC | Age: 64
End: 2019-11-27
Payer: MEDICARE

## 2019-11-27 ENCOUNTER — APPOINTMENT (OUTPATIENT)
Dept: FAMILY MEDICINE | Facility: CLINIC | Age: 64
End: 2019-11-27

## 2019-11-27 VITALS — DIASTOLIC BLOOD PRESSURE: 80 MMHG | SYSTOLIC BLOOD PRESSURE: 138 MMHG

## 2019-11-27 PROCEDURE — 82962 GLUCOSE BLOOD TEST: CPT

## 2019-11-27 PROCEDURE — 83036 HEMOGLOBIN GLYCOSYLATED A1C: CPT | Mod: QW

## 2019-11-27 PROCEDURE — 99496 TRANSJ CARE MGMT HIGH F2F 7D: CPT

## 2019-11-27 RX ORDER — METFORMIN HYDROCHLORIDE 500 MG/1
500 TABLET, COATED ORAL
Qty: 1 | Refills: 5 | Status: COMPLETED | COMMUNITY
End: 2019-11-27

## 2019-11-27 NOTE — PLAN
[FreeTextEntry1] : Prediabetes\par - POCT Bglu: 129\par - POCT HbA1c: 5.9\par - Avoid white flour foods\par - F/u in 3 months\par \par Urostomy tube\par - Contacted urologist about pt's concerns\par \par Depression\par - Discussed taking antidepressants  pt refuses at this time. Takes Klonopin asneeded\par \par HTN\par -continue off Lisinopril \par DM\par Check BG at home several times weekly. Off Metformin\par \par \par

## 2019-11-27 NOTE — REVIEW OF SYSTEMS
[Depression] : depression [Negative] : Neurological [Anxiety] : anxiety [Fever] : no fever [Chills] : no chills [Fatigue] : no fatigue [Vision Problems] : no vision problems [Chest Pain] : no chest pain [Shortness Of Breath] : no shortness of breath [Abdominal Pain] : no abdominal pain [Back Pain] : no back pain [Headache] : no headache [Dizziness] : no dizziness [FreeTextEntry7] : stoma no problem [FreeTextEntry8] : ureteral stent placement [de-identified] : teary-eyed in office

## 2019-11-27 NOTE — PHYSICAL EXAM
[Normal] : no acute distress, well-nourished, well developed, well appearing [de-identified] : NT left draining serosanguineous drainage   [de-identified] : N [de-identified] : AA&Ox3, depressed mood and affect, teary-eyed in office

## 2019-11-27 NOTE — HISTORY OF PRESENT ILLNESS
[de-identified] : 65y/o M with PMHx of Bladder CA, Prostate CA, Bone metastases, CKD (stage 3), HTN, Prediabetes, Iron deficiency anemia, Anxiety and Depression presents to the office for hospital follow up. Pt was admitted to hospital for decreased kidney function and   Lisinopril and Metformin was d/aidee due to increasing BUN/Creatinine Reports having ureteral stent placement 3 wks ago Denies fever or chills. Denies abdominal or back pain. Pt feeling depressed due to multiple surgeries and health issues. Pt concerned about urostomy tube tryong to communicate with urologist. NT in place and draining well. POCT Bglu is 129. POCT HbA1c is 5.9. Denies HA, Dizziness, fatigue, CP, SOB, lightheadedness or acute visual changes. Pt also feeling depressed due to  family issues. Pt is teary-eyed in office

## 2019-11-27 NOTE — ADDENDUM
[FreeTextEntry1] : I, Klarissa Ernst, acted solely as a scribe for Dr. Lilly on this date 11/27/2019.\par \par All medical record entries made by the Scribe were at my, Dr. Lilly, direction and personally dictated by me on 11/27/2019. I have reviewed the chart and agree that the record accurately reflects my personal performance of the history, physical exam, assessment and plan.  I have also personally directed, reviewed and agreed with the chart.

## 2019-11-30 LAB
GLUCOSE BLDC GLUCOMTR-MCNC: 129
HBA1C MFR BLD HPLC: 5.9

## 2019-12-16 ENCOUNTER — FORM ENCOUNTER (OUTPATIENT)
Age: 64
End: 2019-12-16

## 2019-12-17 ENCOUNTER — OUTPATIENT (OUTPATIENT)
Dept: OUTPATIENT SERVICES | Facility: HOSPITAL | Age: 64
LOS: 1 days | End: 2019-12-17
Payer: MEDICARE

## 2019-12-17 DIAGNOSIS — Z93.6 OTHER ARTIFICIAL OPENINGS OF URINARY TRACT STATUS: Chronic | ICD-10-CM

## 2019-12-17 DIAGNOSIS — Z85.51 PERSONAL HISTORY OF MALIGNANT NEOPLASM OF BLADDER: Chronic | ICD-10-CM

## 2019-12-17 DIAGNOSIS — Z98.89 OTHER SPECIFIED POSTPROCEDURAL STATES: Chronic | ICD-10-CM

## 2019-12-17 DIAGNOSIS — N13.30 UNSPECIFIED HYDRONEPHROSIS: ICD-10-CM

## 2019-12-17 DIAGNOSIS — Z93.59 OTHER CYSTOSTOMY STATUS: Chronic | ICD-10-CM

## 2019-12-17 DIAGNOSIS — N32.9 BLADDER DISORDER, UNSPECIFIED: Chronic | ICD-10-CM

## 2019-12-17 LAB — GLUCOSE BLDC GLUCOMTR-MCNC: 128 MG/DL — HIGH (ref 70–99)

## 2019-12-17 PROCEDURE — 50706 BALLOON DILATE URTRL STRIX: CPT | Mod: 50

## 2019-12-17 PROCEDURE — 50688 CHANGE OF URETER TUBE/STENT: CPT | Mod: LT

## 2019-12-17 PROCEDURE — 75984 XRAY CONTROL CATHETER CHANGE: CPT | Mod: 26

## 2019-12-17 PROCEDURE — 53899 UNLISTED PX URINARY SYSTEM: CPT

## 2019-12-20 ENCOUNTER — APPOINTMENT (OUTPATIENT)
Dept: UROLOGY | Facility: CLINIC | Age: 64
End: 2019-12-20
Payer: MEDICARE

## 2019-12-20 ENCOUNTER — OUTPATIENT (OUTPATIENT)
Dept: OUTPATIENT SERVICES | Facility: HOSPITAL | Age: 64
LOS: 1 days | End: 2019-12-20
Payer: MEDICARE

## 2019-12-20 DIAGNOSIS — Z98.89 OTHER SPECIFIED POSTPROCEDURAL STATES: Chronic | ICD-10-CM

## 2019-12-20 DIAGNOSIS — N32.9 BLADDER DISORDER, UNSPECIFIED: Chronic | ICD-10-CM

## 2019-12-20 DIAGNOSIS — Z85.51 PERSONAL HISTORY OF MALIGNANT NEOPLASM OF BLADDER: Chronic | ICD-10-CM

## 2019-12-20 DIAGNOSIS — Z93.6 OTHER ARTIFICIAL OPENINGS OF URINARY TRACT STATUS: Chronic | ICD-10-CM

## 2019-12-20 DIAGNOSIS — R35.0 FREQUENCY OF MICTURITION: ICD-10-CM

## 2019-12-20 DIAGNOSIS — Z93.59 OTHER CYSTOSTOMY STATUS: Chronic | ICD-10-CM

## 2019-12-20 PROCEDURE — 99213 OFFICE O/P EST LOW 20 MIN: CPT | Mod: 25

## 2019-12-20 PROCEDURE — 96402 CHEMO HORMON ANTINEOPL SQ/IM: CPT

## 2019-12-20 RX ORDER — GOSERELIN ACETATE 10.8 MG/1
10.8 IMPLANT SUBCUTANEOUS
Qty: 1 | Refills: 0 | Status: COMPLETED | OUTPATIENT
Start: 2019-12-20

## 2019-12-20 RX ORDER — GOSERELIN ACETATE 10.8 MG/1
10.8 IMPLANT SUBCUTANEOUS
Qty: 1 | Refills: 0 | Status: COMPLETED | OUTPATIENT
Start: 2019-12-20 | End: 2019-12-20

## 2019-12-20 RX ADMIN — GOSERELIN ACETATE 0 MG: 10.8 IMPLANT SUBCUTANEOUS at 00:00

## 2019-12-20 NOTE — ASSESSMENT
[FreeTextEntry1] : check BMP - if Creatinine still up needs nephrology consult\par continue ADT and Xtandi - check PSA

## 2019-12-20 NOTE — PHYSICAL EXAM
[General Appearance - Well Developed] : well developed [General Appearance - Well Nourished] : well nourished [Abdomen Soft] : soft [Abdomen Tenderness] : non-tender [Abdomen Mass (___ Cm)] : no abdominal mass palpated [FreeTextEntry1] : nice rosebud stoma [Edema] : no peripheral edema [] : no respiratory distress [Exaggerated Use Of Accessory Muscles For Inspiration] : no accessory muscle use [Oriented To Time, Place, And Person] : oriented to person, place, and time [Normal Station and Gait] : the gait and station were normal for the patient's age [No Focal Deficits] : no focal deficits

## 2019-12-20 NOTE — HISTORY OF PRESENT ILLNESS
[FreeTextEntry1] :  Complicated  history. He had RALP for high grade cancer followed by adjuvant radiation therapy. 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 Did well initially; he then noted creatinine up to 1.8 and USG noted new left hydro. No pain. 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 stent out.\par Seen in October with an abscess inner thigh of left leg. 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 Just completing 6 weeks IV antibiotics. before this became apparent he had bloody discharge from small opening at bottom of the midline old incision. \par Noted hydro on CT done Inhouse and Cr @2. No back or flank pain. renal scan notes this kidney dead.\par he then called having right leg pain - i was concerned that the osteomyelitis+/-abscess was coming back so ordered a CT scan - this noted osteoblastic bone lesions  and PSA is @200.\par Bone scan confirmed diffuse mets - started ADT with Xtandi - first \par leg pains have all gone. Some fatigue in afternoon but no hot flashes\par No problems with his stoma - changing bags without issue every week \par Latest PSA  0.09 however CR up to 2.8. CT notes new right hydronephrosis and slight worsening of the left also notes more blood from open on leg so wearing a diaper, though better since last visit. Feels fine. \par he didn't want more surgey so planned for IR to place antegrade upside down NT - able on the right but not on the left - has NT. Not happy and doesn't feel well. making urine with no fevers or chills. Urine clear.\par \par Urine function within the week was still had Cr 3.6 - in meantime accidently tube was pulled out. IR able place 2 upside NTs.

## 2019-12-24 LAB
ALBUMIN SERPL ELPH-MCNC: 3.6 G/DL
ALP BLD-CCNC: 86 U/L
ALT SERPL-CCNC: 6 U/L
ANION GAP SERPL CALC-SCNC: 13 MMOL/L
AST SERPL-CCNC: 8 U/L
BILIRUB SERPL-MCNC: 0.3 MG/DL
BUN SERPL-MCNC: 41 MG/DL
CALCIUM SERPL-MCNC: 9.6 MG/DL
CHLORIDE SERPL-SCNC: 109 MMOL/L
CO2 SERPL-SCNC: 19 MMOL/L
CREAT SERPL-MCNC: 3.03 MG/DL
GLUCOSE SERPL-MCNC: 147 MG/DL
POTASSIUM SERPL-SCNC: 4.1 MMOL/L
PROT SERPL-MCNC: 7.2 G/DL
SODIUM SERPL-SCNC: 141 MMOL/L

## 2019-12-26 ENCOUNTER — CLINICAL ADVICE (OUTPATIENT)
Age: 64
End: 2019-12-26

## 2019-12-26 DIAGNOSIS — N13.1 HYDRONEPHROSIS WITH URETERAL STRICTURE, NOT ELSEWHERE CLASSIFIED: ICD-10-CM

## 2019-12-26 DIAGNOSIS — Z43.6 ENCOUNTER FOR ATTENTION TO OTHER ARTIFICIAL OPENINGS OF URINARY TRACT: ICD-10-CM

## 2019-12-27 LAB — PSA SERPL-MCNC: <0.01 NG/ML

## 2019-12-30 DIAGNOSIS — C61 MALIGNANT NEOPLASM OF PROSTATE: ICD-10-CM

## 2019-12-30 DIAGNOSIS — N18.3 CHRONIC KIDNEY DISEASE, STAGE 3 (MODERATE): ICD-10-CM

## 2019-12-30 DIAGNOSIS — Z93.6 OTHER ARTIFICIAL OPENINGS OF URINARY TRACT STATUS: ICD-10-CM

## 2019-12-30 DIAGNOSIS — N13.30 UNSPECIFIED HYDRONEPHROSIS: ICD-10-CM

## 2020-01-01 NOTE — BEHAVIORAL HEALTH ASSESSMENT NOTE - NSBHATTESTSEENBY_PSY_A_CORE
Problem:  Care  Goal:  assessment and vital signs within acceptable range  Outcome: Outcome Met, Continue evaluating goal progress toward completion  Pt's vitals and assessment within normal limits.         attending Psychiatrist without NP/Trainee

## 2020-01-30 ENCOUNTER — APPOINTMENT (OUTPATIENT)
Dept: NEPHROLOGY | Facility: CLINIC | Age: 65
End: 2020-01-30
Payer: MEDICARE

## 2020-01-30 VITALS — DIASTOLIC BLOOD PRESSURE: 84 MMHG | HEART RATE: 50 BPM | SYSTOLIC BLOOD PRESSURE: 169 MMHG | OXYGEN SATURATION: 98 %

## 2020-01-30 PROCEDURE — 99205 OFFICE O/P NEW HI 60 MIN: CPT

## 2020-01-30 RX ORDER — ATROPA BELLADONNA AND OPIUM 16.2; 6 MG/1; MG/1
16.2-6 SUPPOSITORY RECTAL 3 TIMES DAILY
Qty: 90 | Refills: 0 | Status: DISCONTINUED | COMMUNITY
Start: 2018-03-05 | End: 2020-01-30

## 2020-01-30 RX ORDER — ASCORBIC ACID 500 MG
500 TABLET ORAL DAILY
Qty: 90 | Refills: 2 | Status: DISCONTINUED | COMMUNITY
Start: 2018-11-21 | End: 2020-01-30

## 2020-01-30 RX ORDER — VITAMIN B COMPLEX
TABLET ORAL
Qty: 30 | Refills: 0 | Status: DISCONTINUED | COMMUNITY
Start: 2017-11-30 | End: 2020-01-30

## 2020-01-30 RX ORDER — IRBESARTAN 300 MG/1
300 TABLET ORAL
Qty: 30 | Refills: 3 | Status: DISCONTINUED | COMMUNITY
Start: 2019-08-26 | End: 2020-01-30

## 2020-01-30 RX ORDER — TRAMADOL HYDROCHLORIDE 50 MG/1
50 TABLET, COATED ORAL
Qty: 30 | Refills: 0 | Status: DISCONTINUED | COMMUNITY
Start: 2019-01-04 | End: 2020-01-30

## 2020-01-30 RX ORDER — AMLODIPINE BESYLATE 5 MG/1
5 TABLET ORAL
Refills: 0 | Status: DISCONTINUED | COMMUNITY
Start: 2018-08-06 | End: 2020-01-30

## 2020-01-30 RX ORDER — CIPROFLOXACIN HYDROCHLORIDE 250 MG/1
250 TABLET, FILM COATED ORAL
Qty: 14 | Refills: 0 | Status: DISCONTINUED | COMMUNITY
Start: 2019-11-15 | End: 2020-01-30

## 2020-01-30 RX ORDER — OXYCODONE AND ACETAMINOPHEN 5; 325 MG/1; MG/1
5-325 TABLET ORAL 3 TIMES DAILY
Qty: 42 | Refills: 0 | Status: DISCONTINUED | COMMUNITY
Start: 2019-01-11 | End: 2020-01-30

## 2020-01-30 RX ORDER — OXYCODONE AND ACETAMINOPHEN 5; 325 MG/1; MG/1
5-325 TABLET ORAL
Qty: 20 | Refills: 0 | Status: DISCONTINUED | COMMUNITY
Start: 2018-08-27 | End: 2020-01-30

## 2020-01-30 RX ORDER — LISINOPRIL 30 MG/1
TABLET ORAL
Refills: 0 | Status: DISCONTINUED | COMMUNITY
End: 2020-01-30

## 2020-01-30 NOTE — ASSESSMENT
[FreeTextEntry1] : Today I had the pleasure of meeting August Ye who is a 64 years old man with urinary obstruction here to establish care.\par \par Chronic Kidney Disease Stage IV -- Likely related to chronic obstruction with component of contribution from hypertension and age-related GFR decline.  The patient is well compensated from a renal stand point without showing uremic signs or symptoms however his creatinine is worsening over the past year.  I discussed with the patient at length today about the nature of his kidney disease and strategies for prevention. We spent most of the visit discussing chronic kidney disease, its stages, risk for progression and strategies for prevention including avoidance of NSAIDs and notifying me of medication changes.\par \par Hypertension -- His blood pressure is not presently optimized and this is multifactorial.  First he has a high salt diet and we discussed low salt strategy.  Additionally he has been taking NSAIDs and he has not been taking his amlodipine.  On exam today I noted bradycardia which has been present previously and an ECG performed showed sinus bradycardia.  He is asymptomatic from this but I did ask him to reduce his atenolol to once per day and call me tomorrow with repeat blood pressure and heart rate.\par \par He will follow up with me in three months.\par

## 2020-01-30 NOTE — PHYSICAL EXAM
[General Appearance - Alert] : alert [General Appearance - In No Acute Distress] : in no acute distress [General Appearance - Well Nourished] : well nourished [Sclera] : the sclera and conjunctiva were normal [Hearing Threshold Finger Rub Not McK[place]] : hearing was normal [Neck Appearance] : the appearance of the neck was normal [Neck Cervical Mass (___cm)] : no neck mass was observed [Jugular Venous Distention Increased] : there was no jugular-venous distention [Respiration, Rhythm And Depth] : normal respiratory rhythm and effort [Exaggerated Use Of Accessory Muscles For Inspiration] : no accessory muscle use [Auscultation Breath Sounds / Voice Sounds] : lungs were clear to auscultation bilaterally [Heart Sounds] : normal S1 and S2 [Heart Sounds Gallop] : no gallops [Murmurs] : no murmurs [Heart Sounds Pericardial Friction Rub] : no pericardial rub [Edema] : there was no peripheral edema [Bowel Sounds] : normal bowel sounds [Abdomen Soft] : soft [Abdomen Tenderness] : non-tender [] : no hepato-splenomegaly [FreeTextEntry1] : ileal conduit noted [Cervical Lymph Nodes Enlarged Posterior Bilaterally] : posterior cervical [Cervical Lymph Nodes Enlarged Anterior Bilaterally] : anterior cervical [Supraclavicular Lymph Nodes Enlarged Bilaterally] : supraclavicular [No CVA Tenderness] : no ~M costovertebral angle tenderness [Abnormal Walk] : normal gait [No Spinal Tenderness] : no spinal tenderness [Oriented To Time, Place, And Person] : oriented to person, place, and time [Affect] : the affect was normal [Impaired Insight] : insight and judgment were intact [Mood] : the mood was normal

## 2020-01-30 NOTE — HISTORY OF PRESENT ILLNESS
[FreeTextEntry1] : Today I had the pleasure of meeting August Calvin who is a 64 years old man with a history of hypertension who presents for evaluation of an elevated creatinine.  He has an extensive urological history.  He's had prostate cancer with bladder resection requiring ileal conduit.  He's had episodes of obstruction since then at times with imaging findings showing severe bilateral hydronephrosis.  At one point last year his left kidney was completely blocked.  He now has bilateral anterograde nephrostomy tubes from the kidneys to the conduit.  His creatiine was about 1.9 in 2018 and has progressively risen now to about 3.  \par \par On my evaluation today he presently feels well.  He is nervous and upset but he has not had any chest pain, shortness of breath, syncope.  Last week he had a stomach virus and vomiting multiple times and had many episodes of loose stool.  In this setting he has been taking alleve.  Additionally, he had an issue with his pharmacy and was without his amlodipine for a number of days and just restarted the pill yesterday.

## 2020-01-30 NOTE — REVIEW OF SYSTEMS
[Fever] : no fever [Chills] : no chills [Feeling Poorly] : not feeling poorly [Feeling Tired] : not feeling tired [Eyesight Problems] : no eyesight problems [Nosebleeds] : no nosebleeds [Chest Pain] : no chest pain [Palpitations] : no palpitations [Lower Ext Edema] : no extremity edema [Shortness Of Breath] : no shortness of breath [Wheezing] : no wheezing [Abdominal Pain] : no abdominal pain [Vomiting] : no vomiting [Joint Swelling] : no joint swelling [Dizziness] : no dizziness [Fainting] : no fainting [Anxiety] : no anxiety [Depression] : no depression [Easy Bleeding] : no tendency for easy bleeding [Easy Bruising] : no tendency for easy bruising [FreeTextEntry8] : no change in ileal conduit output

## 2020-01-31 LAB
ALBUMIN SERPL ELPH-MCNC: 3.7 G/DL
ANION GAP SERPL CALC-SCNC: 11 MMOL/L
BASOPHILS # BLD AUTO: 0.03 K/UL
BASOPHILS NFR BLD AUTO: 0.4 %
BUN SERPL-MCNC: 34 MG/DL
CALCIUM SERPL-MCNC: 9.8 MG/DL
CHLORIDE SERPL-SCNC: 108 MMOL/L
CO2 SERPL-SCNC: 22 MMOL/L
CREAT SERPL-MCNC: 2.65 MG/DL
EOSINOPHIL # BLD AUTO: 0.22 K/UL
EOSINOPHIL NFR BLD AUTO: 3.1 %
ESTIMATED AVERAGE GLUCOSE: 123 MG/DL
FERRITIN SERPL-MCNC: 25 NG/ML
GLUCOSE SERPL-MCNC: 93 MG/DL
HBA1C MFR BLD HPLC: 5.9 %
HCT VFR BLD CALC: 32.6 %
HGB BLD-MCNC: 9.8 G/DL
IMM GRANULOCYTES NFR BLD AUTO: 1.4 %
IRON SATN MFR SERPL: 11 %
IRON SERPL-MCNC: 39 UG/DL
LYMPHOCYTES # BLD AUTO: 1.31 K/UL
LYMPHOCYTES NFR BLD AUTO: 18.3 %
MAN DIFF?: NORMAL
MCHC RBC-ENTMCNC: 25.7 PG
MCHC RBC-ENTMCNC: 30.1 GM/DL
MCV RBC AUTO: 85.6 FL
MONOCYTES # BLD AUTO: 0.54 K/UL
MONOCYTES NFR BLD AUTO: 7.6 %
NEUTROPHILS # BLD AUTO: 4.95 K/UL
NEUTROPHILS NFR BLD AUTO: 69.2 %
PHOSPHATE SERPL-MCNC: 3.7 MG/DL
PLATELET # BLD AUTO: 270 K/UL
POTASSIUM SERPL-SCNC: 4.7 MMOL/L
RBC # BLD: 3.81 M/UL
RBC # FLD: 16 %
SODIUM SERPL-SCNC: 140 MMOL/L
TIBC SERPL-MCNC: 339 UG/DL
UIBC SERPL-MCNC: 300 UG/DL
WBC # FLD AUTO: 7.15 K/UL

## 2020-02-25 ENCOUNTER — APPOINTMENT (OUTPATIENT)
Dept: FAMILY MEDICINE | Facility: CLINIC | Age: 65
End: 2020-02-25
Payer: MEDICARE

## 2020-02-25 VITALS — DIASTOLIC BLOOD PRESSURE: 88 MMHG | SYSTOLIC BLOOD PRESSURE: 138 MMHG

## 2020-02-25 VITALS — SYSTOLIC BLOOD PRESSURE: 150 MMHG | TEMPERATURE: 97.7 F | DIASTOLIC BLOOD PRESSURE: 88 MMHG

## 2020-02-25 PROCEDURE — 99215 OFFICE O/P EST HI 40 MIN: CPT | Mod: 25

## 2020-02-25 PROCEDURE — 82962 GLUCOSE BLOOD TEST: CPT

## 2020-02-25 RX ORDER — AMLODIPINE BESYLATE 10 MG/1
10 TABLET ORAL DAILY
Qty: 30 | Refills: 3 | Status: COMPLETED | COMMUNITY
Start: 2020-01-30 | End: 2020-02-25

## 2020-02-25 NOTE — REVIEW OF SYSTEMS
[Anxiety] : anxiety [Depression] : depression [Negative] : Neurological [Chest Pain] : no chest pain [Shortness Of Breath] : no shortness of breath [Headache] : no headache [Dizziness] : no dizziness

## 2020-02-25 NOTE — ADDENDUM
[FreeTextEntry1] : I, Klarissa Fariain, acted solely as a scribe for Dr. Lilly on this date 02/25/2020.\par \par All medical record entries made by the Scribe were at my, Dr. Lilly, direction and personally dictated by me on 02/25/2020. I have reviewed the chart and agree that the record accurately reflects my personal performance of the history, physical exam, assessment and plan.  I have also personally directed, reviewed and agreed with the chart.

## 2020-02-25 NOTE — HISTORY OF PRESENT ILLNESS
[de-identified] : 65y/o M with PMHx of CKD (stage 4), Bladder CA, Prostate CA, Bone metastases, HTN, Prediabetes, Iron deficiency anemia, Anxiety and Depression presents to the office for f/u on blood pressure. Pt is accompanied byGF. Pt called 4 days ago with BP of 190/110. No symptoms.  Pt not compliant with his low salt diet, Saw Nephro last month . Pt was started on Amlodipine 5mg BID and Atenolol 50mg BID. BP in office is 150/88, on repeat 138/80. Pt does [employer] for meditation and exercise. Denies CP, SOB, HA, or dizziness. Pt is under stress and feeling depressed due to family issues. Pt will f/u with nephro in 1 month. Pt admits not monitoring Bglu at home. POCT Bglu is 151 (non-fasting). Pt was d/aidee Metformin per Nephro

## 2020-02-25 NOTE — PLAN
[FreeTextEntry1] : HTN\par - Continue Amlodipine 5mg BID and Atenolol 50mg in morning, 25mg at night\par - Pt to see nephro in 1 month \par - Encouraged exercise and healthy diet\par \par Prediabetes\par - POCT Bglu: 151 (non-fasting)\par - Monitor Bglu outside office 3-4 days/week\par - Avoid white flour foods\par - F/u in 6 weeks for Bglu check

## 2020-03-13 ENCOUNTER — APPOINTMENT (OUTPATIENT)
Dept: UROLOGY | Facility: CLINIC | Age: 65
End: 2020-03-13
Payer: MEDICARE

## 2020-03-13 ENCOUNTER — OUTPATIENT (OUTPATIENT)
Dept: OUTPATIENT SERVICES | Facility: HOSPITAL | Age: 65
LOS: 1 days | End: 2020-03-13
Payer: MEDICARE

## 2020-03-13 DIAGNOSIS — Z98.89 OTHER SPECIFIED POSTPROCEDURAL STATES: Chronic | ICD-10-CM

## 2020-03-13 DIAGNOSIS — Z85.51 PERSONAL HISTORY OF MALIGNANT NEOPLASM OF BLADDER: Chronic | ICD-10-CM

## 2020-03-13 DIAGNOSIS — Z93.6 OTHER ARTIFICIAL OPENINGS OF URINARY TRACT STATUS: Chronic | ICD-10-CM

## 2020-03-13 DIAGNOSIS — Z93.59 OTHER CYSTOSTOMY STATUS: Chronic | ICD-10-CM

## 2020-03-13 DIAGNOSIS — N32.9 BLADDER DISORDER, UNSPECIFIED: Chronic | ICD-10-CM

## 2020-03-13 DIAGNOSIS — R35.0 FREQUENCY OF MICTURITION: ICD-10-CM

## 2020-03-13 PROCEDURE — 96402 CHEMO HORMON ANTINEOPL SQ/IM: CPT

## 2020-03-13 PROCEDURE — 99214 OFFICE O/P EST MOD 30 MIN: CPT | Mod: 25

## 2020-03-13 RX ORDER — GOSERELIN ACETATE 10.8 MG/1
10.8 IMPLANT SUBCUTANEOUS
Qty: 1 | Refills: 0 | Status: ACTIVE | OUTPATIENT
Start: 2019-03-01

## 2020-03-13 RX ORDER — GOSERELIN ACETATE 10.8 MG/1
10.8 IMPLANT SUBCUTANEOUS
Qty: 1 | Refills: 0 | Status: COMPLETED | OUTPATIENT
Start: 2020-03-13

## 2020-03-13 RX ADMIN — GOSERELIN ACETATE 0 MG: 10.8 IMPLANT SUBCUTANEOUS at 00:00

## 2020-03-14 NOTE — HISTORY OF PRESENT ILLNESS
[FreeTextEntry1] :  Complicated  history. He had RALP for high grade cancer followed by adjuvant radiation therapy. 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 Did well initially; he then noted creatinine up to 1.8 and USG noted new left hydro. 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 stent out.\par Seen in [id number] an abscess inner thigh of left leg. 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 Just completing 6 weeks IV antibiotics. before this became apparent he had bloody discharge from small opening at bottom of the midline old incision. \par Noted hydro on CT done Inhouse and Cr @2. No back or flank pain. renal scan notes this kidney dead.\par he then called having right leg pain - i was concerned that the osteomyelitis+/-abscess was coming back so ordered a CT scan - this noted osteoblastic bone lesions  and PSA is @200.\par Bone scan confirmed diffuse mets - started ADT with Xtandi - first \par \par Latest PSA  0.09 however CR up to 2.8. CT notes new right hydronephrosis and slight worsening of the left also notes more blood from open on leg so wearing a diaper, though better since last visit. Feels fine. \par he didn't want more surgey so planned for IR to place antegrade upside down NT - able on the right but not on the left - has NT. Not happy and doesn't feel well. making urine with no fevers or chills. Urine clear.\par \par Urine function within the week was still had Cr 3.6 - in meantime accidently tube was pulled out. IR able place 2 upside NTs. \par \par here for delayed follow up. Feels well with no pains. rare leakage from groin. Notes one of the tubes fell out ~1 week ago.

## 2020-03-14 NOTE — PHYSICAL EXAM
[General Appearance - Well Developed] : well developed [General Appearance - Well Nourished] : well nourished [Abdomen Soft] : soft [Abdomen Tenderness] : non-tender [Abdomen Mass (___ Cm)] : no abdominal mass palpated [Abdomen Hernia] : no hernia was discovered [FreeTextEntry1] : stoma looks good [Edema] : no peripheral edema [] : no respiratory distress [Exaggerated Use Of Accessory Muscles For Inspiration] : no accessory muscle use [Oriented To Time, Place, And Person] : oriented to person, place, and time [Normal Station and Gait] : the gait and station were normal for the patient's age [No Focal Deficits] : no focal deficits

## 2020-03-14 NOTE — ASSESSMENT
[FreeTextEntry1] : needs blood work and ADT - continue Xtandi until PSA progresses.\par not sure which tube fell out - will await BMP. If no real change change this one.\par if up needs IR to replace one fell out - i reiterated that was to be changed by me in office NOT surgery

## 2020-03-18 LAB
ANION GAP SERPL CALC-SCNC: 15 MMOL/L
BUN SERPL-MCNC: 53 MG/DL
CALCIUM SERPL-MCNC: 9.7 MG/DL
CHLORIDE SERPL-SCNC: 108 MMOL/L
CO2 SERPL-SCNC: 19 MMOL/L
CREAT SERPL-MCNC: 3.21 MG/DL
GLUCOSE SERPL-MCNC: 105 MG/DL
POTASSIUM SERPL-SCNC: 4.8 MMOL/L
PSA SERPL-MCNC: <0.01 NG/ML
SODIUM SERPL-SCNC: 143 MMOL/L

## 2020-03-19 DIAGNOSIS — C79.51 SECONDARY MALIGNANT NEOPLASM OF BONE: ICD-10-CM

## 2020-03-19 DIAGNOSIS — N18.3 CHRONIC KIDNEY DISEASE, STAGE 3 (MODERATE): ICD-10-CM

## 2020-03-19 DIAGNOSIS — Z93.6 OTHER ARTIFICIAL OPENINGS OF URINARY TRACT STATUS: ICD-10-CM

## 2020-03-19 DIAGNOSIS — C61 MALIGNANT NEOPLASM OF PROSTATE: ICD-10-CM

## 2020-03-26 ENCOUNTER — APPOINTMENT (OUTPATIENT)
Dept: NEPHROLOGY | Facility: CLINIC | Age: 65
End: 2020-03-26

## 2020-03-31 RX ORDER — ASCORBIC ACID 500 MG
500 TABLET ORAL
Qty: 90 | Refills: 2 | Status: ACTIVE | COMMUNITY
Start: 2019-08-15 | End: 1900-01-01

## 2020-04-28 ENCOUNTER — APPOINTMENT (OUTPATIENT)
Dept: FAMILY MEDICINE | Facility: CLINIC | Age: 65
End: 2020-04-28
Payer: MEDICARE

## 2020-04-28 PROCEDURE — 99442: CPT

## 2020-05-05 ENCOUNTER — APPOINTMENT (OUTPATIENT)
Dept: FAMILY MEDICINE | Facility: CLINIC | Age: 65
End: 2020-05-05
Payer: MEDICARE

## 2020-05-05 VITALS — SYSTOLIC BLOOD PRESSURE: 136 MMHG | DIASTOLIC BLOOD PRESSURE: 80 MMHG | TEMPERATURE: 97.6 F

## 2020-05-05 PROCEDURE — 99214 OFFICE O/P EST MOD 30 MIN: CPT | Mod: 25

## 2020-05-05 PROCEDURE — 83036 HEMOGLOBIN GLYCOSYLATED A1C: CPT | Mod: QW

## 2020-05-05 PROCEDURE — 82962 GLUCOSE BLOOD TEST: CPT

## 2020-05-05 NOTE — HISTORY OF PRESENT ILLNESS
[de-identified] : 66 y/o M presents to office to f/up on BG and BP.Pt states he has been  monitoring BG at home with readings 110-150.Not able to take BP\par as he doesn't know how to use machine. Offers no complaints. Not been compliant with diet and exercise due to Covid. Just postponed an elective kidney procedure for fear of going into hospital

## 2020-05-22 NOTE — ED ADULT TRIAGE NOTE - PRO INTERPRETER NEED 2
Alfaro's esophagus without dysplasia    Crohn's disease with complication, unspecified gastrointestinal tract location    Depression, unspecified depression type English

## 2020-06-12 ENCOUNTER — APPOINTMENT (OUTPATIENT)
Dept: UROLOGY | Facility: CLINIC | Age: 65
End: 2020-06-12
Payer: MEDICARE

## 2020-06-12 VITALS
BODY MASS INDEX: 29.4 KG/M2 | RESPIRATION RATE: 17 BRPM | SYSTOLIC BLOOD PRESSURE: 167 MMHG | DIASTOLIC BLOOD PRESSURE: 77 MMHG | WEIGHT: 210 LBS | HEIGHT: 71 IN | HEART RATE: 54 BPM

## 2020-06-12 VITALS — TEMPERATURE: 88 F

## 2020-06-12 PROCEDURE — 99212 OFFICE O/P EST SF 10 MIN: CPT | Mod: 25

## 2020-06-12 RX ORDER — LEUPROLIDE ACETATE 22.5 MG
22.5 KIT INTRAMUSCULAR
Qty: 1 | Refills: 0 | Status: ACTIVE | OUTPATIENT
Start: 2020-06-12

## 2020-06-13 LAB
ALBUMIN SERPL ELPH-MCNC: 4 G/DL
ALP BLD-CCNC: 123 U/L
ALT SERPL-CCNC: 9 U/L
ANION GAP SERPL CALC-SCNC: 17 MMOL/L
AST SERPL-CCNC: 8 U/L
BILIRUB SERPL-MCNC: 0.4 MG/DL
BUN SERPL-MCNC: 71 MG/DL
CALCIUM SERPL-MCNC: 9.6 MG/DL
CHLORIDE SERPL-SCNC: 108 MMOL/L
CO2 SERPL-SCNC: 18 MMOL/L
CREAT SERPL-MCNC: 4.88 MG/DL
GLUCOSE SERPL-MCNC: 123 MG/DL
POTASSIUM SERPL-SCNC: 4.3 MMOL/L
PROT SERPL-MCNC: 7.5 G/DL
PSA SERPL-MCNC: <0.01 NG/ML
SODIUM SERPL-SCNC: 143 MMOL/L

## 2020-06-14 ENCOUNTER — APPOINTMENT (OUTPATIENT)
Dept: DISASTER EMERGENCY | Facility: CLINIC | Age: 65
End: 2020-06-14

## 2020-06-14 DIAGNOSIS — Z01.818 ENCOUNTER FOR OTHER PREPROCEDURAL EXAMINATION: ICD-10-CM

## 2020-06-14 LAB — SARS-COV-2 N GENE NPH QL NAA+PROBE: NOT DETECTED

## 2020-06-15 NOTE — HISTORY OF PRESENT ILLNESS
[FreeTextEntry1] :  Complicated  history. He had RALP for high grade cancer followed by adjuvant radiation therapy. 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 Did well initially; he then noted creatinine up to 1.8 and USG noted new left hydro. 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 stent out.\par Seen in [id number] an abscess inner thigh of left leg. 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 Just completing 6 weeks IV antibiotics. before this became apparent he had bloody discharge from small opening at bottom of the midline old incision. \par Noted hydro on CT done Inhouse and Cr @2. No back or flank pain. renal scan notes this kidney dead.\par he then called having right leg pain - i was concerned that the osteomyelitis+/-abscess was coming back so ordered a CT scan - this noted osteoblastic bone lesions  and PSA is @200.\par Bone scan confirmed diffuse mets - started ADT with Xtandi - first \par \par Latest PSA  0.09 however CR up to 2.8. CT notes new right hydronephrosis and slight worsening of the left also notes more blood from open on leg so wearing a diaper, though better since last visit. Feels fine. \par he didn't want more surgey so planned for IR to place antegrade upside down NT - able on the right but not on the left - has NT. Not happy and doesn't feel well. making urine with no fevers or chills. Urine clear.\par \par Urine function within the week was still had Cr 3.6 - in meantime accidently tube was pulled out. IR able place 2 upside NTs. \par \par here for delayed follow up. Feels well with no pains. rare leakage from groin. Notes one of the tubes fell out ~1 week ago. He was reluctant to go to IR during the COvid outbreak but set up for next week for new tube and echange of other. here for ADT injection but have switched from Zoldaex to Lupron but co-pay $2600(!!).

## 2020-06-15 NOTE — ASSESSMENT
[FreeTextEntry1] : will have him get injection at 1000NB as no co-pay; check labs and continue Xtandi.\par reminded can exchange the NTs via ileal conduit in office - straight forward procedure.

## 2020-06-17 ENCOUNTER — OUTPATIENT (OUTPATIENT)
Dept: OUTPATIENT SERVICES | Facility: HOSPITAL | Age: 65
LOS: 1 days | End: 2020-06-17
Payer: MEDICAID

## 2020-06-17 ENCOUNTER — RESULT REVIEW (OUTPATIENT)
Age: 65
End: 2020-06-17

## 2020-06-17 DIAGNOSIS — Z85.51 PERSONAL HISTORY OF MALIGNANT NEOPLASM OF BLADDER: Chronic | ICD-10-CM

## 2020-06-17 DIAGNOSIS — Z93.59 OTHER CYSTOSTOMY STATUS: Chronic | ICD-10-CM

## 2020-06-17 DIAGNOSIS — Z93.6 OTHER ARTIFICIAL OPENINGS OF URINARY TRACT STATUS: Chronic | ICD-10-CM

## 2020-06-17 DIAGNOSIS — C67.9 MALIGNANT NEOPLASM OF BLADDER, UNSPECIFIED: ICD-10-CM

## 2020-06-17 DIAGNOSIS — Z98.89 OTHER SPECIFIED POSTPROCEDURAL STATES: Chronic | ICD-10-CM

## 2020-06-17 DIAGNOSIS — N32.9 BLADDER DISORDER, UNSPECIFIED: Chronic | ICD-10-CM

## 2020-06-17 PROCEDURE — 53899 UNLISTED PX URINARY SYSTEM: CPT | Mod: RT

## 2020-06-17 PROCEDURE — 75984 XRAY CONTROL CATHETER CHANGE: CPT | Mod: 26

## 2020-06-17 PROCEDURE — 50688 CHANGE OF URETER TUBE/STENT: CPT | Mod: LT

## 2020-06-22 DIAGNOSIS — Z46.6 ENCOUNTER FOR FITTING AND ADJUSTMENT OF URINARY DEVICE: ICD-10-CM

## 2020-06-22 DIAGNOSIS — N13.1 HYDRONEPHROSIS WITH URETERAL STRICTURE, NOT ELSEWHERE CLASSIFIED: ICD-10-CM

## 2020-06-25 ENCOUNTER — APPOINTMENT (OUTPATIENT)
Dept: NEPHROLOGY | Facility: CLINIC | Age: 65
End: 2020-06-25

## 2020-07-02 ENCOUNTER — RX RENEWAL (OUTPATIENT)
Age: 65
End: 2020-07-02

## 2020-07-02 NOTE — ASSESSMENT
[FreeTextEntry1] : suspect he has developed right uretero-ileal stricture.\par discussed further testing and options. He doses NOt want any more surgery.\par suggested less invasive way to have IR access via back and place upside down NT out via stoma - could be changed in office every 3 months.\par noted importance given the CRF worsening. 
02-Jul-2020 21:53

## 2020-07-08 ENCOUNTER — APPOINTMENT (OUTPATIENT)
Dept: UROLOGY | Facility: CLINIC | Age: 65
End: 2020-07-08
Payer: MEDICARE

## 2020-07-08 VITALS — DIASTOLIC BLOOD PRESSURE: 71 MMHG | HEART RATE: 51 BPM | SYSTOLIC BLOOD PRESSURE: 121 MMHG | TEMPERATURE: 97.3 F

## 2020-07-08 PROCEDURE — 99212 OFFICE O/P EST SF 10 MIN: CPT

## 2020-07-08 RX ORDER — LEUPROLIDE ACETATE 30 MG
30 KIT INTRAMUSCULAR
Qty: 1 | Refills: 0 | Status: COMPLETED | OUTPATIENT
Start: 2020-07-08

## 2020-07-08 RX ORDER — LEUPROLIDE ACETATE 30 MG
30 KIT INTRAMUSCULAR
Qty: 1 | Refills: 0 | Status: COMPLETED | COMMUNITY
Start: 2020-07-08 | End: 2020-07-08

## 2020-07-08 RX ADMIN — LEUPROLIDE ACETATE 30 MG: KIT at 00:00

## 2020-07-08 NOTE — HISTORY OF PRESENT ILLNESS
[FreeTextEntry1] :  Complicated  history. He had RALP for high grade cancer followed by adjuvant radiation therapy. 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 Did well initially; he then noted creatinine up to 1.8 and USG noted new left hydro. 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 stent out.\par Seen in [id number] an abscess inner thigh of left leg. 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 Just completing 6 weeks IV antibiotics. before this became apparent he had bloody discharge from small opening at bottom of the midline old incision. \par Noted hydro on CT done Inhouse and Cr @2. No back or flank pain. renal scan notes this kidney dead.\par he then called having right leg pain - i was concerned that the osteomyelitis+/-abscess was coming back so ordered a CT scan - this noted osteoblastic bone lesions  and PSA is @200.\par Bone scan confirmed diffuse mets - started ADT with Xtandi - first \par \par Latest PSA  0.09 however CR up to 2.8. CT notes new right hydronephrosis and slight worsening of the left also notes more blood from open on leg so wearing a diaper, though better since last visit. Feels fine. \par he didn't want more surgey so planned for IR to place antegrade upside down NT - able on the right but not on the left - has NT. Not happy and doesn't feel well. making urine with no fevers or chills. Urine clear.\par \par Urine function within the week was still had Cr 3.6 - in meantime accidently tube was pulled out. IR able place 2 upside NTs. \par \par here for delayed follow up. Feels well with no pains. rare leakage from groin. Notes one of the tubes fell out ~1 week ago. He was reluctant to go to IR during the COvid outbreak but set up for next week for new tube and exchange of other. here for ADT injection but have switched from Zoladex to Lupron but co-pay $2600(!!). \par \par Now s/p placement new NT and replacement of other. Felt  weak day after but no fevers chills etc.\par feels ok now with no hematuria. \par

## 2020-07-08 NOTE — ASSESSMENT
[FreeTextEntry1] : To get Lurpon every 4 months at 1000NB - doing well ASHLEY on Xtandi\par Check BMP today and arrange for tube change in office in September.

## 2020-07-08 NOTE — PHYSICAL EXAM
[General Appearance - Well Nourished] : well nourished [General Appearance - Well Developed] : well developed [Abdomen Soft] : soft [Abdomen Tenderness] : non-tender [FreeTextEntry1] : good rosebud stoma - stents in place [Abdomen Mass (___ Cm)] : no abdominal mass palpated [Edema] : no peripheral edema [] : no respiratory distress [Exaggerated Use Of Accessory Muscles For Inspiration] : no accessory muscle use [Normal Station and Gait] : the gait and station were normal for the patient's age [Oriented To Time, Place, And Person] : oriented to person, place, and time [No Focal Deficits] : no focal deficits

## 2020-07-09 LAB
ANION GAP SERPL CALC-SCNC: 14 MMOL/L
BUN SERPL-MCNC: 49 MG/DL
CALCIUM SERPL-MCNC: 9.6 MG/DL
CHLORIDE SERPL-SCNC: 107 MMOL/L
CO2 SERPL-SCNC: 19 MMOL/L
CREAT SERPL-MCNC: 3.04 MG/DL
GLUCOSE SERPL-MCNC: 100 MG/DL
POTASSIUM SERPL-SCNC: 4.4 MMOL/L
SODIUM SERPL-SCNC: 140 MMOL/L

## 2020-08-03 ENCOUNTER — APPOINTMENT (OUTPATIENT)
Dept: FAMILY MEDICINE | Facility: CLINIC | Age: 65
End: 2020-08-03

## 2020-08-14 ENCOUNTER — RX RENEWAL (OUTPATIENT)
Age: 65
End: 2020-08-14

## 2020-09-02 RX ORDER — BLOOD-GLUCOSE METER
W/DEVICE KIT MISCELLANEOUS
Qty: 1 | Refills: 0 | Status: ACTIVE | COMMUNITY
Start: 2019-07-30 | End: 1900-01-01

## 2020-09-03 RX ORDER — ISOPROPYL ALCOHOL 70 ML/100ML
SWAB TOPICAL
Qty: 1 | Refills: 10 | Status: ACTIVE | COMMUNITY
Start: 2018-01-24 | End: 1900-01-01

## 2020-09-18 ENCOUNTER — OUTPATIENT (OUTPATIENT)
Dept: OUTPATIENT SERVICES | Facility: HOSPITAL | Age: 65
LOS: 1 days | End: 2020-09-18
Payer: MEDICARE

## 2020-09-18 ENCOUNTER — APPOINTMENT (OUTPATIENT)
Dept: UROLOGY | Facility: CLINIC | Age: 65
End: 2020-09-18
Payer: MEDICARE

## 2020-09-18 VITALS — TEMPERATURE: 97.3 F

## 2020-09-18 DIAGNOSIS — Z98.89 OTHER SPECIFIED POSTPROCEDURAL STATES: Chronic | ICD-10-CM

## 2020-09-18 DIAGNOSIS — Z93.6 OTHER ARTIFICIAL OPENINGS OF URINARY TRACT STATUS: Chronic | ICD-10-CM

## 2020-09-18 DIAGNOSIS — Z93.59 OTHER CYSTOSTOMY STATUS: Chronic | ICD-10-CM

## 2020-09-18 DIAGNOSIS — R35.0 FREQUENCY OF MICTURITION: ICD-10-CM

## 2020-09-18 DIAGNOSIS — Z85.51 PERSONAL HISTORY OF MALIGNANT NEOPLASM OF BLADDER: Chronic | ICD-10-CM

## 2020-09-18 DIAGNOSIS — N32.9 BLADDER DISORDER, UNSPECIFIED: Chronic | ICD-10-CM

## 2020-09-18 PROCEDURE — 50688 CHANGE OF URETER TUBE/STENT: CPT

## 2020-09-18 PROCEDURE — 50688 CHANGE OF URETER TUBE/STENT: CPT | Mod: 59

## 2020-09-21 LAB — BACTERIA UR CULT: ABNORMAL

## 2020-09-25 ENCOUNTER — APPOINTMENT (OUTPATIENT)
Dept: FAMILY MEDICINE | Facility: CLINIC | Age: 65
End: 2020-09-25
Payer: MEDICARE

## 2020-09-25 VITALS — SYSTOLIC BLOOD PRESSURE: 129 MMHG | TEMPERATURE: 97.5 F | DIASTOLIC BLOOD PRESSURE: 70 MMHG

## 2020-09-25 DIAGNOSIS — R21 RASH AND OTHER NONSPECIFIC SKIN ERUPTION: ICD-10-CM

## 2020-09-25 PROCEDURE — 83036 HEMOGLOBIN GLYCOSYLATED A1C: CPT | Mod: QW

## 2020-09-25 PROCEDURE — 99214 OFFICE O/P EST MOD 30 MIN: CPT | Mod: 25

## 2020-09-25 PROCEDURE — 82962 GLUCOSE BLOOD TEST: CPT

## 2020-09-25 RX ORDER — CETIRIZINE HYDROCHLORIDE 10 MG/1
10 TABLET, FILM COATED ORAL DAILY
Qty: 14 | Refills: 0 | Status: ACTIVE | COMMUNITY
Start: 2020-09-25 | End: 1900-01-01

## 2020-09-25 RX ORDER — TRIAMCINOLONE ACETONIDE 0.25 MG/G
0.03 CREAM TOPICAL 3 TIMES DAILY
Qty: 1 | Refills: 1 | Status: ACTIVE | COMMUNITY
Start: 2020-09-25 | End: 1900-01-01

## 2020-09-25 NOTE — REVIEW OF SYSTEMS
[Recent Change In Weight] : ~T recent weight change [Itching] : itching [Skin Rash] : skin rash [Negative] : Heme/Lymph [FreeTextEntry2] : refuses weight check

## 2020-09-25 NOTE — HISTORY OF PRESENT ILLNESS
[FreeTextEntry8] : 64 y/o M PMHx HTN (Amlodipine,Metoprolol) Anemia, Bl;adder CA, CKD presents to office for rash on arms for 1 week. Very itchy. Denies new soaps or detergents. Hasn't seen any bed bugs . Also request BG check today.Pt admits to gaining weight and not exercising.

## 2020-09-25 NOTE — PHYSICAL EXAM
[Normal] : normal rate, regular rhythm, normal S1 and S2 and no murmur heard [Alert and Oriented x3] : oriented to person, place, and time [de-identified] : RUE macular rash excoriations in linear pattern, LUE  excoriated lesions

## 2020-09-30 DIAGNOSIS — Z93.6 OTHER ARTIFICIAL OPENINGS OF URINARY TRACT STATUS: ICD-10-CM

## 2020-09-30 DIAGNOSIS — N99.89 OTHER POSTPROCEDURAL COMPLICATIONS AND DISORDERS OF GENITOURINARY SYSTEM: ICD-10-CM

## 2020-10-05 ENCOUNTER — RX RENEWAL (OUTPATIENT)
Age: 65
End: 2020-10-05

## 2020-10-29 ENCOUNTER — APPOINTMENT (OUTPATIENT)
Dept: FAMILY MEDICINE | Facility: CLINIC | Age: 65
End: 2020-10-29
Payer: MEDICARE

## 2020-10-29 VITALS — SYSTOLIC BLOOD PRESSURE: 126 MMHG | DIASTOLIC BLOOD PRESSURE: 78 MMHG | TEMPERATURE: 97.8 F

## 2020-10-29 DIAGNOSIS — H61.23 IMPACTED CERUMEN, BILATERAL: ICD-10-CM

## 2020-10-29 DIAGNOSIS — H92.02 OTALGIA, LEFT EAR: ICD-10-CM

## 2020-10-29 PROCEDURE — 99213 OFFICE O/P EST LOW 20 MIN: CPT | Mod: 25

## 2020-10-29 PROCEDURE — G0008: CPT

## 2020-10-29 PROCEDURE — 99072 ADDL SUPL MATRL&STAF TM PHE: CPT

## 2020-10-29 PROCEDURE — 90686 IIV4 VACC NO PRSV 0.5 ML IM: CPT

## 2020-10-29 NOTE — PHYSICAL EXAM
[Normal Oropharynx] : the oropharynx was normal [Alert and Oriented x3] : oriented to person, place, and time [de-identified] : Cerumen impaction B/L

## 2020-10-29 NOTE — REVIEW OF SYSTEMS
[Earache] : earache [Hearing Loss] : hearing loss [Negative] : Respiratory [Sore Throat] : no sore throat [FreeTextEntry4] : Left

## 2020-10-29 NOTE — HISTORY OF PRESENT ILLNESS
[FreeTextEntry8] : 64 y/o M PMHx HTN (Amlodipine,Atenolol) CKD,DM Bladder CA, presents to office c/o left ear pain for 1 week. No ST,Denies fever ,No ear discharge.Pt admits to some decreased hearing

## 2020-11-11 ENCOUNTER — APPOINTMENT (OUTPATIENT)
Dept: UROLOGY | Facility: CLINIC | Age: 65
End: 2020-11-11
Payer: MEDICARE

## 2020-11-11 VITALS
WEIGHT: 210 LBS | TEMPERATURE: 97.8 F | OXYGEN SATURATION: 97 % | DIASTOLIC BLOOD PRESSURE: 76 MMHG | HEART RATE: 49 BPM | BODY MASS INDEX: 29.4 KG/M2 | HEIGHT: 71 IN | SYSTOLIC BLOOD PRESSURE: 126 MMHG

## 2020-11-11 PROCEDURE — 99072 ADDL SUPL MATRL&STAF TM PHE: CPT

## 2020-11-11 PROCEDURE — 99213 OFFICE O/P EST LOW 20 MIN: CPT

## 2020-11-11 RX ORDER — LEUPROLIDE ACETATE 30 MG/.5ML
30 INJECTION, SUSPENSION, EXTENDED RELEASE SUBCUTANEOUS
Qty: 1 | Refills: 0 | Status: COMPLETED | OUTPATIENT
Start: 2020-11-11 | End: 2020-11-11

## 2020-11-11 RX ORDER — LEUPROLIDE ACETATE 30 MG/.5ML
30 INJECTION, SUSPENSION, EXTENDED RELEASE SUBCUTANEOUS
Refills: 0 | Status: COMPLETED | OUTPATIENT
Start: 2020-11-11

## 2020-11-11 NOTE — HISTORY OF PRESENT ILLNESS
[FreeTextEntry1] :  Complicated  history. He had RALP for high grade cancer followed by adjuvant radiation therapy. 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 Did well initially; he then noted creatinine up to 1.8 and USG noted new left hydro. 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 stent out.\par Seen in [id number] with an abscess inner thigh of left leg. 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 Just completing 6 weeks IV antibiotics. before this became apparent he had bloody discharge from small opening at bottom of the midline old incision. \par Noted hydro on CT done Inhouse and Cr @2. No back or flank pain. renal scan notes this kidney dead.\par he then called having right leg pain - i was concerned that the osteomyelitis+/-abscess was coming back so ordered a CT scan - this noted osteoblastic bone lesions  and PSA is @200.\par Bone scan confirmed diffuse mets - started ADT with Xtandi - first \par \par Latest PSA  0.09 however CR up to 2.8. CT notes new right hydronephrosis and slight worsening of the left also notes more blood from open on leg so wearing a diaper, though better since last visit. Feels fine. \par he didn't want more surgey so planned for IR to place antegrade upside down NT - able on the right but not on the left - has NT. Not happy and doesn't feel well. making urine with no fevers or chills. Urine clear.\par \par Urine function within the week was still had Cr 3.6 - in meantime accidently tube was pulled out. IR able place 2 upside NTs. \par \par here for delayed follow up. Feels well with no pains. rare leakage from groin. Notes one of the tubes fell out ~1 week ago. He was reluctant to go to IR during the COvid outbreak but set up for next week for new tube and exchange of other. here for ADT injection but have switched from Zoladex to Lupron but co-pay $2600(!!). \par \par Now s/p placement new NT and replacement of other. Felt  weak day after but no fevers chills etc.\par feels ok now with no hematuria. \par here for Eligard - doing well with no weight loss or bone pain. No fatigue. Actually has gained weight.

## 2020-11-12 LAB
ALBUMIN SERPL ELPH-MCNC: 4 G/DL
ALP BLD-CCNC: 125 U/L
ALT SERPL-CCNC: 11 U/L
ANION GAP SERPL CALC-SCNC: 17 MMOL/L
AST SERPL-CCNC: 11 U/L
BILIRUB SERPL-MCNC: 0.5 MG/DL
BUN SERPL-MCNC: 40 MG/DL
CALCIUM SERPL-MCNC: 9.8 MG/DL
CHLORIDE SERPL-SCNC: 107 MMOL/L
CO2 SERPL-SCNC: 18 MMOL/L
CREAT SERPL-MCNC: 3.14 MG/DL
GLUCOSE SERPL-MCNC: 124 MG/DL
POTASSIUM SERPL-SCNC: 4.4 MMOL/L
PROT SERPL-MCNC: 7.1 G/DL
SODIUM SERPL-SCNC: 142 MMOL/L

## 2020-11-13 LAB — PSA SERPL-MCNC: <0.01 NG/ML

## 2020-12-11 ENCOUNTER — APPOINTMENT (OUTPATIENT)
Dept: UROLOGY | Facility: CLINIC | Age: 65
End: 2020-12-11
Payer: MEDICARE

## 2020-12-11 ENCOUNTER — OUTPATIENT (OUTPATIENT)
Dept: OUTPATIENT SERVICES | Facility: HOSPITAL | Age: 65
LOS: 1 days | End: 2020-12-11
Payer: MEDICARE

## 2020-12-11 VITALS
DIASTOLIC BLOOD PRESSURE: 73 MMHG | TEMPERATURE: 97.7 F | OXYGEN SATURATION: 97 % | SYSTOLIC BLOOD PRESSURE: 118 MMHG | HEART RATE: 51 BPM

## 2020-12-11 DIAGNOSIS — N32.9 BLADDER DISORDER, UNSPECIFIED: Chronic | ICD-10-CM

## 2020-12-11 DIAGNOSIS — Z98.89 OTHER SPECIFIED POSTPROCEDURAL STATES: Chronic | ICD-10-CM

## 2020-12-11 DIAGNOSIS — Z93.59 OTHER CYSTOSTOMY STATUS: Chronic | ICD-10-CM

## 2020-12-11 DIAGNOSIS — Z85.51 PERSONAL HISTORY OF MALIGNANT NEOPLASM OF BLADDER: Chronic | ICD-10-CM

## 2020-12-11 DIAGNOSIS — N99.89 OTHER POSTPROCEDURAL COMPLICATIONS AND DISORDERS OF GENITOURINARY SYSTEM: ICD-10-CM

## 2020-12-11 DIAGNOSIS — Z93.6 OTHER ARTIFICIAL OPENINGS OF URINARY TRACT STATUS: Chronic | ICD-10-CM

## 2020-12-11 DIAGNOSIS — R35.0 FREQUENCY OF MICTURITION: ICD-10-CM

## 2020-12-11 DIAGNOSIS — Z93.6 OTHER ARTIFICIAL OPENINGS OF URINARY TRACT STATUS: ICD-10-CM

## 2020-12-11 PROCEDURE — 50688 CHANGE OF URETER TUBE/STENT: CPT

## 2020-12-14 ENCOUNTER — APPOINTMENT (OUTPATIENT)
Dept: FAMILY MEDICINE | Facility: CLINIC | Age: 65
End: 2020-12-14

## 2020-12-23 NOTE — H&P PST ADULT - NSANTHTOTALSCORECAL_ENT_A_CORE
Admitted/transferred from: PACU  Reason for admission/transfer: Pineal cyst removal  2 RN skin assessment: completed by Ashu GAYTAN and Eduardo ZAVALA  Result of skin assessment and interventions/actions: Head incisions with circular rings still attached  Height, weight, drug calc weight: Done  Patient belongings (see Flowsheet)  MDRO education added to care planN/A  ?     4

## 2020-12-24 NOTE — PHYSICAL THERAPY INITIAL EVALUATION ADULT - RANGE OF MOTION EXAMINATION, REHAB EVAL
Patient calling needing a refill of his cialis medication. Pharmacy: [facility] on [facility].  Yearly follow up appointment scheduled for 1/7 and will need PSA and Testosterone lab orders entered per recall. He plans to go to the [facility].    bilateral upper extremity ROM was WFL (within functional limits)/bilateral lower extremity ROM was WFL (within functional limits)

## 2021-01-05 ENCOUNTER — EMERGENCY (EMERGENCY)
Facility: HOSPITAL | Age: 66
LOS: 1 days | Discharge: HOME | End: 2021-01-05
Attending: STUDENT IN AN ORGANIZED HEALTH CARE EDUCATION/TRAINING PROGRAM | Admitting: STUDENT IN AN ORGANIZED HEALTH CARE EDUCATION/TRAINING PROGRAM
Payer: MEDICARE

## 2021-01-05 ENCOUNTER — NON-APPOINTMENT (OUTPATIENT)
Age: 66
End: 2021-01-05

## 2021-01-05 VITALS
DIASTOLIC BLOOD PRESSURE: 65 MMHG | SYSTOLIC BLOOD PRESSURE: 135 MMHG | RESPIRATION RATE: 18 BRPM | HEART RATE: 54 BPM | TEMPERATURE: 98 F | OXYGEN SATURATION: 98 % | HEIGHT: 72 IN

## 2021-01-05 DIAGNOSIS — N32.9 BLADDER DISORDER, UNSPECIFIED: Chronic | ICD-10-CM

## 2021-01-05 DIAGNOSIS — Z98.89 OTHER SPECIFIED POSTPROCEDURAL STATES: Chronic | ICD-10-CM

## 2021-01-05 DIAGNOSIS — Z85.51 PERSONAL HISTORY OF MALIGNANT NEOPLASM OF BLADDER: Chronic | ICD-10-CM

## 2021-01-05 DIAGNOSIS — Z93.6 OTHER ARTIFICIAL OPENINGS OF URINARY TRACT STATUS: Chronic | ICD-10-CM

## 2021-01-05 DIAGNOSIS — Z93.59 OTHER CYSTOSTOMY STATUS: Chronic | ICD-10-CM

## 2021-01-05 LAB
ALBUMIN SERPL ELPH-MCNC: 3.7 G/DL — SIGNIFICANT CHANGE UP (ref 3.3–5)
ALP SERPL-CCNC: 107 U/L — SIGNIFICANT CHANGE UP (ref 40–120)
ALT FLD-CCNC: 7 U/L — SIGNIFICANT CHANGE UP (ref 4–41)
ANION GAP SERPL CALC-SCNC: 11 MMOL/L — SIGNIFICANT CHANGE UP (ref 7–14)
AST SERPL-CCNC: 9 U/L — SIGNIFICANT CHANGE UP (ref 4–40)
BASOPHILS # BLD AUTO: 0.03 K/UL — SIGNIFICANT CHANGE UP (ref 0–0.2)
BASOPHILS NFR BLD AUTO: 0.3 % — SIGNIFICANT CHANGE UP (ref 0–2)
BILIRUB SERPL-MCNC: 0.3 MG/DL — SIGNIFICANT CHANGE UP (ref 0.2–1.2)
BUN SERPL-MCNC: 55 MG/DL — HIGH (ref 7–23)
CALCIUM SERPL-MCNC: 10.2 MG/DL — SIGNIFICANT CHANGE UP (ref 8.4–10.5)
CHLORIDE SERPL-SCNC: 108 MMOL/L — HIGH (ref 98–107)
CO2 SERPL-SCNC: 21 MMOL/L — LOW (ref 22–31)
CREAT SERPL-MCNC: 3.25 MG/DL — HIGH (ref 0.5–1.3)
EOSINOPHIL # BLD AUTO: 0.19 K/UL — SIGNIFICANT CHANGE UP (ref 0–0.5)
EOSINOPHIL NFR BLD AUTO: 2.2 % — SIGNIFICANT CHANGE UP (ref 0–6)
GLUCOSE SERPL-MCNC: 94 MG/DL — SIGNIFICANT CHANGE UP (ref 70–99)
HCT VFR BLD CALC: 36.4 % — LOW (ref 39–50)
HGB BLD-MCNC: 11.5 G/DL — LOW (ref 13–17)
IANC: 6.49 K/UL — SIGNIFICANT CHANGE UP (ref 1.5–8.5)
IMM GRANULOCYTES NFR BLD AUTO: 0.9 % — SIGNIFICANT CHANGE UP (ref 0–1.5)
LYMPHOCYTES # BLD AUTO: 1.29 K/UL — SIGNIFICANT CHANGE UP (ref 1–3.3)
LYMPHOCYTES # BLD AUTO: 14.6 % — SIGNIFICANT CHANGE UP (ref 13–44)
MCHC RBC-ENTMCNC: 29.4 PG — SIGNIFICANT CHANGE UP (ref 27–34)
MCHC RBC-ENTMCNC: 31.6 GM/DL — LOW (ref 32–36)
MCV RBC AUTO: 93.1 FL — SIGNIFICANT CHANGE UP (ref 80–100)
MONOCYTES # BLD AUTO: 0.75 K/UL — SIGNIFICANT CHANGE UP (ref 0–0.9)
MONOCYTES NFR BLD AUTO: 8.5 % — SIGNIFICANT CHANGE UP (ref 2–14)
NEUTROPHILS # BLD AUTO: 6.49 K/UL — SIGNIFICANT CHANGE UP (ref 1.8–7.4)
NEUTROPHILS NFR BLD AUTO: 73.5 % — SIGNIFICANT CHANGE UP (ref 43–77)
NRBC # BLD: 0 /100 WBCS — SIGNIFICANT CHANGE UP
NRBC # FLD: 0 K/UL — SIGNIFICANT CHANGE UP
PLATELET # BLD AUTO: 228 K/UL — SIGNIFICANT CHANGE UP (ref 150–400)
POTASSIUM SERPL-MCNC: 4.3 MMOL/L — SIGNIFICANT CHANGE UP (ref 3.5–5.3)
POTASSIUM SERPL-SCNC: 4.3 MMOL/L — SIGNIFICANT CHANGE UP (ref 3.5–5.3)
PROT SERPL-MCNC: 7.7 G/DL — SIGNIFICANT CHANGE UP (ref 6–8.3)
RBC # BLD: 3.91 M/UL — LOW (ref 4.2–5.8)
RBC # FLD: 12.3 % — SIGNIFICANT CHANGE UP (ref 10.3–14.5)
SARS-COV-2 RNA SPEC QL NAA+PROBE: SIGNIFICANT CHANGE UP
SODIUM SERPL-SCNC: 140 MMOL/L — SIGNIFICANT CHANGE UP (ref 135–145)
WBC # BLD: 8.83 K/UL — SIGNIFICANT CHANGE UP (ref 3.8–10.5)
WBC # FLD AUTO: 8.83 K/UL — SIGNIFICANT CHANGE UP (ref 3.8–10.5)

## 2021-01-05 PROCEDURE — 99284 EMERGENCY DEPT VISIT MOD MDM: CPT | Mod: GC

## 2021-01-05 NOTE — ED PROVIDER NOTE - ATTENDING CONTRIBUTION TO CARE
64 yo M past medical history htn, bladder ca s/p resection ileal  conduit creation with bilateral ureteral stents presents after accidental stent removal to ostomy appliance  care. reports still has appropriate output. denies fever chills, back pain, abd pain. exam as above. plan: labs, uro consult, reassess.

## 2021-01-05 NOTE — ED ADULT NURSE REASSESSMENT NOTE - NS ED NURSE REASSESS COMMENT FT1
patient updated on status- waiting for urology and patient "does not understand why he is here".  Informed md hair. patient refusing vital signs

## 2021-01-05 NOTE — CONSULT NOTE ADULT - SUBJECTIVE AND OBJECTIVE BOX
HPI    65 male w/ complex  hx (s/p RALP for HG prostate cancer s/p adjuvant XRT c/b bladder neck contracture managed with SPT; LG Ta TCC  now s/p RC/IC in 2018 c/b uretero-ileal stricture currently managed w/ b/l nephrostomy tubes. Most recently placed antegrade by IR and exchanged periodically in office. Now presents for spontaneous dislodgement of b/l tubes from IC while trying to change bag overnight.  Denies any fevers, chills, CP, SOB, nausea, vomiting, diarrhea, constipation. Says he is continuing to make urine into his IC although may have dropped just slightly.       PAST MEDICAL & SURGICAL HISTORY:  Unspecified hydronephrosis    Malignant neoplasm of bladder    Diabetes mellitus    Obese    Other calculus in bladder    Urethral stricture    Urinary (tract) obstruction    UTI (urinary tract infection)    Major depressive disorder    Anxiety    HTN (Hypertension)    Prostate Cancer    Bladder disease  Bladder Removed    Nephrostomy status  Left, 8/1/2018    S/P ileal conduit  May 2018    History of bladder cancer  bladder removal May 10, 2018    Suprapubic catheter  8/2015    History of prostatectomy  robotic, 2011, s/p radiation    H/O cystoscopy  - multiple  last cystoscopy, laser litholapaxy on 1/2018    S/P Appendectomy  40 years ago        MEDICATIONS  (STANDING):    MEDICATIONS  (PRN):      FAMILY HISTORY:  FH: colon cancer        Allergies    No Known Allergies    Intolerances        SOCIAL HISTORY:    REVIEW OF SYSTEMS: Otherwise negative as stated in HPI    Physical Exam  Vital signs  T(C): 36.6 (01-05-21 @ 14:08), Max: 36.6 (01-05-21 @ 14:08)  HR: 54 (01-05-21 @ 14:08)  BP: 135/65 (01-05-21 @ 14:08)  SpO2: 98% (01-05-21 @ 14:08)  Wt(kg): --    Output      Gen:  NAD    Pulm:  No respiratory distress  	  CV:  RRR    GI:  S/ND/NT    :  IC stoma well appearing. Clear urine in stoma bag.           LABS:      01-05 @ 17:14    WBC 8.83  / Hct 36.4  / SCr --

## 2021-01-05 NOTE — ED ADULT TRIAGE NOTE - CHIEF COMPLAINT QUOTE
Pt c/o colostomy malfunction, pt states 2 wires to his colostomy dislodged. Pt denies any present pain.

## 2021-01-05 NOTE — ED PROVIDER NOTE - CLINICAL SUMMARY MEDICAL DECISION MAKING FREE TEXT BOX
65 male, Hx: HTN, s/p bladder cancer resection w/subsequent ileal conduit in 2018, requiring B/L nephrostomy stenting in the setting of hydrnephrosis (Ismael Esteban MD Urology). Presents s/p removal of pigtail stents last night while trying to and subsequent dec urine output. Exam, presentation, and history concerning for likely obstructive BARON - will rule out with CBC, CMP, Renal Sono, Uro consult.

## 2021-01-05 NOTE — ED PROVIDER NOTE - PATIENT PORTAL LINK FT
You can access the FollowMyHealth Patient Portal offered by [facility] by registering at the following website: [email or web address]. By joining [employer]’s FollowMyHealth portal, you will also be able to view your health information using other applications (apps) compatible with our system.

## 2021-01-05 NOTE — PROVIDER CONTACT NOTE (OTHER) - ASSESSMENT
Called [employer], obtained Auth [id number] and was told they can not find a Taxi for 3-4 hours. Pt got upset and asked me to arrange another taxi. Pt agreed to pay. I arranged [employer] [place] [phone]. P/U 7.15, [employer] will call pt on his cell.

## 2021-01-05 NOTE — ED PROVIDER NOTE - NSFOLLOWUPINSTRUCTIONS_ED_ALL_ED_FT
You were seen and evaluated in the Emergency Department for your dislodged nephrostomy tube. You were evaluated clinically and with laboratory studies.    - YOU NEED TO HAVE YOUR NEPHROSTOMY TUBES REPLACED, PLEASE CALL DR. ESTEBAN'S OFFICE TO MAKE AN APPOINTMENT TODAY.     While clinical evaluation does not demonstrate any acute, life-threatening pathology on your exam at this time, we strongly recommend you follow up with one of our Urology consultants (or your own) for further evaluation of your symptoms by calling the following number to make an appointment:    Ismael Esteban)  Urology  [address] [place] [address]  Phone: [phone]  Fax: [phone]  Follow Up Time: 3-5 DAYS    Should you develop new or worsening flank pain, fevers, chills, nausea, vomiting, diarrhea, or constipation - please return to the ED for immediate evaluation.     We also strongly encourage you make an appointment with your Primary Care Physician for a comprehensive evaluation of your health.

## 2021-01-05 NOTE — CONSULT NOTE ADULT - ASSESSMENT
65 male w/ complex  hx s/p RC/IC c/b ureter-ileal stricture managed w/ b/l nephrostomy tubes presenting for spontaneous dislodgement of b/l tubes from IC. Doing well otherwise with adequate urine output.     --No acute urologic intervention needed  --Will need IR for placement of b/l antegrade stents similar to last procedure. He can be discharged and follow-up as an outpatient for planned IR procedure     D/w Dr. Ismael Esteban

## 2021-01-05 NOTE — ED PROVIDER NOTE - PROGRESS NOTE DETAILS
Resident Savita: Urology (Darin Grace MD) with recommendations for CMP to r/o obstructive BARON, and if no BARON to send home to follow up with MD Carlito for outpatient placement of nephro tubes. Decline request for imaging or other evaluation. Resident Savita: No evidence of BARON, Creatinine is consistent with patient's baseline. Will have him follow up with MD Carlito. Urology made aware.

## 2021-01-05 NOTE — ED PROVIDER NOTE - OBJECTIVE STATEMENT
65 male, Hx: HTN, s/p bladder cancer resection w/subsequent ileal conduit in 2018, requiring B/L nephrostomy stenting in the setting of hydrnephrosis (Ismael Esteban MD Urology). Presents s/p removal of pigtail stents last night while trying to change bag. Denies any fevers, chills, CP, SOB, nausea, vomiting, diarrhea, constipation. Endorses decreased urine output.

## 2021-01-05 NOTE — ED PROVIDER NOTE - PHYSICAL EXAMINATION
GEN - NAD; non-toxic; A+Ox3, speaking full sentences, steady gait   HENT - NC/AT, No visible Ecchymosis, No Abrasions, No Lac/Tears, MMM, no discharge  EYES - no conjunctival pallor, no scleral icterus  PULM - CTA B/L,  symmetric breath sounds  CV -  RRR, S1 S2, no murmurs 2+ Pulses B/L UE  GI - (+) Ostomy; (+) Urine within bag; NT/ND, soft, no guarding, no rebound, no masses    MSK/EXT- no edema, no gross deformity, warm and well perfused, no calf tenderness/swelling/erythema   SKIN - no rash or bruising  NEUROLOGIC - alert, moving all 4 ext

## 2021-01-05 NOTE — ED ADULT NURSE NOTE - PAIN: PRESENCE, MLM
Teaching Attending Note


Name of Resident: Satinder Hughes





ATTENDING PHYSICIAN STATEMENT





I saw and evaluated the patient.


I reviewed the resident's note and discussed the case with the resident.


I agree with the resident's findings and plan as documented.








SUBJECTIVE:


Patient seen and examined in the ICU.  Awake and alert. 


Receiving additional pRBC transfusion as his H&H again dropped.  


Endoscopic results note: Diverticulosis and hemorrhoids. 





 Intake & Output











 01/12/19 01/13/19 01/14/19 01/15/19





 23:59 23:59 23:59 23:59


 


Intake Total 1540 4750 4210 200


 


Output Total   2450 600


 


Balance 1540 4750 1760 -400


 


Weight   235 lb 240 lb 11.916 oz








 Last Vital Signs











Temp Pulse Resp BP Pulse Ox


 


 99.0 F   124 H  22 H  104/59 L  100 


 


 01/15/19 08:00  01/15/19 12:00  01/15/19 12:00  01/15/19 12:00  01/15/19 00:30








Active Medications





Chlorhexidine Gluconate (Hibiclens For Decolonization -)  1 applic TP HS [facility]


   Last Admin: 01/14/19 22:00 Dose:  1 applic


Metronidazole (Flagyl 500mg Premixed Ivpb -)  500 mg in 100 mls @ 100 mls/hr 

IVPB Q8H-IV [facility]


   Last Admin: 01/15/19 10:36 Dose:  100 mls/hr


Levofloxacin (Levaquin 750 Mg Premixed Ivpb -)  750 mg in 150 mls @ 100 mls/hr 

IVPB DAILY [facility]; Protocol


   Last Admin: 01/15/19 10:37 Dose:  100 mls/hr


Sodium Chloride (Normal Saline -)  1,000 mls @ 50 mls/hr IV ASDIR [facility]


   Last Admin: 01/15/19 07:08 Dose:  50 mls/hr


Mupirocin (Bactroban Ointment (For Decolonization) -)  1 applic NS BID [facility]


   Stop: 01/18/19 21:59


   Last Admin: 01/15/19 10:35 Dose:  1 applic


Pantoprazole Sodium (Protonix -)  40 mg PO DAILY [facility]


   Last Admin: 01/14/19 09:54 Dose:  40 mg











Constitutional: Yes: No Distress, Calm


Eyes: Yes: Conjunctiva Clear, EOM Intact


HENT: Yes: Atraumatic, Normocephalic


Cardiovascular: Yes: Regular Rate and Rhythm, S1, S2.  No: Murmur


Respiratory: Yes: CTA Bilaterally


Gastrointestinal: Yes: Normal Bowel Sounds, Abdomen, Obese, Hernia, Rectal 

Bleeding.  No: Tenderness, Vomiting


Edema: No


Neurological: Yes: Alert, Oriented


Labs: 


  Laboratory Results - last 24 hr











  01/14/19 01/14/19 01/14/19





  05:15 17:30 22:30


 


WBC   14.3 H  13.5 H


 


RBC   2.78 L  2.62 L


 


Hgb   8.4 L  7.9 L


 


Hct   24.2 L  22.6 L


 


MCV   86.8  86.1


 


MCH   30.1  30.0


 


MCHC   34.7  34.9


 


RDW   17.0 H  16.8 H


 


Plt Count   299  D  278


 


MPV   7.6  7.5


 


Absolute Neuts (auto)   


 


Neutrophils %   


 


Lymphocytes %   


 


Monocytes %   


 


Eosinophils %   


 


Basophils %   


 


Nucleated RBC %   


 


Sodium   


 


Potassium   


 


Chloride   


 


Carbon Dioxide   


 


Anion Gap   


 


BUN   


 


Creatinine   


 


Creat Clearance w eGFR   


 


Random Glucose   


 


Calcium   


 


Phosphorus   


 


Magnesium   


 


Total Bilirubin   


 


AST   


 


ALT   


 


Alkaline Phosphatase   


 


Total Protein   


 


Albumin   


 


Blood Type  O POSITIVE  


 


Antibody Screen  Negative  


 


Crossmatch  See Detail  














  01/15/19 01/15/19





  04:25 04:25


 


WBC   11.1 H


 


RBC   2.42 L


 


Hgb   7.0 L


 


Hct   21.3 L


 


MCV   88.1


 


MCH   29.1


 


MCHC   33.0


 


RDW   16.6 H


 


Plt Count   255


 


MPV   7.5


 


Absolute Neuts (auto)   8.7 H


 


Neutrophils %   78.3


 


Lymphocytes %   13.9  D


 


Monocytes %   7.3


 


Eosinophils %   0.3


 


Basophils %   0.2


 


Nucleated RBC %   0


 


Sodium  144 


 


Potassium  3.8 


 


Chloride  114 H 


 


Carbon Dioxide  25 


 


Anion Gap  6 L 


 


BUN  7 


 


Creatinine  1.2 


 


Creat Clearance w eGFR  > 60 


 


Random Glucose  147 H 


 


Calcium  7.5 L 


 


Phosphorus  3.4 


 


Magnesium  1.7 L 


 


Total Bilirubin  0.3 


 


AST  10 L 


 


ALT  10 L 


 


Alkaline Phosphatase  38 L 


 


Total Protein  4.7 L 


 


Albumin  2.0 L 


 


Blood Type  


 


Antibody Screen  


 


Crossmatch  











Assessment/Plan


LGIB due to diverticulosis and hemorrhoids: additional active bleed overnight 

requiring pRBCs 


(?) colitis 


Bilateral inguinal hernias 


Hepatitis C 


Schizophrenia 





Normal transfusion thresholds Hgb: 7 


Serial H & H 


O2 as needed


Follow I & O 


Surgical evaluation noted 


Noted empiric ABX 


Mechanical VTE prophylaxis 


Maintain 2 large bore IV access 


ICU monitoring until bleeding stabilizes 





Dr Samuels 


Critical care time spent in reviewing chart, evaluating patient and formulating 

plan - 36 minutes. denies pain/discomfort Dupixent Pregnancy And Lactation Text: This medication likely crosses the placenta but the risk for the fetus is uncertain. This medication is excreted in breast milk.

## 2021-01-05 NOTE — ED ADULT NURSE NOTE - OBJECTIVE STATEMENT
Pt comes to ED s/p bladder CA resection with subequent ileal conduit in 2018, requiring B/L nephrostomy stenting in the setting of hydronephrosis. Pt is s/p removal pigtail stents last night while trying to change bag. Pt is A&Ox4, ambulates independently. denies pain.

## 2021-01-05 NOTE — ED PROVIDER NOTE - NS ED ROS FT
Constitution: No Fever or chills  HEENT: No cough, No Discharge, No Rhinorrhea, No URI symptoms  Cardio: No Chest pain, No Palpitations, No Dyspnea  Resp: No SOB, No Wheezing  GI: No abdominal pain, No Nausea, No Vomiting, No Constipation, No Diarrhea  : (+) Dislodged Urinary Device; (+) Decreased Urine Output  MSK: No Back pain, No Numbness, No Tingling, No Weakness  Neuro: No Headache, Normal Gait  Skin: No rashes, No Bruising, No Swelling

## 2021-01-07 ENCOUNTER — NON-APPOINTMENT (OUTPATIENT)
Age: 66
End: 2021-01-07

## 2021-01-10 ENCOUNTER — APPOINTMENT (OUTPATIENT)
Dept: DISASTER EMERGENCY | Facility: CLINIC | Age: 66
End: 2021-01-10

## 2021-01-12 LAB — SARS-COV-2 N GENE NPH QL NAA+PROBE: NOT DETECTED

## 2021-01-13 ENCOUNTER — OUTPATIENT (OUTPATIENT)
Dept: OUTPATIENT SERVICES | Facility: HOSPITAL | Age: 66
LOS: 1 days | End: 2021-01-13
Payer: MEDICARE

## 2021-01-13 ENCOUNTER — RESULT REVIEW (OUTPATIENT)
Age: 66
End: 2021-01-13

## 2021-01-13 DIAGNOSIS — Z98.89 OTHER SPECIFIED POSTPROCEDURAL STATES: Chronic | ICD-10-CM

## 2021-01-13 DIAGNOSIS — Z85.51 PERSONAL HISTORY OF MALIGNANT NEOPLASM OF BLADDER: Chronic | ICD-10-CM

## 2021-01-13 DIAGNOSIS — Z93.6 OTHER ARTIFICIAL OPENINGS OF URINARY TRACT STATUS: Chronic | ICD-10-CM

## 2021-01-13 DIAGNOSIS — C61 MALIGNANT NEOPLASM OF PROSTATE: ICD-10-CM

## 2021-01-13 DIAGNOSIS — N32.9 BLADDER DISORDER, UNSPECIFIED: Chronic | ICD-10-CM

## 2021-01-13 DIAGNOSIS — Z93.59 OTHER CYSTOSTOMY STATUS: Chronic | ICD-10-CM

## 2021-01-13 LAB
ANION GAP SERPL CALC-SCNC: 12 MMOL/L — SIGNIFICANT CHANGE UP (ref 7–14)
APTT BLD: 51.3 SEC — HIGH (ref 27–36.3)
BUN SERPL-MCNC: 42 MG/DL — HIGH (ref 7–23)
CALCIUM SERPL-MCNC: 9.9 MG/DL — SIGNIFICANT CHANGE UP (ref 8.4–10.5)
CHLORIDE SERPL-SCNC: 107 MMOL/L — SIGNIFICANT CHANGE UP (ref 98–107)
CO2 SERPL-SCNC: 19 MMOL/L — LOW (ref 22–31)
CREAT SERPL-MCNC: 3.3 MG/DL — HIGH (ref 0.5–1.3)
GLUCOSE SERPL-MCNC: 127 MG/DL — HIGH (ref 70–99)
INR BLD: 0.97 RATIO — SIGNIFICANT CHANGE UP (ref 0.88–1.16)
POTASSIUM SERPL-MCNC: 4.4 MMOL/L — SIGNIFICANT CHANGE UP (ref 3.5–5.3)
POTASSIUM SERPL-SCNC: 4.4 MMOL/L — SIGNIFICANT CHANGE UP (ref 3.5–5.3)
PROTHROM AB SERPL-ACNC: 11.1 SEC — SIGNIFICANT CHANGE UP (ref 10.6–13.6)
SODIUM SERPL-SCNC: 138 MMOL/L — SIGNIFICANT CHANGE UP (ref 135–145)

## 2021-01-13 PROCEDURE — 50432 PLMT NEPHROSTOMY CATHETER: CPT | Mod: 50

## 2021-01-13 PROCEDURE — 53899 UNLISTED PX URINARY SYSTEM: CPT

## 2021-01-19 ENCOUNTER — APPOINTMENT (OUTPATIENT)
Dept: FAMILY MEDICINE | Facility: CLINIC | Age: 66
End: 2021-01-19
Payer: MEDICARE

## 2021-01-19 VITALS
DIASTOLIC BLOOD PRESSURE: 60 MMHG | TEMPERATURE: 98.5 F | SYSTOLIC BLOOD PRESSURE: 126 MMHG | OXYGEN SATURATION: 96 % | RESPIRATION RATE: 16 BRPM | HEART RATE: 52 BPM

## 2021-01-19 VITALS — BODY MASS INDEX: 33.05 KG/M2 | WEIGHT: 237 LBS

## 2021-01-19 DIAGNOSIS — N13.1 HYDRONEPHROSIS WITH URETERAL STRICTURE, NOT ELSEWHERE CLASSIFIED: ICD-10-CM

## 2021-01-19 DIAGNOSIS — Z87.898 PERSONAL HISTORY OF OTHER SPECIFIED CONDITIONS: ICD-10-CM

## 2021-01-19 DIAGNOSIS — T83.123A DISPLACEMENT OF OTHER URINARY STENTS, INITIAL ENCOUNTER: ICD-10-CM

## 2021-01-19 PROCEDURE — 99072 ADDL SUPL MATRL&STAF TM PHE: CPT

## 2021-01-19 PROCEDURE — 83036 HEMOGLOBIN GLYCOSYLATED A1C: CPT | Mod: QW

## 2021-01-19 PROCEDURE — 99213 OFFICE O/P EST LOW 20 MIN: CPT | Mod: 25

## 2021-01-19 PROCEDURE — 82962 GLUCOSE BLOOD TEST: CPT

## 2021-01-19 NOTE — REVIEW OF SYSTEMS
[Fatigue] : fatigue [Anxiety] : anxiety [Depression] : depression [Negative] : Integumentary [FreeTextEntry8] : ureteral stents inserted

## 2021-01-19 NOTE — HISTORY OF PRESENT ILLNESS
[FreeTextEntry8] : 64 y/o M PMHx HTN (Amlodipine,Atenolol) CKD,DM Bladder CA, presents to office for decreased heart rate.Pt had recent placement of bilateral antegrade stents. Feels weak. Heart rate after procedure was in the 40's. Pt currently taking Atenolol 50mg twice daily. Today feeling better less fatigued and weak.

## 2021-03-10 ENCOUNTER — APPOINTMENT (OUTPATIENT)
Dept: UROLOGY | Facility: CLINIC | Age: 66
End: 2021-03-10

## 2021-03-12 ENCOUNTER — APPOINTMENT (OUTPATIENT)
Dept: UROLOGY | Facility: CLINIC | Age: 66
End: 2021-03-12
Payer: MEDICARE

## 2021-03-12 ENCOUNTER — TRANSCRIPTION ENCOUNTER (OUTPATIENT)
Age: 66
End: 2021-03-12

## 2021-03-12 ENCOUNTER — OUTPATIENT (OUTPATIENT)
Dept: OUTPATIENT SERVICES | Facility: HOSPITAL | Age: 66
LOS: 1 days | End: 2021-03-12
Payer: MEDICARE

## 2021-03-12 ENCOUNTER — NON-APPOINTMENT (OUTPATIENT)
Age: 66
End: 2021-03-12

## 2021-03-12 DIAGNOSIS — Z98.89 OTHER SPECIFIED POSTPROCEDURAL STATES: Chronic | ICD-10-CM

## 2021-03-12 DIAGNOSIS — Z93.6 OTHER ARTIFICIAL OPENINGS OF URINARY TRACT STATUS: Chronic | ICD-10-CM

## 2021-03-12 DIAGNOSIS — Z93.59 OTHER CYSTOSTOMY STATUS: Chronic | ICD-10-CM

## 2021-03-12 DIAGNOSIS — Z85.51 PERSONAL HISTORY OF MALIGNANT NEOPLASM OF BLADDER: Chronic | ICD-10-CM

## 2021-03-12 DIAGNOSIS — N99.89 OTHER POSTPROCEDURAL COMPLICATIONS AND DISORDERS OF GENITOURINARY SYSTEM: ICD-10-CM

## 2021-03-12 DIAGNOSIS — N32.9 BLADDER DISORDER, UNSPECIFIED: Chronic | ICD-10-CM

## 2021-03-12 DIAGNOSIS — Z93.6 OTHER ARTIFICIAL OPENINGS OF URINARY TRACT STATUS: ICD-10-CM

## 2021-03-12 PROCEDURE — 50688 CHANGE OF URETER TUBE/STENT: CPT

## 2021-03-12 PROCEDURE — 50688 CHANGE OF URETER TUBE/STENT: CPT | Mod: 50

## 2021-03-13 LAB
ALBUMIN SERPL ELPH-MCNC: 4 G/DL
ALP BLD-CCNC: 126 U/L
ALT SERPL-CCNC: 12 U/L
ANION GAP SERPL CALC-SCNC: 11 MMOL/L
AST SERPL-CCNC: 11 U/L
BILIRUB SERPL-MCNC: 0.5 MG/DL
BUN SERPL-MCNC: 48 MG/DL
CALCIUM SERPL-MCNC: 10 MG/DL
CHLORIDE SERPL-SCNC: 108 MMOL/L
CO2 SERPL-SCNC: 22 MMOL/L
CREAT SERPL-MCNC: 3.14 MG/DL
GLUCOSE SERPL-MCNC: 93 MG/DL
POTASSIUM SERPL-SCNC: 4.5 MMOL/L
PROT SERPL-MCNC: 7.3 G/DL
PSA SERPL-MCNC: <0.01 NG/ML
SODIUM SERPL-SCNC: 140 MMOL/L

## 2021-03-15 ENCOUNTER — APPOINTMENT (OUTPATIENT)
Dept: FAMILY MEDICINE | Facility: CLINIC | Age: 66
End: 2021-03-15
Payer: MEDICARE

## 2021-03-15 VITALS — SYSTOLIC BLOOD PRESSURE: 128 MMHG | HEART RATE: 47 BPM | DIASTOLIC BLOOD PRESSURE: 70 MMHG

## 2021-03-15 VITALS
TEMPERATURE: 96 F | OXYGEN SATURATION: 98 % | DIASTOLIC BLOOD PRESSURE: 70 MMHG | HEART RATE: 48 BPM | SYSTOLIC BLOOD PRESSURE: 128 MMHG

## 2021-03-15 PROCEDURE — 99072 ADDL SUPL MATRL&STAF TM PHE: CPT

## 2021-03-15 PROCEDURE — 99213 OFFICE O/P EST LOW 20 MIN: CPT

## 2021-03-15 RX ORDER — AMOXICILLIN 500 MG/1
500 TABLET, FILM COATED ORAL 3 TIMES DAILY
Qty: 21 | Refills: 0 | Status: DISCONTINUED | COMMUNITY
Start: 2020-10-29 | End: 2021-03-15

## 2021-03-16 ENCOUNTER — NON-APPOINTMENT (OUTPATIENT)
Age: 66
End: 2021-03-16

## 2021-03-17 DIAGNOSIS — R35.0 FREQUENCY OF MICTURITION: ICD-10-CM

## 2021-04-19 NOTE — REASON FOR VISIT
CT cervical spine

 

Technique: Multiple axial sections through the cervical spine were 

obtained.  Reconstructed coronal and sagittal images were obtained.

 

Findings: Fractures are noted within the posterior arch of C2 on both 

sides.  Fracture occurs in two locations on the left side and through 

the vertebral foramina on the right side close to the pedicle.  

Fractures are also noted on the left side which extends into the 

apophyseal joint at C2-3.

 

Small fractures are seen within the lamina on both sides at C6.  No 

significant displacement is seen.

 

Mildly displaced fracture is seen at the base of the spinous process 

at C7.  

 

Fracture are also noted within the lamina at T1 on both sides.  No 

displacement is seen.  

 

Fractures are noted within the posterior right first and second ribs. 

 Diffuse soft tissue swelling is noted within the upper paraspinal 

soft tissues.

 

Scattered disc space narrowing is noted.  Scattered degenerative 

apophyseal change is seen.  Scattered degenerative change within the 

neural foramina are noted.

 

Impression:

1.  Multiple cervical spine fractures which are most severe within the

 posterior arches at C2 with one fracture extending to the right 

vertebral foramina and second fracture occurring in two locations 

posteriorly on the left side with some displacement of the fracture 

fragments.

2.  Other fractures which are nondisplaced within the posterior 

elements as noted above.

3.  Fractures are also noted within the posterior right first and 

second ribs.

4.  Scattered degenerative change.

 

Diagnostic code #5
CT chest

 

Technique: Multiple axial sections were obtained from above the lung 

apices inferiorly through the lung bases.  Intravenous contrast was 

utilized.  Reconstructed coronal and sagittal images were obtained.

 

Comparison: No comparison available.

 

Findings: Soft tissue swelling is seen within the paraspinal areas 

within the upper cervical spine and around several thoracic spine 

fractures.  Thoracic aorta shows no abnormality.  Pulmonary arteries 

appear normal in size.  Mediastinum shows no hematoma other than 

slight anteriorly around a manubrial fracture.

 

Nondisplaced fractures are seen within the anterior ninth and tenth 

left ribs.  Mildly comminuted and slightly displaced fracture is noted

 within the anterior left sixth rib.  Fractures are seen within the 

posterior right first and second ribs.  Acute fracture is also noted 

anteriorly within the right sixth rib.  

 

Old right-sided rib fractures are noted which appear to be healed.  

 

Small bilateral pleural effusions are seen.  Minimal atelectasis is 

noted.  No other parenchymal finding is seen within the lungs.

 

Impression:

1.  Nondisplaced bilateral rib fractures as noted above.  Mildly 

comminuted and mildly displaced fracture also noted anteriorly within 

the left sixth rib.

2.  Manubrial fracture which shows no displacement.  Slight soft 

tissue swelling noted posterior to the sternum at this level.

3.  Fractures within the upper thoracic spine with surrounding soft 

tissue swelling.

4.  Small bilateral pleural effusions and mild scattered atelectasis.

5.  No other acute abnormality is appreciated.

 

Diagnostic code #3

 

 

 

CT abdomen and pelvis

 

Technique: Multiple axial sections were obtained from above the dome 

of the diaphragm inferiorly through the pubic symphysis.  Intravenous 

contrast was utilized.  No oral contrast has been given.  

Reconstructed coronal and sagittal images were obtained.

 

Comparison: No prior abdominal imaging is available.

 

Findings: Liver contains no focal abnormality.  Spleen appears within 

normal limits.  Adrenal glands show no nodule.  Pancreas shows no 

discrete abnormality.  Gallbladder contains no calcified gallstones.  

Kidneys show symmetric contrast enhancement.  Four cysts are seen 

within the left kidney.  Largest left kidney cyst measures 4.1 cm in 

size.

 

Abdominal aorta shows no aneurysm.  No retroperitoneal adenopathy is 

seen.  Appendix is seen which is normal.  No mesenteric abnormalities 

are seen.  

 

No pelvic mass or adenopathy is appreciated.  [place] catheter is seen 

within the bladder.

 

Bone window settings were reviewed which show no acute fracture within

 the pelvis or hips.  Left-sided transverse process fractures are seen

 within L2-L5.

 

Impression:

1.  Left-sided renal cyst as an incidentaloma.

2.  Left-sided transverse process fractures within the lumbar spine.

3.  No acute abnormality otherwise seen on CT study of the abdomen and

 pelvis.

 

Diagnostic code #3
CT lumbar spine

 

Technique: Multiple axial sections through the lumbar spine were 

obtained.  Reconstructed coronal and sagittal images were obtained.

 

Comparison: No prior lumbar spine imaging is available.

 

Findings: Fractures are seen within the transverse process of L2, L3, 

L4 and L5 on the left side.  Right sided transverse processes are 

intact.  Vertebral body heights are maintained.  No additional 

fracture is appreciated.

 

Mild scattered circumferential disc bulges are seen.  Mild scattered 

degenerative apophyseal change is noted.  No abnormal subluxation is 

appreciated.

 

Impression:

1.  Fractures within the left L2, L3, L4 and L5 transverse process 

fractures on the left side.

2.  Mild degenerative change with no other acute osseous abnormality 

being seen.

 

Diagnostic code #3
CT maxillofacial

 

Technique: Multiple axial sections were obtained from inferior of the 

mandible to superior of the orbits.  Reconstructed coronal and 

sagittal images were obtained.

 

Limitations: Motion artifact.

 

Findings: Slight mucosal thickening is seen within the sphenoid sinus 

and ethmoid sinus.  Mild mucosal thickening is noted within both 

maxillary sinuses as well as a small left-sided retention cyst.  No 

fluid levels are seen within the paranasal sinuses.  

 

Small superficial radiopacities are seen around the right orbit.  Soft

 tissue swelling with several small radiopacities are seen within the 

left periorbita.

 

Impression:

1.  Sinus findings believed to be chronic.

2.  Soft tissue radiopacities superficially on the skin around the 

right orbit and within the soft tissue swelling lateral to the left 

orbit.

3.  No acute osseous finding is appreciated within the facial bone 

structures.

 

Diagnostic code #2
CT thoracic spine

 

Technique: Multiple axial sections were obtained through the thoracic 

spine.  Reconstructed coronal and sagittal images were obtained.

 

Findings: 

Fractures are noted within the lamina of T1 on both sides.  

 

Fracture is noted within the lamina at T2 on the right side.  There is

 mild anterior wedging of T2 which is felt to be acute.

 

T3 shows mild anterior wedging with fracture lines being seen.  

Fracture is also noted within the tip of the spinous process at T3.

 

Fractures are seen within the posterior first and second ribs on the 

right side.

 

Diffuse paravertebral soft tissue swelling is seen within the upper 

spine.

 

No additional thoracic spine fracture is seen.  No abnormal 

subluxation is noted.  Fracture is noted within the manubrium which is

 minimally displaced.

 

Impression:

1.  Mild anterior compression deformities of T2 and T3.  Additional 

posterior spinous process fracture at T3 and lamina fractures at T2 on

 both sides.

2.  Fractures are also noted posteriorly within the lamina at T1.

3.  Posterior rib fractures at the first and second ribs on the right 

side.

4.  Diffuse paravertebral soft tissue swelling within the upper 

thoracic spine.

5.  Fracture within the manubrium is seen.

 

Diagnostic code #5
Head CT

 

Technique: Multiple axial sections of the brain were obtained.  

Intravenous contrast was not utilized.  Reconstructed coronal and 

sagittal images were obtained.

 

Comparison: No prior intracranial imaging is available.

 

Findings: Soft tissue air is noted within the anterior skull as well 

as air being seen on both sides of the lateral scalp.  There is fluid 

being seen within both sides of the lateral scalp.  Soft tissue 

swelling is noted within the scalp.  Soft tissue swelling is also 

noted to the lateral left orbital region.

 

Ventricles along with basal cisterns and sulci over the convexities 

are within normal limits.  No abnormal parenchymal densities are seen.

  No evidence of intracranial hemorrhage.  No midline shift or 

mass-effect is seen.

 

Small retention cyst is noted within the left maxillary sinus and 

ethmoid sinus.  Slight mucosal thickening is also noted within the 

sphenoid sinus.  Mastoid sinuses are clear.

 

Bone window setting shows no acute calvarial finding.

 

Impression:

1.  Scalp injury with air-fluid levels, fluid as well as soft tissue 

swelling.

2.  No acute intracranial abnormality is seen.

3.  Slight sinus findings which are believed to be chronic.

 

Diagnostic code #3
[Follow-up Visit ___] : a follow-up visit  for [unfilled]

## 2021-05-12 ENCOUNTER — RX RENEWAL (OUTPATIENT)
Age: 66
End: 2021-05-12

## 2021-05-21 NOTE — ASU PREOP CHECKLIST - LAST TOOK
72 y/o M solids 72 y/o M with pmhx of polymyalgia rheumatica and HLD presents to ED c/o lower back pain. Pt states he had an isolated injury after he slipped and fell down last 2 wooden stairs in his home. Denies hitting head, LOC, numbness/tingling, weakness, radiation down legs.

## 2021-06-11 ENCOUNTER — APPOINTMENT (OUTPATIENT)
Dept: UROLOGY | Facility: CLINIC | Age: 66
End: 2021-06-11
Payer: MEDICARE

## 2021-06-11 ENCOUNTER — OUTPATIENT (OUTPATIENT)
Dept: OUTPATIENT SERVICES | Facility: HOSPITAL | Age: 66
LOS: 1 days | End: 2021-06-11
Payer: MEDICARE

## 2021-06-11 VITALS
HEART RATE: 47 BPM | HEIGHT: 70 IN | WEIGHT: 225 LBS | TEMPERATURE: 98 F | BODY MASS INDEX: 32.21 KG/M2 | DIASTOLIC BLOOD PRESSURE: 83 MMHG | SYSTOLIC BLOOD PRESSURE: 145 MMHG

## 2021-06-11 DIAGNOSIS — Z93.6 OTHER ARTIFICIAL OPENINGS OF URINARY TRACT STATUS: Chronic | ICD-10-CM

## 2021-06-11 DIAGNOSIS — N32.9 BLADDER DISORDER, UNSPECIFIED: Chronic | ICD-10-CM

## 2021-06-11 DIAGNOSIS — Z98.89 OTHER SPECIFIED POSTPROCEDURAL STATES: Chronic | ICD-10-CM

## 2021-06-11 DIAGNOSIS — C61 MALIGNANT NEOPLASM OF PROSTATE: ICD-10-CM

## 2021-06-11 DIAGNOSIS — Z93.59 OTHER CYSTOSTOMY STATUS: Chronic | ICD-10-CM

## 2021-06-11 DIAGNOSIS — N99.89 OTHER POSTPROCEDURAL COMPLICATIONS AND DISORDERS OF GENITOURINARY SYSTEM: ICD-10-CM

## 2021-06-11 DIAGNOSIS — Z85.51 PERSONAL HISTORY OF MALIGNANT NEOPLASM OF BLADDER: Chronic | ICD-10-CM

## 2021-06-11 DIAGNOSIS — R35.0 FREQUENCY OF MICTURITION: ICD-10-CM

## 2021-06-11 DIAGNOSIS — Z93.6 OTHER ARTIFICIAL OPENINGS OF URINARY TRACT STATUS: ICD-10-CM

## 2021-06-11 PROCEDURE — 50688 CHANGE OF URETER TUBE/STENT: CPT

## 2021-06-11 PROCEDURE — 99212 OFFICE O/P EST SF 10 MIN: CPT | Mod: 25

## 2021-06-12 LAB
ALBUMIN SERPL ELPH-MCNC: 3.9 G/DL
ALP BLD-CCNC: 122 U/L
ALT SERPL-CCNC: 9 U/L
ANION GAP SERPL CALC-SCNC: 15 MMOL/L
AST SERPL-CCNC: 11 U/L
BILIRUB SERPL-MCNC: 0.4 MG/DL
BUN SERPL-MCNC: 42 MG/DL
CALCIUM SERPL-MCNC: 10 MG/DL
CHLORIDE SERPL-SCNC: 107 MMOL/L
CO2 SERPL-SCNC: 18 MMOL/L
CREAT SERPL-MCNC: 2.88 MG/DL
GLUCOSE SERPL-MCNC: 101 MG/DL
POTASSIUM SERPL-SCNC: 5 MMOL/L
PROT SERPL-MCNC: 7.2 G/DL
PSA SERPL-MCNC: <0.01 NG/ML
SODIUM SERPL-SCNC: 141 MMOL/L

## 2021-06-12 NOTE — HISTORY OF PRESENT ILLNESS
[FreeTextEntry1] :  Complicated  history. He had RALP for high grade cancer followed by adjuvant radiation therapy. 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 Did well initially; he then noted creatinine up to 1.8 and USG noted new left hydro. 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 stent out.\par Seen in October2019 with an abscess inner thigh of left leg. 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 Just completing 6 weeks IV antibiotics. before this became apparent he had bloody discharge from small opening at bottom of the midline old incision. \par Noted hydro on CT done Inhouse and Cr @2. No back or flank pain. renal scan notes this kidney dead.\par he then called having right leg pain - i was concerned that the osteomyelitis+/-abscess was coming back so ordered a CT scan - this noted osteoblastic bone lesions  and PSA is @200.\par Bone scan confirmed diffuse mets - started ADT with Xtandi - first \par \par Latest PSA  0.09 however CR up to 2.8. CT notes new right hydronephrosis and slight worsening of the left also notes more blood from open on leg so wearing a diaper, though better since last visit. Feels fine. \par he didn't want more surgey so planned for IR to place antegrade upside down NT - able on the right but not on the left - has NT. Not happy and doesn't feel well. making urine with no fevers or chills. Urine clear.\par \par Urine function within the week was still had Cr 3.6 - in meantime accidently tube was pulled out. IR able place 2 upside NTs. \par \par here for delayed follow up. Feels well with no pains. rare leakage from groin. Notes one of the tubes fell out ~1 week ago. He was reluctant to go to IR during the COvid outbreak but set up for next week for new tube and exchange of other. here for ADT injection but have switched from Zoladex to Lupron but co-pay $2600(!!). 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 Tolerating well - no fatigue, bone pain or weight loss.\par GFR stable \par \par

## 2021-06-13 LAB — BACTERIA UR CULT: NORMAL

## 2021-06-16 ENCOUNTER — APPOINTMENT (OUTPATIENT)
Age: 66
End: 2021-06-16
Payer: MEDICARE

## 2021-06-16 VITALS
SYSTOLIC BLOOD PRESSURE: 99 MMHG | OXYGEN SATURATION: 95 % | DIASTOLIC BLOOD PRESSURE: 65 MMHG | TEMPERATURE: 97 F | HEART RATE: 74 BPM | RESPIRATION RATE: 15 BRPM

## 2021-06-16 PROCEDURE — 96402 CHEMO HORMON ANTINEOPL SQ/IM: CPT

## 2021-06-16 RX ORDER — LEUPROLIDE ACETATE 30 MG/.5ML
30 INJECTION, SUSPENSION, EXTENDED RELEASE SUBCUTANEOUS
Refills: 0 | Status: COMPLETED | OUTPATIENT
Start: 2021-06-16

## 2021-06-24 RX ORDER — LEUPROLIDE ACETATE 30 MG/.5ML
30 INJECTION, SUSPENSION, EXTENDED RELEASE SUBCUTANEOUS
Qty: 0 | Refills: 0 | Status: COMPLETED | OUTPATIENT
Start: 2021-06-16

## 2021-08-06 NOTE — H&P PST ADULT - SOURCE OF INFORMATION, PROFILE
"Chief Complaint   Patient presents with     ER F/U       Initial /72 (BP Location: Left arm, Patient Position: Sitting, Cuff Size: Adult Large)   Pulse 70   Temp (!) 96.3  F (35.7  C) (Tympanic)   Resp 20   Wt 113.9 kg (251 lb)   SpO2 96%   BMI 37.61 kg/m   Estimated body mass index is 37.61 kg/m  as calculated from the following:    Height as of 5/14/21: 1.74 m (5' 8.5\").    Weight as of this encounter: 113.9 kg (251 lb).  Medication Reconciliation: complete  Chioma Gillespie LPN  "
patient/poor historian/chart(s)

## 2021-09-03 ENCOUNTER — APPOINTMENT (OUTPATIENT)
Dept: UROLOGY | Facility: CLINIC | Age: 66
End: 2021-09-03
Payer: MEDICARE

## 2021-09-03 ENCOUNTER — NON-APPOINTMENT (OUTPATIENT)
Age: 66
End: 2021-09-03

## 2021-09-03 VITALS
TEMPERATURE: 98 F | DIASTOLIC BLOOD PRESSURE: 77 MMHG | OXYGEN SATURATION: 98 % | SYSTOLIC BLOOD PRESSURE: 142 MMHG | HEART RATE: 40 BPM | RESPIRATION RATE: 16 BRPM

## 2021-09-03 PROCEDURE — 50688 CHANGE OF URETER TUBE/STENT: CPT

## 2021-09-03 PROCEDURE — 99213 OFFICE O/P EST LOW 20 MIN: CPT | Mod: 25

## 2021-09-05 LAB
ALBUMIN SERPL ELPH-MCNC: 4.1 G/DL
ALP BLD-CCNC: 135 U/L
ALT SERPL-CCNC: 10 U/L
ANION GAP SERPL CALC-SCNC: 13 MMOL/L
AST SERPL-CCNC: 11 U/L
BILIRUB SERPL-MCNC: 0.4 MG/DL
BUN SERPL-MCNC: 33 MG/DL
CALCIUM SERPL-MCNC: 10.2 MG/DL
CHLORIDE SERPL-SCNC: 105 MMOL/L
CO2 SERPL-SCNC: 22 MMOL/L
CREAT SERPL-MCNC: 2.71 MG/DL
GLUCOSE SERPL-MCNC: 120 MG/DL
POTASSIUM SERPL-SCNC: 4.4 MMOL/L
PROT SERPL-MCNC: 7.6 G/DL
PSA SERPL-MCNC: <0.01 NG/ML
SODIUM SERPL-SCNC: 140 MMOL/L

## 2021-09-06 NOTE — HISTORY OF PRESENT ILLNESS
[FreeTextEntry1] :  Complicated  history. He had RALP for high grade cancer followed by adjuvant radiation therapy. 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 Did well initially; he then noted creatinine up to 1.8 and USG noted new left hydro. 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 stent out.\par Seen in October2019 with an abscess inner thigh of left leg. 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 Just completing 6 weeks IV antibiotics. before this became apparent he had bloody discharge from small opening at bottom of the midline old incision. \par Noted hydro on CT done Inhouse and Cr @2. No back or flank pain. renal scan notes this kidney dead.\par he then called having right leg pain - i was concerned that the osteomyelitis+/-abscess was coming back so ordered a CT scan - this noted osteoblastic bone lesions  and PSA is @200.\par Bone scan confirmed diffuse mets - started ADT with Xtandi - first \par \par Latest PSA  0.09 however CR up to 2.8. CT notes new right hydronephrosis and slight worsening of the left also notes more blood from open on leg so wearing a diaper, though better since last visit. Feels fine. \par he didn't want more surgey so planned for IR to place antegrade upside down NT - able on the right but not on the left - has NT. Not happy and doesn't feel well. making urine with no fevers or chills. Urine clear.\par \par 8/21  Feels well with no pains. 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 Tolerating well - no fatigue, bone pain or weight loss. labs from last visit reviewed - good PSA control \par \par

## 2021-09-21 ENCOUNTER — APPOINTMENT (OUTPATIENT)
Dept: FAMILY MEDICINE | Facility: CLINIC | Age: 66
End: 2021-09-21
Payer: MEDICARE

## 2021-09-21 VITALS
RESPIRATION RATE: 16 BRPM | OXYGEN SATURATION: 97 % | SYSTOLIC BLOOD PRESSURE: 132 MMHG | DIASTOLIC BLOOD PRESSURE: 84 MMHG | HEART RATE: 48 BPM | TEMPERATURE: 95 F

## 2021-09-21 DIAGNOSIS — R53.83 OTHER FATIGUE: ICD-10-CM

## 2021-09-21 PROCEDURE — 99214 OFFICE O/P EST MOD 30 MIN: CPT | Mod: 25

## 2021-09-21 PROCEDURE — 83036 HEMOGLOBIN GLYCOSYLATED A1C: CPT | Mod: QW

## 2021-09-21 NOTE — REVIEW OF SYSTEMS
[Anxiety] : anxiety [Depression] : depression [Negative] : Cardiovascular [de-identified] : tingling sensation of foot

## 2021-09-21 NOTE — PHYSICAL EXAM
[Normal] : no respiratory distress, lungs were clear to auscultation bilaterally and no accessory muscle use [de-identified] : manuel @ [de-identified] : depressed affect and mood

## 2021-09-21 NOTE — PHYSICAL EXAM
[Normal] : no respiratory distress, lungs were clear to auscultation bilaterally and no accessory muscle use [de-identified] : manuel @ [de-identified] : depressed affect and mood

## 2021-09-21 NOTE — REVIEW OF SYSTEMS
[Anxiety] : anxiety [Depression] : depression [Negative] : Cardiovascular [de-identified] : tingling sensation of foot

## 2021-09-21 NOTE — HISTORY OF PRESENT ILLNESS
[de-identified] : 67 y/o M PMHx HTN (Amlodipine,Atenolol) CKD,DM Bladder CA, presents to office for decreased heart rate.Pt has decreased Atenolol to 50mg in am and 25mg at bed. Denies dizziness or lightheadedness but admits to few months of "feeling always cold" and when he lies in bed "toes tingle on right foot" sometimes left foot. No tingling when he ambulates.

## 2021-09-21 NOTE — HISTORY OF PRESENT ILLNESS
[de-identified] : 67 y/o M PMHx HTN (Amlodipine,Atenolol) CKD,DM Bladder CA, presents to office for decreased heart rate.Pt has decreased Atenolol to 50mg in am and 25mg at bed. Denies dizziness or lightheadedness but admits to few months of "feeling always cold" and when he lies in bed "toes tingle on right foot" sometimes left foot. No tingling when he ambulates.

## 2021-10-12 ENCOUNTER — APPOINTMENT (OUTPATIENT)
Dept: FAMILY MEDICINE | Facility: CLINIC | Age: 66
End: 2021-10-12
Payer: MEDICARE

## 2021-10-12 ENCOUNTER — APPOINTMENT (OUTPATIENT)
Dept: SURGERY | Facility: CLINIC | Age: 66
End: 2021-10-12
Payer: MEDICARE

## 2021-10-12 VITALS
DIASTOLIC BLOOD PRESSURE: 78 MMHG | TEMPERATURE: 97.8 F | SYSTOLIC BLOOD PRESSURE: 126 MMHG | HEART RATE: 52 BPM | RESPIRATION RATE: 16 BRPM | OXYGEN SATURATION: 97 %

## 2021-10-12 DIAGNOSIS — L02.91 CUTANEOUS ABSCESS, UNSPECIFIED: ICD-10-CM

## 2021-10-12 DIAGNOSIS — R00.1 BRADYCARDIA, UNSPECIFIED: ICD-10-CM

## 2021-10-12 PROCEDURE — 99214 OFFICE O/P EST MOD 30 MIN: CPT

## 2021-10-12 PROCEDURE — 99204 OFFICE O/P NEW MOD 45 MIN: CPT | Mod: 25

## 2021-10-12 PROCEDURE — 10060 I&D ABSCESS SIMPLE/SINGLE: CPT

## 2021-10-12 NOTE — HISTORY OF PRESENT ILLNESS
[de-identified] : 65 y/o M PMHx HTN (Amlodipine,Atenolol) CKD,DM Bladder CA, presents to office for f/up  decreased heart rate and "blister under his abdomen " Painful for  few days. Denies fever. Also here to review labs . Labs  WBC 10.63 Hb 11.9 (9.8) Taking Ateneolol 25mg BID . No dizziness

## 2021-10-12 NOTE — PHYSICAL EXAM
[de-identified] : GUERO @52 [de-identified] : raised boil lower abdomen [de-identified] : depressed affect

## 2021-10-12 NOTE — HISTORY OF PRESENT ILLNESS
[de-identified] : August is a 65 y/o male here for consultation for abdominal abscess.  Patient developed a tender red swelling in the suprapubic area with some minimal drainage but a moderate amount of pain

## 2021-10-12 NOTE — PHYSICAL EXAM
[Normal Breath Sounds] : Normal breath sounds [Normal Heart Sounds] : normal heart sounds [No HSM] : no hepatosplenomegaly [Alert] : alert [Oriented to Person] : oriented to person [Oriented to Place] : oriented to place [Oriented to Time] : oriented to time [Calm] : calm [Abdominal Masses] : No abdominal masses [Abdomen Tenderness] : ~T ~M No abdominal tenderness [de-identified] : Well-developed well-nourished white male complaining of pain [de-identified] : Within normal limits [de-identified] : Right side ostomy with intact appliance [de-identified] : Normal strength and gait [de-identified] : There is a suprapubic abscess.

## 2021-10-12 NOTE — CONSULT LETTER
[Dear  ___] : Dear  [unfilled], [Consult Letter:] : I had the pleasure of evaluating your patient, [unfilled]. [Please see my note below.] : Please see my note below. [Consult Closing:] : Thank you very much for allowing me to participate in the care of this patient.  If you have any questions, please do not hesitate to contact me. [Sincerely,] : Sincerely, [FreeTextEntry3] : I have reviewed all the documentation for this encounter with the patient and have edited where appropriate\par \par Dr. Manjit Lafleur

## 2021-10-12 NOTE — ASSESSMENT
[FreeTextEntry1] : Under local anesthesia of 1% Xylocaine and I&D of the abscess was done the abscess was drained and irrigated.  Half-inch iodoform gauze was placed into the wound dressings were applied.  The patient was instructed on wound care.  He will shower tomorrow and remove the drain and he is instructed to see me again on Thursday

## 2021-10-14 ENCOUNTER — APPOINTMENT (OUTPATIENT)
Dept: SURGERY | Facility: CLINIC | Age: 66
End: 2021-10-14
Payer: MEDICARE

## 2021-10-14 VITALS
OXYGEN SATURATION: 99 % | DIASTOLIC BLOOD PRESSURE: 79 MMHG | RESPIRATION RATE: 18 BRPM | HEART RATE: 46 BPM | SYSTOLIC BLOOD PRESSURE: 139 MMHG | TEMPERATURE: 98 F

## 2021-10-14 DIAGNOSIS — Z98.890 OTHER SPECIFIED POSTPROCEDURAL STATES: ICD-10-CM

## 2021-10-14 PROCEDURE — 99024 POSTOP FOLLOW-UP VISIT: CPT

## 2021-10-14 NOTE — HISTORY OF PRESENT ILLNESS
[de-identified] : August is a 65 y/o male here for follow up. Last seen on 10/12/21, I&D of the abscess was done the abscess was drained and irrigated. He reports slight  lump near the incision. States that he wasn't feeling well yesterday. Denies fever or chills. Currently on Cipro.

## 2021-10-14 NOTE — ASSESSMENT
[FreeTextEntry1] : Wound care was discussed with the patient.  He is to finish the antibiotics he was given.  He is to return in 2 weeks.

## 2021-10-14 NOTE — PHYSICAL EXAM
[de-identified] : I&D site is open.  Using an applicator the area of the abscess was explored no further pus.

## 2021-10-20 ENCOUNTER — APPOINTMENT (OUTPATIENT)
Age: 66
End: 2021-10-20
Payer: MEDICARE

## 2021-10-20 VITALS — HEART RATE: 74 BPM | DIASTOLIC BLOOD PRESSURE: 72 MMHG | SYSTOLIC BLOOD PRESSURE: 115 MMHG

## 2021-10-20 PROCEDURE — 96402 CHEMO HORMON ANTINEOPL SQ/IM: CPT

## 2021-10-20 RX ORDER — LEUPROLIDE ACETATE 30 MG/.5ML
30 INJECTION, SUSPENSION, EXTENDED RELEASE SUBCUTANEOUS
Refills: 0 | Status: COMPLETED | OUTPATIENT
Start: 2021-10-20

## 2021-10-26 ENCOUNTER — APPOINTMENT (OUTPATIENT)
Dept: SURGERY | Facility: CLINIC | Age: 66
End: 2021-10-26

## 2021-10-26 ENCOUNTER — APPOINTMENT (OUTPATIENT)
Dept: UROLOGY | Facility: CLINIC | Age: 66
End: 2021-10-26
Payer: MEDICARE

## 2021-10-26 PROCEDURE — 99213 OFFICE O/P EST LOW 20 MIN: CPT

## 2021-10-26 NOTE — HISTORY OF PRESENT ILLNESS
[FreeTextEntry1] :  Complicated  history. He had RALP for high grade cancer followed by adjuvant radiation therapy. 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 Did well initially; he then noted creatinine up to 1.8 and USG noted new left hydro. 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 stent out.\par Seen in October2019 with an abscess inner thigh of left leg. 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 Just completing 6 weeks IV antibiotics. before this became apparent he had bloody discharge from small opening at bottom of the midline old incision. \par Noted hydro on CT done Inhouse and Cr @2. No back or flank pain. renal scan notes this kidney dead.\par he then called having right leg pain - i was concerned that the osteomyelitis+/-abscess was coming back so ordered a CT scan - this noted osteoblastic bone lesions  and PSA is @200.\par Bone scan confirmed diffuse mets - started ADT with Xtandi - first \par \par Latest PSA  0.09 however CR up to 2.8. CT notes new right hydronephrosis and slight worsening of the left also notes more blood from open on leg so wearing a diaper, though better since last visit. Feels fine. \par he didn't want more surgey so planned for IR to place antegrade upside down NT - able on the right but not on the left - has NT. Not happy and doesn't feel well. making urine with no fevers or chills. Urine clear.\par \par 8/21  Feels well with no pains. 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 Tolerating well - no fatigue, bone pain or weight loss. labs from last visit reviewed - good PSA control \par \par 10/21 - here today as one of the NTs came out - had been slowly out then saw in the bag. No fevers or chills; no back pain. \par he also noted his drainage from hois abdomen - saw his PCP who expressed some fluid and felt he should see me. \par \par

## 2021-10-26 NOTE — PHYSICAL EXAM
[General Appearance - Well Developed] : well developed [General Appearance - Well Nourished] : well nourished [Abdomen Soft] : soft [Abdomen Tenderness] : non-tender [Abdomen Mass (___ Cm)] : no abdominal mass palpated [FreeTextEntry1] : nice rosebud:  small opening lower incision though could not express anything

## 2021-10-27 ENCOUNTER — TRANSCRIPTION ENCOUNTER (OUTPATIENT)
Age: 66
End: 2021-10-27

## 2021-10-27 LAB
ALBUMIN SERPL ELPH-MCNC: 3.3 G/DL
ALP BLD-CCNC: 99 U/L
ALT SERPL-CCNC: <5 U/L
ANION GAP SERPL CALC-SCNC: 15 MMOL/L
AST SERPL-CCNC: 7 U/L
BASOPHILS # BLD AUTO: 0.02 K/UL
BASOPHILS NFR BLD AUTO: 0.2 %
BILIRUB SERPL-MCNC: 0.4 MG/DL
BUN SERPL-MCNC: 78 MG/DL
CALCIUM SERPL-MCNC: 9.2 MG/DL
CHLORIDE SERPL-SCNC: 112 MMOL/L
CO2 SERPL-SCNC: 14 MMOL/L
CREAT SERPL-MCNC: 6.32 MG/DL
EOSINOPHIL # BLD AUTO: 0.22 K/UL
EOSINOPHIL NFR BLD AUTO: 2.3 %
GLUCOSE SERPL-MCNC: 137 MG/DL
HCT VFR BLD CALC: 30.1 %
HGB BLD-MCNC: 9.2 G/DL
IMM GRANULOCYTES NFR BLD AUTO: 0.5 %
LYMPHOCYTES # BLD AUTO: 0.74 K/UL
LYMPHOCYTES NFR BLD AUTO: 7.9 %
MAN DIFF?: NORMAL
MCHC RBC-ENTMCNC: 28.3 PG
MCHC RBC-ENTMCNC: 30.6 GM/DL
MCV RBC AUTO: 92.6 FL
MONOCYTES # BLD AUTO: 0.57 K/UL
MONOCYTES NFR BLD AUTO: 6.1 %
NEUTROPHILS # BLD AUTO: 7.8 K/UL
NEUTROPHILS NFR BLD AUTO: 83 %
PLATELET # BLD AUTO: 204 K/UL
POTASSIUM SERPL-SCNC: 5.3 MMOL/L
PROT SERPL-MCNC: 6.5 G/DL
RBC # BLD: 3.25 M/UL
RBC # FLD: 15.4 %
SODIUM SERPL-SCNC: 141 MMOL/L
WBC # FLD AUTO: 9.4 K/UL

## 2021-10-29 ENCOUNTER — RESULT REVIEW (OUTPATIENT)
Age: 66
End: 2021-10-29

## 2021-10-29 ENCOUNTER — OUTPATIENT (OUTPATIENT)
Dept: OUTPATIENT SERVICES | Facility: HOSPITAL | Age: 66
LOS: 1 days | End: 2021-10-29

## 2021-10-29 DIAGNOSIS — Z93.59 OTHER CYSTOSTOMY STATUS: Chronic | ICD-10-CM

## 2021-10-29 DIAGNOSIS — Z93.6 OTHER ARTIFICIAL OPENINGS OF URINARY TRACT STATUS: Chronic | ICD-10-CM

## 2021-10-29 DIAGNOSIS — C61 MALIGNANT NEOPLASM OF PROSTATE: ICD-10-CM

## 2021-10-29 DIAGNOSIS — Z98.89 OTHER SPECIFIED POSTPROCEDURAL STATES: Chronic | ICD-10-CM

## 2021-10-29 DIAGNOSIS — Z85.51 PERSONAL HISTORY OF MALIGNANT NEOPLASM OF BLADDER: Chronic | ICD-10-CM

## 2021-10-29 DIAGNOSIS — N32.9 BLADDER DISORDER, UNSPECIFIED: Chronic | ICD-10-CM

## 2021-10-29 PROCEDURE — 75894 X-RAYS TRANSCATH THERAPY: CPT | Mod: 26,1L

## 2021-10-29 PROCEDURE — 53899 UNLISTED PX URINARY SYSTEM: CPT | Mod: 1L

## 2021-10-29 PROCEDURE — 50688 CHANGE OF URETER TUBE/STENT: CPT | Mod: 1L,RT

## 2021-11-03 DIAGNOSIS — T83.123A DISPLACEMENT OF OTHER URINARY STENTS, INITIAL ENCOUNTER: ICD-10-CM

## 2021-11-03 DIAGNOSIS — Q62.10 CONGENITAL OCCLUSION OF URETER, UNSPECIFIED: ICD-10-CM

## 2021-11-09 ENCOUNTER — APPOINTMENT (OUTPATIENT)
Dept: UROLOGY | Facility: CLINIC | Age: 66
End: 2021-11-09
Payer: MEDICARE

## 2021-11-09 DIAGNOSIS — N18.30 CHRONIC KIDNEY DISEASE, STAGE 3 UNSPECIFIED: ICD-10-CM

## 2021-11-09 PROCEDURE — 99213 OFFICE O/P EST LOW 20 MIN: CPT

## 2021-11-10 LAB
ANION GAP SERPL CALC-SCNC: 12 MMOL/L
BUN SERPL-MCNC: 49 MG/DL
CALCIUM SERPL-MCNC: 9.5 MG/DL
CHLORIDE SERPL-SCNC: 110 MMOL/L
CO2 SERPL-SCNC: 17 MMOL/L
CREAT SERPL-MCNC: 3.53 MG/DL
GLUCOSE SERPL-MCNC: 119 MG/DL
POTASSIUM SERPL-SCNC: 4.3 MMOL/L
SODIUM SERPL-SCNC: 139 MMOL/L

## 2021-11-11 PROBLEM — N18.30 CHRONIC RENAL FAILURE, STAGE 3 (MODERATE): Status: ACTIVE | Noted: 2019-10-11

## 2021-11-11 NOTE — ASSESSMENT
[FreeTextEntry1] : reminded him that when notes the NTs coming out t immediately let me know so can replace\par plan to have him f/u with nephrology in addition to monitor hisCRF

## 2021-11-11 NOTE — HISTORY OF PRESENT ILLNESS
[FreeTextEntry1] :  Complicated  history. He had RALP for high grade cancer followed by adjuvant radiation therapy. 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 Did well initially; he then noted creatinine up to 1.8 and USG noted new left hydro. 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 stent out.\par Seen in October2019 with an abscess inner thigh of left leg. 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 Just completing 6 weeks IV antibiotics. before this became apparent he had bloody discharge from small opening at bottom of the midline old incision. \par Noted hydro on CT done Inhouse and Cr @2. No back or flank pain. renal scan notes this kidney dead.\par he then called having right leg pain - i was concerned that the osteomyelitis+/-abscess was coming back so ordered a CT scan - this noted osteoblastic bone lesions  and PSA is @200.\par Bone scan confirmed diffuse mets - started ADT with Xtandi - first \par \par Latest PSA  0.09 however CR up to 2.8. CT notes new right hydronephrosis and slight worsening of the left also notes more blood from open on leg so wearing a diaper, though better since last visit. Feels fine. \par he didn't want more surgey so planned for IR to place antegrade upside down NT - able on the right but not on the left - has NT. Not happy and doesn't feel well. making urine with no fevers or chills. Urine clear.\par \par 8/21  Feels well with no pains. 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 Tolerating well - no fatigue, bone pain or weight loss. labs from last visit reviewed - good PSA control \par \par 10/21 - here today as one of the NTs came out - had been slowly out then saw in the bag. No fevers or chills; no back pain. he also noted his drainage from his abdomen - saw his PCP who expressed some fluid and felt he should see me. \par \par 11/21 - now S/p replacement of dislodged Nt and exchange of other. Had him come back to repeat BMP at noted his creatinine up to 6 ; he feels well thoough loks a bit pale to me. \par \par

## 2021-11-23 ENCOUNTER — APPOINTMENT (OUTPATIENT)
Dept: NEPHROLOGY | Facility: CLINIC | Age: 66
End: 2021-11-23
Payer: MEDICARE

## 2021-11-23 VITALS — HEART RATE: 78 BPM | DIASTOLIC BLOOD PRESSURE: 87 MMHG | SYSTOLIC BLOOD PRESSURE: 144 MMHG

## 2021-11-23 PROCEDURE — 99215 OFFICE O/P EST HI 40 MIN: CPT

## 2021-11-23 NOTE — PHYSICAL EXAM

## 2021-11-29 ENCOUNTER — NON-APPOINTMENT (OUTPATIENT)
Age: 66
End: 2021-11-29

## 2021-11-29 LAB
25(OH)D3 SERPL-MCNC: 26.9 NG/ML
ALBUMIN SERPL ELPH-MCNC: 3.8 G/DL
ANION GAP SERPL CALC-SCNC: 19 MMOL/L
APPEARANCE: ABNORMAL
BACTERIA: ABNORMAL
BASOPHILS # BLD AUTO: 0.06 K/UL
BASOPHILS NFR BLD AUTO: 0.4 %
BILIRUBIN URINE: NEGATIVE
BLOOD URINE: ABNORMAL
BUN SERPL-MCNC: 51 MG/DL
CALCIUM SERPL-MCNC: 9.7 MG/DL
CALCIUM SERPL-MCNC: 9.7 MG/DL
CHLORIDE SERPL-SCNC: 107 MMOL/L
CO2 SERPL-SCNC: 13 MMOL/L
COLOR: NORMAL
CREAT SERPL-MCNC: 2.87 MG/DL
CREAT SPEC-SCNC: 71 MG/DL
CREAT/PROT UR: 2.8 RATIO
EOSINOPHIL # BLD AUTO: 0.28 K/UL
EOSINOPHIL NFR BLD AUTO: 2 %
FERRITIN SERPL-MCNC: 146 NG/ML
GLUCOSE QUALITATIVE U: NEGATIVE
GLUCOSE SERPL-MCNC: 98 MG/DL
HCT VFR BLD CALC: 33.7 %
HGB BLD-MCNC: 10.4 G/DL
HYALINE CASTS: 0 /LPF
IMM GRANULOCYTES NFR BLD AUTO: 1.6 %
IRON SATN MFR SERPL: 20 %
IRON SERPL-MCNC: 60 UG/DL
KETONES URINE: NEGATIVE
LEUKOCYTE ESTERASE URINE: ABNORMAL
LYMPHOCYTES # BLD AUTO: 1.76 K/UL
LYMPHOCYTES NFR BLD AUTO: 12.6 %
MAN DIFF?: NORMAL
MCHC RBC-ENTMCNC: 29.4 PG
MCHC RBC-ENTMCNC: 30.9 GM/DL
MCV RBC AUTO: 95.2 FL
MICROSCOPIC-UA: NORMAL
MONOCYTES # BLD AUTO: 0.87 K/UL
MONOCYTES NFR BLD AUTO: 6.2 %
NEUTROPHILS # BLD AUTO: 10.75 K/UL
NEUTROPHILS NFR BLD AUTO: 77.2 %
NITRITE URINE: NEGATIVE
PARATHYROID HORMONE INTACT: 100 PG/ML
PH URINE: 7
PHOSPHATE SERPL-MCNC: 3.7 MG/DL
PLATELET # BLD AUTO: 256 K/UL
POTASSIUM SERPL-SCNC: 4.4 MMOL/L
PROT UR-MCNC: 197 MG/DL
PROTEIN URINE: ABNORMAL
RBC # BLD: 3.54 M/UL
RBC # BLD: 3.54 M/UL
RBC # FLD: 18 %
RED BLOOD CELLS URINE: 100 /HPF
RETICS # AUTO: 3.3 %
RETICS AGGREG/RBC NFR: 117.9 K/UL
SODIUM SERPL-SCNC: 139 MMOL/L
SPECIFIC GRAVITY URINE: 1.02
SQUAMOUS EPITHELIAL CELLS: 1 /HPF
TIBC SERPL-MCNC: 302 UG/DL
UIBC SERPL-MCNC: 241 UG/DL
UROBILINOGEN URINE: NORMAL
WBC # FLD AUTO: 13.94 K/UL
WHITE BLOOD CELLS URINE: 6 /HPF

## 2021-11-29 RX ORDER — ERGOCALCIFEROL 1.25 MG/1
1.25 MG CAPSULE ORAL
Qty: 5 | Refills: 9 | Status: ACTIVE | COMMUNITY
Start: 2021-11-29 | End: 1900-01-01

## 2021-12-08 NOTE — ED PROVIDER NOTE - RADIATION
Additional Notes: Patient consent was obtained to proceed with the visit and recommended plan of care after discussion of all risks and benefits, including the risks of COVID-19 exposure. Detail Level: Simple none

## 2022-01-03 NOTE — ED PROVIDER NOTE - PMH
Anxiety    Diabetes mellitus  pre diabetic  HTN (Hypertension)    Major depressive disorder    Malignant neoplasm of bladder    Obese    Other calculus in bladder    Prostate Cancer    Unspecified hydronephrosis    Urethral stricture    Urinary (tract) obstruction    UTI (urinary tract infection) High Dose Vitamin A Counseling: Side effects reviewed, pt to contact office should one occur.

## 2022-01-06 ENCOUNTER — APPOINTMENT (OUTPATIENT)
Dept: NEPHROLOGY | Facility: CLINIC | Age: 67
End: 2022-01-06
Payer: MEDICARE

## 2022-01-06 DIAGNOSIS — N13.9 OBSTRUCTIVE AND REFLUX UROPATHY, UNSPECIFIED: ICD-10-CM

## 2022-01-06 PROCEDURE — 99443: CPT

## 2022-01-06 NOTE — HISTORY OF PRESENT ILLNESS
[Other Location: e.g. School (Enter Location, City,State)___] : at [unfilled], at the time of the visit. [Medical Office: ([facility])___] : at the medical office located in  [Other:____] : [unfilled] [Verbal consent obtained from patient] : the patient, [unfilled]

## 2022-01-10 ENCOUNTER — RX RENEWAL (OUTPATIENT)
Age: 67
End: 2022-01-10

## 2022-01-11 LAB
25(OH)D3 SERPL-MCNC: 42.1 NG/ML
ALBUMIN SERPL ELPH-MCNC: 3.5 G/DL
ANION GAP SERPL CALC-SCNC: 18 MMOL/L
APPEARANCE: ABNORMAL
BACTERIA: ABNORMAL
BASOPHILS # BLD AUTO: 0.02 K/UL
BASOPHILS NFR BLD AUTO: 0.2 %
BILIRUBIN URINE: NEGATIVE
BLOOD URINE: NEGATIVE
BUN SERPL-MCNC: 40 MG/DL
CALCIUM SERPL-MCNC: 9.1 MG/DL
CALCIUM SERPL-MCNC: 9.1 MG/DL
CHLORIDE SERPL-SCNC: 104 MMOL/L
CO2 SERPL-SCNC: 19 MMOL/L
COLOR: NORMAL
CREAT SERPL-MCNC: 2.65 MG/DL
CREAT SPEC-SCNC: 61 MG/DL
CREAT/PROT UR: 1.3 RATIO
EOSINOPHIL # BLD AUTO: 0.16 K/UL
EOSINOPHIL NFR BLD AUTO: 1.7 %
GLUCOSE QUALITATIVE U: NEGATIVE
GLUCOSE SERPL-MCNC: 106 MG/DL
HCT VFR BLD CALC: 34.1 %
HGB BLD-MCNC: 10.7 G/DL
IMM GRANULOCYTES NFR BLD AUTO: 0.6 %
KETONES URINE: NEGATIVE
LEUKOCYTE ESTERASE URINE: NEGATIVE
LYMPHOCYTES # BLD AUTO: 1.47 K/UL
LYMPHOCYTES NFR BLD AUTO: 15.5 %
MAN DIFF?: NORMAL
MCHC RBC-ENTMCNC: 29.4 PG
MCHC RBC-ENTMCNC: 31.4 GM/DL
MCV RBC AUTO: 93.7 FL
MICROSCOPIC-UA: NORMAL
MONOCYTES # BLD AUTO: 0.82 K/UL
MONOCYTES NFR BLD AUTO: 8.6 %
NEUTROPHILS # BLD AUTO: 6.98 K/UL
NEUTROPHILS NFR BLD AUTO: 73.4 %
NITRITE URINE: NEGATIVE
PARATHYROID HORMONE INTACT: 108 PG/ML
PH URINE: >=9
PHOSPHATE SERPL-MCNC: 3.3 MG/DL
PLATELET # BLD AUTO: 206 K/UL
POTASSIUM SERPL-SCNC: 4.6 MMOL/L
PROT UR-MCNC: 76 MG/DL
PROTEIN URINE: ABNORMAL
RBC # BLD: 3.64 M/UL
RBC # FLD: 15.1 %
RED BLOOD CELLS URINE: 0 /HPF
SODIUM SERPL-SCNC: 140 MMOL/L
SPECIFIC GRAVITY URINE: 1.01
SQUAMOUS EPITHELIAL CELLS: 5 /HPF
TRIPLE PHOSPHATE CRYSTALS: ABNORMAL
UROBILINOGEN URINE: NORMAL
WBC # FLD AUTO: 9.51 K/UL
WHITE BLOOD CELLS URINE: 5 /HPF

## 2022-01-18 ENCOUNTER — RX RENEWAL (OUTPATIENT)
Age: 67
End: 2022-01-18

## 2022-02-14 ENCOUNTER — APPOINTMENT (OUTPATIENT)
Age: 67
End: 2022-02-14

## 2022-02-18 ENCOUNTER — APPOINTMENT (OUTPATIENT)
Dept: UROLOGY | Facility: CLINIC | Age: 67
End: 2022-02-18
Payer: MEDICARE

## 2022-02-18 VITALS
HEIGHT: 70 IN | DIASTOLIC BLOOD PRESSURE: 72 MMHG | BODY MASS INDEX: 31.5 KG/M2 | WEIGHT: 220 LBS | SYSTOLIC BLOOD PRESSURE: 133 MMHG | HEART RATE: 45 BPM

## 2022-02-18 PROCEDURE — 96402 CHEMO HORMON ANTINEOPL SQ/IM: CPT

## 2022-02-18 RX ORDER — LEUPROLIDE ACETATE 30 MG/.5ML
30 INJECTION, SUSPENSION, EXTENDED RELEASE SUBCUTANEOUS
Refills: 0 | Status: COMPLETED | OUTPATIENT
Start: 2022-02-18

## 2022-02-18 RX ADMIN — LEUPROLIDE ACETATE 0 MG: KIT SUBCUTANEOUS at 00:00

## 2022-02-25 ENCOUNTER — APPOINTMENT (OUTPATIENT)
Dept: UROLOGY | Facility: CLINIC | Age: 67
End: 2022-02-25

## 2022-03-02 ENCOUNTER — OUTPATIENT (OUTPATIENT)
Dept: OUTPATIENT SERVICES | Facility: HOSPITAL | Age: 67
LOS: 1 days | End: 2022-03-02
Payer: MEDICARE

## 2022-03-02 ENCOUNTER — RESULT REVIEW (OUTPATIENT)
Age: 67
End: 2022-03-02

## 2022-03-02 VITALS
OXYGEN SATURATION: 100 % | SYSTOLIC BLOOD PRESSURE: 183 MMHG | DIASTOLIC BLOOD PRESSURE: 80 MMHG | TEMPERATURE: 99 F | HEART RATE: 45 BPM | RESPIRATION RATE: 18 BRPM

## 2022-03-02 VITALS
HEART RATE: 43 BPM | TEMPERATURE: 98 F | DIASTOLIC BLOOD PRESSURE: 72 MMHG | OXYGEN SATURATION: 100 % | SYSTOLIC BLOOD PRESSURE: 175 MMHG | RESPIRATION RATE: 18 BRPM

## 2022-03-02 DIAGNOSIS — Z93.59 OTHER CYSTOSTOMY STATUS: Chronic | ICD-10-CM

## 2022-03-02 DIAGNOSIS — N32.9 BLADDER DISORDER, UNSPECIFIED: Chronic | ICD-10-CM

## 2022-03-02 DIAGNOSIS — Z98.89 OTHER SPECIFIED POSTPROCEDURAL STATES: Chronic | ICD-10-CM

## 2022-03-02 DIAGNOSIS — C61 MALIGNANT NEOPLASM OF PROSTATE: ICD-10-CM

## 2022-03-02 DIAGNOSIS — Z93.6 OTHER ARTIFICIAL OPENINGS OF URINARY TRACT STATUS: Chronic | ICD-10-CM

## 2022-03-02 DIAGNOSIS — Z85.51 PERSONAL HISTORY OF MALIGNANT NEOPLASM OF BLADDER: Chronic | ICD-10-CM

## 2022-03-02 PROCEDURE — 50688 CHANGE OF URETER TUBE/STENT: CPT | Mod: RT

## 2022-03-02 PROCEDURE — 75984 XRAY CONTROL CATHETER CHANGE: CPT | Mod: 26,59

## 2022-03-09 ENCOUNTER — APPOINTMENT (OUTPATIENT)
Dept: NEPHROLOGY | Facility: CLINIC | Age: 67
End: 2022-03-09
Payer: MEDICARE

## 2022-03-09 VITALS
SYSTOLIC BLOOD PRESSURE: 156 MMHG | OXYGEN SATURATION: 96 % | HEART RATE: 52 BPM | DIASTOLIC BLOOD PRESSURE: 84 MMHG | HEIGHT: 70 IN

## 2022-03-09 DIAGNOSIS — C61 MALIGNANT NEOPLASM OF PROSTATE: ICD-10-CM

## 2022-03-09 DIAGNOSIS — T83.123A DISPLACEMENT OF OTHER URINARY STENTS, INITIAL ENCOUNTER: ICD-10-CM

## 2022-03-09 PROCEDURE — 99215 OFFICE O/P EST HI 40 MIN: CPT

## 2022-03-09 NOTE — PHYSICAL EXAM
[General Appearance - Alert] : alert [General Appearance - In No Acute Distress] : in no acute distress [Sclera] : the sclera and conjunctiva were normal [PERRL With Normal Accommodation] : pupils were equal in size, round, and reactive to light [Extraocular Movements] : extraocular movements were intact [Outer Ear] : the ears and nose were normal in appearance [Oropharynx] : the oropharynx was normal [Neck Appearance] : the appearance of the neck was normal [Neck Cervical Mass (___cm)] : no neck mass was observed [Thyroid Diffuse Enlargement] : the thyroid was not enlarged [Jugular Venous Distention Increased] : there was no jugular-venous distention [Thyroid Nodule] : there were no palpable thyroid nodules [Auscultation Breath Sounds / Voice Sounds] : lungs were clear to auscultation bilaterally [Heart Rate And Rhythm] : heart rate was normal and rhythm regular [Heart Sounds] : normal S1 and S2 [Heart Sounds Gallop] : no gallops [Murmurs] : no murmurs [Heart Sounds Pericardial Friction Rub] : no pericardial rub [Full Pulse] : the pedal pulses are present [Edema] : there was no peripheral edema [Bowel Sounds] : normal bowel sounds [Abdomen Soft] : soft [Abdomen Tenderness] : non-tender [Abdomen Mass (___ Cm)] : no abdominal mass palpated [No CVA Tenderness] : no ~M costovertebral angle tenderness [No Spinal Tenderness] : no spinal tenderness [Abnormal Walk] : normal gait [Nail Clubbing] : no clubbing  or cyanosis of the fingernails [Musculoskeletal - Swelling] : no joint swelling seen [Motor Tone] : muscle strength and tone were normal [Skin Color & Pigmentation] : normal skin color and pigmentation [] : no rash [Skin Turgor] : normal skin turgor [Deep Tendon Reflexes (DTR)] : deep tendon reflexes were 2+ and symmetric [Sensation] : the sensory exam was normal to light touch and pinprick [No Focal Deficits] : no focal deficits [Oriented To Time, Place, And Person] : oriented to person, place, and time [Impaired Insight] : insight and judgment were intact [Affect] : the affect was normal

## 2022-03-11 LAB
25(OH)D3 SERPL-MCNC: 41.4 NG/ML
ALBUMIN SERPL ELPH-MCNC: 3.8 G/DL
ANION GAP SERPL CALC-SCNC: 13 MMOL/L
BASOPHILS # BLD AUTO: 0.04 K/UL
BASOPHILS NFR BLD AUTO: 0.5 %
BUN SERPL-MCNC: 36 MG/DL
CALCIUM SERPL-MCNC: 9.5 MG/DL
CALCIUM SERPL-MCNC: 9.5 MG/DL
CHLORIDE SERPL-SCNC: 106 MMOL/L
CO2 SERPL-SCNC: 21 MMOL/L
CREAT SERPL-MCNC: 2.9 MG/DL
EGFR: 23 ML/MIN/1.73M2
EOSINOPHIL # BLD AUTO: 0.25 K/UL
EOSINOPHIL NFR BLD AUTO: 2.9 %
FERRITIN SERPL-MCNC: 40 NG/ML
GLUCOSE SERPL-MCNC: 99 MG/DL
HCT VFR BLD CALC: 38.5 %
HGB BLD-MCNC: 11.9 G/DL
IMM GRANULOCYTES NFR BLD AUTO: 0.8 %
IRON SATN MFR SERPL: 31 %
IRON SERPL-MCNC: 98 UG/DL
LYMPHOCYTES # BLD AUTO: 1.21 K/UL
LYMPHOCYTES NFR BLD AUTO: 14.2 %
MAN DIFF?: NORMAL
MCHC RBC-ENTMCNC: 28.4 PG
MCHC RBC-ENTMCNC: 30.9 GM/DL
MCV RBC AUTO: 91.9 FL
MONOCYTES # BLD AUTO: 0.6 K/UL
MONOCYTES NFR BLD AUTO: 7.1 %
NEUTROPHILS # BLD AUTO: 6.33 K/UL
NEUTROPHILS NFR BLD AUTO: 74.5 %
PARATHYROID HORMONE INTACT: 131 PG/ML
PHOSPHATE SERPL-MCNC: 3.3 MG/DL
PLATELET # BLD AUTO: 220 K/UL
POTASSIUM SERPL-SCNC: 4.9 MMOL/L
RBC # BLD: 4.19 M/UL
RBC # BLD: 4.19 M/UL
RBC # FLD: 15.4 %
RETICS # AUTO: 1.5 %
RETICS AGGREG/RBC NFR: 61.2 K/UL
SODIUM SERPL-SCNC: 141 MMOL/L
TIBC SERPL-MCNC: 320 UG/DL
UIBC SERPL-MCNC: 222 UG/DL
WBC # FLD AUTO: 8.5 K/UL

## 2022-03-17 ENCOUNTER — LABORATORY RESULT (OUTPATIENT)
Age: 67
End: 2022-03-17

## 2022-03-17 LAB
BASOPHILS # BLD AUTO: 0.04 K/UL
BASOPHILS NFR BLD AUTO: 0.4 %
EOSINOPHIL # BLD AUTO: 0.31 K/UL
EOSINOPHIL NFR BLD AUTO: 2.9 %
HCT VFR BLD CALC: 39 %
HGB BLD-MCNC: 11.9 G/DL
IMM GRANULOCYTES NFR BLD AUTO: 0.7 %
LYMPHOCYTES # BLD AUTO: 1.46 K/UL
LYMPHOCYTES NFR BLD AUTO: 13.7 %
MAN DIFF?: NORMAL
MCHC RBC-ENTMCNC: 29 PG
MCHC RBC-ENTMCNC: 30.5 GM/DL
MCV RBC AUTO: 94.9 FL
MONOCYTES # BLD AUTO: 0.78 K/UL
MONOCYTES NFR BLD AUTO: 7.3 %
NEUTROPHILS # BLD AUTO: 7.97 K/UL
NEUTROPHILS NFR BLD AUTO: 75 %
PLATELET # BLD AUTO: 243 K/UL
RBC # BLD: 4.11 M/UL
RBC # FLD: 14.7 %
TSH SERPL-ACNC: 2.07 UIU/ML
WBC # FLD AUTO: 10.63 K/UL

## 2022-03-23 NOTE — BEHAVIORAL HEALTH ASSESSMENT NOTE - PRIMARY DX
1. Caller Name: Faith                        Call Back Number: [phone] opt [phone]      How would the patient prefer to be contacted with a response: Phone call OK to leave a detailed message    Faith from [facility] called to say they need a recollect   Please call Faith and find out what lab needs redoing.   Delirium due to multiple etiologies

## 2022-03-24 NOTE — PROGRESS NOTE ADULT - PROBLEM SELECTOR PROBLEM 4
Anxiety Chonodrocutaneous Helical Advancement Flap Text: The defect edges were debeveled with a #15 scalpel blade.  Given the location of the defect and the proximity to free margins a chondrocutaneous helical advancement flap was deemed most appropriate.  Using a sterile surgical marker, the appropriate advancement flap was drawn incorporating the defect and placing the expected incisions within the relaxed skin tension lines where possible.    The area thus outlined was incised deep to adipose tissue with a #15 scalpel blade.  The skin margins were undermined to an appropriate distance in all directions utilizing iris scissors.

## 2022-05-06 ENCOUNTER — APPOINTMENT (OUTPATIENT)
Dept: UROLOGY | Facility: CLINIC | Age: 67
End: 2022-05-06
Payer: MEDICARE

## 2022-05-06 ENCOUNTER — OUTPATIENT (OUTPATIENT)
Dept: OUTPATIENT SERVICES | Facility: HOSPITAL | Age: 67
LOS: 1 days | End: 2022-05-06
Payer: MEDICARE

## 2022-05-06 ENCOUNTER — NON-APPOINTMENT (OUTPATIENT)
Age: 67
End: 2022-05-06

## 2022-05-06 VITALS — HEART RATE: 50 BPM | SYSTOLIC BLOOD PRESSURE: 153 MMHG | OXYGEN SATURATION: 99 % | DIASTOLIC BLOOD PRESSURE: 75 MMHG

## 2022-05-06 DIAGNOSIS — Z93.6 OTHER ARTIFICIAL OPENINGS OF URINARY TRACT STATUS: Chronic | ICD-10-CM

## 2022-05-06 DIAGNOSIS — Z85.51 PERSONAL HISTORY OF MALIGNANT NEOPLASM OF BLADDER: Chronic | ICD-10-CM

## 2022-05-06 DIAGNOSIS — Z93.59 OTHER CYSTOSTOMY STATUS: Chronic | ICD-10-CM

## 2022-05-06 DIAGNOSIS — R35.0 FREQUENCY OF MICTURITION: ICD-10-CM

## 2022-05-06 DIAGNOSIS — Z93.6 OTHER ARTIFICIAL OPENINGS OF URINARY TRACT STATUS: ICD-10-CM

## 2022-05-06 DIAGNOSIS — Z98.89 OTHER SPECIFIED POSTPROCEDURAL STATES: Chronic | ICD-10-CM

## 2022-05-06 DIAGNOSIS — N32.9 BLADDER DISORDER, UNSPECIFIED: Chronic | ICD-10-CM

## 2022-05-06 DIAGNOSIS — N99.89 OTHER POSTPROCEDURAL COMPLICATIONS AND DISORDERS OF GENITOURINARY SYSTEM: ICD-10-CM

## 2022-05-06 DIAGNOSIS — T83.092A OTHER MECHANICAL COMPLICATION OF NEPHROSTOMY CATHETER, INITIAL ENCOUNTER: ICD-10-CM

## 2022-05-06 PROCEDURE — 50688 CHANGE OF URETER TUBE/STENT: CPT

## 2022-05-16 ENCOUNTER — NON-APPOINTMENT (OUTPATIENT)
Age: 67
End: 2022-05-16

## 2022-05-17 ENCOUNTER — APPOINTMENT (OUTPATIENT)
Dept: UROLOGY | Facility: CLINIC | Age: 67
End: 2022-05-17
Payer: MEDICARE

## 2022-05-17 ENCOUNTER — NON-APPOINTMENT (OUTPATIENT)
Age: 67
End: 2022-05-17

## 2022-05-17 PROCEDURE — 99213 OFFICE O/P EST LOW 20 MIN: CPT

## 2022-05-18 LAB
ALBUMIN SERPL ELPH-MCNC: 4 G/DL
ALP BLD-CCNC: 130 U/L
ALT SERPL-CCNC: 10 U/L
ANION GAP SERPL CALC-SCNC: 18 MMOL/L
AST SERPL-CCNC: 12 U/L
BASOPHILS # BLD AUTO: 0.02 K/UL
BASOPHILS NFR BLD AUTO: 0.2 %
BILIRUB SERPL-MCNC: 0.6 MG/DL
BUN SERPL-MCNC: 44 MG/DL
CALCIUM SERPL-MCNC: 10.1 MG/DL
CHLORIDE SERPL-SCNC: 107 MMOL/L
CO2 SERPL-SCNC: 18 MMOL/L
CREAT SERPL-MCNC: 3.11 MG/DL
EGFR: 21 ML/MIN/1.73M2
EOSINOPHIL # BLD AUTO: 0.19 K/UL
EOSINOPHIL NFR BLD AUTO: 2.1 %
GLUCOSE SERPL-MCNC: 129 MG/DL
HCT VFR BLD CALC: 38.9 %
HGB BLD-MCNC: 12.2 G/DL
IMM GRANULOCYTES NFR BLD AUTO: 0.3 %
INR PPP: 0.95 RATIO
LYMPHOCYTES # BLD AUTO: 1.34 K/UL
LYMPHOCYTES NFR BLD AUTO: 14.8 %
MAN DIFF?: NORMAL
MCHC RBC-ENTMCNC: 29 PG
MCHC RBC-ENTMCNC: 31.4 GM/DL
MCV RBC AUTO: 92.4 FL
MONOCYTES # BLD AUTO: 0.76 K/UL
MONOCYTES NFR BLD AUTO: 8.4 %
NEUTROPHILS # BLD AUTO: 6.74 K/UL
NEUTROPHILS NFR BLD AUTO: 74.2 %
PLATELET # BLD AUTO: 203 K/UL
POTASSIUM SERPL-SCNC: 4.3 MMOL/L
PROT SERPL-MCNC: 7.1 G/DL
PT BLD: 11.2 SEC
RBC # BLD: 4.21 M/UL
RBC # FLD: 15.9 %
SODIUM SERPL-SCNC: 143 MMOL/L
WBC # FLD AUTO: 9.08 K/UL

## 2022-05-18 NOTE — HISTORY OF PRESENT ILLNESS
[FreeTextEntry1] :  Complicated  history. He had RALP for high grade cancer followed by adjuvant radiation therapy. 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 Did well initially; he then noted creatinine up to 1.8 and USG noted new left hydro. 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 stent out.\par Seen in October2019 with an abscess inner thigh of left leg. 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 Just completing 6 weeks IV antibiotics. before this became apparent he had bloody discharge from small opening at bottom of the midline old incision. \par Noted hydro on CT done Inhouse and Cr @2. No back or flank pain. renal scan notes this kidney dead.\par he then called having right leg pain - i was concerned that the osteomyelitis+/-abscess was coming back so ordered a CT scan - this noted osteoblastic bone lesions  and PSA is @200.\par Bone scan confirmed diffuse mets - started ADT with Xtandi - first \par \par Latest PSA  0.09 however CR up to 2.8. CT notes new right hydronephrosis and slight worsening of the left also notes more blood from open on leg so wearing a diaper, though better since last visit. Feels fine. \par he didn't want more surgey so planned for IR to place antegrade upside down NT - able on the right but not on the left - has NT. Not happy and doesn't feel well. making urine with no fevers or chills. Urine clear.\par \par 8/21  Feels well with no pains. 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 Tolerating well - no fatigue, bone pain or weight loss. labs from last visit reviewed - good PSA control \par \par 10/21 - here today as one of the NTs came out - had been slowly out then saw in the bag. No fevers or chills; no back pain. he also noted his drainage from his abdomen - saw his PCP who expressed some fluid and felt he should see me. \par \par 11/21 - now S/p replacement of dislodged Nt and exchange of other. Had him come back to repeat BMP at noted his creatinine up to 6 ; he feels well though looks a bit pale to me. \par \par 5/22 - called this morning that one of the NTs was dislodged. No fevers or chills. making urine.\par having separate issues with the opening on the bag which he is changing frequently \par \par

## 2022-05-18 NOTE — ASSESSMENT
[FreeTextEntry1] : needs urgent IR intervention to replace the NT - sent labs.\par showed him the next size for the opening on the stoma appliance

## 2022-05-19 ENCOUNTER — RESULT REVIEW (OUTPATIENT)
Age: 67
End: 2022-05-19

## 2022-05-19 ENCOUNTER — OUTPATIENT (OUTPATIENT)
Dept: OUTPATIENT SERVICES | Facility: HOSPITAL | Age: 67
LOS: 1 days | End: 2022-05-19
Payer: MEDICARE

## 2022-05-19 DIAGNOSIS — Z93.59 OTHER CYSTOSTOMY STATUS: Chronic | ICD-10-CM

## 2022-05-19 DIAGNOSIS — Z93.6 OTHER ARTIFICIAL OPENINGS OF URINARY TRACT STATUS: Chronic | ICD-10-CM

## 2022-05-19 DIAGNOSIS — C61 MALIGNANT NEOPLASM OF PROSTATE: ICD-10-CM

## 2022-05-19 DIAGNOSIS — Z98.89 OTHER SPECIFIED POSTPROCEDURAL STATES: Chronic | ICD-10-CM

## 2022-05-19 DIAGNOSIS — N32.9 BLADDER DISORDER, UNSPECIFIED: Chronic | ICD-10-CM

## 2022-05-19 DIAGNOSIS — Z85.51 PERSONAL HISTORY OF MALIGNANT NEOPLASM OF BLADDER: Chronic | ICD-10-CM

## 2022-05-19 PROCEDURE — 50688 CHANGE OF URETER TUBE/STENT: CPT | Mod: RT

## 2022-05-19 PROCEDURE — 50432 PLMT NEPHROSTOMY CATHETER: CPT | Mod: RT

## 2022-05-19 PROCEDURE — 75984 XRAY CONTROL CATHETER CHANGE: CPT | Mod: 26

## 2022-05-20 ENCOUNTER — APPOINTMENT (OUTPATIENT)
Dept: UROLOGY | Facility: CLINIC | Age: 67
End: 2022-05-20

## 2022-05-23 ENCOUNTER — RX RENEWAL (OUTPATIENT)
Age: 67
End: 2022-05-23

## 2022-05-24 ENCOUNTER — APPOINTMENT (OUTPATIENT)
Dept: NEPHROLOGY | Facility: CLINIC | Age: 67
End: 2022-05-24
Payer: MEDICARE

## 2022-05-24 VITALS
HEIGHT: 70 IN | DIASTOLIC BLOOD PRESSURE: 82 MMHG | SYSTOLIC BLOOD PRESSURE: 138 MMHG | TEMPERATURE: 97.2 F | HEART RATE: 48 BPM | OXYGEN SATURATION: 97 %

## 2022-05-24 DIAGNOSIS — C61 MALIGNANT NEOPLASM OF PROSTATE: ICD-10-CM

## 2022-05-24 DIAGNOSIS — N13.30 UNSPECIFIED HYDRONEPHROSIS: ICD-10-CM

## 2022-05-24 DIAGNOSIS — T83.123A DISPLACEMENT OF OTHER URINARY STENTS, INITIAL ENCOUNTER: ICD-10-CM

## 2022-05-24 PROCEDURE — 99215 OFFICE O/P EST HI 40 MIN: CPT

## 2022-05-26 LAB
25(OH)D3 SERPL-MCNC: 48.6 NG/ML
ALBUMIN SERPL ELPH-MCNC: 4.1 G/DL
ANION GAP SERPL CALC-SCNC: 14 MMOL/L
BUN SERPL-MCNC: 37 MG/DL
CALCIUM SERPL-MCNC: 10.2 MG/DL
CALCIUM SERPL-MCNC: 10.2 MG/DL
CHLORIDE SERPL-SCNC: 104 MMOL/L
CO2 SERPL-SCNC: 23 MMOL/L
CREAT SERPL-MCNC: 3.13 MG/DL
EGFR: 21 ML/MIN/1.73M2
GLUCOSE SERPL-MCNC: 98 MG/DL
PARATHYROID HORMONE INTACT: 129 PG/ML
PHOSPHATE SERPL-MCNC: 3.4 MG/DL
POTASSIUM SERPL-SCNC: 4.3 MMOL/L
SODIUM SERPL-SCNC: 141 MMOL/L

## 2022-06-01 ENCOUNTER — APPOINTMENT (OUTPATIENT)
Dept: UROLOGY | Facility: CLINIC | Age: 67
End: 2022-06-01
Payer: MEDICARE

## 2022-06-01 PROCEDURE — 96402 CHEMO HORMON ANTINEOPL SQ/IM: CPT

## 2022-06-02 ENCOUNTER — RX RENEWAL (OUTPATIENT)
Age: 67
End: 2022-06-02

## 2022-06-02 NOTE — H&P ADULT - NSHPSOCIALHISTORY_GEN_ALL_CORE
Received call from patient requesting refill(s) of      traMADol (ULTRAM) 50 MG tablet  Last dispensed from pharmacy on 05/07/22    traMADol (ULTRAM-ER) 200 MG 24 hr tablet   Last dispensed from pharmacy on 05/07/22    Patient's last office/virtual visit by prescribing provider on 04/13/22  Next office/virtual appointment scheduled for None    Last urine drug screen date 04/15/22  Current opioid agreement on file (completed within the last year) Yes Date of opioid agreement: 04/25/21    E-prescribe to   [address] [address]  Phone: [phone] Fax: [phone]    Will route to nursing [place] for review and preparation of prescription(s).       Lashonda Lyles MA  [facility]       Non smoker  Denies EtOH use  Denies illicit drug use

## 2022-06-05 ENCOUNTER — NON-APPOINTMENT (OUTPATIENT)
Age: 67
End: 2022-06-05

## 2022-06-17 ENCOUNTER — RESULT REVIEW (OUTPATIENT)
Age: 67
End: 2022-06-17

## 2022-06-17 ENCOUNTER — OUTPATIENT (OUTPATIENT)
Dept: OUTPATIENT SERVICES | Facility: HOSPITAL | Age: 67
LOS: 1 days | End: 2022-06-17
Payer: MEDICARE

## 2022-06-17 DIAGNOSIS — Z98.89 OTHER SPECIFIED POSTPROCEDURAL STATES: Chronic | ICD-10-CM

## 2022-06-17 DIAGNOSIS — Z93.6 OTHER ARTIFICIAL OPENINGS OF URINARY TRACT STATUS: Chronic | ICD-10-CM

## 2022-06-17 DIAGNOSIS — C61 MALIGNANT NEOPLASM OF PROSTATE: ICD-10-CM

## 2022-06-17 DIAGNOSIS — N32.9 BLADDER DISORDER, UNSPECIFIED: Chronic | ICD-10-CM

## 2022-06-17 DIAGNOSIS — Z85.51 PERSONAL HISTORY OF MALIGNANT NEOPLASM OF BLADDER: Chronic | ICD-10-CM

## 2022-06-17 DIAGNOSIS — Z93.59 OTHER CYSTOSTOMY STATUS: Chronic | ICD-10-CM

## 2022-06-17 PROCEDURE — 50693 PLMT URETERAL STENT PRQ: CPT | Mod: RT

## 2022-06-22 DIAGNOSIS — C61 MALIGNANT NEOPLASM OF PROSTATE: ICD-10-CM

## 2022-06-22 DIAGNOSIS — Z46.6 ENCOUNTER FOR FITTING AND ADJUSTMENT OF URINARY DEVICE: ICD-10-CM

## 2022-06-27 NOTE — PATIENT PROFILE ADULT. - MEDICATION ADMINISTRATION INFO, PROFILE
no concerns Tazorac Counseling:  Patient advised that medication is irritating and drying.  Patient may need to apply sparingly and wash off after an hour before eventually leaving it on overnight.  The patient verbalized understanding of the proper use and possible adverse effects of tazorac.  All of the patient's questions and concerns were addressed.

## 2022-08-05 ENCOUNTER — APPOINTMENT (OUTPATIENT)
Dept: UROLOGY | Facility: CLINIC | Age: 67
End: 2022-08-05

## 2022-08-05 ENCOUNTER — OUTPATIENT (OUTPATIENT)
Dept: OUTPATIENT SERVICES | Facility: HOSPITAL | Age: 67
LOS: 1 days | End: 2022-08-05
Payer: MEDICARE

## 2022-08-05 VITALS
HEART RATE: 46 BPM | OXYGEN SATURATION: 99 % | SYSTOLIC BLOOD PRESSURE: 144 MMHG | TEMPERATURE: 97 F | DIASTOLIC BLOOD PRESSURE: 84 MMHG

## 2022-08-05 DIAGNOSIS — Z98.89 OTHER SPECIFIED POSTPROCEDURAL STATES: Chronic | ICD-10-CM

## 2022-08-05 DIAGNOSIS — Z85.51 PERSONAL HISTORY OF MALIGNANT NEOPLASM OF BLADDER: Chronic | ICD-10-CM

## 2022-08-05 DIAGNOSIS — Z93.6 OTHER ARTIFICIAL OPENINGS OF URINARY TRACT STATUS: Chronic | ICD-10-CM

## 2022-08-05 DIAGNOSIS — N32.89 OTHER SPECIFIED DISORDERS OF BLADDER: ICD-10-CM

## 2022-08-05 DIAGNOSIS — Z87.448 PERSONAL HISTORY OF OTHER DISEASES OF URINARY SYSTEM: ICD-10-CM

## 2022-08-05 DIAGNOSIS — Z93.59 OTHER CYSTOSTOMY STATUS: Chronic | ICD-10-CM

## 2022-08-05 DIAGNOSIS — Z97.8 PRESENCE OF OTHER SPECIFIED DEVICES: ICD-10-CM

## 2022-08-05 DIAGNOSIS — N32.0 BLADDER-NECK OBSTRUCTION: ICD-10-CM

## 2022-08-05 DIAGNOSIS — N32.9 BLADDER DISORDER, UNSPECIFIED: Chronic | ICD-10-CM

## 2022-08-05 DIAGNOSIS — R35.0 FREQUENCY OF MICTURITION: ICD-10-CM

## 2022-08-05 PROCEDURE — 50688 CHANGE OF URETER TUBE/STENT: CPT

## 2022-08-06 PROBLEM — N32.89 BLADDER SPASMS: Status: RESOLVED | Noted: 2017-12-06 | Resolved: 2022-08-06

## 2022-08-06 PROBLEM — Z97.8 CHRONIC INDWELLING FOLEY CATHETER: Status: RESOLVED | Noted: 2017-11-03 | Resolved: 2022-08-06

## 2022-08-07 ENCOUNTER — TRANSCRIPTION ENCOUNTER (OUTPATIENT)
Age: 67
End: 2022-08-07

## 2022-08-08 DIAGNOSIS — Z93.6 OTHER ARTIFICIAL OPENINGS OF URINARY TRACT STATUS: ICD-10-CM

## 2022-08-08 DIAGNOSIS — N99.89 OTHER POSTPROCEDURAL COMPLICATIONS AND DISORDERS OF GENITOURINARY SYSTEM: ICD-10-CM

## 2022-08-08 RX ORDER — BLOOD SUGAR DIAGNOSTIC
STRIP MISCELLANEOUS
Qty: 1 | Refills: 5 | Status: COMPLETED | COMMUNITY
Start: 2019-01-31 | End: 2022-08-08

## 2022-08-08 RX ORDER — BLOOD SUGAR DIAGNOSTIC
STRIP MISCELLANEOUS
Qty: 1 | Refills: 5 | Status: ACTIVE | COMMUNITY
Start: 2018-01-24 | End: 1900-01-01

## 2022-08-20 NOTE — ED ADULT NURSE NOTE - CHIEF COMPLAINT QUOTE
Pt c/o colostomy malfunction, pt states 2 wires to his colostomy dislodged. Pt denies any present pain.
[employer]

## 2022-08-23 ENCOUNTER — APPOINTMENT (OUTPATIENT)
Dept: FAMILY MEDICINE | Facility: CLINIC | Age: 67
End: 2022-08-23

## 2022-08-23 ENCOUNTER — APPOINTMENT (OUTPATIENT)
Dept: NEPHROLOGY | Facility: CLINIC | Age: 67
End: 2022-08-23

## 2022-08-23 VITALS — SYSTOLIC BLOOD PRESSURE: 170 MMHG | DIASTOLIC BLOOD PRESSURE: 90 MMHG

## 2022-08-23 VITALS
OXYGEN SATURATION: 98 % | TEMPERATURE: 97.1 F | HEART RATE: 48 BPM | HEIGHT: 70 IN | SYSTOLIC BLOOD PRESSURE: 178 MMHG | DIASTOLIC BLOOD PRESSURE: 75 MMHG

## 2022-08-23 DIAGNOSIS — R60.0 LOCALIZED EDEMA: ICD-10-CM

## 2022-08-23 PROCEDURE — 99443: CPT

## 2022-08-23 PROCEDURE — 99215 OFFICE O/P EST HI 40 MIN: CPT

## 2022-08-23 NOTE — PHYSICAL EXAM
[General Appearance - Alert] : alert [General Appearance - In No Acute Distress] : in no acute distress [Sclera] : the sclera and conjunctiva were normal [PERRL With Normal Accommodation] : pupils were equal in size, round, and reactive to light [Extraocular Movements] : extraocular movements were intact [Outer Ear] : the ears and nose were normal in appearance [Oropharynx] : the oropharynx was normal [Neck Appearance] : the appearance of the neck was normal [Neck Cervical Mass (___cm)] : no neck mass was observed [Jugular Venous Distention Increased] : there was no jugular-venous distention [Thyroid Diffuse Enlargement] : the thyroid was not enlarged [Thyroid Nodule] : there were no palpable thyroid nodules [Auscultation Breath Sounds / Voice Sounds] : lungs were clear to auscultation bilaterally [Heart Rate And Rhythm] : heart rate was normal and rhythm regular [Heart Sounds] : normal S1 and S2 [Heart Sounds Gallop] : no gallops [Murmurs] : no murmurs [Heart Sounds Pericardial Friction Rub] : no pericardial rub [Full Pulse] : the pedal pulses are present [Pitting Edema] : pitting edema present [___ +] : bilateral [unfilled]+ pretibial pitting edema [Bowel Sounds] : normal bowel sounds [Abdomen Soft] : soft [Abdomen Tenderness] : non-tender [Abdomen Mass (___ Cm)] : no abdominal mass palpated [No CVA Tenderness] : no ~M costovertebral angle tenderness [No Spinal Tenderness] : no spinal tenderness [Abnormal Walk] : normal gait [Nail Clubbing] : no clubbing  or cyanosis of the fingernails [Musculoskeletal - Swelling] : no joint swelling seen [Motor Tone] : muscle strength and tone were normal [Skin Color & Pigmentation] : normal skin color and pigmentation [Skin Turgor] : normal skin turgor [] : no rash [Deep Tendon Reflexes (DTR)] : deep tendon reflexes were 2+ and symmetric [Sensation] : the sensory exam was normal to light touch and pinprick [No Focal Deficits] : no focal deficits [Oriented To Time, Place, And Person] : oriented to person, place, and time [Impaired Insight] : insight and judgment were intact [Affect] : the affect was normal [FreeTextEntry1] : anterior abd urine bag draining clear urine

## 2022-08-26 LAB
25(OH)D3 SERPL-MCNC: 52.2 NG/ML
ALBUMIN SERPL ELPH-MCNC: 4 G/DL
ANION GAP SERPL CALC-SCNC: 13 MMOL/L
BASOPHILS # BLD AUTO: 0.04 K/UL
BASOPHILS NFR BLD AUTO: 0.5 %
BUN SERPL-MCNC: 45 MG/DL
CALCIUM SERPL-MCNC: 9.7 MG/DL
CALCIUM SERPL-MCNC: 9.7 MG/DL
CHLORIDE SERPL-SCNC: 109 MMOL/L
CO2 SERPL-SCNC: 20 MMOL/L
CREAT SERPL-MCNC: 2.76 MG/DL
EGFR: 24 ML/MIN/1.73M2
EOSINOPHIL # BLD AUTO: 0.18 K/UL
EOSINOPHIL NFR BLD AUTO: 2.1 %
GLUCOSE SERPL-MCNC: 125 MG/DL
HCT VFR BLD CALC: 37.3 %
HGB BLD-MCNC: 12.1 G/DL
IMM GRANULOCYTES NFR BLD AUTO: 0.7 %
LYMPHOCYTES # BLD AUTO: 1.4 K/UL
LYMPHOCYTES NFR BLD AUTO: 16 %
MAN DIFF?: NORMAL
MCHC RBC-ENTMCNC: 29.4 PG
MCHC RBC-ENTMCNC: 32.4 GM/DL
MCV RBC AUTO: 90.8 FL
MONOCYTES # BLD AUTO: 0.62 K/UL
MONOCYTES NFR BLD AUTO: 7.1 %
NEUTROPHILS # BLD AUTO: 6.45 K/UL
NEUTROPHILS NFR BLD AUTO: 73.6 %
PARATHYROID HORMONE INTACT: 114 PG/ML
PHOSPHATE SERPL-MCNC: 3.2 MG/DL
PLATELET # BLD AUTO: 199 K/UL
POTASSIUM SERPL-SCNC: 5.2 MMOL/L
RBC # BLD: 4.11 M/UL
RBC # FLD: 14.5 %
SODIUM SERPL-SCNC: 142 MMOL/L
WBC # FLD AUTO: 8.75 K/UL

## 2022-09-08 ENCOUNTER — APPOINTMENT (OUTPATIENT)
Dept: FAMILY MEDICINE | Facility: CLINIC | Age: 67
End: 2022-09-08

## 2022-09-08 VITALS
HEART RATE: 51 BPM | OXYGEN SATURATION: 95 % | SYSTOLIC BLOOD PRESSURE: 136 MMHG | DIASTOLIC BLOOD PRESSURE: 82 MMHG | RESPIRATION RATE: 18 BRPM | TEMPERATURE: 97.2 F

## 2022-09-08 PROCEDURE — 99214 OFFICE O/P EST MOD 30 MIN: CPT | Mod: 25

## 2022-09-08 PROCEDURE — 83036 HEMOGLOBIN GLYCOSYLATED A1C: CPT | Mod: QW

## 2022-09-08 PROCEDURE — 82962 GLUCOSE BLOOD TEST: CPT

## 2022-09-09 ENCOUNTER — RX RENEWAL (OUTPATIENT)
Age: 67
End: 2022-09-09

## 2022-09-09 NOTE — HISTORY OF PRESENT ILLNESS
[de-identified] : 66 y/o M PMHx HTN (Amlodipine,Atenolol) CKD,DM Bladder CA, presents to office for f/up on BG and BP. Offers no complaints. Not exercising  not dieting

## 2022-09-17 NOTE — CONSULT NOTE ADULT - PROBLEM SELECTOR PROBLEM 1
[address]  Progress Note - Park Lopez 1979, 39 y o  male MRN: [id number]  Unit/Bed#: -01 Encounter: [id number]  Primary Care Provider: Venus Wright MD   Date and time admitted to hospital: 9/9/2022 11:40 AM    * Acute respiratory failure with hypoxia ([address] )  Assessment & Plan  · Found to have oxygen saturation of 87% in ER, was utilizing 5 L nasal cannula, currently on room air  · Takes Bumex 2 mg twice daily as an outpatient  · X-ray revealed findings suggestive of fluid overload and possible pneumonia  · Patient received IV Lasix and Rocephin in ED  ·    · Echocardiogram shows LVEF of 50 to 55%  Trivial small pericardial effusion  · Acute respiratory failure is due to suspected volume overload  · Nephrology input appreciated- continue with current diuretic therapy-Bumex 4 mg BID  · 5 day course of ceftriaxone completed 9/13/22   · Blood cultures- NGTD    · Continue with respiratory protocol; oxygen supplement keep SpO2 greater than 92%  · Patient will be initiated on hemodialysis- tunneled dialysis catheter placed 9/16/2022    Chronic kidney disease, stage 4 (severe) [facility])  Assessment & Plan  Lab Results   Component Value Date    EGFR 11 09/17/2022    EGFR 11 09/16/2022    EGFR 13 09/15/2022    CREATININE 5 56 (H) 09/17/2022    CREATININE 5 46 (H) 09/16/2022    CREATININE 4 98 (H) 09/15/2022     · Baseline Cr around 4 0mg/dL  follows with nephrology outpatient  The patient underwent renal biopsy which indicates nodular diabetic glomerulosclerosis, 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     · Presented with STEFANIA Cr 4 38mg/dL suspect to be due to acute tubular necrosis  · Renal function continues to worsen  · Nephrology input appreciated- discussed initiation of dialysis   · Bumex discontinued per Nephrology  · Tunneled dialysis catheter placed 9/16  · Dialysis started 9/17/2022 with plan to dialyze again on Monday      Type 2 diabetes mellitus with stage 4 chronic kidney disease and hypertension [facility])  Assessment & Plan  Lab Results   Component Value Date    HGBA1C 9 1 (H) 2022       Recent Labs     22  1608 22  2050 22  0649 22  1118   POCGLU 177* 264* 125 115       Blood Sugar Average: Last 72 hrs:  (P) 196 6     · Lantus increased to 25 units at bedtime  · Continue lispro 5 units with meals  · Fingerstick blood sugar with sliding scale coverage 4 times daily with meals and at bedtime  · Carb controlled diet    Chest pain  Assessment & Plan  · Patient reported right sided chest pressure   · Troponin 17- 16  · No additional chest pain- patient stated he thought it may be anxiety   · Monitor for chest pain    Essential hypertension  Assessment & Plan  · Patient states he was out of his antihypertensives  · Continue home amlodipine, carvedilol, hydralazine   · Continue with aggressive diuretic therapy per nephrology    Gastroesophageal reflux disease without esophagitis  Assessment & Plan  · Continue home Protonix     Acquired hypothyroidism  Assessment & Plan  · Continue home levothyroxine    VTE Pharmacologic Prophylaxis:   Pharmacologic: Heparin  Mechanical VTE Prophylaxis in Place: ordered    Patient Centered Rounds: I have performed bedside rounds with nursing staff today  Education and Discussions with Family / Patient: Patient    Time Spent for Care: 30 minutes  More than 50% of total time spent on counseling and coordination of care as described above  Current Length of Stay: 7 day(s)    Current Patient Status: Inpatient   Certification Statement: The patient will continue to require additional inpatient hospital stay due to per plan above  Discharge Plan:  Possibly Tuesday if medically stable and dialysis chair available    Code Status: Level 1 - Full Code      Subjective:   Patient seen and examined  His only complaint is that he is tired       Objective:     Vitals:   Temp (24hrs), Av °F (36 7 °C), Min:97 6 °F (36 4 °C), Max:98 3 °F (36 8 °C)    Temp:  [97 6 °F (36 4 °C)-98 3 °F (36 8 °C)] 98 2 °F (36 8 °C)  HR:  [67-86] 73  Resp:  [17-20] 17  BP: (119-158)/(65-93) 146/89  SpO2:  [89 %-95 %] 92 %  Body mass index is 58 95 kg/m²  Input and Output Summary (last 24 hours): Intake/Output Summary (Last 24 hours) at 9/17/2022 1410  Last data filed at 9/17/2022 1145  Gross per 24 hour   Intake 1090 ml   Output 1070 ml   Net 20 ml       Physical Exam:     Physical Exam  Vitals and nursing note reviewed  Constitutional:       Appearance: He is obese  HENT:      Head: Normocephalic and atraumatic  Mouth/Throat:      Pharynx: Oropharynx is clear  Eyes:      Pupils: Pupils are equal, round, and reactive to light  Cardiovascular:      Rate and Rhythm: Normal rate  Pulmonary:      Effort: Pulmonary effort is normal  No respiratory distress  Breath sounds: Normal breath sounds  Abdominal:      General: Bowel sounds are normal       Palpations: Abdomen is soft  Tenderness: There is no abdominal tenderness  Musculoskeletal:      Cervical back: Neck supple  Right lower leg: Edema present  Left lower leg: Edema present  Comments: 2+ bilateral lower extremity nonpitting edema   Skin:     General: Skin is warm and dry  Capillary Refill: Capillary refill takes less than 2 seconds  Neurological:      General: No focal deficit present  Mental Status: He is alert  Additional Data:     Labs:    Results from last 7 days   Lab Units 09/17/22  0549 09/13/22 0423 09/12/22  0516   WBC Thousand/uL 9 40   < > 9 62   HEMOGLOBIN g/dL 8 7*   < > 7 9*   HEMATOCRIT % 27 1*   < > 24 2*   PLATELETS Thousands/uL 381   < > 350   NEUTROS PCT %  --   --  72   LYMPHS PCT %  --   --  15   MONOS PCT %  --   --  7   EOS PCT %  --   --  5    < > = values in this interval not displayed       Results from last 7 days   Lab Units 09/17/22  0549 09/13/22  0423 09/12/22  3983 Malignant neoplasm of bladder SODIUM mmol/L 138   < > 138   POTASSIUM mmol/L 3 6   < > 3 8   CHLORIDE mmol/L 97   < > 100   CO2 mmol/L 31   < > 27   BUN mg/dL 79*   < > 99*   CREATININE mg/dL 5 56*   < > 5 00*   ANION GAP mmol/L 10   < > 11   CALCIUM mg/dL 9 1   < > 8 7   ALBUMIN g/dL  --   --  2 8*   TOTAL BILIRUBIN mg/dL  --   --  0 27   ALK PHOS U/L  --   --  56   ALT U/L  --   --  8   AST U/L  --   --  7*   GLUCOSE RANDOM mg/dL 118   < > 160*    < > = values in this interval not displayed  Results from last 7 days   Lab Units 09/17/22  1118 09/17/22  0649 09/16/22  2050 09/16/22  1608 09/16/22  1135 09/16/22  0755 09/15/22  2035 09/15/22  1600 09/15/22  1119 09/15/22  0801 09/15/22  0337 09/14/22  2126   POC GLUCOSE mg/dl 115 125 264* 177* 246* 195* 250* 196* 226* 179* 166* 234*         Results from last 7 days   Lab Units 09/11/22  0457   PROCALCITONIN ng/ml 0 18           * I Have Reviewed All Lab Data Listed Above  * Additional Pertinent Lab Tests Reviewed:  [address] Admission Reviewed          Last 24 Hours Medication List:   Current Facility-Administered Medications   Medication Dose Route Frequency Provider Last Rate    acetaminophen  650 mg Oral Q6H PRN Guido De La Cruz PA-C      albuterol  2 puff Inhalation Q4H PRN Leah Saavedra DO      amLODIPine  10 mg Oral Daily DOREEN Zhang      carvedilol  25 mg Oral BID With Meals Leah Saavedra DO      doxazosin  2 mg Oral Daily Leah Saavedra DO      FLUoxetine  20 mg Oral QAM Leah Saavedra DO      glycerin-hypromellose-  2 drop Both Eyes Q3H PRN DOREEN Hutchinson      [START ON 9/19/2022] heparin (porcine)  3,000 Units Intravenous Once Sirena Cordova,       heparin (porcine)  7,500 Units Subcutaneous [facility] Leah Saavedra DO      hydrALAZINE  50 mg Oral TID DOREEN Zhang      hydrOXYzine HCL  25 mg Oral Q6H PRN Guido De La Cruz PA-C      insulin glargine  25 Units Subcutaneous HS Mihaela Khan PA-C      insulin lispro  2-12 Units Subcutaneous TID AC Milo Rosales, DO      insulin lispro  2-12 Units Subcutaneous HS Brandon Damico, DO      insulin lispro  5 Units Subcutaneous TID With Meals Brandon Damico, DO      levothyroxine  125 mcg Oral Early Morning Brandon Damico,       ondansetron  4 mg Intravenous Q4H PRN Arminda Stark PA-C      pantoprazole  40 mg Oral BID AC Brandon Damico DO      [START ON 9/18/2022] torsemide  100 mg Oral Once per day on Sun Tue Thu Sat Ranjeet Long DO          Today, Patient Was Seen By: DOREEN Hester    ** Please Note: Dictation voice to text software may have been used in the creation of this document   **

## 2022-10-05 ENCOUNTER — APPOINTMENT (OUTPATIENT)
Age: 67
End: 2022-10-05
Payer: MEDICARE

## 2022-10-05 VITALS
OXYGEN SATURATION: 97 % | RESPIRATION RATE: 17 BRPM | TEMPERATURE: 97.2 F | HEART RATE: 54 BPM | DIASTOLIC BLOOD PRESSURE: 67 MMHG | HEIGHT: 70 IN | BODY MASS INDEX: 31.5 KG/M2 | SYSTOLIC BLOOD PRESSURE: 125 MMHG | WEIGHT: 220 LBS

## 2022-10-05 PROCEDURE — 96402 CHEMO HORMON ANTINEOPL SQ/IM: CPT

## 2022-10-05 RX ORDER — LEUPROLIDE ACETATE 30 MG/.5ML
30 INJECTION, SUSPENSION, EXTENDED RELEASE SUBCUTANEOUS
Refills: 0 | Status: COMPLETED | OUTPATIENT
Start: 2022-10-05

## 2022-10-13 ENCOUNTER — NON-APPOINTMENT (OUTPATIENT)
Age: 67
End: 2022-10-13

## 2022-10-18 NOTE — PATIENT PROFILE ADULT. - SOCIAL CONCERNS
----- Message from Jenny Angeles sent at 10/18/2022  6:46 AM CDT -----  Good morning,    A urine drug screen order is needed for Jolanta, unless you did not want one done for her.    Just a reminder that the drug screen orders need to be placed at the same time as the sleep study.    Thank you,    Razia     None

## 2022-10-27 ENCOUNTER — RX RENEWAL (OUTPATIENT)
Age: 67
End: 2022-10-27

## 2022-11-11 ENCOUNTER — APPOINTMENT (OUTPATIENT)
Dept: UROLOGY | Facility: CLINIC | Age: 67
End: 2022-11-11
Payer: MEDICARE

## 2022-11-11 ENCOUNTER — OUTPATIENT (OUTPATIENT)
Dept: OUTPATIENT SERVICES | Facility: HOSPITAL | Age: 67
LOS: 1 days | End: 2022-11-11
Payer: MEDICARE

## 2022-11-11 VITALS — DIASTOLIC BLOOD PRESSURE: 86 MMHG | SYSTOLIC BLOOD PRESSURE: 145 MMHG

## 2022-11-11 DIAGNOSIS — Z98.89 OTHER SPECIFIED POSTPROCEDURAL STATES: Chronic | ICD-10-CM

## 2022-11-11 DIAGNOSIS — N13.30 UNSPECIFIED HYDRONEPHROSIS: ICD-10-CM

## 2022-11-11 DIAGNOSIS — Z85.51 PERSONAL HISTORY OF MALIGNANT NEOPLASM OF BLADDER: Chronic | ICD-10-CM

## 2022-11-11 DIAGNOSIS — Z93.6 OTHER ARTIFICIAL OPENINGS OF URINARY TRACT STATUS: Chronic | ICD-10-CM

## 2022-11-11 DIAGNOSIS — Z93.59 OTHER CYSTOSTOMY STATUS: Chronic | ICD-10-CM

## 2022-11-11 DIAGNOSIS — N32.9 BLADDER DISORDER, UNSPECIFIED: Chronic | ICD-10-CM

## 2022-11-11 DIAGNOSIS — R35.0 FREQUENCY OF MICTURITION: ICD-10-CM

## 2022-11-11 PROCEDURE — 50688 CHANGE OF URETER TUBE/STENT: CPT

## 2022-11-12 LAB
ALBUMIN SERPL ELPH-MCNC: 4.1 G/DL
ALP BLD-CCNC: 145 U/L
ALT SERPL-CCNC: 12 U/L
ANION GAP SERPL CALC-SCNC: 14 MMOL/L
AST SERPL-CCNC: 13 U/L
BILIRUB SERPL-MCNC: 0.4 MG/DL
BUN SERPL-MCNC: 48 MG/DL
CALCIUM SERPL-MCNC: 10.1 MG/DL
CHLORIDE SERPL-SCNC: 109 MMOL/L
CO2 SERPL-SCNC: 20 MMOL/L
CREAT SERPL-MCNC: 3.34 MG/DL
EGFR: 19 ML/MIN/1.73M2
GLUCOSE SERPL-MCNC: 94 MG/DL
POTASSIUM SERPL-SCNC: 4.8 MMOL/L
PROT SERPL-MCNC: 7.6 G/DL
PSA SERPL-MCNC: <0.01 NG/ML
SODIUM SERPL-SCNC: 143 MMOL/L

## 2022-11-14 DIAGNOSIS — Z93.6 OTHER ARTIFICIAL OPENINGS OF URINARY TRACT STATUS: ICD-10-CM

## 2022-11-14 DIAGNOSIS — N13.30 UNSPECIFIED HYDRONEPHROSIS: ICD-10-CM

## 2022-11-16 ENCOUNTER — APPOINTMENT (OUTPATIENT)
Dept: NEPHROLOGY | Facility: CLINIC | Age: 67
End: 2022-11-16

## 2022-11-16 VITALS
SYSTOLIC BLOOD PRESSURE: 129 MMHG | OXYGEN SATURATION: 96 % | DIASTOLIC BLOOD PRESSURE: 77 MMHG | HEIGHT: 70 IN | TEMPERATURE: 97.3 F | HEART RATE: 50 BPM

## 2022-11-16 PROCEDURE — 99215 OFFICE O/P EST HI 40 MIN: CPT

## 2022-11-17 LAB
25(OH)D3 SERPL-MCNC: 47.9 NG/ML
BASOPHILS # BLD AUTO: 0.03 K/UL
BASOPHILS NFR BLD AUTO: 0.3 %
CALCIUM SERPL-MCNC: 9.9 MG/DL
EOSINOPHIL # BLD AUTO: 0.33 K/UL
EOSINOPHIL NFR BLD AUTO: 3.2 %
HCT VFR BLD CALC: 40.3 %
HGB BLD-MCNC: 12.4 G/DL
IMM GRANULOCYTES NFR BLD AUTO: 0.9 %
LYMPHOCYTES # BLD AUTO: 1.77 K/UL
LYMPHOCYTES NFR BLD AUTO: 17.3 %
MAN DIFF?: NORMAL
MCHC RBC-ENTMCNC: 29.1 PG
MCHC RBC-ENTMCNC: 30.8 GM/DL
MCV RBC AUTO: 94.6 FL
MONOCYTES # BLD AUTO: 0.8 K/UL
MONOCYTES NFR BLD AUTO: 7.8 %
NEUTROPHILS # BLD AUTO: 7.19 K/UL
NEUTROPHILS NFR BLD AUTO: 70.5 %
PARATHYROID HORMONE INTACT: 137 PG/ML
PLATELET # BLD AUTO: 212 K/UL
RBC # BLD: 4.26 M/UL
RBC # FLD: 15.5 %
WBC # FLD AUTO: 10.21 K/UL

## 2022-11-17 NOTE — PHYSICAL EXAM

## 2022-11-28 ENCOUNTER — NON-APPOINTMENT (OUTPATIENT)
Age: 67
End: 2022-11-28

## 2022-11-29 ENCOUNTER — RX RENEWAL (OUTPATIENT)
Age: 67
End: 2022-11-29

## 2022-12-05 ENCOUNTER — APPOINTMENT (OUTPATIENT)
Dept: FAMILY MEDICINE | Facility: CLINIC | Age: 67
End: 2022-12-05

## 2022-12-27 ENCOUNTER — APPOINTMENT (OUTPATIENT)
Dept: FAMILY MEDICINE | Facility: CLINIC | Age: 67
End: 2022-12-27

## 2023-01-04 ENCOUNTER — APPOINTMENT (OUTPATIENT)
Dept: UROLOGY | Facility: CLINIC | Age: 68
End: 2023-01-04
Payer: MEDICARE

## 2023-01-04 VITALS
WEIGHT: 220 LBS | SYSTOLIC BLOOD PRESSURE: 150 MMHG | HEART RATE: 48 BPM | OXYGEN SATURATION: 97 % | HEIGHT: 70 IN | BODY MASS INDEX: 31.5 KG/M2 | DIASTOLIC BLOOD PRESSURE: 82 MMHG | RESPIRATION RATE: 16 BRPM | TEMPERATURE: 98 F

## 2023-01-04 PROCEDURE — 96402 CHEMO HORMON ANTINEOPL SQ/IM: CPT

## 2023-01-04 RX ORDER — LEUPROLIDE ACETATE 30 MG/.5ML
30 INJECTION, SUSPENSION, EXTENDED RELEASE SUBCUTANEOUS
Refills: 0 | Status: COMPLETED | OUTPATIENT
Start: 2023-01-04

## 2023-01-04 RX ADMIN — LEUPROLIDE ACETATE 0 MG: KIT SUBCUTANEOUS at 00:00

## 2023-01-31 ENCOUNTER — APPOINTMENT (OUTPATIENT)
Dept: NEPHROLOGY | Facility: CLINIC | Age: 68
End: 2023-01-31
Payer: MEDICARE

## 2023-01-31 VITALS
HEART RATE: 43 BPM | OXYGEN SATURATION: 97 % | TEMPERATURE: 97.6 F | SYSTOLIC BLOOD PRESSURE: 142 MMHG | DIASTOLIC BLOOD PRESSURE: 81 MMHG | HEIGHT: 70 IN

## 2023-01-31 PROCEDURE — 99215 OFFICE O/P EST HI 40 MIN: CPT

## 2023-01-31 NOTE — PHYSICAL EXAM
[General Appearance - Alert] : alert [General Appearance - In No Acute Distress] : in no acute distress [Sclera] : the sclera and conjunctiva were normal [PERRL With Normal Accommodation] : pupils were equal in size, round, and reactive to light [Extraocular Movements] : extraocular movements were intact [Outer Ear] : the ears and nose were normal in appearance [Oropharynx] : the oropharynx was normal [Neck Appearance] : the appearance of the neck was normal [Neck Cervical Mass (___cm)] : no neck mass was observed [Jugular Venous Distention Increased] : there was no jugular-venous distention [Thyroid Diffuse Enlargement] : the thyroid was not enlarged [Thyroid Nodule] : there were no palpable thyroid nodules [Auscultation Breath Sounds / Voice Sounds] : lungs were clear to auscultation bilaterally [Heart Rate And Rhythm] : heart rate was normal and rhythm regular [Heart Sounds] : normal S1 and S2 [Murmurs] : no murmurs [Heart Sounds Gallop] : no gallops [Heart Sounds Pericardial Friction Rub] : no pericardial rub [Full Pulse] : the pedal pulses are present [Edema] : there was no peripheral edema [Bowel Sounds] : normal bowel sounds [Abdomen Soft] : soft [Abdomen Tenderness] : non-tender [Abdomen Mass (___ Cm)] : no abdominal mass palpated [No CVA Tenderness] : no ~M costovertebral angle tenderness [No Spinal Tenderness] : no spinal tenderness [Abnormal Walk] : normal gait [Nail Clubbing] : no clubbing  or cyanosis of the fingernails [Musculoskeletal - Swelling] : no joint swelling seen [Motor Tone] : muscle strength and tone were normal [Skin Color & Pigmentation] : normal skin color and pigmentation [Skin Turgor] : normal skin turgor [] : no rash [Deep Tendon Reflexes (DTR)] : deep tendon reflexes were 2+ and symmetric [Sensation] : the sensory exam was normal to light touch and pinprick [No Focal Deficits] : no focal deficits [Oriented To Time, Place, And Person] : oriented to person, place, and time [Impaired Insight] : insight and judgment were intact [Affect] : the affect was normal [FreeTextEntry1] : anterior abd urine bag draining clear urine

## 2023-02-02 LAB
25(OH)D3 SERPL-MCNC: 59.2 NG/ML
ALBUMIN SERPL ELPH-MCNC: 4 G/DL
ANION GAP SERPL CALC-SCNC: 14 MMOL/L
BASOPHILS # BLD AUTO: 0.03 K/UL
BASOPHILS NFR BLD AUTO: 0.3 %
BUN SERPL-MCNC: 45 MG/DL
CALCIUM SERPL-MCNC: 9.8 MG/DL
CALCIUM SERPL-MCNC: 9.8 MG/DL
CHLORIDE SERPL-SCNC: 105 MMOL/L
CO2 SERPL-SCNC: 21 MMOL/L
CREAT SERPL-MCNC: 2.88 MG/DL
EGFR: 23 ML/MIN/1.73M2
EOSINOPHIL # BLD AUTO: 0.19 K/UL
EOSINOPHIL NFR BLD AUTO: 2.1 %
GLUCOSE SERPL-MCNC: 109 MG/DL
HCT VFR BLD CALC: 39.3 %
HGB BLD-MCNC: 12.9 G/DL
IMM GRANULOCYTES NFR BLD AUTO: 0.6 %
LYMPHOCYTES # BLD AUTO: 1.32 K/UL
LYMPHOCYTES NFR BLD AUTO: 14.7 %
MAN DIFF?: NORMAL
MCHC RBC-ENTMCNC: 30.6 PG
MCHC RBC-ENTMCNC: 32.8 GM/DL
MCV RBC AUTO: 93.1 FL
MONOCYTES # BLD AUTO: 0.69 K/UL
MONOCYTES NFR BLD AUTO: 7.7 %
NEUTROPHILS # BLD AUTO: 6.7 K/UL
NEUTROPHILS NFR BLD AUTO: 74.6 %
PARATHYROID HORMONE INTACT: 151 PG/ML
PHOSPHATE SERPL-MCNC: 3.5 MG/DL
PLATELET # BLD AUTO: 192 K/UL
POTASSIUM SERPL-SCNC: 4.6 MMOL/L
RBC # BLD: 4.22 M/UL
RBC # FLD: 14 %
SODIUM SERPL-SCNC: 140 MMOL/L
WBC # FLD AUTO: 8.98 K/UL

## 2023-02-14 ENCOUNTER — APPOINTMENT (OUTPATIENT)
Dept: FAMILY MEDICINE | Facility: CLINIC | Age: 68
End: 2023-02-14
Payer: MEDICARE

## 2023-02-14 VITALS — DIASTOLIC BLOOD PRESSURE: 78 MMHG | SYSTOLIC BLOOD PRESSURE: 146 MMHG

## 2023-02-14 VITALS
OXYGEN SATURATION: 97 % | TEMPERATURE: 95 F | SYSTOLIC BLOOD PRESSURE: 116 MMHG | RESPIRATION RATE: 16 BRPM | HEART RATE: 53 BPM | DIASTOLIC BLOOD PRESSURE: 74 MMHG

## 2023-02-14 DIAGNOSIS — E66.9 OBESITY, UNSPECIFIED: ICD-10-CM

## 2023-02-14 PROCEDURE — 99214 OFFICE O/P EST MOD 30 MIN: CPT | Mod: 25

## 2023-02-15 PROBLEM — E66.9 OBESITY (BMI 30.0-34.9): Status: RESOLVED | Noted: 2022-09-09 | Resolved: 2023-02-15

## 2023-02-17 ENCOUNTER — APPOINTMENT (OUTPATIENT)
Dept: UROLOGY | Facility: CLINIC | Age: 68
End: 2023-02-17

## 2023-02-22 ENCOUNTER — OUTPATIENT (OUTPATIENT)
Dept: OUTPATIENT SERVICES | Facility: HOSPITAL | Age: 68
LOS: 1 days | End: 2023-02-22
Payer: MEDICARE

## 2023-02-22 ENCOUNTER — APPOINTMENT (OUTPATIENT)
Dept: UROLOGY | Facility: CLINIC | Age: 68
End: 2023-02-22
Payer: MEDICARE

## 2023-02-22 VITALS — DIASTOLIC BLOOD PRESSURE: 79 MMHG | HEART RATE: 51 BPM | SYSTOLIC BLOOD PRESSURE: 145 MMHG

## 2023-02-22 DIAGNOSIS — Z93.59 OTHER CYSTOSTOMY STATUS: Chronic | ICD-10-CM

## 2023-02-22 DIAGNOSIS — N32.9 BLADDER DISORDER, UNSPECIFIED: Chronic | ICD-10-CM

## 2023-02-22 DIAGNOSIS — Z85.51 PERSONAL HISTORY OF MALIGNANT NEOPLASM OF BLADDER: Chronic | ICD-10-CM

## 2023-02-22 DIAGNOSIS — Z93.6 OTHER ARTIFICIAL OPENINGS OF URINARY TRACT STATUS: Chronic | ICD-10-CM

## 2023-02-22 DIAGNOSIS — R35.0 FREQUENCY OF MICTURITION: ICD-10-CM

## 2023-02-22 DIAGNOSIS — Z98.89 OTHER SPECIFIED POSTPROCEDURAL STATES: Chronic | ICD-10-CM

## 2023-02-22 PROCEDURE — 50688 CHANGE OF URETER TUBE/STENT: CPT

## 2023-02-23 DIAGNOSIS — N99.89 OTHER POSTPROCEDURAL COMPLICATIONS AND DISORDERS OF GENITOURINARY SYSTEM: ICD-10-CM

## 2023-02-23 DIAGNOSIS — Z93.6 OTHER ARTIFICIAL OPENINGS OF URINARY TRACT STATUS: ICD-10-CM

## 2023-03-24 ENCOUNTER — NON-APPOINTMENT (OUTPATIENT)
Age: 68
End: 2023-03-24

## 2023-03-24 RX ORDER — OMEGA-3 ACID ETHYL ESTERS 1 G
1 CAPSULE ORAL
Qty: 0 | Refills: 0 | DISCHARGE

## 2023-03-24 RX ORDER — ENZALUTAMIDE 80 MG/1
4 TABLET ORAL
Qty: 0 | Refills: 0 | DISCHARGE

## 2023-03-24 RX ORDER — ICOSAPENT ETHYL 500 MG/1
2 CAPSULE, LIQUID FILLED ORAL
Qty: 0 | Refills: 0 | DISCHARGE

## 2023-03-28 ENCOUNTER — APPOINTMENT (OUTPATIENT)
Dept: UROLOGY | Facility: CLINIC | Age: 68
End: 2023-03-28
Payer: MEDICARE

## 2023-03-28 ENCOUNTER — INPATIENT (INPATIENT)
Facility: HOSPITAL | Age: 68
LOS: 5 days | Discharge: HOME | End: 2023-04-03
Attending: SPECIALIST | Admitting: SPECIALIST
Payer: MEDICARE

## 2023-03-28 VITALS
SYSTOLIC BLOOD PRESSURE: 101 MMHG | DIASTOLIC BLOOD PRESSURE: 54 MMHG | RESPIRATION RATE: 16 BRPM | OXYGEN SATURATION: 98 % | TEMPERATURE: 98 F | HEART RATE: 54 BPM

## 2023-03-28 DIAGNOSIS — Z85.51 PERSONAL HISTORY OF MALIGNANT NEOPLASM OF BLADDER: Chronic | ICD-10-CM

## 2023-03-28 DIAGNOSIS — Z98.89 OTHER SPECIFIED POSTPROCEDURAL STATES: Chronic | ICD-10-CM

## 2023-03-28 DIAGNOSIS — N32.9 BLADDER DISORDER, UNSPECIFIED: Chronic | ICD-10-CM

## 2023-03-28 DIAGNOSIS — N99.528 OTHER COMPLICATION OF INCONTINENT EXTERNAL STOMA OF URINARY TRACT: ICD-10-CM

## 2023-03-28 DIAGNOSIS — Z93.6 OTHER ARTIFICIAL OPENINGS OF URINARY TRACT STATUS: Chronic | ICD-10-CM

## 2023-03-28 DIAGNOSIS — Z93.59 OTHER CYSTOSTOMY STATUS: Chronic | ICD-10-CM

## 2023-03-28 LAB
ANION GAP SERPL CALC-SCNC: 19 MMOL/L — HIGH (ref 7–14)
APTT BLD: 30.7 SEC — SIGNIFICANT CHANGE UP (ref 27–36.3)
BLD GP AB SCN SERPL QL: NEGATIVE — SIGNIFICANT CHANGE UP
BUN SERPL-MCNC: 111 MG/DL — HIGH (ref 7–23)
CALCIUM SERPL-MCNC: 9.2 MG/DL — SIGNIFICANT CHANGE UP (ref 8.4–10.5)
CHLORIDE SERPL-SCNC: 97 MMOL/L — LOW (ref 98–107)
CO2 SERPL-SCNC: 17 MMOL/L — LOW (ref 22–31)
CREAT SERPL-MCNC: 8.82 MG/DL — HIGH (ref 0.5–1.3)
EGFR: 6 ML/MIN/1.73M2 — LOW
FLUAV AG NPH QL: SIGNIFICANT CHANGE UP
FLUBV AG NPH QL: SIGNIFICANT CHANGE UP
GLUCOSE SERPL-MCNC: 114 MG/DL — HIGH (ref 70–99)
HCT VFR BLD CALC: 31.2 % — LOW (ref 39–50)
HGB BLD-MCNC: 10.1 G/DL — LOW (ref 13–17)
INR BLD: 1.17 RATIO — HIGH (ref 0.88–1.16)
MAGNESIUM SERPL-MCNC: 2.7 MG/DL — HIGH (ref 1.6–2.6)
MCHC RBC-ENTMCNC: 29.8 PG — SIGNIFICANT CHANGE UP (ref 27–34)
MCHC RBC-ENTMCNC: 32.4 GM/DL — SIGNIFICANT CHANGE UP (ref 32–36)
MCV RBC AUTO: 92 FL — SIGNIFICANT CHANGE UP (ref 80–100)
NRBC # BLD: 0 /100 WBCS — SIGNIFICANT CHANGE UP (ref 0–0)
NRBC # FLD: 0 K/UL — SIGNIFICANT CHANGE UP (ref 0–0)
PHOSPHATE SERPL-MCNC: 4.5 MG/DL — SIGNIFICANT CHANGE UP (ref 2.5–4.5)
PLATELET # BLD AUTO: 197 K/UL — SIGNIFICANT CHANGE UP (ref 150–400)
POTASSIUM SERPL-MCNC: 4.4 MMOL/L — SIGNIFICANT CHANGE UP (ref 3.5–5.3)
POTASSIUM SERPL-SCNC: 4.4 MMOL/L — SIGNIFICANT CHANGE UP (ref 3.5–5.3)
PROTHROM AB SERPL-ACNC: 13.6 SEC — HIGH (ref 10.5–13.4)
RBC # BLD: 3.39 M/UL — LOW (ref 4.2–5.8)
RBC # FLD: 14.8 % — HIGH (ref 10.3–14.5)
RH IG SCN BLD-IMP: POSITIVE — SIGNIFICANT CHANGE UP
RSV RNA NPH QL NAA+NON-PROBE: SIGNIFICANT CHANGE UP
SARS-COV-2 RNA SPEC QL NAA+PROBE: SIGNIFICANT CHANGE UP
SODIUM SERPL-SCNC: 133 MMOL/L — LOW (ref 135–145)
WBC # BLD: 16.3 K/UL — HIGH (ref 3.8–10.5)
WBC # FLD AUTO: 16.3 K/UL — HIGH (ref 3.8–10.5)

## 2023-03-28 PROCEDURE — 99213 OFFICE O/P EST LOW 20 MIN: CPT

## 2023-03-28 PROCEDURE — 99285 EMERGENCY DEPT VISIT HI MDM: CPT

## 2023-03-28 RX ORDER — SENNA PLUS 8.6 MG/1
2 TABLET ORAL AT BEDTIME
Refills: 0 | Status: DISCONTINUED | OUTPATIENT
Start: 2023-03-28 | End: 2023-04-03

## 2023-03-28 RX ORDER — PIPERACILLIN AND TAZOBACTAM 4; .5 G/20ML; G/20ML
3.38 INJECTION, POWDER, LYOPHILIZED, FOR SOLUTION INTRAVENOUS ONCE
Refills: 0 | Status: COMPLETED | OUTPATIENT
Start: 2023-03-29 | End: 2023-03-29

## 2023-03-28 RX ORDER — PIPERACILLIN AND TAZOBACTAM 4; .5 G/20ML; G/20ML
3.38 INJECTION, POWDER, LYOPHILIZED, FOR SOLUTION INTRAVENOUS ONCE
Refills: 0 | Status: COMPLETED | OUTPATIENT
Start: 2023-03-28 | End: 2023-03-28

## 2023-03-28 RX ORDER — AMLODIPINE BESYLATE 2.5 MG/1
10 TABLET ORAL DAILY
Refills: 0 | Status: DISCONTINUED | OUTPATIENT
Start: 2023-03-28 | End: 2023-03-30

## 2023-03-28 RX ORDER — SODIUM CHLORIDE 9 MG/ML
1000 INJECTION, SOLUTION INTRAVENOUS
Refills: 0 | Status: DISCONTINUED | OUTPATIENT
Start: 2023-03-28 | End: 2023-04-01

## 2023-03-28 RX ORDER — TRAZODONE HCL 50 MG
150 TABLET ORAL AT BEDTIME
Refills: 0 | Status: DISCONTINUED | OUTPATIENT
Start: 2023-03-28 | End: 2023-03-28

## 2023-03-28 RX ORDER — ATENOLOL 25 MG/1
50 TABLET ORAL
Refills: 0 | Status: DISCONTINUED | OUTPATIENT
Start: 2023-03-28 | End: 2023-03-30

## 2023-03-28 RX ORDER — HEPARIN SODIUM 5000 [USP'U]/ML
5000 INJECTION INTRAVENOUS; SUBCUTANEOUS EVERY 8 HOURS
Refills: 0 | Status: DISCONTINUED | OUTPATIENT
Start: 2023-03-28 | End: 2023-03-28

## 2023-03-28 RX ORDER — CLONAZEPAM 1 MG
1 TABLET ORAL THREE TIMES A DAY
Refills: 0 | Status: DISCONTINUED | OUTPATIENT
Start: 2023-03-28 | End: 2023-03-28

## 2023-03-28 RX ORDER — PIPERACILLIN AND TAZOBACTAM 4; .5 G/20ML; G/20ML
3.38 INJECTION, POWDER, LYOPHILIZED, FOR SOLUTION INTRAVENOUS EVERY 12 HOURS
Refills: 0 | Status: DISCONTINUED | OUTPATIENT
Start: 2023-03-29 | End: 2023-04-03

## 2023-03-28 RX ADMIN — Medication 150 MILLIGRAM(S): at 21:54

## 2023-03-28 RX ADMIN — PIPERACILLIN AND TAZOBACTAM 200 GRAM(S): 4; .5 INJECTION, POWDER, LYOPHILIZED, FOR SOLUTION INTRAVENOUS at 23:33

## 2023-03-28 RX ADMIN — SODIUM CHLORIDE 100 MILLILITER(S): 9 INJECTION, SOLUTION INTRAVENOUS at 19:52

## 2023-03-28 RX ADMIN — Medication 1 MILLIGRAM(S): at 21:54

## 2023-03-28 NOTE — H&P ADULT - ASSESSMENT
68 male w/ hx of ileal conduit with Dr. Esteban presents with BARON after dislodged nephrostomy tube.     Plan  -Admit to Urology under Dr. Esteban  -IR consulted for NT replacement in the setting of Cr 8.8 from baseline 2  -NPO at midnight, IVF  -monitor for fever/hemodynamic changes  -f/u IR recommendations in the AM  -Zosyn for antibiotics  -Plan discussed with Dr. Esteban

## 2023-03-28 NOTE — ED ADULT NURSE NOTE - OBJECTIVE STATEMENT
Patient is A&OX4, awake and alert. Pt coming to ED from home regarding new generalized weakness that started last Friday and nephrostomy tube "coming out". Pt states he has a decrease appetite since Friday and has been eating one meal since Friday. Pt denies any N/V and ABD pain. Pt states he had several episodes of non bloody diarrhea over the weekend. Nephrostomy tube site is clean dry, no redness, swelling, or foul odor noted.  Pt denies any fever, chills, blurred vision, weakness, headache. PMH of DM2, HTN, and bladder cancer. lung sounds clear BL, respirations are even and unlabored. heart tones audible and regular. ABD is soft, nontender, and nondistended. Bowel sounds present and active in all 4 quads. bed lowest position. call bell within reach. will continue to monitor.

## 2023-03-28 NOTE — ED PROVIDER NOTE - CLINICAL SUMMARY MEDICAL DECISION MAKING FREE TEXT BOX
well appearing male presents for correction of his dislodged ileal conduit x5d and generalized weakness with no focal complaints or findings. will obtain screening labs and cs uro

## 2023-03-28 NOTE — H&P ADULT - NSHPLABSRESULTS_GEN_ALL_CORE
Labs:                       10.1   16.30 )-----------( 197      ( 28 Mar 2023 17:54 )             31.2   03-28    133<L>  |  97<L>  |  111<H>  ----------------------------<  114<H>  4.4   |  17<L>  |  8.82<H>    Ca    9.2      28 Mar 2023 17:54  Phos  4.5     03-28  Mg     2.70     03-28    PT/INR - ( 28 Mar 2023 17:54 )   PT: 13.6 sec;   INR: 1.17 ratio         PTT - ( 28 Mar 2023 17:54 )  PTT:30.7 sec

## 2023-03-28 NOTE — ED PROVIDER NOTE - PROGRESS NOTE DETAILS
MAGAN Loya PGY2 urology aware requesting IR consultation and admission to Dr. Esteban. ecg with sinus manuel. normotensive and animated. no blocks or cardiac symptoms to warrant extensive workup in the ER prior to admission. IR consult order placed and I called IR team, who will reach out.

## 2023-03-28 NOTE — H&P ADULT - NSHPPHYSICALEXAM_GEN_ALL_CORE
PHYSICAL EXAM:  Gen: NAD, resting comfortably, conversive.   Cardiopulm: Non-labored breathing.   Ab: Soft, appropriately tender, nondistended. Right nephrostomy tube bag with purulent drainage

## 2023-03-28 NOTE — ED PROVIDER NOTE - PHYSICAL EXAMINATION
General: non-toxic, NAD  HEENT: NCAT, PERRL ,no conjunctival pallor   Cardiac: bradycardic no murmurs, 2+ peripheral pulses  Resp: CTAB  Abdomen: obese, bowel sounds present, no ttp, no rebound or guarding. no organomegaly  Extremities: no peripheral edema, calf tenderness, or leg size discrepancies  Skin: no rashes  Neuro: AAOx4, 5+motor, sensation grossly intact  Psych: mood and affect appropriate General: non-toxic, NAD  HEENT: NCAT, PERRL ,no conjunctival pallor   Cardiac: bradycardic no murmurs, 2+ peripheral pulses  Resp: CTAB  Abdomen: obese, bowel sounds present, no ttp, no rebound or guarding. no organomegaly. ileal conduit with ileostomy looks clean dry and intact with redundant tubing visible in the ostomy bag. suprapubic area with question of old stoma site with clear fluid accumulation and mild skin irritation without ulceration. no fluid wave.   Extremities: no peripheral edema, calf tenderness, or leg size discrepancies  Skin: no rashes  Neuro: AAOx4, 5+motor, sensation grossly intact  Psych: mood and affect appropriate

## 2023-03-28 NOTE — ED ADULT TRIAGE NOTE - CHIEF COMPLAINT QUOTE
c/o generalized weakness since discharged from hospital on Friday. States has bilateral nephrostomy tubes and one of the wires came out. Pt appears drowsy, weak and pale. [id number]

## 2023-03-28 NOTE — H&P ADULT - NSICDXPASTSURGICALHX_GEN_ALL_CORE_FT
4 = No assist / stand by assistance PAST SURGICAL HISTORY:  Bladder disease Bladder Removed    H/O cystoscopy - multiple  last cystoscopy, laser litholapaxy on 1/2018    History of bladder cancer bladder removal May 10, 2018    History of prostatectomy robotic, 2011, s/p radiation    Nephrostomy status Left, 8/1/2018    S/P Appendectomy 40 years ago    S/P ileal conduit May 2018    Suprapubic catheter 8/2015

## 2023-03-28 NOTE — ED PROVIDER NOTE - NS ED ROS FT
Constitutional: no fevers, chills  HEENT: no HA, vision changes, rhinorrhea, sore throat  Cardiac: no chest pain, palpitations  Respiratory: no SOB, cough or hemoptysis  GI: no n/v/d/c, abd pain, bloody or dark stools  : dislodged ileal conduit  MSK: no joint pain, neck pain or back pain  Skin: no rashes, jaundice, pruritis  Neuro: no numbness/tingling, +gen weakness, not walking  ROS otherwise neg except per hpi

## 2023-03-28 NOTE — ED PROVIDER NOTE - ATTENDING CONTRIBUTION TO CARE
68y Male complaining of weakness hx HTN bladder cancer s/p nephrostomy tubes now with ileal conduit presents for 5d dislodged retrograde nephrostomies and generalized weakness. No abd pain, n/v. Denies fevers, chills. Afeb on triage vitals. +urine in ileal conduit bag, abd no focal ttp. Has had some decreased appetite over the last few days but now is feeling hungry, asking for food. Pt states he was told by his urology to present to the ED for tube replacement with IR on 3/24- in his chart I can see an IR consult note stating no need for emergent intervention and pt ws discharged home. There are no ED visit or triage notes from that visit. Dr. Esteban recalled pt to ED today for admission for IR intervention, pt accepted to urology for direct admission.  On my evaluation of the patient, he is noticeably very frustrated and angry regarding his care.  He voiced many frustrations that he had been discharged home on the 24th without any intervention with interventional radiology and states that over the weekend he was feeling very lethargic and unable to get out of bed, decreased appetite.  He is very angry at this time that his procedure is not being performed immediately.  His abdomen is soft, nontender, there is urine drainage into his ileal conduit bag.  I spoke with patient at length regarding his frustrations. I explained that I was not here on March 24 and there are no ER visit notes in his chart so I am not sure as to why the procedure was not performed at that time however interventional radiology note in chart reviewed and  states they did not feel that this was an emergent procedure but that I could not provide him with further information as to the details of that decision as it was outside of the purview of my expertise. I reassured him that the urology team ws coming down imminently to speak with him and that I had placed an emergent consult note to IR to evaluate him for procedure and that they would be the ones to schedule it and give him more details as to timing.

## 2023-03-28 NOTE — H&P ADULT - HISTORY OF PRESENT ILLNESS
68y Male complaining of weakness hx HTN bladder cancer s/p nephrostomy tubes now with ileal conduit presents for 5d of dislodged tubeand generalized weakness. no other localizing or infectious symptoms. contrary to triage note pt is animated with normal skin color and interactive on interview. he has no abd pain, distension, sob, cp, focal weakness. notes had some decreased appetite over the last 5d but now is feeling hungry, asking for food. no n/v/d rashes or uri sx. states he was seen on friday in this ER when the tube initially dislodged, at the recommendation of Dr. Esteban (uro). No changes or adjustments were made to the tubing at that visit. He received a call yesterday from Dr. Esteban that he could not adjust the tube, so he came to the ER. states "all I need is my tube fixed". Contrary to triage note pt does not have nephrostomy tubes    Patient complaining of dislodged nephrostomy tube requiring replacement.. Mentioned that his tube has been out of place since Friday, however IR decided not to intervene. Today comes back with weakness and NT continuing to be dislodged, Cr 8 from 2 baseline. AVSS. IR consulted.

## 2023-03-29 DIAGNOSIS — C61 MALIGNANT NEOPLASM OF PROSTATE: ICD-10-CM

## 2023-03-29 DIAGNOSIS — N17.9 ACUTE KIDNEY FAILURE, UNSPECIFIED: ICD-10-CM

## 2023-03-29 DIAGNOSIS — F41.9 ANXIETY DISORDER, UNSPECIFIED: ICD-10-CM

## 2023-03-29 DIAGNOSIS — E55.9 VITAMIN D DEFICIENCY, UNSPECIFIED: ICD-10-CM

## 2023-03-29 DIAGNOSIS — I10 ESSENTIAL (PRIMARY) HYPERTENSION: ICD-10-CM

## 2023-03-29 DIAGNOSIS — N18.4 CHRONIC KIDNEY DISEASE, STAGE 4 (SEVERE): ICD-10-CM

## 2023-03-29 DIAGNOSIS — G47.00 INSOMNIA, UNSPECIFIED: ICD-10-CM

## 2023-03-29 LAB
ALBUMIN SERPL ELPH-MCNC: 2.8 G/DL — LOW (ref 3.3–5)
ALP SERPL-CCNC: 83 U/L — SIGNIFICANT CHANGE UP (ref 40–120)
ALT FLD-CCNC: 9 U/L — SIGNIFICANT CHANGE UP (ref 4–41)
ANION GAP SERPL CALC-SCNC: 20 MMOL/L — HIGH (ref 7–14)
ANION GAP SERPL CALC-SCNC: 20 MMOL/L — HIGH (ref 7–14)
ANISOCYTOSIS BLD QL: SLIGHT — SIGNIFICANT CHANGE UP
APTT BLD: 28.5 SEC — SIGNIFICANT CHANGE UP (ref 27–36.3)
APTT BLD: 31.5 SEC — SIGNIFICANT CHANGE UP (ref 27–36.3)
AST SERPL-CCNC: 11 U/L — SIGNIFICANT CHANGE UP (ref 4–40)
BASOPHILS # BLD AUTO: 0 K/UL — SIGNIFICANT CHANGE UP (ref 0–0.2)
BASOPHILS NFR BLD AUTO: 0 % — SIGNIFICANT CHANGE UP (ref 0–2)
BILIRUB SERPL-MCNC: 0.4 MG/DL — SIGNIFICANT CHANGE UP (ref 0.2–1.2)
BUN SERPL-MCNC: 119 MG/DL — HIGH (ref 7–23)
BUN SERPL-MCNC: 119 MG/DL — HIGH (ref 7–23)
CALCIUM SERPL-MCNC: 8.2 MG/DL — LOW (ref 8.4–10.5)
CALCIUM SERPL-MCNC: 8.4 MG/DL — SIGNIFICANT CHANGE UP (ref 8.4–10.5)
CHLORIDE SERPL-SCNC: 100 MMOL/L — SIGNIFICANT CHANGE UP (ref 98–107)
CHLORIDE SERPL-SCNC: 98 MMOL/L — SIGNIFICANT CHANGE UP (ref 98–107)
CO2 SERPL-SCNC: 12 MMOL/L — LOW (ref 22–31)
CO2 SERPL-SCNC: 15 MMOL/L — LOW (ref 22–31)
CREAT SERPL-MCNC: 8.81 MG/DL — HIGH (ref 0.5–1.3)
CREAT SERPL-MCNC: 9.14 MG/DL — HIGH (ref 0.5–1.3)
EGFR: 6 ML/MIN/1.73M2 — LOW
EGFR: 6 ML/MIN/1.73M2 — LOW
EOSINOPHIL # BLD AUTO: 0 K/UL — SIGNIFICANT CHANGE UP (ref 0–0.5)
EOSINOPHIL NFR BLD AUTO: 0 % — SIGNIFICANT CHANGE UP (ref 0–6)
GLUCOSE SERPL-MCNC: 143 MG/DL — HIGH (ref 70–99)
GLUCOSE SERPL-MCNC: 252 MG/DL — HIGH (ref 70–99)
HCT VFR BLD CALC: 27.6 % — LOW (ref 39–50)
HCT VFR BLD CALC: 27.8 % — LOW (ref 39–50)
HGB BLD-MCNC: 9 G/DL — LOW (ref 13–17)
HGB BLD-MCNC: 9.1 G/DL — LOW (ref 13–17)
HYPOCHROMIA BLD QL: SLIGHT — SIGNIFICANT CHANGE UP
IANC: 9.59 K/UL — HIGH (ref 1.8–7.4)
INR BLD: 1.12 RATIO — SIGNIFICANT CHANGE UP (ref 0.88–1.16)
INR BLD: 1.13 RATIO — SIGNIFICANT CHANGE UP (ref 0.88–1.16)
LYMPHOCYTES # BLD AUTO: 1.08 K/UL — SIGNIFICANT CHANGE UP (ref 1–3.3)
LYMPHOCYTES # BLD AUTO: 8.6 % — LOW (ref 13–44)
MCHC RBC-ENTMCNC: 29.6 PG — SIGNIFICANT CHANGE UP (ref 27–34)
MCHC RBC-ENTMCNC: 29.9 PG — SIGNIFICANT CHANGE UP (ref 27–34)
MCHC RBC-ENTMCNC: 32.4 GM/DL — SIGNIFICANT CHANGE UP (ref 32–36)
MCHC RBC-ENTMCNC: 33 GM/DL — SIGNIFICANT CHANGE UP (ref 32–36)
MCV RBC AUTO: 89.9 FL — SIGNIFICANT CHANGE UP (ref 80–100)
MCV RBC AUTO: 92.4 FL — SIGNIFICANT CHANGE UP (ref 80–100)
MONOCYTES # BLD AUTO: 0.87 K/UL — SIGNIFICANT CHANGE UP (ref 0–0.9)
MONOCYTES NFR BLD AUTO: 6.9 % — SIGNIFICANT CHANGE UP (ref 2–14)
MYELOCYTES NFR BLD: 0.9 % — HIGH (ref 0–0)
NEUTROPHILS # BLD AUTO: 10.3 K/UL — HIGH (ref 1.8–7.4)
NEUTROPHILS NFR BLD AUTO: 78.4 % — HIGH (ref 43–77)
NEUTS BAND # BLD: 3.5 % — SIGNIFICANT CHANGE UP (ref 0–6)
NRBC # BLD: 0 /100 WBCS — SIGNIFICANT CHANGE UP (ref 0–0)
NRBC # FLD: 0 K/UL — SIGNIFICANT CHANGE UP (ref 0–0)
OVALOCYTES BLD QL SMEAR: SLIGHT — SIGNIFICANT CHANGE UP
PLAT MORPH BLD: NORMAL — SIGNIFICANT CHANGE UP
PLATELET # BLD AUTO: 195 K/UL — SIGNIFICANT CHANGE UP (ref 150–400)
PLATELET # BLD AUTO: 252 K/UL — SIGNIFICANT CHANGE UP (ref 150–400)
PLATELET COUNT - ESTIMATE: NORMAL — SIGNIFICANT CHANGE UP
POLYCHROMASIA BLD QL SMEAR: SLIGHT — SIGNIFICANT CHANGE UP
POTASSIUM SERPL-MCNC: 4.3 MMOL/L — SIGNIFICANT CHANGE UP (ref 3.5–5.3)
POTASSIUM SERPL-MCNC: 4.7 MMOL/L — SIGNIFICANT CHANGE UP (ref 3.5–5.3)
POTASSIUM SERPL-SCNC: 4.3 MMOL/L — SIGNIFICANT CHANGE UP (ref 3.5–5.3)
POTASSIUM SERPL-SCNC: 4.7 MMOL/L — SIGNIFICANT CHANGE UP (ref 3.5–5.3)
PROT SERPL-MCNC: 6.6 G/DL — SIGNIFICANT CHANGE UP (ref 6–8.3)
PROTHROM AB SERPL-ACNC: 13 SEC — SIGNIFICANT CHANGE UP (ref 10.5–13.4)
PROTHROM AB SERPL-ACNC: 13.1 SEC — SIGNIFICANT CHANGE UP (ref 10.5–13.4)
RBC # BLD: 3.01 M/UL — LOW (ref 4.2–5.8)
RBC # BLD: 3.07 M/UL — LOW (ref 4.2–5.8)
RBC # FLD: 14.6 % — HIGH (ref 10.3–14.5)
RBC # FLD: 14.9 % — HIGH (ref 10.3–14.5)
RBC BLD AUTO: ABNORMAL
SODIUM SERPL-SCNC: 132 MMOL/L — LOW (ref 135–145)
SODIUM SERPL-SCNC: 133 MMOL/L — LOW (ref 135–145)
VARIANT LYMPHS # BLD: 1.7 % — SIGNIFICANT CHANGE UP (ref 0–6)
WBC # BLD: 12.58 K/UL — HIGH (ref 3.8–10.5)
WBC # BLD: 17.02 K/UL — HIGH (ref 3.8–10.5)
WBC # FLD AUTO: 12.58 K/UL — HIGH (ref 3.8–10.5)
WBC # FLD AUTO: 17.02 K/UL — HIGH (ref 3.8–10.5)

## 2023-03-29 PROCEDURE — 99231 SBSQ HOSP IP/OBS SF/LOW 25: CPT

## 2023-03-29 PROCEDURE — 99223 1ST HOSP IP/OBS HIGH 75: CPT

## 2023-03-29 PROCEDURE — 74176 CT ABD & PELVIS W/O CONTRAST: CPT | Mod: 26

## 2023-03-29 PROCEDURE — 75984 XRAY CONTROL CATHETER CHANGE: CPT | Mod: 26,59

## 2023-03-29 PROCEDURE — 99222 1ST HOSP IP/OBS MODERATE 55: CPT

## 2023-03-29 PROCEDURE — 50688 CHANGE OF URETER TUBE/STENT: CPT | Mod: LT

## 2023-03-29 RX ORDER — VANCOMYCIN HCL 1 G
1250 VIAL (EA) INTRAVENOUS ONCE
Refills: 0 | Status: COMPLETED | OUTPATIENT
Start: 2023-03-29 | End: 2023-03-29

## 2023-03-29 RX ORDER — SODIUM CHLORIDE 9 MG/ML
1000 INJECTION INTRAMUSCULAR; INTRAVENOUS; SUBCUTANEOUS ONCE
Refills: 0 | Status: COMPLETED | OUTPATIENT
Start: 2023-03-29 | End: 2023-03-29

## 2023-03-29 RX ORDER — CHLORHEXIDINE GLUCONATE 213 G/1000ML
1 SOLUTION TOPICAL DAILY
Refills: 0 | Status: DISCONTINUED | OUTPATIENT
Start: 2023-03-29 | End: 2023-03-31

## 2023-03-29 RX ORDER — CLONAZEPAM 1 MG
1 TABLET ORAL THREE TIMES A DAY
Refills: 0 | Status: DISCONTINUED | OUTPATIENT
Start: 2023-03-29 | End: 2023-04-03

## 2023-03-29 RX ORDER — SODIUM BICARBONATE 1 MEQ/ML
650 SYRINGE (ML) INTRAVENOUS THREE TIMES A DAY
Refills: 0 | Status: DISCONTINUED | OUTPATIENT
Start: 2023-03-29 | End: 2023-04-03

## 2023-03-29 RX ORDER — HEPARIN SODIUM 5000 [USP'U]/ML
5000 INJECTION INTRAVENOUS; SUBCUTANEOUS EVERY 8 HOURS
Refills: 0 | Status: DISCONTINUED | OUTPATIENT
Start: 2023-03-29 | End: 2023-04-03

## 2023-03-29 RX ORDER — DESMOPRESSIN ACETATE 0.1 MG/1
30 TABLET ORAL ONCE
Refills: 0 | Status: DISCONTINUED | OUTPATIENT
Start: 2023-03-29 | End: 2023-03-29

## 2023-03-29 RX ORDER — SODIUM CHLORIDE 9 MG/ML
1000 INJECTION, SOLUTION INTRAVENOUS ONCE
Refills: 0 | Status: COMPLETED | OUTPATIENT
Start: 2023-03-29 | End: 2023-03-29

## 2023-03-29 RX ORDER — PHENYLEPHRINE HYDROCHLORIDE 10 MG/ML
0.5 INJECTION INTRAVENOUS
Qty: 40 | Refills: 0 | Status: DISCONTINUED | OUTPATIENT
Start: 2023-03-29 | End: 2023-03-31

## 2023-03-29 RX ORDER — CALCITRIOL 0.5 UG/1
0.25 CAPSULE ORAL
Refills: 0 | Status: DISCONTINUED | OUTPATIENT
Start: 2023-03-29 | End: 2023-04-03

## 2023-03-29 RX ADMIN — PHENYLEPHRINE HYDROCHLORIDE 19.6 MICROGRAM(S)/KG/MIN: 10 INJECTION INTRAVENOUS at 21:30

## 2023-03-29 RX ADMIN — SODIUM CHLORIDE 100 MILLILITER(S): 9 INJECTION, SOLUTION INTRAVENOUS at 09:41

## 2023-03-29 RX ADMIN — SODIUM CHLORIDE 1000 MILLILITER(S): 9 INJECTION, SOLUTION INTRAVENOUS at 21:31

## 2023-03-29 RX ADMIN — PHENYLEPHRINE HYDROCHLORIDE 19.6 MICROGRAM(S)/KG/MIN: 10 INJECTION INTRAVENOUS at 19:16

## 2023-03-29 RX ADMIN — Medication 650 MILLIGRAM(S): at 22:31

## 2023-03-29 RX ADMIN — Medication 1 MILLIGRAM(S): at 10:19

## 2023-03-29 RX ADMIN — SODIUM CHLORIDE 1000 MILLILITER(S): 9 INJECTION INTRAMUSCULAR; INTRAVENOUS; SUBCUTANEOUS at 18:56

## 2023-03-29 RX ADMIN — SODIUM CHLORIDE 100 MILLILITER(S): 9 INJECTION, SOLUTION INTRAVENOUS at 21:29

## 2023-03-29 RX ADMIN — Medication 166.67 MILLIGRAM(S): at 22:44

## 2023-03-29 RX ADMIN — Medication 1 MILLIGRAM(S): at 22:31

## 2023-03-29 RX ADMIN — HEPARIN SODIUM 5000 UNIT(S): 5000 INJECTION INTRAVENOUS; SUBCUTANEOUS at 22:32

## 2023-03-29 RX ADMIN — PIPERACILLIN AND TAZOBACTAM 25 GRAM(S): 4; .5 INJECTION, POWDER, LYOPHILIZED, FOR SOLUTION INTRAVENOUS at 17:44

## 2023-03-29 RX ADMIN — SODIUM CHLORIDE 100 MILLILITER(S): 9 INJECTION, SOLUTION INTRAVENOUS at 07:11

## 2023-03-29 RX ADMIN — PIPERACILLIN AND TAZOBACTAM 25 GRAM(S): 4; .5 INJECTION, POWDER, LYOPHILIZED, FOR SOLUTION INTRAVENOUS at 06:39

## 2023-03-29 NOTE — CONSULT NOTE ADULT - ASSESSMENT
Assessment: 68y Male with bladder ca and ileal conduit with fully dislodged L retrograde nephrostomy and partially dislodged R retrograde nephrostomy.     Plan: IR to perform R retrograde nephrostomy exchange and L PCN with possible stent placement today, 3/29  -IR procedure ordered  -COVID PCR within 5 days of procedure  -discussed with primary team    Nicolás Adorno MD  PGY-V, Interventional Radiology    -Available on [employer] TEAMS for all non-urgent questions  -Emergent issues: [facility]-p.[phone]; [facility]-p.[phone] ([phone])  -Non-emergent consults: Please place a [facility] order "IR Consult" with an appropriate callback number  -Scheduling questions: [facility]: [phone]; [facility]: [phone]  -Clinic/Outpatient booking: [facility]: [phone]; [facility]: [phone]
ASSESSMENT:  Patient is a 68 year old male complaining of weakness hx HTN bladder cancer s/p nephrostomy tubes now with ileal conduit presents for 5d of dislodged tube and generalized weakness. Underwent bilateral nephroureteral stent replacement with IR today. Upon termination of the procedure, the patient developed hypotension requiring pressor support. SICU consulted for hemodynamic monitoring in setting of post-procedure hypotension requiring pressor support.      PLAN:    Neuro:  - Pain control: tylenol, will order prn meds if needed    Resp:  - On room air  - Maintain SpO2 > 92%    CV:  - Requiring everton  - Will wean vasopressor support w/ goal MAP > 65 mmHg  - Will POCUS  - Will evaluate need for A-line placement    GI:   - NPO, will discuss advancing diet in AM    Renal:  - Monitor urostomy output  - Monitor I&Os and electrolytes w/ repletions as necessary    Heme:  - Monitor CBC and coags  - SQH for VTE prophylaxis    ID:   - Monitor for clinical evidence of active infection  - Monitor WBC and temperature  - Antibiotics - Zosyn, will add vanc given stent placement    Endo:   - Monitor glucose on metabolic panel    Code Status: Full code    Disposition: SICU
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 Presents for dislodged tube, found to have BARON on CKD

## 2023-03-29 NOTE — PROGRESS NOTE ADULT - SUBJECTIVE AND OBJECTIVE BOX
Note    Post IR Procedure Note    s/p B/L nephroureteral stent exchange, left side performed percutaneously.    Patient developed Hypotension to the 80/50s post procedure.  Patient received 2L IVF bolus without improvement, requiring Phenylephrine.  SICU consulted, and accepted for admission.     Patient seen and examined without complaints, denies pain, denies dizziness/lightheadedness, denies chest pain or shortness of breath.      Vital Signs Last 24 Hrs  T(C): 36.4 (29 Mar 2023 13:42), Max: 36.9 (29 Mar 2023 09:45)  T(F): 97.6 (29 Mar 2023 13:42), Max: 98.4 (29 Mar 2023 09:45)  HR: 58 (29 Mar 2023 13:42) (51 - 58)  BP: 108/51 (29 Mar 2023 13:42) (107/58 - 126/62)  BP(mean): --  RR: 17 (29 Mar 2023 13:42) (17 - 18)  SpO2: 97% (29 Mar 2023 13:42) (96% - 98%)    Parameters below as of 29 Mar 2023 13:42  Patient On (Oxygen Delivery Method): room air    I&O's Summary    28 Mar 2023 07:01  -  29 Mar 2023 07:00  --------------------------------------------------------  IN: 850 mL / OUT: 400 mL / NET: 450 mL    29 Mar 2023 07:01  -  29 Mar 2023 19:59  --------------------------------------------------------  IN: 860 mL / OUT: 600 mL / NET: 260 mL    PHYSICAL EXAM:   Constitutional: well appearing, A+O, no distress    Respiratory: clear     Cardiovascular: regular     Gastrointestinal: soft, nontender    Genitourinary: Ileal conduit in place, draining well, purulent appearing urine.     Extremities: nontender    LABS:                        9.1    12.58 )-----------( 195      ( 29 Mar 2023 07:23 )             27.6       03-29    133<L>  |  98  |  119<H>  ----------------------------<  143<H>  4.3   |  15<L>  |  9.14<H>    Ca    8.4      29 Mar 2023 07:23  Phos  4.5     03-28  Mg     2.70     03-28

## 2023-03-29 NOTE — PRE PROCEDURE NOTE - PRE PROCEDURE EVALUATION
Vascular & Interventional Radiology Pre-Procedure Note    Procedure Name: R retrograde nephrostomy exchange, possible bilateral nephrostomy, possible bilateral ureteral stent placement    HPI: 68y Male with bladder ca and ileal conduit with completely dislodged L and partially dislodged R retrograde nephrostomies presents for R retrograde nephrostomy exchange, possible bilateral nephrostomy, possible bilateral ureteral stent placement.    Allergies: No Known Allergies      Medications:     piperacillin/tazobactam IVPB.: 200 mL/Hr IV Intermittent (03-28 @ 23:33)  piperacillin/tazobactam IVPB.-: 25 mL/Hr IV Intermittent (03-29 @ 06:39)      Data:  Vital Signs Last 24 Hrs  T(C): 36.4 (29 Mar 2023 13:42), Max: 36.9 (29 Mar 2023 09:45)  T(F): 97.6 (29 Mar 2023 13:42), Max: 98.4 (29 Mar 2023 09:45)  HR: 58 (29 Mar 2023 13:42) (51 - 58)  BP: 108/51 (29 Mar 2023 13:42) (107/58 - 126/62)  BP(mean): --  RR: 17 (29 Mar 2023 13:42) (17 - 18)  SpO2: 97% (29 Mar 2023 13:42) (96% - 98%)    Parameters below as of 29 Mar 2023 13:42  Patient On (Oxygen Delivery Method): room air        LABS:                        9.1    12.58 )-----------( 195      ( 29 Mar 2023 07:23 )             27.6     03-29    133<L>  |  98  |  119<H>  ----------------------------<  143<H>  4.3   |  15<L>  |  9.14<H>    Ca    8.4      29 Mar 2023 07:23  Phos  4.5     03-28  Mg     2.70     03-28      PT/INR - ( 29 Mar 2023 07:23 )   PT: 13.1 sec;   INR: 1.13 ratio         PTT - ( 29 Mar 2023 07:23 )  PTT:28.5 sec    Plan:   -68y Male presents for R retrograde nephrostomy exchange, possible bilateral nephrostomy, possible bilateral ureteral stent placement  -Risks/Benefits/alternatives explained with the patient and witnessed informed consent obtained.

## 2023-03-29 NOTE — CONSULT NOTE ADULT - SUBJECTIVE AND OBJECTIVE BOX
Vascular & Interventional Radiology Consult Note    Evaluate for Procedure: R retrograde nephrostomy exchange, L PCN with possible stent placement    HPI: 68y Male with h/o bladder ca and ileal conduit with dislodged retrograde nephrostomies and elevated creatinine. CT demonstrates the R retrograde nephrostomy to still be within the ureter with complete dislodgement of the L nephrostomy.     Allergies: No Known Allergies    Medications (Abx/Cardiac/Anticoagulation/Blood Products)    piperacillin/tazobactam IVPB.: 200 mL/Hr IV Intermittent (03-28 @ 23:33)  piperacillin/tazobactam IVPB.-: 25 mL/Hr IV Intermittent (03-29 @ 06:39)    Data:    T(C): 36.4  HR: 58  BP: 108/51  RR: 17  SpO2: 97%    -WBC 12.58 / HgB 9.1 / Hct 27.6 / Plt 195  -Na 133 / Cl 98 /  / Glucose 143  -K 4.3 / CO2 15 / Cr 9.14  -ALT -- / Alk Phos -- / T.Bili --  -INR 1.13 / PTT 28.5      Imaging: CT A/P reviewed

## 2023-03-29 NOTE — CONSULT NOTE ADULT - SUBJECTIVE AND OBJECTIVE BOX
SICU Consult Note    HISTORY OF PRESENT ILLNESS:  Patient is a 68 year old male complaining of weakness hx HTN bladder cancer s/p nephrostomy tubes now with ileal conduit presents for 5d of dislodged tube and generalized weakness.    Underwent bilateral nephroureteral stent replacement with IR today. Upon termination of the procedure, the patient developed hypotension requiring pressor support. SICU consulted for hemodynamic monitoring in setting of post-procedure hypotension requiring pressor support. On 0.5 of everton in IR recovery suite at time of initial evaluation.      PAST MEDICAL HISTORY: Prostate Cancer    HTN (Hypertension)    Anxiety    Major depressive disorder    UTI (urinary tract infection)    Urinary (tract) obstruction    Urethral stricture    Other calculus in bladder    Obese    Diabetes mellitus    Malignant neoplasm of bladder    Unspecified hydronephrosis      PAST SURGICAL HISTORY: S/P Appendectomy    H/O cystoscopy    History of prostatectomy    Suprapubic catheter    History of bladder cancer    S/P ileal conduit    Nephrostomy status    Bladder disease      HOME MEDICATIONS: Home Medications:  amLODIPine 5 mg oral tablet: 2 tab(s) orally once a day (24 Mar 2023 15:33)  atenolol 50 mg oral tablet: 50 milligram(s) orally 2 times a day (24 Mar 2023 15:33)  KlonoPIN 1 mg oral tablet: 1 tab(s) orally 3 times a day (24 Mar 2023 15:33)  traZODone 150 mg oral tablet: 1 tab(s) orally once a day (at bedtime) (24 Mar 2023 15:33)    ALLERGIES: No Known Allergies    FAMILY HISTORY: No pertinent family history in first degree relatives    FH: colon cancer      SOCIAL HISTORY:  REVIEW OF SYSTEMS:  VITAL SIGNS:  ICU Vital Signs Last 24 Hrs  T(C): 36.4 (29 Mar 2023 13:42), Max: 36.9 (29 Mar 2023 09:45)  T(F): 97.6 (29 Mar 2023 13:42), Max: 98.4 (29 Mar 2023 09:45)  HR: 58 (29 Mar 2023 13:42) (51 - 58)  BP: 108/51 (29 Mar 2023 13:42) (107/58 - 126/62)  BP(mean): --  ABP: --  ABP(mean): --  RR: 17 (29 Mar 2023 13:42) (17 - 18)  SpO2: 97% (29 Mar 2023 13:42) (96% - 98%)    O2 Parameters below as of 29 Mar 2023 13:42  Patient On (Oxygen Delivery Method): room air          PHYSICAL EXAMINATION:  General - NAD, tired, resting comfortably in bed  Neuro - A&O x3, no focal deficits  HEENT - Atraumatic  Lungs - Nonlabored breathing on room air  Heart - Low grade bradycardic when evaluated, regular rhythm on monitor  Abdomen - Soft, nondistended, nontender, ileal conduit with purulent drainage noted in urostomy bag  Extremities - Warm and perfused        LABS:                        9.1    12.58 )-----------( 195      ( 29 Mar 2023 07:23 )             27.6     03-29    133<L>  |  98  |  119<H>  ----------------------------<  143<H>  4.3   |  15<L>  |  9.14<H>    Ca    8.4      29 Mar 2023 07:23  Phos  4.5     03-28  Mg     2.70     03-28      PT/INR - ( 29 Mar 2023 07:23 )   PT: 13.1 sec;   INR: 1.13 ratio         PTT - ( 29 Mar 2023 07:23 )  PTT:28.5 sec      RECENT CULTURES:    CAPILLARY BLOOD GLUCOSE        IMAGING STUDIES:

## 2023-03-29 NOTE — CONSULT NOTE ADULT - NSCONSULTADDITIONALINFOA_GEN_ALL_CORE
This report was requested by: Flakita Rubin | Reference #: [id number]    Others' Prescriptions  Patient Name: August Scott Date: 1955  Address: [address] [place], [place] [address]Hnq: Male  Rx Written	Rx Dispensed	Drug	Quantity	Days Supply	Prescriber Name  03/01/2023	03/04/2023	clonazepam 1 mg tablet	90	30	Giedt, Parish JAY NP  02/06/2023	02/07/2023	clonazepam 1 mg tablet	90	30	Giedt, Parish JAY NP  01/11/2023	01/11/2023	clonazepam 1 mg tablet	90	30	Giedt, Parish JAY NP  12/13/2022	12/13/2022	clonazepam 1 mg tablet	90	30	Giedt, Parish JAY NP  11/15/2022	11/16/2022	clonazepam 1 mg tablet	90	30	Giedt, Parish JAY NP  10/17/2022	10/19/2022	clonazepam 1 mg tablet	90	30	Giedt, Parish JAY NP  09/20/2022	09/21/2022	clonazepam 1 mg tablet	90	30	Giedt, Parish JAY NP  08/24/2022	08/24/2022	clonazepam 1 mg tablet	90	30	Giedt, Parish JAY NP  07/27/2022	07/28/2022	clonazepam 1 mg tablet	90	30	Giedt, Parish JAY NP  07/01/2022	07/01/2022	clonazepam 1 mg tablet	90	30	Poniarski, August Carter MD  06/02/2022	06/02/2022	clonazepam 1 mg tablet	90	30	Giedt, Parish JAY NP  05/03/2022	05/06/2022	clonazepam 1 mg tablet	90	30	Giedt, Parish JAY NP

## 2023-03-29 NOTE — CONSULT NOTE ADULT - ATTENDING COMMENTS
I agree with the detailed interval history, physical, and plan, which I have reviewed and edited where appropriate'; also agree with notes/assessment with my team on service.  I have personally examined the patient.  I was physically present for the key portions of the evaluation and management (E/M) service provided.  I reviewed all the pertinent data.  The patient is a critical care patient with life threatening hemodynamic and metabolic instability in SICU.  The SICU team has a constant risk benefit analyzes discussion and coordinating care with the primary team and all consultants.   The patient is in SICU with the chief complaint and diagnosis mentioned in the note.   The plan will be specified in the note.  68 year old male sp bilateral nephroureteral stent replacement with IR today with hypotension requiring vasopressor support. SICU consulted for hemodynamic monitoring in setting of post-procedure hypotension.  PHYSICAL EXAMINATION:  General - NAD,   Neuro - no focal deficits  Lungs - clear  Heart - regular   Abdomen - Soft, nondistended, nontender,    PLAN:  tylenol, will order prn meds if needed  Resp:  - On room air  CV:  -  wean vasopressor support w/ goal MAP > 65 mmHg  - l POCUS  GI:   - NPO  Renal:  -- Monitor electrolytes   Heme:  - SQH for VTE prophylaxis  ID:   [id number],   Endo:   - Monitor glucose     Code Status: Full code    Disposition: SICU

## 2023-03-29 NOTE — PROGRESS NOTE ADULT - ASSESSMENT
68y Male complaining of weakness hx HTN bladder cancer s/p nephrostomy tubes now with ileal conduit presents for 5d of dislodged tubeand generalized weakness. no other localizing or infectious symptoms. contrary to triage note pt is animated with normal skin color and interactive on interview. he has no abd pain, distension, sob, cp, focal weakness. notes had some decreased appetite over the last 5d but now is feeling hungry, asking for food. no n/v/d rashes or uri sx. states he was seen on friday in this ER when the tube initially dislodged, at the recommendation of Dr. Esteban (uro). No changes or adjustments were made to the tubing at that visit. He received a call yesterday from Dr. Esteban that he could not adjust the tube, so he came to the ER. states "all I need is my tube fixed". Contrary to triage note pt does not have nephrostomy tubes    Patient complaining of dislodged nephrostomy tube requiring replacement.. Mentioned that his tube has been out of place since Friday, however IR decided not to intervene. Today comes back with weakness and NT continuing to be dislodged, Cr 8 from 2 baseline. AVSS. IR consulted.     3/29 - pt very angry, abusive and cursing that nothing is done yet  Plan:  - CT as per IR  - labs  - NPO

## 2023-03-29 NOTE — PROGRESS NOTE ADULT - ASSESSMENT
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     -Strict I&O's  -Continue Zosyn  -follow cultures  -Supportive care/SICU management.

## 2023-03-29 NOTE — PRE PROCEDURE NOTE - GENERAL PROCEDURE NAME
R retrograde nephrostomy exchange, possible bilateral nephrostomy, possible bilateral ureteral stent placement

## 2023-03-29 NOTE — PROGRESS NOTE ADULT - SUBJECTIVE AND OBJECTIVE BOX
No new events  Afeb 118/92 55 97%RA    Pt is very angry and is cursing that nothing has been done yet  Abd- soft  IC - 400  Spoke with IR about replacing upside down NT's in conduit....more complicated than that, may need       regular neph tubes; they want CT to start

## 2023-03-29 NOTE — CONSULT NOTE ADULT - SUBJECTIVE AND OBJECTIVE BOX
Patient is a 68y old  Male who presents with a chief complaint of problem with tube    HPI:  68y Male complaining of weakness hx HTN bladder cancer s/p nephrostomy tubes now with ileal conduit presents for 5d of dislodged tubeand generalized weakness. no other localizing or infectious symptoms. contrary to triage note pt is animated with normal skin color and interactive on interview. he has no abd pain, distension, sob, cp, focal weakness. notes had some decreased appetite over the last 5d but now is feeling hungry, asking for food. no n/v/d rashes or uri sx. states he was seen on friday in this ER when the tube initially dislodged, at the recommendation of Dr. Esteban (uro). No changes or adjustments were made to the tubing at that visit. He received a call yesterday from Dr. Esteban that he could not adjust the tube, so he came to the ER. states "all I need is my tube fixed". Contrary to triage note pt does not have nephrostomy tubes    Patient complaining of dislodged nephrostomy tube requiring replacement.. Mentioned that his tube has been out of place since Friday, however IR decided not to intervene. Today comes back with weakness and NT continuing to be dislodged, Cr 8 from 2 baseline. AVSS. IR consulted.  (28 Mar 2023 19:01)    No Known Allergies    HOME MEDICATIONS: [ ] Reviewed      MEDICATIONS  (STANDING):  amLODIPine   Tablet 10 milliGRAM(s) Oral daily  atenolol  Tablet 50 milliGRAM(s) Oral two times a day  clonazePAM  Tablet 1 milliGRAM(s) Oral three times a day  lactated ringers. 1000 milliLiter(s) (100 mL/Hr) IV Continuous <Continuous>  piperacillin/tazobactam IVPB.. 3.375 Gram(s) IV Intermittent every 12 hours    MEDICATIONS  (PRN):  senna 2 Tablet(s) Oral at bedtime PRN Constipation      PAST MEDICAL & SURGICAL HISTORY:  Prostate Cancer      HTN (Hypertension)      Anxiety      Major depressive disorder      UTI (urinary tract infection)      Urinary (tract) obstruction      Urethral stricture      Other calculus in bladder      Obese      Diabetes mellitus      Malignant neoplasm of bladder      Unspecified hydronephrosis      S/P Appendectomy  40 years ago      H/O cystoscopy  - multiple  last cystoscopy, laser litholapaxy on 1/2018      History of prostatectomy  robotic, 2011, s/p radiation      Suprapubic catheter  8/2015      History of bladder cancer  bladder removal May 10, 2018      S/P ileal conduit  May 2018      Nephrostomy status  Left, 8/1/2018      Bladder disease  Bladder Removed      [ ] Reviewed     SOCIAL HISTORY:  Residence: [ ] skilled nursing  [ ] SNF  [ ] Community  [ ] Substance abuse:   [ ] Tobacco:   [ ] Alcohol use:     FAMILY HISTORY:  FH: colon cancer    [ ] No pertinent family history in first degree relatives     REVIEW OF SYSTEMS:    CONSTITUTIONAL: No fever, weight loss, or fatigue  EYES: No eye pain, visual disturbances, or discharge  ENMT:  No difficulty hearing, tinnitus, vertigo; No sinus or throat pain  NECK: No pain or stiffness  BREASTS: No pain, masses, or nipple discharge  RESPIRATORY: No cough, wheezing, chills or hemoptysis; No shortness of breath  CARDIOVASCULAR: No chest pain, palpitations, dizziness, or leg swelling  GASTROINTESTINAL: No abdominal or epigastric pain. No nausea, vomiting, or hematemesis; No diarrhea or constipation. No melena or hematochezia.  GENITOURINARY: No dysuria, frequency, hematuria, or incontinence  NEUROLOGICAL: No headaches, memory loss, loss of strength, numbness, or tremors  SKIN: No itching, burning, rashes, or lesions   LYMPH NODES: No enlarged glands  ENDOCRINE: No heat or cold intolerance; No hair loss  MUSCULOSKELETAL: No muscle or back pain  PSYCHIATRIC: No depression, anxiety, mood swings, or difficulty sleeping  HEME/LYMPH: No easy bruising, or bleeding gums  ALLERGY AND IMMUNOLOGIC: No hives or eczema    [  ] All other ROS negative  [  ] Unable to obtain due to poor mental status    Vital Signs Last 24 Hrs  T(C): 36.9 (29 Mar 2023 09:45), Max: 36.9 (29 Mar 2023 09:45)  T(F): 98.4 (29 Mar 2023 09:45), Max: 98.4 (29 Mar 2023 09:45)  HR: 55 (29 Mar 2023 09:45) (51 - 55)  BP: 107/58 (29 Mar 2023 09:45) (101/54 - 126/62)  BP(mean): --  RR: 18 (29 Mar 2023 09:45) (16 - 18)  SpO2: 96% (29 Mar 2023 09:45) (96% - 98%)    Parameters below as of 29 Mar 2023 09:45  Patient On (Oxygen Delivery Method): room air    PHYSICAL EXAM:  GENERAL: NAD, well-groomed, well-developed  HEAD:  Atraumatic, Normocephalic  EYES: EOMI, PERRLA, conjunctiva and sclera clear  ENMT: Moist mucous membranes  NECK: Supple, No JVD  RESPIRATORY: Clear to auscultation bilaterally; No rales, rhonchi, wheezing, or rubs  CARDIOVASCULAR: Regular rate and rhythm; No murmurs, rubs, or gallops  GASTROINTESTINAL: Soft, Nontender, Nondistended; Bowel sounds present  GENITOURINARY: Not examined  EXTREMITIES:  2+ Peripheral Pulses, No clubbing, cyanosis, or edema  NERVOUS SYSTEM:  Alert & Oriented X3; Moving all 4 extremities; No gross sensory deficits  HEME/LYMPH: No lymphadenopathy noted  SKIN: No rashes or lesions; Incisions C/D/I    LABS:                        9.1    12.58 )-----------( 195      ( 29 Mar 2023 07:23 )             27.6     03-29    133<L>  |  98  |  119<H>  ----------------------------<  143<H>  4.3   |  15<L>  |  9.14<H>    Ca    8.4      29 Mar 2023 07:23  Phos  4.5     03-28  Mg     2.70     03-28      PT/INR - ( 29 Mar 2023 07:23 )   PT: 13.1 sec;   INR: 1.13 ratio         PTT - ( 29 Mar 2023 07:23 )  PTT:28.5 sec    CAPILLARY BLOOD GLUCOSE      POCT Blood Glucose.: 122 mg/dL (28 Mar 2023 14:07)      RADIOLOGY & ADDITIONAL STUDIES:    EKG:   Personally Reviewed:  [ ] YES     Imaging:   Personally Reviewed:  [ ] YES               Consultant(s) notes reviewed:    Care Discussed with Consultant(s)/Other Providers: Urology re overall care Patient is a 68y old  Male who presents with a chief complaint of problem with tube    HPI: 68M anxiety/depression, pre-DM-2, HTN, prostate cancer s/p prostatectomy 2011, bladder cancer s/p cystoscopy, fulguration, cystolitholapaxy on 1/9/18, s/p radical cystectomy and ileal conduit creation on 5/10/18, 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 He presented for 5d of dislodged tube and generalized weakness. no other localizing or infectious symptoms. Denied abd pain, distension, sob, cp, focal weakness. Noted some decreased appetite over the last 5d. No n/v/d rashes or uri sx.    Here Cr found to be elevated to 8, awaiting CT and possible NTs. Pt asking for his Klonopin.    No Known Allergies    HOME MEDICATIONS: [ ] Reviewed      MEDICATIONS  (STANDING):  amLODIPine   Tablet 10 milliGRAM(s) Oral daily  atenolol  Tablet 50 milliGRAM(s) Oral two times a day  clonazePAM  Tablet 1 milliGRAM(s) Oral three times a day  lactated ringers. 1000 milliLiter(s) (100 mL/Hr) IV Continuous <Continuous>  piperacillin/tazobactam IVPB.. 3.375 Gram(s) IV Intermittent every 12 hours    MEDICATIONS  (PRN):  senna 2 Tablet(s) Oral at bedtime PRN Constipation      PAST MEDICAL & SURGICAL HISTORY:  Prostate Cancer      HTN (Hypertension)      Anxiety      Major depressive disorder      UTI (urinary tract infection)      Urinary (tract) obstruction      Urethral stricture      Other calculus in bladder      Obese      Diabetes mellitus      Malignant neoplasm of bladder      Unspecified hydronephrosis      S/P Appendectomy  40 years ago      H/O cystoscopy  - multiple  last cystoscopy, laser litholapaxy on 1/2018      History of prostatectomy  robotic, 2011, s/p radiation      Suprapubic catheter  8/2015      History of bladder cancer  bladder removal May 10, 2018      S/P ileal conduit  May 2018      Nephrostomy status  Left, 8/1/2018      Bladder disease  Bladder Removed      [ ] Reviewed     SOCIAL HISTORY:  Residence: [ ] [facility]  [ ] [facility]  [ ] Community  [ ] Substance abuse:   [ ] Tobacco:   [ ] Alcohol use:     FAMILY HISTORY:  FH: colon cancer    [ ] No pertinent family history in first degree relatives     REVIEW OF SYSTEMS:    CONSTITUTIONAL: No fever, weight loss, or fatigue  EYES: No eye pain, visual disturbances, or discharge  ENMT:  No difficulty hearing, tinnitus, vertigo; No sinus or throat pain  NECK: No pain or stiffness  BREASTS: No pain, masses, or nipple discharge  RESPIRATORY: No cough, wheezing, chills or hemoptysis; No shortness of breath  CARDIOVASCULAR: No chest pain, palpitations, dizziness, or leg swelling  GASTROINTESTINAL: No abdominal or epigastric pain. No nausea, vomiting, or hematemesis; No diarrhea or constipation. No melena or hematochezia.  GENITOURINARY: No dysuria, frequency, hematuria, or incontinence  NEUROLOGICAL: No headaches, memory loss, loss of strength, numbness, or tremors  SKIN: No itching, burning, rashes, or lesions   LYMPH NODES: No enlarged glands  ENDOCRINE: No heat or cold intolerance; No hair loss  MUSCULOSKELETAL: No muscle or back pain  PSYCHIATRIC: No depression, anxiety, mood swings, or difficulty sleeping  HEME/LYMPH: No easy bruising, or bleeding gums  ALLERGY AND IMMUNOLOGIC: No hives or eczema    [  ] All other ROS negative  [  ] Unable to obtain due to poor mental status    Vital Signs Last 24 Hrs  T(C): 36.9 (29 Mar 2023 09:45), Max: 36.9 (29 Mar 2023 09:45)  T(F): 98.4 (29 Mar 2023 09:45), Max: 98.4 (29 Mar 2023 09:45)  HR: 55 (29 Mar 2023 09:45) (51 - 55)  BP: 107/58 (29 Mar 2023 09:45) (101/54 - 126/62)  BP(mean): --  RR: 18 (29 Mar 2023 09:45) (16 - 18)  SpO2: 96% (29 Mar 2023 09:45) (96% - 98%)    Parameters below as of 29 Mar 2023 09:45  Patient On (Oxygen Delivery Method): room air    PHYSICAL EXAM:  GENERAL: NAD, well-groomed, well-developed  HEAD:  Atraumatic, Normocephalic  EYES: EOMI, PERRLA, conjunctiva and sclera clear  ENMT: Moist mucous membranes  NECK: Supple, No JVD  RESPIRATORY: Clear to auscultation bilaterally; No rales, rhonchi, wheezing, or rubs  CARDIOVASCULAR: Regular rate and rhythm; No murmurs, rubs, or gallops  GASTROINTESTINAL: Soft, Nontender, Nondistended; Bowel sounds present  GENITOURINARY: Not examined  EXTREMITIES:  2+ Peripheral Pulses, No clubbing, cyanosis, or edema  NERVOUS SYSTEM:  Alert & Oriented X3; Moving all 4 extremities; No gross sensory deficits  HEME/LYMPH: No lymphadenopathy noted  SKIN: No rashes or lesions; Incisions C/D/I    LABS:                        9.1    12.58 )-----------( 195      ( 29 Mar 2023 07:23 )             27.6     03-29    133<L>  |  98  |  119<H>  ----------------------------<  143<H>  4.3   |  15<L>  |  9.14<H>    Ca    8.4      29 Mar 2023 07:23  Phos  4.5     03-28  Mg     2.70     03-28      PT/INR - ( 29 Mar 2023 07:23 )   PT: 13.1 sec;   INR: 1.13 ratio         PTT - ( 29 Mar 2023 07:23 )  PTT:28.5 sec    CAPILLARY BLOOD GLUCOSE      POCT Blood Glucose.: 122 mg/dL (28 Mar 2023 14:07)      RADIOLOGY & ADDITIONAL STUDIES:    EKG:   Personally Reviewed:  [ ] YES     Imaging:   Personally Reviewed:  [ ] YES               Consultant(s) notes reviewed:    Care Discussed with Consultant(s)/Other Providers: Urology re overall care PMD Dr. Lydia Lilly  psych Dr. Parish Gill  renal Dr. 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 He presented for 5d of dislodged tube and generalized weakness. no other localizing or infectious symptoms. Denied abd pain, distension, sob, cp, focal weakness. Noted some decreased appetite over the last 5d. No n/v/d rashes or uri sx.    Here Cr found to be elevated to 8, awaiting CT and possible NTs. Pt asking for his Klonopin.    No Known Allergies    HOME MEDICATIONS:   [facility] [phone]  amlodipine 5 bid  atenolol 50 bid  Klonopin 1 tid (last filled 3/4/23)  Trazodone 150 hs (?? 4 pills at bedtime???)))  Na bicarb 650 tid  diphenhydramine 25 hs  vitamin D 50,000 qweek  calcitriol 0.25 qod  lasix 40 qod    MEDICATIONS  (STANDING):  amLODIPine   Tablet 10 milliGRAM(s) Oral daily  atenolol  Tablet 50 milliGRAM(s) Oral two times a day  clonazePAM  Tablet 1 milliGRAM(s) Oral three times a day  lactated ringers. 1000 milliLiter(s) (100 mL/Hr) IV Continuous <Continuous>  piperacillin/tazobactam IVPB.. 3.375 Gram(s) IV Intermittent every 12 hours    MEDICATIONS  (PRN):  senna 2 Tablet(s) Oral at bedtime PRN Constipation      PAST MEDICAL & SURGICAL HISTORY:  Prostate Cancer  HTN (Hypertension)  Anxiety  Major depressive disorder  UTI (urinary tract infection)  Urinary (tract) obstruction  Urethral stricture  Other calculus in bladder  Obese  Diabetes mellitus  Malignant neoplasm of bladder  Unspecified hydronephrosis  S/P Appendectomy 40 years ago  H/O cystoscopy  - multiple, last cystoscopy, laser litholapaxy on 1/2018  History of prostatectomy robotic, 2011, s/p radiation  Suprapubic catheter 8/2015  History of bladder cancer bladder removal May 10, 2018  S/P ileal conduit May 2018  Nephrostomy status Left, 8/1/2018  Bladder disease, Bladder Removed    SOCIAL HISTORY:  reports he lives alone but has a girlfriend  denies tob/alcohol/drugs    FAMILY HISTORY:  FH: colon cancer  [ ] No pertinent family history in first degree relatives     REVIEW OF SYSTEMS:  CONSTITUTIONAL: No fever, weight loss, or fatigue  EYES: No eye pain, visual disturbances, or discharge  ENMT:  No difficulty hearing, tinnitus, vertigo; No sinus or throat pain  NECK: No pain or stiffness  BREASTS: No pain, masses, or nipple discharge  RESPIRATORY: No cough, wheezing, chills or hemoptysis; No shortness of breath  CARDIOVASCULAR: No chest pain, palpitations, dizziness, or leg swelling  GASTROINTESTINAL: No abdominal or epigastric pain. No nausea, vomiting, or hematemesis; No diarrhea or constipation. No melena or hematochezia.  GENITOURINARY: per HPI  NEUROLOGICAL: No headaches, memory loss, loss of strength, numbness, or tremors  SKIN: No itching, burning, rashes, or lesions   LYMPH NODES: No enlarged glands  ENDOCRINE: No heat or cold intolerance; No hair loss  MUSCULOSKELETAL: No muscle or back pain  PSYCHIATRIC: No depression, anxiety, mood swings, or difficulty sleeping  HEME/LYMPH: No easy bruising, or bleeding gums  ALLERGY AND IMMUNOLOGIC: No hives or eczema    [  ] All other ROS negative  [  ] Unable to obtain due to poor mental status    Vital Signs Last 24 Hrs  T(C): 36.9 (29 Mar 2023 09:45), Max: 36.9 (29 Mar 2023 09:45)  T(F): 98.4 (29 Mar 2023 09:45), Max: 98.4 (29 Mar 2023 09:45)  HR: 55 (29 Mar 2023 09:45) (51 - 55)  BP: 107/58 (29 Mar 2023 09:45) (101/54 - 126/62)  BP(mean): --  RR: 18 (29 Mar 2023 09:45) (16 - 18)  SpO2: 96% (29 Mar 2023 09:45) (96% - 98%)    Parameters below as of 29 Mar 2023 09:45  Patient On (Oxygen Delivery Method): room air    PHYSICAL EXAM:  GENERAL: elderly WM, lying in bed, looks tied  HEAD:  Atraumatic, Normocephalic  EYES: EOMI, PERRLA, conjunctiva and sclera clear  ENMT: Moist mucous membranes  NECK: Supple, No JVD  RESPIRATORY: Clear to auscultation bilaterally; No rales, rhonchi, wheezing, or rubs  CARDIOVASCULAR: Regular rate and rhythm; No murmurs, rubs, or gallops  GASTROINTESTINAL: obese, +BS, stoma with one white tube in bag with yellow urine  GENITOURINARY: Not examined  EXTREMITIES:  2+ Peripheral Pulses, No clubbing, cyanosis, or edema  NERVOUS SYSTEM:  Alert & Oriented X3; Moving all 4 extremities; No gross sensory deficits  HEME/LYMPH: No lymphadenopathy noted  SKIN: No rashes or lesions  PSYCH: irritable    LABS:                        9.1    12.58 )-----------( 195      ( 29 Mar 2023 07:23 )             27.6     03-29    133<L>  |  98  |  119<H>  ----------------------------<  143<H>  4.3   |  15<L>  |  9.14<H>    Ca    8.4      29 Mar 2023 07:23  Phos  4.5     03-28  Mg     2.70     03-28      PT/INR - ( 29 Mar 2023 07:23 )   PT: 13.1 sec;   INR: 1.13 ratio    PTT - ( 29 Mar 2023 07:23 )  PTT:28.5 sec    CAPILLARY BLOOD GLUCOSE  POCT Blood Glucose.: 122 mg/dL (28 Mar 2023 14:07)    RADIOLOGY & ADDITIONAL STUDIES:    EKG:   Personally Reviewed:  [ ] YES     Imaging:   Personally Reviewed:  [ ] YES               Consultant(s) notes reviewed:    Care Discussed with Consultant(s)/Other Providers: Urology re overall care

## 2023-03-29 NOTE — PROCEDURE NOTE - PROCEDURE FINDINGS AND DETAILS
Initial  imaging demonstrates partially dislodged right upside down nephroureteral pigtail catheter with its tip in the distal right ureter. Subsequent retrograde over the wire exchange for right upside down 8.5 Fr. pigtail catheter/stent with its pigtail formed in the right renal pelvis. Left percutaneous renal access was then obtained, and guidewire was snared via the patients ileal conduit, with successful placement of a retrograde left 8.5 Fr upside down pigtail catheter/stent. No percutaneous nephrostomy drain was left in place. Both catheters were left to drain into the patient's urostomy bag. Upon the termination of the procedure the patient developed hypotension requiring pressor support. SICU consulted for further post procedural management.

## 2023-03-29 NOTE — CONSULT NOTE ADULT - PROBLEM SELECTOR RECOMMENDATION 9
BARON in setting of dislodged stent  - Uro coordinating with IR for possible NT  - if not improving, consider renal consult BARON in setting of dislodged stent  - Uro coordinating with IR for possible NT  - if not improving, consider renal consult  - on Zosyn per uro

## 2023-03-29 NOTE — CONSULT NOTE ADULT - CONSULT REASON
comanagement
Hemodynamic monitoring
R retrograde nephrostomy exchange, L PCN with possible stent placement

## 2023-03-29 NOTE — CONSULT NOTE ADULT - PROBLEM SELECTOR RECOMMENDATION 2
last seen by renal in Jan, Cr 2.88, noted CKD 4/5  - c/w Na bicarb 650 tid  - c/w calcitriol 0.25 mcg qd  - hold lasix last seen by renal in Jan, Cr 2.88, noted CKD 4/5  - c/w Na bicarb 650 tid  - c/w calcitriol 0.25 mcg qod  - hold lasix

## 2023-03-30 LAB
CK MB BLD-MCNC: 4.7 % — HIGH (ref 0–2.5)
CK MB CFR SERPL CALC: 1.5 NG/ML — SIGNIFICANT CHANGE UP
CK SERPL-CCNC: 32 U/L — SIGNIFICANT CHANGE UP (ref 30–200)
TROPONIN T, HIGH SENSITIVITY RESULT: 18 NG/L — SIGNIFICANT CHANGE UP

## 2023-03-30 PROCEDURE — 99291 CRITICAL CARE FIRST HOUR: CPT

## 2023-03-30 PROCEDURE — 99231 SBSQ HOSP IP/OBS SF/LOW 25: CPT

## 2023-03-30 PROCEDURE — 93010 ELECTROCARDIOGRAM REPORT: CPT

## 2023-03-30 RX ORDER — TRAZODONE HCL 50 MG
150 TABLET ORAL AT BEDTIME
Refills: 0 | Status: DISCONTINUED | OUTPATIENT
Start: 2023-03-30 | End: 2023-04-03

## 2023-03-30 RX ORDER — ACETAMINOPHEN 500 MG
975 TABLET ORAL EVERY 6 HOURS
Refills: 0 | Status: DISCONTINUED | OUTPATIENT
Start: 2023-03-30 | End: 2023-03-30

## 2023-03-30 RX ORDER — ACETAMINOPHEN 500 MG
975 TABLET ORAL EVERY 6 HOURS
Refills: 0 | Status: DISCONTINUED | OUTPATIENT
Start: 2023-03-30 | End: 2023-04-03

## 2023-03-30 RX ADMIN — SODIUM CHLORIDE 100 MILLILITER(S): 9 INJECTION, SOLUTION INTRAVENOUS at 15:30

## 2023-03-30 RX ADMIN — PIPERACILLIN AND TAZOBACTAM 25 GRAM(S): 4; .5 INJECTION, POWDER, LYOPHILIZED, FOR SOLUTION INTRAVENOUS at 18:38

## 2023-03-30 RX ADMIN — Medication 1 MILLIGRAM(S): at 20:33

## 2023-03-30 RX ADMIN — Medication 1 MILLIGRAM(S): at 06:26

## 2023-03-30 RX ADMIN — CALCITRIOL 0.25 MICROGRAM(S): 0.5 CAPSULE ORAL at 09:13

## 2023-03-30 RX ADMIN — Medication 975 MILLIGRAM(S): at 19:35

## 2023-03-30 RX ADMIN — CHLORHEXIDINE GLUCONATE 1 APPLICATION(S): 213 SOLUTION TOPICAL at 12:54

## 2023-03-30 RX ADMIN — Medication 975 MILLIGRAM(S): at 05:15

## 2023-03-30 RX ADMIN — Medication 650 MILLIGRAM(S): at 06:27

## 2023-03-30 RX ADMIN — Medication 975 MILLIGRAM(S): at 04:12

## 2023-03-30 RX ADMIN — PIPERACILLIN AND TAZOBACTAM 25 GRAM(S): 4; .5 INJECTION, POWDER, LYOPHILIZED, FOR SOLUTION INTRAVENOUS at 06:33

## 2023-03-30 RX ADMIN — Medication 1 MILLIGRAM(S): at 13:05

## 2023-03-30 RX ADMIN — Medication 650 MILLIGRAM(S): at 20:33

## 2023-03-30 RX ADMIN — Medication 650 MILLIGRAM(S): at 13:06

## 2023-03-30 RX ADMIN — SODIUM CHLORIDE 100 MILLILITER(S): 9 INJECTION, SOLUTION INTRAVENOUS at 19:51

## 2023-03-30 RX ADMIN — Medication 975 MILLIGRAM(S): at 18:38

## 2023-03-30 RX ADMIN — Medication 150 MILLIGRAM(S): at 21:48

## 2023-03-30 RX ADMIN — HEPARIN SODIUM 5000 UNIT(S): 5000 INJECTION INTRAVENOUS; SUBCUTANEOUS at 06:27

## 2023-03-30 NOTE — PROGRESS NOTE ADULT - ASSESSMENT
68y Male with h/o bladder ca and ileal conduit with dislodged retrograde nephrostomies s/p bilateral nephroureteral stent replacement on 3/29 in Interventional Radiology.     Plan:  - Continue drainage  - Monitor output    [id number]

## 2023-03-30 NOTE — PROGRESS NOTE ADULT - ASSESSMENT
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     3/30: admitted to SICU after IR procedure, weaned off everton, improved, on regular diet, afebrile, urine clear.     -Strict I&O's  -Continue Zosyn  -follow cultures  -Supportive care/SICU management.   -Plan discussed with Dr. haney.

## 2023-03-30 NOTE — PROGRESS NOTE ADULT - SUBJECTIVE AND OBJECTIVE BOX
68y Male s/p bilateral nephroureteral stent replacement on 3/29 in Interventional Radiology.     Patient seen and examined bedside. No pertinent complaint offered. Pressure stable.     T(F): 95.6 (03-30-23 @ 08:00), Max: 98.6 (03-30-23 @ 04:00)  HR: 48 (03-30-23 @ 10:00) (47 - 58)  BP: 108/58 (03-30-23 @ 10:00) (107/53 - 123/55)  RR: 15 (03-30-23 @ 10:00) (11 - 24)  SpO2: 98% (03-30-23 @ 10:00) (91% - 100%)  Wt(kg): --    LABS:                        9.0    17.02 )-----------( 252      ( 29 Mar 2023 21:31 )             27.8     03-29    132<L>  |  100  |  119<H>  ----------------------------<  252<H>  4.7   |  12<L>  |  8.81<H>    Ca    8.2<L>      29 Mar 2023 21:31  Phos  4.5     03-28  Mg     2.70     03-28    TPro  6.6  /  Alb  2.8<L>  /  TBili  0.4  /  DBili  x   /  AST  11  /  ALT  9   /  AlkPhos  83  03-29    PT/INR - ( 29 Mar 2023 21:31 )   PT: 13.0 sec;   INR: 1.12 ratio         PTT - ( 29 Mar 2023 21:31 )  PTT:31.5 sec  I&O's Detail    29 Mar 2023 07:01  -  30 Mar 2023 07:00  --------------------------------------------------------  IN:    IV PiggyBack: 350 mL    Lactated Ringers: 1900 mL    Lactated Ringers Bolus: 1000 mL    Oral Fluid: 60 mL    Phenylephrine: 50.9 mL  Total IN: 3360.9 mL    OUT:    Urostomy (mL): 1900 mL  Total OUT: 1900 mL    Total NET: 1460.9 mL      30 Mar 2023 07:01  -  30 Mar 2023 10:31  --------------------------------------------------------  IN:  Total IN: 0 mL    OUT:    Urostomy (mL): 225 mL  Total OUT: 225 mL    Total NET: -225 mL            PHYSICAL EXAM:  General: Nontoxic, in NAD  B/L upside down nephrostomy tube: c/d/i

## 2023-03-30 NOTE — PROGRESS NOTE ADULT - SUBJECTIVE AND OBJECTIVE BOX
Surgery Progress Note     Subjective/24hour Events:   Patient seen and examined.   No acute events overnight.   Pain controlled.     Vital Signs:  Vital Signs Last 24 Hrs  T(C): 36.1 (30 Mar 2023 16:00), Max: 37 (30 Mar 2023 04:00)  T(F): 96.9 (30 Mar 2023 16:00), Max: 98.6 (30 Mar 2023 04:00)  HR: 53 (30 Mar 2023 16:00) (46 - 56)  BP: 119/52 (30 Mar 2023 15:00) (91/62 - 123/60)  BP(mean): 72 (30 Mar 2023 15:00) (55 - 86)  RR: 15 (30 Mar 2023 16:00) (10 - 24)  SpO2: 96% (30 Mar 2023 16:00) (91% - 100%)    Parameters below as of 30 Mar 2023 16:00  Patient On (Oxygen Delivery Method): room air        CAPILLARY BLOOD GLUCOSE          I&O's Detail    29 Mar 2023 07:01  -  30 Mar 2023 07:00  --------------------------------------------------------  IN:    IV PiggyBack: 350 mL    Lactated Ringers: 1900 mL    Lactated Ringers Bolus: 1000 mL    Oral Fluid: 60 mL    Phenylephrine: 50.9 mL  Total IN: 3360.9 mL    OUT:    Urostomy (mL): 1900 mL  Total OUT: 1900 mL    Total NET: 1460.9 mL      30 Mar 2023 07:01  -  30 Mar 2023 18:03  --------------------------------------------------------  IN:    Lactated Ringers: 100 mL  Total IN: 100 mL    OUT:    Urostomy (mL): 450 mL  Total OUT: 450 mL    Total NET: -350 mL          MEDICATIONS  (STANDING):  calcitriol   Capsule 0.25 MICROGram(s) Oral <User Schedule>  chlorhexidine 2% Cloths 1 Application(s) Topical daily  clonazePAM  Tablet 1 milliGRAM(s) Oral three times a day  heparin   Injectable 5000 Unit(s) SubCutaneous every 8 hours  lactated ringers. 1000 milliLiter(s) (100 mL/Hr) IV Continuous <Continuous>  phenylephrine    Infusion 0.5 MICROgram(s)/kG/Min (19.6 mL/Hr) IV Continuous <Continuous>  piperacillin/tazobactam IVPB.. 3.375 Gram(s) IV Intermittent every 12 hours  sodium bicarbonate 650 milliGRAM(s) Oral three times a day    MEDICATIONS  (PRN):  acetaminophen     Tablet .. 975 milliGRAM(s) Oral every 6 hours PRN Moderate Pain (4 - 6)  senna 2 Tablet(s) Oral at bedtime PRN Constipation      Physical Exam:  Gen: NAD.  Lungs: Non labored breathing.   Ab: Soft, nontender, nondistended.   : IC draining clear yellow urine    Labs:    03-29    132<L>  |  100  |  119<H>  ----------------------------<  252<H>  4.7   |  12<L>  |  8.81<H>    Ca    8.2<L>      29 Mar 2023 21:31    TPro  6.6  /  Alb  2.8<L>  /  TBili  0.4  /  DBili  x   /  AST  11  /  ALT  9   /  AlkPhos  83  03-29    LIVER FUNCTIONS - ( 29 Mar 2023 21:31 )  Alb: 2.8 g/dL / Pro: 6.6 g/dL / ALK PHOS: 83 U/L / ALT: 9 U/L / AST: 11 U/L / GGT: x                                 9.0    17.02 )-----------( 252      ( 29 Mar 2023 21:31 )             27.8     PT/INR - ( 29 Mar 2023 21:31 )   PT: 13.0 sec;   INR: 1.12 ratio         PTT - ( 29 Mar 2023 21:31 )  PTT:31.5 sec

## 2023-03-30 NOTE — PROGRESS NOTE ADULT - ASSESSMENT
Assessment: 68 year old male complaining of weakness hx HTN bladder cancer s/p nephrostomy tubes now with ileal conduit presents for 5d of dislodged tube and generalized weakness. Underwent bilateral nephroureteral stent replacement with IR today. Upon termination of the procedure, the patient developed hypotension requiring pressor support. SICU consulted for hemodynamic monitoring in setting of post-procedure hypotension requiring pressor support.    PLAN:  Neuro:  - Pain control: tylenol, will order prn meds if needed    Resp:  - On room air  - Maintain SpO2 > 92%    CV:  - Requiring everton  - Will wean vasopressor support w/ goal MAP > 65 mmHg  - Will evaluate need for A-line placement    GI:   - NPO, will discuss advancing diet in AM    Renal:  - Monitor urostomy output  - Monitor I&Os and electrolytes w/ repletions as necessary    Heme:  - Monitor CBC and coags  - SQH for VTE prophylaxis    ID:   - Monitor for clinical evidence of active infection  - Monitor WBC and temperature  - Antibiotics - Zosyn, will add vanc given stent placement    Endo:   - Monitor glucose on metabolic panel    Code Status: Full code    Disposition: SICU   Assessment: 68 year old male complaining of weakness hx HTN bladder cancer s/p nephrostomy tubes now with ileal conduit presents for 5d of dislodged tube and generalized weakness. Underwent bilateral nephroureteral stent replacement with IR today. Upon termination of the procedure, the patient developed hypotension requiring pressor support. SICU consulted for hemodynamic monitoring in setting of post-procedure hypotension requiring pressor support.    PLAN:  Neuro:  - Pain control: tylenol, will order prn meds if needed  - Home med resumed: Klonopin 1mg q8hr    Resp:  - On room air  - Maintain SpO2 > 92%    CV:  - No longer requiring Marvin and maintaining MAPs  - Holding home Atenolol 10mg, Amlodipine 10mg  - EKG (3/28): sinus bradycardia (VHR 53)  - EKG (3/30): sinus bradycardia with sinus arrhythmia (VHR 48)  - F/u cardiac enzyme panel (pt c/o chest pain 3/30)    GI:   - Advanced to regular diet 3/30  - No acute issues    Renal:  - Monitor urostomy output  - Monitor I&Os and electrolytes w/ repletions as necessary    Heme:  - Monitor CBC and coags  - SQH for VTE prophylaxis    ID:   - Monitor for clinical evidence of active infection  - Monitor WBC and temperature  - Antibiotics - Zosyn, will add vanc given stent placement    Endo:   - Monitor glucose on metabolic panel    Tubes/Lines/Drains  - Urostomy tube  - Pt removed PIV and refusing replacement - will attempt again today    Code Status: Full code  Disposition: SICU   Assessment: 68 year old male complaining of weakness hx HTN bladder cancer s/p nephrostomy tubes now with ileal conduit presents for 5d of dislodged tube and generalized weakness. Underwent bilateral nephroureteral stent replacement with IR today. Upon termination of the procedure, the patient developed hypotension requiring pressor support. SICU consulted for hemodynamic monitoring in setting of post-procedure hypotension requiring pressor support.    PLAN:  Neuro:  - Pain control: tylenol, will order prn meds if needed  - Home med resumed: Klonopin 1mg q8hr    Resp:  - On room air  - Maintain SpO2 > 92%    CV:  - No longer requiring Marvin and maintaining MAPs  - Holding home Atenolol 10mg, Amlodipine 10mg  - EKG (3/28): sinus bradycardia (VHR 53)  - EKG (3/30): sinus bradycardia with sinus arrhythmia (VHR 48)  - F/u cardiac enzyme panel (pt c/o chest pain 3/30)    GI:   - Advanced to regular diet 3/30  - No acute issues    Renal:  - Monitor urostomy output  - Monitor I&Os and electrolytes w/ repletions as necessary    Heme:  - Monitor CBC and coags  - SQH for VTE prophylaxis    ID:   - Monitor for clinical evidence of active infection  - Monitor WBC and temperature  - Antibiotics - Zosyn, will add vanc given stent placement    Endo:   - Monitor glucose on metabolic panel    Tubes/Lines/Drains  - Urostomy tube  - PIV    Code Status: Full code  Disposition: SICU

## 2023-03-30 NOTE — ASSESSMENT
[FreeTextEntry1] : sent him to the [facility] ER - needs blood work, hydration and urgent IR intervention

## 2023-03-30 NOTE — HISTORY OF PRESENT ILLNESS
[FreeTextEntry1] :  Complicated  history. He had RALP for high grade cancer followed by adjuvant radiation therapy. 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 Did well initially; he then noted creatinine up to 1.8 and USG noted new left hydro. 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 stent out.\par Seen in October2019 with an abscess inner thigh of left leg. 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 Just completing 6 weeks IV antibiotics. before this became apparent he had bloody discharge from small opening at bottom of the midline old incision. \par Noted hydro on CT done Inhouse and Cr @2. No back or flank pain. renal scan notes this kidney dead.\par he then called having right leg pain - i was concerned that the osteomyelitis+/-abscess was coming back so ordered a CT scan - this noted osteoblastic bone lesions  and PSA is @200.\par Bone scan confirmed diffuse mets - started ADT with Xtandi - first \par \par Latest PSA  0.09 however CR up to 2.8. CT notes new right hydronephrosis and slight worsening of the left also notes more blood from open on leg so wearing a diaper, though better since last visit. Feels fine. \par he didn't want more surgey so planned for IR to place antegrade upside down NT - able on the right but not on the left - has NT. Not happy and doesn't feel well. making urine with no fevers or chills. Urine clear.\par \par 8/21  Feels well with no pains. 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 Tolerating well - no fatigue, bone pain or weight loss. labs from last visit reviewed - good PSA control \par \par 10/21 - here today as one of the NTs came out - had been slowly out then saw in the bag. No fevers or chills; no back pain. he also noted his drainage from his abdomen - saw his PCP who expressed some fluid and felt he should see me. \par \par 11/21 - now S/p replacement of dislodged Nt and exchange of other. Had him come back to repeat BMP at noted his creatinine up to 6 ; he feels well though looks a bit pale to me. \par \par 5/22 - called this morning that one of the NTs was dislodged. No fevers or chills. making urine.\par having separate issues with the opening on the bag which he is changing frequently \par \par 3/38/30 - had called on  Friday that one of his tubes and pulled out - was under imression part way but actually out completely.\par had gone to [facility] and seen by IR - fir unclear reasons he was sent home with no f/u\par came to [facility]. his friend noted that he is confused. No fevers.

## 2023-03-31 DIAGNOSIS — R00.1 BRADYCARDIA, UNSPECIFIED: ICD-10-CM

## 2023-03-31 LAB
ANION GAP SERPL CALC-SCNC: 14 MMOL/L — SIGNIFICANT CHANGE UP (ref 7–14)
BUN SERPL-MCNC: 100 MG/DL — HIGH (ref 7–23)
CALCIUM SERPL-MCNC: 8.3 MG/DL — LOW (ref 8.4–10.5)
CHLORIDE SERPL-SCNC: 108 MMOL/L — HIGH (ref 98–107)
CO2 SERPL-SCNC: 17 MMOL/L — LOW (ref 22–31)
CREAT SERPL-MCNC: 7.58 MG/DL — HIGH (ref 0.5–1.3)
EGFR: 7 ML/MIN/1.73M2 — LOW
GLUCOSE BLDC GLUCOMTR-MCNC: 123 MG/DL — HIGH (ref 70–99)
GLUCOSE BLDC GLUCOMTR-MCNC: 125 MG/DL — HIGH (ref 70–99)
GLUCOSE BLDC GLUCOMTR-MCNC: 159 MG/DL — HIGH (ref 70–99)
GLUCOSE SERPL-MCNC: 119 MG/DL — HIGH (ref 70–99)
HCT VFR BLD CALC: 26.7 % — LOW (ref 39–50)
HGB BLD-MCNC: 8.6 G/DL — LOW (ref 13–17)
MAGNESIUM SERPL-MCNC: 2.3 MG/DL — SIGNIFICANT CHANGE UP (ref 1.6–2.6)
MCHC RBC-ENTMCNC: 29.8 PG — SIGNIFICANT CHANGE UP (ref 27–34)
MCHC RBC-ENTMCNC: 32.2 GM/DL — SIGNIFICANT CHANGE UP (ref 32–36)
MCV RBC AUTO: 92.4 FL — SIGNIFICANT CHANGE UP (ref 80–100)
NRBC # BLD: 0 /100 WBCS — SIGNIFICANT CHANGE UP (ref 0–0)
NRBC # FLD: 0 K/UL — SIGNIFICANT CHANGE UP (ref 0–0)
PHOSPHATE SERPL-MCNC: 5 MG/DL — HIGH (ref 2.5–4.5)
PLATELET # BLD AUTO: 271 K/UL — SIGNIFICANT CHANGE UP (ref 150–400)
POTASSIUM SERPL-MCNC: 3.8 MMOL/L — SIGNIFICANT CHANGE UP (ref 3.5–5.3)
POTASSIUM SERPL-SCNC: 3.8 MMOL/L — SIGNIFICANT CHANGE UP (ref 3.5–5.3)
RBC # BLD: 2.89 M/UL — LOW (ref 4.2–5.8)
RBC # FLD: 15.1 % — HIGH (ref 10.3–14.5)
SODIUM SERPL-SCNC: 139 MMOL/L — SIGNIFICANT CHANGE UP (ref 135–145)
WBC # BLD: 11.72 K/UL — HIGH (ref 3.8–10.5)
WBC # FLD AUTO: 11.72 K/UL — HIGH (ref 3.8–10.5)

## 2023-03-31 PROCEDURE — 99233 SBSQ HOSP IP/OBS HIGH 50: CPT

## 2023-03-31 RX ORDER — DEXTROSE 50 % IN WATER 50 %
25 SYRINGE (ML) INTRAVENOUS ONCE
Refills: 0 | Status: DISCONTINUED | OUTPATIENT
Start: 2023-03-31 | End: 2023-04-03

## 2023-03-31 RX ORDER — LANOLIN ALCOHOL/MO/W.PET/CERES
3 CREAM (GRAM) TOPICAL ONCE
Refills: 0 | Status: COMPLETED | OUTPATIENT
Start: 2023-03-31 | End: 2023-03-31

## 2023-03-31 RX ORDER — LACTOBACILLUS ACIDOPHILUS 100MM CELL
1 CAPSULE ORAL
Refills: 0 | Status: DISCONTINUED | OUTPATIENT
Start: 2023-03-31 | End: 2023-04-03

## 2023-03-31 RX ORDER — DEXTROSE 50 % IN WATER 50 %
12.5 SYRINGE (ML) INTRAVENOUS ONCE
Refills: 0 | Status: DISCONTINUED | OUTPATIENT
Start: 2023-03-31 | End: 2023-04-03

## 2023-03-31 RX ORDER — SODIUM CHLORIDE 9 MG/ML
1000 INJECTION, SOLUTION INTRAVENOUS
Refills: 0 | Status: DISCONTINUED | OUTPATIENT
Start: 2023-03-31 | End: 2023-04-03

## 2023-03-31 RX ORDER — CALCIUM CARBONATE 500(1250)
1 TABLET ORAL ONCE
Refills: 0 | Status: COMPLETED | OUTPATIENT
Start: 2023-03-31 | End: 2023-03-31

## 2023-03-31 RX ORDER — INSULIN LISPRO 100/ML
VIAL (ML) SUBCUTANEOUS
Refills: 0 | Status: DISCONTINUED | OUTPATIENT
Start: 2023-03-31 | End: 2023-04-03

## 2023-03-31 RX ORDER — LANOLIN ALCOHOL/MO/W.PET/CERES
6 CREAM (GRAM) TOPICAL ONCE
Refills: 0 | Status: COMPLETED | OUTPATIENT
Start: 2023-03-31 | End: 2023-03-31

## 2023-03-31 RX ORDER — GLUCAGON INJECTION, SOLUTION 0.5 MG/.1ML
1 INJECTION, SOLUTION SUBCUTANEOUS ONCE
Refills: 0 | Status: DISCONTINUED | OUTPATIENT
Start: 2023-03-31 | End: 2023-04-03

## 2023-03-31 RX ORDER — LANOLIN ALCOHOL/MO/W.PET/CERES
3 CREAM (GRAM) TOPICAL AT BEDTIME
Refills: 0 | Status: DISCONTINUED | OUTPATIENT
Start: 2023-03-31 | End: 2023-03-31

## 2023-03-31 RX ORDER — DEXTROSE 50 % IN WATER 50 %
15 SYRINGE (ML) INTRAVENOUS ONCE
Refills: 0 | Status: DISCONTINUED | OUTPATIENT
Start: 2023-03-31 | End: 2023-04-03

## 2023-03-31 RX ADMIN — SODIUM CHLORIDE 100 MILLILITER(S): 9 INJECTION, SOLUTION INTRAVENOUS at 06:05

## 2023-03-31 RX ADMIN — Medication 975 MILLIGRAM(S): at 11:24

## 2023-03-31 RX ADMIN — Medication 1 MILLIGRAM(S): at 13:43

## 2023-03-31 RX ADMIN — PIPERACILLIN AND TAZOBACTAM 25 GRAM(S): 4; .5 INJECTION, POWDER, LYOPHILIZED, FOR SOLUTION INTRAVENOUS at 18:03

## 2023-03-31 RX ADMIN — Medication 1 MILLIGRAM(S): at 21:19

## 2023-03-31 RX ADMIN — Medication 150 MILLIGRAM(S): at 21:13

## 2023-03-31 RX ADMIN — Medication 975 MILLIGRAM(S): at 10:55

## 2023-03-31 RX ADMIN — HEPARIN SODIUM 5000 UNIT(S): 5000 INJECTION INTRAVENOUS; SUBCUTANEOUS at 21:18

## 2023-03-31 RX ADMIN — Medication 650 MILLIGRAM(S): at 05:58

## 2023-03-31 RX ADMIN — Medication 1 MILLIGRAM(S): at 05:58

## 2023-03-31 RX ADMIN — PIPERACILLIN AND TAZOBACTAM 25 GRAM(S): 4; .5 INJECTION, POWDER, LYOPHILIZED, FOR SOLUTION INTRAVENOUS at 05:58

## 2023-03-31 RX ADMIN — Medication 650 MILLIGRAM(S): at 21:20

## 2023-03-31 RX ADMIN — Medication 1 TABLET(S): at 05:58

## 2023-03-31 RX ADMIN — Medication 3 MILLIGRAM(S): at 01:45

## 2023-03-31 RX ADMIN — Medication 650 MILLIGRAM(S): at 13:43

## 2023-03-31 RX ADMIN — HEPARIN SODIUM 5000 UNIT(S): 5000 INJECTION INTRAVENOUS; SUBCUTANEOUS at 13:45

## 2023-03-31 NOTE — PROGRESS NOTE ADULT - ASSESSMENT
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 Presents for dislodged tube, found to have BARON on CKD

## 2023-03-31 NOTE — PROGRESS NOTE ADULT - ASSESSMENT
Interval Events:  - EKG 3/30: sinus bradycardia with sinus arrhythmia  - No acute overnight events    PLAN:  Neuro:  - Pain control: tylenol, will order prn meds if needed  - Home med resumed: Klonopin 1mg q8hr    Resp:  - On room air  - Maintain SpO2 > 92%    CV:  - No longer requiring Marvin and maintaining MAPs  - Holding home Atenolol 10mg, Amlodipine 10mg  - EKG (3/28): sinus bradycardia (VHR 53)  - EKG (3/30): sinus bradycardia with sinus arrhythmia (VHR 48)  - F/u cardiac enzyme panel (pt c/o chest pain 3/30)    GI:   - Advanced to regular diet 3/30  - No acute issues    Renal:  - Monitor urostomy output  - Monitor I&Os and electrolytes w/ repletions as necessary    Heme:  - Monitor CBC and coags  - SQH for VTE prophylaxis    ID:   - Monitor for clinical evidence of active infection  - Monitor WBC and temperature  - Antibiotics - Zosyn, will add vanc given stent placement    Endo:   - Monitor glucose on metabolic panel    Tubes/Lines/Drains  - Urostomy tube  - PIV    Code Status: Full code  Disposition: SICU

## 2023-03-31 NOTE — PROGRESS NOTE ADULT - SUBJECTIVE AND OBJECTIVE BOX
Overnight events:  None    Subjective:  Pt offers no physical complaints    Objective:    Vital signs  T(C): , Max: 36.5 (03-31-23 @ 04:37)  HR: 60 (03-31-23 @ 04:37)  BP: 126/68 (03-31-23 @ 04:37)  SpO2: 96% (03-31-23 @ 04:37)  Wt(kg): --    Output     03-30 @ 07:01  -  03-31 @ 07:00  --------------------------------------------------------  IN: 1550 mL / OUT: 1495 mL / NET: 55 mL    03-31 @ 07:01  -  03-31 @ 08:27  --------------------------------------------------------  IN: 200 mL / OUT: 0 mL / NET: 200 mL        Gen: NAD  Abd: stoma pink with upside down myra visible, soft, nontender      Labs: pending today                        9.0    17.02 )-----------( 252      ( 29 Mar 2023 21:31 )             27.8     29 Mar 2023 21:31    132    |  100    |  119    ----------------------------<  252    4.7     |  12     |  8.81     Ca    8.2        29 Mar 2023 21:31

## 2023-03-31 NOTE — PROGRESS NOTE ADULT - SUBJECTIVE AND OBJECTIVE BOX
CHIEF COMPLAINT: f/u     SUBJECTIVE / OVERNIGHT EVENTS: Patient seen and examined. No acute events overnight. Pain well controlled and patient without any complaints.    MEDICATIONS  (STANDING):  calcitriol   Capsule 0.25 MICROGram(s) Oral <User Schedule>  clonazePAM  Tablet 1 milliGRAM(s) Oral three times a day  dextrose 5%. 1000 milliLiter(s) (50 mL/Hr) IV Continuous <Continuous>  dextrose 5%. 1000 milliLiter(s) (100 mL/Hr) IV Continuous <Continuous>  dextrose 50% Injectable 25 Gram(s) IV Push once  dextrose 50% Injectable 12.5 Gram(s) IV Push once  dextrose 50% Injectable 25 Gram(s) IV Push once  glucagon  Injectable 1 milliGRAM(s) IntraMuscular once  heparin   Injectable 5000 Unit(s) SubCutaneous every 8 hours  insulin lispro (ADMELOG) corrective regimen sliding scale   SubCutaneous three times a day before meals  lactated ringers. 1000 milliLiter(s) (100 mL/Hr) IV Continuous <Continuous>  piperacillin/tazobactam IVPB.. 3.375 Gram(s) IV Intermittent every 12 hours  sodium bicarbonate 650 milliGRAM(s) Oral three times a day  traZODone 150 milliGRAM(s) Oral at bedtime    MEDICATIONS  (PRN):  acetaminophen     Tablet .. 975 milliGRAM(s) Oral every 6 hours PRN Moderate Pain (4 - 6)  dextrose Oral Gel 15 Gram(s) Oral once PRN Blood Glucose LESS THAN 70 milliGRAM(s)/deciliter  senna 2 Tablet(s) Oral at bedtime PRN Constipation      VITALS:  T(F): 97.9 (03-31-23 @ 08:00), Max: 97.9 (03-31-23 @ 08:00)  HR: 51 (03-31-23 @ 08:00) (46 - 60)  BP: 130/63 (03-31-23 @ 08:00) (91/62 - 140/56)  RR: 16 (03-31-23 @ 08:00) (10 - 18)  SpO2: 97% (03-31-23 @ 08:00)  Wt(kg): --      CAPILLARY BLOOD GLUCOSE      PHYSICAL EXAM:  GENERAL: NAD, well-developed  HEAD:  Atraumatic, Normocephalic  EYES: EOMI, PERRLA, conjunctiva and sclera clear  NECK: Supple, No JVD  CHEST/LUNG: Clear to auscultation bilaterally; No wheeze  HEART: Regular rate and rhythm; No murmurs, rubs, or gallops  ABDOMEN: Soft, Nontender, Nondistended; Bowel sounds present  EXTREMITIES:  2+ Peripheral Pulses, No clubbing, cyanosis, or edema  PSYCH: AAOx3  NEUROLOGY: non-focal  SKIN: No rashes or lesions    LABS:              9.0                  132  | 12   | 119          17.02 >-----------< 252     ------------------------< 252                   27.8                 4.7  | 100  | 8.81                                         Ca 8.2   Mg x     Ph x           TPro  6.6  /  Alb  2.8      TBili  0.4  /  DBili  x         AST  11  /  ALT  9             AlkPhos  83      INR: 1.12 ratio;    PT: 13.0 sec;    PTT: 31.5 sec    CARDIAC MARKERS  x     / 32 U/L / x                  MICROBIOLOGY:        RADIOLOGY & ADDITIONAL TESTS:  Imaging Personally Reviewed: [ ] Yes    [ ] Consultant(s) Notes Reviewed:  [x] Care Discussed with Consultants/Other Providers: Urology PA - discussed   CHIEF COMPLAINT: f/u     SUBJECTIVE / OVERNIGHT EVENTS: Patient seen and examined. No acute events overnight. Sitting in chair, comfortable.     MEDICATIONS  (STANDING):  calcitriol   Capsule 0.25 MICROGram(s) Oral <User Schedule>  clonazePAM  Tablet 1 milliGRAM(s) Oral three times a day  dextrose 5%. 1000 milliLiter(s) (50 mL/Hr) IV Continuous <Continuous>  dextrose 5%. 1000 milliLiter(s) (100 mL/Hr) IV Continuous <Continuous>  dextrose 50% Injectable 25 Gram(s) IV Push once  dextrose 50% Injectable 12.5 Gram(s) IV Push once  dextrose 50% Injectable 25 Gram(s) IV Push once  glucagon  Injectable 1 milliGRAM(s) IntraMuscular once  heparin   Injectable 5000 Unit(s) SubCutaneous every 8 hours  insulin lispro (ADMELOG) corrective regimen sliding scale   SubCutaneous three times a day before meals  lactated ringers. 1000 milliLiter(s) (100 mL/Hr) IV Continuous <Continuous>  piperacillin/tazobactam IVPB.. 3.375 Gram(s) IV Intermittent every 12 hours  sodium bicarbonate 650 milliGRAM(s) Oral three times a day  traZODone 150 milliGRAM(s) Oral at bedtime    MEDICATIONS  (PRN):  acetaminophen     Tablet .. 975 milliGRAM(s) Oral every 6 hours PRN Moderate Pain (4 - 6)  dextrose Oral Gel 15 Gram(s) Oral once PRN Blood Glucose LESS THAN 70 milliGRAM(s)/deciliter  senna 2 Tablet(s) Oral at bedtime PRN Constipation      VITALS:  T(F): 97.9 (03-31-23 @ 08:00), Max: 97.9 (03-31-23 @ 08:00)  HR: 51 (03-31-23 @ 08:00) (46 - 60)  BP: 130/63 (03-31-23 @ 08:00) (91/62 - 140/56)  RR: 16 (03-31-23 @ 08:00) (10 - 18)  SpO2: 97% (03-31-23 @ 08:00)  Wt(kg): --      CAPILLARY BLOOD GLUCOSE      PHYSICAL EXAM:  GENERAL: NAD, well-appearing, sitting in chair   CHEST/LUNG: decreased breath sounds, normal effort   HEART: Regular rate and rhythm; No murmurs, rubs, or gallops  ABDOMEN: Soft, pink stoma, nontender   EXTREMITIES:  warm, well perfused   PSYCH: AAOx3  NEUROLOGY: non-focal  SKIN: No rashes or lesions    LABS:              9.0                  132  | 12   | 119          17.02 >-----------< 252     ------------------------< 252                   27.8                 4.7  | 100  | 8.81                                         Ca 8.2   Mg x     Ph x           TPro  6.6  /  Alb  2.8      TBili  0.4  /  DBili  x         AST  11  /  ALT  9             AlkPhos  83      INR: 1.12 ratio;    PT: 13.0 sec;    PTT: 31.5 sec    CARDIAC MARKERS  x     / 32 U/L / x                  MICROBIOLOGY:        RADIOLOGY & ADDITIONAL TESTS:  Imaging Personally Reviewed: [ ] Yes    [ ] Consultant(s) Notes Reviewed:  [x] Care Discussed with Consultants/Other Providers: Urology PA - discussed

## 2023-03-31 NOTE — PROGRESS NOTE ADULT - ASSESSMENT
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     3/30: admitted to SICU after IR procedure, weaned off everton, improved, on regular diet, afebrile, urine clear, episodes of bradycardia, hypothermia but BP stable, episode of CP, trop neg, EKG sinus manuel w/ sinus arrhythmia   3/31: transferred to telemetry floor in stable condition, pt offers no physical complaints, wants to go home, tolerating diet, no pain    Plan  -f/u AM labs  -continue Zosyn  -tele monitoring  -f/u medicine  -PT consult  -ostomy consult  -DVT prophy, IS, OOB, ambulate

## 2023-03-31 NOTE — PROGRESS NOTE ADULT - SUBJECTIVE AND OBJECTIVE BOX
SICU Daily Progress Note  =====================================================  Interval/Overnight Events:    - EKG 3/30: sinus bradycardia with sinus arrhythmia  - No acute overnight events      ALLERGIES:  No Known Allergies      --------------------------------------------------------------------------------------    MEDICATIONS:    Neurologic Medications  acetaminophen     Tablet .. 975 milliGRAM(s) Oral every 6 hours PRN Moderate Pain (4 - 6)  clonazePAM  Tablet 1 milliGRAM(s) Oral three times a day  traZODone 150 milliGRAM(s) Oral at bedtime    Respiratory Medications    Cardiovascular Medications  phenylephrine    Infusion 0.5 MICROgram(s)/kG/Min IV Continuous <Continuous>    Gastrointestinal Medications  calcitriol   Capsule 0.25 MICROGram(s) Oral <User Schedule>  lactated ringers. 1000 milliLiter(s) IV Continuous <Continuous>  senna 2 Tablet(s) Oral at bedtime PRN Constipation  sodium bicarbonate 650 milliGRAM(s) Oral three times a day    Genitourinary Medications    Hematologic/Oncologic Medications  heparin   Injectable 5000 Unit(s) SubCutaneous every 8 hours    Antimicrobial/Immunologic Medications  piperacillin/tazobactam IVPB.. 3.375 Gram(s) IV Intermittent every 12 hours    Endocrine/Metabolic Medications    Topical/Other Medications  chlorhexidine 2% Cloths 1 Application(s) Topical daily    --------------------------------------------------------------------------------------    VITAL SIGNS:  ICU Vital Signs Last 24 Hrs  T(C): 35.9 (31 Mar 2023 00:00), Max: 37 (30 Mar 2023 04:00)  T(F): 96.6 (31 Mar 2023 00:00), Max: 98.6 (30 Mar 2023 04:00)  HR: 54 (31 Mar 2023 00:00) (46 - 59)  BP: 140/56 (30 Mar 2023 21:29) (91/62 - 140/56)  BP(mean): 79 (30 Mar 2023 21:29) (68 - 86)  ABP: --  ABP(mean): --  RR: 17 (31 Mar 2023 00:00) (10 - 24)  SpO2: 93% (31 Mar 2023 00:00) (92% - 100%)    O2 Parameters below as of 31 Mar 2023 00:00  Patient On (Oxygen Delivery Method): room air  --------------------------------------------------------------------------------------    INS AND OUTS:  I&O's Detail    29 Mar 2023 07:01  -  30 Mar 2023 07:00  --------------------------------------------------------  IN:    IV PiggyBack: 350 mL    Lactated Ringers: 1900 mL    Lactated Ringers Bolus: 1000 mL    Oral Fluid: 60 mL    Phenylephrine: 50.9 mL  Total IN: 3360.9 mL    OUT:    Urostomy (mL): 1900 mL  Total OUT: 1900 mL    Total NET: 1460.9 mL      30 Mar 2023 07:01  -  31 Mar 2023 01:11  --------------------------------------------------------  IN:    IV PiggyBack: 100 mL    Lactated Ringers: 600 mL    Oral Fluid: 50 mL  Total IN: 750 mL    OUT:    Urostomy (mL): 965 mL  Total OUT: 965 mL    Total NET: -215 mL  --------------------------------------------------------------------------------------    PHYSICAL EXAMINATION:  General - NAD, tired, resting comfortably in bed  Neuro - A&O x3, no focal deficits  HEENT - Atraumatic  Lungs - Nonlabored breathing on room air  Heart - Low grade bradycardic when evaluated, regular rhythm on monitor  Abdomen - Soft, nondistended, nontender, ileal conduit with urostomy bag  Extremities - Warm and perfused    METABOLIC / FLUIDS / ELECTROLYTES  calcitriol   Capsule 0.25 MICROGram(s) Oral <User Schedule>  lactated ringers. 1000 milliLiter(s) IV Continuous <Continuous>  sodium bicarbonate 650 milliGRAM(s) Oral three times a day      HEMATOLOGIC  [x] VTE Prophylaxis: heparin   Injectable 5000 Unit(s) SubCutaneous every 8 hours    Transfusions:	[] PRBC	[] Platelets		[] FFP	[] Cryoprecipitate    INFECTIOUS DISEASE  Antimicrobials/Immunologic Medications:  piperacillin/tazobactam IVPB.. 3.375 Gram(s) IV Intermittent every 12 hours    Day #      of     ***    TUBES / LINES / DRAINS  ***  [x] Peripheral IV  [x] Necessity of urinary, arterial, and venous catheters discussed    --------------------------------------------------------------------------------------    LABS                        9.0    17.02 )-----------( 252      ( 29 Mar 2023 21:31 )             27.8   03-29    132<L>  |  100  |  119<H>  ----------------------------<  252<H>  4.7   |  12<L>  |  8.81<H>    Ca    8.2<L>      29 Mar 2023 21:31    TPro  6.6  /  Alb  2.8<L>  /  TBili  0.4  /  DBili  x   /  AST  11  /  ALT  9   /  AlkPhos  83  03-29    --------------------------------------------------------------------------------------    OTHER LABORATORY:     IMAGING STUDIES:   CXR:

## 2023-04-01 LAB
ANION GAP SERPL CALC-SCNC: 15 MMOL/L — HIGH (ref 7–14)
BUN SERPL-MCNC: 85 MG/DL — HIGH (ref 7–23)
CALCIUM SERPL-MCNC: 8.2 MG/DL — LOW (ref 8.4–10.5)
CHLORIDE SERPL-SCNC: 108 MMOL/L — HIGH (ref 98–107)
CO2 SERPL-SCNC: 15 MMOL/L — LOW (ref 22–31)
CREAT SERPL-MCNC: 6.71 MG/DL — HIGH (ref 0.5–1.3)
EGFR: 8 ML/MIN/1.73M2 — LOW
GLUCOSE BLDC GLUCOMTR-MCNC: 118 MG/DL — HIGH (ref 70–99)
GLUCOSE BLDC GLUCOMTR-MCNC: 132 MG/DL — HIGH (ref 70–99)
GLUCOSE SERPL-MCNC: 127 MG/DL — HIGH (ref 70–99)
HCT VFR BLD CALC: 27.3 % — LOW (ref 39–50)
HGB BLD-MCNC: 8.6 G/DL — LOW (ref 13–17)
MAGNESIUM SERPL-MCNC: 2.3 MG/DL — SIGNIFICANT CHANGE UP (ref 1.6–2.6)
MCHC RBC-ENTMCNC: 29.4 PG — SIGNIFICANT CHANGE UP (ref 27–34)
MCHC RBC-ENTMCNC: 31.5 GM/DL — LOW (ref 32–36)
MCV RBC AUTO: 93.2 FL — SIGNIFICANT CHANGE UP (ref 80–100)
NRBC # BLD: 0 /100 WBCS — SIGNIFICANT CHANGE UP (ref 0–0)
NRBC # FLD: 0 K/UL — SIGNIFICANT CHANGE UP (ref 0–0)
PHOSPHATE SERPL-MCNC: 5.2 MG/DL — HIGH (ref 2.5–4.5)
PLATELET # BLD AUTO: 259 K/UL — SIGNIFICANT CHANGE UP (ref 150–400)
POTASSIUM SERPL-MCNC: 4.4 MMOL/L — SIGNIFICANT CHANGE UP (ref 3.5–5.3)
POTASSIUM SERPL-SCNC: 4.4 MMOL/L — SIGNIFICANT CHANGE UP (ref 3.5–5.3)
RBC # BLD: 2.93 M/UL — LOW (ref 4.2–5.8)
RBC # FLD: 15 % — HIGH (ref 10.3–14.5)
SODIUM SERPL-SCNC: 138 MMOL/L — SIGNIFICANT CHANGE UP (ref 135–145)
WBC # BLD: 9.7 K/UL — SIGNIFICANT CHANGE UP (ref 3.8–10.5)
WBC # FLD AUTO: 9.7 K/UL — SIGNIFICANT CHANGE UP (ref 3.8–10.5)

## 2023-04-01 PROCEDURE — 99233 SBSQ HOSP IP/OBS HIGH 50: CPT

## 2023-04-01 PROCEDURE — 99232 SBSQ HOSP IP/OBS MODERATE 35: CPT

## 2023-04-01 RX ORDER — AMLODIPINE BESYLATE 2.5 MG/1
5 TABLET ORAL DAILY
Refills: 0 | Status: DISCONTINUED | OUTPATIENT
Start: 2023-04-01 | End: 2023-04-03

## 2023-04-01 RX ORDER — LANOLIN ALCOHOL/MO/W.PET/CERES
3 CREAM (GRAM) TOPICAL AT BEDTIME
Refills: 0 | Status: DISCONTINUED | OUTPATIENT
Start: 2023-04-01 | End: 2023-04-03

## 2023-04-01 RX ADMIN — Medication 975 MILLIGRAM(S): at 16:00

## 2023-04-01 RX ADMIN — Medication 1 MILLIGRAM(S): at 05:02

## 2023-04-01 RX ADMIN — Medication 1 TABLET(S): at 08:23

## 2023-04-01 RX ADMIN — Medication 1 MILLIGRAM(S): at 20:32

## 2023-04-01 RX ADMIN — PIPERACILLIN AND TAZOBACTAM 25 GRAM(S): 4; .5 INJECTION, POWDER, LYOPHILIZED, FOR SOLUTION INTRAVENOUS at 08:15

## 2023-04-01 RX ADMIN — CALCITRIOL 0.25 MICROGRAM(S): 0.5 CAPSULE ORAL at 08:23

## 2023-04-01 RX ADMIN — Medication 150 MILLIGRAM(S): at 23:00

## 2023-04-01 RX ADMIN — Medication 6 MILLIGRAM(S): at 00:02

## 2023-04-01 RX ADMIN — Medication 1 MILLIGRAM(S): at 13:39

## 2023-04-01 RX ADMIN — Medication 975 MILLIGRAM(S): at 15:18

## 2023-04-01 RX ADMIN — Medication 650 MILLIGRAM(S): at 05:03

## 2023-04-01 RX ADMIN — Medication 3 MILLIGRAM(S): at 23:04

## 2023-04-01 RX ADMIN — PIPERACILLIN AND TAZOBACTAM 25 GRAM(S): 4; .5 INJECTION, POWDER, LYOPHILIZED, FOR SOLUTION INTRAVENOUS at 18:01

## 2023-04-01 RX ADMIN — Medication 1 TABLET(S): at 18:01

## 2023-04-01 RX ADMIN — Medication 650 MILLIGRAM(S): at 13:39

## 2023-04-01 RX ADMIN — Medication 650 MILLIGRAM(S): at 23:02

## 2023-04-01 RX ADMIN — AMLODIPINE BESYLATE 5 MILLIGRAM(S): 2.5 TABLET ORAL at 12:19

## 2023-04-01 NOTE — PHYSICAL THERAPY INITIAL EVALUATION ADULT - ADDITIONAL COMMENTS
Patient lives alone but per chart patient lives with 2 housemate's. Patient was independent in all ADL prior to admission. patient was not using an assistive device prior to admission.

## 2023-04-01 NOTE — PROGRESS NOTE ADULT - SUBJECTIVE AND OBJECTIVE BOX
Surgery Progress Note     Subjective/24hour Events:   Patient seen and examined.   No acute events overnight. No Complaints. eager to go home  Pain controlled.     Vital Signs:  Vital Signs Last 24 Hrs  T(C): 36.7 (31 Mar 2023 19:57), Max: 36.7 (31 Mar 2023 12:48)  T(F): 98 (31 Mar 2023 19:57), Max: 98 (31 Mar 2023 12:48)  HR: 49 (31 Mar 2023 19:57) (49 - 56)  BP: 153/60 (31 Mar 2023 19:57) (137/54 - 159/61)  BP(mean): --  RR: 18 (31 Mar 2023 19:57) (16 - 18)  SpO2: 95% (31 Mar 2023 19:57) (95% - 97%)    Parameters below as of 31 Mar 2023 19:57  Patient On (Oxygen Delivery Method): room air        CAPILLARY BLOOD GLUCOSE      POCT Blood Glucose.: 123 mg/dL (31 Mar 2023 17:06)  POCT Blood Glucose.: 125 mg/dL (31 Mar 2023 12:46)      I&O's Detail    31 Mar 2023 07:01  -  01 Apr 2023 07:00  --------------------------------------------------------  IN:    IV PiggyBack: 100 mL    Lactated Ringers: 2000 mL    Oral Fluid: 950 mL  Total IN: 3050 mL    OUT:    Urostomy (mL): 2950 mL  Total OUT: 2950 mL    Total NET: 100 mL          MEDICATIONS  (STANDING):  calcitriol   Capsule 0.25 MICROGram(s) Oral <User Schedule>  clonazePAM  Tablet 1 milliGRAM(s) Oral three times a day  dextrose 5%. 1000 milliLiter(s) (50 mL/Hr) IV Continuous <Continuous>  dextrose 5%. 1000 milliLiter(s) (100 mL/Hr) IV Continuous <Continuous>  dextrose 50% Injectable 25 Gram(s) IV Push once  dextrose 50% Injectable 12.5 Gram(s) IV Push once  dextrose 50% Injectable 25 Gram(s) IV Push once  glucagon  Injectable 1 milliGRAM(s) IntraMuscular once  heparin   Injectable 5000 Unit(s) SubCutaneous every 8 hours  insulin lispro (ADMELOG) corrective regimen sliding scale   SubCutaneous three times a day before meals  lactated ringers. 1000 milliLiter(s) (100 mL/Hr) IV Continuous <Continuous>  lactobacillus acidophilus 1 Tablet(s) Oral two times a day with meals  piperacillin/tazobactam IVPB.. 3.375 Gram(s) IV Intermittent every 12 hours  sodium bicarbonate 650 milliGRAM(s) Oral three times a day  traZODone 150 milliGRAM(s) Oral at bedtime    MEDICATIONS  (PRN):  acetaminophen     Tablet .. 975 milliGRAM(s) Oral every 6 hours PRN Moderate Pain (4 - 6)  dextrose Oral Gel 15 Gram(s) Oral once PRN Blood Glucose LESS THAN 70 milliGRAM(s)/deciliter  senna 2 Tablet(s) Oral at bedtime PRN Constipation      Physical Exam:  Gen: NAD.  Lungs: Non labored breathing.   Ab: Soft, nontender, nondistended.   : ostomy pink/viable, upsidedown NT in place and draining.    Labs:    03-31    139  |  108<H>  |  100<H>  ----------------------------<  119<H>  3.8   |  17<L>  |  7.58<H>    Ca    8.3<L>      31 Mar 2023 12:28  Phos  5.0     03-31  Mg     2.30     03-31                              8.6    11.72 )-----------( 271      ( 31 Mar 2023 12:28 )             26.7

## 2023-04-01 NOTE — PHYSICAL THERAPY INITIAL EVALUATION ADULT - PERTINENT HX OF CURRENT PROBLEM, REHAB EVAL
68 male with history of ileal conduit with Dr. Esteban presents with BARON after dislodged nephrostomy tube

## 2023-04-01 NOTE — PROGRESS NOTE ADULT - ASSESSMENT
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     3/30: admitted to SICU after IR procedure, weaned off everton, improved, on regular diet, afebrile, urine clear, episodes of bradycardia, hypothermia but BP stable, episode of CP, trop neg, EKG sinus manuel w/ sinus arrhythmia   3/31: transferred to telemetry floor in stable condition, pt offers no physical complaints, wants to go home, tolerating diet, no pain    Plan  -f/u AM labs  -continue Zosyn  -tele monitoring  -f/u medicine recommendations  -PT consult  -ostomy consult    [facility]  [address] [place] [address]  [phone]    -DVT prophy, IS, OOB, ambulate       67 y/o male CKD4/5, anxiety/depression, pre-DM-2, HTN, prostate cancer s/p prostatectomy 2011, bladder cancer s/p cystoscopy, fulguration, cystolitholapaxy on 1/9/18, s/p radical cystectomy and ileal conduit creation on 5/10/18, 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     3/30: admitted to SICU after IR procedure, weaned off everton, improved, on regular diet, afebrile, urine clear, episodes of bradycardia, hypothermia but BP stable, episode of CP, trop neg, EKG sinus manuel w/ sinus arrhythmia   3/31: transferred to telemetry floor in stable condition, pt offers no physical complaints, wants to go home, tolerating diet, no pain    Plan  -f/u AM labs  -continue Zosyn  -tele monitoring  -f/u medicine recommendations  -PT consult  -ostomy consult    [facility]  [address] [place] [address]  [phone]    -DVT prophy, IS, OOB, ambulate

## 2023-04-01 NOTE — PROGRESS NOTE ADULT - SUBJECTIVE AND OBJECTIVE BOX
CHIEF COMPLAINT: f/u     SUBJECTIVE / OVERNIGHT EVENTS: Patient seen and examined. No acute events overnight. Upset being in the hospital, wants to go home.    MEDICATIONS  (STANDING):  calcitriol   Capsule 0.25 MICROGram(s) Oral <User Schedule>  clonazePAM  Tablet 1 milliGRAM(s) Oral three times a day  dextrose 5%. 1000 milliLiter(s) (50 mL/Hr) IV Continuous <Continuous>  dextrose 5%. 1000 milliLiter(s) (100 mL/Hr) IV Continuous <Continuous>  dextrose 50% Injectable 25 Gram(s) IV Push once  dextrose 50% Injectable 12.5 Gram(s) IV Push once  dextrose 50% Injectable 25 Gram(s) IV Push once  glucagon  Injectable 1 milliGRAM(s) IntraMuscular once  heparin   Injectable 5000 Unit(s) SubCutaneous every 8 hours  insulin lispro (ADMELOG) corrective regimen sliding scale   SubCutaneous three times a day before meals  lactated ringers. 1000 milliLiter(s) (100 mL/Hr) IV Continuous <Continuous>  lactobacillus acidophilus 1 Tablet(s) Oral two times a day with meals  piperacillin/tazobactam IVPB.. 3.375 Gram(s) IV Intermittent every 12 hours  sodium bicarbonate 650 milliGRAM(s) Oral three times a day  traZODone 150 milliGRAM(s) Oral at bedtime    MEDICATIONS  (PRN):  acetaminophen     Tablet .. 975 milliGRAM(s) Oral every 6 hours PRN Moderate Pain (4 - 6)  dextrose Oral Gel 15 Gram(s) Oral once PRN Blood Glucose LESS THAN 70 milliGRAM(s)/deciliter  senna 2 Tablet(s) Oral at bedtime PRN Constipation      VITALS:  T(F): 97.9 (04-01-23 @ 08:00), Max: 98 (03-31-23 @ 12:48)  HR: 51 (04-01-23 @ 08:00) (49 - 56)  BP: 154/61 (04-01-23 @ 08:00) (143/59 - 159/61)  RR: 18 (04-01-23 @ 08:00) (16 - 18)  SpO2: 97% (04-01-23 @ 08:00)  Wt(kg): --      CAPILLARY BLOOD GLUCOSE      PHYSICAL EXAM:  GENERAL: NAD, well-appearing, sitting in chair   CHEST/LUNG: decreased breath sounds, normal effort   HEART: Regular rate and rhythm; No murmurs, rubs, or gallops  ABDOMEN: Soft, pink stoma, nontender   EXTREMITIES:  warm, well perfused   PSYCH: cooperative, but upset   NEUROLOGY: non-focal  SKIN: No rashes or lesions  LABS:              8.6                  138  | 15   | 85           9.70  >-----------< 259     ------------------------< 127                   27.3                 4.4  | 108  | 6.71                                         Ca 8.2   Mg 2.30  Ph 5.2            CARDIAC MARKERS  x     / 32 U/L / x                  MICROBIOLOGY:        RADIOLOGY & ADDITIONAL TESTS:  Imaging Personally Reviewed: [ ] Yes    [ ] Consultant(s) Notes Reviewed:  [x] Care Discussed with Consultants/Other Providers: Urology PA - discussed

## 2023-04-02 DIAGNOSIS — R19.7 DIARRHEA, UNSPECIFIED: ICD-10-CM

## 2023-04-02 LAB
ANION GAP SERPL CALC-SCNC: 13 MMOL/L — SIGNIFICANT CHANGE UP (ref 7–14)
BUN SERPL-MCNC: 68 MG/DL — HIGH (ref 7–23)
CALCIUM SERPL-MCNC: 8.8 MG/DL — SIGNIFICANT CHANGE UP (ref 8.4–10.5)
CHLORIDE SERPL-SCNC: 110 MMOL/L — HIGH (ref 98–107)
CO2 SERPL-SCNC: 19 MMOL/L — LOW (ref 22–31)
CREAT SERPL-MCNC: 5.71 MG/DL — HIGH (ref 0.5–1.3)
EGFR: 10 ML/MIN/1.73M2 — LOW
GLUCOSE BLDC GLUCOMTR-MCNC: 149 MG/DL — HIGH (ref 70–99)
GLUCOSE BLDC GLUCOMTR-MCNC: 155 MG/DL — HIGH (ref 70–99)
GLUCOSE BLDC GLUCOMTR-MCNC: 95 MG/DL — SIGNIFICANT CHANGE UP (ref 70–99)
GLUCOSE SERPL-MCNC: 104 MG/DL — HIGH (ref 70–99)
MAGNESIUM SERPL-MCNC: 2 MG/DL — SIGNIFICANT CHANGE UP (ref 1.6–2.6)
PHOSPHATE SERPL-MCNC: 4.9 MG/DL — HIGH (ref 2.5–4.5)
POTASSIUM SERPL-MCNC: 3.7 MMOL/L — SIGNIFICANT CHANGE UP (ref 3.5–5.3)
POTASSIUM SERPL-SCNC: 3.7 MMOL/L — SIGNIFICANT CHANGE UP (ref 3.5–5.3)
SODIUM SERPL-SCNC: 142 MMOL/L — SIGNIFICANT CHANGE UP (ref 135–145)

## 2023-04-02 PROCEDURE — 99232 SBSQ HOSP IP/OBS MODERATE 35: CPT

## 2023-04-02 PROCEDURE — 99231 SBSQ HOSP IP/OBS SF/LOW 25: CPT

## 2023-04-02 RX ORDER — LOPERAMIDE HCL 2 MG
2 TABLET ORAL ONCE
Refills: 0 | Status: COMPLETED | OUTPATIENT
Start: 2023-04-02 | End: 2023-04-02

## 2023-04-02 RX ORDER — LOPERAMIDE HCL 2 MG
2 TABLET ORAL DAILY
Refills: 0 | Status: DISCONTINUED | OUTPATIENT
Start: 2023-04-02 | End: 2023-04-03

## 2023-04-02 RX ADMIN — Medication 2 MILLIGRAM(S): at 11:17

## 2023-04-02 RX ADMIN — HEPARIN SODIUM 5000 UNIT(S): 5000 INJECTION INTRAVENOUS; SUBCUTANEOUS at 21:23

## 2023-04-02 RX ADMIN — Medication 150 MILLIGRAM(S): at 21:23

## 2023-04-02 RX ADMIN — Medication 1 MILLIGRAM(S): at 21:21

## 2023-04-02 RX ADMIN — Medication 1 TABLET(S): at 21:22

## 2023-04-02 RX ADMIN — Medication 1 MILLIGRAM(S): at 13:46

## 2023-04-02 RX ADMIN — Medication 1: at 18:06

## 2023-04-02 RX ADMIN — Medication 2 MILLIGRAM(S): at 18:06

## 2023-04-02 RX ADMIN — Medication 3 MILLIGRAM(S): at 21:22

## 2023-04-02 RX ADMIN — Medication 650 MILLIGRAM(S): at 06:53

## 2023-04-02 RX ADMIN — PIPERACILLIN AND TAZOBACTAM 25 GRAM(S): 4; .5 INJECTION, POWDER, LYOPHILIZED, FOR SOLUTION INTRAVENOUS at 21:20

## 2023-04-02 RX ADMIN — AMLODIPINE BESYLATE 5 MILLIGRAM(S): 2.5 TABLET ORAL at 06:53

## 2023-04-02 RX ADMIN — Medication 1 TABLET(S): at 11:16

## 2023-04-02 RX ADMIN — HEPARIN SODIUM 5000 UNIT(S): 5000 INJECTION INTRAVENOUS; SUBCUTANEOUS at 14:35

## 2023-04-02 RX ADMIN — Medication 1 MILLIGRAM(S): at 06:53

## 2023-04-02 RX ADMIN — Medication 650 MILLIGRAM(S): at 13:46

## 2023-04-02 RX ADMIN — Medication 650 MILLIGRAM(S): at 21:21

## 2023-04-02 NOTE — PROGRESS NOTE ADULT - SUBJECTIVE AND OBJECTIVE BOX
CHIEF COMPLAINT: f/u     SUBJECTIVE / OVERNIGHT EVENTS: Patient seen and examined. Laying in bed, reports diarrhea overnight, wants imodium and wants to go home.     MEDICATIONS  (STANDING):  amLODIPine   Tablet 5 milliGRAM(s) Oral daily  calcitriol   Capsule 0.25 MICROGram(s) Oral <User Schedule>  clonazePAM  Tablet 1 milliGRAM(s) Oral three times a day  dextrose 5%. 1000 milliLiter(s) (50 mL/Hr) IV Continuous <Continuous>  dextrose 5%. 1000 milliLiter(s) (100 mL/Hr) IV Continuous <Continuous>  dextrose 50% Injectable 25 Gram(s) IV Push once  dextrose 50% Injectable 12.5 Gram(s) IV Push once  dextrose 50% Injectable 25 Gram(s) IV Push once  glucagon  Injectable 1 milliGRAM(s) IntraMuscular once  heparin   Injectable 5000 Unit(s) SubCutaneous every 8 hours  insulin lispro (ADMELOG) corrective regimen sliding scale   SubCutaneous three times a day before meals  lactobacillus acidophilus 1 Tablet(s) Oral two times a day with meals  loperamide 2 milliGRAM(s) Oral once  melatonin 3 milliGRAM(s) Oral at bedtime  piperacillin/tazobactam IVPB.. 3.375 Gram(s) IV Intermittent every 12 hours  sodium bicarbonate 650 milliGRAM(s) Oral three times a day  traZODone 150 milliGRAM(s) Oral at bedtime    MEDICATIONS  (PRN):  acetaminophen     Tablet .. 975 milliGRAM(s) Oral every 6 hours PRN Moderate Pain (4 - 6)  dextrose Oral Gel 15 Gram(s) Oral once PRN Blood Glucose LESS THAN 70 milliGRAM(s)/deciliter  senna 2 Tablet(s) Oral at bedtime PRN Constipation      VITALS:  T(F): 97.6 (04-02-23 @ 07:00), Max: 97.8 (04-01-23 @ 12:00)  HR: 50 (04-02-23 @ 07:00) (50 - 60)  BP: 145/67 (04-02-23 @ 07:00) (140/56 - 160/64)  RR: 18 (04-02-23 @ 07:00) (18 - 18)  SpO2: 99% (04-02-23 @ 07:00)  Wt(kg): --      CAPILLARY BLOOD GLUCOSE      PHYSICAL EXAM:  GENERAL: NAD, well-appearing, laying in bed   CHEST/LUNG: mild anterior wheeze, normal resp effort  HEART: Regular rate and rhythm; No murmurs, rubs, or gallops  ABDOMEN: Soft, pink stoma, nontender   EXTREMITIES:  warm, well perfused   PSYCH: cooperative, but upset   NEUROLOGY: non-focal  SKIN: No rashes or lesions    LABS:              8.6                  138  | 15   | 85           9.70  >-----------< 259     ------------------------< 127                   27.3                 4.4  | 108  | 6.71                                         Ca 8.2   Mg 2.30  Ph 5.2                        MICROBIOLOGY:        RADIOLOGY & ADDITIONAL TESTS:  Imaging Personally Reviewed: [ ] Yes    [ ] Consultant(s) Notes Reviewed:  [x] Care Discussed with Consultants/Other Providers: Urology PA - discussed

## 2023-04-02 NOTE — PROGRESS NOTE ADULT - ATTENDING COMMENTS
seen and examined  agree with above assessment / plan
seen and examined, agree with note as written
Severe sepsis secondary to UTI  a.  s/p bilateral ureteral stent placement  b.  Wean neosynprhine gtt  c.  Decrease IVF  d.  Hold amlodipine    Anxiety disorder/agitation  a.  Restart clonpin  b.  PRN haldol    BARON  a.  Likely in diuretic phase of resolving RF  b.  Monitor I and Os  c.  Trend BUN/creatinine    At risk for malnutrition  a. S/P PEG today  b.  Start TF in am

## 2023-04-02 NOTE — PROVIDER CONTACT NOTE (OTHER) - REASON
Patient pulled out his IV and has no IV access at the moment
Pt refusing fingerstick and vitals
Patient refusing bedtime blood glucose check

## 2023-04-02 NOTE — PROVIDER CONTACT NOTE (OTHER) - SITUATION
Patient pulled out his IV and has no IV access at the moment   Primary RN attempted and 2 other RNs attempted  pt is a very hardstick and RN unable to obtain IV acccess- pt due for IV antibiotics
Patient stated that they are refusing their bedtime glucose fingerstick as well as vitals throughout the night. Patient stated that they do not want to be disturbed.
Patient is refusing bedtime blood glucose check

## 2023-04-02 NOTE — PROGRESS NOTE ADULT - ASSESSMENT
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     3/30: admitted to SICU after IR procedure, weaned off everton, improved, on regular diet, afebrile, urine clear, episodes of bradycardia, hypothermia but BP stable, episode of CP, trop neg, EKG sinus manuel w/ sinus arrhythmia   3/31: transferred to telemetry floor in stable condition, pt offers no physical complaints, wants to go home, tolerating diet, no pain  4/2: feels well, wants to go home    Plan  -f/u AM labs  -continue Zosyn  -tele monitoring  -f/u medicine recommendations  -PT consult  -ostomy consult  - plan for discharge home pending social work clearance     Patient seen and examined by Dr. Carlos Tejeda [facility]  [address] [place] [phone]

## 2023-04-02 NOTE — PROVIDER CONTACT NOTE (OTHER) - ACTION/TREATMENT ORDERED:
No IV nurse available today.   RN called clinical impact nurse- clinical impact nurse came to bedside and pt now has IV access.
Provider recommended to monitor patient for status changes.
Provider asked for previous fingerstick at dinner time which was 155, she said it is ok for fingerstick to not be checked tonight

## 2023-04-02 NOTE — PROGRESS NOTE ADULT - ASSESSMENT
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 Presents for dislodged tube, found to have BARON on CKD, now improving.

## 2023-04-02 NOTE — PROVIDER CONTACT NOTE (OTHER) - ASSESSMENT
Pt is asymptomatic upon assessment.
Patient refusing fingerstick states that he wants to go to sleep and does not want to be disturbed overnight
Patient in no apparent distress at the moment   see flowsheet for pts vitals

## 2023-04-02 NOTE — PROGRESS NOTE ADULT - SUBJECTIVE AND OBJECTIVE BOX
Subjective    Patient seen and examined. States he feels okay, wants to go home.    Objective    Vital signs  T(F): , Max: 97.8 (04-01-23 @ 12:00)  HR: 50 (04-02-23 @ 07:00)  BP: 145/67 (04-02-23 @ 07:00)  SpO2: 99% (04-02-23 @ 07:00)  Wt(kg): --    Output     04-01 @ 07:01  -  04-02 @ 07:00  --------------------------------------------------------  IN: 2020 mL / OUT: 2400 mL / NET: -380 mL        General: NAD  Abdomen: soft/non-tender/non-distended      Labs      04-01 @ 08:00    WBC 9.70  / Hct 27.3  / SCr 6.71     03-31 @ 12:28    WBC 11.72 / Hct 26.7  / SCr 7.58

## 2023-04-03 ENCOUNTER — TRANSCRIPTION ENCOUNTER (OUTPATIENT)
Age: 68
End: 2023-04-03

## 2023-04-03 VITALS
DIASTOLIC BLOOD PRESSURE: 66 MMHG | RESPIRATION RATE: 18 BRPM | SYSTOLIC BLOOD PRESSURE: 154 MMHG | HEART RATE: 51 BPM | TEMPERATURE: 98 F | OXYGEN SATURATION: 97 %

## 2023-04-03 LAB
ANION GAP SERPL CALC-SCNC: 16 MMOL/L — HIGH (ref 7–14)
BUN SERPL-MCNC: 59 MG/DL — HIGH (ref 7–23)
CALCIUM SERPL-MCNC: 8.7 MG/DL — SIGNIFICANT CHANGE UP (ref 8.4–10.5)
CHLORIDE SERPL-SCNC: 111 MMOL/L — HIGH (ref 98–107)
CO2 SERPL-SCNC: 15 MMOL/L — LOW (ref 22–31)
CREAT SERPL-MCNC: 5.35 MG/DL — HIGH (ref 0.5–1.3)
EGFR: 11 ML/MIN/1.73M2 — LOW
GLUCOSE BLDC GLUCOMTR-MCNC: 143 MG/DL — HIGH (ref 70–99)
GLUCOSE SERPL-MCNC: 131 MG/DL — HIGH (ref 70–99)
MAGNESIUM SERPL-MCNC: 2.1 MG/DL — SIGNIFICANT CHANGE UP (ref 1.6–2.6)
PHOSPHATE SERPL-MCNC: 4.8 MG/DL — HIGH (ref 2.5–4.5)
POTASSIUM SERPL-MCNC: 3.8 MMOL/L — SIGNIFICANT CHANGE UP (ref 3.5–5.3)
POTASSIUM SERPL-SCNC: 3.8 MMOL/L — SIGNIFICANT CHANGE UP (ref 3.5–5.3)
SODIUM SERPL-SCNC: 142 MMOL/L — SIGNIFICANT CHANGE UP (ref 135–145)

## 2023-04-03 PROCEDURE — 99231 SBSQ HOSP IP/OBS SF/LOW 25: CPT

## 2023-04-03 PROCEDURE — 99232 SBSQ HOSP IP/OBS MODERATE 35: CPT

## 2023-04-03 RX ORDER — FUROSEMIDE 40 MG
1 TABLET ORAL
Refills: 0 | DISCHARGE

## 2023-04-03 RX ORDER — ATENOLOL 25 MG/1
50 TABLET ORAL
Qty: 0 | Refills: 0 | DISCHARGE

## 2023-04-03 RX ORDER — CALCITRIOL 0.5 UG/1
1 CAPSULE ORAL
Qty: 0 | Refills: 0 | DISCHARGE
Start: 2023-04-03

## 2023-04-03 RX ORDER — SODIUM BICARBONATE 1 MEQ/ML
1 SYRINGE (ML) INTRAVENOUS
Qty: 0 | Refills: 0 | DISCHARGE
Start: 2023-04-03

## 2023-04-03 RX ORDER — LOPERAMIDE HCL 2 MG
1 TABLET ORAL
Qty: 0 | Refills: 0 | DISCHARGE
Start: 2023-04-03

## 2023-04-03 RX ORDER — ACETAMINOPHEN 500 MG
3 TABLET ORAL
Qty: 0 | Refills: 0 | DISCHARGE
Start: 2023-04-03

## 2023-04-03 RX ADMIN — Medication 1 TABLET(S): at 09:04

## 2023-04-03 RX ADMIN — Medication 1 MILLIGRAM(S): at 05:04

## 2023-04-03 RX ADMIN — Medication 975 MILLIGRAM(S): at 01:43

## 2023-04-03 RX ADMIN — Medication 1 MILLIGRAM(S): at 11:54

## 2023-04-03 RX ADMIN — AMLODIPINE BESYLATE 5 MILLIGRAM(S): 2.5 TABLET ORAL at 05:05

## 2023-04-03 RX ADMIN — Medication 2 MILLIGRAM(S): at 09:04

## 2023-04-03 RX ADMIN — PIPERACILLIN AND TAZOBACTAM 25 GRAM(S): 4; .5 INJECTION, POWDER, LYOPHILIZED, FOR SOLUTION INTRAVENOUS at 11:11

## 2023-04-03 RX ADMIN — HEPARIN SODIUM 5000 UNIT(S): 5000 INJECTION INTRAVENOUS; SUBCUTANEOUS at 05:05

## 2023-04-03 RX ADMIN — Medication 975 MILLIGRAM(S): at 01:13

## 2023-04-03 RX ADMIN — CALCITRIOL 0.25 MICROGRAM(S): 0.5 CAPSULE ORAL at 09:05

## 2023-04-03 NOTE — DISCHARGE NOTE NURSING/CASE MANAGEMENT/SOCIAL WORK - NSDCVIVACCINE_GEN_ALL_CORE_FT
influenza, injectable, quadrivalent, preservative free; 23-Nov-2019 14:01; Maryanne Snider (RN); Binary Thumb; 3bs44 (Exp. Date: 30-Jun-2020); IntraMuscular; Deltoid Left.; 0.5 milliLiter(s); VIS (VIS Published: 15-Aug-2019, VIS Presented: 23-Nov-2019);

## 2023-04-03 NOTE — PROGRESS NOTE ADULT - SUBJECTIVE AND OBJECTIVE BOX
Patient is a 68y old  Male who presents with a chief complaint of Dislodged nephrouretereal tube (03 Apr 2023 09:36)    SUBJECTIVE / OVERNIGHT EVENTS: No acute events. Feels well, no pain or discomfort, wants to return home today.     MEDICATIONS  (STANDING):  amLODIPine   Tablet 5 milliGRAM(s) Oral daily  calcitriol   Capsule 0.25 MICROGram(s) Oral <User Schedule>  clonazePAM  Tablet 1 milliGRAM(s) Oral three times a day  dextrose 5%. 1000 milliLiter(s) (100 mL/Hr) IV Continuous <Continuous>  dextrose 5%. 1000 milliLiter(s) (50 mL/Hr) IV Continuous <Continuous>  dextrose 50% Injectable 25 Gram(s) IV Push once  dextrose 50% Injectable 12.5 Gram(s) IV Push once  dextrose 50% Injectable 25 Gram(s) IV Push once  glucagon  Injectable 1 milliGRAM(s) IntraMuscular once  heparin   Injectable 5000 Unit(s) SubCutaneous every 8 hours  insulin lispro (ADMELOG) corrective regimen sliding scale   SubCutaneous three times a day before meals  lactobacillus acidophilus 1 Tablet(s) Oral two times a day with meals  melatonin 3 milliGRAM(s) Oral at bedtime  piperacillin/tazobactam IVPB.. 3.375 Gram(s) IV Intermittent every 12 hours  sodium bicarbonate 650 milliGRAM(s) Oral three times a day  traZODone 150 milliGRAM(s) Oral at bedtime    MEDICATIONS  (PRN):  acetaminophen     Tablet .. 975 milliGRAM(s) Oral every 6 hours PRN Moderate Pain (4 - 6)  dextrose Oral Gel 15 Gram(s) Oral once PRN Blood Glucose LESS THAN 70 milliGRAM(s)/deciliter  loperamide 2 milliGRAM(s) Oral daily PRN Diarrhea  senna 2 Tablet(s) Oral at bedtime PRN Constipation    CAPILLARY BLOOD GLUCOSE    POCT Blood Glucose.: 143 mg/dL (03 Apr 2023 08:21)  POCT Blood Glucose.: 155 mg/dL (02 Apr 2023 18:00)  POCT Blood Glucose.: 95 mg/dL (02 Apr 2023 12:15)    I&O's Summary    02 Apr 2023 07:01  -  03 Apr 2023 07:00  --------------------------------------------------------  IN: 740 mL / OUT: 1750 mL / NET: -1010 mL    03 Apr 2023 07:01  -  03 Apr 2023 10:37  --------------------------------------------------------  IN: 240 mL / OUT: 200 mL / NET: 40 mL    PHYSICAL EXAM:  Vital Signs Last 24 Hrs  T(C): 36.4 (03 Apr 2023 08:00), Max: 36.5 (03 Apr 2023 04:30)  T(F): 97.5 (03 Apr 2023 08:00), Max: 97.7 (03 Apr 2023 04:30)  HR: 54 (03 Apr 2023 08:00) (53 - 58)  BP: 163/71 (03 Apr 2023 08:00) (147/63 - 163/71)  BP(mean): --  RR: 14 (03 Apr 2023 08:00) (14 - 19)  SpO2: 100% (03 Apr 2023 08:00) (97% - 100%)    Parameters below as of 03 Apr 2023 08:00  Patient On (Oxygen Delivery Method): room air    CONSTITUTIONAL: NAD, sitting up in chair  EYES: conjunctiva and sclera clear  ENMT: Moist oral mucosa  NECK: Supple  RESPIRATORY: Normal respiratory effort; lungs are clear to auscultation bilaterally  CARDIOVASCULAR: Regular rate and rhythm, normal S1 and S2, trace b/l lower extremity edema  ABDOMEN: Nontender to palpation, normoactive bowel sounds, no rebound/guarding  NEUROLOGY: moving all extremities  SKIN: No rashes    LABS:    04-03    142  |  111<H>  |  59<H>  ----------------------------<  131<H>  3.8   |  15<L>  |  5.35<H>    Ca    8.7      03 Apr 2023 07:36  Phos  4.8     04-03  Mg     2.10     04-03    RADIOLOGY & ADDITIONAL TESTS: Reviewed    COORDINATION OF CARE:  Care Discussed with Consultants/Other Providers [Y- Urology PA]

## 2023-04-03 NOTE — PROVIDER CONTACT NOTE (CHANGE IN STATUS NOTIFICATION) - ACTION/TREATMENT ORDERED:
[facility] made aware, awaiting bedside arrival. RN removed IV as per patient request; IV zosyn held; 25 cc mL of the medication infused into patient; [facility] made aware, awaiting bedside arrival. RN removed IV as per patient request; IV zosyn held; 25 cc mL of the medication infused into patient; [facility] made aware, urology team arrived to bedside, discharge paperwork generated. Ok to discharge pt as per order.

## 2023-04-03 NOTE — DISCHARGE NOTE PROVIDER - NSDCMRMEDTOKEN_GEN_ALL_CORE_FT
acetaminophen 325 mg oral tablet: 3 tab(s) orally every 6 hours as needed for as needed for pain  amLODIPine 5 mg oral tablet: 2 tab(s) orally once a day  KlonoPIN 1 mg oral tablet: 1 tab(s) orally 3 times a day  loperamide 2 mg oral capsule: 1 cap(s) orally once a day As needed Diarrhea  traZODone 150 mg oral tablet: 1 tab(s) orally once a day (at bedtime)   acetaminophen 325 mg oral tablet: 3 tab(s) orally every 6 hours as needed for as needed for pain  amLODIPine 5 mg oral tablet: 2 tab(s) orally once a day  calcitriol 0.25 mcg oral capsule: 1 cap(s) orally  KlonoPIN 1 mg oral tablet: 1 tab(s) orally 3 times a day  loperamide 2 mg oral capsule: 1 cap(s) orally once a day As needed Diarrhea  sodium bicarbonate 650 mg oral tablet: 1 tab(s) orally 3 times a day  traZODone 150 mg oral tablet: 1 tab(s) orally once a day (at bedtime)   acetaminophen 325 mg oral tablet: 3 tab(s) orally every 6 hours as needed for as needed for pain  amLODIPine 5 mg oral tablet: 2 tab(s) orally once a day  calcitriol 0.25 mcg oral capsule: 1 cap(s) orally 3 times a week Monday, Thursday, Saturday  KlonoPIN 1 mg oral tablet: 1 tab(s) orally 3 times a day  loperamide 2 mg oral capsule: 1 cap(s) orally once a day As needed Diarrhea  sodium bicarbonate 650 mg oral tablet: 1 tab(s) orally 3 times a day  traZODone 150 mg oral tablet: 1 tab(s) orally once a day (at bedtime)

## 2023-04-03 NOTE — PROVIDER CONTACT NOTE (CHANGE IN STATUS NOTIFICATION) - SITUATION
Patient wants to leave against medical advice; patient removed tele monitor, RN educated patient on importance of cardiac monitoring due to his bradycardia, pt still refusing; patient does not want to finish IV zosyn before he leaves, he wants to leave now. Patient wants to leave against medical advice; patient removed tele monitor, RN educated patient on importance of cardiac monitoring due to his bradycardia, pt still refusing; patient does not want to finish IV zosyn before he leaves, he wants to leave now; pt asked rn to remove IV and to shut off IV zosyn

## 2023-04-03 NOTE — DISCHARGE NOTE PROVIDER - CARE PROVIDER_API CALL
Ismael Esteban  Urology  [address] [place] [address]  Phone: [phone]  Fax: [phone]  Follow Up Time:     Vidhi Kemp (MD)  Internal Medicine; Nephrology  [address] [place] [address]  Phone: [phone]  Fax: [phone]  Follow Up Time:     Barby Tejada [place], [place]  Phone: [phone]  Fax: (   )    -  Follow Up Time:

## 2023-04-03 NOTE — DISCHARGE NOTE PROVIDER - HOSPITAL COURSE
69 y/o male CKD4/5, anxiety/depression, pre-DM-2, HTN, prostate cancer s/p prostatectomy 2011, bladder cancer s/p cystoscopy, fulguration, cystolitholapaxy on 1/9/18, s/p radical cystectomy and ileal conduit creation on 5/10/18, developed L ureteroileal stricture 8/15/18 underwent dilatation of left nephrostomy tract, antegrade ureteroscopy, laser incision of ureteral stricture, and placement of double-J stent with malfunctioning/dislodged tubes, acute on chronic renal failure, requiring B/L nephroureteral stent exchange, admitted 3/28 with dislodged left nephroureteral tube.  Pt underwent IR percutaneous placement of left nephroureteral tube and ernst performed percutaneously via IR on 3/29, with hypotension post-procedure requiring pressor support and SICU monitoring.     3/30: admitted to SICU after IR procedure, weaned off everton, improved, on regular diet, afebrile, urine clear, episodes of bradycardia, hypothermia but BP stable, episode of CP, trop neg, EKG sinus manuel w/ sinus arrhythmia   3/31: transferred to telemetry floor in stable condition, pt offers no physical complaints, wants to go home, tolerating diet, no pain, amlodipine restarted  4/1: PT consult obtained, no needs  4/2: remains afebrile, making adequate urine, Cr downtrending, tolerating diet, VSS, feels well, wants to go home  4/3: remains afebrile, making adequate urine, Cr still downtrending, tolerating diet, VSS, feels well, wants to go home 67 y/o male CKD4/5, anxiety/depression, pre-DM-2, HTN, prostate cancer s/p prostatectomy 2011, bladder cancer s/p cystoscopy, fulguration, cystolitholapaxy on 1/9/18, s/p radical cystectomy and ileal conduit creation on 5/10/18, developed L ureteroileal stricture 8/15/18 underwent dilatation of left nephrostomy tract, antegrade ureteroscopy, laser incision of ureteral stricture, and placement of double-J stent with malfunctioning/dislodged tubes, acute on chronic renal failure, requiring B/L nephroureteral stent exchange, admitted to the floor 3/28 with dislodged left nephroureteral tube, BARON and acidosis, started on sodium bicarb po.  Pt underwent IR percutaneous replacement of left nephroureteral tube and exchange of right nephroureteral tube 3/29, pt with hypotension post-procedure requiring pressor support, was transferred from the IR suite to SICU for pressor support and monitoring.   3/30: amlodipine and atenolol held, weaned off everton, improved, on regular diet, afebrile, urine clear, episodes of bradycardia, hypothermia but BP stable, episode of CP, trop neg, EKG sinus manuel w/ sinus arrhythmia   3/31: transferred to telemetry floor in stable condition, pt offers no physical complaints, wants to go home, tolerating diet, no pain, amlodipine restarted  4/1: PT consult obtained, no needs, will not restart atenolol on discharge.  4/2: remains afebrile, making adequate urine, Cr downtrending, tolerating diet, VSS, no further bradycardia, feels well, wants to go home  4/3: remains afebrile, making adequate urine, Cr still downtrending, tolerating diet, VSS, no further bradycardia, feels well, wants to go home, pt d/c to f/u with Dr. Esteban 69 y/o male CKD4/5, anxiety/depression, pre-DM-2, HTN, prostate cancer s/p prostatectomy 2011, bladder cancer s/p cystoscopy, fulguration, cystolitholapaxy on 1/9/18, s/p radical cystectomy and ileal conduit creation on 5/10/18, developed L ureteroileal stricture 8/15/18 underwent dilatation of left nephrostomy tract, antegrade ureteroscopy, laser incision of ureteral stricture, and placement of double-J stent with malfunctioning/dislodged tubes, acute on chronic renal failure, requiring B/L nephroureteral stent exchange, admitted to the floor 3/28 with dislodged left nephroureteral tube, BARON and acidosis, started on sodium bicarb po.  Pt underwent IR percutaneous replacement of left nephroureteral tube and exchange of right nephroureteral tube 3/29, pt with hypotension post-procedure requiring pressor support, was transferred from the IR suite to SICU for pressor support and monitoring, started empirically on zosyn.  3/30: amlodipine and atenolol held, weaned off everton, improved, on regular diet, afebrile, urine clear, episodes of bradycardia, hypothermia but BP stable, episode of CP, trop neg, EKG sinus manuel w/ sinus arrhythmia   3/31: transferred to telemetry floor in stable condition, pt offers no physical complaints, wants to go home, tolerating diet, no pain, amlodipine restarted  4/1: PT consult obtained, no needs, will not restart atenolol on discharge.  4/2: remains afebrile, making adequate urine, Cr downtrending, tolerating diet, VSS, no further bradycardia, feels well, wants to go home  4/3: remains afebrile, making adequate urine, Cr still downtrending, tolerating diet, VSS, no further bradycardia, feels well, wants to go home, pt d/c to f/u with Dr. Esteban 67 y/o male CKD4/5, anxiety/depression, pre-DM-2, HTN, prostate cancer s/p prostatectomy 2011, bladder cancer s/p cystoscopy, fulguration, cystolitholapaxy on 1/9/18, s/p radical cystectomy and ileal conduit creation on 5/10/18, developed L ureteroileal stricture 8/15/18 underwent dilatation of left nephrostomy tract, antegrade ureteroscopy, laser incision of ureteral stricture, and placement of double-J stent with malfunctioning/dislodged tubes, acute on chronic renal failure, requiring B/L nephroureteral stent exchange, admitted to the floor 3/28 with dislodged left nephroureteral tube, BARON and acidosis, restarted on sodium bicarb po.  Pt underwent IR percutaneous replacement of left nephroureteral tube and exchange of right nephroureteral tube 3/29, pt with hypotension post-procedure requiring pressor support, was transferred from the IR suite to SICU for pressor support and monitoring, started empirically on zosyn.  3/30: amlodipine and atenolol held, weaned off everton, improved, on regular diet, afebrile, urine clear, episodes of bradycardia, hypothermia but BP stable, episode of CP, trop neg, EKG sinus manuel w/ sinus arrhythmia   3/31: transferred to telemetry floor in stable condition, pt offers no physical complaints, wants to go home, tolerating diet, no pain, amlodipine restarted  4/1: PT consult obtained, no needs, will not restart atenolol on discharge.  4/2: remains afebrile, making adequate urine, Cr downtrending, tolerating diet, VSS, no further bradycardia, feels well, wants to go home  4/3: remains afebrile, making adequate urine, Cr still downtrending, tolerating diet, VSS, no further bradycardia, feels well, wants to go home, pt d/c to f/u with Dr. Esteban 67 y/o male CKD4/5, anxiety/depression, pre-DM-2, HTN, prostate cancer s/p prostatectomy 2011, bladder cancer s/p cystoscopy, fulguration, cystolitholapaxy on 1/9/18, s/p radical cystectomy and ileal conduit creation on 5/10/18, developed L ureteroileal stricture 8/15/18 underwent dilatation of left nephrostomy tract, antegrade ureteroscopy, laser incision of ureteral stricture, and placement of double-J stent with malfunctioning/dislodged tubes, acute on chronic renal failure, requiring B/L nephroureteral stent exchange, admitted to the floor 3/28 with dislodged left nephroureteral tube, BARON and acidosis, restarted on sodium bicarb po.  Pt underwent IR percutaneous replacement of left nephroureteral tube and exchange of right nephroureteral tube 3/29, pt with hypotension post-procedure requiring pressor support, was transferred from the IR suite to SICU for pressor support and monitoring, started empirically on zosyn.  3/30: amlodipine, lasix and atenolol held, weaned off everton, improved, on regular diet, afebrile, urine clear, episodes of bradycardia, hypothermia but BP stable, episode of CP, trop neg, EKG sinus manuel w/ sinus arrhythmia   3/31: transferred to telemetry floor in stable condition, pt offers no physical complaints, wants to go home, tolerating diet, no pain, amlodipine restarted  4/1: PT consult obtained, no needs, will not restart atenolol on discharge.  4/2: remains afebrile, making adequate urine, Cr downtrending, tolerating diet, VSS, no further bradycardia, feels well, wants to go home  4/3: remains afebrile, making adequate urine, Cr still downtrending, tolerating diet, VSS, no further bradycardia, feels well, wants to go home. Pt instructed to not restart lasix or atenolol until he follows up next week with his nephrologist and PCP.  Pt d/c to f/u with Dr. Esteban

## 2023-04-03 NOTE — PROGRESS NOTE ADULT - PROBLEM SELECTOR PLAN 5
s/p SICU stay for postop hypotension  -BP uptrending  -resume Amlodipine 5mg, uptitrate PRN
s/p SICU stay for postop hypotension  -amlodipine 5mg resumed here, at home takes 10mg qd, can resume home dosing given uptrending bp. Low Na diet.
c/w clonazepam 1 tid (hold for sedation) (ISTOP reference [id number] as noted below).
c/w clonazepam 1 tid (hold for sedation) (ISTOP reference [id number] as noted below).

## 2023-04-03 NOTE — DISCHARGE NOTE PROVIDER - NSDCFUSCHEDAPPT_GEN_ALL_CORE_FT
Vidhi Kemp  [facility]  NEPHRO [address]  Scheduled Appointment: 04/05/2023    [facility]  UROLOGY [address]  Scheduled Appointment: 04/14/2023    Renee Odonnell  [facility]  UROLOGY [address]  Scheduled Appointment: 04/14/2023    Gary Esteban  [facility]  UROLOGY [address] [address]  Scheduled Appointment: 05/24/2023    Jenifer Lilly  [facility]  FAMILYMED [address]  Scheduled Appointment: 06/12/2023

## 2023-04-03 NOTE — PROGRESS NOTE ADULT - PROBLEM SELECTOR PLAN 3
HR improved, 51-60s   -Hold Atenolol, likely can dc home without it.
last seen by renal in Jan, Cr 2.88, noted CKD 4/5  - c/w Na bicarb 650 tid  - c/w calcitriol 0.25 mcg qod  - hold lasix
HR improved, 51-60s   -Hold Atenolol, dc home without it.
last seen by renal in Jan, Cr 2.88, noted CKD 4/5  - c/w Na bicarb 650 tid  - c/w calcitriol 0.25 mcg qod  - hold lasix

## 2023-04-03 NOTE — PROGRESS NOTE ADULT - PROVIDER SPECIALTY LIST ADULT
Hospitalist
Urology
Intervent Radiology
Urology
SICU
SICU
Urology
Hospitalist

## 2023-04-03 NOTE — DISCHARGE NOTE PROVIDER - NSDCCPCAREPLAN_GEN_ALL_CORE_FT
PRINCIPAL DISCHARGE DIAGNOSIS  Diagnosis: Complication of Ileal conduit  Assessment and Plan of Treatment: Change appliance as instructed.  Dr. Esteban's office will call you with a follow up appointment.  Call the office if you have fever greater than 101, no urine in bag, pain not relieved with pain medication, nausea/vomiting.        SECONDARY DISCHARGE DIAGNOSES  Diagnosis: HTN (hypertension)  Assessment and Plan of Treatment: Do not restart your atenolol, continue amlodipine and follow up with Dr. Lilly next week for BP check and medication adjustment as indicated    Diagnosis: Acute kidney injury superimposed on CKD  Assessment and Plan of Treatment: Continue sodium bicarbonate 3 times a day and follow up with your nephrologist Dr. Posada     PRINCIPAL DISCHARGE DIAGNOSIS  Diagnosis: Complication of Ileal conduit  Assessment and Plan of Treatment: Change appliance as instructed.  Dr. Esteban's office will call you with a follow up appointment.  Call the office if you have fever greater than 101, no urine in bag, pain not relieved with pain medication, nausea/vomiting.        SECONDARY DISCHARGE DIAGNOSES  Diagnosis: HTN (hypertension)  Assessment and Plan of Treatment: Do not restart your atenolol, continue amlodipine and follow up with Dr. Lilly next week for BP check and medication adjustment as indicated    Diagnosis: Anxiety and depression  Assessment and Plan of Treatment: Continue current home medications and follow up with Dr. Barby Murray and therapist Juan Antonio Gill at [facility] in [place]      Diagnosis: Acute kidney injury superimposed on CKD  Assessment and Plan of Treatment: Continue sodium bicarbonate 3 times a day and follow up with your nephrologist Dr. Kemp

## 2023-04-03 NOTE — PROGRESS NOTE ADULT - PROBLEM SELECTOR PLAN 8
c/w calcitriol
outpt records note was on Xtandi, Zoladex.
c/w calcitriol
outpt records note was on Xtandi, Zoladex. Close outpt follow up as advised

## 2023-04-03 NOTE — PROGRESS NOTE ADULT - PROBLEM SELECTOR PLAN 6
c/w trazodone 150 hs  pharmacy reports takes Benadryl 25 hs,  hold for now.
c/w clonazepam 1 tid (hold for sedation) (ISTOP reference [id number] as noted below).
c/w trazodone 150 hs  pharmacy reports takes Benadryl 25 hs,  hold for now.
c/w clonazepam 1 tid (hold for sedation)

## 2023-04-03 NOTE — DISCHARGE NOTE PROVIDER - PROVIDER TOKENS
PROVIDER:[TOKEN:[[id number]]],PROVIDER:[TOKEN:[[id number]]],FREE:[LAST:[Nathaniela],FIRST:[Barby],PHONE:[phone]],FAX:[(   [phone]    -],ADDRESS:[[facility]]

## 2023-04-03 NOTE — PROGRESS NOTE ADULT - ASSESSMENT
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     3/30: admitted to SICU after IR procedure, weaned off everton, improved, on regular diet, afebrile, urine clear, episodes of bradycardia, hypothermia but BP stable, episode of CP, trop neg, EKG sinus manuel w/ sinus arrhythmia   3/31: transferred to telemetry floor in stable condition, pt offers no physical complaints, wants to go home, tolerating diet, no pain, amlodipine restarted  4/2: feels well, wants to go home  4/3: feels well, wants to go home    Plan  -f/u AM BMP  -continue Zosyn  -tele monitoring  -f/u medicine  -PT consult recs no needs  -plan for discharge home pending social work clearance as pt lives in supportive housing

## 2023-04-03 NOTE — CHART NOTE - NSCHARTNOTEFT_GEN_A_CORE
Patient Age: 68    Patient Gender: M    Procedure (including site / side if known): poss emmanuel. regular neph tubes    Diagnosis / Indication: creat 9; emmanuel uret strictures s/p cystectomy/il. conduit wit upside down neph tubes that fell out    Interventional Radiology Attending Physician: Oleksandr Mao    Ordering Attending Physician: Carlito    Pertinent Medical History:    PAST MEDICAL & SURGICAL HISTORY:  Prostate Cancer      HTN (Hypertension)      Anxiety      Major depressive disorder      UTI (urinary tract infection)      Urinary (tract) obstruction      Urethral stricture      Other calculus in bladder      Obese      Diabetes mellitus      Malignant neoplasm of bladder      Unspecified hydronephrosis      S/P Appendectomy  40 years ago      H/O cystoscopy  - multiple  last cystoscopy, laser litholapaxy on 1/2018      History of prostatectomy  robotic, 2011, s/p radiation      Suprapubic catheter  8/2015      History of bladder cancer  bladder removal May 10, 2018      S/P ileal conduit  May 2018      Nephrostomy status  Left, 8/1/2018      Bladder disease  Bladder Removed            Pertinent Labs:                            9.1    12.58 )-----------( 195      ( 29 Mar 2023 07:23 )             27.6       03-29    133<L>  |  98  |  119<H>  ----------------------------<  143<H>  4.3   |  15<L>  |  9.14<H>    Ca    8.4      29 Mar 2023 07:23  Phos  4.5     03-28  Mg     2.70     03-28        PT/INR - ( 29 Mar 2023 07:23 )   PT: 13.1 sec;   INR: 1.13 ratio         PTT - ( 29 Mar 2023 07:23 )  PTT:28.5 sec    Patient and Family aware:   [ X ]Y   [  ]N
Patient is scheduled for Outpatient bilateral nephroureteral stent exchange with Interventional Radiology on 6/21 at 10am. Appointment card given to patient.   [facility] 2nd floor, room [address]. Outpatient IR office [phone].    [id number]

## 2023-04-03 NOTE — PROVIDER CONTACT NOTE (CHANGE IN STATUS NOTIFICATION) - BACKGROUND
pt admitted with dislodged ileal conduit nephrostomy tube 3/28; sinus bradycardia on monitor with sinus arrythmia present 3/30

## 2023-04-03 NOTE — PROGRESS NOTE ADULT - NS ATTEND AMEND GEN_ALL_CORE FT
patient remains unhappy about the situation.  able to calm him and reasons fro staying   continue IV antibiotics and creatinine and electrolytes
creatinine trending down - pending for today  wants to go home

## 2023-04-03 NOTE — PROGRESS NOTE ADULT - SUBJECTIVE AND OBJECTIVE BOX
Overnight events:  None    Subjective:  Pt offers no physical complaints, just wants to go home    Objective:    Vital signs  T(C): , Max: 36.5 (04-03-23 @ 04:30)  HR: 53 (04-03-23 @ 04:30)  BP: 149/69 (04-03-23 @ 04:30)  SpO2: 99% (04-03-23 @ 04:30)  Wt(kg): --    Output   IC: 1350  04-02 @ 07:01  -  04-03 @ 07:00  --------------------------------------------------------  IN: 740 mL / OUT: 1750 mL / NET: -1010 mL        Gen: NAD  Abd: stoma pink w/ upside down NTs visible, soft, nontender      Labs: pending    02 Apr 2023 11:31    142    |  110    |  68     ----------------------------<  104    3.7     |  19     |  5.71     Ca    8.8        02 Apr 2023 11:31  Phos  4.9       02 Apr 2023 11:31  Mg     2.00      02 Apr 2023 11:31

## 2023-04-03 NOTE — DISCHARGE NOTE NURSING/CASE MANAGEMENT/SOCIAL WORK - PATIENT PORTAL LINK FT
You can access the FollowMyHealth Patient Portal offered by [facility] by registering at the following website: [email or web address]. By joining [email or web address]’s FollowMyHealth portal, you will also be able to view your health information using other applications (apps) compatible with our system.

## 2023-04-03 NOTE — PROGRESS NOTE ADULT - PROBLEM SELECTOR PLAN 1
BARON in setting of dislodged stent  - s/p B/L nephroureteral stent exchange, left side performed percutaneously via IR, with hypotension post-procedure requiring pressor support and SICU monitoring, now transferred to surgical floor on 3/31  - Cr elevated, ~8  - Trend labs  - monitor UOP, renally dose meds   - on Zosyn per uro.
BARON in setting of dislodged stent  - s/p B/L nephroureteral stent exchange, left side performed percutaneously via IR, with hypotension post-procedure requiring pressor support and SICU monitoring, now transferred to surgical floor on 3/31  - Cr elevated, ~8, slowly downtrending, now ~6  - Trend labs  - monitor UOP, renally dose meds   - on Zosyn per uro  - DC planning per primary team
BARON in setting of dislodged stent  - s/p B/L nephroureteral stent exchange, left side performed percutaneously via IR, with hypotension post-procedure requiring pressor support and SICU monitoring, now transferred to surgical floor on 3/31  - Cr elevated, ~8, slowly downtrending, now 5.35 today   - close outpatient renal/urology follow up
BARON in setting of dislodged stent  - s/p B/L nephroureteral stent exchange, left side performed percutaneously via IR, with hypotension post-procedure requiring pressor support and SICU monitoring, now transferred to surgical floor on 3/31  - Cr elevated, ~8, slowly downtrending, now ~6, labs pending from today   - monitor UOP, renally dose meds   - on Zosyn per uro -- needs IV access   - DC planning per primary team

## 2023-04-03 NOTE — DISCHARGE NOTE NURSING/CASE MANAGEMENT/SOCIAL WORK - NSDCPEFALRISK_GEN_ALL_CORE
For information on Fall & Injury Prevention, visit: [email or web address] OR  [email or web address] OR  https://www.cdc.gov/steadi/patient.html

## 2023-04-03 NOTE — PROGRESS NOTE ADULT - PROBLEM SELECTOR PLAN 2
Pt reports an episode of diarrhea overnight, per RN likely formed but very loose.   suspect 2/2 abx use, although pt reports intermittent diarrhea and uses imodium at home.  -can trial imodium x1 and see if improvement  -c/w lactobacillus bid
last seen by renal in Jan, Cr 2.88, noted CKD 4/5  - c/w Na bicarb 650 tid  - c/w calcitriol 0.25 mcg qod  - hold lasix
previously reported episode of diarrhea, suspect noninfectious etiology 2/2 abx use, although pt reports intermittent diarrhea and uses imodium at home chronically  -c/w lactobacillus bid  -appears improved at this time
last seen by renal in Jan, Cr 2.88, noted CKD 4/5  - c/w Na bicarb 650 tid  - c/w calcitriol 0.25 mcg qod  - hold lasix

## 2023-04-03 NOTE — DISCHARGE NOTE PROVIDER - CARE PROVIDERS DIRECT ADDRESSES
[email or web address]

## 2023-04-03 NOTE — ADVANCED PRACTICE NURSE CONSULT - ASSESSMENT
Upon introduction patient frustrated as he is being discharged and taxi waiting outside. At first refusing pouch change but amendable once he realized the pouch was leaking. Patient stating he is familiar with pouch change process as he has "been doing it for years". No questions. Stating we have the wrong pouches- when asked what pouches he uses, he doesnt answer just says we have the wrong pouch and gets frustrated again. One piece jose pouch applied to control leakage.     Stoma pink moist viable, unable to measure due to hast of patient wanting to leave.

## 2023-04-05 ENCOUNTER — APPOINTMENT (OUTPATIENT)
Dept: NEPHROLOGY | Facility: CLINIC | Age: 68
End: 2023-04-05
Payer: MEDICARE

## 2023-04-05 VITALS
DIASTOLIC BLOOD PRESSURE: 67 MMHG | TEMPERATURE: 97.6 F | HEART RATE: 59 BPM | OXYGEN SATURATION: 96 % | SYSTOLIC BLOOD PRESSURE: 145 MMHG | HEIGHT: 70 IN

## 2023-04-05 DIAGNOSIS — N17.9 ACUTE KIDNEY FAILURE, UNSPECIFIED: ICD-10-CM

## 2023-04-05 DIAGNOSIS — R53.1 WEAKNESS: ICD-10-CM

## 2023-04-05 PROCEDURE — 99215 OFFICE O/P EST HI 40 MIN: CPT

## 2023-04-05 NOTE — PHYSICAL EXAM
[General Appearance - Alert] : alert [General Appearance - In No Acute Distress] : in no acute distress [Sclera] : the sclera and conjunctiva were normal [PERRL With Normal Accommodation] : pupils were equal in size, round, and reactive to light [Extraocular Movements] : extraocular movements were intact [Outer Ear] : the ears and nose were normal in appearance [Oropharynx] : the oropharynx was normal [Neck Appearance] : the appearance of the neck was normal [Neck Cervical Mass (___cm)] : no neck mass was observed [Jugular Venous Distention Increased] : there was no jugular-venous distention no change [Thyroid Diffuse Enlargement] : the thyroid was not enlarged [Thyroid Nodule] : there were no palpable thyroid nodules [Auscultation Breath Sounds / Voice Sounds] : lungs were clear to auscultation bilaterally [Heart Rate And Rhythm] : heart rate was normal and rhythm regular [Heart Sounds] : normal S1 and S2 [Heart Sounds Gallop] : no gallops [Murmurs] : no murmurs [Heart Sounds Pericardial Friction Rub] : no pericardial rub [Full Pulse] : the pedal pulses are present [Edema] : there was no peripheral edema [Bowel Sounds] : normal bowel sounds [Abdomen Soft] : soft progress slowly [Abdomen Tenderness] : non-tender [Abdomen Mass (___ Cm)] : no abdominal mass palpated [Cervical Lymph Nodes Enlarged Posterior Bilaterally] : posterior cervical [Cervical Lymph Nodes Enlarged Anterior Bilaterally] : anterior cervical [Supraclavicular Lymph Nodes Enlarged Bilaterally] : supraclavicular [No CVA Tenderness] : no ~M costovertebral angle tenderness [No Spinal Tenderness] : no spinal tenderness [Abnormal Walk] : normal gait [Nail Clubbing] : no clubbing  or cyanosis of the fingernails [Musculoskeletal - Swelling] : no joint swelling seen [Motor Tone] : muscle strength and tone were normal [Skin Color & Pigmentation] : normal skin color and pigmentation [Skin Turgor] : normal skin turgor [] : no rash [Deep Tendon Reflexes (DTR)] : deep tendon reflexes were 2+ and symmetric [Sensation] : the sensory exam was normal to light touch and pinprick [No Focal Deficits] : no focal deficits [Oriented To Time, Place, And Person] : oriented to person, place, and time [Impaired Insight] : insight and judgment were intact [Affect] : the affect was normal

## 2023-04-06 ENCOUNTER — NON-APPOINTMENT (OUTPATIENT)
Age: 68
End: 2023-04-06

## 2023-04-06 ENCOUNTER — EMERGENCY (EMERGENCY)
Facility: HOSPITAL | Age: 68
LOS: 1 days | Discharge: HOME | End: 2023-04-06
Attending: STUDENT IN AN ORGANIZED HEALTH CARE EDUCATION/TRAINING PROGRAM | Admitting: STUDENT IN AN ORGANIZED HEALTH CARE EDUCATION/TRAINING PROGRAM
Payer: MEDICARE

## 2023-04-06 VITALS
DIASTOLIC BLOOD PRESSURE: 81 MMHG | SYSTOLIC BLOOD PRESSURE: 174 MMHG | TEMPERATURE: 98 F | RESPIRATION RATE: 16 BRPM | OXYGEN SATURATION: 100 % | HEART RATE: 57 BPM

## 2023-04-06 VITALS
HEART RATE: 56 BPM | DIASTOLIC BLOOD PRESSURE: 88 MMHG | TEMPERATURE: 98 F | RESPIRATION RATE: 18 BRPM | OXYGEN SATURATION: 98 % | SYSTOLIC BLOOD PRESSURE: 158 MMHG

## 2023-04-06 DIAGNOSIS — N32.9 BLADDER DISORDER, UNSPECIFIED: Chronic | ICD-10-CM

## 2023-04-06 DIAGNOSIS — Z93.6 OTHER ARTIFICIAL OPENINGS OF URINARY TRACT STATUS: Chronic | ICD-10-CM

## 2023-04-06 DIAGNOSIS — Z98.89 OTHER SPECIFIED POSTPROCEDURAL STATES: Chronic | ICD-10-CM

## 2023-04-06 DIAGNOSIS — Z93.59 OTHER CYSTOSTOMY STATUS: Chronic | ICD-10-CM

## 2023-04-06 DIAGNOSIS — Z85.51 PERSONAL HISTORY OF MALIGNANT NEOPLASM OF BLADDER: Chronic | ICD-10-CM

## 2023-04-06 LAB
ALBUMIN SERPL ELPH-MCNC: 3.3 G/DL
ALBUMIN SERPL ELPH-MCNC: 3.4 G/DL — SIGNIFICANT CHANGE UP (ref 3.3–5)
ALP SERPL-CCNC: 99 U/L — SIGNIFICANT CHANGE UP (ref 40–120)
ALT FLD-CCNC: 37 U/L — SIGNIFICANT CHANGE UP (ref 4–41)
ANION GAP SERPL CALC-SCNC: 13 MMOL/L
ANION GAP SERPL CALC-SCNC: 13 MMOL/L — SIGNIFICANT CHANGE UP (ref 7–14)
AST SERPL-CCNC: 23 U/L — SIGNIFICANT CHANGE UP (ref 4–40)
BASOPHILS # BLD AUTO: 0.03 K/UL
BASOPHILS NFR BLD AUTO: 0.3 %
BILIRUB SERPL-MCNC: 0.3 MG/DL — SIGNIFICANT CHANGE UP (ref 0.2–1.2)
BUN SERPL-MCNC: 40 MG/DL — HIGH (ref 7–23)
BUN SERPL-MCNC: 45 MG/DL
CALCIUM SERPL-MCNC: 9.1 MG/DL
CALCIUM SERPL-MCNC: 9.3 MG/DL — SIGNIFICANT CHANGE UP (ref 8.4–10.5)
CHLORIDE SERPL-SCNC: 111 MMOL/L
CHLORIDE SERPL-SCNC: 111 MMOL/L — HIGH (ref 98–107)
CO2 SERPL-SCNC: 18 MMOL/L — LOW (ref 22–31)
CO2 SERPL-SCNC: 19 MMOL/L
CREAT SERPL-MCNC: 4.49 MG/DL — HIGH (ref 0.5–1.3)
CREAT SERPL-MCNC: 4.86 MG/DL
EGFR: 12 ML/MIN/1.73M2
EGFR: 13 ML/MIN/1.73M2 — LOW
EOSINOPHIL # BLD AUTO: 0.18 K/UL
EOSINOPHIL NFR BLD AUTO: 1.6 %
GLUCOSE SERPL-MCNC: 100 MG/DL — HIGH (ref 70–99)
GLUCOSE SERPL-MCNC: 132 MG/DL
HCT VFR BLD CALC: 32.3 % — LOW (ref 39–50)
HCT VFR BLD CALC: 33.1 %
HGB BLD-MCNC: 10 G/DL
HGB BLD-MCNC: 10.1 G/DL — LOW (ref 13–17)
IMM GRANULOCYTES NFR BLD AUTO: 3 %
LYMPHOCYTES # BLD AUTO: 1.13 K/UL
LYMPHOCYTES NFR BLD AUTO: 9.9 %
MAN DIFF?: NORMAL
MCHC RBC-ENTMCNC: 29.9 PG
MCHC RBC-ENTMCNC: 30.2 GM/DL
MCHC RBC-ENTMCNC: 30.6 PG — SIGNIFICANT CHANGE UP (ref 27–34)
MCHC RBC-ENTMCNC: 31.3 GM/DL — LOW (ref 32–36)
MCV RBC AUTO: 97.9 FL — SIGNIFICANT CHANGE UP (ref 80–100)
MCV RBC AUTO: 99.1 FL
MONOCYTES # BLD AUTO: 0.63 K/UL
MONOCYTES NFR BLD AUTO: 5.5 %
NEUTROPHILS # BLD AUTO: 9.1 K/UL
NEUTROPHILS NFR BLD AUTO: 79.7 %
NRBC # BLD: 0 /100 WBCS — SIGNIFICANT CHANGE UP (ref 0–0)
NRBC # FLD: 0 K/UL — SIGNIFICANT CHANGE UP (ref 0–0)
PHOSPHATE SERPL-MCNC: 4.9 MG/DL
PLATELET # BLD AUTO: 244 K/UL — SIGNIFICANT CHANGE UP (ref 150–400)
PLATELET # BLD AUTO: 293 K/UL
POTASSIUM SERPL-MCNC: 4 MMOL/L — SIGNIFICANT CHANGE UP (ref 3.5–5.3)
POTASSIUM SERPL-SCNC: 4 MMOL/L — SIGNIFICANT CHANGE UP (ref 3.5–5.3)
POTASSIUM SERPL-SCNC: 4.5 MMOL/L
PROT SERPL-MCNC: 7.1 G/DL — SIGNIFICANT CHANGE UP (ref 6–8.3)
RBC # BLD: 3.3 M/UL — LOW (ref 4.2–5.8)
RBC # BLD: 3.34 M/UL
RBC # FLD: 15.1 % — HIGH (ref 10.3–14.5)
RBC # FLD: 15.3 %
SODIUM SERPL-SCNC: 142 MMOL/L — SIGNIFICANT CHANGE UP (ref 135–145)
SODIUM SERPL-SCNC: 143 MMOL/L
WBC # BLD: 10.56 K/UL — HIGH (ref 3.8–10.5)
WBC # FLD AUTO: 10.56 K/UL — HIGH (ref 3.8–10.5)
WBC # FLD AUTO: 11.41 K/UL

## 2023-04-06 PROCEDURE — 99285 EMERGENCY DEPT VISIT HI MDM: CPT

## 2023-04-06 RX ORDER — SODIUM CHLORIDE 9 MG/ML
1000 INJECTION INTRAMUSCULAR; INTRAVENOUS; SUBCUTANEOUS ONCE
Refills: 0 | Status: COMPLETED | OUTPATIENT
Start: 2023-04-06 | End: 2023-04-06

## 2023-04-06 RX ADMIN — SODIUM CHLORIDE 1000 MILLILITER(S): 9 INJECTION INTRAMUSCULAR; INTRAVENOUS; SUBCUTANEOUS at 20:26

## 2023-04-06 RX ADMIN — SODIUM CHLORIDE 2000 MILLILITER(S): 9 INJECTION INTRAMUSCULAR; INTRAVENOUS; SUBCUTANEOUS at 18:58

## 2023-04-06 NOTE — ED PROVIDER NOTE - PROGRESS NOTE DETAILS
Daniele, PGY3: attempts made to discuss case with nephrologist but unable to reach them. Patient's creatinine is improved from baseline and is still producing urine. Will discharge with outpatient PMD and nephrology follow up. Patient has capacity and understands, agrees with plan and consents to discharge.

## 2023-04-06 NOTE — ED ADULT NURSE REASSESSMENT NOTE - NS ED NURSE REASSESS COMMENT FT1
Report received from day shift. Respirations even and unlabored. Pt denies chest pain, shortness of breath and headache. 375cc yellow urine emptied from nephrostomy. Pt discharged, ambulatory with steady gait. Pt will call cab for himself.

## 2023-04-06 NOTE — ED PROVIDER NOTE - ATTENDING CONTRIBUTION TO CARE
I (Kaitlynn) agree with above, I performed a history and physical. Counseled [facility] medical staff, physician assistant, and/or medical student on medical decision making as documented. Medical decisions and treatment interventions were made in real time during the patient encounter. Additionally and/or with the following exceptions: Patient is a 68-year-old male past medical history of CKD prediabetes, hypertension prostate cancer bladder cancer radical cystectomy.  Sent in by his nephrologist Dr. mays for fluids for possible BARON patient is still producing urine through the ileal conduit.  Of note a week ago his left nephrostomy tube fell out, and his team was trending kidney function without the nephrostomy tube in place.  Patient has no fevers, no acute medical complaints.  Says he is making an appropriate amount of urine and drinking water normally.  The patient's vital signs are significant for hypertension but were otherwise unremarkable.  CBC with mild anemia improved from baseline.  Kidney function is BUN 40 creatinine 4.49 suggestive of intrinsic renal disease which is actually markedly improved from when he was recently discharged from the hospital 2 days ago.  Case was discussed with private nephrologist who recommended fluids and checking labs.  If the patient were to require admission would be admitted to the hospitalist.  However the patient's labs appear to be improving he is making urine he can follow-up as an outpatient please note that the patient has capacity had his keys, was able to say he could order a taxi to get back into his house upon discharge.  Return precautions reviewed

## 2023-04-06 NOTE — ED CLERICAL - NS ED CLERK NOTE PRE-ARRIVAL INFORMATION; ADDITIONAL PRE-ARRIVAL INFORMATION
This patient is enrolled in the [facility] readmission reduction program and has active care navigation. This patient can be followed up by the care navigation team within 24 hours. To arrange close follow-up or to obtain additional clinical information about this patient, please call the contact number above.

## 2023-04-06 NOTE — ED PROVIDER NOTE - CLINICAL SUMMARY MEDICAL DECISION MAKING FREE TEXT BOX
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 Yo) for worsening kidney function; will repeat blood work and assess kidney function and for any metabolic derangements and provide IV hydration      ===================================  attending mdm (Attila Calderón MD; attending emergency medicine and medical toxicology)    Patient is a 68-year-old male past medical history of CKD prediabetes, hypertension prostate cancer bladder cancer radical cystectomy.  Sent in by his nephrologist Dr. mays for fluids for possible BARON patient is still producing urine through the ileal conduit.  Of note a week ago his left nephrostomy tube fell out, and his team was trending kidney function without the nephrostomy tube in place.  Patient has no fevers, no acute medical complaints.  Says he is making an appropriate amount of urine and drinking water normally.  The patient's vital signs are significant for hypertension but were otherwise unremarkable.  CBC with mild anemia improved from baseline.  Kidney function is BUN 40 creatinine 4.49 suggestive of intrinsic renal disease which is actually markedly improved from when he was recently discharged from the hospital 2 days ago.  Case was discussed with private nephrologist who recommended fluids and checking labs.  If the patient were to require admission would be admitted to the hospitalist.  However the patient's labs appear to be improving he is making urine he can follow-up as an outpatient please note that the patient has capacity had his keys, was able to say he could order a taxi to get back into his house upon discharge.  Return precautions reviewed

## 2023-04-06 NOTE — ED PROVIDER NOTE - PHYSICAL EXAMINATION
GENERAL: well appearing in no apparent distress  HEAD: normocephalic, atraumatic  HENT: airway intact  EYES: normal conjunctiva  CARDIAC: regular rate and rhythm, normal S1S2, no appreciable murmurs, 2+ pulses in UE/LE b/l  PULM: normal breath sounds, clear to ascultation bilaterally, no rales, rhonchi, wheezing  GI: abdomen nondistended, soft, nontender, no guarding, rebound tenderness  NEURO: no focal motor or sensory deficits  MSK: no peripheral edema  SKIN: well-perfused, extremities warm, no visible rashes

## 2023-04-06 NOTE — ED ADULT NURSE NOTE - OBJECTIVE STATEMENT
Pt was received into spot 2. Pt c/o generalized weakness for 2 day. pt states his nephrostomy tube was recently changed. When drained there was 200cc of clear yellow urine. skin surrounding the nephrotomy tube are clean dry and intact with no signs of rednesss. Pt abdomen is firm, normal distended according to the pt, and nontender on palpation. Respirations are even and unlabored. Pt denies headaches, dizziness, N/V, chest pain, SOB, or fever like symptoms. Pt is pending an ultrasound guided IV placement.  PMH of HX bladder Ca, prostate Ca. Will continue to monitor. Pt was received into spot 2. Pt c/o generalized weakness for 2 day. pt states his nephrostomy tube was recently changed. When drained there was 200cc of clear yellow urine. skin surrounding the nephrotomy tube are clean dry and intact with no signs of rednesss. Pt abdomen is firm, normal distended according to the pt, and nontender on palpation. Respirations are even and unlabored. Pt denies headaches, dizziness, N/V, chest pain, SOB, or fever like symptoms. Pt is pending an ultrasound guided IV placement.  Pt has a cholostomy bag. clean and intact. PMH of HX bladder Ca, prostate Ca. Will continue to monitor.

## 2023-04-06 NOTE — ED PROVIDER NOTE - NSFOLLOWUPINSTRUCTIONS_ED_ALL_ED_FT
Discharge instructions:    - Please follow up with your Primary Care Doctor within the next 24-72 hours.    - Please follow up with your nephrologist within the next 24-72 hours.     - Tylenol up to 650 mg every  hours as needed for pain. Take any prescribed medications as instructed:         SEEK IMMEDIATE MEDICAL CARE IF YOU HAVE ANY OF THE FOLLOWING SYMPTOMS: severe back or abdominal pain, fever, inability to keep fluids or medicine down, dizziness/lightheadedness, or a change in mental status.

## 2023-04-06 NOTE — ED ADULT TRIAGE NOTE - CHIEF COMPLAINT QUOTE
Pt c/o generalized weakness x 2 days. Pt states he was sent to ED by nephrologist for "fluids." Pt has chronic kidney problems, recently had nephrostomy tubes replaced. HX bladder Ca, prostate Ca.

## 2023-04-06 NOTE — ED PROVIDER NOTE - PATIENT PORTAL LINK FT
You can access the FollowMyHealth Patient Portal offered by [facility] by registering at the following website: [email or web address]. By joining [facility]’s FollowMyHealth portal, you will also be able to view your health information using other applications (apps) compatible with our system.

## 2023-04-06 NOTE — ED PROVIDER NOTE - OBJECTIVE STATEMENT
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 Yo) for worsening kidney function. Patient states he has had worsening weakness since being discharge. Reports producing urine but amount is less than normal. Denies any chest pain, SOb, nausea, vomiting or diarrhea.

## 2023-04-13 ENCOUNTER — LABORATORY RESULT (OUTPATIENT)
Age: 68
End: 2023-04-13

## 2023-04-17 ENCOUNTER — RX RENEWAL (OUTPATIENT)
Age: 68
End: 2023-04-17

## 2023-04-17 ENCOUNTER — NON-APPOINTMENT (OUTPATIENT)
Age: 68
End: 2023-04-17

## 2023-04-27 ENCOUNTER — NON-APPOINTMENT (OUTPATIENT)
Age: 68
End: 2023-04-27

## 2023-05-07 DIAGNOSIS — R73.03 PREDIABETES.: ICD-10-CM

## 2023-05-07 RX ORDER — LEUPROLIDE ACETATE 30 MG/.5ML
30 INJECTION, SUSPENSION, EXTENDED RELEASE SUBCUTANEOUS
Qty: 1 | Refills: 0 | Status: ACTIVE | COMMUNITY
Start: 2023-05-07 | End: 1900-01-01

## 2023-05-07 RX ORDER — CIPROFLOXACIN HYDROCHLORIDE 500 MG/1
500 TABLET, FILM COATED ORAL
Qty: 14 | Refills: 1 | Status: COMPLETED | COMMUNITY
Start: 2021-10-13 | End: 2023-05-07

## 2023-05-08 ENCOUNTER — RX RENEWAL (OUTPATIENT)
Age: 68
End: 2023-05-08

## 2023-05-08 RX ORDER — ERGOCALCIFEROL 1.25 MG/1
1.25 MG CAPSULE, LIQUID FILLED ORAL
Qty: 12 | Refills: 5 | Status: ACTIVE | COMMUNITY
Start: 2022-09-09 | End: 1900-01-01

## 2023-05-10 ENCOUNTER — APPOINTMENT (OUTPATIENT)
Dept: UROLOGY | Facility: CLINIC | Age: 68
End: 2023-05-10

## 2023-05-11 ENCOUNTER — APPOINTMENT (OUTPATIENT)
Dept: UROLOGY | Facility: CLINIC | Age: 68
End: 2023-05-11

## 2023-05-11 NOTE — PHYSICAL EXAM
[General Appearance - Well Developed] : well developed [General Appearance - Well Nourished] : well nourished [] : no respiratory distress [Respiration, Rhythm And Depth] : normal respiratory rhythm and effort [Abdomen Soft] : soft [Costovertebral Angle Tenderness] : no ~M costovertebral angle tenderness [Normal Station and Gait] : the gait and station were normal for the patient's age [No Focal Deficits] : no focal deficits

## 2023-05-11 NOTE — ADDENDUM
[FreeTextEntry1] : This note was authored by Gina Munroe working as a scribe for Dr.Gary Recinos. I, Dr. Taz Recinos have reviewed the content of this note and confirm it is true and accurate. I personally performed the history and physical examination and made all the decisions 05/11/2023\par

## 2023-05-12 ENCOUNTER — APPOINTMENT (OUTPATIENT)
Age: 68
End: 2023-05-12

## 2023-05-17 ENCOUNTER — APPOINTMENT (OUTPATIENT)
Dept: NEPHROLOGY | Facility: CLINIC | Age: 68
End: 2023-05-17
Payer: MEDICARE

## 2023-05-17 VITALS
SYSTOLIC BLOOD PRESSURE: 126 MMHG | HEART RATE: 75 BPM | HEIGHT: 70 IN | TEMPERATURE: 96.5 F | OXYGEN SATURATION: 96 % | DIASTOLIC BLOOD PRESSURE: 75 MMHG

## 2023-05-17 PROCEDURE — 99215 OFFICE O/P EST HI 40 MIN: CPT

## 2023-05-17 NOTE — PHYSICAL EXAM
[General Appearance - Alert] : alert [General Appearance - In No Acute Distress] : in no acute distress [Sclera] : the sclera and conjunctiva were normal [PERRL With Normal Accommodation] : pupils were equal in size, round, and reactive to light [Extraocular Movements] : extraocular movements were intact [Outer Ear] : the ears and nose were normal in appearance [Oropharynx] : the oropharynx was normal [Neck Appearance] : the appearance of the neck was normal [Neck Cervical Mass (___cm)] : no neck mass was observed [Jugular Venous Distention Increased] : there was no jugular-venous distention [Thyroid Diffuse Enlargement] : the thyroid was not enlarged [Thyroid Nodule] : there were no palpable thyroid nodules [Auscultation Breath Sounds / Voice Sounds] : lungs were clear to auscultation bilaterally [Heart Rate And Rhythm] : heart rate was normal and rhythm regular [Heart Sounds] : normal S1 and S2 [Heart Sounds Gallop] : no gallops [Murmurs] : no murmurs [Heart Sounds Pericardial Friction Rub] : no pericardial rub [Full Pulse] : the pedal pulses are present [Edema] : there was no peripheral edema [Bowel Sounds] : normal bowel sounds [Abdomen Tenderness] : non-tender [Abdomen Soft] : soft [Abdomen Mass (___ Cm)] : no abdominal mass palpated [Cervical Lymph Nodes Enlarged Posterior Bilaterally] : posterior cervical [Cervical Lymph Nodes Enlarged Anterior Bilaterally] : anterior cervical [Supraclavicular Lymph Nodes Enlarged Bilaterally] : supraclavicular [No CVA Tenderness] : no ~M costovertebral angle tenderness [No Spinal Tenderness] : no spinal tenderness [Abnormal Walk] : normal gait [Nail Clubbing] : no clubbing  or cyanosis of the fingernails [Musculoskeletal - Swelling] : no joint swelling seen [Motor Tone] : muscle strength and tone were normal [Skin Color & Pigmentation] : normal skin color and pigmentation [Skin Turgor] : normal skin turgor [] : no rash [Deep Tendon Reflexes (DTR)] : deep tendon reflexes were 2+ and symmetric [Sensation] : the sensory exam was normal to light touch and pinprick [No Focal Deficits] : no focal deficits [Oriented To Time, Place, And Person] : oriented to person, place, and time [Impaired Insight] : insight and judgment were intact [Affect] : the affect was normal [FreeTextEntry1] : anterior abd urine bag draining clear urine

## 2023-05-22 ENCOUNTER — APPOINTMENT (OUTPATIENT)
Dept: UROLOGY | Facility: CLINIC | Age: 68
End: 2023-05-22
Payer: MEDICARE

## 2023-05-22 VITALS
HEART RATE: 81 BPM | TEMPERATURE: 97.1 F | HEIGHT: 70 IN | OXYGEN SATURATION: 95 % | BODY MASS INDEX: 31.5 KG/M2 | WEIGHT: 220 LBS | SYSTOLIC BLOOD PRESSURE: 131 MMHG | RESPIRATION RATE: 15 BRPM | DIASTOLIC BLOOD PRESSURE: 72 MMHG

## 2023-05-22 PROCEDURE — 96402 CHEMO HORMON ANTINEOPL SQ/IM: CPT

## 2023-05-22 RX ORDER — LEUPROLIDE ACETATE 30 MG/.5ML
30 INJECTION, SUSPENSION, EXTENDED RELEASE SUBCUTANEOUS
Refills: 0 | Status: COMPLETED | OUTPATIENT
Start: 2023-05-22

## 2023-05-22 RX ADMIN — LEUPROLIDE ACETATE 0 MG: KIT SUBCUTANEOUS at 00:00

## 2023-05-24 LAB
25(OH)D3 SERPL-MCNC: 59.2 NG/ML
ALBUMIN SERPL ELPH-MCNC: 3.9 G/DL
ANION GAP SERPL CALC-SCNC: 15 MMOL/L
BUN SERPL-MCNC: 58 MG/DL
CALCIUM SERPL-MCNC: 10.1 MG/DL
CALCIUM SERPL-MCNC: 10.1 MG/DL
CHLORIDE SERPL-SCNC: 104 MMOL/L
CO2 SERPL-SCNC: 20 MMOL/L
CREAT SERPL-MCNC: 4.68 MG/DL
EGFR: 13 ML/MIN/1.73M2
GLUCOSE SERPL-MCNC: 118 MG/DL
PARATHYROID HORMONE INTACT: 137 PG/ML
PHOSPHATE SERPL-MCNC: 3.6 MG/DL
POTASSIUM SERPL-SCNC: 4.5 MMOL/L
SODIUM SERPL-SCNC: 139 MMOL/L

## 2023-05-26 ENCOUNTER — APPOINTMENT (OUTPATIENT)
Dept: FAMILY MEDICINE | Facility: CLINIC | Age: 68
End: 2023-05-26
Payer: MEDICARE

## 2023-05-26 PROCEDURE — 99443: CPT

## 2023-06-04 NOTE — ED ADULT NURSE NOTE - NSIMPLEMENTINTERV_GEN_ALL_ED
Implemented All Universal Safety Interventions:  [place] to call system. Call bell, personal items and telephone within reach. Instruct patient to call for assistance. Room bathroom lighting operational. Non-slip footwear when patient is off stretcher. Physically safe environment: no spills, clutter or unnecessary equipment. Stretcher in lowest position, wheels locked, appropriate side rails in place.
0 = swallows foods/liquids without difficulty

## 2023-06-05 NOTE — ED ADULT NURSE NOTE - CAS DISCH CONDITION
Improved Sotyktu Counseling:  I discussed the most common side effects of Sotyktu including: common cold, sore throat, sinus infections, cold sores, canker sores, folliculitis, and acne.? I also discussed more serious side effects of Sotyktu including but not limited to: serious allergic reactions; increased risk for infections such as TB; cancers such as lymphomas; rhabdomyolysis and elevated CPK; and elevated triglycerides and liver enzymes.?

## 2023-06-06 LAB
25(OH)D3 SERPL-MCNC: 62.8 NG/ML
ALBUMIN SERPL ELPH-MCNC: 4.1 G/DL
ANION GAP SERPL CALC-SCNC: 21 MMOL/L
BUN SERPL-MCNC: 44 MG/DL
CALCIUM SERPL-MCNC: 9.9 MG/DL
CALCIUM SERPL-MCNC: 9.9 MG/DL
CHLORIDE SERPL-SCNC: 108 MMOL/L
CO2 SERPL-SCNC: 16 MMOL/L
CREAT SERPL-MCNC: 3.87 MG/DL
EGFR: 16 ML/MIN/1.73M2
GLUCOSE SERPL-MCNC: 115 MG/DL
PARATHYROID HORMONE INTACT: 150 PG/ML
PHOSPHATE SERPL-MCNC: 3.8 MG/DL
POTASSIUM SERPL-SCNC: 4.2 MMOL/L
SODIUM SERPL-SCNC: 145 MMOL/L

## 2023-06-06 RX ORDER — SODIUM BICARBONATE 650 MG/1
650 TABLET ORAL 3 TIMES DAILY
Qty: 180 | Refills: 5 | Status: ACTIVE | COMMUNITY
Start: 2023-06-06 | End: 1900-01-01

## 2023-06-12 ENCOUNTER — RX RENEWAL (OUTPATIENT)
Age: 68
End: 2023-06-12

## 2023-06-12 ENCOUNTER — APPOINTMENT (OUTPATIENT)
Dept: FAMILY MEDICINE | Facility: CLINIC | Age: 68
End: 2023-06-12
Payer: MEDICARE

## 2023-06-12 VITALS
DIASTOLIC BLOOD PRESSURE: 72 MMHG | SYSTOLIC BLOOD PRESSURE: 124 MMHG | RESPIRATION RATE: 16 BRPM | TEMPERATURE: 96.2 F | HEART RATE: 92 BPM | OXYGEN SATURATION: 95 %

## 2023-06-12 DIAGNOSIS — E11.9 TYPE 2 DIABETES MELLITUS W/OUT COMPLICATIONS: ICD-10-CM

## 2023-06-12 DIAGNOSIS — F41.9 ANXIETY DISORDER, UNSPECIFIED: ICD-10-CM

## 2023-06-12 DIAGNOSIS — F32.A ANXIETY DISORDER, UNSPECIFIED: ICD-10-CM

## 2023-06-12 DIAGNOSIS — E66.9 OBESITY, UNSPECIFIED: ICD-10-CM

## 2023-06-12 LAB — GLUCOSE BLDC GLUCOMTR-MCNC: 145

## 2023-06-12 PROCEDURE — 99214 OFFICE O/P EST MOD 30 MIN: CPT | Mod: 25

## 2023-06-12 PROCEDURE — 82962 GLUCOSE BLOOD TEST: CPT

## 2023-06-12 PROCEDURE — 83036 HEMOGLOBIN GLYCOSYLATED A1C: CPT | Mod: QW

## 2023-06-12 NOTE — HISTORY OF PRESENT ILLNESS
[de-identified] : 69y/o M PMHx HTN (Amlodipine,Atenolol) CKD,DM Bladder CA, presents to office for f/up on BG and BP. .Not monitoring BG at home. Non compliant with diet  or exercise.  Had reinsertion of NT.

## 2023-06-12 NOTE — HISTORY OF PRESENT ILLNESS
[de-identified] : 66 y/o M PMHx HTN (Amlodipine,Atenolol) CKD,DM Bladder CA, presents to office for f/up on BG and BP. .Not monitoring BG at home. Non compliant with diet  or exercise.

## 2023-06-12 NOTE — PHYSICAL EXAM
[Normal] : normal rate, regular rhythm, normal S1 and S2 and no murmur heard [de-identified] : depressed affect

## 2023-06-12 NOTE — PHYSICAL EXAM
[Normal] : normal rate, regular rhythm, normal S1 and S2 and no murmur heard [de-identified] : anxius and deprssed affect and mood

## 2023-06-23 ENCOUNTER — APPOINTMENT (OUTPATIENT)
Dept: UROLOGY | Facility: CLINIC | Age: 68
End: 2023-06-23

## 2023-07-05 ENCOUNTER — APPOINTMENT (OUTPATIENT)
Dept: UROLOGY | Facility: CLINIC | Age: 68
End: 2023-07-05

## 2023-07-18 ENCOUNTER — APPOINTMENT (OUTPATIENT)
Dept: UROLOGY | Facility: CLINIC | Age: 68
End: 2023-07-18
Payer: MEDICARE

## 2023-07-18 ENCOUNTER — OUTPATIENT (OUTPATIENT)
Dept: OUTPATIENT SERVICES | Facility: HOSPITAL | Age: 68
LOS: 1 days | End: 2023-07-18
Payer: MEDICARE

## 2023-07-18 VITALS
HEART RATE: 68 BPM | SYSTOLIC BLOOD PRESSURE: 137 MMHG | TEMPERATURE: 97.3 F | OXYGEN SATURATION: 96 % | DIASTOLIC BLOOD PRESSURE: 82 MMHG

## 2023-07-18 DIAGNOSIS — N32.9 BLADDER DISORDER, UNSPECIFIED: Chronic | ICD-10-CM

## 2023-07-18 DIAGNOSIS — Z93.59 OTHER CYSTOSTOMY STATUS: Chronic | ICD-10-CM

## 2023-07-18 DIAGNOSIS — Z93.6 OTHER ARTIFICIAL OPENINGS OF URINARY TRACT STATUS: Chronic | ICD-10-CM

## 2023-07-18 DIAGNOSIS — R35.0 FREQUENCY OF MICTURITION: ICD-10-CM

## 2023-07-18 DIAGNOSIS — Z98.89 OTHER SPECIFIED POSTPROCEDURAL STATES: Chronic | ICD-10-CM

## 2023-07-18 DIAGNOSIS — Z85.51 PERSONAL HISTORY OF MALIGNANT NEOPLASM OF BLADDER: Chronic | ICD-10-CM

## 2023-07-18 PROCEDURE — 50435 EXCHANGE NEPHROSTOMY CATH: CPT | Mod: LT

## 2023-07-18 PROCEDURE — 99212 OFFICE O/P EST SF 10 MIN: CPT | Mod: 25

## 2023-07-18 PROCEDURE — 50688 CHANGE OF URETER TUBE/STENT: CPT | Mod: 59

## 2023-07-18 NOTE — HISTORY OF PRESENT ILLNESS
[FreeTextEntry1] :  Complicated  history. He had RALP for high grade cancer followed by adjuvant radiation therapy. 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 Did well initially; he then noted creatinine up to 1.8 and USG noted new left hydro. 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 stent out.\par Seen in October2019 with an abscess inner thigh of left leg. 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 Just completing 6 weeks IV antibiotics. before this became apparent he had bloody discharge from small opening at bottom of the midline old incision. \par Noted hydro on CT done Inhouse and Cr @2. No back or flank pain. renal scan notes this kidney dead.\par he then called having right leg pain - i was concerned that the osteomyelitis+/-abscess was coming back so ordered a CT scan - this noted osteoblastic bone lesions  and PSA is @200.\par Bone scan confirmed diffuse mets - started ADT with Xtandi - first \par \par Latest PSA  0.09 however CR up to 2.8. CT notes new right hydronephrosis and slight worsening of the left also notes more blood from open on leg so wearing a diaper, though better since last visit. Feels fine. \par he didn't want more surgey so planned for IR to place antegrade upside down NT - able on the right but not on the left - has NT. Not happy and doesn't feel well. making urine with no fevers or chills. Urine clear.\par \par 8/21  Feels well with no pains. 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 Tolerating well - no fatigue, bone pain or weight loss. labs from last visit reviewed - good PSA control \par \par 10/21 - here today as one of the NTs came out - had been slowly out then saw in the bag. No fevers or chills; no back pain. he also noted his drainage from his abdomen - saw his PCP who expressed some fluid and felt he should see me. \par \par 11/21 - now S/p replacement of dislodged Nt and exchange of other. Had him come back to repeat BMP at noted his creatinine up to 6 ; he feels well though looks a bit pale to me. \par \par 5/22 - called this morning that one of the NTs was dislodged. No fevers or chills. making urine.\par having separate issues with the opening on the bag which he is changing frequently \par \par 3/23  - had called on  Friday that one of his tubes and pulled out - was under impression part way but actually out completely.\par had gone to [facility] and seen by IR - fir unclear reasons he was sent home with no f/u\par came to [place]. his friend noted that he is confused. No fevers.\par \par 7/23 on Xatndi 0 tolerating well\par no labwork in some time

## 2023-07-19 DIAGNOSIS — N99.89 OTHER POSTPROCEDURAL COMPLICATIONS AND DISORDERS OF GENITOURINARY SYSTEM: ICD-10-CM

## 2023-07-19 DIAGNOSIS — Z93.6 OTHER ARTIFICIAL OPENINGS OF URINARY TRACT STATUS: ICD-10-CM

## 2023-07-19 LAB
ALBUMIN SERPL ELPH-MCNC: 4.2 G/DL
ALP BLD-CCNC: 140 U/L
ALT SERPL-CCNC: 11 U/L
ANION GAP SERPL CALC-SCNC: 16 MMOL/L
AST SERPL-CCNC: 10 U/L
BILIRUB SERPL-MCNC: 0.4 MG/DL
BUN SERPL-MCNC: 50 MG/DL
CALCIUM SERPL-MCNC: 10.2 MG/DL
CHLORIDE SERPL-SCNC: 105 MMOL/L
CO2 SERPL-SCNC: 21 MMOL/L
CREAT SERPL-MCNC: 4.17 MG/DL
EGFR: 15 ML/MIN/1.73M2
GLUCOSE SERPL-MCNC: 131 MG/DL
POTASSIUM SERPL-SCNC: 4.3 MMOL/L
PROT SERPL-MCNC: 7.4 G/DL
PSA SERPL-MCNC: <0.01 NG/ML
SODIUM SERPL-SCNC: 141 MMOL/L

## 2023-08-16 ENCOUNTER — APPOINTMENT (OUTPATIENT)
Dept: NEPHROLOGY | Facility: CLINIC | Age: 68
End: 2023-08-16
Payer: MEDICARE

## 2023-08-16 VITALS
HEIGHT: 70 IN | DIASTOLIC BLOOD PRESSURE: 83 MMHG | TEMPERATURE: 97.2 F | HEART RATE: 73 BPM | OXYGEN SATURATION: 96 % | SYSTOLIC BLOOD PRESSURE: 130 MMHG

## 2023-08-16 DIAGNOSIS — D50.9 IRON DEFICIENCY ANEMIA, UNSPECIFIED: ICD-10-CM

## 2023-08-16 PROCEDURE — 99215 OFFICE O/P EST HI 40 MIN: CPT

## 2023-08-16 NOTE — PLAN
[TextEntry] : CKD 4- discussed planning for dialysis with AVF however he is adamantly against it. Will plan for labs today. Will call pt with results. Cont current meds for now, BP and volume status controlled. Follow up in 6- 8 wks

## 2023-08-16 NOTE — CURRENT MEDS
[TextEntry] :  Amlodipine 5mg BID Klonipin Trazodone    Ergocalciferol 50k q wk Sodium bicarb tabs TID Xtandi Furosemide 40mg po qod

## 2023-08-16 NOTE — HISTORY OF PRESENT ILLNESS
[FreeTextEntry1] : 68 M s/p vasectomy and ileoconduit from high grade TCC, prostate cancer s/p prostatectomy on Xtandi with CKD 4/5. Patient also has HTN, prediabetes.  Currently he feels well. His appetite is good. He has no "kidney pain." He is on sodium bicarb tabs and he is on it TID. He is also taking Vit D 50 k q wk. He has been taking it. No leg swelling. No shortness of breath. He has no blood in the urine. No headaches or dizziness.

## 2023-08-16 NOTE — PATIENT PROFILE ADULT - HAVE YOU EXPERIENCED A TRAUMATIC EVENT?
Addendum  created 08/16/23 1016 by Hanane Mckeon CRNA    Intraprocedure Event deleted, Intraprocedure Event edited, Intraprocedure Staff edited       no

## 2023-08-16 NOTE — PHYSICAL EXAM
[General Appearance - Alert] : alert [General Appearance - In No Acute Distress] : in no acute distress [Sclera] : the sclera and conjunctiva were normal [PERRL With Normal Accommodation] : pupils were equal in size, round, and reactive to light [Extraocular Movements] : extraocular movements were intact [Outer Ear] : the ears and nose were normal in appearance [Oropharynx] : the oropharynx was normal [Neck Appearance] : the appearance of the neck was normal [Neck Cervical Mass (___cm)] : no neck mass was observed [Jugular Venous Distention Increased] : there was no jugular-venous distention [Thyroid Diffuse Enlargement] : the thyroid was not enlarged [Thyroid Nodule] : there were no palpable thyroid nodules [Auscultation Breath Sounds / Voice Sounds] : lungs were clear to auscultation bilaterally [Heart Rate And Rhythm] : heart rate was normal and rhythm regular [Heart Sounds] : normal S1 and S2 [Heart Sounds Gallop] : no gallops [Murmurs] : no murmurs [Heart Sounds Pericardial Friction Rub] : no pericardial rub [Full Pulse] : the pedal pulses are present [Edema] : there was no peripheral edema [Bowel Sounds] : normal bowel sounds [Abdomen Soft] : soft [Abdomen Tenderness] : non-tender [Abdomen Mass (___ Cm)] : no abdominal mass palpated [No CVA Tenderness] : no ~M costovertebral angle tenderness [No Spinal Tenderness] : no spinal tenderness [Abnormal Walk] : normal gait [Nail Clubbing] : no clubbing  or cyanosis of the fingernails [Musculoskeletal - Swelling] : no joint swelling seen [Motor Tone] : muscle strength and tone were normal [Skin Color & Pigmentation] : normal skin color and pigmentation [] : no rash [Skin Turgor] : normal skin turgor [Deep Tendon Reflexes (DTR)] : deep tendon reflexes were 2+ and symmetric [Sensation] : the sensory exam was normal to light touch and pinprick [No Focal Deficits] : no focal deficits [Oriented To Time, Place, And Person] : oriented to person, place, and time [FreeTextEntry1] : leaking ostomy bag

## 2023-08-17 ENCOUNTER — NON-APPOINTMENT (OUTPATIENT)
Age: 68
End: 2023-08-17

## 2023-08-18 LAB
25(OH)D3 SERPL-MCNC: 55.6 NG/ML
ALBUMIN SERPL ELPH-MCNC: 4 G/DL
ANION GAP SERPL CALC-SCNC: 24 MMOL/L
BUN SERPL-MCNC: 52 MG/DL
CALCIUM SERPL-MCNC: 9.6 MG/DL
CALCIUM SERPL-MCNC: 9.6 MG/DL
CHLORIDE SERPL-SCNC: 104 MMOL/L
CO2 SERPL-SCNC: 14 MMOL/L
CREAT SERPL-MCNC: 4.36 MG/DL
EGFR: 14 ML/MIN/1.73M2
GLUCOSE SERPL-MCNC: 96 MG/DL
PARATHYROID HORMONE INTACT: 116 PG/ML
PHOSPHATE SERPL-MCNC: 3.3 MG/DL
POTASSIUM SERPL-SCNC: 4.5 MMOL/L
SODIUM SERPL-SCNC: 143 MMOL/L

## 2023-08-21 ENCOUNTER — APPOINTMENT (OUTPATIENT)
Dept: FAMILY MEDICINE | Facility: CLINIC | Age: 68
End: 2023-08-21
Payer: MEDICARE

## 2023-08-21 PROCEDURE — 99443: CPT

## 2023-08-21 NOTE — HISTORY OF PRESENT ILLNESS
[Home] : at home, [unfilled] , at the time of the visit. [Medical Office: ([facility])___] : at the medical office located in  [de-identified] : 69 y/o M presents via Telephonic  for update.pt will be having AVF procedure in preparation for HD.  pt expressed his concern for this procedure however he understands the  necessity /80 BG  130 (june) Pt to f/up  in 3 months

## 2023-08-23 ENCOUNTER — RX RENEWAL (OUTPATIENT)
Age: 68
End: 2023-08-23

## 2023-08-23 RX ORDER — FUROSEMIDE 40 MG/1
40 TABLET ORAL
Qty: 45 | Refills: 3 | Status: ACTIVE | COMMUNITY
Start: 2022-08-23 | End: 1900-01-01

## 2023-08-31 ENCOUNTER — APPOINTMENT (OUTPATIENT)
Dept: VASCULAR SURGERY | Facility: CLINIC | Age: 68
End: 2023-08-31
Payer: MEDICARE

## 2023-08-31 PROCEDURE — 99203 OFFICE O/P NEW LOW 30 MIN: CPT

## 2023-08-31 NOTE — PHYSICAL EXAM
[JVD] : no jugular venous distention  [Normal Breath Sounds] : Normal breath sounds [Normal Rate and Rhythm] : normal rate and rhythm [2+] : left 2+ [Ankle Swelling (On Exam)] : not present [Varicose Veins Of Lower Extremities] : not present [] : not present [Abdomen Tenderness] : ~T ~M No abdominal tenderness [No Rash or Lesion] : No rash or lesion [Alert] : alert [de-identified] : Appears well

## 2023-08-31 NOTE — ASSESSMENT
[FreeTextEntry1] : 68-year-old gentleman evaluated for hemodialysis access creation.  Patient will return to the office for a left arm vein mapping September 13 at which time we will decide on fistula creation.

## 2023-08-31 NOTE — REASON FOR VISIT
[Consultation] : a consultation visit [FreeTextEntry1] : Evaluation for creation of hemodialysis access

## 2023-08-31 NOTE — HISTORY OF PRESENT ILLNESS
[FreeTextEntry1] : 68-year-old gentleman with multiple medical problems including a history of bladder cancer, prostate cancer and colostomy presents to the office with worsening renal function.  Patient is now approaching hemodialysis.  Patient presents for evaluation for creation of hemodialysis access.  Patient is right-handed and has no pacemaker or defibrillator.

## 2023-09-11 ENCOUNTER — APPOINTMENT (OUTPATIENT)
Dept: UROLOGY | Facility: CLINIC | Age: 68
End: 2023-09-11

## 2023-09-13 ENCOUNTER — APPOINTMENT (OUTPATIENT)
Dept: VASCULAR SURGERY | Facility: CLINIC | Age: 68
End: 2023-09-13
Payer: MEDICARE

## 2023-09-13 PROCEDURE — 93986 DUP-SCAN HEMO COMPL UNI STD: CPT

## 2023-09-25 ENCOUNTER — APPOINTMENT (OUTPATIENT)
Dept: UROLOGY | Facility: CLINIC | Age: 68
End: 2023-09-25

## 2023-09-26 ENCOUNTER — NON-APPOINTMENT (OUTPATIENT)
Age: 68
End: 2023-09-26

## 2023-10-06 ENCOUNTER — APPOINTMENT (OUTPATIENT)
Age: 68
End: 2023-10-06
Payer: MEDICARE

## 2023-10-06 VITALS
TEMPERATURE: 97.7 F | HEIGHT: 70 IN | BODY MASS INDEX: 31.5 KG/M2 | WEIGHT: 220 LBS | OXYGEN SATURATION: 95 % | SYSTOLIC BLOOD PRESSURE: 130 MMHG | HEART RATE: 72 BPM | RESPIRATION RATE: 16 BRPM | DIASTOLIC BLOOD PRESSURE: 75 MMHG

## 2023-10-06 PROCEDURE — 96402 CHEMO HORMON ANTINEOPL SQ/IM: CPT

## 2023-10-06 RX ORDER — LEUPROLIDE ACETATE 30 MG/.5ML
30 INJECTION, SUSPENSION, EXTENDED RELEASE SUBCUTANEOUS
Refills: 0 | Status: COMPLETED | OUTPATIENT
Start: 2023-10-06

## 2023-10-17 ENCOUNTER — APPOINTMENT (OUTPATIENT)
Dept: UROLOGY | Facility: CLINIC | Age: 68
End: 2023-10-17

## 2023-10-25 RX ORDER — CALCITRIOL 0.25 UG/1
0.25 CAPSULE, LIQUID FILLED ORAL
Qty: 45 | Refills: 3 | Status: ACTIVE | COMMUNITY
Start: 2023-02-02 | End: 1900-01-01

## 2023-10-27 ENCOUNTER — NON-APPOINTMENT (OUTPATIENT)
Age: 68
End: 2023-10-27

## 2023-11-10 ENCOUNTER — APPOINTMENT (OUTPATIENT)
Dept: UROLOGY | Facility: CLINIC | Age: 68
End: 2023-11-10

## 2023-11-13 ENCOUNTER — APPOINTMENT (OUTPATIENT)
Dept: NEPHROLOGY | Facility: CLINIC | Age: 68
End: 2023-11-13
Payer: MEDICARE

## 2023-11-13 VITALS
OXYGEN SATURATION: 94 % | HEIGHT: 70 IN | TEMPERATURE: 96.8 F | DIASTOLIC BLOOD PRESSURE: 77 MMHG | HEART RATE: 83 BPM | SYSTOLIC BLOOD PRESSURE: 124 MMHG

## 2023-11-13 PROCEDURE — 99215 OFFICE O/P EST HI 40 MIN: CPT

## 2023-11-14 ENCOUNTER — OUTPATIENT (OUTPATIENT)
Dept: OUTPATIENT SERVICES | Facility: HOSPITAL | Age: 68
LOS: 1 days | End: 2023-11-14
Payer: COMMERCIAL

## 2023-11-14 VITALS
OXYGEN SATURATION: 95 % | DIASTOLIC BLOOD PRESSURE: 81 MMHG | RESPIRATION RATE: 18 BRPM | HEIGHT: 71 IN | HEART RATE: 73 BPM | SYSTOLIC BLOOD PRESSURE: 121 MMHG | WEIGHT: 253.09 LBS | TEMPERATURE: 97 F

## 2023-11-14 DIAGNOSIS — N17.9 ACUTE KIDNEY FAILURE, UNSPECIFIED: ICD-10-CM

## 2023-11-14 DIAGNOSIS — Z01.818 ENCOUNTER FOR OTHER PREPROCEDURAL EXAMINATION: ICD-10-CM

## 2023-11-14 DIAGNOSIS — Z93.59 OTHER CYSTOSTOMY STATUS: Chronic | ICD-10-CM

## 2023-11-14 DIAGNOSIS — Z85.51 PERSONAL HISTORY OF MALIGNANT NEOPLASM OF BLADDER: Chronic | ICD-10-CM

## 2023-11-14 DIAGNOSIS — Z93.6 OTHER ARTIFICIAL OPENINGS OF URINARY TRACT STATUS: Chronic | ICD-10-CM

## 2023-11-14 DIAGNOSIS — Z98.89 OTHER SPECIFIED POSTPROCEDURAL STATES: Chronic | ICD-10-CM

## 2023-11-14 DIAGNOSIS — N32.9 BLADDER DISORDER, UNSPECIFIED: Chronic | ICD-10-CM

## 2023-11-14 DIAGNOSIS — I10 ESSENTIAL (PRIMARY) HYPERTENSION: ICD-10-CM

## 2023-11-14 PROCEDURE — 36415 COLL VENOUS BLD VENIPUNCTURE: CPT

## 2023-11-14 PROCEDURE — 93010 ELECTROCARDIOGRAM REPORT: CPT | Mod: NC

## 2023-11-14 PROCEDURE — 93005 ELECTROCARDIOGRAM TRACING: CPT

## 2023-11-14 PROCEDURE — G0463: CPT

## 2023-11-14 NOTE — H&P PST ADULT - HISTORY OF PRESENT ILLNESS
68 year old male with hx of bladder cancer s/p cystectomy, prostate cancer, HTN, chronic renal failure and ilial conduit in 5/2018 presents for PST.  Pre op of A-V fistula placement, patient states he will need dialysis soon.  Overall poor historian, but he states he is feeling well at PST today.  Scheduled for creation left radiocephalic arteriovenous fistula with Dr Obando on 11/20/2023.   68 year old male with hx of bladder cancer s/p cystectomy, prostate cancer, HTN, chronic renal failure and ilial conduit in 5/2018 presents for PST.  Pre op of A-V fistula placement, patient states he will need dialysis soon due to continues renal failure.  Overall poor historian, but he states he is feeling well at PST today.  Scheduled for creation left radiocephalic arteriovenous fistula with Dr Obando on 11/20/2023.

## 2023-11-14 NOTE — H&P PST ADULT - GENERAL
You can access the FollowMyHealth Patient Portal offered by [facility] by registering at the following website: [email or web address]. By joining [facility]’s FollowMyHealth portal, you will also be able to view your health information using other applications (apps) compatible with our system. details…

## 2023-11-14 NOTE — H&P PST ADULT - NSANTHOSAYNRD_GEN_A_CORE
neck 18.5 inches/No. DERICK screening performed.  STOP BANG Legend: 0-2 = LOW Risk; 3-4 = INTERMEDIATE Risk; 5-8 = HIGH Risk

## 2023-11-14 NOTE — H&P PST ADULT - GENITOURINARY COMMENTS
with urostomy - currently draining clear yellow urine- hx CKD - see nephro and now pre op for a-v fistula suprapubic cath in place draining clear yellow urine

## 2023-11-14 NOTE — H&P PST ADULT - PROBLEM SELECTOR PLAN 1
Scheduled for creation left radiocephalic arteriovenous fistula with Dr Obando on 11/20/2023.  Pre op instructions given and patient verbalized understanding.  CBC, BMP on chart.  pending EKG and medical clearance.  NPO after midnight night before procedure.  My take Amlodipine AM of procedure with small sip of water.  To stop all ASA, NSAIDs, vitamins and supplements 1 week prior to procedure.  Chlorhexidene wash given with instructions.  DERICK precautions- STOP BANG 5

## 2023-11-16 ENCOUNTER — APPOINTMENT (OUTPATIENT)
Dept: FAMILY MEDICINE | Facility: CLINIC | Age: 68
End: 2023-11-16
Payer: MEDICARE

## 2023-11-16 VITALS
HEIGHT: 70 IN | HEART RATE: 75 BPM | BODY MASS INDEX: 31.5 KG/M2 | OXYGEN SATURATION: 97 % | DIASTOLIC BLOOD PRESSURE: 70 MMHG | WEIGHT: 220 LBS | RESPIRATION RATE: 16 BRPM | SYSTOLIC BLOOD PRESSURE: 126 MMHG | TEMPERATURE: 96.8 F

## 2023-11-16 DIAGNOSIS — I10 ESSENTIAL (PRIMARY) HYPERTENSION: ICD-10-CM

## 2023-11-16 DIAGNOSIS — Z23 ENCOUNTER FOR IMMUNIZATION: ICD-10-CM

## 2023-11-16 DIAGNOSIS — C79.51 SECONDARY MALIGNANT NEOPLASM OF BONE: ICD-10-CM

## 2023-11-16 DIAGNOSIS — R73.9 HYPERGLYCEMIA, UNSPECIFIED: ICD-10-CM

## 2023-11-16 LAB
25(OH)D3 SERPL-MCNC: 52 NG/ML
ALBUMIN SERPL ELPH-MCNC: 3.8 G/DL
ANION GAP SERPL CALC-SCNC: 14 MMOL/L
BASOPHILS # BLD AUTO: 0.04 K/UL
BASOPHILS NFR BLD AUTO: 0.5 %
BUN SERPL-MCNC: 52 MG/DL
CALCIUM SERPL-MCNC: 9.9 MG/DL
CALCIUM SERPL-MCNC: 9.9 MG/DL
CHLORIDE SERPL-SCNC: 105 MMOL/L
CO2 SERPL-SCNC: 21 MMOL/L
CREAT SERPL-MCNC: 4.22 MG/DL
EGFR: 14 ML/MIN/1.73M2
EOSINOPHIL # BLD AUTO: 0.21 K/UL
EOSINOPHIL NFR BLD AUTO: 2.6 %
GLUCOSE SERPL-MCNC: 159 MG/DL
HCT VFR BLD CALC: 39.2 %
HGB BLD-MCNC: 12.1 G/DL
IMM GRANULOCYTES NFR BLD AUTO: 0.6 %
LYMPHOCYTES # BLD AUTO: 1.07 K/UL
LYMPHOCYTES NFR BLD AUTO: 13.3 %
MAN DIFF?: NORMAL
MCHC RBC-ENTMCNC: 29.5 PG
MCHC RBC-ENTMCNC: 30.9 GM/DL
MCV RBC AUTO: 95.6 FL
MONOCYTES # BLD AUTO: 0.62 K/UL
MONOCYTES NFR BLD AUTO: 7.7 %
NEUTROPHILS # BLD AUTO: 6.08 K/UL
NEUTROPHILS NFR BLD AUTO: 75.3 %
PARATHYROID HORMONE INTACT: 174 PG/ML
PHOSPHATE SERPL-MCNC: 3.4 MG/DL
PLATELET # BLD AUTO: 179 K/UL
POTASSIUM SERPL-SCNC: 5 MMOL/L
RBC # BLD: 4.1 M/UL
RBC # FLD: 13.7 %
SODIUM SERPL-SCNC: 140 MMOL/L
WBC # FLD AUTO: 8.07 K/UL

## 2023-11-16 PROCEDURE — G0008: CPT

## 2023-11-16 PROCEDURE — 90662 IIV NO PRSV INCREASED AG IM: CPT

## 2023-11-16 PROCEDURE — 99214 OFFICE O/P EST MOD 30 MIN: CPT | Mod: 25

## 2023-11-19 ENCOUNTER — TRANSCRIPTION ENCOUNTER (OUTPATIENT)
Age: 68
End: 2023-11-19

## 2023-11-20 ENCOUNTER — APPOINTMENT (OUTPATIENT)
Dept: VASCULAR SURGERY | Facility: HOSPITAL | Age: 68
End: 2023-11-20
Payer: MEDICARE

## 2023-11-20 ENCOUNTER — OUTPATIENT (OUTPATIENT)
Dept: OUTPATIENT SERVICES | Facility: HOSPITAL | Age: 68
LOS: 1 days | End: 2023-11-20
Payer: COMMERCIAL

## 2023-11-20 ENCOUNTER — TRANSCRIPTION ENCOUNTER (OUTPATIENT)
Age: 68
End: 2023-11-20

## 2023-11-20 VITALS
OXYGEN SATURATION: 97 % | HEIGHT: 71 IN | WEIGHT: 253.09 LBS | RESPIRATION RATE: 15 BRPM | SYSTOLIC BLOOD PRESSURE: 145 MMHG | TEMPERATURE: 99 F | DIASTOLIC BLOOD PRESSURE: 82 MMHG | HEART RATE: 94 BPM

## 2023-11-20 VITALS
SYSTOLIC BLOOD PRESSURE: 125 MMHG | RESPIRATION RATE: 16 BRPM | TEMPERATURE: 97 F | HEART RATE: 81 BPM | OXYGEN SATURATION: 96 % | DIASTOLIC BLOOD PRESSURE: 75 MMHG

## 2023-11-20 DIAGNOSIS — N32.9 BLADDER DISORDER, UNSPECIFIED: Chronic | ICD-10-CM

## 2023-11-20 DIAGNOSIS — N17.9 ACUTE KIDNEY FAILURE, UNSPECIFIED: ICD-10-CM

## 2023-11-20 DIAGNOSIS — Z93.6 OTHER ARTIFICIAL OPENINGS OF URINARY TRACT STATUS: Chronic | ICD-10-CM

## 2023-11-20 DIAGNOSIS — Z93.59 OTHER CYSTOSTOMY STATUS: Chronic | ICD-10-CM

## 2023-11-20 DIAGNOSIS — Z98.89 OTHER SPECIFIED POSTPROCEDURAL STATES: Chronic | ICD-10-CM

## 2023-11-20 DIAGNOSIS — Z85.51 PERSONAL HISTORY OF MALIGNANT NEOPLASM OF BLADDER: Chronic | ICD-10-CM

## 2023-11-20 LAB
GLUCOSE BLDC GLUCOMTR-MCNC: 214 MG/DL — HIGH (ref 70–99)
GLUCOSE BLDC GLUCOMTR-MCNC: 214 MG/DL — HIGH (ref 70–99)

## 2023-11-20 PROCEDURE — C1889: CPT

## 2023-11-20 PROCEDURE — 36821 AV FUSION DIRECT ANY SITE: CPT | Mod: LT

## 2023-11-20 PROCEDURE — 36820 AV FUSION/FOREARM VEIN: CPT | Mod: AS

## 2023-11-20 PROCEDURE — 82962 GLUCOSE BLOOD TEST: CPT

## 2023-11-20 PROCEDURE — 36820 AV FUSION/FOREARM VEIN: CPT | Mod: LT

## 2023-11-20 DEVICE — ARISTA 3GR: Type: IMPLANTABLE DEVICE | Site: LEFT | Status: FUNCTIONAL

## 2023-11-20 DEVICE — CLIP APPLIER ETHICON LIGACLIP 9 3/8" SMALL: Type: IMPLANTABLE DEVICE | Site: LEFT | Status: FUNCTIONAL

## 2023-11-20 RX ORDER — ACETAMINOPHEN 500 MG
2 TABLET ORAL
Qty: 0 | Refills: 0 | DISCHARGE

## 2023-11-20 RX ORDER — DEXTROSE 50 % IN WATER 50 %
25 SYRINGE (ML) INTRAVENOUS ONCE
Refills: 0 | Status: DISCONTINUED | OUTPATIENT
Start: 2023-11-20 | End: 2023-11-20

## 2023-11-20 RX ORDER — OXYCODONE HYDROCHLORIDE 5 MG/1
5 TABLET ORAL ONCE
Refills: 0 | Status: DISCONTINUED | OUTPATIENT
Start: 2023-11-20 | End: 2023-11-20

## 2023-11-20 RX ORDER — OXYCODONE HYDROCHLORIDE 5 MG/1
1 TABLET ORAL
Qty: 8 | Refills: 0
Start: 2023-11-20 | End: 2023-11-21

## 2023-11-20 RX ORDER — ONDANSETRON 8 MG/1
4 TABLET, FILM COATED ORAL ONCE
Refills: 0 | Status: DISCONTINUED | OUTPATIENT
Start: 2023-11-20 | End: 2023-11-20

## 2023-11-20 RX ORDER — SODIUM CHLORIDE 9 MG/ML
1000 INJECTION INTRAMUSCULAR; INTRAVENOUS; SUBCUTANEOUS
Refills: 0 | Status: DISCONTINUED | OUTPATIENT
Start: 2023-11-20 | End: 2023-11-20

## 2023-11-20 RX ORDER — ENZALUTAMIDE 80 MG/1
4 TABLET ORAL
Refills: 0 | DISCHARGE

## 2023-11-20 RX ORDER — GLUCAGON INJECTION, SOLUTION 0.5 MG/.1ML
1 INJECTION, SOLUTION SUBCUTANEOUS ONCE
Refills: 0 | Status: DISCONTINUED | OUTPATIENT
Start: 2023-11-20 | End: 2023-11-20

## 2023-11-20 RX ORDER — CLONAZEPAM 1 MG
1 TABLET ORAL
Qty: 0 | Refills: 0 | DISCHARGE

## 2023-11-20 RX ORDER — OXYCODONE AND ACETAMINOPHEN 5; 325 MG/1; MG/1
1 TABLET ORAL
Qty: 0 | Refills: 0 | DISCHARGE
Start: 2023-11-20

## 2023-11-20 RX ORDER — TRAZODONE HCL 50 MG
1 TABLET ORAL
Qty: 0 | Refills: 0 | DISCHARGE

## 2023-11-20 RX ORDER — HYDROMORPHONE HYDROCHLORIDE 2 MG/ML
0.5 INJECTION INTRAMUSCULAR; INTRAVENOUS; SUBCUTANEOUS
Refills: 0 | Status: DISCONTINUED | OUTPATIENT
Start: 2023-11-20 | End: 2023-11-20

## 2023-11-20 RX ORDER — OXYCODONE AND ACETAMINOPHEN 5; 325 MG/1; MG/1
1 TABLET ORAL ONCE
Refills: 0 | Status: DISCONTINUED | OUTPATIENT
Start: 2023-11-20 | End: 2023-11-20

## 2023-11-20 NOTE — ASU DISCHARGE PLAN (ADULT/PEDIATRIC) - NURSING INSTRUCTIONS
Drink plenty of fluids. Take tylenol for mild-moderate pain. Take oxycodone for severe pain. Please follow up with MD for post-op appointment.

## 2023-11-20 NOTE — ASU DISCHARGE PLAN (ADULT/PEDIATRIC) - ASU DC SPECIAL INSTRUCTIONSFT
Keep dressing dry and intact for 24 hours then remove outer dressing and may shower over steri strips   Take pain medication as directed .  -   if pain mild take tylenol only

## 2023-11-20 NOTE — BRIEF OPERATIVE NOTE - NSICDXBRIEFPROCEDURE_GEN_ALL_CORE_FT
PROCEDURES:  Creation of distal AV fistula for lower extremity bypass 20-Nov-2023 13:37:27 left arm Carolee Oshea

## 2023-11-20 NOTE — BRIEF OPERATIVE NOTE - NSICDXBRIEFPREOP_GEN_ALL_CORE_FT
PRE-OP DIAGNOSIS:  End stage renal failure untreated by renal replacement therapy 20-Nov-2023 13:36:09  Carolee Oshea

## 2023-11-22 ENCOUNTER — APPOINTMENT (OUTPATIENT)
Dept: UROLOGY | Facility: CLINIC | Age: 68
End: 2023-11-22

## 2023-11-22 ENCOUNTER — APPOINTMENT (OUTPATIENT)
Dept: UROLOGY | Facility: CLINIC | Age: 68
End: 2023-11-22
Payer: MEDICARE

## 2023-11-22 ENCOUNTER — OUTPATIENT (OUTPATIENT)
Dept: OUTPATIENT SERVICES | Facility: HOSPITAL | Age: 68
LOS: 1 days | End: 2023-11-22
Payer: MEDICARE

## 2023-11-22 VITALS
SYSTOLIC BLOOD PRESSURE: 130 MMHG | TEMPERATURE: 97.7 F | OXYGEN SATURATION: 96 % | HEART RATE: 87 BPM | DIASTOLIC BLOOD PRESSURE: 74 MMHG

## 2023-11-22 DIAGNOSIS — Z98.89 OTHER SPECIFIED POSTPROCEDURAL STATES: Chronic | ICD-10-CM

## 2023-11-22 DIAGNOSIS — N32.9 BLADDER DISORDER, UNSPECIFIED: Chronic | ICD-10-CM

## 2023-11-22 DIAGNOSIS — Z93.59 OTHER CYSTOSTOMY STATUS: Chronic | ICD-10-CM

## 2023-11-22 DIAGNOSIS — R35.0 FREQUENCY OF MICTURITION: ICD-10-CM

## 2023-11-22 PROCEDURE — 50688 CHANGE OF URETER TUBE/STENT: CPT

## 2023-11-23 LAB
ALBUMIN SERPL ELPH-MCNC: 3.8 G/DL
ALP BLD-CCNC: 110 U/L
ALT SERPL-CCNC: <5 U/L
ANION GAP SERPL CALC-SCNC: 18 MMOL/L
AST SERPL-CCNC: 13 U/L
BILIRUB SERPL-MCNC: 0.3 MG/DL
BUN SERPL-MCNC: 57 MG/DL
CALCIUM SERPL-MCNC: 9 MG/DL
CHLORIDE SERPL-SCNC: 104 MMOL/L
CO2 SERPL-SCNC: 17 MMOL/L
CREAT SERPL-MCNC: 4.61 MG/DL
EGFR: 13 ML/MIN/1.73M2
GLUCOSE SERPL-MCNC: 128 MG/DL
POTASSIUM SERPL-SCNC: 4.3 MMOL/L
PROT SERPL-MCNC: 6.8 G/DL
PSA SERPL-MCNC: <0.01 NG/ML
SODIUM SERPL-SCNC: 139 MMOL/L

## 2023-11-27 DIAGNOSIS — N13.30 UNSPECIFIED HYDRONEPHROSIS: ICD-10-CM

## 2023-11-27 DIAGNOSIS — Z93.6 OTHER ARTIFICIAL OPENINGS OF URINARY TRACT STATUS: ICD-10-CM

## 2023-12-05 ENCOUNTER — APPOINTMENT (OUTPATIENT)
Dept: VASCULAR SURGERY | Facility: CLINIC | Age: 68
End: 2023-12-05
Payer: MEDICARE

## 2023-12-05 PROCEDURE — 93990 DOPPLER FLOW TESTING: CPT

## 2023-12-05 PROCEDURE — 99024 POSTOP FOLLOW-UP VISIT: CPT

## 2023-12-21 NOTE — ED PROVIDER NOTE - BIRTH SEX
I called her with the results of the biopsies.  The biopsy of the right lower lobe lesion was essentially nondiagnostic.  I have cultures pending which include fungal and mycobacterial organism.  The sample of the lymph node which showed uptake on the PET scan showed evidence of non-necrotizing granulomas and no evidence of cancer.  I am almost certain this is a patient with granulomatous inflammation.  Whether this is sarcoidosis or a non clinically apparent fungal infection is to be determined (Histoplasma is high on the list, cultures are pending).  I told her that a follow-up CT scan of the chest in 3-4 months to assure stability on the size of the right lower lobe lesion is the next step.  Only if there is growth in the size of the lesion then we may consider repeating a biopsy.  She is okay with this plan.  Dr. Grier, the referring physician has been included in this note   Male

## 2024-01-03 ENCOUNTER — APPOINTMENT (OUTPATIENT)
Dept: UROLOGY | Facility: CLINIC | Age: 69
End: 2024-01-03

## 2024-01-12 ENCOUNTER — APPOINTMENT (OUTPATIENT)
Dept: UROLOGY | Facility: CLINIC | Age: 69
End: 2024-01-12

## 2024-01-12 VITALS
OXYGEN SATURATION: 98 % | DIASTOLIC BLOOD PRESSURE: 77 MMHG | SYSTOLIC BLOOD PRESSURE: 144 MMHG | HEART RATE: 74 BPM | TEMPERATURE: 97.8 F

## 2024-01-24 ENCOUNTER — APPOINTMENT (OUTPATIENT)
Dept: NEPHROLOGY | Facility: CLINIC | Age: 69
End: 2024-01-24
Payer: MEDICARE

## 2024-01-24 VITALS
OXYGEN SATURATION: 97 % | TEMPERATURE: 97.6 F | HEART RATE: 76 BPM | SYSTOLIC BLOOD PRESSURE: 160 MMHG | DIASTOLIC BLOOD PRESSURE: 81 MMHG

## 2024-01-24 PROCEDURE — 99215 OFFICE O/P EST HI 40 MIN: CPT

## 2024-01-24 NOTE — PLAN
[TextEntry] : CKD 4/5- discussed that if patient has uremic symptoms will need to consider starting HD however he does not have any right now. Will check labs today. Will cont vascular follow up, appt in Feb. Will call pt with results. Cont current meds for now, BP and volume status controlled. Follow up in ~8 wks

## 2024-01-24 NOTE — PHYSICAL EXAM
[General Appearance - Alert] : alert [General Appearance - In No Acute Distress] : in no acute distress [Sclera] : the sclera and conjunctiva were normal [PERRL With Normal Accommodation] : pupils were equal in size, round, and reactive to light [Extraocular Movements] : extraocular movements were intact [Outer Ear] : the ears and nose were normal in appearance [Oropharynx] : the oropharynx was normal [Neck Appearance] : the appearance of the neck was normal [Neck Cervical Mass (___cm)] : no neck mass was observed [Jugular Venous Distention Increased] : there was no jugular-venous distention [Thyroid Diffuse Enlargement] : the thyroid was not enlarged [Thyroid Nodule] : there were no palpable thyroid nodules [Auscultation Breath Sounds / Voice Sounds] : lungs were clear to auscultation bilaterally [Heart Rate And Rhythm] : heart rate was normal and rhythm regular [Heart Sounds] : normal S1 and S2 [Heart Sounds Gallop] : no gallops [Murmurs] : no murmurs [Heart Sounds Pericardial Friction Rub] : no pericardial rub [Bowel Sounds] : normal bowel sounds [Abdomen Soft] : soft [Abdomen Tenderness] : non-tender [Abdomen Mass (___ Cm)] : no abdominal mass palpated [No CVA Tenderness] : no ~M costovertebral angle tenderness [No Spinal Tenderness] : no spinal tenderness [Abnormal Walk] : normal gait [Nail Clubbing] : no clubbing  or cyanosis of the fingernails [Musculoskeletal - Swelling] : no joint swelling seen [Motor Tone] : muscle strength and tone were normal [Skin Color & Pigmentation] : normal skin color and pigmentation [Skin Turgor] : normal skin turgor [] : no rash [No Focal Deficits] : no focal deficits [Oriented To Time, Place, And Person] : oriented to person, place, and time [Impaired Insight] : insight and judgment were intact [Affect] : the affect was normal [___ (cm) Fistula] : [unfilled] (cm) fistula [Bruit] : a bruit was present [Thrill] : a thrill was present

## 2024-01-24 NOTE — HISTORY OF PRESENT ILLNESS
[FreeTextEntry1] : 68 M s/p vasectomy and ileoconduit from high grade TCC, prostate cancer s/p prostatectomy on Xtandi with CKD 4/5. Patient also has HTN, prediabetes. He had AVF surgery with Dr. Obando in November 2023 and had a follow up with him a month later and he has a good thrill and flow.  Currently he feels well. His appetite is good, he says he is eating too much. He has no "kidney pain." His mouth is dry and he has toe pain sometimes when he is laying down.  He is on sodium bicarb tabs and he is on it TID. He is also taking Vit D 50 k q wk. He has been taking it.  No leg swelling. No shortness of breath. He has no blood in the urine. No headaches or dizziness.

## 2024-01-26 LAB
25(OH)D3 SERPL-MCNC: 64 NG/ML
ALBUMIN SERPL ELPH-MCNC: 3.9 G/DL
ANION GAP SERPL CALC-SCNC: 14 MMOL/L
BUN SERPL-MCNC: 48 MG/DL
CALCIUM SERPL-MCNC: 9.7 MG/DL
CALCIUM SERPL-MCNC: 9.7 MG/DL
CHLORIDE SERPL-SCNC: 106 MMOL/L
CO2 SERPL-SCNC: 18 MMOL/L
CREAT SERPL-MCNC: 3.63 MG/DL
EGFR: 17 ML/MIN/1.73M2
GLUCOSE SERPL-MCNC: 119 MG/DL
PARATHYROID HORMONE INTACT: 149 PG/ML
PHOSPHATE SERPL-MCNC: 3.1 MG/DL
POTASSIUM SERPL-SCNC: 4.7 MMOL/L
SODIUM SERPL-SCNC: 138 MMOL/L

## 2024-02-15 ENCOUNTER — APPOINTMENT (OUTPATIENT)
Dept: VASCULAR SURGERY | Facility: CLINIC | Age: 69
End: 2024-02-15
Payer: MEDICARE

## 2024-02-15 DIAGNOSIS — I77.0 ARTERIOVENOUS FISTULA, ACQUIRED: ICD-10-CM

## 2024-02-15 PROCEDURE — 99024 POSTOP FOLLOW-UP VISIT: CPT

## 2024-02-15 PROCEDURE — 93990 DOPPLER FLOW TESTING: CPT

## 2024-02-15 NOTE — REASON FOR VISIT
[de-identified] : Left radiocephalic AV fistula [de-identified] : Status post creation left radiocephalic AV fistula.

## 2024-02-15 NOTE — DISCUSSION/SUMMARY
[FreeTextEntry1] : In the office today patient underwent a duplex study which shows a well-functioning fistula with no evidence of stenosis.  Patient may use fistula if necessary.    Patient to follow-up in 3 to 4 month with a repeat duplex.

## 2024-02-16 ENCOUNTER — APPOINTMENT (OUTPATIENT)
Age: 69
End: 2024-02-16
Payer: MEDICARE

## 2024-02-16 VITALS
TEMPERATURE: 97.5 F | DIASTOLIC BLOOD PRESSURE: 76 MMHG | RESPIRATION RATE: 16 BRPM | HEIGHT: 70 IN | SYSTOLIC BLOOD PRESSURE: 133 MMHG | HEART RATE: 87 BPM | BODY MASS INDEX: 31.5 KG/M2 | WEIGHT: 220 LBS | OXYGEN SATURATION: 96 %

## 2024-02-16 PROCEDURE — 96402 CHEMO HORMON ANTINEOPL SQ/IM: CPT

## 2024-02-16 RX ORDER — LEUPROLIDE ACETATE 30 MG/.5ML
30 INJECTION, SUSPENSION, EXTENDED RELEASE SUBCUTANEOUS
Refills: 0 | Status: COMPLETED | OUTPATIENT
Start: 2024-02-16

## 2024-02-21 ENCOUNTER — APPOINTMENT (OUTPATIENT)
Dept: UROLOGY | Facility: CLINIC | Age: 69
End: 2024-02-21
Payer: MEDICARE

## 2024-02-21 ENCOUNTER — OUTPATIENT (OUTPATIENT)
Dept: OUTPATIENT SERVICES | Facility: HOSPITAL | Age: 69
LOS: 1 days | End: 2024-02-21
Payer: MEDICARE

## 2024-02-21 VITALS
HEART RATE: 77 BPM | TEMPERATURE: 98 F | SYSTOLIC BLOOD PRESSURE: 146 MMHG | OXYGEN SATURATION: 97 % | DIASTOLIC BLOOD PRESSURE: 83 MMHG

## 2024-02-21 DIAGNOSIS — Z93.6 OTHER ARTIFICIAL OPENINGS OF URINARY TRACT STATUS: Chronic | ICD-10-CM

## 2024-02-21 DIAGNOSIS — Z98.89 OTHER SPECIFIED POSTPROCEDURAL STATES: Chronic | ICD-10-CM

## 2024-02-21 DIAGNOSIS — Z85.51 PERSONAL HISTORY OF MALIGNANT NEOPLASM OF BLADDER: Chronic | ICD-10-CM

## 2024-02-21 DIAGNOSIS — N32.9 BLADDER DISORDER, UNSPECIFIED: Chronic | ICD-10-CM

## 2024-02-21 PROCEDURE — 50435 EXCHANGE NEPHROSTOMY CATH: CPT

## 2024-02-21 PROCEDURE — 50435 EXCHANGE NEPHROSTOMY CATH: CPT | Mod: 50

## 2024-02-26 DIAGNOSIS — N99.89 OTHER POSTPROCEDURAL COMPLICATIONS AND DISORDERS OF GENITOURINARY SYSTEM: ICD-10-CM

## 2024-02-26 DIAGNOSIS — N13.30 UNSPECIFIED HYDRONEPHROSIS: ICD-10-CM

## 2024-03-19 NOTE — BRIEF OPERATIVE NOTE - PRIMARY SURGEON
Anna MRI resulted, no acute infarction.  Note small basal ganglia remote deep infarctions.  Scattered cerebral hemispheric white matter lesions, unchanged from 10/23.  Will await neuro final recs regarding MRI.  EP SYED Hernandez made aware of need to interrogate loop recorder. - Joesph Bailey PA-C Patient evaluated by stroke attending Dr. Gregg who reviewed MRI, advised cleared for discharge from stroke standpoint, however advised if internal loop interrogation shows A-fib would stop antiplatelets and start DOAC.  Patient is currently awaiting ILR interrogation. - Joesph Bailey PA-C MRI resulted, no acute infarction.  Note small basal ganglia remote deep infarctions.  Scattered cerebral hemispheric white matter lesions, unchanged from 10/23.  Will await neuro final recs regarding MRI.  EP SYED Hernandez made aware of need to interrogate loop recorder. Patient reassessed at bedside, feels well, symptoms completely resolved. CN II-XII intact, no focal sensory/motor deficits. - Joesph Bailey PA-C EP interrogated ILR, no A-fib events at all.  Neurologist Dr. Gregg made aware of this, will keep on DAPT at this time, will not switch to Eliquis given no A-fib. Case discussed with ED attending Dr. Anglin, stable for discharge home.  Results of all lab work and imaging were discussed with patient at bedside.  She is aware of need to follow-up with neurology and PMD as outpatient.  Additionally made aware of CT findings questioning periodontal disease and dental recs for outpatient follow-up, states she has dentistry plans to follow-up with her in upcoming weeks.  All questions answered, stable for discharge home. - Joesph Bailey PA-C

## 2024-03-26 ENCOUNTER — APPOINTMENT (OUTPATIENT)
Dept: NEPHROLOGY | Facility: CLINIC | Age: 69
End: 2024-03-26
Payer: MEDICARE

## 2024-03-26 VITALS
TEMPERATURE: 97.1 F | HEART RATE: 79 BPM | OXYGEN SATURATION: 98 % | DIASTOLIC BLOOD PRESSURE: 91 MMHG | HEIGHT: 70 IN | SYSTOLIC BLOOD PRESSURE: 190 MMHG

## 2024-03-26 VITALS — SYSTOLIC BLOOD PRESSURE: 152 MMHG | DIASTOLIC BLOOD PRESSURE: 80 MMHG

## 2024-03-26 DIAGNOSIS — E87.20 ACIDOSIS, UNSPECIFIED: ICD-10-CM

## 2024-03-26 DIAGNOSIS — I10 ESSENTIAL (PRIMARY) HYPERTENSION: ICD-10-CM

## 2024-03-26 DIAGNOSIS — N18.4 CHRONIC KIDNEY DISEASE, STAGE 4 (SEVERE): ICD-10-CM

## 2024-03-26 PROCEDURE — G2211 COMPLEX E/M VISIT ADD ON: CPT

## 2024-03-26 PROCEDURE — 99215 OFFICE O/P EST HI 40 MIN: CPT

## 2024-03-26 NOTE — PHYSICAL EXAM
[General Appearance - Alert] : alert [General Appearance - In No Acute Distress] : in no acute distress [Sclera] : the sclera and conjunctiva were normal [Extraocular Movements] : extraocular movements were intact [PERRL With Normal Accommodation] : pupils were equal in size, round, and reactive to light [Outer Ear] : the ears and nose were normal in appearance [Oropharynx] : the oropharynx was normal [Neck Appearance] : the appearance of the neck was normal [Jugular Venous Distention Increased] : there was no jugular-venous distention [Neck Cervical Mass (___cm)] : no neck mass was observed [Thyroid Diffuse Enlargement] : the thyroid was not enlarged [Thyroid Nodule] : there were no palpable thyroid nodules [Auscultation Breath Sounds / Voice Sounds] : lungs were clear to auscultation bilaterally [Heart Sounds] : normal S1 and S2 [Heart Rate And Rhythm] : heart rate was normal and rhythm regular [Murmurs] : no murmurs [Heart Sounds Gallop] : no gallops [Heart Sounds Pericardial Friction Rub] : no pericardial rub [Full Pulse] : the pedal pulses are present [Edema] : there was no peripheral edema [Bowel Sounds] : normal bowel sounds [Abdomen Soft] : soft [Abdomen Tenderness] : non-tender [Abdomen Mass (___ Cm)] : no abdominal mass palpated [Nail Clubbing] : no clubbing  or cyanosis of the fingernails [Abnormal Walk] : normal gait [Motor Tone] : muscle strength and tone were normal [Musculoskeletal - Swelling] : no joint swelling seen [Skin Color & Pigmentation] : normal skin color and pigmentation [Skin Turgor] : normal skin turgor [] : no rash [Deep Tendon Reflexes (DTR)] : deep tendon reflexes were 2+ and symmetric [No Focal Deficits] : no focal deficits [Sensation] : the sensory exam was normal to light touch and pinprick [Oriented To Time, Place, And Person] : oriented to person, place, and time [Impaired Insight] : insight and judgment were intact [Affect] : the affect was normal

## 2024-03-26 NOTE — HISTORY OF PRESENT ILLNESS
[FreeTextEntry1] : 69 M s/p vasectomy and ileoconduit from high grade TCC, prostate cancer s/p prostatectomy on Xtandi with CKD 4/5. Patient also has HTN, prediabetes. He had AVF surgery with Dr. Obando in November 2023 and had a follow up with him a month later and he has a good thrill and flow.  Currently he feels well. His appetite is good, he says he is eating too much. He has no "kidney pain." He comes today with his girlfriend and plans to go away to [place] within the next two months. He states that his BP is very high today because he is extremely anxious and did not sleep all night bc he is worried about dialysis.  He is on sodium bicarb tabs and he is on it TID. He is also taking Vit D 50 k q wk. He has been taking it. No leg swelling. No shortness of breath. He has no blood in the urine. No headaches or dizziness.

## 2024-03-26 NOTE — PLAN
[TextEntry] : CKD 4/5- discussed that if patient has uremic symptoms will need to consider starting HD however he does not have any right now. Will check labs today. Discussed enrollment in HT program however patient is resistant. Will discuss with HT RNs.  Will call pt with results. Cont current meds for now, BP and volume status controlled. Follow up in ~8 wks

## 2024-03-27 LAB
25(OH)D3 SERPL-MCNC: 63.3 NG/ML
ALBUMIN SERPL ELPH-MCNC: 4.1 G/DL
ANION GAP SERPL CALC-SCNC: 16 MMOL/L
BASOPHILS # BLD AUTO: 0.04 K/UL
BASOPHILS NFR BLD AUTO: 0.4 %
BUN SERPL-MCNC: 50 MG/DL
CALCIUM SERPL-MCNC: 10.3 MG/DL
CALCIUM SERPL-MCNC: 10.3 MG/DL
CHLORIDE SERPL-SCNC: 106 MMOL/L
CO2 SERPL-SCNC: 19 MMOL/L
CREAT SERPL-MCNC: 3.84 MG/DL
EGFR: 16 ML/MIN/1.73M2
EOSINOPHIL # BLD AUTO: 0.24 K/UL
EOSINOPHIL NFR BLD AUTO: 2.3 %
GLUCOSE SERPL-MCNC: 107 MG/DL
HCT VFR BLD CALC: 39.7 %
HGB BLD-MCNC: 12.6 G/DL
IMM GRANULOCYTES NFR BLD AUTO: 0.9 %
LYMPHOCYTES # BLD AUTO: 1.73 K/UL
LYMPHOCYTES NFR BLD AUTO: 16.5 %
MAN DIFF?: NORMAL
MCHC RBC-ENTMCNC: 30.4 PG
MCHC RBC-ENTMCNC: 31.7 GM/DL
MCV RBC AUTO: 95.7 FL
MONOCYTES # BLD AUTO: 0.98 K/UL
MONOCYTES NFR BLD AUTO: 9.4 %
NEUTROPHILS # BLD AUTO: 7.38 K/UL
NEUTROPHILS NFR BLD AUTO: 70.5 %
PARATHYROID HORMONE INTACT: 208 PG/ML
PHOSPHATE SERPL-MCNC: 3.6 MG/DL
PLATELET # BLD AUTO: 245 K/UL
POTASSIUM SERPL-SCNC: 5 MMOL/L
RBC # BLD: 4.15 M/UL
RBC # FLD: 15.9 %
SODIUM SERPL-SCNC: 140 MMOL/L
WBC # FLD AUTO: 10.46 K/UL

## 2024-04-02 NOTE — ED ADULT NURSE NOTE - NS PRO AD PATIENT TYPE
Daughter called in to follow up on the patient's request from last week to have either Dr. Mcgowan or an AP reach out to review the diff intervention options with daughter.    Daughter's number -[phone]   Health Care Proxy (HCP)

## 2024-05-13 RX ORDER — SODIUM BICARBONATE 650 MG/1
650 TABLET ORAL 3 TIMES DAILY
Qty: 180 | Refills: 5 | Status: ACTIVE | COMMUNITY
Start: 2021-11-23 | End: 1900-01-01

## 2024-06-04 ENCOUNTER — APPOINTMENT (OUTPATIENT)
Dept: VASCULAR SURGERY | Facility: CLINIC | Age: 69
End: 2024-06-04
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
## 2024-06-19 ENCOUNTER — OUTPATIENT (OUTPATIENT)
Dept: OUTPATIENT SERVICES | Facility: HOSPITAL | Age: 69
LOS: 1 days | End: 2024-06-19
Payer: MEDICARE

## 2024-06-19 ENCOUNTER — APPOINTMENT (OUTPATIENT)
Dept: UROLOGY | Facility: CLINIC | Age: 69
End: 2024-06-19
Payer: MEDICARE

## 2024-06-19 VITALS — SYSTOLIC BLOOD PRESSURE: 160 MMHG | DIASTOLIC BLOOD PRESSURE: 80 MMHG | HEART RATE: 72 BPM

## 2024-06-19 DIAGNOSIS — Z98.89 OTHER SPECIFIED POSTPROCEDURAL STATES: Chronic | ICD-10-CM

## 2024-06-19 DIAGNOSIS — Z93.6 OTHER ARTIFICIAL OPENINGS OF URINARY TRACT STATUS: Chronic | ICD-10-CM

## 2024-06-19 DIAGNOSIS — N99.89 OTHER POSTPROCEDURAL COMPLICATIONS AND DISORDERS OF GENITOURINARY SYSTEM: ICD-10-CM

## 2024-06-19 DIAGNOSIS — N32.9 BLADDER DISORDER, UNSPECIFIED: Chronic | ICD-10-CM

## 2024-06-19 DIAGNOSIS — Z93.59 OTHER CYSTOSTOMY STATUS: Chronic | ICD-10-CM

## 2024-06-19 DIAGNOSIS — Z85.51 PERSONAL HISTORY OF MALIGNANT NEOPLASM OF BLADDER: Chronic | ICD-10-CM

## 2024-06-19 DIAGNOSIS — R35.0 FREQUENCY OF MICTURITION: ICD-10-CM

## 2024-06-19 PROCEDURE — 50435 EXCHANGE NEPHROSTOMY CATH: CPT | Mod: 50

## 2024-06-19 PROCEDURE — 50435 EXCHANGE NEPHROSTOMY CATH: CPT

## 2024-06-19 RX ORDER — ENZALUTAMIDE 40 MG/1
40 CAPSULE ORAL DAILY
Qty: 360 | Refills: 3 | Status: ACTIVE | COMMUNITY
Start: 2019-04-08 | End: 1900-01-01

## 2024-06-20 DIAGNOSIS — Z93.6 OTHER ARTIFICIAL OPENINGS OF URINARY TRACT STATUS: ICD-10-CM

## 2024-06-20 DIAGNOSIS — N99.89 OTHER POSTPROCEDURAL COMPLICATIONS AND DISORDERS OF GENITOURINARY SYSTEM: ICD-10-CM

## 2024-06-21 ENCOUNTER — APPOINTMENT (OUTPATIENT)
Age: 69
End: 2024-06-21
Payer: MEDICARE

## 2024-06-21 DIAGNOSIS — Z93.6 OTHER ARTIFICIAL OPENINGS OF URINARY TRACT STATUS: ICD-10-CM

## 2024-06-21 DIAGNOSIS — N13.30 UNSPECIFIED HYDRONEPHROSIS: ICD-10-CM

## 2024-06-21 DIAGNOSIS — C67.9 MALIGNANT NEOPLASM OF BLADDER, UNSPECIFIED: ICD-10-CM

## 2024-06-21 DIAGNOSIS — C61 MALIGNANT NEOPLASM OF PROSTATE: ICD-10-CM

## 2024-06-21 PROCEDURE — 99215 OFFICE O/P EST HI 40 MIN: CPT | Mod: 25

## 2024-06-21 PROCEDURE — 96402 CHEMO HORMON ANTINEOPL SQ/IM: CPT

## 2024-06-21 NOTE — PHYSICAL EXAM
[Normal Appearance] : normal appearance [Well Groomed] : well groomed [General Appearance - In No Acute Distress] : no acute distress [Edema] : no peripheral edema [Respiration, Rhythm And Depth] : normal respiratory rhythm and effort [Exaggerated Use Of Accessory Muscles For Inspiration] : no accessory muscle use [Abdomen Soft] : soft [Abdomen Tenderness] : non-tender [Costovertebral Angle Tenderness] : no ~M costovertebral angle tenderness [] : no rash [No Focal Deficits] : no focal deficits [Oriented To Time, Place, And Person] : oriented to person, place, and time [Affect] : the affect was normal [Mood] : the mood was normal [de-identified] : stoma lower right quadrant

## 2024-06-21 NOTE — ASSESSMENT
[FreeTextEntry1] : 68 yo M with complicated  history as above  - Spent extensive period of time reviewing chart including previous notes by Dr. Esteban - Last PSA was <0.01in Nov 2023 - Reviewed options with pt. Pt plans to continue neph tube exchange with Dr. Hoenig - Will continue eligard injection every 4months at this office - Continue xtandi

## 2024-06-21 NOTE — HISTORY OF PRESENT ILLNESS
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 Esteban had been managing pt but pt had been receiving Eligard injections from our office due to insurance and article 28 issues  Today pt states he no longer plans to go back to Dr. Esteban - upset that his neph tube exchange was delayed last year Tubes were last changed by Dr. Hoenig two days ago Was also able to get refills for xtandi from Dr. Hoenig

## 2024-06-28 ENCOUNTER — APPOINTMENT (OUTPATIENT)
Dept: NEPHROLOGY | Facility: CLINIC | Age: 69
End: 2024-06-28

## 2024-07-17 DIAGNOSIS — N18.4 CHRONIC KIDNEY DISEASE, STAGE 4 (SEVERE): ICD-10-CM

## 2024-07-18 ENCOUNTER — LABORATORY RESULT (OUTPATIENT)
Age: 69
End: 2024-07-18

## 2024-07-18 ENCOUNTER — APPOINTMENT (OUTPATIENT)
Dept: FAMILY MEDICINE | Facility: CLINIC | Age: 69
End: 2024-07-18

## 2024-07-19 ENCOUNTER — LABORATORY RESULT (OUTPATIENT)
Age: 69
End: 2024-07-19

## 2024-08-27 ENCOUNTER — APPOINTMENT (OUTPATIENT)
Dept: NEPHROLOGY | Facility: CLINIC | Age: 69
End: 2024-08-27
Payer: MEDICARE

## 2024-08-27 VITALS
TEMPERATURE: 96.5 F | HEART RATE: 71 BPM | DIASTOLIC BLOOD PRESSURE: 71 MMHG | OXYGEN SATURATION: 96 % | SYSTOLIC BLOOD PRESSURE: 168 MMHG

## 2024-08-27 VITALS — DIASTOLIC BLOOD PRESSURE: 90 MMHG | SYSTOLIC BLOOD PRESSURE: 160 MMHG

## 2024-08-27 DIAGNOSIS — D63.1 CHRONIC KIDNEY DISEASE, UNSPECIFIED: ICD-10-CM

## 2024-08-27 DIAGNOSIS — N18.4 CHRONIC KIDNEY DISEASE, STAGE 4 (SEVERE): ICD-10-CM

## 2024-08-27 DIAGNOSIS — M25.473 EFFUSION, UNSPECIFIED ANKLE: ICD-10-CM

## 2024-08-27 DIAGNOSIS — N18.9 CHRONIC KIDNEY DISEASE, UNSPECIFIED: ICD-10-CM

## 2024-08-27 DIAGNOSIS — E87.20 ACIDOSIS, UNSPECIFIED: ICD-10-CM

## 2024-08-27 DIAGNOSIS — I10 ESSENTIAL (PRIMARY) HYPERTENSION: ICD-10-CM

## 2024-08-27 PROCEDURE — G2211 COMPLEX E/M VISIT ADD ON: CPT

## 2024-08-27 PROCEDURE — 99215 OFFICE O/P EST HI 40 MIN: CPT

## 2024-08-27 NOTE — PLAN
[TextEntry] : CKD 4/5- discussed that if patient has uremic symptoms will need to consider starting HD however he does not have any right now. Will check labs today. Discussed enrollment in HT program however patient was previously resistant. Will discuss with HT RNs.  Will call pt with results. Cont current meds for now but renew furosemide as he has edema.  Anemia seen on last labs Hgb 9, will repeat today and plan for EPO.  Follow up in ~6-8 wks

## 2024-08-27 NOTE — REASON FOR VISIT
[address] [facility] [place]    Date: 4/3/2022       Time: 6:50 PM   Patient Name: Yevgeniy Biswas     Patient : 1995  Room/Bed: 5326/3524-22    Admission Date/Time: 4/3/2022  6:16 PM      CC: IOL 2/2 FGR     HPI: Yevgeniy Biswas is a 31 y.o. [id number] at 36w4d who presents from home for a scheduled IOL 2/2 FGR based on AC < 3%. The patient reports fetal movement is present, denies contractions, denies loss of fluid, denies vaginal bleeding. She denies HA, vision changes, SOB, chest pain, Lightheadedness, dizziness, nausea, vomiting. DATING:  LMP: No LMP recorded (lmp unknown). Patient is pregnant.   Estimated Date of Delivery: 22   Based on: early ultrasound, at 8 5/7 weeks GA    PREGNANCY RISK FACTORS:  Patient Active Problem List   Diagnosis    Asthma     Hx Aortic Stenosis    Axillary hidradenitis suppurativa    Hx sPTD x 2    Dysmenorrhea    Hx gHTN (G3)    FHx of aortic stenosis (G3)      COVID vaccinated    FGR    Fetal club feet    Declining 1hr GTT        Steroids Given In This Pregnancy:  no     REVIEW OF SYSTEMS:   Constitutional: negative fever, negative chills, negative weight changes   HEENT: negative visual disturbances, negative headaches, negative dizziness, negative hearing loss  Breast: Negative breast abnormalities, negative breast lumps, negative nipple discharge  Respiratory: negative dyspnea, negative cough, negative SOB  Cardiovascular: negative chest pain,  negative palpitations, negative arrhythmia, negative syncope   Gastrointestinal: negative abdominal pain, negative RUQ pain, negative N/V, negative diarrhea, negative constipation, negative bowel changes, negative heartburn   Genitourinary: negative dysuria, negative hematuria, negative urinary incontinence, negative vaginal discharge, negative vaginal bleeding or spotting  Dermatological: negative rash, negative pruritis, negative mole or other skin changes  Hematologic: negative bruising  Immunologic/Lymphatic: negative recent illness, negative recent sick contact  Musculoskeletal: negative back pain, negative myalgias, negative arthralgias  Neurological:  negative dizziness, negative migraines, negative seizures, negative weakness  Behavior/Psych: negative depression, negative anxiety, negative SI, negative HI    OBSTETRICAL HISTORY:   OB History    Para Term  AB Living   4 3 1 2 0 3   SAB IAB Ectopic Molar Multiple Live Births   0 0 0 0 0 3      # Outcome Date GA Lbr Filipe/2nd Weight Sex Delivery Anes PTL Lv   4 Current            3 Term 06/10/20 38w1d  6 lb 8.2 oz (2.955 kg) F Vag-Spont EPI N VANITA      Name: [facility]: [address] [place] [address] [place]: 9   2   32w0d  4 lb 3 oz (1.899 kg) M Vag-Spont         Birth Comments: [facility]   1   35w0d  5 lb 1 oz (2.296 kg) F Vag-Spont EPI Y VANITA      Birth Comments: this delivery       Apgar1: 7  Apgar5: 8      Obstetric Comments   G1-    G2-  - Progesterone in this preg   G3- Progesterone in this preg. Injectable Progesterone with [facility] home [facility]. Same partner for all preg. PAST MEDICAL HISTORY:   has a past medical history of ACL injury tear, Asthma, Chronic airway disease, Dental crowns present, Gestational HTN--G3, Hidradenitis axillaris, History of  labor, Hydradenitis, Left ankle sprain, Maternal congenital heart disease, antepartum, Obesity,  w/ MIrena IUD 6/10/20 F APG 8/9 Wt 6#8, and Wears glasses. PAST SURGICAL HISTORY:   has a past surgical history that includes Tonsillectomy and adenoidectomy and other surgical history (Bilateral, 2016). ALLERGIES:  is allergic to lidocaine. MEDICATIONS:  Prior to Admission medications    Medication Sig Start Date End Date Taking?  Authorizing Provider   HYDROXYprogesterone caproate 250 MG/ML OIL oil injection Inject 250 mg into the muscle every 7 days  Patient not taking: Reported on 3/25/2022 10/29/21   Historical Provider, MD   fluticasone (FLONASE) 50 MCG/ACT nasal spray instill 2 sprays into each nostril once daily  Patient not taking: Reported on 3/7/2022 8/9/21   Historical Provider, MD   aspirin EC 81 MG EC tablet Take 1 tablet by mouth daily 9/27/21   Angelo Rbeollar DO   Prenatal Multivit-Min-Fe-FA (PRENATAL FORTE) TABS Take 1 tablet by mouth Daily May substitute with any prenatal vit pt insurance will cover 9/14/21 9/14/22  Maria Del Carmen Delvalle MD   acetaminophen (TYLENOL) 325 MG tablet Take 1 tablet by mouth every 4 hours as needed for Pain  Patient not taking: Reported on 4/3/2022 12/4/20   Margaret Coles MD   selenium sulfide (SELSUN) 2.5 % lotion Apply topically daily as needed for Itching Apply topically daily as needed. Historical Provider, MD       FAMILY HISTORY:  family history includes Cancer in her maternal grandmother; Diabetes in her maternal grandmother and mother; Early Death (age of onset: 62) in her paternal uncle; Heart Disease in her paternal uncle; Learning Disabilities in her mother; [place] in her maternal grandfather; No Known Problems in her brother, father, paternal grandfather, paternal grandmother, and sister; Other in her brother. SOCIAL HISTORY:   reports that she has never smoked. She has never used smokeless tobacco. She reports that she does not drink alcohol and does not use drugs.     VITALS:  Vitals:    04/03/22 1831 04/03/22 1833   BP: 131/66    Pulse: 93    Resp: 16    Temp: 97.9 °F (36.6 °C)    TempSrc: Oral    SpO2: 95%    Weight:  239 lb (108.4 kg)   Height:  5' 4\" (1.626 m)         PHYSICAL EXAM:  Fetal Heart Monitor:  Baseline Heart Rate 145, moderate variability, present accelerations, one variable deceleration on arrival otherwise no other decelerations  [place]: contractions, none    General appearance:  no apparent distress alert and cooperative  HEENT: head atraumatic, normocephalic, moist mucous membranes, trachea midline  Neurologic: alert, oriented, normal speech, no focal findings or movement disorder noted  Lungs:  No increased work of breathing, good air exchange, clear to auscultation bilaterally, no crackles or wheezing  Heart:  regular rate and rhythm and no murmur, rubs, gallops  Abdomen:  soft, gravid, non-tender, no rebound, guarding, or rigidity, no RUQ or epigastric tenderness, no signs or symptoms of abruption, no signs or symptoms of chorioamnionitis  Extremities:  no calf tenderness, non edematous, no varicosities, full range of motion in all four extremities  Musculoskeletal: Gross strength equal and intact throughout, no gross abnormalities, range of motion normal in hips, knees, shoulders and spine, CVA tenderness: none  Psychiatric: Mood appropriate, normal affect   Rectal Exam: not indicated    Pelvic Exam:   Chaperone for Intimate Exam: Chaperone was present for entire exam, Chaperone Name: [facility] ([facility])  Sterile Vaginal Exam:  Cervix: 1-2 cm dilated, 30 % effaced, -3 station, posterior position (out of 3 station), soft consistency, FETAL POSITION: Cephalic (confirmed by ultrasound), Membranes intact,    Bishops Score: 3     0 1 2 3   Position Posterior Intermediate Anterior -   Consistency Firm Intermediate Soft -   Effacement 0-30% 31-50% 51-80% >80%   Dilation 0cm 1-2cm 3-4cm >5cm   Fetal Station -3 -2 -1, 0 +1, +2       LIMITED BEDSIDE US:  Position: Cephalic  Placental Location: anterior  Fetal Heart Tones: Present  Fetal Movement: Present  Amniotic Fluid Index/Volume:  adequate 2x2 cm fluid pocket  Estimated Fetal Weight:  6 lbs 5oz    PRENATAL LAB RESULTS:   Blood Type/Rh: O pos  Antibody Screen: negative  Hemoglobin, Hematocrit, Platelets: 10/81.9/823  Rubella: immune  T.  Pallidum, IgG: non-reactive  Hepatitis B Surface Antigen: non-reactive   Hepatitis C Antibody: non-reactive   HIV: negative   Sickle Cell Screen: not available  Gonorrhea: negative  Chlamydia: negative  Urine culture: negative, date: 9/7/21    Early 1 hour Glucose Tolerance Test: not done  Early 3 hour Glucose Tolerance Test: not done  1 hour Glucose Tolerance Test: not done  3 hour Glucose Tolerance Test: not done    Group B Strep: positive  Cystic Fibrosis Screen: not done  First Trimester Screen: low risk for aneuploidy  MSAFP/Multiple Markers: low risk for aneuploidy  Non-Invasive Prenatal Testing: low risk for aneuploidy  Anatomy US: anterior placenta, 3VC, female gender, bilateral club feet otherwise normal anatomy    ASSESSMENT & PLAN:  Jorge Ryan is a 31 y.o. female [id number] at 38w3d IUP   - GBS positive / Rh positive / R immune   - Pen G for GBS prophylaxis     Scheduled IOL 2/2 FGR (Ac< 3)    - Admit to labor and delivery under the service of Dr. Neymar Molina   - VSS    - cEFM and TOCO   - Cat 1 FHT and TOCO showing no contractions   - CBC, T&S, T.Pal ordered   - UDS ordered.  R/B/A discussed with patient and patient agreeable   - IVF: LR @ 125 ml/hr    - Plan of Induction: Cytotec 25 PO x 1   - Continue expectant management     Hx sPTD x 2   - 2020 and 2015   - Was on Doris Progesterone shots this pregnancy    Hx gTHN (G3)   - BP normotensive   - Denies s/s of pre E    Hx Aortic Stenosis   - Saw cardiology 3/20 with echo that showed EF of 55%- normal LV diastolic compliance, no significant valvular abnormalities    - Echo ordered for February of this year however patient never got it done   - Patient non compliant with cardiology follow up    FHx Aortic Stenosis   - G3 daughter   - Fetal echo wnl this pregnancy    FGR   - Abdominal circumference under the 3 %   - Has been following with [facility]    Bilateral Fetal Club Feet   - Noted on anatomy scan    Dyesmenorrhea   - Was previously on Depo    Non compliant   - Never followed up with cardiology or echo    - Never completed glucose testing   - States that she showed someone her sugars and they were fine    Axillary Hidradenitis Suppurativa   - No current concerns    Asthma   - Controlled no medications    BMI 41    Patient Active Problem List    Diagnosis Date Noted    Asthma  2012     Priority: High     Pt did not report using inhalers at intake-[facility] 2022     AC 8%ile 22  AC under 3% 22    Needs delivered at 38 and 0  Needs weekly NST   Pt scheduled for weekly BPP with [facility]      Fetal club feet 2022    Declining 1hr GTT 2022     Patient states unable to complete 1hr GTT, supplies ordered to check blood sugars for two weeks  Patient states that she was told her sugars are wnl and threw away the sheets      COVID vaccinated 2021    FHx of aortic stenosis (G3)   2021     Pt daughter born in . Small defect, no interventions  Fetal echo wnl      Hx gHTN (G3) 2020    Dysmenorrhea 2020     History of management with Depo       Hx Aortic Stenosis 2019     Arotic Stenosis. 3/24/20- saw Dr Laurence Manriquez at [facility], echo done showing EF of 55%, normal LV diastolic compliance, no significant valvular abnormalities   Pt has f/u appt in 2020, Pt completed appt, to follow up as needed-[facility]      Axillary hidradenitis suppurativa 2019    Hx sPTD x 2 2019 and   ( progesterone in  )   17P Plans/Education and Counseled-  Labor Definition and Warning Signs, What is 17P and  Common side effects of 17P injections,  [facility] with [facility]. 21- GA 9w5d  Plan for referral to [facility] accordingly. Plan to obtain progesterone from [place] accordingly closer to 16 weeks of pregnancy   [facility] placed referral. Pt started injections 10/29/21 with [facility]         Plan discussed with Dr. Sushma Yan, who is agreeable. Steroids given this admission: No    Risks, benefits, alternatives and possible complications have been discussed in detail with the patient. Admission, and post admission procedures and expectations were discussed in detail. All questions were answered.     Attending's Name:  [facility],   Ob/Gyn Resident  4/3/2022, 6:50 PM [Follow-Up] : a follow-up visit

## 2024-08-27 NOTE — HISTORY OF PRESENT ILLNESS
[FreeTextEntry1] : 69 M s/p vasectomy and ileoconduit from high grade TCC, prostate cancer s/p prostatectomy on Xtandi with CKD 4/5. Patient also has HTN, prediabetes. He had AVF surgery with Dr. Obando in November 2023 and had a follow up with him a month later and he has a good thrill and flow.  Currently he feels well. His appetite is good, he says he is eating too much. He has not taken furosemide in a while because he ran out and didn't call for refills.   He is on sodium bicarb tabs and he is on it TID.  He has been taking it. No leg swelling. No shortness of breath. He has no blood in the urine. No headaches or dizziness.

## 2024-08-27 NOTE — PHYSICAL EXAM
[General Appearance - Alert] : alert [General Appearance - In No Acute Distress] : in no acute distress [Sclera] : the sclera and conjunctiva were normal [PERRL With Normal Accommodation] : pupils were equal in size, round, and reactive to light [Extraocular Movements] : extraocular movements were intact [Outer Ear] : the ears and nose were normal in appearance [Oropharynx] : the oropharynx was normal [Neck Appearance] : the appearance of the neck was normal [Neck Cervical Mass (___cm)] : no neck mass was observed [Jugular Venous Distention Increased] : there was no jugular-venous distention [Thyroid Diffuse Enlargement] : the thyroid was not enlarged [Thyroid Nodule] : there were no palpable thyroid nodules [Auscultation Breath Sounds / Voice Sounds] : lungs were clear to auscultation bilaterally [Heart Rate And Rhythm] : heart rate was normal and rhythm regular [Heart Sounds] : normal S1 and S2 [Heart Sounds Gallop] : no gallops [Murmurs] : no murmurs [Heart Sounds Pericardial Friction Rub] : no pericardial rub [Full Pulse] : the pedal pulses are present [Pitting Edema] : pitting edema present [___ +] : bilateral [unfilled]+ pretibial pitting edema [Bowel Sounds] : normal bowel sounds [Abdomen Soft] : soft [Abdomen Tenderness] : non-tender [Abdomen Mass (___ Cm)] : no abdominal mass palpated [Abnormal Walk] : normal gait [Nail Clubbing] : no clubbing  or cyanosis of the fingernails [Musculoskeletal - Swelling] : no joint swelling seen [Motor Tone] : muscle strength and tone were normal [Skin Color & Pigmentation] : normal skin color and pigmentation [Skin Turgor] : normal skin turgor [] : no rash [Deep Tendon Reflexes (DTR)] : deep tendon reflexes were 2+ and symmetric [Sensation] : the sensory exam was normal to light touch and pinprick [No Focal Deficits] : no focal deficits [Oriented To Time, Place, And Person] : oriented to person, place, and time [Impaired Insight] : insight and judgment were intact [Affect] : the affect was normal

## 2024-08-27 NOTE — CURRENT MEDS
[TextEntry] : Amlodipine 5mg BID Klonipin Trazodone calcitriol 0.25mcg po qod Sodium bicarb tabs TID Xtandi Furosemide 40mg po qod

## 2024-09-04 LAB
25(OH)D3 SERPL-MCNC: 51.8 NG/ML
ALBUMIN SERPL ELPH-MCNC: 3.8 G/DL
ANION GAP SERPL CALC-SCNC: 12 MMOL/L
BASOPHILS # BLD AUTO: 0.03 K/UL
BASOPHILS NFR BLD AUTO: 0.3 %
BUN SERPL-MCNC: 43 MG/DL
CALCIUM SERPL-MCNC: 9.8 MG/DL
CALCIUM SERPL-MCNC: 9.8 MG/DL
CHLORIDE SERPL-SCNC: 106 MMOL/L
CO2 SERPL-SCNC: 18 MMOL/L
CREAT SERPL-MCNC: 3.71 MG/DL
EGFR: 17 ML/MIN/1.73M2
EOSINOPHIL # BLD AUTO: 0.26 K/UL
EOSINOPHIL NFR BLD AUTO: 2.7 %
GLUCOSE SERPL-MCNC: 138 MG/DL
HCT VFR BLD CALC: 34 %
HGB BLD-MCNC: 10.6 G/DL
IMM GRANULOCYTES NFR BLD AUTO: 0.6 %
LYMPHOCYTES # BLD AUTO: 1.45 K/UL
LYMPHOCYTES NFR BLD AUTO: 15.2 %
MAN DIFF?: NORMAL
MCHC RBC-ENTMCNC: 30.2 PG
MCHC RBC-ENTMCNC: 31.2 GM/DL
MCV RBC AUTO: 96.9 FL
MONOCYTES # BLD AUTO: 0.81 K/UL
MONOCYTES NFR BLD AUTO: 8.5 %
NEUTROPHILS # BLD AUTO: 6.95 K/UL
NEUTROPHILS NFR BLD AUTO: 72.7 %
PARATHYROID HORMONE INTACT: 156 PG/ML
PHOSPHATE SERPL-MCNC: 3.3 MG/DL
PLATELET # BLD AUTO: 204 K/UL
POTASSIUM SERPL-SCNC: 4.9 MMOL/L
RBC # BLD: 3.51 M/UL
RBC # FLD: 13.9 %
SODIUM SERPL-SCNC: 136 MMOL/L
WBC # FLD AUTO: 9.56 K/UL

## 2024-09-20 ENCOUNTER — APPOINTMENT (OUTPATIENT)
Dept: UROLOGY | Facility: CLINIC | Age: 69
End: 2024-09-20
Payer: MEDICARE

## 2024-09-20 ENCOUNTER — OUTPATIENT (OUTPATIENT)
Dept: OUTPATIENT SERVICES | Facility: HOSPITAL | Age: 69
LOS: 1 days | End: 2024-09-20
Payer: MEDICARE

## 2024-09-20 DIAGNOSIS — Z98.89 OTHER SPECIFIED POSTPROCEDURAL STATES: Chronic | ICD-10-CM

## 2024-09-20 DIAGNOSIS — Z93.6 OTHER ARTIFICIAL OPENINGS OF URINARY TRACT STATUS: ICD-10-CM

## 2024-09-20 DIAGNOSIS — C67.9 MALIGNANT NEOPLASM OF BLADDER, UNSPECIFIED: ICD-10-CM

## 2024-09-20 DIAGNOSIS — Z85.51 PERSONAL HISTORY OF MALIGNANT NEOPLASM OF BLADDER: Chronic | ICD-10-CM

## 2024-09-20 DIAGNOSIS — Z93.6 OTHER ARTIFICIAL OPENINGS OF URINARY TRACT STATUS: Chronic | ICD-10-CM

## 2024-09-20 DIAGNOSIS — R35.0 FREQUENCY OF MICTURITION: ICD-10-CM

## 2024-09-20 DIAGNOSIS — N99.89 OTHER POSTPROCEDURAL COMPLICATIONS AND DISORDERS OF GENITOURINARY SYSTEM: ICD-10-CM

## 2024-09-20 DIAGNOSIS — N32.9 BLADDER DISORDER, UNSPECIFIED: Chronic | ICD-10-CM

## 2024-09-20 DIAGNOSIS — Z93.59 OTHER CYSTOSTOMY STATUS: Chronic | ICD-10-CM

## 2024-09-20 DIAGNOSIS — C61 MALIGNANT NEOPLASM OF PROSTATE: ICD-10-CM

## 2024-09-20 PROCEDURE — 50435 EXCHANGE NEPHROSTOMY CATH: CPT | Mod: 50

## 2024-09-20 PROCEDURE — 50435 EXCHANGE NEPHROSTOMY CATH: CPT

## 2024-09-23 DIAGNOSIS — C67.9 MALIGNANT NEOPLASM OF BLADDER, UNSPECIFIED: ICD-10-CM

## 2024-09-23 DIAGNOSIS — Z93.6 OTHER ARTIFICIAL OPENINGS OF URINARY TRACT STATUS: ICD-10-CM

## 2024-09-23 DIAGNOSIS — N99.89 OTHER POSTPROCEDURAL COMPLICATIONS AND DISORDERS OF GENITOURINARY SYSTEM: ICD-10-CM

## 2024-09-23 DIAGNOSIS — C61 MALIGNANT NEOPLASM OF PROSTATE: ICD-10-CM

## 2024-09-23 LAB
ANION GAP SERPL CALC-SCNC: 15 MMOL/L
BASOPHILS # BLD AUTO: 0.01 K/UL
BASOPHILS NFR BLD AUTO: 0.1 %
BUN SERPL-MCNC: 47 MG/DL
CALCIUM SERPL-MCNC: 9.7 MG/DL
CHLORIDE SERPL-SCNC: 106 MMOL/L
CO2 SERPL-SCNC: 18 MMOL/L
CREAT SERPL-MCNC: 4.66 MG/DL
EGFR: 13 ML/MIN/1.73M2
EOSINOPHIL # BLD AUTO: 0.01 K/UL
EOSINOPHIL NFR BLD AUTO: 0.1 %
GLUCOSE SERPL-MCNC: 142 MG/DL
HCT VFR BLD CALC: 34.2 %
HGB BLD-MCNC: 11.1 G/DL
IMM GRANULOCYTES NFR BLD AUTO: 1 %
LYMPHOCYTES # BLD AUTO: 0.64 K/UL
LYMPHOCYTES NFR BLD AUTO: 8.7 %
MAN DIFF?: NORMAL
MCHC RBC-ENTMCNC: 29.8 PG
MCHC RBC-ENTMCNC: 32.5 GM/DL
MCV RBC AUTO: 91.9 FL
MONOCYTES # BLD AUTO: 0.6 K/UL
MONOCYTES NFR BLD AUTO: 8.2 %
NEUTROPHILS # BLD AUTO: 5.99 K/UL
NEUTROPHILS NFR BLD AUTO: 81.9 %
PLATELET # BLD AUTO: 165 K/UL
POTASSIUM SERPL-SCNC: 4.8 MMOL/L
PSA SERPL-MCNC: <0.01 NG/ML
RBC # BLD: 3.72 M/UL
RBC # FLD: 13.3 %
SODIUM SERPL-SCNC: 139 MMOL/L
WBC # FLD AUTO: 7.32 K/UL

## 2024-10-08 ENCOUNTER — APPOINTMENT (OUTPATIENT)
Dept: NEPHROLOGY | Facility: CLINIC | Age: 69
End: 2024-10-08
Payer: MEDICARE

## 2024-10-08 VITALS
HEIGHT: 70 IN | DIASTOLIC BLOOD PRESSURE: 89 MMHG | SYSTOLIC BLOOD PRESSURE: 184 MMHG | HEART RATE: 70 BPM | TEMPERATURE: 96.9 F | OXYGEN SATURATION: 97 %

## 2024-10-08 VITALS — DIASTOLIC BLOOD PRESSURE: 64 MMHG | SYSTOLIC BLOOD PRESSURE: 168 MMHG

## 2024-10-08 DIAGNOSIS — I10 ESSENTIAL (PRIMARY) HYPERTENSION: ICD-10-CM

## 2024-10-08 DIAGNOSIS — E87.20 ACIDOSIS, UNSPECIFIED: ICD-10-CM

## 2024-10-08 DIAGNOSIS — D63.1 CHRONIC KIDNEY DISEASE, UNSPECIFIED: ICD-10-CM

## 2024-10-08 DIAGNOSIS — N18.9 CHRONIC KIDNEY DISEASE, UNSPECIFIED: ICD-10-CM

## 2024-10-08 DIAGNOSIS — N18.4 CHRONIC KIDNEY DISEASE, STAGE 4 (SEVERE): ICD-10-CM

## 2024-10-08 LAB
25(OH)D3 SERPL-MCNC: 50.6 NG/ML
ALBUMIN SERPL ELPH-MCNC: 3.8 G/DL
ANION GAP SERPL CALC-SCNC: 16 MMOL/L
BASOPHILS # BLD AUTO: 0.03 K/UL
BASOPHILS NFR BLD AUTO: 0.4 %
BUN SERPL-MCNC: 63 MG/DL
CALCIUM SERPL-MCNC: 9.5 MG/DL
CALCIUM SERPL-MCNC: 9.5 MG/DL
CHLORIDE SERPL-SCNC: 112 MMOL/L
CO2 SERPL-SCNC: 13 MMOL/L
CREAT SERPL-MCNC: 6.11 MG/DL
EGFR: 9 ML/MIN/1.73M2
EOSINOPHIL # BLD AUTO: 0.31 K/UL
EOSINOPHIL NFR BLD AUTO: 3.9 %
GLUCOSE SERPL-MCNC: 113 MG/DL
HCT VFR BLD CALC: 30.6 %
HGB BLD-MCNC: 10.1 G/DL
IMM GRANULOCYTES NFR BLD AUTO: 0.5 %
LYMPHOCYTES # BLD AUTO: 1.49 K/UL
LYMPHOCYTES NFR BLD AUTO: 18.9 %
MAN DIFF?: NORMAL
MCHC RBC-ENTMCNC: 29.6 PG
MCHC RBC-ENTMCNC: 33 GM/DL
MCV RBC AUTO: 89.7 FL
MONOCYTES # BLD AUTO: 0.59 K/UL
MONOCYTES NFR BLD AUTO: 7.5 %
NEUTROPHILS # BLD AUTO: 5.44 K/UL
NEUTROPHILS NFR BLD AUTO: 68.8 %
PARATHYROID HORMONE INTACT: 237 PG/ML
PHOSPHATE SERPL-MCNC: 4.8 MG/DL
PLATELET # BLD AUTO: 184 K/UL
POTASSIUM SERPL-SCNC: 5.4 MMOL/L
RBC # BLD: 3.41 M/UL
RBC # FLD: 14.6 %
SODIUM SERPL-SCNC: 141 MMOL/L
WBC # FLD AUTO: 7.9 K/UL

## 2024-10-08 PROCEDURE — 99215 OFFICE O/P EST HI 40 MIN: CPT

## 2024-10-08 PROCEDURE — G2211 COMPLEX E/M VISIT ADD ON: CPT

## 2024-10-08 RX ORDER — LOSARTAN POTASSIUM 25 MG/1
25 TABLET, FILM COATED ORAL DAILY
Qty: 3 | Refills: 3 | Status: ACTIVE | COMMUNITY
Start: 2024-10-08 | End: 1900-01-01

## 2024-10-13 NOTE — DISCHARGE NOTE ADULT - NSCORESITESY/N_GEN_A_CORE_RD
No care due was identified.  [facility] Embedded Care Due Messages. Reference number: [id number].   10/13/2024 8:04:44 AM CDT   No

## 2024-10-24 ENCOUNTER — NON-APPOINTMENT (OUTPATIENT)
Age: 69
End: 2024-10-24

## 2024-10-24 ENCOUNTER — INPATIENT (INPATIENT)
Facility: HOSPITAL | Age: 69
LOS: 0 days | Discharge: LEFT BEFORE DISCHARGE | End: 2024-10-25
Attending: STUDENT IN AN ORGANIZED HEALTH CARE EDUCATION/TRAINING PROGRAM | Admitting: STUDENT IN AN ORGANIZED HEALTH CARE EDUCATION/TRAINING PROGRAM
Payer: MEDICARE

## 2024-10-24 VITALS
WEIGHT: 240.08 LBS | RESPIRATION RATE: 18 BRPM | TEMPERATURE: 97 F | OXYGEN SATURATION: 98 % | DIASTOLIC BLOOD PRESSURE: 80 MMHG | HEIGHT: 71 IN | SYSTOLIC BLOOD PRESSURE: 162 MMHG | HEART RATE: 80 BPM

## 2024-10-24 DIAGNOSIS — N99.528 OTHER COMPLICATION OF INCONTINENT EXTERNAL STOMA OF URINARY TRACT: ICD-10-CM

## 2024-10-24 DIAGNOSIS — C67.9 MALIGNANT NEOPLASM OF BLADDER, UNSPECIFIED: ICD-10-CM

## 2024-10-24 DIAGNOSIS — Z98.89 OTHER SPECIFIED POSTPROCEDURAL STATES: Chronic | ICD-10-CM

## 2024-10-24 DIAGNOSIS — C61 MALIGNANT NEOPLASM OF PROSTATE: ICD-10-CM

## 2024-10-24 DIAGNOSIS — F41.9 ANXIETY DISORDER, UNSPECIFIED: ICD-10-CM

## 2024-10-24 DIAGNOSIS — G47.00 INSOMNIA, UNSPECIFIED: ICD-10-CM

## 2024-10-24 DIAGNOSIS — Z93.6 OTHER ARTIFICIAL OPENINGS OF URINARY TRACT STATUS: Chronic | ICD-10-CM

## 2024-10-24 DIAGNOSIS — Z85.51 PERSONAL HISTORY OF MALIGNANT NEOPLASM OF BLADDER: Chronic | ICD-10-CM

## 2024-10-24 DIAGNOSIS — Z93.59 OTHER CYSTOSTOMY STATUS: Chronic | ICD-10-CM

## 2024-10-24 DIAGNOSIS — I10 ESSENTIAL (PRIMARY) HYPERTENSION: ICD-10-CM

## 2024-10-24 DIAGNOSIS — N32.9 BLADDER DISORDER, UNSPECIFIED: Chronic | ICD-10-CM

## 2024-10-24 DIAGNOSIS — N17.9 ACUTE KIDNEY FAILURE, UNSPECIFIED: ICD-10-CM

## 2024-10-24 LAB
ALBUMIN SERPL ELPH-MCNC: 3.1 G/DL — LOW (ref 3.3–5)
ALP SERPL-CCNC: 91 U/L — SIGNIFICANT CHANGE UP (ref 40–120)
ALT FLD-CCNC: 7 U/L — SIGNIFICANT CHANGE UP (ref 4–41)
ANION GAP SERPL CALC-SCNC: 15 MMOL/L — HIGH (ref 7–14)
APTT BLD: 34 SEC — SIGNIFICANT CHANGE UP (ref 24.5–35.6)
AST SERPL-CCNC: 12 U/L — SIGNIFICANT CHANGE UP (ref 4–40)
BASOPHILS # BLD AUTO: 0.01 K/UL — SIGNIFICANT CHANGE UP (ref 0–0.2)
BASOPHILS NFR BLD AUTO: 0.1 % — SIGNIFICANT CHANGE UP (ref 0–2)
BILIRUB SERPL-MCNC: 0.4 MG/DL — SIGNIFICANT CHANGE UP (ref 0.2–1.2)
BLD GP AB SCN SERPL QL: NEGATIVE — SIGNIFICANT CHANGE UP
BUN SERPL-MCNC: 83 MG/DL — HIGH (ref 7–23)
CALCIUM SERPL-MCNC: 9.7 MG/DL — SIGNIFICANT CHANGE UP (ref 8.4–10.5)
CHLORIDE SERPL-SCNC: 103 MMOL/L — SIGNIFICANT CHANGE UP (ref 98–107)
CO2 SERPL-SCNC: 14 MMOL/L — LOW (ref 22–31)
CREAT SERPL-MCNC: 8.15 MG/DL — HIGH (ref 0.5–1.3)
EGFR: 7 ML/MIN/1.73M2 — LOW
EOSINOPHIL # BLD AUTO: 0.06 K/UL — SIGNIFICANT CHANGE UP (ref 0–0.5)
EOSINOPHIL NFR BLD AUTO: 0.6 % — SIGNIFICANT CHANGE UP (ref 0–6)
GLUCOSE SERPL-MCNC: 161 MG/DL — HIGH (ref 70–99)
HCT VFR BLD CALC: 29.3 % — LOW (ref 39–50)
HGB BLD-MCNC: 9.5 G/DL — LOW (ref 13–17)
IANC: 8.07 K/UL — HIGH (ref 1.8–7.4)
IMM GRANULOCYTES NFR BLD AUTO: 1.4 % — HIGH (ref 0–0.9)
INR BLD: 1.06 RATIO — SIGNIFICANT CHANGE UP (ref 0.85–1.16)
LIDOCAIN IGE QN: 15 U/L — SIGNIFICANT CHANGE UP (ref 7–60)
LYMPHOCYTES # BLD AUTO: 0.98 K/UL — LOW (ref 1–3.3)
LYMPHOCYTES # BLD AUTO: 9.4 % — LOW (ref 13–44)
MCHC RBC-ENTMCNC: 29.7 PG — SIGNIFICANT CHANGE UP (ref 27–34)
MCHC RBC-ENTMCNC: 32.4 GM/DL — SIGNIFICANT CHANGE UP (ref 32–36)
MCV RBC AUTO: 91.6 FL — SIGNIFICANT CHANGE UP (ref 80–100)
MONOCYTES # BLD AUTO: 1.18 K/UL — HIGH (ref 0–0.9)
MONOCYTES NFR BLD AUTO: 11.3 % — SIGNIFICANT CHANGE UP (ref 2–14)
NEUTROPHILS # BLD AUTO: 8.07 K/UL — HIGH (ref 1.8–7.4)
NEUTROPHILS NFR BLD AUTO: 77.2 % — HIGH (ref 43–77)
NRBC # BLD: 0 /100 WBCS — SIGNIFICANT CHANGE UP (ref 0–0)
NRBC # FLD: 0 K/UL — SIGNIFICANT CHANGE UP (ref 0–0)
PLATELET # BLD AUTO: 200 K/UL — SIGNIFICANT CHANGE UP (ref 150–400)
POTASSIUM SERPL-MCNC: 5.2 MMOL/L — SIGNIFICANT CHANGE UP (ref 3.5–5.3)
POTASSIUM SERPL-SCNC: 5.2 MMOL/L — SIGNIFICANT CHANGE UP (ref 3.5–5.3)
PROT SERPL-MCNC: 7.7 G/DL — SIGNIFICANT CHANGE UP (ref 6–8.3)
PROTHROM AB SERPL-ACNC: 12.3 SEC — SIGNIFICANT CHANGE UP (ref 9.9–13.4)
RBC # BLD: 3.2 M/UL — LOW (ref 4.2–5.8)
RBC # FLD: 14.4 % — SIGNIFICANT CHANGE UP (ref 10.3–14.5)
RH IG SCN BLD-IMP: POSITIVE — SIGNIFICANT CHANGE UP
SODIUM SERPL-SCNC: 132 MMOL/L — LOW (ref 135–145)
WBC # BLD: 10.45 K/UL — SIGNIFICANT CHANGE UP (ref 3.8–10.5)
WBC # FLD AUTO: 10.45 K/UL — SIGNIFICANT CHANGE UP (ref 3.8–10.5)

## 2024-10-24 PROCEDURE — 99254 IP/OBS CNSLTJ NEW/EST MOD 60: CPT

## 2024-10-24 PROCEDURE — 99222 1ST HOSP IP/OBS MODERATE 55: CPT

## 2024-10-24 PROCEDURE — 74176 CT ABD & PELVIS W/O CONTRAST: CPT | Mod: 26,MC

## 2024-10-24 PROCEDURE — 99223 1ST HOSP IP/OBS HIGH 75: CPT

## 2024-10-24 PROCEDURE — 99285 EMERGENCY DEPT VISIT HI MDM: CPT

## 2024-10-24 RX ORDER — CEFTRIAXONE SODIUM 10 G
1000 VIAL (EA) INJECTION EVERY 24 HOURS
Refills: 0 | Status: DISCONTINUED | OUTPATIENT
Start: 2024-10-25 | End: 2024-10-25

## 2024-10-24 RX ORDER — SODIUM BICARBONATE 1 MEQ/ML
1300 VIAL (ML) INTRAVENOUS THREE TIMES A DAY
Refills: 0 | Status: DISCONTINUED | OUTPATIENT
Start: 2024-10-24 | End: 2024-10-25

## 2024-10-24 RX ORDER — ENZALUTAMIDE 40 MG/1
160 CAPSULE ORAL DAILY
Refills: 0 | Status: DISCONTINUED | OUTPATIENT
Start: 2024-10-24 | End: 2024-10-24

## 2024-10-24 RX ORDER — ACETAMINOPHEN 500 MG
650 TABLET ORAL EVERY 6 HOURS
Refills: 0 | Status: DISCONTINUED | OUTPATIENT
Start: 2024-10-24 | End: 2024-10-25

## 2024-10-24 RX ORDER — ACETAMINOPHEN 500 MG
1000 TABLET ORAL ONCE
Refills: 0 | Status: COMPLETED | OUTPATIENT
Start: 2024-10-24 | End: 2024-10-24

## 2024-10-24 RX ORDER — INFLUENZ VIR VAC TV P-SURF2003 15MCG/.5ML
0.5 SYRINGE (ML) INTRAMUSCULAR ONCE
Refills: 0 | Status: DISCONTINUED | OUTPATIENT
Start: 2024-10-24 | End: 2024-10-25

## 2024-10-24 RX ORDER — CHLORHEXIDINE GLUCONATE 40 MG/ML
1 SOLUTION TOPICAL DAILY
Refills: 0 | Status: DISCONTINUED | OUTPATIENT
Start: 2024-10-24 | End: 2024-10-25

## 2024-10-24 RX ORDER — CALCITRIOL 0.25 UG/1
0.25 CAPSULE, LIQUID FILLED ORAL
Refills: 0 | Status: DISCONTINUED | OUTPATIENT
Start: 2024-10-24 | End: 2024-10-25

## 2024-10-24 RX ORDER — TRAZODONE HYDROCHLORIDE 100 MG/1
600 TABLET ORAL AT BEDTIME
Refills: 0 | Status: DISCONTINUED | OUTPATIENT
Start: 2024-10-24 | End: 2024-10-25

## 2024-10-24 RX ORDER — CEFTRIAXONE SODIUM 10 G
1000 VIAL (EA) INJECTION ONCE
Refills: 0 | Status: COMPLETED | OUTPATIENT
Start: 2024-10-24 | End: 2024-10-24

## 2024-10-24 RX ORDER — AMLODIPINE BESYLATE 10 MG
5 TABLET ORAL
Refills: 0 | Status: DISCONTINUED | OUTPATIENT
Start: 2024-10-24 | End: 2024-10-25

## 2024-10-24 RX ORDER — DIPHENHYDRAMINE HCL 12.5MG/5ML
25 ELIXIR ORAL AT BEDTIME
Refills: 0 | Status: DISCONTINUED | OUTPATIENT
Start: 2024-10-24 | End: 2024-10-25

## 2024-10-24 RX ORDER — CLONAZEPAM 1 MG
1 TABLET ORAL THREE TIMES A DAY
Refills: 0 | Status: DISCONTINUED | OUTPATIENT
Start: 2024-10-24 | End: 2024-10-25

## 2024-10-24 RX ADMIN — TRAZODONE HYDROCHLORIDE 600 MILLIGRAM(S): 100 TABLET ORAL at 21:22

## 2024-10-24 RX ADMIN — Medication 5 MILLIGRAM(S): at 20:55

## 2024-10-24 RX ADMIN — Medication 400 MILLIGRAM(S): at 15:26

## 2024-10-24 RX ADMIN — Medication 100 MILLIGRAM(S): at 17:50

## 2024-10-24 RX ADMIN — Medication 25 MILLIGRAM(S): at 21:23

## 2024-10-24 RX ADMIN — Medication 1300 MILLIGRAM(S): at 21:23

## 2024-10-24 RX ADMIN — Medication 1 MILLIGRAM(S): at 20:55

## 2024-10-24 NOTE — CONSULT NOTE ADULT - PROBLEM SELECTOR RECOMMENDATION 9
Pt c/o pelvic pain, vaginal discharge and urinary pain x  3days
Pt has dislodged Neph-u stent and BARON  -Recommend:  -Stat CT abd pelvis non con  -Stat IR consult for replacement of upside down neph-u tube  -D/W Dr. Tran

## 2024-10-24 NOTE — ED PROVIDER NOTE - CLINICAL SUMMARY MEDICAL DECISION MAKING FREE TEXT BOX
Nadeem: pt with dislodged ileal conduit tubing Nadeem: pt with dislodged ileal conduit tubing    Afebrile hemodynamically stable male presents to ED for ileal conduit to take a little while at work pulled out earlier today.  Denies abdominal pain.  Soft nondistended nontender abdomen.  Labs she is getting a CAT scan.  ANC is a bit low yeah like his urine close at ostomy site so she is not and around ileal conduit tubing.  She ordered basic labs, lipase, coags, type and screen, CTAP.  Consulted urology, will see patient.

## 2024-10-24 NOTE — H&P ADULT - ASSESSMENT
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

## 2024-10-24 NOTE — ED ADULT NURSE NOTE - OBJECTIVE STATEMENT
Pt received c/o dislodged urostomy. Phx. Pt received c/o dislodged urostomy. Phx. CKD, L AV fistula HTN, DMII, anxiety/depression, prostate cancer s/p prostatectomy 2011, bladder cancer, s/p cystectomy. Denies fever/chills, denies chest pain/SOB. Unable to obatin IV access. MD aware that pt needs ultrasound guided IV.

## 2024-10-24 NOTE — H&P ADULT - PROBLEM SELECTOR PLAN 3
- Xtandi not available in [facility] pharmacy.  If pt will stay beyond tomorrow, will need to have family bring med from home

## 2024-10-24 NOTE — ED PROVIDER NOTE - OBJECTIVE STATEMENT
pt w h/o bladder cancer s/p surgery for ileal conduit, ESRD on dialysis, pt comes in today bc his ileal conduit tubing pulled out most of the way and now is leaking urine around the ostomy and not into the bag. 69 y M PMH CKD4/5, anxiety/depression, pre-DM-2, HTN, prostate cancer s/p prostatectomy 2011, bladder cancer, s/p radical cystectomy and ileal conduit presents ot ED  today bc his ileal conduit tubing pulled out most of the way and now is leaking urine around the ostomy and not into the bag. Denies fever, chills, n/v , abd pain.

## 2024-10-24 NOTE — ED ADULT NURSE REASSESSMENT NOTE - NS ED NURSE REASSESS COMMENT FT1
Pt resting comfortably. denies pain. Urine still draining from stoma. Pt seen by IR scheduled for procedure tomorrow morning. Comfort measures provided, call bell in reach. Awaiting bed assignment.

## 2024-10-24 NOTE — H&P ADULT - NSHPREVIEWOFSYSTEMS_GEN_ALL_CORE
Review of Systems:   CONSTITUTIONAL: No fever or chills  EYES: No eye pain, visual disturbances, or discharge  ENMT:  No difficulty hearing, no throat pain  NECK: No pain or stiffness  RESPIRATORY: No cough, No shortness of breath  CARDIOVASCULAR: No chest pain, palpitations, dizziness, or leg swelling  GASTROINTESTINAL: No abdominal pain, nausea, vomiting or diarrhea  GENITOURINARY: as above  NEUROLOGICAL: No headaches, weakness, or numbness  SKIN: No rashes, or lesions   LYMPH NODES: No enlarged glands  ENDOCRINE: No heat or cold intolerance  MUSCULOSKELETAL: No joint pain or swelling  PSYCHIATRIC: anxiety  HEME/LYMPH: No easy bruising, or bleeding  ALLERGY AND IMMUNOLOGIC: No hives or eczema

## 2024-10-24 NOTE — H&P ADULT - NSHPLABSRESULTS_GEN_ALL_CORE
9.5    10.45 )-----------( 200      ( 24 Oct 2024 14:12 )             29.3     132[L]  |  103  |  83[H]  ----------------------------<  161[H]     10-24  5.2   |  14[L]  |  8.15[H]    Ca    9.7      24 Oct 2024 14:12    TPro  7.7  /  Alb  3.1[L]  /  TBili  0.4  /  DBili  x   /  AST  12  /  ALT  7   /  AlkPhos  91  10-24    PT/INR: 12.3/1.06 (10-24-24 @ 14:12)  PTT: 34.0 (10-24-24 @ 14:12)                < from: CT Abdomen and Pelvis No Cont (10.24.24 @ 16:26) >      IMPRESSION:  Left-sided upside down nephroureteral stent with proximal pigtail in the   proximal ureter and distal stent traversing the ileal conduit and   ileostomy terminating in the body. Severely atrophic left kidney with   mild hydronephrosis.    No right-sided nephroureteral stent is visualized. Atrophic right kidney   with moderate right-sided hydronephrosis.    < end of copied text >

## 2024-10-24 NOTE — H&P ADULT - NSHPPHYSICALEXAM_GEN_ALL_CORE
Vital Signs Last 24 Hrs  T(C): 36.4 (24 Oct 2024 20:28), Max: 36.7 (24 Oct 2024 16:13)  T(F): 97.5 (24 Oct 2024 20:28), Max: 98 (24 Oct 2024 16:13)  HR: 79 (24 Oct 2024 20:28) (77 - 80)  BP: 165/75 (24 Oct 2024 20:28) (156/77 - 165/75)  BP(mean): --  RR: 17 (24 Oct 2024 20:28) (16 - 18)  SpO2: 100% (24 Oct 2024 20:28) (98% - 100%)    Parameters below as of 24 Oct 2024 20:28  Patient On (Oxygen Delivery Method): room air        PHYSICAL EXAM:  GENERAL: No Acute Distress  EYES: conjunctiva and sclera clear  ENMT: Moist mucous membranes   NECK: Supple  PULMONARY: Clear to auscultation bilaterally  CARDIAC: Regular rate and rhythm; No murmurs, rubs, or gallops  GASTROINTESTINAL: Abdomen soft, Nontender, Nondistended; Bowel sounds normal  EXTREMITIES:   No clubbing, cyanosis, or pedal edema  PSYCH: Normal Affect, Normal Behavior  NEUROLOGY:   - Mental status A&O x 3  SKIN: Ileal conduit ostomy with scant drainage of urine.  No erythema  MUSCULOSKELETAL: No joint swelling

## 2024-10-24 NOTE — ED ADULT TRIAGE NOTE - CHIEF COMPLAINT QUOTE
Pt states had a colostomy bag that became dislodge, pt states meeds to be evaluated. Pt with mild pain .Pt refused fs.

## 2024-10-24 NOTE — PATIENT PROFILE ADULT - FALL HARM RISK - HARM RISK INTERVENTIONS

## 2024-10-24 NOTE — ED ADULT NURSE NOTE - NSFALLHARMRISKINTERV_ED_ALL_ED

## 2024-10-24 NOTE — H&P ADULT - HISTORY OF PRESENT ILLNESS
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  Pt reports tubing became dislodged this am, and he noticed draining of urine from ostomy of ileal conduit.  He reports mild flank pain. No fever or chills.  No nausea.  No bloody discharge.      In the ED, pt was evaluated by urology and IR with plan to replace tube tomorrow.  He was given ceftriaxone and acetaminophen.

## 2024-10-24 NOTE — CONSULT NOTE ADULT - NS ATTEND AMEND GEN_ALL_CORE FT
pt s/p ileal conduit with ureteroileal strictures management with single J stents through conduit.  lef t renal atrophy right stent completely out left is dislodged to proximal ureter.  at tiem of my exam pt sleeping nad ncat  and nd. BARON  plan for visit to IR for attemtp to reestablish right nephroureteral stent and left side repalcement into renal pelvis.

## 2024-10-24 NOTE — CONSULT NOTE ADULT - SUBJECTIVE AND OBJECTIVE BOX
Patient is a 69y old  Male who presents with a chief complaint of   HPI:  68y Male with h/o bladder ca and ileal conduit with history of bilateral upside down nephroureteral stents presents to the ED with dislodged right nephroureteral tube. Patient reports one tube was dislodged yesterday evening. CT A/P obtained demonstrating retracted left nephroureteral tube. IR is now consulted for replacement of tubes.         SOCIAL HISTORY:  FAMILY HISTORY:  FH: colon cancer      PAST MEDICAL & SURGICAL HISTORY:  Prostate Cancer  Other calculus in bladder  Obese  Diabetes mellitus  Malignant neoplasm of bladder  Unspecified hydronephrosis  S/P Appendectomy  40 years ago  H/O cystoscopy  - multiple  last cystoscopy, laser litholapaxy on 1/2018  History of prostatectomy  robotic, 2011, s/p radiation  Suprapubic catheter 8/2019  History of bladder cancer  bladder removal May 10, 2018 S/P ileal conduit May 2018  Nephrostomy status  Left, 8/1/2018  Bladder disease  Bladder Removed        Allergies    No Known Allergies    Intolerances      MEDICATIONS  (STANDING):  cefTRIAXone   IVPB 1000 milliGRAM(s) IV Intermittent once    MEDICATIONS  (PRN):    Vital Signs Last 24 Hrs  T(C): 36.7 (24 Oct 2024 16:13), Max: 36.7 (24 Oct 2024 16:13)  T(F): 98 (24 Oct 2024 16:13), Max: 98 (24 Oct 2024 16:13)  HR: 77 (24 Oct 2024 16:13) (77 - 80)  BP: 156/77 (24 Oct 2024 16:13) (156/77 - 162/80)  BP(mean): --  RR: 16 (24 Oct 2024 16:13) (16 - 18)  SpO2: 100% (24 Oct 2024 16:13) (98% - 100%)    Parameters below as of 24 Oct 2024 16:13  Patient On (Oxygen Delivery Method): room air      LABS:                        9.5    10.45 )-----------( 200      ( 24 Oct 2024 14:12 )             29.3     10-24    132[L]  |  103  |  83[H]  ----------------------------<  161[H]  5.2   |  14[L]  |  8.15[H]    Ca    9.7      24 Oct 2024 14:12    TPro  7.7  /  Alb  3.1[L]  /  TBili  0.4  /  DBili  x   /  AST  12  /  ALT  7   /  AlkPhos  91  10-24    PT/INR - ( 24 Oct 2024 14:12 )   PT: 12.3 sec;   INR: 1.06 ratio         PTT - ( 24 Oct 2024 14:12 )  PTT:34.0 sec  Urinalysis Basic - ( 24 Oct 2024 14:12 )    Color: x / Appearance: x / SG: x / pH: x  Gluc: 161 mg/dL / Ketone: x  / Bili: x / Urobili: x   Blood: x / Protein: x / Nitrite: x   Leuk Esterase: x / RBC: x / WBC x   Sq Epi: x / Non Sq Epi: x / Bacteria: x

## 2024-10-24 NOTE — ED PROVIDER NOTE - DISPOSITION TYPE
Returned call to Angelica  Advised her pulm will be reaching out regarding asept removal  She voiced understanding  ADMIT

## 2024-10-24 NOTE — PATIENT PROFILE ADULT - FUNCTIONAL ASSESSMENT - BASIC MOBILITY 6.
3-calculated by average/Not able to assess (calculate score using [facility] averaging method)

## 2024-10-24 NOTE — CHART NOTE - NSCHARTNOTEFT_GEN_A_CORE
PRE-INTERVENTIONAL RADIOLOGY PROCEDURE NOTE      Patient Age: 69y    Patient Gender: Male    Procedure: nephroureteral tube exchange     Diagnosis/Indication: dislodged nephroureteral tube    Interventional Radiology Attending Physician:     Ordering Attending Physician: Dr. Trell Mayo    Pertinent Medical History:  68-year-old male past medical history of bladder CA and ileal conduit with bilateral side down nephroureteral tube presents to ED with dislodged right nephroureteral tube.  Patient reports tube was dislodged yesterday evening.  CTAP obtained demonstrated a retracted left nephroureteral tube.  IR is now consulted for replacement of tubes.    Pertinent labs:                      9.5    10.45 )-----------( 200      ( 24 Oct 2024 14:12 )             29.3       10-24    132[L]  |  103  |  83[H]  ----------------------------<  161[H]  5.2   |  14[L]  |  8.15[H]    Ca    9.7      24 Oct 2024 14:12    TPro  7.7  /  Alb  3.1[L]  /  TBili  0.4  /  DBili  x   /  AST  12  /  ALT  7   /  AlkPhos  91  10-24      PT/INR - ( 24 Oct 2024 14:12 )   PT: 12.3 sec;   INR: 1.06 ratio         PTT - ( 24 Oct 2024 14:12 )  PTT:34.0 sec        Patient and Family Aware ? Yes

## 2024-10-24 NOTE — ED PROVIDER NOTE - PROGRESS NOTE DETAILS
Ismael Soriano DO (PGY2)  IR noted interventional procedure tomorrow for dislodged upside down nephroureteral stent.  Recommend starting patient on empiric antibiotics.  Will admit to medicine for inpatient management.

## 2024-10-24 NOTE — CONSULT NOTE ADULT - SUBJECTIVE AND OBJECTIVE BOX
HPI:      PAST MEDICAL & SURGICAL HISTORY:  Prostate Cancer      HTN (Hypertension)      Anxiety      Major depressive disorder      UTI (urinary tract infection)      Urinary (tract) obstruction      Urethral stricture      Other calculus in bladder      Obese      Diabetes mellitus      Malignant neoplasm of bladder      Unspecified hydronephrosis      S/P Appendectomy  40 years ago      H/O cystoscopy  - multiple  last cystoscopy, laser litholapaxy on 1/2018      History of prostatectomy  robotic, 2011, s/p radiation      Suprapubic catheter  8/2015      History of bladder cancer  bladder removal May 10, 2018      S/P ileal conduit  May 2018      Nephrostomy status  Left, 8/1/2018      Bladder disease  Bladder Removed          MEDICATIONS  (STANDING):  acetaminophen   IVPB .. 1000 milliGRAM(s) IV Intermittent Once    MEDICATIONS  (PRN):      FAMILY HISTORY:  FH: colon cancer        Allergies    No Known Allergies    Intolerances        SOCIAL HISTORY:    REVIEW OF SYSTEMS: Otherwise negative as stated in HPI      Vital Signs Last 24 Hrs  T(C): 36.2 (24 Oct 2024 11:57), Max: 36.2 (24 Oct 2024 11:57)  T(F): 97.2 (24 Oct 2024 11:57), Max: 97.2 (24 Oct 2024 11:57)  HR: 80 (24 Oct 2024 11:57) (80 - 80)  BP: 162/80 (24 Oct 2024 11:57) (162/80 - 162/80)  BP(mean): --  RR: 18 (24 Oct 2024 11:57) (18 - 18)  SpO2: 98% (24 Oct 2024 11:57) (98% - 98%)    Parameters below as of 24 Oct 2024 11:57  Patient On (Oxygen Delivery Method): room air        PHYSICAL EXAM:    General:	[ ] Awake and Alert in no acute distress    Respiratory and Thorax: [  ] no resp distress   	  Cardiovascular: [ ] S1,S2 Reg Rate    Gastrointestinal: [ ]soft  [ ] non tender [ ] +BS  [ ] scars,   CVAT [ ]Y  [ ]N  /    [ ]L  [ ]R     Genitourinary: Glans Circumcised [ ]Y  [ ]N, [ ]lesions     Meatus Discharge [ ]Y  [ ] N,  Blood [ ]Y [ ] N                               Testes  Descended [ ]Y  [ ]N,    Tender [ ]Y  [ ]N,   Epididymis Tender  [ ]Y [ ]N,    Cremasteric [ ]Y  [ ]N                        	    Musculoskeletal / Extremities:  Edema [ ]Y [ ]N,  AROM x 4 [ ]Y  [ ]N  	          LABS:                        9.5    10.45 )-----------( 200      ( 24 Oct 2024 14:12 )             29.3     10-24    132[L]  |  103  |  83[H]  ----------------------------<  161[H]  5.2   |  14[L]  |  8.15[H]    Ca    9.7      24 Oct 2024 14:12    TPro  7.7  /  Alb  3.1[L]  /  TBili  0.4  /  DBili  x   /  AST  12  /  ALT  7   /  AlkPhos  91  10-24    PT/INR - ( 24 Oct 2024 14:12 )   PT: 12.3 sec;   INR: 1.06 ratio         PTT - ( 24 Oct 2024 14:12 )  PTT:34.0 sec  Urinalysis Basic - ( 24 Oct 2024 14:12 )    Color: x / Appearance: x / SG: x / pH: x  Gluc: 161 mg/dL / Ketone: x  / Bili: x / Urobili: x   Blood: x / Protein: x / Nitrite: x   Leuk Esterase: x / RBC: x / WBC x   Sq Epi: x / Non Sq Epi: x / Bacteria: x      URINE CX:    RADIOLOGY:    ASSESSMENT & PLAN:   HPI:    70 yo M with complicated urologic history,  s/p RARP for prostate cancer with adjuvant radiation.  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  Managed with Xtandi and Eligard  Pt now presents to [facility] ER with c/o dislodged upside down neph-u stent  In ER found to have BARON; Cr=8.1  Denies any flank pain, fever, chills, N/V      PAST MEDICAL & SURGICAL HISTORY:    Prostate Cancer      HTN (Hypertension)      Anxiety      Major depressive disorder      UTI (urinary tract infection)      Urinary (tract) obstruction      Urethral stricture      Other calculus in bladder      Obese      Diabetes mellitus      Malignant neoplasm of bladder      Unspecified hydronephrosis      S/P Appendectomy  40 years ago      H/O cystoscopy  - multiple  last cystoscopy, laser litholapaxy on 1/2018      History of prostatectomy  robotic, 2011, s/p radiation      Suprapubic catheter  8/2015      History of bladder cancer  bladder removal May 10, 2018      S/P ileal conduit  May 2018      Nephrostomy status  Left, 8/1/2018      Bladder disease  Bladder Removed          MEDICATIONS  (STANDING):    · 	oxyCODONE 5 mg oral tablet: 1 tab(s) orally every 6 hours as needed for  moderate pain MDD: 4  · 	oxycodone-acetaminophen 5 mg-325 mg oral tablet: 1 tab(s) orally once As needed Moderate Pain (4 - 6)  · 	calcitriol 0.25 mcg oral capsule: 1 cap(s) orally 3 times a week Monday, Thursday, Saturday  · 	sodium bicarbonate 650 mg oral tablet: 1 tab(s) orally 3 times a day  · 	amLODIPine 5 mg oral tablet: 2 tab(s) orally once a day  · 	KlonoPIN 1 mg oral tablet: 1 tab(s) orally 3 times a day  · 	Xtandi 40 mg oral capsule: 4 cap(s) orally once a day  · 	Tylenol 500 mg oral tablet: 2 tab(s) orally every 8 hours as needed for  mild pain  · 	traZODone 150 mg oral tablet: 1 tab(s) orally once a day (at bedtime)      FAMILY HISTORY:    FH: colon cancer        Allergies    No Known Allergies          SOCIAL HISTORY:  No Smoking    REVIEW OF SYSTEMS: Otherwise negative as stated in HPI      Vital Signs Last 24 Hrs  T(C): 36.2 (24 Oct 2024 11:57), Max: 36.2 (24 Oct 2024 11:57)  T(F): 97.2 (24 Oct 2024 11:57), Max: 97.2 (24 Oct 2024 11:57)  HR: 80 (24 Oct 2024 11:57) (80 - 80)  BP: 162/80 (24 Oct 2024 11:57) (162/80 - 162/80)  BP(mean): --  RR: 18 (24 Oct 2024 11:57) (18 - 18)  SpO2: 98% (24 Oct 2024 11:57) (98% - 98%)    Parameters below as of 24 Oct 2024 11:57  Patient On (Oxygen Delivery Method): room air        PHYSICAL EXAM:    General: [x ] Awake and Alert in no acute distress    Respiratory and Thorax: [ x  ] no resp distress   	  Cardiovascular: [x ] Reg Rate    Gastrointestinal / : [x ]soft  [x ] non tender,  IC Stoma pink with one neph u- stent present ;  the other Neph u stent is dislodged at bedside.                LABS:                        9.5    10.45 )-----------( 200      ( 24 Oct 2024 14:12 )             29.3     10-24    132[L]  |  103  |  83[H]  ----------------------------<  161[H]  5.2   |  14[L]  |  8.15[H]    Ca    9.7      24 Oct 2024 14:12    TPro  7.7  /  Alb  3.1[L]  /  TBili  0.4  /  DBili  x   /  AST  12  /  ALT  7   /  AlkPhos  91  10-24    PT/INR - ( 24 Oct 2024 14:12 )   PT: 12.3 sec;   INR: 1.06 ratio         PTT - ( 24 Oct 2024 14:12 )  PTT:34.0 sec  Urinalysis Basic - ( 24 Oct 2024 14:12 )    Color: x / Appearance: x / SG: x / pH: x  Gluc: 161 mg/dL / Ketone: x  / Bili: x / Urobili: x   Blood: x / Protein: x / Nitrite: x   Leuk Esterase: x / RBC: x / WBC x   Sq Epi: x / Non Sq Epi: x / Bacteria: x      URINE CX:  pend    RADIOLOGY:    CT A/P pending

## 2024-10-24 NOTE — CONSULT NOTE ADULT - ASSESSMENT
68y Male with h/o bladder ca and ileal conduit with bilateral upside down nephroureteral tubes presents to the ED with dislodged right nephroureteral tube. Patient reports tube was dislodged yesterday evening. CT A/P obtained demonstrating retracted left nephroureteral tube. IR is now consulted for replacement of tubes.       Plan:  -Please place IR procedure order for right upside down nephroureteral tube replacement and left nephroureteral tube exhange under Dr. Mayo.   -Tentatively scheduled for 10/25.  -Keep patient NPO past midnight  -AM CBC, BMP, Coags  -Please write Pre-IR note  -IV antibiotics to be ordered by primary team    [id number] KAUSHIK TORRES
69 y M with dislodged upside down nephro ureteral stent and BARON

## 2024-10-24 NOTE — ED PROVIDER NOTE - PHYSICAL EXAMINATION
Gen: alert, NAD  HEENT:  NC/AT, PERR  CV:  well perfused  Pulm:  normal RR, breathing comfortably  Abd: s/nt/nd  : leakage of urine at the ostomy site around ileal conduit tubing  MSK: moving all extremities  Neuro:  non-focal  Skin:  visualized areas intact  Psych: AOx3

## 2024-10-25 ENCOUNTER — APPOINTMENT (OUTPATIENT)
Age: 69
End: 2024-10-25

## 2024-10-25 ENCOUNTER — TRANSCRIPTION ENCOUNTER (OUTPATIENT)
Age: 69
End: 2024-10-25

## 2024-10-25 VITALS
RESPIRATION RATE: 16 BRPM | DIASTOLIC BLOOD PRESSURE: 71 MMHG | OXYGEN SATURATION: 99 % | HEART RATE: 72 BPM | SYSTOLIC BLOOD PRESSURE: 149 MMHG

## 2024-10-25 DIAGNOSIS — Z29.9 ENCOUNTER FOR PROPHYLACTIC MEASURES, UNSPECIFIED: ICD-10-CM

## 2024-10-25 LAB
ANION GAP SERPL CALC-SCNC: 18 MMOL/L — HIGH (ref 7–14)
APTT BLD: 35.5 SEC — SIGNIFICANT CHANGE UP (ref 24.5–35.6)
BUN SERPL-MCNC: 89 MG/DL — HIGH (ref 7–23)
CALCIUM SERPL-MCNC: 9.4 MG/DL — SIGNIFICANT CHANGE UP (ref 8.4–10.5)
CHLORIDE SERPL-SCNC: 105 MMOL/L — SIGNIFICANT CHANGE UP (ref 98–107)
CO2 SERPL-SCNC: 14 MMOL/L — LOW (ref 22–31)
CREAT SERPL-MCNC: 8.9 MG/DL — HIGH (ref 0.5–1.3)
EGFR: 6 ML/MIN/1.73M2 — LOW
GLUCOSE SERPL-MCNC: 127 MG/DL — HIGH (ref 70–99)
HCT VFR BLD CALC: 27 % — LOW (ref 39–50)
HGB BLD-MCNC: 8.9 G/DL — LOW (ref 13–17)
INR BLD: 1.08 RATIO — SIGNIFICANT CHANGE UP (ref 0.85–1.16)
MAGNESIUM SERPL-MCNC: 2.2 MG/DL — SIGNIFICANT CHANGE UP (ref 1.6–2.6)
MCHC RBC-ENTMCNC: 29.9 PG — SIGNIFICANT CHANGE UP (ref 27–34)
MCHC RBC-ENTMCNC: 33 GM/DL — SIGNIFICANT CHANGE UP (ref 32–36)
MCV RBC AUTO: 90.6 FL — SIGNIFICANT CHANGE UP (ref 80–100)
MRSA PCR RESULT.: SIGNIFICANT CHANGE UP
NRBC # BLD: 0 /100 WBCS — SIGNIFICANT CHANGE UP (ref 0–0)
NRBC # FLD: 0 K/UL — SIGNIFICANT CHANGE UP (ref 0–0)
PHOSPHATE SERPL-MCNC: 5.8 MG/DL — HIGH (ref 2.5–4.5)
PLATELET # BLD AUTO: 224 K/UL — SIGNIFICANT CHANGE UP (ref 150–400)
POTASSIUM SERPL-MCNC: 5.3 MMOL/L — SIGNIFICANT CHANGE UP (ref 3.5–5.3)
POTASSIUM SERPL-SCNC: 5.3 MMOL/L — SIGNIFICANT CHANGE UP (ref 3.5–5.3)
PROTHROM AB SERPL-ACNC: 12.5 SEC — SIGNIFICANT CHANGE UP (ref 9.9–13.4)
RBC # BLD: 2.98 M/UL — LOW (ref 4.2–5.8)
RBC # FLD: 14.6 % — HIGH (ref 10.3–14.5)
S AUREUS DNA NOSE QL NAA+PROBE: SIGNIFICANT CHANGE UP
SODIUM SERPL-SCNC: 137 MMOL/L — SIGNIFICANT CHANGE UP (ref 135–145)
WBC # BLD: 8.88 K/UL — SIGNIFICANT CHANGE UP (ref 3.8–10.5)
WBC # FLD AUTO: 8.88 K/UL — SIGNIFICANT CHANGE UP (ref 3.8–10.5)

## 2024-10-25 PROCEDURE — 53899 UNLISTED PX URINARY SYSTEM: CPT | Mod: RT

## 2024-10-25 PROCEDURE — 50435 EXCHANGE NEPHROSTOMY CATH: CPT | Mod: LT

## 2024-10-25 PROCEDURE — 99232 SBSQ HOSP IP/OBS MODERATE 35: CPT

## 2024-10-25 RX ORDER — ACETAMINOPHEN 500 MG
1000 TABLET ORAL ONCE
Refills: 0 | Status: COMPLETED | OUTPATIENT
Start: 2024-10-25 | End: 2024-10-25

## 2024-10-25 RX ADMIN — Medication 1300 MILLIGRAM(S): at 07:22

## 2024-10-25 RX ADMIN — Medication 1 MILLIGRAM(S): at 05:06

## 2024-10-25 RX ADMIN — Medication 650 MILLIGRAM(S): at 05:33

## 2024-10-25 RX ADMIN — Medication 5 MILLIGRAM(S): at 05:06

## 2024-10-25 RX ADMIN — Medication 650 MILLIGRAM(S): at 04:33

## 2024-10-25 NOTE — PROGRESS NOTE ADULT - ASSESSMENT
68 yo M with complicated urologic history,  s/p RARP for prostate cancer with adjuvant radiation.  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 Presenting with dislodged nephro-u stent.       Plan  - IR today for upsidedown nephro-u replacement. Pt has dislodged Neph-u stent and BARNO  -Stat CT abd pelvis non con-> left nephro-u present. Right was dislodged   -trend cr  - monitor outputs    -D/W Dr. Tran.
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

## 2024-10-25 NOTE — DISCHARGE NOTE PROVIDER - PROVIDER TOKENS
PROVIDER:[TOKEN:[[id number]]],PROVIDER:[TOKEN:[[id number]]],PROVIDER:[TOKEN:[[id number]]]

## 2024-10-25 NOTE — DISCHARGE NOTE PROVIDER - NSDCCPCAREPLAN_GEN_ALL_CORE_FT
PRINCIPAL DISCHARGE DIAGNOSIS  Diagnosis: Complication of Ileal conduit  Assessment and Plan of Treatment: You had a Right upside down nephroureteral stent placement and left upside down nephroureteral stent exchange on Friday 10/25/24  Follow up outpatient with2 your Urologist.      SECONDARY DISCHARGE DIAGNOSES  Diagnosis: BARON (acute kidney injury)  Assessment and Plan of Treatment: Follow up with your PCP for monitoring of creatinine function    Diagnosis: Bladder cancer  Assessment and Plan of Treatment:     Diagnosis: Essential hypertension  Assessment and Plan of Treatment: Continue blood pressure medication regimen as directed. Monitor for any visual changes, headaches or dizziness.  Monitor blood pressure regularly.  Follow up with your primary care provider for further management for high blood pressure.

## 2024-10-25 NOTE — PROGRESS NOTE ADULT - PROBLEM SELECTOR PLAN 1
CT abd pelvis non con-> left nephro-u present. Right was dislodged   -urology and IR recs appreciated   - right upside down nephroureteral tube replacement and left nephroureteral tube exhange planned for today by IR   -c/w  Ceftriaxone  -f/u urology for further recs

## 2024-10-25 NOTE — PROGRESS NOTE ADULT - SUBJECTIVE AND OBJECTIVE BOX
Subjective  resting     Objective    Vital signs  T(F): , Max: 98.2 (10-24-24 @ 23:25)  HR: 81 (10-24-24 @ 23:25)  BP: 156/78 (10-24-24 @ 23:25)  SpO2: 100% (10-24-24 @ 23:25)  Wt(kg): --    Output       Gen: NAD  Abd: soft, nontender, nondistended  : pinks stoma, no ostomy bag in place, 1 upsidedown nephro U present     Labs      10-24 @ 14:12    WBC 10.45 / Hct 29.3  / SCr 8.15           Urine Cx: ?  Blood Cx: ?    Imaging

## 2024-10-25 NOTE — PROGRESS NOTE ADULT - PROBLEM SELECTOR PLAN 3
- Xtandi not available in [facility] pharmacy.    -If patient stays longer than 10/26 have patient ask family to bring in home medication

## 2024-10-25 NOTE — PRE PROCEDURE NOTE - PRE PROCEDURE EVALUATION
------------------------------------------------------------  Interventional Radiology Pre-Procedure Note  ------------------------------------------------------------    Indication: 69y Male with history of bladder ca and ileal conduit with bilateral upside down nephroureteral tubes who presents with dislodged right nephroureteral tube and malpositioned left nephroureteral tube. Plan for right upside down nephroureteral tube replacement and left nephroureteral tube exchange.     Past Medical History:  Prostate Cancer    HTN (Hypertension)    Anxiety    Major depressive disorder    UTI (urinary tract infection)    Urinary (tract) obstruction    Urethral stricture    Other calculus in bladder    Obese    Diabetes mellitus    Malignant neoplasm of bladder    Unspecified hydronephrosis    Allergies: No Known Allergies    Medications:  amLODIPine   Tablet: 5 milliGRAM(s) Oral (10-25-24 @ 05:06)  cefTRIAXone   IVPB: 100 mL/Hr IV Intermittent (10-24-24 @ 17:50)    Vital Signs:   T(F): 97.4 (13:08), Max: 98.2 (23:25)  HR: 74 (13:08)  BP: 127/65 (13:08)  RR: 18 (13:08)  SpO2: 98% (13:08)    Labs:           8.9  8.88)-----(224     (10-25-24 @ 07:06)         27.0     137 | 105 | 89  --------------------< 127     (10-25-24 @ 07:06)  5.3 | 14 | 8.90       PT: 12.5 10-25-24 @ 07:06  aPTT: 35.5 10-25-24 @ 07:06   INR: 1.08 10-25-24 @ 07:06    Imaging: CT abdomen and pelvis 10/24/24 was reviewed     Consent: Risks/benefits/alternatives were explained and informed written consent was obtained.     Procedure Plan: Plan for right upside down nephrostomy tube placement and  left nephroureteral tube exchange.

## 2024-10-25 NOTE — DISCHARGE NOTE PROVIDER - ATTENDING DISCHARGE PHYSICAL EXAMINATION:
CONSTITUTIONAL: Older gentleman in mild distress  EYES: PERRLA; conjunctiva and sclera clear  ENMT: Moist oral mucosa  NECK: Supple,  RESPIRATORY: Normal respiratory effort; lungs are clear to auscultation bilaterally  CARDIOVASCULAR: Regular rate and rhythm, normal S1 and S2, no murmur/rub/gallop  ABDOMEN: Nontender to palpation, normoactive bowel sounds, no rebound/guarding;   MUSCULOSKELETAL:   no clubbing or cyanosis of digits; no joint swelling or tenderness to palpation, ileal conduit ostomy with no erythema   PSYCH: A+O to person, place, and time; affect appropriate  NEUROLOGY: CN 2-12 are intact and symmetric; no gross sensory deficits   SKIN: No rashes; no palpable lesions

## 2024-10-25 NOTE — DISCHARGE NOTE PROVIDER - NSDCMRMEDTOKEN_GEN_ALL_CORE_FT
amLODIPine 5 mg oral tablet: 1 tab(s) orally 2 times a day  calcitriol 0.25 mcg oral capsule: 1 cap(s) orally 3 times a week Monday, Thursday, Saturday  diphenhydrAMINE 25 mg oral tablet: 1 tab(s) orally once a day (at bedtime)  KlonoPIN 1 mg oral tablet: 1 tab(s) orally 3 times a day  losartan 25 mg oral tablet: 1 tab(s) orally once a day  sodium bicarbonate 650 mg oral tablet: 2 tab(s) orally 3 times a day  traZODone 150 mg oral tablet: 4 tab(s) orally once a day (at bedtime)  Xtandi 40 mg oral capsule: 4 cap(s) orally once a day

## 2024-10-25 NOTE — PROGRESS NOTE ADULT - SUBJECTIVE AND OBJECTIVE BOX
Division of Hospital Medicine  Aris Hidalgo MD  Available via MS Teams  Pager: [phone]      Patient is a 69y old  Male who presents with a chief complaint of Dislodged nephroureteral tube (25 Oct 2024 07:17)      SUBJECTIVE / OVERNIGHT EVENTS: No acute overnight events. Pt seen and examined. Denies fevers, chills, CP, SOB, Abdominal pain, N/V, Constipation, Diarrhea    ADDITIONAL REVIEW OF SYSTEMS:    MEDICATIONS  (STANDING):  amLODIPine   Tablet 5 milliGRAM(s) Oral <User Schedule>  calcitriol   Capsule 0.25 MICROGram(s) Oral <User Schedule>  cefTRIAXone   IVPB 1000 milliGRAM(s) IV Intermittent every 24 hours  chlorhexidine 2% Cloths 1 Application(s) Topical daily  clonazePAM  Tablet 1 milliGRAM(s) Oral three times a day  influenza  Vaccine (HIGH DOSE) 0.5 milliLiter(s) IntraMuscular once  sodium bicarbonate 1300 milliGRAM(s) Oral three times a day  traZODone 600 milliGRAM(s) Oral at bedtime    MEDICATIONS  (PRN):  acetaminophen     Tablet .. 650 milliGRAM(s) Oral every 6 hours PRN Temp greater or equal to 38C (100.4F), Mild Pain (1 - 3), Moderate Pain (4 - 6)  diphenhydrAMINE 25 milliGRAM(s) Oral at bedtime PRN Rash and/or Itching      I&O's Summary    25 Oct 2024 07:01  -  25 Oct 2024 11:37  --------------------------------------------------------  IN: 0 mL / OUT: 260 mL / NET: -260 mL        PHYSICAL EXAM:  Vital Signs Last 24 Hrs  T(C): 36.4 (25 Oct 2024 07:22), Max: 36.8 (24 Oct 2024 23:25)  T(F): 97.5 (25 Oct 2024 07:22), Max: 98.2 (24 Oct 2024 23:25)  HR: 85 (25 Oct 2024 07:22) (77 - 85)  BP: 137/68 (25 Oct 2024 07:22) (137/68 - 165/75)  BP(mean): --  RR: 18 (25 Oct 2024 07:22) (16 - 18)  SpO2: 100% (25 Oct 2024 07:22) (98% - 100%)    Parameters below as of 25 Oct 2024 07:22  Patient On (Oxygen Delivery Method): room air      CONSTITUTIONAL: NAD, well-groomed  EYES: PERRLA; conjunctiva and sclera clear  ENMT: Moist oral mucosa, no pharyngeal injection or exudates; normal dentition  NECK: Supple, no palpable masses;  RESPIRATORY: Normal respiratory effort; lungs are clear to auscultation bilaterally  CARDIOVASCULAR: Regular rate and rhythm, normal S1 and S2, no murmur/rub/gallop; No lower extremity edema; Peripheral pulses are 2+ bilaterally  ABDOMEN: Nontender to palpation, normoactive bowel sounds, no rebound/guarding; No hepatosplenomegaly  MUSCULOSKELETAL:   no clubbing or cyanosis of digits; no joint swelling or tenderness to palpation  PSYCH: A+O to person, place, and time; affect appropriate  NEUROLOGY: CN 2-12 are intact and symmetric; no gross sensory deficits   SKIN: No rashes; no palpable lesions    LABS:                        8.9    8.88  )-----------( 224      ( 25 Oct 2024 07:06 )             27.0     10-25    137  |  105  |  89[H]  ----------------------------<  127[H]  5.3   |  14[L]  |  8.90[H]    Ca    9.4      25 Oct 2024 07:06  Phos  5.8     10-25  Mg     2.20     10-25    TPro  7.7  /  Alb  3.1[L]  /  TBili  0.4  /  DBili  x   /  AST  12  /  ALT  7   /  AlkPhos  91  10-24    PT/INR - ( 25 Oct 2024 07:06 )   PT: 12.5 sec;   INR: 1.08 ratio         PTT - ( 25 Oct 2024 07:06 )  PTT:35.5 sec      Urinalysis Basic - ( 25 Oct 2024 07:06 )    Color: x / Appearance: x / SG: x / pH: x  Gluc: 127 mg/dL / Ketone: x  / Bili: x / Urobili: x   Blood: x / Protein: x / Nitrite: x   Leuk Esterase: x / RBC: x / WBC x   Sq Epi: x / Non Sq Epi: x / Bacteria: x            RADIOLOGY & ADDITIONAL TESTS:  New Imaging Personally Reviewed Today:  New Electrocardiogram Personally Reviewed Today:  Other Results Reviewed Today:   Prior or Outpatient Records Reviewed Today with Summary:    COORDINATION OF CARE:  Consultant Communication and Details of Discussion (where applicable):     Division of Hospital Medicine  Aris Hidalgo MD  Available via MS Teams  Pager: [phone]      Patient is a 69y old  Male who presents with a chief complaint of Dislodged nephroureteral tube (25 Oct 2024 07:17)      SUBJECTIVE / OVERNIGHT EVENTS: No acute overnight events. Pt seen and examined. Patient was very upset this morning as his time for his procedure kept being pushed back. He was yelling about how this is unacceptable and that the hospital is killing him. Unable to assess ROS as the patient was unwilling to answer my questions.     ADDITIONAL REVIEW OF SYSTEMS:    MEDICATIONS  (STANDING):  amLODIPine   Tablet 5 milliGRAM(s) Oral <User Schedule>  calcitriol   Capsule 0.25 MICROGram(s) Oral <User Schedule>  cefTRIAXone   IVPB 1000 milliGRAM(s) IV Intermittent every 24 hours  chlorhexidine 2% Cloths 1 Application(s) Topical daily  clonazePAM  Tablet 1 milliGRAM(s) Oral three times a day  influenza  Vaccine (HIGH DOSE) 0.5 milliLiter(s) IntraMuscular once  sodium bicarbonate 1300 milliGRAM(s) Oral three times a day  traZODone 600 milliGRAM(s) Oral at bedtime    MEDICATIONS  (PRN):  acetaminophen     Tablet .. 650 milliGRAM(s) Oral every 6 hours PRN Temp greater or equal to 38C (100.4F), Mild Pain (1 - 3), Moderate Pain (4 - 6)  diphenhydrAMINE 25 milliGRAM(s) Oral at bedtime PRN Rash and/or Itching      I&O's Summary    25 Oct 2024 07:01  -  25 Oct 2024 11:37  --------------------------------------------------------  IN: 0 mL / OUT: 260 mL / NET: -260 mL        PHYSICAL EXAM:  Vital Signs Last 24 Hrs  T(C): 36.4 (25 Oct 2024 07:22), Max: 36.8 (24 Oct 2024 23:25)  T(F): 97.5 (25 Oct 2024 07:22), Max: 98.2 (24 Oct 2024 23:25)  HR: 85 (25 Oct 2024 07:22) (77 - 85)  BP: 137/68 (25 Oct 2024 07:22) (137/68 - 165/75)  BP(mean): --  RR: 18 (25 Oct 2024 07:22) (16 - 18)  SpO2: 100% (25 Oct 2024 07:22) (98% - 100%)    Parameters below as of 25 Oct 2024 07:22  Patient On (Oxygen Delivery Method): room air      CONSTITUTIONAL: Older gentleman in mild distress  EYES: PERRLA; conjunctiva and sclera clear  ENMT: Moist oral mucosa  NECK: Supple,  RESPIRATORY: Normal respiratory effort; lungs are clear to auscultation bilaterally  CARDIOVASCULAR: Regular rate and rhythm, normal S1 and S2, no murmur/rub/gallop  ABDOMEN: Nontender to palpation, normoactive bowel sounds, no rebound/guarding;   MUSCULOSKELETAL:   no clubbing or cyanosis of digits; no joint swelling or tenderness to palpation, ileal conduit ostomy with no erythema   PSYCH: A+O to person, place, and time; affect appropriate  NEUROLOGY: CN 2-12 are intact and symmetric; no gross sensory deficits   SKIN: No rashes; no palpable lesions        LABS:                        8.9    8.88  )-----------( 224      ( 25 Oct 2024 07:06 )             27.0     10-25    137  |  105  |  89[H]  ----------------------------<  127[H]  5.3   |  14[L]  |  8.90[H]    Ca    9.4      25 Oct 2024 07:06  Phos  5.8     10-25  Mg     2.20     10-25    TPro  7.7  /  Alb  3.1[L]  /  TBili  0.4  /  DBili  x   /  AST  12  /  ALT  7   /  AlkPhos  91  10-24    PT/INR - ( 25 Oct 2024 07:06 )   PT: 12.5 sec;   INR: 1.08 ratio         PTT - ( 25 Oct 2024 07:06 )  PTT:35.5 sec      Urinalysis Basic - ( 25 Oct 2024 07:06 )    Color: x / Appearance: x / SG: x / pH: x  Gluc: 127 mg/dL / Ketone: x  / Bili: x / Urobili: x   Blood: x / Protein: x / Nitrite: x   Leuk Esterase: x / RBC: x / WBC x   Sq Epi: x / Non Sq Epi: x / Bacteria: x            RADIOLOGY & ADDITIONAL TESTS:  New Imaging Personally Reviewed Today:  New Electrocardiogram Personally Reviewed Today:  Other Results Reviewed Today:   Prior or Outpatient Records Reviewed Today with Summary:    COORDINATION OF CARE:  Consultant Communication and Details of Discussion (where applicable):

## 2024-10-25 NOTE — PROCEDURE NOTE - PROCEDURE FINDINGS AND DETAILS
Status post successful left upside down nephroureterostomy stent exchange (10.2 Fr x 45 cm).   Next, a right 10.2Fr x 45cm upside down nephroureterostomy stent was placed through access into the lower pole of the right kidney. The right renal access was taken out and hemostasis was achieved with manual pressure.   Dry sterile dressing was applied. The ostomy was placed back on.

## 2024-10-25 NOTE — DISCHARGE NOTE PROVIDER - HOSPITAL COURSE
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 Pt with noted with BARON cr 8.15 yesterday to 8.9 today. Pt is s/p IR Right upside down nephroureteral stent placement and left upside down nephroureteral stent exchange on 10/25.      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 Pt with noted with BARON cr 8.15 yesterday to 8.9 today. Pt is s/p IR Right upside down nephroureteral stent placement and left upside down nephroureteral stent exchange on 10/25.     The patient has decided to leave against medical advice because he was upset that there were delays in the date and time in which his procedure was done. Pt was very upset and screaming at the staff after returning from the procedure to the floor, refusing to be moved from the stretcher to be bed, he was recommended for 1 hr of bedrest. Code JASON was called. Pt requested to go home. Pt was Pt has normal mental status and adequate capacity to make medical decisions. The patient refuses further In-pt hospital management and wants to be discharged. The risks have been explained to the patient, including worsening illness, chronic pain, permanent disability and he benefits of admission have also been explained, including the availability and proximity of nurses, physicians, ongoing monitoring, diagnostic testing, and treatments. Patient was able to understand and state the risks and benefits of hospital admission. This was witnessed by nurse. Patient had the opportunity to ask questions about their medical condition. The patient was treated to the extent that they would allow and knows that they may return for care at any time. Follow-up has been discussed. Dr. Hidalgo made aware.        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 Pt with noted with BARON cr 8.15 yesterday to 8.9 today. Pt is s/p IR Right upside down nephroureteral stent placement and left upside down nephroureteral stent exchange on 10/25.     The patient has decided to leave against medical advice because he was upset that there were delays in the date and time in which his procedure was done. Pt was very upset and screaming at the staff after returning from the procedure to the floor, refusing to be moved from the stretcher to be bed, he was recommended for 1 hr of bedrest. Code JASON was called. Pt requested to go home. Pt was Pt has normal mental status and adequate capacity to make medical decisions. The patient refuses further In-pt hospital management and wants to be discharged. The risks have been explained to the patient, including worsening illness, chronic pain, permanent disability and he benefits of admission have also been explained, including the availability and proximity of nurses, physicians, ongoing monitoring, diagnostic testing, and treatments. Patient was able to understand and state the risks and benefits of hospital admission. This was witnessed by nurse. Patient had the opportunity to ask questions about their medical condition. The patient was treated to the extent that they would allow and knows that they may return for care at any time. Follow-up has been discussed. Dr. Hidalgo made aware.       HPI:  68 y/o M with pmh of HTN, depression, anxiety, CKD 4/5, prostate cancer s/p radical prostatectomy, bladder ca s/p cystectomy and ileal conduit, and uretero-ilial stricture s/p  bilateral upside down nephroureteral stents presents to the ED with dislodged right nephroureteral tube.  Pt reports tubing became dislodged this am, and he noticed draining of urine from ostomy of ileal conduit.  He reports mild flank pain. No fever or chills.  No nausea.  No bloody discharge.      In the ED, pt was evaluated by urology and IR with plan to replace tube tomorrow.  He was given ceftriaxone and acetaminophen.     (24 Oct 2024 19:44)        Important Medication Changes and Reason:  n/a    Active or Pending Issues Requiring Follow-up:  Urology     Advanced Directives:   [ x] Full code  [ ] DNR  [ ] Hospice    Discharge Diagnoses:  #Dislodged nephroureteral tube

## 2024-10-25 NOTE — DISCHARGE NOTE PROVIDER - CARE PROVIDERS DIRECT ADDRESSES
[email or web address]

## 2024-10-25 NOTE — DISCHARGE NOTE PROVIDER - NSDCFUSCHEDAPPT_GEN_ALL_CORE_FT
Jenifer Lilly  [facility]  FAMILYMED [address]  Scheduled Appointment: 11/25/2024    Vidhi Kemp  [facility]  NEPHRO [address]  Scheduled Appointment: 12/16/2024

## 2024-10-25 NOTE — DISCHARGE NOTE PROVIDER - CARE PROVIDER_API CALL
Hoenig, David Mayer  Urology  [address] [facility] [address]  Phone: [phone]  Fax: [phone]  Follow Up Time:     Vidhi Kemp  Nephrology  [address] [place] [address], [address]  [place] [address]  Phone: [phone]  Fax: [phone]  Follow Up Time:     Jenifer Lilly  Family Medicine  [address] [facility], [address]  [place] [address]  Phone: [phone]  Fax: [phone]  Follow Up Time:

## 2024-10-26 ENCOUNTER — TRANSCRIPTION ENCOUNTER (OUTPATIENT)
Age: 69
End: 2024-10-26

## 2024-10-28 ENCOUNTER — OUTPATIENT (OUTPATIENT)
Dept: OUTPATIENT SERVICES | Facility: HOSPITAL | Age: 69
LOS: 1 days | End: 2024-10-28

## 2024-10-28 ENCOUNTER — INPATIENT (INPATIENT)
Facility: HOSPITAL | Age: 69
LOS: 2 days | Discharge: HOME | End: 2024-10-31
Attending: HOSPITALIST | Admitting: HOSPITALIST
Payer: MEDICARE

## 2024-10-28 ENCOUNTER — NON-APPOINTMENT (OUTPATIENT)
Age: 69
End: 2024-10-28

## 2024-10-28 VITALS
HEIGHT: 71 IN | RESPIRATION RATE: 16 BRPM | TEMPERATURE: 98 F | SYSTOLIC BLOOD PRESSURE: 147 MMHG | OXYGEN SATURATION: 99 % | HEART RATE: 71 BPM | WEIGHT: 220.02 LBS | DIASTOLIC BLOOD PRESSURE: 75 MMHG

## 2024-10-28 DIAGNOSIS — N99.528 OTHER COMPLICATION OF INCONTINENT EXTERNAL STOMA OF URINARY TRACT: ICD-10-CM

## 2024-10-28 DIAGNOSIS — Z93.6 OTHER ARTIFICIAL OPENINGS OF URINARY TRACT STATUS: Chronic | ICD-10-CM

## 2024-10-28 DIAGNOSIS — C67.9 MALIGNANT NEOPLASM OF BLADDER, UNSPECIFIED: ICD-10-CM

## 2024-10-28 DIAGNOSIS — N32.9 BLADDER DISORDER, UNSPECIFIED: Chronic | ICD-10-CM

## 2024-10-28 DIAGNOSIS — Z98.89 OTHER SPECIFIED POSTPROCEDURAL STATES: Chronic | ICD-10-CM

## 2024-10-28 DIAGNOSIS — G47.00 INSOMNIA, UNSPECIFIED: ICD-10-CM

## 2024-10-28 DIAGNOSIS — Z93.59 OTHER CYSTOSTOMY STATUS: Chronic | ICD-10-CM

## 2024-10-28 DIAGNOSIS — Z85.51 PERSONAL HISTORY OF MALIGNANT NEOPLASM OF BLADDER: Chronic | ICD-10-CM

## 2024-10-28 DIAGNOSIS — N17.9 ACUTE KIDNEY FAILURE, UNSPECIFIED: ICD-10-CM

## 2024-10-28 DIAGNOSIS — T83.022A DISPLACEMENT OF NEPHROSTOMY CATHETER, INITIAL ENCOUNTER: ICD-10-CM

## 2024-10-28 DIAGNOSIS — C61 MALIGNANT NEOPLASM OF PROSTATE: ICD-10-CM

## 2024-10-28 DIAGNOSIS — I10 ESSENTIAL (PRIMARY) HYPERTENSION: ICD-10-CM

## 2024-10-28 DIAGNOSIS — Z00.00 ENCOUNTER FOR GENERAL ADULT MEDICAL EXAMINATION W/OUT ABNORMAL FINDINGS: ICD-10-CM

## 2024-10-28 LAB
ALBUMIN SERPL ELPH-MCNC: 3.2 G/DL — LOW (ref 3.3–5)
ALP SERPL-CCNC: 92 U/L — SIGNIFICANT CHANGE UP (ref 40–120)
ALT FLD-CCNC: 9 U/L — SIGNIFICANT CHANGE UP (ref 4–41)
ANION GAP SERPL CALC-SCNC: 23 MMOL/L — HIGH (ref 7–14)
APPEARANCE UR: ABNORMAL
APTT BLD: 38.1 SEC — HIGH (ref 24.5–35.6)
AST SERPL-CCNC: 11 U/L — SIGNIFICANT CHANGE UP (ref 4–40)
BASOPHILS # BLD AUTO: 0.07 K/UL — SIGNIFICANT CHANGE UP (ref 0–0.2)
BASOPHILS NFR BLD AUTO: 0.7 % — SIGNIFICANT CHANGE UP (ref 0–2)
BILIRUB SERPL-MCNC: 0.2 MG/DL — SIGNIFICANT CHANGE UP (ref 0.2–1.2)
BILIRUB UR-MCNC: NEGATIVE — SIGNIFICANT CHANGE UP
BLD GP AB SCN SERPL QL: NEGATIVE — SIGNIFICANT CHANGE UP
BUN SERPL-MCNC: 83 MG/DL — HIGH (ref 7–23)
CALCIUM SERPL-MCNC: 9.6 MG/DL — SIGNIFICANT CHANGE UP (ref 8.4–10.5)
CHLORIDE SERPL-SCNC: 104 MMOL/L — SIGNIFICANT CHANGE UP (ref 98–107)
CO2 SERPL-SCNC: 11 MMOL/L — LOW (ref 22–31)
COLOR SPEC: ABNORMAL
CREAT SERPL-MCNC: 7.19 MG/DL — HIGH (ref 0.5–1.3)
CULTURE RESULTS: SIGNIFICANT CHANGE UP
DIFF PNL FLD: ABNORMAL
EGFR: 8 ML/MIN/1.73M2 — LOW
EOSINOPHIL # BLD AUTO: 0.23 K/UL — SIGNIFICANT CHANGE UP (ref 0–0.5)
EOSINOPHIL NFR BLD AUTO: 2.2 % — SIGNIFICANT CHANGE UP (ref 0–6)
GLUCOSE SERPL-MCNC: 105 MG/DL — HIGH (ref 70–99)
GLUCOSE UR QL: NEGATIVE MG/DL — SIGNIFICANT CHANGE UP
HCT VFR BLD CALC: 30.9 % — LOW (ref 39–50)
HGB BLD-MCNC: 9.8 G/DL — LOW (ref 13–17)
IANC: 7.54 K/UL — HIGH (ref 1.8–7.4)
IMM GRANULOCYTES NFR BLD AUTO: 4.5 % — HIGH (ref 0–0.9)
INR BLD: 0.94 RATIO — SIGNIFICANT CHANGE UP (ref 0.85–1.16)
KETONES UR-MCNC: NEGATIVE MG/DL — SIGNIFICANT CHANGE UP
LEUKOCYTE ESTERASE UR-ACNC: ABNORMAL
LYMPHOCYTES # BLD AUTO: 1.55 K/UL — SIGNIFICANT CHANGE UP (ref 1–3.3)
LYMPHOCYTES # BLD AUTO: 14.9 % — SIGNIFICANT CHANGE UP (ref 13–44)
MCHC RBC-ENTMCNC: 29.6 PG — SIGNIFICANT CHANGE UP (ref 27–34)
MCHC RBC-ENTMCNC: 31.7 GM/DL — LOW (ref 32–36)
MCV RBC AUTO: 93.4 FL — SIGNIFICANT CHANGE UP (ref 80–100)
MONOCYTES # BLD AUTO: 0.57 K/UL — SIGNIFICANT CHANGE UP (ref 0–0.9)
MONOCYTES NFR BLD AUTO: 5.5 % — SIGNIFICANT CHANGE UP (ref 2–14)
NEUTROPHILS # BLD AUTO: 7.54 K/UL — HIGH (ref 1.8–7.4)
NEUTROPHILS NFR BLD AUTO: 72.2 % — SIGNIFICANT CHANGE UP (ref 43–77)
NITRITE UR-MCNC: POSITIVE
NRBC # BLD: 0 /100 WBCS — SIGNIFICANT CHANGE UP (ref 0–0)
NRBC # FLD: 0 K/UL — SIGNIFICANT CHANGE UP (ref 0–0)
PH UR: 7.5 — SIGNIFICANT CHANGE UP (ref 5–8)
PLATELET # BLD AUTO: 242 K/UL — SIGNIFICANT CHANGE UP (ref 150–400)
POTASSIUM SERPL-MCNC: 5.3 MMOL/L — SIGNIFICANT CHANGE UP (ref 3.5–5.3)
POTASSIUM SERPL-SCNC: 5.3 MMOL/L — SIGNIFICANT CHANGE UP (ref 3.5–5.3)
PROT SERPL-MCNC: 8 G/DL — SIGNIFICANT CHANGE UP (ref 6–8.3)
PROT UR-MCNC: 100 MG/DL
PROTHROM AB SERPL-ACNC: 11.2 SEC — SIGNIFICANT CHANGE UP (ref 9.9–13.4)
RBC # BLD: 3.31 M/UL — LOW (ref 4.2–5.8)
RBC # FLD: 14.7 % — HIGH (ref 10.3–14.5)
RH IG SCN BLD-IMP: POSITIVE — SIGNIFICANT CHANGE UP
SODIUM SERPL-SCNC: 138 MMOL/L — SIGNIFICANT CHANGE UP (ref 135–145)
SP GR SPEC: 1.01 — SIGNIFICANT CHANGE UP (ref 1–1.03)
SPECIMEN SOURCE: SIGNIFICANT CHANGE UP
UROBILINOGEN FLD QL: 0.2 MG/DL — SIGNIFICANT CHANGE UP (ref 0.2–1)
WBC # BLD: 10.43 K/UL — SIGNIFICANT CHANGE UP (ref 3.8–10.5)
WBC # FLD AUTO: 10.43 K/UL — SIGNIFICANT CHANGE UP (ref 3.8–10.5)

## 2024-10-28 PROCEDURE — 99285 EMERGENCY DEPT VISIT HI MDM: CPT

## 2024-10-28 PROCEDURE — 99223 1ST HOSP IP/OBS HIGH 75: CPT

## 2024-10-28 RX ORDER — CALCITRIOL 0.25 UG/1
0.25 CAPSULE, LIQUID FILLED ORAL
Refills: 0 | Status: DISCONTINUED | OUTPATIENT
Start: 2024-10-28 | End: 2024-10-31

## 2024-10-28 RX ORDER — ENZALUTAMIDE 40 MG/1
160 CAPSULE ORAL DAILY
Refills: 0 | Status: DISCONTINUED | OUTPATIENT
Start: 2024-10-29 | End: 2024-10-31

## 2024-10-28 RX ORDER — CLONAZEPAM 1 MG
1 TABLET ORAL THREE TIMES A DAY
Refills: 0 | Status: DISCONTINUED | OUTPATIENT
Start: 2024-10-28 | End: 2024-10-31

## 2024-10-28 RX ORDER — SODIUM CHLORIDE 9 MG/ML
1000 INJECTION, SOLUTION INTRAMUSCULAR; INTRAVENOUS; SUBCUTANEOUS ONCE
Refills: 0 | Status: COMPLETED | OUTPATIENT
Start: 2024-10-28 | End: 2024-10-28

## 2024-10-28 RX ORDER — DIPHENHYDRAMINE HCL 12.5MG/5ML
1 ELIXIR ORAL
Refills: 0 | DISCHARGE

## 2024-10-28 RX ORDER — NYSTATIN 100000 U/G
1 POWDER TOPICAL ONCE
Refills: 0 | Status: COMPLETED | OUTPATIENT
Start: 2024-10-28 | End: 2024-10-29

## 2024-10-28 RX ORDER — CEFTRIAXONE SODIUM 10 G
1000 VIAL (EA) INJECTION EVERY 24 HOURS
Refills: 0 | Status: DISCONTINUED | OUTPATIENT
Start: 2024-10-28 | End: 2024-10-31

## 2024-10-28 RX ORDER — TRAZODONE HYDROCHLORIDE 100 MG/1
600 TABLET ORAL AT BEDTIME
Refills: 0 | Status: DISCONTINUED | OUTPATIENT
Start: 2024-10-28 | End: 2024-10-31

## 2024-10-28 RX ORDER — SODIUM BICARBONATE 1 MEQ/ML
1300 VIAL (ML) INTRAVENOUS THREE TIMES A DAY
Refills: 0 | Status: DISCONTINUED | OUTPATIENT
Start: 2024-10-28 | End: 2024-10-31

## 2024-10-28 RX ORDER — AMLODIPINE BESYLATE 10 MG
5 TABLET ORAL
Refills: 0 | Status: DISCONTINUED | OUTPATIENT
Start: 2024-10-28 | End: 2024-10-31

## 2024-10-28 RX ORDER — ACETAMINOPHEN 500 MG
650 TABLET ORAL ONCE
Refills: 0 | Status: COMPLETED | OUTPATIENT
Start: 2024-10-28 | End: 2024-10-28

## 2024-10-28 RX ORDER — DIPHENHYDRAMINE HCL 12.5MG/5ML
25 ELIXIR ORAL AT BEDTIME
Refills: 0 | Status: DISCONTINUED | OUTPATIENT
Start: 2024-10-28 | End: 2024-10-31

## 2024-10-28 RX ORDER — ACETAMINOPHEN 500 MG
650 TABLET ORAL EVERY 6 HOURS
Refills: 0 | Status: DISCONTINUED | OUTPATIENT
Start: 2024-10-28 | End: 2024-10-31

## 2024-10-28 RX ADMIN — Medication 650 MILLIGRAM(S): at 17:23

## 2024-10-28 RX ADMIN — SODIUM CHLORIDE 1000 MILLILITER(S): 9 INJECTION, SOLUTION INTRAMUSCULAR; INTRAVENOUS; SUBCUTANEOUS at 17:33

## 2024-10-28 NOTE — ED ADULT TRIAGE NOTE - CHIEF COMPLAINT QUOTE
Pt presents to ED via wheelchair from IR, pt had nephrostomy tube placed 4 days ago, today it accidentally fell out. Pt endorses lower back pain. Pt uncomfortable and tearful in triage. Pt went to IR expecting to have procedure done today, spoke with nephrologist who advised pt to come to ED.

## 2024-10-28 NOTE — ED PROVIDER NOTE - OBJECTIVE STATEMENT
See MDM See MDM    DD ED ATTM p/w back pain and dislodged NT since yesterday.  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 Pt with noted with BARON cr 8.15 yesterday to 8.9 today. Pt is s/p IR Right upside down nephroureteral stent placement and left upside down nephroureteral stent exchange on 10/25. Pt also c/o rash to R groin.  Rash to R groin appears to be fungal, will rx nystatin cream.  VS wnl.  PMHX HTN depression CKD, prostate CA, h/o radical prostatectomy, ileal conduit.  Will check labs, contact IR, rx pain med and antifungal cream.  Likely admission.

## 2024-10-28 NOTE — ED PROVIDER NOTE - CLINICAL SUMMARY MEDICAL DECISION MAKING FREE TEXT BOX
-year-old male history of CKD stage IV, hypertension, prostate cancer status post prostatectomy, bladder cancer status post radical cystectomy, ileal conduit, anxiety, depression presenting after pulling nephrostomy tube out that was placed on 10/25/2024.  Denies any fevers, chills, nausea, vomiting, abdominal pain, hematuria, dysuria.  Exam as above, no signs of infection.  Will consult IR for replacing nephrostomy tube.

## 2024-10-28 NOTE — H&P ADULT - PROBLEM SELECTOR PLAN 5
- Continue home amlodipine   - hold Losartan 2/2 BARON 56-year-old female postop day 5 from unilateral breast reduction with Dr. Jimenez (reconstruction of breast with deep inferior epigastric artery  flap) presents emergency department with increased paranoia since time of discharge. low concern for infection, will get labs, UA. will consult psych. no acute concern for harm for self or anyone else at this time.

## 2024-10-28 NOTE — H&P ADULT - HISTORY OF PRESENT ILLNESS
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  Pt was admitted for dislodgement of tube 4 days ago.  He left AMA after the procedure was performed on 10/25.  Today, the tube became dislodged again.  Pt reports no pain, fever, or chills.  No bleeding from ileal conduit site.  Pt feels very anxious.  He spoke with interventional radiology office who advised him to come to the ED.      In the ED, pt was given Tylenol.

## 2024-10-28 NOTE — ED ADULT TRIAGE NOTE - LOCATION:
The above information was copied from a provider's documentation of pre-arrival medical information as obtained.
Right arm;

## 2024-10-28 NOTE — ED PROVIDER NOTE - NS ED ROS FT
GENERAL: No fever or chills  EYES: No change in vision  HEENT: No trouble swallowing or speaking  CARDIAC: No chest pain  PULMONARY: No cough or SOB  GI: +abdominal pain,  no nausea, vomiting, no diarrhea or constipation  : +nephrostomy tube out  SKIN: No rashes  NEURO: No headache, no numbness  MSK: No joint pain  Otherwise as HPI or negative.

## 2024-10-28 NOTE — H&P ADULT - PROBLEM SELECTOR PLAN 1
- reviewed IR consult  - plan is for replacement tomorrow  - Pt refusing recommended CT at this time.  Will try again in AM prior to procedure - reviewed IR consult  - plan is for replacement tomorrow  - Pt refusing recommended CT at this time.  Will try again in AM prior to procedure  - + UA, will start ceftriaxone

## 2024-10-28 NOTE — ED PROVIDER NOTE - PROGRESS NOTE DETAILS
Per nephrologist katherine olson, nephrostomy tube to be replaced tomorrow so Cr doesn't increase. Will admit to medicine and order labs.

## 2024-10-28 NOTE — H&P ADULT - NSHPPHYSICALEXAM_GEN_ALL_CORE
Vital Signs Last 24 Hrs  T(C): 36.4 (28 Oct 2024 20:26), Max: 36.5 (28 Oct 2024 13:29)  T(F): 97.6 (28 Oct 2024 20:26), Max: 97.7 (28 Oct 2024 13:29)  HR: 70 (28 Oct 2024 20:26) (67 - 71)  BP: 154/58 (28 Oct 2024 20:26) (147/75 - 154/58)  BP(mean): --  RR: 17 (28 Oct 2024 20:26) (16 - 18)  SpO2: 100% (28 Oct 2024 20:26) (98% - 100%)    Parameters below as of 28 Oct 2024 20:26  Patient On (Oxygen Delivery Method): room air        PHYSICAL EXAM:  GENERAL: No Acute Distress  EYES: conjunctiva and sclera clear  ENMT: Moist mucous membranes   NECK: Supple  PULMONARY: Clear to auscultation bilaterally  CARDIAC: Regular rate and rhythm; No murmurs, rubs, or gallops  GASTROINTESTINAL: Abdomen soft, Nontender, Nondistended; Bowel sounds normal  EXTREMITIES:   No clubbing, cyanosis, or pedal edema  PSYCH: Agitated, anxious, tearful  NEUROLOGY:   - Mental status A&O x 3  SKIN: No rashes or lesions  MUSCULOSKELETAL: No joint swelling

## 2024-10-28 NOTE — H&P ADULT - NSHPREVIEWOFSYSTEMS_GEN_ALL_CORE
Review of Systems:   CONSTITUTIONAL: No fever or chills  EYES: No eye pain, visual disturbances, or discharge  ENMT:  No difficulty hearing, no throat pain  NECK: No pain or stiffness  RESPIRATORY: No cough, No shortness of breath  CARDIOVASCULAR: No chest pain, palpitations, dizziness, or leg swelling  GASTROINTESTINAL: No abdominal pain, nausea, vomiting or diarrhea  GENITOURINARY: No dysuria, or hematuria  NEUROLOGICAL: No headaches, weakness, or numbness  SKIN: No rashes, or lesions   LYMPH NODES: No enlarged glands  ENDOCRINE: No heat or cold intolerance  MUSCULOSKELETAL: No joint pain or swelling  PSYCHIATRIC: anxiety  HEME/LYMPH: No easy bruising, or bleeding  ALLERGY AND IMMUNOLOGIC: No hives or eczema

## 2024-10-28 NOTE — H&P ADULT - ASSESSMENT
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

## 2024-10-28 NOTE — CONSULT NOTE ADULT - SUBJECTIVE AND OBJECTIVE BOX
Patient is a 69y old  Male who presents with a chief complaint of   HPI:  68y Male with h/o bladder ca and ileal conduit with history of bilateral upside down nephroureteral stents presented to IR office today with a dislodged upside down nephroureteral tube. Patient was advised to go to ER for further evaluation. Of note, patient recently dislodged the tube on 10/25 as well. IR is now consulted upside down nephroureteral tube replacement.       PAST MEDICAL & SURGICAL HISTORY:  Prostate Cancer  HTN (Hypertension)  Anxiety  Major depressive disorder  UTI (urinary tract infection)  Urinary (tract) obstruction  Urethral stricture  Other calculus in bladder  Obese  Diabetes mellitus  Malignant neoplasm of bladder  Unspecified hydronephrosis  S/P Appendectomy  40 years ago  H/O cystoscopy  - multiple  last cystoscopy, laser litholapaxy on 1/2018  History of prostatectomy  robotic, 2011, s/p radiation  Suprapubic catheter  8/2015  History of bladder cancer  bladder removal May 10, 2018  S/P ileal conduit  May 2018  Nephrostomy status  Left, 8/1/2018  Bladder disease  Bladder Removed        Allergies  No Known Allergies  Intolerances      MEDICATIONS  (STANDING):  nystatin Cream 1 Application(s) Topical once    MEDICATIONS  (PRN):    Vital Signs Last 24 Hrs  T(C): 36.5 (28 Oct 2024 13:29), Max: 36.5 (28 Oct 2024 13:29)  T(F): 97.7 (28 Oct 2024 13:29), Max: 97.7 (28 Oct 2024 13:29)  HR: 71 (28 Oct 2024 13:29) (71 - 71)  BP: 147/75 (28 Oct 2024 13:29) (147/75 - 147/75)  BP(mean): --  RR: 16 (28 Oct 2024 13:29) (16 - 16)  SpO2: 99% (28 Oct 2024 13:29) (99% - 99%)    Parameters below as of 28 Oct 2024 13:29  Patient On (Oxygen Delivery Method): room air

## 2024-10-28 NOTE — CHART NOTE - NSCHARTNOTEFT_GEN_A_CORE
RN notified that patient took his own home meds Patient evaluated at bedside. before evaluation, RN was able to obtain the meds from the patient and was sent to pharmacy for safe keeping. It was explained to the patient that he cannot take home meds while admitted unless its verified by pharmacy.  Patient is agreeable to take hospital meds. patient also refused his CT scan earlier and he states he will go tomorrow morning. Instructed patient that his procedure can be delayed if CT is not done in a timely manner. Patient states he does not want to take the CT scan tonight. Reattempt in AM.

## 2024-10-28 NOTE — ED PROVIDER NOTE - PHYSICAL EXAMINATION
General: alert, oriented to person, time, place  Psych: mood appropriate  Head: normocephalic; atraumatic  Eyes: EOMI, conjunctivae clear bilaterally, sclerae anicteric  ENT: no nasal flaring, patent nares  Cardio: RRR, no m/r/g, pulses 2+ b/l  Resp: CATB, no w/r/r  GI: soft/nondistended/nontender  : Nephrostomy tube site without nephrostomy tube.   Neuro: normal sensation, moving all four extremities equally  Skin: R medial thigh scaly erythematous rash.   MSK: normal movement of all extremities  Lymph/Vasc: no LE edema.

## 2024-10-28 NOTE — ED ADULT NURSE NOTE - OBJECTIVE STATEMENT
Received pt to bed 20a, A+Ox4, ambulatory @baseline. advised to come to ED to have nephrostomy evaluated. nephrostomy to right abdomen. no redness or warmth noted, pt states "there are suppose to be 2 wires, but one got pulled out" Pt denies any chest pain, SOB, headache, dizziness, N+V, diarrhea, fever, chills. pt with left arm precautions as per pt. 20G to right hand, Labs sent, Medicated as per Provider orders, plan of care ongoing,

## 2024-10-28 NOTE — H&P ADULT - PROBLEM SELECTOR PLAN 2
Cr above baseline in the setting of obstruction  - trend post procedure  - outpt nephrology f/u.  Will possibly need HD in future.  Fistula present.

## 2024-10-28 NOTE — CHART NOTE - NSCHARTNOTEFT_GEN_A_CORE
68 y/o M with pmh of HTN, depression, anxiety, CKD 4/5, prostate cancer s/p radical prostatectomy, bladder ca s/p cystectomy and ileal conduit, and uretero-ilial stricture s/p right upside down nephroureteral stent placement and left upside down nephroureteral stent exchange 10/25 presents to the IR with dislodged nephroureteral tube. Patient reports only one nephroureteral tube remains. Patient would require new tube placement under sedation. Recommended patient to go to ER for evaluation

## 2024-10-28 NOTE — ED PROVIDER NOTE - ATTENDING CONTRIBUTION TO CARE
69M p/w back pain and dislodged NT since yesterday.  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 Pt with noted with BARON cr 8.15 yesterday to 8.9 today. Pt is s/p IR Right upside down nephroureteral stent placement and left upside down nephroureteral stent exchange on 10/25. Pt also c/o rash to R groin.  Rash to R groin appears to be fungal, will rx nystatin cream.  VS wnl.  PMHX HTN depression CKD, prostate CA, h/o radical prostatectomy, ileal conduit.  Will check labs, contact IR, rx pain med and antifungal cream.  Likely admission.    NOTE INCOMPLETE 69M p/w back pain and dislodged NT since yesterday.  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 Pt with noted with BARON cr 8.15 yesterday to 8.9 today. Pt is s/p IR Right upside down nephroureteral stent placement and left upside down nephroureteral stent exchange on 10/25. Pt also c/o rash to R groin.  Rash to R groin appears to be fungal, will rx nystatin cream.  VS wnl.  PMHX HTN depression CKD, prostate CA, h/o radical prostatectomy, ileal conduit.  Will check labs, contact IR, rx pain med and antifungal cream.  Likely admission.    VS:  unremarkable    GEN - NAD;   well appearing;   A+O x3   HEAD - NC/AT     ENT - PEERL, EOMI, mucous membranes    moist , no discharge      NECK: Neck supple, non-tender without lymphadenopathy, no masses, no JVD  PULM - CTA b/l,  symmetric breath sounds  COR -  normal heart sounds    ABD - , ND, NT, soft,  (+)ileal conduit with one stent in place in bag.  Yellow urine in pouch.    BACK - no CVA tenderness, nontender spine     EXTREMS - no edema, no deformity, warm and well perfused    SKIN - no bruising.  R upper thigh and intertriginous groin with redness and excoriation, no warmth, tenderness, purulence, induration, or crepitus.      NEUROLOGIC - alert, face symmetric, speech fluent, sensation nl, motor no focal deficit.  Ambulatory.

## 2024-10-28 NOTE — CHART NOTE - NSCHARTNOTEFT_GEN_A_CORE
PRE-INTERVENTIONAL RADIOLOGY PROCEDURE NOTE      Patient Age: 69    Patient Gender: M    Procedure: upside down nephroureteral tube replacement    Diagnosis/Indication: Displaced nephrostomy tube    Interventional Radiology Attending Physician: Dr. Mao    Ordering Attending Physician: Dr. Rangel    Pertinent Medical History: ckd stage 4/5, bladder cancer, ileal conduit    Pertinent labs:                      9.8    10.43 )-----------( 242      ( 28 Oct 2024 17:46 )             30.9                       Patient and Family Aware ? Yes PRE-INTERVENTIONAL RADIOLOGY PROCEDURE NOTE      Patient Age: 69    Patient Gender: M    Procedure: upside down nephroureteral tube replacement    Diagnosis/Indication: Displaced nephrostomy tube    Interventional Radiology Attending Physician: Dr. Mao    Ordering Attending Physician: Dr. Rangel    Pertinent Medical History: ckd stage 4/5, bladder cancer, ileal conduit    Pertinent labs:                      9.8    10.43 )-----------( 242      ( 28 Oct 2024 17:46 )             30.9                       Patient and Family Aware ? Yes    DD ED ATTG:   Agree.  I have seen and examined this pt.  See ED provider note for more details.

## 2024-10-28 NOTE — CONSULT NOTE ADULT - ASSESSMENT
68y Male with h/o bladder ca and ileal conduit with history of bilateral upside down nephroureteral stents presented to IR office today due to dislodgement of one of the tubes. Patient was advised to go to ER for further evaluation. Of note, patient recently dislodged the one tube on 10/24 which was then replaced on 10/24 in IR. IR is now consulted upside down nephroureteral tube replacement.     Plan:  -Please place IR procedure order for upside down nephroureteral tube replacement under Dr. Mao.   -Tentatively scheduled for tomorrow 10/29.  -Please obtain CT A/P   -Keep patient NPO past midnight.  -Hold anticoagulation  -AM CBC, BMP, Coags  -Please write Pre-IR note

## 2024-10-28 NOTE — H&P ADULT - NSHPLABSRESULTS_GEN_ALL_CORE
9.8    10.43 )-----------( 242      ( 28 Oct 2024 17:46 )             30.9     138  |  104  |  83[H]  ----------------------------<  105[H]     10-28  5.3   |  11[L]  |  7.19[H]    Ca    9.6      28 Oct 2024 17:46    TPro  8.0  /  Alb  3.2[L]  /  TBili  0.2  /  DBili  x   /  AST  11  /  ALT  9   /  AlkPhos  92  10-28    PT/INR: 11.2/0.94 (10-28-24 @ 17:40)  PTT: 38.1 (10-28-24 @ 17:40)                Urinalysis Basic - ( 28 Oct 2024 18:25 )  Color: Orange / Appearance: Cloudy / S.011 / pH: 7.5  Gluc: Negative mg/dL / Ketone: Negative mg/dL  / Bili: Negative / Urobili: 0.2 mg/dL   Blood: Large / Protein: 100 mg/dL / Nitrite: Positive   Leuk Esterase: Large / RBC: 102 /HPF / WBC 36 /HPF   Sq Epi: 1 /HPF / Bacteria: Many /HPF  Hyaline Casts: x/WBC Casts: x          Urinalysis with Rflx Culture (collected 28 Oct 2024 18:25)

## 2024-10-29 LAB — GLUCOSE BLDC GLUCOMTR-MCNC: 125 MG/DL — HIGH (ref 70–99)

## 2024-10-29 PROCEDURE — 99222 1ST HOSP IP/OBS MODERATE 55: CPT

## 2024-10-29 PROCEDURE — 74176 CT ABD & PELVIS W/O CONTRAST: CPT | Mod: 26

## 2024-10-29 PROCEDURE — 93010 ELECTROCARDIOGRAM REPORT: CPT

## 2024-10-29 PROCEDURE — 99232 SBSQ HOSP IP/OBS MODERATE 35: CPT

## 2024-10-29 RX ADMIN — Medication 650 MILLIGRAM(S): at 10:49

## 2024-10-29 RX ADMIN — NYSTATIN 1 APPLICATION(S): 100000 POWDER TOPICAL at 13:31

## 2024-10-29 RX ADMIN — Medication 1300 MILLIGRAM(S): at 07:51

## 2024-10-29 RX ADMIN — Medication 5 MILLIGRAM(S): at 21:39

## 2024-10-29 RX ADMIN — Medication 25 MILLIGRAM(S): at 21:38

## 2024-10-29 RX ADMIN — Medication 1300 MILLIGRAM(S): at 21:39

## 2024-10-29 RX ADMIN — Medication 1 MILLIGRAM(S): at 21:39

## 2024-10-29 RX ADMIN — Medication 650 MILLIGRAM(S): at 10:19

## 2024-10-29 RX ADMIN — TRAZODONE HYDROCHLORIDE 600 MILLIGRAM(S): 100 TABLET ORAL at 21:38

## 2024-10-29 RX ADMIN — Medication 1 MILLIGRAM(S): at 13:30

## 2024-10-29 RX ADMIN — Medication 1 MILLIGRAM(S): at 07:51

## 2024-10-29 RX ADMIN — Medication 1300 MILLIGRAM(S): at 13:30

## 2024-10-29 RX ADMIN — Medication 5 MILLIGRAM(S): at 10:21

## 2024-10-29 RX ADMIN — ENZALUTAMIDE 160 MILLIGRAM(S): 40 CAPSULE ORAL at 18:21

## 2024-10-29 RX ADMIN — Medication 100 MILLIGRAM(S): at 18:18

## 2024-10-29 NOTE — CONSULT NOTE ADULT - ATTENDING COMMENTS
BARON on CKD 5  No uremic symptoms currently    Will monitor for HD needs. IR for tube replacment.  Will monitor labs and Is/Os

## 2024-10-29 NOTE — PROVIDER CONTACT NOTE (OTHER) - BACKGROUND
68y Male with h/o bladder ca and ileal conduit with hx of bilateral upside down nephroureteral stents presented to IR office today with a dislodged upside down nephroureteral tube.

## 2024-10-29 NOTE — CONSULT NOTE ADULT - SUBJECTIVE AND OBJECTIVE BOX
[facility] DIVISION OF KIDNEY DISEASES AND HYPERTENSION -- [phone]  -- INITIAL CONSULT NOTE  --------------------------------------------------------------------------------  HPI: 69-year-old male with PMHx of anxiety/depression, CKD V not on HD (OP of Dr. Vidhi Kemp), prostate ca s/p radical prostatectomy, bladder cancer s/p cystectomy and ileal conduit formation c/b uretero-ilial stricture s/p B/L upside nephroureteral stent presenting with dislodged nephrostomy tube with recent admission for same complaint. Nephrology consulted for BARON on CKD.     Pt. has history of advanced CKD and has LUE AVF created 11/2023 by Dr. Obando. Pt. has not been initiated on dialysis. [facility] with SC[facility] outpatient of 6.11 (10/8/24). O prior admission (10/24-10/25/24) SCr was 8.15-8.90(10/24/24). On this admission pt. SCr is elevated/improved at 7.19.     Pt. seen and examined at bedside earlier today. Pt. is a highly anxious individual. He complains of the bags that he bought for his urostomy tube are not working. Pt. denies nausea, vomiting, change in taste. Reports itching skin over groin rash. No fever, CP, SOB, LE edema, HA or dizziness during rounds today.         PAST HISTORY  --------------------------------------------------------------------------------  PAST MEDICAL & SURGICAL HISTORY:  Prostate Cancer  HTN (Hypertension)  Anxiety  Major depressive disorder  UTI (urinary tract infection)  Urinary (tract) obstruction  Urethral stricture  Other calculus in bladder  Obese  Diabetes mellitus  Malignant neoplasm of bladder  Unspecified hydronephrosis  S/P Appendectomy  40 years ago  H/O cystoscopy  - multiple  last cystoscopy, laser litholapaxy on 1/2018  History of prostatectomy  robotic, 2011, s/p radiation  Suprapubic catheter  8/2015  History of bladder cancer  bladder removal May 10, 2018  S/P ileal conduit  May 2018  Nephrostomy status  Left, 8/1/2018  Bladder disease  Bladder Removed    FAMILY HISTORY:  FH: colon cancer    PAST SOCIAL HISTORY:    ALLERGIES & MEDICATIONS  --------------------------------------------------------------------------------  Allergies    No Known Allergies    Intolerances      Standing Inpatient Medications  amLODIPine   Tablet 5 milliGRAM(s) Oral <User Schedule>  calcitriol   Capsule 0.25 MICROGram(s) Oral <User Schedule>  cefTRIAXone   IVPB 1000 milliGRAM(s) IV Intermittent every 24 hours  clonazePAM  Tablet 1 milliGRAM(s) Oral three times a day  diphenhydrAMINE 25 milliGRAM(s) Oral at bedtime  enzalutamide 160 milliGRAM(s) Oral daily  nystatin Cream 1 Application(s) Topical once  sodium bicarbonate 1300 milliGRAM(s) Oral three times a day  traZODone 600 milliGRAM(s) Oral at bedtime    PRN Inpatient Medications  acetaminophen     Tablet .. 650 milliGRAM(s) Oral every 6 hours PRN      REVIEW OF SYSTEMS  --------------------------------------------------------------------------------  Gen: No fevers/chills  Skin: +groin rash  Head/Eyes/Ears: No HA  Respiratory: No SOB,  CV: No CP  GI: No abdominal pain, diarrhea, N/V  : +dislodged nephrostomy tube  MSK: +mild LE edema    All other systems were reviewed and are negative, except as noted.    VITALS/PHYSICAL EXAM  --------------------------------------------------------------------------------  T(C): 36.8 (10-29-24 @ 10:30), Max: 36.8 (10-29-24 @ 07:10)  HR: 72 (10-29-24 @ 10:20) (67 - 76)  BP: 153/74 (10-29-24 @ 10:20) (145/77 - 154/58)  RR: 16 (10-29-24 @ 10:30) (16 - 18)  SpO2: 99% (10-29-24 @ 10:30) (98% - 100%)  Wt(kg): --  Height (cm): 180.3 (10-28-24 @ 13:29)  Weight (kg): 99.8 (10-28-24 @ 13:29)  BMI (kg/m2): 30.7 (10-28-24 @ 13:29)  BSA (m2): 2.2 (10-28-24 @ 13:29)      10-28-24 @ 07:01  -  10-29-24 @ 07:00  --------------------------------------------------------  IN: 0 mL / OUT: 1200 mL / NET: -1200 mL    Physical Exam:  Gen: resting,NAD  Pulm: CTA B/L  CV: RRR, S1S2;  Abd: +BS, +urostomy pouch  Extremities: mild B/L LE edema  Neuro: Awake, alert.  Skin: Warm  Vascular Access: LUE AVF +thrill felt.       LABS/STUDIES  --------------------------------------------------------------------------------              9.8    10.43 >-----------<  242      [10-28-24 @ 17:46]              30.9     138  |  104  |  83  ----------------------------<  105      [10-28-24 @ 17:46]  5.3   |  11  |  7.19        Ca     9.6     [10-28-24 @ 17:46]    TPro  8.0  /  Alb  3.2  /  TBili  0.2  /  DBili  x   /  AST  11  /  ALT  9   /  AlkPhos  92  [10-28-24 @ 17:46]    PT/INR: PT 11.2 , INR 0.94       [10-28-24 @ 17:40]  PTT: 38.1       [10-28-24 @ 17:40]      Creatinine Trend:  SCr 7.19 [10-28 @ 17:46]  SCr 8.90 [10-25 @ 07:06]  SCr 8.15 [10-24 @ 14:12]    Urinalysis - [10-28-24 @ 18:25]      Color Orange / Appearance Cloudy / SG 1.011 / pH 7.5      Gluc Negative / Ketone Negative  / Bili Negative / Urobili 0.2       Blood Large / Protein 100 / Leuk Est Large / Nitrite Positive       / WBC 36 / Hyaline  / Gran  / Sq Epi  / Non Sq Epi 1 / Bacteria Many       [facility] DIVISION OF KIDNEY DISEASES AND HYPERTENSION -- [phone]  -- INITIAL CONSULT NOTE  --------------------------------------------------------------------------------  HPI: 69-year-old male with PMHx of anxiety/depression, CKD V not on HD (OP of Dr. Vidhi Kemp), prostate ca s/p radical prostatectomy, bladder cancer s/p cystectomy and ileal conduit formation c/b uretero-ilial stricture s/p B/L upside nephroureteral stent presenting with dislodged nephrostomy tube with recent admission for same complaint. Nephrology consulted for BARON on CKD.     Pt. has history of advanced CKD and has LUE AVF created 11/2023 by Dr. Obando. Pt. has not been initiated on dialysis. [facility] pt[facility] with SCr outpatient of 6.11 (10/8/24). O prior admission (10/24-10/25/24) SCr was 8.15-8.90(10/24/24). On this admission pt. SCr is elevated/improved at 7.19.     Pt. seen and examined at bedside earlier today. Pt. is a highly anxious individual. He complains of the bags that he bought for his urostomy tube are not working. Pt. denies nausea, vomiting, change in taste. Reports itching skin over groin rash. No fever, CP, SOB, LE edema, HA or dizziness during rounds today.         PAST HISTORY  --------------------------------------------------------------------------------  PAST MEDICAL & SURGICAL HISTORY:  Prostate Cancer  HTN (Hypertension)  Anxiety  Major depressive disorder  UTI (urinary tract infection)  Urinary (tract) obstruction  Urethral stricture  Other calculus in bladder  Obese  Diabetes mellitus  Malignant neoplasm of bladder  Unspecified hydronephrosis  S/P Appendectomy  40 years ago  H/O cystoscopy  - multiple  last cystoscopy, laser litholapaxy on 1/2018  History of prostatectomy  robotic, 2011, s/p radiation  Suprapubic catheter  8/2015  History of bladder cancer  bladder removal May 10, 2018  S/P ileal conduit  May 2018  Nephrostomy status  Left, 8/1/2018  Bladder disease  Bladder Removed    FAMILY HISTORY:  FH: colon cancer    PAST SOCIAL HISTORY: No smoking, drugs or alcohol use    ALLERGIES & MEDICATIONS  --------------------------------------------------------------------------------  Allergies    No Known Allergies    Intolerances      Standing Inpatient Medications  amLODIPine   Tablet 5 milliGRAM(s) Oral <User Schedule>  calcitriol   Capsule 0.25 MICROGram(s) Oral <User Schedule>  cefTRIAXone   IVPB 1000 milliGRAM(s) IV Intermittent every 24 hours  clonazePAM  Tablet 1 milliGRAM(s) Oral three times a day  diphenhydrAMINE 25 milliGRAM(s) Oral at bedtime  enzalutamide 160 milliGRAM(s) Oral daily  nystatin Cream 1 Application(s) Topical once  sodium bicarbonate 1300 milliGRAM(s) Oral three times a day  traZODone 600 milliGRAM(s) Oral at bedtime    PRN Inpatient Medications  acetaminophen     Tablet .. 650 milliGRAM(s) Oral every 6 hours PRN      REVIEW OF SYSTEMS  --------------------------------------------------------------------------------  Gen: No fevers/chills  Skin: +groin rash  Head/Eyes/Ears: No HA  Respiratory: No SOB,  CV: No CP  GI: No abdominal pain, diarrhea, N/V  : +dislodged nephrostomy tube  MSK: +mild LE edema    All other systems were reviewed and are negative, except as noted.    VITALS/PHYSICAL EXAM  --------------------------------------------------------------------------------  T(C): 36.8 (10-29-24 @ 10:30), Max: 36.8 (10-29-24 @ 07:10)  HR: 72 (10-29-24 @ 10:20) (67 - 76)  BP: 153/74 (10-29-24 @ 10:20) (145/77 - 154/58)  RR: 16 (10-29-24 @ 10:30) (16 - 18)  SpO2: 99% (10-29-24 @ 10:30) (98% - 100%)  Wt(kg): --  Height (cm): 180.3 (10-28-24 @ 13:29)  Weight (kg): 99.8 (10-28-24 @ 13:29)  BMI (kg/m2): 30.7 (10-28-24 @ 13:29)  BSA (m2): 2.2 (10-28-24 @ 13:29)      10-28-24 @ 07:01  -  10-29-24 @ 07:00  --------------------------------------------------------  IN: 0 mL / OUT: 1200 mL / NET: -1200 mL    Physical Exam:  Gen: resting,NAD  Pulm: CTA B/L  CV: RRR, S1S2;  Abd: +BS, +urostomy pouch  Extremities: mild B/L LE edema  Neuro: Awake, alert.  Skin: Warm  Vascular Access: LUE AVF +thrill felt.       LABS/STUDIES  --------------------------------------------------------------------------------              9.8    10.43 >-----------<  242      [10-28-24 @ 17:46]              30.9     138  |  104  |  83  ----------------------------<  105      [10-28-24 @ 17:46]  5.3   |  11  |  7.19        Ca     9.6     [10-28-24 @ 17:46]    TPro  8.0  /  Alb  3.2  /  TBili  0.2  /  DBili  x   /  AST  11  /  ALT  9   /  AlkPhos  92  [10-28-24 @ 17:46]    PT/INR: PT 11.2 , INR 0.94       [10-28-24 @ 17:40]  PTT: 38.1       [10-28-24 @ 17:40]      Creatinine Trend:  SCr 7.19 [10-28 @ 17:46]  SCr 8.90 [10-25 @ 07:06]  SCr 8.15 [10-24 @ 14:12]    Urinalysis - [10-28-24 @ 18:25]      Color Orange / Appearance Cloudy / SG 1.011 / pH 7.5      Gluc Negative / Ketone Negative  / Bili Negative / Urobili 0.2       Blood Large / Protein 100 / Leuk Est Large / Nitrite Positive       / WBC 36 / Hyaline  / Gran  / Sq Epi  / Non Sq Epi 1 / Bacteria Many

## 2024-10-29 NOTE — PROVIDER CONTACT NOTE (OTHER) - SITUATION
Patient with home medications at bedside and took nightime doses, patient refusing to give medications to RN
Patient with home medications at bedside and took nightime doses, patient refusing to give medications to RN

## 2024-10-29 NOTE — PATIENT PROFILE ADULT - FALL HARM RISK - HARM RISK INTERVENTIONS
Assistance with ambulation/Assistance OOB with selected safe patient handling equipment/Communicate Risk of Fall with Harm to all staff/Reinforce activity limits and safety measures with patient and family/Tailored Fall Risk Interventions/Use of alarms - bed, chair and/or voice tab/Visual Cue: Yellow wristband and red socks/Bed in lowest position, wheels locked, appropriate side rails in place/Call bell, personal items and telephone in reach/Instruct patient to call for assistance before getting out of bed or chair/Non-slip footwear when patient is out of bed/Randall to call system/Physically safe environment - no spills, clutter or unnecessary equipment/Purposeful Proactive Rounding/Room/bathroom lighting operational, light cord in reach
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
## 2024-10-29 NOTE — CONSULT NOTE ADULT - PROBLEM SELECTOR RECOMMENDATION 9
Pt. with BARON on CKD in setting of dislodged nephrostomy tube. Pt. has history of advanced CKD and has LUE AVF created 11/2023 by Dr. Obando. Pt. has not been initiated on dialysis. On review of [facility], pt. with SCr outpatient of 6.11 (10/8/24). O prior admission (10/24-10/25/24) SCr was 8.15-8.90(10/24/24). On this admission pt. SCr is elevated/improved at 7.19. Urinalysis is cloudy, 100 protein, large LE/+nitrites, many bacteria as is from ileal conduit. IR note from 10/27/24 reviewed. Plan for R nephrostomy tube replacement today. Pt. without uremic symptoms. No plan for HD. Monitor labs and I/Os. Avoid nephrotoxins including, ACE/ARB, NSAIDs, contrast, etc. Dose medications as per eGFR.       If you have any questions, please feel free to contact me:  Shanice Ty MD PGY-4  Nephrology Fellow  Pager [phone] / [employer] Teams (Preferred)  (After 5pm or on weekends please page the on-call fellow)

## 2024-10-29 NOTE — PATIENT PROFILE ADULT - FUNCTIONAL ASSESSMENT - BASIC MOBILITY 5.
HPI:  Here with cough, congestion, runny nose for 2 days. Cough became barky overnight. Temps at home no higher than 100F. Drinking, eating some. Taking an OTC cough syrup. Attends .       ROS:   negative other than stated above in HPI    Vitals:    10/05/23 1021   Temp: 37.8 °C (100 °F)   Weight: 13.8 kg        Current Outpatient Medications:     cetirizine (ZyrTEC) 1 mg/mL syrup, Take by mouth once daily., Disp: 118 mL, Rfl: 0     Physical Exam:  Alert. Interactive. Appears well hydrated.   Normocephalic. Atraumatic.MMM and pink. Oropharynx is pink. No lesions, or petechiae.   Tympanic membranes are dull bilaterally; with serous effusion, decreased light reflex and diminished landmarks.   Nasal turbinates erythematous; congested. Clear discharge.   Lungs clear bilaterally; good air exchange. No crackles or wheezing.   No murmurs. Regular rate and rhythm. Normal S1, S2.  Abdomen soft. Nontender. Nondistended. No hepatosplenomegaly  skin warm well perfused.      Assessment and Plan:  Viral croup. Diagnosis, expected resolution reviewed. Decadron given in office today. Discussed home supportive care and reasons to return  
3 = A little assistance

## 2024-10-29 NOTE — CHART NOTE - NSCHARTNOTEFT_GEN_A_CORE
Tentatively planned for new nephroureteral tube replacement vs. nephrostomy tube placement vs. nephroureterostomy tube placement tomorrow pending new imaging.   IR will need new CT abdomen and pelvis to evaluate position of existing nephroureteral tube and understanding which tube is dislodged prior to procedure. Yes

## 2024-10-29 NOTE — PROVIDER CONTACT NOTE (OTHER) - ASSESSMENT
Patient A&Ox3. Stable. NPO for IR. Refused to get his blood sugar checked this am. States, "I'm not happy with the care. I need to speak with the supervisor."
3-vessel coronary artery disease    H/O bilateral hip replacements    H/O carpal tunnel repair    History of appendectomy    History of right cataract surgery  bilat cataract sx

## 2024-10-30 LAB
ANION GAP SERPL CALC-SCNC: 16 MMOL/L — HIGH (ref 7–14)
APTT BLD: 39 SEC — HIGH (ref 24.5–35.6)
BUN SERPL-MCNC: 74 MG/DL — HIGH (ref 7–23)
CALCIUM SERPL-MCNC: 9 MG/DL — SIGNIFICANT CHANGE UP (ref 8.4–10.5)
CHLORIDE SERPL-SCNC: 109 MMOL/L — HIGH (ref 98–107)
CO2 SERPL-SCNC: 16 MMOL/L — LOW (ref 22–31)
CREAT SERPL-MCNC: 6.53 MG/DL — HIGH (ref 0.5–1.3)
EGFR: 9 ML/MIN/1.73M2 — LOW
GLUCOSE BLDC GLUCOMTR-MCNC: 106 MG/DL — HIGH (ref 70–99)
GLUCOSE BLDC GLUCOMTR-MCNC: 134 MG/DL — HIGH (ref 70–99)
GLUCOSE BLDC GLUCOMTR-MCNC: 161 MG/DL — HIGH (ref 70–99)
GLUCOSE BLDC GLUCOMTR-MCNC: 161 MG/DL — HIGH (ref 70–99)
GLUCOSE SERPL-MCNC: 125 MG/DL — HIGH (ref 70–99)
HCT VFR BLD CALC: 26 % — LOW (ref 39–50)
HGB BLD-MCNC: 8.5 G/DL — LOW (ref 13–17)
INR BLD: 0.91 RATIO — SIGNIFICANT CHANGE UP (ref 0.85–1.16)
MAGNESIUM SERPL-MCNC: 1.9 MG/DL — SIGNIFICANT CHANGE UP (ref 1.6–2.6)
MCHC RBC-ENTMCNC: 30.1 PG — SIGNIFICANT CHANGE UP (ref 27–34)
MCHC RBC-ENTMCNC: 32.7 G/DL — SIGNIFICANT CHANGE UP (ref 32–36)
MCV RBC AUTO: 92.2 FL — SIGNIFICANT CHANGE UP (ref 80–100)
NRBC # BLD: 0 /100 WBCS — SIGNIFICANT CHANGE UP (ref 0–0)
NRBC # FLD: 0 K/UL — SIGNIFICANT CHANGE UP (ref 0–0)
PHOSPHATE SERPL-MCNC: 4.7 MG/DL — HIGH (ref 2.5–4.5)
PLATELET # BLD AUTO: 219 K/UL — SIGNIFICANT CHANGE UP (ref 150–400)
POTASSIUM SERPL-MCNC: 4.5 MMOL/L — SIGNIFICANT CHANGE UP (ref 3.5–5.3)
POTASSIUM SERPL-SCNC: 4.5 MMOL/L — SIGNIFICANT CHANGE UP (ref 3.5–5.3)
PROTHROM AB SERPL-ACNC: 10.8 SEC — SIGNIFICANT CHANGE UP (ref 9.9–13.4)
RBC # BLD: 2.82 M/UL — LOW (ref 4.2–5.8)
RBC # FLD: 15.1 % — HIGH (ref 10.3–14.5)
SODIUM SERPL-SCNC: 141 MMOL/L — SIGNIFICANT CHANGE UP (ref 135–145)
WBC # BLD: 8.16 K/UL — SIGNIFICANT CHANGE UP (ref 3.8–10.5)
WBC # FLD AUTO: 8.16 K/UL — SIGNIFICANT CHANGE UP (ref 3.8–10.5)

## 2024-10-30 PROCEDURE — 99232 SBSQ HOSP IP/OBS MODERATE 35: CPT

## 2024-10-30 PROCEDURE — 50387 CHANGE NEPHROURETERAL CATH: CPT | Mod: LT

## 2024-10-30 PROCEDURE — 53899 UNLISTED PX URINARY SYSTEM: CPT | Mod: 78

## 2024-10-30 PROCEDURE — 99232 SBSQ HOSP IP/OBS MODERATE 35: CPT | Mod: GC

## 2024-10-30 PROCEDURE — 50433 PLMT NEPHROURETERAL CATHETER: CPT | Mod: RT,XS

## 2024-10-30 RX ADMIN — Medication 1 MILLIGRAM(S): at 14:31

## 2024-10-30 RX ADMIN — Medication 1 MILLIGRAM(S): at 05:42

## 2024-10-30 RX ADMIN — Medication 1300 MILLIGRAM(S): at 21:37

## 2024-10-30 RX ADMIN — Medication 5 MILLIGRAM(S): at 09:25

## 2024-10-30 RX ADMIN — TRAZODONE HYDROCHLORIDE 600 MILLIGRAM(S): 100 TABLET ORAL at 21:33

## 2024-10-30 RX ADMIN — Medication 650 MILLIGRAM(S): at 09:19

## 2024-10-30 RX ADMIN — Medication 25 MILLIGRAM(S): at 21:34

## 2024-10-30 RX ADMIN — Medication 650 MILLIGRAM(S): at 10:01

## 2024-10-30 RX ADMIN — Medication 5 MILLIGRAM(S): at 20:37

## 2024-10-30 RX ADMIN — Medication 1300 MILLIGRAM(S): at 05:42

## 2024-10-30 RX ADMIN — ENZALUTAMIDE 160 MILLIGRAM(S): 40 CAPSULE ORAL at 20:31

## 2024-10-30 RX ADMIN — Medication 1 MILLIGRAM(S): at 21:37

## 2024-10-30 NOTE — CHART NOTE - NSCHARTNOTEFT_GEN_A_CORE
PRE-INTERVENTIONAL RADIOLOGY PROCEDURE NOTE    Patient Age: 69  Patient Gender: M    Procedure (including site / side if known): nephrostomy tube replacement     Diagnosis / Indication: Obstruction of ureteroileal conduit     Interventional Radiology Attending Physician: Dr. Mao    Ordering Attending Physician: Dr. Artis     Pertinent medical history: with h/o bladder ca and ileal conduit with hx of bilateral upside down nephroureteral stents    Pertinent labs:                       8.5    8.16  )-----------( 219      ( 30 Oct 2024 07:40 )             26.0   10-30    141  |  109[H]  |  74[H]  ----------------------------<  125[H]  4.5   |  16[L]  |  6.53[H]    Ca    9.0      30 Oct 2024 07:40  Phos  4.7     10-30  Mg     1.90     10-30    TPro  8.0  /  Alb  3.2[L]  /  TBili  0.2  /  DBili  x   /  AST  11  /  ALT  9   /  AlkPhos  92  10-28  PT/INR - ( 30 Oct 2024 07:40 )   PT: 10.8 sec;   INR: 0.91 ratio         PTT - ( 30 Oct 2024 07:40 )  PTT:39.0 sec    Patient and Family aware? Yes

## 2024-10-30 NOTE — PRE PROCEDURE NOTE - PRE PROCEDURE EVALUATION
------------------------------------------------------------  Interventional Radiology Pre-Procedure Note  ------------------------------------------------------------    Indication: 69y Male with history of bladder CA and ileal conduit with bilateral upside down nephroureteral tubes who presents with dislodged right nephroureteral tube and malpositioned left nephroureteral tube. Plan for right upside down nephroureteral tube replacement and left nephroureteral tube exchange.     Past Medical History:  Prostate Cancer    HTN (Hypertension)    Anxiety    Major depressive disorder    UTI (urinary tract infection)    Urinary (tract) obstruction    Urethral stricture    Other calculus in bladder    Obese    Diabetes mellitus    Malignant neoplasm of bladder    Unspecified hydronephrosis        Allergies: No Known Allergies      Medications:  amLODIPine   Tablet: 5 milliGRAM(s) Oral (10-30-24 @ 09:25)  cefTRIAXone   IVPB: 100 mL/Hr IV Intermittent (10-29-24 @ 18:18)      Vital Signs:   T(F): 97.9 (09:25), Max: 97.9 (18:12)  HR: 68 (09:25)  BP: 145/72 (09:25)  RR: 19 (09:25)  SpO2: 99% (09:25)    Labs:           8.5  8.16)-----(219     (10-30-24 @ 07:40)         26.0     141 | 109 | 74  --------------------< 125     (10-30-24 @ 07:40)  4.5 | 16 | 6.53       PT: 10.8 10-30-24 @ 07:40  aPTT: 39.0[H] 10-30-24 @ 07:40   INR: 0.91 10-30-24 @ 07:40    Imaging: Most recent CT abdomen and pelvis was reviewed    Consent: Risks/benefits/alternatives were explained and informed written consent was obtained.     Procedure Plan: Plan for left ureteral stent exchange and right ureteral stent placement.

## 2024-10-30 NOTE — ADVANCED PRACTICE NURSE CONSULT - REASON FOR CONSULT
Patient seen for urostomy management. Patient with h/o HTN, depression, anxiety, CKD 5, prostate cancer s/p radical prostatectomy, bladder ca s/p cystectomy and ileal conduit, and uretero-ilial stricture s/p  bilateral upside down nephroureteral stents presents to the ED with dislodged right nephroureteral tube pending OR  for tube replacement.

## 2024-10-30 NOTE — ADVANCED PRACTICE NURSE CONSULT - ASSESSMENT
Patient interviewed stating he manages his ostomy independently with occasional leakage. "Hospital bags leak more". Uses a one piece convex Coloplast pouch at home- precut unsure of size. From hx seems like the opening might be too small- "urine burrows underneath".     RLQ urostomy- 1" See A&I flowsheet for full stoma assessment details.

## 2024-10-30 NOTE — PROGRESS NOTE ADULT - PROBLEM SELECTOR PLAN 6
- Continue Xtandi  pt will need to bring home meds as this is not available in the hospital
- Continue Xtandi  pt will need to bring home meds as this is not available in the hospital

## 2024-10-30 NOTE — ADVANCED PRACTICE NURSE CONSULT - RECOMMEDATIONS
Supplies utilized: one piece fecal pouch,     Recommending one piece urostomy- item #72829 with use of Brava strips.     Please reconsult if we can be of further assistance (ext [phone]).

## 2024-10-30 NOTE — PROGRESS NOTE ADULT - PROBLEM SELECTOR PLAN 2
Cr above baseline in the setting of obstruction  - trend post procedure  - outpt nephrology f/u.  Will possibly need HD in future.  Fistula present.
Cr above baseline in the setting of obstruction  - trend post procedure  - outpt nephrology f/u.  Will possibly need HD in future.  Fistula present.  Cr stable

## 2024-10-30 NOTE — PROCEDURE NOTE - PROCEDURE FINDINGS AND DETAILS
Status post successful left upside down nephroureterostomy stent exchange (10.2 Fr x 45 cm).   Next, a right 10.2Fr x 45cm upside down nephroureterostomy stent was placed through access into the middle pole of the right kidney. The right renal access was taken out and hemostasis was achieved with manual pressure.   Dry sterile dressing was applied. The ostomy was placed back on.

## 2024-10-30 NOTE — PROGRESS NOTE ADULT - PROBLEM SELECTOR PLAN 5
- Continue home amlodipine   - hold Losartan 2/2 BARON
- Continue home amlodipine   - hold Losartan 2/2 BARON

## 2024-10-31 ENCOUNTER — TRANSCRIPTION ENCOUNTER (OUTPATIENT)
Age: 69
End: 2024-10-31

## 2024-10-31 VITALS
TEMPERATURE: 99 F | RESPIRATION RATE: 17 BRPM | SYSTOLIC BLOOD PRESSURE: 135 MMHG | OXYGEN SATURATION: 98 % | HEART RATE: 118 BPM | DIASTOLIC BLOOD PRESSURE: 77 MMHG

## 2024-10-31 LAB
A1C WITH ESTIMATED AVERAGE GLUCOSE RESULT: 5.7 % — HIGH (ref 4–5.6)
ANION GAP SERPL CALC-SCNC: 16 MMOL/L — HIGH (ref 7–14)
BUN SERPL-MCNC: 66 MG/DL — HIGH (ref 7–23)
CALCIUM SERPL-MCNC: 9.2 MG/DL — SIGNIFICANT CHANGE UP (ref 8.4–10.5)
CHLORIDE SERPL-SCNC: 109 MMOL/L — HIGH (ref 98–107)
CO2 SERPL-SCNC: 15 MMOL/L — LOW (ref 22–31)
CREAT SERPL-MCNC: 5.83 MG/DL — HIGH (ref 0.5–1.3)
EGFR: 10 ML/MIN/1.73M2 — LOW
ESTIMATED AVERAGE GLUCOSE: 117 — SIGNIFICANT CHANGE UP
GLUCOSE SERPL-MCNC: 135 MG/DL — HIGH (ref 70–99)
HCT VFR BLD CALC: 30.1 % — LOW (ref 39–50)
HGB BLD-MCNC: 9.6 G/DL — LOW (ref 13–17)
MAGNESIUM SERPL-MCNC: 1.9 MG/DL — SIGNIFICANT CHANGE UP (ref 1.6–2.6)
MCHC RBC-ENTMCNC: 29.7 PG — SIGNIFICANT CHANGE UP (ref 27–34)
MCHC RBC-ENTMCNC: 31.9 G/DL — LOW (ref 32–36)
MCV RBC AUTO: 93.2 FL — SIGNIFICANT CHANGE UP (ref 80–100)
NRBC # BLD: 0 /100 WBCS — SIGNIFICANT CHANGE UP (ref 0–0)
NRBC # FLD: 0 K/UL — SIGNIFICANT CHANGE UP (ref 0–0)
PHOSPHATE SERPL-MCNC: 4.6 MG/DL — HIGH (ref 2.5–4.5)
PLATELET # BLD AUTO: 220 K/UL — SIGNIFICANT CHANGE UP (ref 150–400)
POTASSIUM SERPL-MCNC: 4.6 MMOL/L — SIGNIFICANT CHANGE UP (ref 3.5–5.3)
POTASSIUM SERPL-SCNC: 4.6 MMOL/L — SIGNIFICANT CHANGE UP (ref 3.5–5.3)
RBC # BLD: 3.23 M/UL — LOW (ref 4.2–5.8)
RBC # FLD: 15.1 % — HIGH (ref 10.3–14.5)
SODIUM SERPL-SCNC: 140 MMOL/L — SIGNIFICANT CHANGE UP (ref 135–145)
WBC # BLD: 11.34 K/UL — HIGH (ref 3.8–10.5)
WBC # FLD AUTO: 11.34 K/UL — HIGH (ref 3.8–10.5)

## 2024-10-31 PROCEDURE — 99232 SBSQ HOSP IP/OBS MODERATE 35: CPT | Mod: GC

## 2024-10-31 PROCEDURE — 99239 HOSP IP/OBS DSCHRG MGMT >30: CPT

## 2024-10-31 RX ORDER — LOSARTAN POTASSIUM 25 MG/1
1 TABLET ORAL
Refills: 0 | DISCHARGE

## 2024-10-31 RX ORDER — CLONAZEPAM 1 MG
1 TABLET ORAL THREE TIMES A DAY
Refills: 0 | Status: DISCONTINUED | OUTPATIENT
Start: 2024-10-31 | End: 2024-10-31

## 2024-10-31 RX ORDER — CIPROFLOXACIN LACTATE 200MG/20ML
1 VIAL (ML) INTRAVENOUS
Qty: 7 | Refills: 0
Start: 2024-10-31 | End: 2024-11-06

## 2024-10-31 RX ORDER — MELATONIN 5 MG
6 TABLET ORAL ONCE
Refills: 0 | Status: COMPLETED | OUTPATIENT
Start: 2024-10-31 | End: 2024-10-31

## 2024-10-31 RX ADMIN — CALCITRIOL 0.25 MICROGRAM(S): 0.25 CAPSULE, LIQUID FILLED ORAL at 12:38

## 2024-10-31 RX ADMIN — Medication 1300 MILLIGRAM(S): at 06:18

## 2024-10-31 RX ADMIN — Medication 6 MILLIGRAM(S): at 03:35

## 2024-10-31 RX ADMIN — Medication 1 MILLIGRAM(S): at 06:17

## 2024-10-31 RX ADMIN — Medication 5 MILLIGRAM(S): at 12:38

## 2024-10-31 NOTE — DISCHARGE NOTE PROVIDER - NSDCMRMEDTOKEN_GEN_ALL_CORE_FT
amLODIPine 5 mg oral tablet: 1 tab(s) orally 2 times a day  calcitriol 0.25 mcg oral capsule: 1 cap(s) orally 3 times a week Monday, Thursday, Saturday  diphenhydrAMINE 25 mg oral tablet: 1 tab(s) orally once a day (at bedtime)  KlonoPIN 1 mg oral tablet: 1 tab(s) orally 3 times a day  losartan 25 mg oral tablet: 1 tab(s) orally once a day  sodium bicarbonate 650 mg oral tablet: 2 tab(s) orally 3 times a day  traZODone 150 mg oral tablet: 4 tab(s) orally once a day (at bedtime)  Xtandi 40 mg oral capsule: 4 cap(s) orally once a day   amLODIPine 5 mg oral tablet: 1 tab(s) orally 2 times a day  calcitriol 0.25 mcg oral capsule: 1 cap(s) orally 3 times a week Monday, Thursday, Saturday  diphenhydrAMINE 25 mg oral tablet: 1 tab(s) orally once a day (at bedtime)  KlonoPIN 1 mg oral tablet: 1 tab(s) orally 3 times a day  sodium bicarbonate 650 mg oral tablet: 2 tab(s) orally 3 times a day  traZODone 150 mg oral tablet: 4 tab(s) orally once a day (at bedtime)  Xtandi 40 mg oral capsule: 4 cap(s) orally once a day   amLODIPine 5 mg oral tablet: 1 tab(s) orally 2 times a day  calcitriol 0.25 mcg oral capsule: 1 cap(s) orally 3 times a week Monday, Thursday, Saturday  Cipro 500 mg oral tablet: 1 tab(s) orally every 24 hours  diphenhydrAMINE 25 mg oral tablet: 1 tab(s) orally once a day (at bedtime)  KlonoPIN 1 mg oral tablet: 1 tab(s) orally 3 times a day  sodium bicarbonate 650 mg oral tablet: 2 tab(s) orally 3 times a day  traZODone 150 mg oral tablet: 4 tab(s) orally once a day (at bedtime)  Xtandi 40 mg oral capsule: 4 cap(s) orally once a day

## 2024-10-31 NOTE — PROGRESS NOTE ADULT - REASON FOR ADMISSION
Dislodged nephroureteral tube

## 2024-10-31 NOTE — DISCHARGE NOTE NURSING/CASE MANAGEMENT/SOCIAL WORK - FINANCIAL ASSISTANCE
[facility] provides services at a reduced cost to those who are determined to be eligible through [facility]’s financial assistance program. Information regarding [facility]’s financial assistance program can be found by going to [email or web address] or by calling [phone].

## 2024-10-31 NOTE — DISCHARGE NOTE PROVIDER - HOSPITAL COURSE
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  Pt was admitted for dislodgement of tube 4 days PTA.  He left AMA after the procedure was performed on 10/25.  Today, the tube became dislodged.  Replacement delayed due to pt refusing initial CT which was necessary for IR to determine location of remaining NT, which was also mildly displaced  PT had R NT replaced and L NT exchanged 10/30

## 2024-10-31 NOTE — CHART NOTE - NSCHARTNOTEFT_GEN_A_CORE
UCx sensitivity resulted ESBL E Coli. Discussed with Dr. Artis will treat with Cipro x7d course. Cipro Rx renally dosed (confirmed w/ pharmacy) sent to patient's pharmacy on file. Called patient at [phone] to inform patient of results and prescription however no response. Left voicemail with callback info included.     Tyra Christianson PA-C  Department of Medicine   In-house pager #[phone]

## 2024-10-31 NOTE — DISCHARGE NOTE PROVIDER - NSDCFUSCHEDAPPT_GEN_ALL_CORE_FT
Jenifer Lilly  [facility]  FAMILYMED [address]  Scheduled Appointment: 11/25/2024    Vidhi Kemp  [facility]  NEPHRO [address]  Scheduled Appointment: 12/16/2024

## 2024-10-31 NOTE — PROGRESS NOTE ADULT - PROBLEM SELECTOR PLAN 1
- reviewed IR consult  CT done yesterday afternoon, seems some movement of remaining NT as well as R tube dislodged  plans for NT placment this afternoon  can cont ceftriaxone for now but UA will be + due to ileostomy, pt currently non toxic; Ucx from last NT change negative
Pt. with BARON on CKD in setting of dislodged nephrostomy tube. Pt. has history of advanced CKD and has LUE AVF created 11/2023 by Dr. Obando. Pt. has not been initiated on dialysis. On review of [facility], pt. with SCr outpatient of 6.11 (10/8/24). O prior admission (10/24-10/25/24) SCr was 8.15-8.90(10/24/24). On this admission pt. SCr is elevated at 7.19 10/29/24. Pt. SCr remains elevated/improved to 6.53 today Urinalysis is cloudy, 100 protein, large LE/+nitrites, many bacteria as is from ileal conduit. CTAP (10/29) with Left sided nephroureteral stent with proximal pigtail in the mid ureter, which is slightly more inferior as compared to 10/24/2024. Severe right and moderate left hydroureteronephrosis is unchanged. IR note from 10/29/24 reviewed. Plan for R nephrostomy tube replacement today. Pt. without uremic symptoms. No plan for HD. Monitor labs and I/Os. Avoid nephrotoxins including, ACE/ARB, NSAIDs, contrast, etc. Dose medications as per eGFR.       If you have any questions, please feel free to contact me:  Shanice Ty MD PGY-4  Nephrology Fellow  Pager [phone] / [employer] Teams (Preferred)  (After 5pm or on weekends please page the on-call fellow).
Pt. with BARON on CKD in setting of dislodged nephrostomy tube. Pt. has history of advanced CKD and has LUE AVF created 11/2023 by Dr. Obando. Pt. has not been initiated on dialysis. On review of [facility], pt. with SCr outpatient of 6.11 (10/8/24). On prior admission (10/24-10/25/24) SCr was 8.15-8.90(10/24/24). On this admission pt. SCr is elevated at 7.19 10/29/24. Pt. SCr remains elevated/improved to 5.83 today. Urinalysis is cloudy, 100 protein, large LE/+nitrites, many bacteria as is from ileal conduit. CTAP (10/29) with Left sided nephroureteral stent with proximal pigtail in the mid ureter, which is slightly more inferior as compared to 10/24/2024. Severe right and moderate left hydroureteronephrosis is unchanged. IR note from 10/29/24 reviewed. S/p R upside down nephroureteral stent placement and L sided exchange 10/30. Pt. without uremic symptoms. No plan for HD. Monitor labs and I/Os. Avoid nephrotoxins including, ACE/ARB, NSAIDs, contrast, etc. Dose medications as per eGFR.     If patient is being dischargedplease make an appointment with Dr. Kemp at the [facility] clinic. For scheduling please email Nephrology at [email or web address] or call  [phone].     If you have any questions, please feel free to contact me:  Shanice Ty MD PGY-4  Nephrology Fellow  Pager [phone] / [employer] Teams (Preferred)  (After 5pm or on weekends please page the on-call fellow).
- reviewed IR consult  pt refused CT last night, now planned for 2:30 today, will then need to f/u with IR for NT replacement  can cont ceftriaxone for now but UA will be + due to ileostomy, pt currently non toxic

## 2024-10-31 NOTE — DISCHARGE NOTE NURSING/CASE MANAGEMENT/SOCIAL WORK - NSDCFUADDAPPT_GEN_ALL_CORE_FT
Follow up with your primary care physician for further monitoring in 1-2 weeks. Please call to arrange appointment.     Follow up with your nephrologist Dr. Kemp within 2 weeks. You may call [phone] to arrange an appt.     You will need routine nephrostomy tube exchange every 3 months. You can call Outpatient IR office [phone] to schedule.

## 2024-10-31 NOTE — DISCHARGE NOTE PROVIDER - NSDCCPCAREPLAN_GEN_ALL_CORE_FT
PRINCIPAL DISCHARGE DIAGNOSIS  Diagnosis: Nephrostomy tube displaced  Assessment and Plan of Treatment: Please be careful when tending to your ileostomy to not dislodge your nephrostomy tubes.  These were replaced 10/30/2024      SECONDARY DISCHARGE DIAGNOSES  Diagnosis: Acute kidney injury superimposed on CKD  Assessment and Plan of Treatment: Your kidney numbers are at your baseline.  Please keep your appointment with Dr Kemp.  Take your medication as prescribed    Diagnosis: Prostate cancer  Assessment and Plan of Treatment: Continue your xandi

## 2024-10-31 NOTE — DISCHARGE NOTE PROVIDER - NSDCFUADDAPPT_GEN_ALL_CORE_FT
Follow up with your primary care physician for further monitoring in 1-2 weeks. Please call to arrange appointment.     Follow up with your nephrologist Dr. Kemp within 2 weeks. You may call [phone] to arrange an appt.     You will need routine nephrostomy tube exchange every 3 months. You can call Outpatient IR office [phone] to schedule.

## 2024-10-31 NOTE — DISCHARGE NOTE PROVIDER - ATTENDING DISCHARGE PHYSICAL EXAMINATION:
pt seen and examined by me  cont to be argumentative about his time in the hospital  reminded pt to be careful when changing his colostomy back not to dislodge the tubes; pt declined RN visit offered by DEBBIE  pt seen with [facility] Oliva at bedside  VSS  CHEST non labored  ABD stoma pink, 2 NT draining clear yellow urine  Cr improved  a/w dislodged NT  s/p replacement and exchange of remaining NT  improvement of BARON on CKD  medically stable for dc

## 2024-10-31 NOTE — DISCHARGE NOTE NURSING/CASE MANAGEMENT/SOCIAL WORK - NSDCVIVACCINE_GEN_ALL_CORE_FT
influenza, injectable, quadrivalent, preservative free; 23-Nov-2019 14:01; Maryanne Snider (RN); [employer]; 3bs44 (Exp. Date: 30-Jun-2020); IntraMuscular; Deltoid Left.; 0.5 milliLiter(s); VIS (VIS Published: 15-Aug-2019, VIS Presented: 23-Nov-2019);

## 2024-10-31 NOTE — PROGRESS NOTE ADULT - SUBJECTIVE AND OBJECTIVE BOX
[facility] Division of Kidney Diseases & Hypertension  FOLLOW UP NOTE  [phone]--------------------------------------------------------------------------------  Chief Complaint: BARON on CKD    24 hour events/subjective: Pt. seen and examined at bedside earlier today. Pt. feels improved.  No fever, CP, SOB, LE edema, HA or dizziness during rounds today.     PAST HISTORY  --------------------------------------------------------------------------------  No significant changes to PMH, PSH, FHx, SHx from H&P unless otherwise noted.    ALLERGIES & MEDICATIONS  --------------------------------------------------------------------------------  Allergies    No Known Allergies    Intolerances      Standing Inpatient Medications  amLODIPine   Tablet 5 milliGRAM(s) Oral <User Schedule>  calcitriol   Capsule 0.25 MICROGram(s) Oral <User Schedule>  cefTRIAXone   IVPB 1000 milliGRAM(s) IV Intermittent every 24 hours  clonazePAM  Tablet 1 milliGRAM(s) Oral three times a day  diphenhydrAMINE 25 milliGRAM(s) Oral at bedtime  enzalutamide 160 milliGRAM(s) Oral daily  sodium bicarbonate 1300 milliGRAM(s) Oral three times a day  traZODone 600 milliGRAM(s) Oral at bedtime    PRN Inpatient Medications  acetaminophen     Tablet .. 650 milliGRAM(s) Oral every 6 hours PRN      REVIEW OF SYSTEMS  --------------------------------------------------------------------------------  Gen: No fevers/chills  Skin: +groin rash  Head/Eyes/Ears: No HA  Respiratory: No SOB,  CV: No CP  GI: No abdominal pain, diarrhea, N/V  : +dislodged nephrostomy tube  MSK: +mild LE edema    All other systems were reviewed and are negative, except as noted above.    VITALS/PHYSICAL EXAM  --------------------------------------------------------------------------------  T(C): 36.7 (10-30-24 @ 19:46), Max: 36.7 (10-30-24 @ 19:46)  HR: 66 (10-30-24 @ 19:46) (66 - 72)  BP: 148/74 (10-30-24 @ 19:46) (142/87 - 162/72)  RR: 18 (10-30-24 @ 19:46) (11 - 18)  SpO2: 98% (10-30-24 @ 19:46) (98% - 100%)  Wt(kg): --        10-30-24 @ 07:01  -  10-31-24 @ 07:00  --------------------------------------------------------  IN: 0 mL / OUT: 950 mL / NET: -950 mL      Physical Exam:  Gen: resting,NAD  Pulm: CTA B/L  CV: RRR, S1S2;  Abd: +BS, +urostomy pouch  Extremities: mild B/L LE edema  Neuro: Awake, alert.  Skin: Warm  Vascular Access: LUE AVF +thrill felt.     LABS/STUDIES  --------------------------------------------------------------------------------              9.6    11.34 >-----------<  220      [10-31-24 @ 07:45]              30.1     140  |  109  |  66  ----------------------------<  135      [10-31-24 @ 07:45]  4.6   |  15  |  5.83        Ca     9.2     [10-31-24 @ 07:45]      Mg     1.90     [10-31-24 @ 07:45]      Phos  4.6     [10-31-24 @ 07:45]      PT/INR: PT 10.8 , INR 0.91       [10-30-24 @ 07:40]  PTT: 39.0       [10-30-24 @ 07:40]      Creatinine Trend:  SCr 5.83 [10-31 @ 07:45]  SCr 6.53 [10-30 @ 07:40]  SCr 7.19 [10-28 @ 17:46]  SCr 8.90 [10-25 @ 07:06]  SCr 8.15 [10-24 @ 14:12]        
[facility] Division of Kidney Diseases & Hypertension  FOLLOW UP NOTE  [phone]--------------------------------------------------------------------------------  Chief Complaint: BARON on CKD    24 hour events/subjective: Pt. seen and examined at bedside earlier today. Pt. visibly upset that IR procedure was unable to be completed yesterday. No fever, CP, SOB, LE edema, HA or dizziness during rounds today.     PAST HISTORY  --------------------------------------------------------------------------------  No significant changes to PMH, PSH, FHx, SHx from H&P unless otherwise noted.    ALLERGIES & MEDICATIONS  --------------------------------------------------------------------------------  Allergies    No Known Allergies    Intolerances      Standing Inpatient Medications  amLODIPine   Tablet 5 milliGRAM(s) Oral <User Schedule>  calcitriol   Capsule 0.25 MICROGram(s) Oral <User Schedule>  cefTRIAXone   IVPB 1000 milliGRAM(s) IV Intermittent every 24 hours  clonazePAM  Tablet 1 milliGRAM(s) Oral three times a day  diphenhydrAMINE 25 milliGRAM(s) Oral at bedtime  enzalutamide 160 milliGRAM(s) Oral daily  sodium bicarbonate 1300 milliGRAM(s) Oral three times a day  traZODone 600 milliGRAM(s) Oral at bedtime    PRN Inpatient Medications  acetaminophen     Tablet .. 650 milliGRAM(s) Oral every 6 hours PRN      REVIEW OF SYSTEMS  --------------------------------------------------------------------------------  Gen: No fevers/chills  Skin: +groin rash  Head/Eyes/Ears: No HA  Respiratory: No SOB,  CV: No CP  GI: No abdominal pain, diarrhea, N/V  : +dislodged nephrostomy tube  MSK: +mild LE edema    All other systems were reviewed and are negative, except as noted above.    VITALS/PHYSICAL EXAM  --------------------------------------------------------------------------------  T(C): 36.5 (10-30-24 @ 05:15), Max: 36.8 (10-29-24 @ 10:30)  HR: 75 (10-30-24 @ 05:15) (72 - 80)  BP: 163/79 (10-30-24 @ 05:15) (125/64 - 163/79)  RR: 17 (10-30-24 @ 05:15) (16 - 18)  SpO2: 97% (10-30-24 @ 05:15) (97% - 99%)  Wt(kg): --  Height (cm): 180.3 (10-28-24 @ 13:29)  Weight (kg): 99.8 (10-28-24 @ 13:29)  BMI (kg/m2): 30.7 (10-28-24 @ 13:29)  BSA (m2): 2.2 (10-28-24 @ 13:29)      10-29-24 @ 07:01  -  10-30-24 @ 07:00  --------------------------------------------------------  IN: 0 mL / OUT: 1100 mL / NET: -1100 mL      Physical Exam:  Gen: resting,NAD  Pulm: CTA B/L  CV: RRR, S1S2;  Abd: +BS, +urostomy pouch  Extremities: mild B/L LE edema  Neuro: Awake, alert.  Skin: Warm  Vascular Access: LUE AVF +thrill felt.     LABS/STUDIES  --------------------------------------------------------------------------------              8.5    8.16  >-----------<  219      [10-30-24 @ 07:40]              26.0     141  |  109  |  74  ----------------------------<  125      [10-30-24 @ 07:40]  4.5   |  16  |  6.53        Ca     9.0     [10-30-24 @ 07:40]      Mg     1.90     [10-30-24 @ 07:40]      Phos  4.7     [10-30-24 @ 07:40]    TPro  8.0  /  Alb  3.2  /  TBili  0.2  /  DBili  x   /  AST  11  /  ALT  9   /  AlkPhos  92  [10-28-24 @ 17:46]    PT/INR: PT 10.8 , INR 0.91       [10-30-24 @ 07:40]  PTT: 39.0       [10-30-24 @ 07:40]      Creatinine Trend:  SCr 6.53 [10-30 @ 07:40]  SCr 7.19 [10-28 @ 17:46]  SCr 8.90 [10-25 @ 07:06]  SCr 8.15 [10-24 @ 14:12]          
  68yo Male s/p rght upside down nephroureteral stent placement and left upside down nephroureteral stent exchange on 10/30 in Interventional Radiology.     Patient seen and examined at bedside, resting comfortably.   Denies any pain, offers no further complaints. Wants to go home today.     T(F): 98.1 (10-30-24 @ 19:46), Max: 98.1 (10-30-24 @ 19:46)  HR: 66 (10-30-24 @ 19:46) (66 - 72)  BP: 148/74 (10-30-24 @ 19:46) (142/87 - 162/72)  RR: 18 (10-30-24 @ 19:46) (11 - 18)  SpO2: 98% (10-30-24 @ 19:46) (98% - 100%)    LABS:                        9.6    11.34 )-----------( 220      ( 31 Oct 2024 07:45 )             30.1     10-30    141  |  109[H]  |  74[H]  ----------------------------<  125[H]  4.5   |  16[L]  |  6.53[H]    Ca    9.0      30 Oct 2024 07:40  Phos  4.7     10-30  Mg     1.90     10-30      PT/INR - ( 30 Oct 2024 07:40 )   PT: 10.8 sec;   INR: 0.91 ratio         PTT - ( 30 Oct 2024 07:40 )  PTT:39.0 sec  I&O's Detail    30 Oct 2024 07:01  -  31 Oct 2024 07:00  --------------------------------------------------------  IN:  Total IN: 0 mL    OUT:    Voided (mL): 950 mL  Total OUT: 950 mL    Total NET: -950 mL      PHYSICAL EXAM:  General: Nontoxic, resting comfortably in NAD  B/L upside down nephroureteral stents: to ostomy bag   Right flank: no evidence of ecchymosis, edema or hematoma.           
[facility] Division of Hospital Medicine  France Artis MD  Pager [phone]    Patient is a 69y old  Male who presents with a chief complaint of Dislodged nephroureteral tube      SUBJECTIVE / OVERNIGHT EVENTS: met with pt this AM with BRIAN Ayala; pt reports he refused CT last night bc he was still in the ER and the lights were bright; d/w pt that if he had done this, his procedure would have been done this AM, that this will push everything back; pt very focused in all the way he feels he was not heard or slighted and his care was delayed and how his kidneys are failing and needs the tube in place; difficult to redirect pt; pt was able to state that he recent new NT was dislodged from his ostomy belt; pt states his ostomy is "not good" and leaking      MEDICATIONS  (STANDING):  amLODIPine   Tablet 5 milliGRAM(s) Oral <User Schedule>  calcitriol   Capsule 0.25 MICROGram(s) Oral <User Schedule>  cefTRIAXone   IVPB 1000 milliGRAM(s) IV Intermittent every 24 hours  clonazePAM  Tablet 1 milliGRAM(s) Oral three times a day  diphenhydrAMINE 25 milliGRAM(s) Oral at bedtime  enzalutamide 160 milliGRAM(s) Oral daily  nystatin Cream 1 Application(s) Topical once  sodium bicarbonate 1300 milliGRAM(s) Oral three times a day  traZODone 600 milliGRAM(s) Oral at bedtime    MEDICATIONS  (PRN):  acetaminophen     Tablet .. 650 milliGRAM(s) Oral every 6 hours PRN Temp greater or equal to 38C (100.4F), Mild Pain (1 - 3)      PHYSICAL EXAM:  Vital Signs Last 24 Hrs  T(F): 98.2 (29 Oct 2024 10:30), Max: 98.3 (29 Oct 2024 07:10)  HR: 72 (29 Oct 2024 10:20) (67 - 76)  BP: 153/74 (29 Oct 2024 10:20) (145/77 - 154/58)  RR: 16 (29 Oct 2024 10:30) (16 - 18)  SpO2: 99% (29 Oct 2024 10:30) (98% - 100%)    Parameters below as of 29 Oct 2024 10:30  Patient On (Oxygen Delivery Method): room air        CONSTITUTIONAL: NAD, appears comfortable  EYES: PERRLA; conjunctiva and sclera clear  ENMT: Moist oral mucosa; normal dentition  RESPIRATORY: Normal respiratory effort; lungs are clear to auscultation bilaterally  CARDIOVASCULAR: Regular rate and rhythm; No lower extremity edema  ABDOMEN: Nontender to palpation, normoactive bowel sounds, + ostomy, pink; minimal yellow urine in bag  MUSCULOSKELETAL:  no clubbing or cyanosis of digits; no joint swelling or tenderness to palpation  PSYCH: irritable, tangential; affect appropriate  NEUROLOGY: moves all ext  SKIN: ostomy as above    LABS:               
[facility] Division of Hospital Medicine  France Artis MD  Pager [phone]    Patient is a 69y old  Male who presents with a chief complaint of Dislodged nephroureteral tube      SUBJECTIVE / OVERNIGHT EVENTS: pt seen this AM with BRIAN Ayala; pt cont to state that he is not being treated well because he has been here for too many days and nothing is done; reminded pt that he refused his initial CT which I told him yesterday will delay his procedure; pt asks if he should have gone for the CT at 12:30 in the morning when they came for him, I told pt yes as this is how the hospital works 24/7 and he came for an emergency as he cont to repeat that his kidney are failing; informed pt that his procedure will be later this afternoon      MEDICATIONS  (STANDING):  amLODIPine   Tablet 5 milliGRAM(s) Oral <User Schedule>  calcitriol   Capsule 0.25 MICROGram(s) Oral <User Schedule>  cefTRIAXone   IVPB 1000 milliGRAM(s) IV Intermittent every 24 hours  clonazePAM  Tablet 1 milliGRAM(s) Oral three times a day  diphenhydrAMINE 25 milliGRAM(s) Oral at bedtime  enzalutamide 160 milliGRAM(s) Oral daily  sodium bicarbonate 1300 milliGRAM(s) Oral three times a day  traZODone 600 milliGRAM(s) Oral at bedtime    MEDICATIONS  (PRN):  acetaminophen     Tablet .. 650 milliGRAM(s) Oral every 6 hours PRN Temp greater or equal to 38C (100.4F), Mild Pain (1 - 3)      CAPILLARY BLOOD GLUCOSE  POCT Blood Glucose.: 161 mg/dL (30 Oct 2024 12:02)  POCT Blood Glucose.: 134 mg/dL (30 Oct 2024 07:41)  POCT Blood Glucose.: 125 mg/dL (29 Oct 2024 23:57)      PHYSICAL EXAM:  Vital Signs Last 24 Hrs  T(F): 97.9 (30 Oct 2024 09:25), Max: 97.9 (29 Oct 2024 18:12)  HR: 68 (30 Oct 2024 09:25) (68 - 80)  BP: 145/72 (30 Oct 2024 09:25) (125/64 - 163/79)  RR: 19 (30 Oct 2024 09:25) (17 - 19)  SpO2: 99% (30 Oct 2024 09:25) (97% - 99%)    Parameters below as of 30 Oct 2024 09:25  Patient On (Oxygen Delivery Method): room air        CONSTITUTIONAL: NAD, appears comfortable  EYES: PERRLA; conjunctiva and sclera clear  ENMT: Moist oral mucosa; normal dentition  RESPIRATORY: Normal respiratory effort; grossly b/l AE  CARDIOVASCULAR: Regular rate and rhythm; No lower extremity edema  ABDOMEN: Nontender to palpation, normoactive bowel sounds  MUSCULOSKELETAL: no clubbing or cyanosis of digits; no joint swelling or tenderness to palpation  PSYCH: A+O to person, place, and time; poor insight; irritable; low frustration tolerance  NEUROLOGY: CN 2-12 are intact and symmetric; no gross sensory deficits   SKIN: No rashes; no palpable lesions    LABS:                        8.5    8.16  )-----------( 219      ( 30 Oct 2024 07:40 )             26.0     10-30    141  |  109[H]  |  74[H]  ----------------------------<  125[H]  4.5   |  16[L]  |  6.53[H]    Ca    9.0      30 Oct 2024 07:40  Phos  4.7     10-30  Mg     1.90     10-30      PT/INR - ( 30 Oct 2024 07:40 )   PT: 10.8 sec;   INR: 0.91 ratio         PTT - ( 30 Oct 2024 07:40 )  PTT:39.0 sec          Urinalysis with Rflx Culture (collected 28 Oct 2024 18:25)

## 2024-10-31 NOTE — DISCHARGE NOTE NURSING/CASE MANAGEMENT/SOCIAL WORK - PATIENT PORTAL LINK FT
You can access the FollowMyHealth Patient Portal offered by [facility] by registering at the following website: [email or web address]. By joining [facility]’s FollowMyHealth portal, you will also be able to view your health information using other applications (apps) compatible with our system.

## 2024-10-31 NOTE — CHART NOTE - NSCHARTNOTESELECT_GEN_ALL_CORE
Event Note
IR Pre-Procedure Note/Event Note
Interventional Radiology
Chest pain/Event Note
Event Note
IR pre procedure/Event Note

## 2024-10-31 NOTE — PROGRESS NOTE ADULT - PROBLEM SELECTOR PROBLEM 1
Acute kidney injury superimposed on CKD
Acute kidney injury superimposed on CKD
Obstruction of ureteroileal conduit
Obstruction of ureteroileal conduit

## 2024-10-31 NOTE — PROGRESS NOTE ADULT - ASSESSMENT
69y Male with history of bladder CA and ileal conduit with bilateral upside down nephroureteral tubes who presents with dislodged right nephroureteral tube and malpositioned left nephroureteral tube. Patient is now s/p right upside down nephroureteral tube placement and left nephroureteral tube exchange on 10/30 in IR.     Plan:  - Continue drainage to ostomy bag   - Will need routine nephrostomy tube exchange every 3 months. Patient can call Outpatient IR office [phone] to schedule.     [id number]
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 
Pt. with BARON on CKD. 
Pt. with BARON on CKD. 
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

## 2024-11-01 ENCOUNTER — TRANSCRIPTION ENCOUNTER (OUTPATIENT)
Age: 69
End: 2024-11-01

## 2024-11-04 ENCOUNTER — TRANSCRIPTION ENCOUNTER (OUTPATIENT)
Age: 69
End: 2024-11-04

## 2024-11-05 NOTE — ASU PATIENT PROFILE, ADULT - NSCAFFEAMTFREQ_GEN_ALL_CORE_SD
Patient is a 53 y/o F who presents for a colon cancer screening. Referred by Dr. Lewis. A copy of this report will be sent to referring physician. Normal bowel habits- formed BM qod. Mild constipation 2/2 meds. Denies change in bowel habits, appetite, or weight. No abdominal pain or rectal bleeding. Fhx of brother with stomach CA and sister with colon polyps. No cardiac, lung, or kidney problems. Not on blood thinners. No prior colon.
1-2 cups/cans per day

## 2024-11-07 ENCOUNTER — NON-APPOINTMENT (OUTPATIENT)
Age: 69
End: 2024-11-07

## 2024-11-07 RX ORDER — TRAMADOL HYDROCHLORIDE 50 MG/1
50 TABLET, COATED ORAL
Qty: 6 | Refills: 0 | Status: ACTIVE | COMMUNITY
Start: 2024-11-07 | End: 1900-01-01

## 2024-11-10 LAB
CULTURE RESULTS: SIGNIFICANT CHANGE UP
SPECIMEN SOURCE: SIGNIFICANT CHANGE UP

## 2024-11-15 ENCOUNTER — APPOINTMENT (OUTPATIENT)
Dept: UROLOGY | Facility: CLINIC | Age: 69
End: 2024-11-15

## 2024-11-20 ENCOUNTER — APPOINTMENT (OUTPATIENT)
Dept: UROLOGY | Facility: CLINIC | Age: 69
End: 2024-11-20

## 2024-12-04 ENCOUNTER — APPOINTMENT (OUTPATIENT)
Dept: UROLOGY | Facility: CLINIC | Age: 69
End: 2024-12-04

## 2024-12-06 ENCOUNTER — TRANSCRIPTION ENCOUNTER (OUTPATIENT)
Age: 69
End: 2024-12-06

## 2024-12-13 ENCOUNTER — APPOINTMENT (OUTPATIENT)
Dept: UROLOGY | Facility: CLINIC | Age: 69
End: 2024-12-13

## 2024-12-16 ENCOUNTER — APPOINTMENT (OUTPATIENT)
Dept: NEPHROLOGY | Facility: CLINIC | Age: 69
End: 2024-12-16
Payer: MEDICARE

## 2024-12-16 VITALS
DIASTOLIC BLOOD PRESSURE: 66 MMHG | HEART RATE: 82 BPM | OXYGEN SATURATION: 98 % | TEMPERATURE: 97.3 F | SYSTOLIC BLOOD PRESSURE: 154 MMHG | HEIGHT: 70 IN

## 2024-12-16 DIAGNOSIS — N18.4 CHRONIC KIDNEY DISEASE, STAGE 4 (SEVERE): ICD-10-CM

## 2024-12-16 DIAGNOSIS — I10 ESSENTIAL (PRIMARY) HYPERTENSION: ICD-10-CM

## 2024-12-16 DIAGNOSIS — E87.20 ACIDOSIS, UNSPECIFIED: ICD-10-CM

## 2024-12-16 PROCEDURE — G2211 COMPLEX E/M VISIT ADD ON: CPT

## 2024-12-16 PROCEDURE — 99215 OFFICE O/P EST HI 40 MIN: CPT

## 2024-12-18 DIAGNOSIS — E87.5 HYPERKALEMIA: ICD-10-CM

## 2024-12-18 LAB
25(OH)D3 SERPL-MCNC: 50.5 NG/ML
ALBUMIN SERPL ELPH-MCNC: 3.9 G/DL
ANION GAP SERPL CALC-SCNC: 17 MMOL/L
BASOPHILS # BLD AUTO: 0.04 K/UL
BASOPHILS NFR BLD AUTO: 0.4 %
BUN SERPL-MCNC: 71 MG/DL
CALCIUM SERPL-MCNC: 9.7 MG/DL
CALCIUM SERPL-MCNC: 9.7 MG/DL
CHLORIDE SERPL-SCNC: 108 MMOL/L
CO2 SERPL-SCNC: 13 MMOL/L
CREAT SERPL-MCNC: 6.6 MG/DL
EGFR: 8 ML/MIN/1.73M2
EOSINOPHIL # BLD AUTO: 0.2 K/UL
EOSINOPHIL NFR BLD AUTO: 1.9 %
GLUCOSE SERPL-MCNC: 91 MG/DL
HCT VFR BLD CALC: 29.5 %
HGB BLD-MCNC: 9.3 G/DL
IMM GRANULOCYTES NFR BLD AUTO: 3 %
LYMPHOCYTES # BLD AUTO: 1.58 K/UL
LYMPHOCYTES NFR BLD AUTO: 14.9 %
MAN DIFF?: NORMAL
MCHC RBC-ENTMCNC: 30.4 PG
MCHC RBC-ENTMCNC: 31.5 G/DL
MCV RBC AUTO: 96.4 FL
MONOCYTES # BLD AUTO: 0.88 K/UL
MONOCYTES NFR BLD AUTO: 8.3 %
NEUTROPHILS # BLD AUTO: 7.6 K/UL
NEUTROPHILS NFR BLD AUTO: 71.5 %
PARATHYROID HORMONE INTACT: 235 PG/ML
PHOSPHATE SERPL-MCNC: 4.6 MG/DL
PLATELET # BLD AUTO: 228 K/UL
POTASSIUM SERPL-SCNC: 5.6 MMOL/L
RBC # BLD: 3.06 M/UL
RBC # FLD: 15.1 %
SODIUM SERPL-SCNC: 138 MMOL/L
WBC # FLD AUTO: 10.62 K/UL

## 2024-12-18 RX ORDER — SODIUM ZIRCONIUM CYCLOSILICATE 5 G/5G
5 POWDER, FOR SUSPENSION ORAL
Qty: 45 | Refills: 5 | Status: ACTIVE | COMMUNITY
Start: 2024-12-18 | End: 1900-01-01

## 2024-12-19 NOTE — ASU DISCHARGE PLAN (ADULT/PEDIATRIC) - CARE PROVIDER_API CALL
Fransico Obando  Vascular Surgery  [address], [address]  [place] [address]  Phone: [phone]  Fax: [phone]  Follow Up Time: 1 week   Opt out

## 2024-12-19 NOTE — ED ADULT NURSE NOTE - BEFAST SCREENING
----- Message from Neri Avendano MD sent at 12/19/2024  1:36 PM CST -----  X ray notable for multiple areas of arthritis, continue with current plant   Negative

## 2024-12-20 ENCOUNTER — APPOINTMENT (OUTPATIENT)
Dept: UROLOGY | Facility: CLINIC | Age: 69
End: 2024-12-20

## 2024-12-30 NOTE — ED ADULT TRIAGE NOTE - ESI TRIAGE ACUITY LEVEL, MLM
H&P reviewed. After examining the patient I find no changes in the patients condition since the H&P had been written.    Vitals:    12/30/24 1110   BP: 149/100   Pulse: 84   Resp: 20   Temp: 98.6 °F (37 °C)   SpO2: 96%      2

## 2025-01-03 ENCOUNTER — APPOINTMENT (OUTPATIENT)
Dept: UROLOGY | Facility: CLINIC | Age: 70
End: 2025-01-03
Payer: MEDICARE

## 2025-01-03 VITALS
OXYGEN SATURATION: 98 % | SYSTOLIC BLOOD PRESSURE: 157 MMHG | TEMPERATURE: 97.2 F | DIASTOLIC BLOOD PRESSURE: 65 MMHG | HEART RATE: 77 BPM

## 2025-01-03 DIAGNOSIS — C61 MALIGNANT NEOPLASM OF PROSTATE: ICD-10-CM

## 2025-01-03 PROCEDURE — 96402 CHEMO HORMON ANTINEOPL SQ/IM: CPT

## 2025-01-06 RX ORDER — LEUPROLIDE ACETATE 30 MG/.5ML
30 INJECTION, SUSPENSION, EXTENDED RELEASE SUBCUTANEOUS
Refills: 0 | Status: COMPLETED | OUTPATIENT
Start: 2025-01-06

## 2025-01-06 RX ORDER — LEUPROLIDE ACETATE 30 MG/.5ML
30 INJECTION, SUSPENSION, EXTENDED RELEASE SUBCUTANEOUS
Qty: 1 | Refills: 0 | Status: ACTIVE | COMMUNITY
Start: 2025-01-06

## 2025-01-21 ENCOUNTER — APPOINTMENT (OUTPATIENT)
Dept: FAMILY MEDICINE | Facility: CLINIC | Age: 70
End: 2025-01-21
Payer: MEDICARE

## 2025-01-21 ENCOUNTER — NON-APPOINTMENT (OUTPATIENT)
Age: 70
End: 2025-01-21

## 2025-01-21 VITALS
HEIGHT: 70 IN | OXYGEN SATURATION: 98 % | SYSTOLIC BLOOD PRESSURE: 160 MMHG | RESPIRATION RATE: 16 BRPM | HEART RATE: 78 BPM | DIASTOLIC BLOOD PRESSURE: 80 MMHG | TEMPERATURE: 95.3 F

## 2025-01-21 DIAGNOSIS — Z13.6 ENCOUNTER FOR SCREENING FOR CARDIOVASCULAR DISORDERS: ICD-10-CM

## 2025-01-21 DIAGNOSIS — F32.A ANXIETY DISORDER, UNSPECIFIED: ICD-10-CM

## 2025-01-21 DIAGNOSIS — E11.9 TYPE 2 DIABETES MELLITUS W/OUT COMPLICATIONS: ICD-10-CM

## 2025-01-21 DIAGNOSIS — F41.9 ANXIETY DISORDER, UNSPECIFIED: ICD-10-CM

## 2025-01-21 DIAGNOSIS — I10 ESSENTIAL (PRIMARY) HYPERTENSION: ICD-10-CM

## 2025-01-21 DIAGNOSIS — D63.1 CHRONIC KIDNEY DISEASE, UNSPECIFIED: ICD-10-CM

## 2025-01-21 DIAGNOSIS — N18.9 CHRONIC KIDNEY DISEASE, UNSPECIFIED: ICD-10-CM

## 2025-01-21 DIAGNOSIS — E66.811 OBESITY, CLASS 1: ICD-10-CM

## 2025-01-21 DIAGNOSIS — C79.51 SECONDARY MALIGNANT NEOPLASM OF BONE: ICD-10-CM

## 2025-01-21 DIAGNOSIS — Z00.00 ENCOUNTER FOR GENERAL ADULT MEDICAL EXAMINATION W/OUT ABNORMAL FINDINGS: ICD-10-CM

## 2025-01-21 DIAGNOSIS — E87.5 HYPERKALEMIA: ICD-10-CM

## 2025-01-21 DIAGNOSIS — Z93.6 OTHER ARTIFICIAL OPENINGS OF URINARY TRACT STATUS: ICD-10-CM

## 2025-01-21 DIAGNOSIS — C61 MALIGNANT NEOPLASM OF PROSTATE: ICD-10-CM

## 2025-01-21 PROCEDURE — G0439: CPT

## 2025-01-21 PROCEDURE — 93000 ELECTROCARDIOGRAM COMPLETE: CPT

## 2025-01-21 PROCEDURE — G0447 BEHAVIOR COUNSEL OBESITY 15M: CPT

## 2025-01-22 ENCOUNTER — OUTPATIENT (OUTPATIENT)
Dept: OUTPATIENT SERVICES | Facility: HOSPITAL | Age: 70
LOS: 1 days | End: 2025-01-22
Payer: MEDICARE

## 2025-01-22 ENCOUNTER — APPOINTMENT (OUTPATIENT)
Dept: UROLOGY | Facility: CLINIC | Age: 70
End: 2025-01-22
Payer: MEDICARE

## 2025-01-22 VITALS
TEMPERATURE: 96.8 F | DIASTOLIC BLOOD PRESSURE: 95 MMHG | RESPIRATION RATE: 16 BRPM | HEART RATE: 81 BPM | SYSTOLIC BLOOD PRESSURE: 176 MMHG | OXYGEN SATURATION: 97 %

## 2025-01-22 DIAGNOSIS — Z85.51 PERSONAL HISTORY OF MALIGNANT NEOPLASM OF BLADDER: Chronic | ICD-10-CM

## 2025-01-22 DIAGNOSIS — Z93.6 OTHER ARTIFICIAL OPENINGS OF URINARY TRACT STATUS: Chronic | ICD-10-CM

## 2025-01-22 DIAGNOSIS — R35.0 FREQUENCY OF MICTURITION: ICD-10-CM

## 2025-01-22 DIAGNOSIS — N32.9 BLADDER DISORDER, UNSPECIFIED: Chronic | ICD-10-CM

## 2025-01-22 DIAGNOSIS — Z93.6 OTHER ARTIFICIAL OPENINGS OF URINARY TRACT STATUS: ICD-10-CM

## 2025-01-22 DIAGNOSIS — N99.89 OTHER POSTPROCEDURAL COMPLICATIONS AND DISORDERS OF GENITOURINARY SYSTEM: ICD-10-CM

## 2025-01-22 DIAGNOSIS — Z98.89 OTHER SPECIFIED POSTPROCEDURAL STATES: Chronic | ICD-10-CM

## 2025-01-22 DIAGNOSIS — Z93.59 OTHER CYSTOSTOMY STATUS: Chronic | ICD-10-CM

## 2025-01-22 PROCEDURE — 50435 EXCHANGE NEPHROSTOMY CATH: CPT

## 2025-01-22 PROCEDURE — 50435 EXCHANGE NEPHROSTOMY CATH: CPT | Mod: 50,22

## 2025-01-22 PROCEDURE — C1769: CPT

## 2025-01-23 ENCOUNTER — NON-APPOINTMENT (OUTPATIENT)
Age: 70
End: 2025-01-23

## 2025-01-23 DIAGNOSIS — Z93.6 OTHER ARTIFICIAL OPENINGS OF URINARY TRACT STATUS: ICD-10-CM

## 2025-01-23 DIAGNOSIS — M54.50 LOW BACK PAIN, UNSPECIFIED: ICD-10-CM

## 2025-01-23 DIAGNOSIS — N99.89 OTHER POSTPROCEDURAL COMPLICATIONS AND DISORDERS OF GENITOURINARY SYSTEM: ICD-10-CM

## 2025-01-23 LAB
25(OH)D3 SERPL-MCNC: 54.8 NG/ML
ALBUMIN SERPL ELPH-MCNC: 4.2 G/DL
ALP BLD-CCNC: 110 U/L
ALT SERPL-CCNC: 12 U/L
ANION GAP SERPL CALC-SCNC: 13 MMOL/L
AST SERPL-CCNC: 15 U/L
BASOPHILS # BLD AUTO: 0.02 K/UL
BASOPHILS NFR BLD AUTO: 0.3 %
BILIRUB SERPL-MCNC: 0.4 MG/DL
BUN SERPL-MCNC: 49 MG/DL
CALCIUM SERPL-MCNC: 9.9 MG/DL
CHLORIDE SERPL-SCNC: 101 MMOL/L
CHOLEST SERPL-MCNC: 211 MG/DL
CO2 SERPL-SCNC: 21 MMOL/L
CREAT SERPL-MCNC: 4.99 MG/DL
EGFR: 12 ML/MIN/1.73M2
EOSINOPHIL # BLD AUTO: 0.01 K/UL
EOSINOPHIL NFR BLD AUTO: 0.1 %
ESTIMATED AVERAGE GLUCOSE: 108 MG/DL
FOLATE SERPL-MCNC: 5.1 NG/ML
GLUCOSE SERPL-MCNC: 153 MG/DL
HBA1C MFR BLD HPLC: 5.4 %
HCT VFR BLD CALC: 32.9 %
HDLC SERPL-MCNC: 76 MG/DL
HGB BLD-MCNC: 10.5 G/DL
IMM GRANULOCYTES NFR BLD AUTO: 1.2 %
LDLC SERPL CALC-MCNC: 110 MG/DL
LYMPHOCYTES # BLD AUTO: 0.85 K/UL
LYMPHOCYTES NFR BLD AUTO: 11 %
MAN DIFF?: NORMAL
MCHC RBC-ENTMCNC: 31.5 PG
MCHC RBC-ENTMCNC: 31.9 G/DL
MCV RBC AUTO: 98.8 FL
MONOCYTES # BLD AUTO: 0.8 K/UL
MONOCYTES NFR BLD AUTO: 10.4 %
NEUTROPHILS # BLD AUTO: 5.95 K/UL
NEUTROPHILS NFR BLD AUTO: 77 %
NONHDLC SERPL-MCNC: 135 MG/DL
PLATELET # BLD AUTO: 183 K/UL
POTASSIUM SERPL-SCNC: 4.5 MMOL/L
PROT SERPL-MCNC: 7.5 G/DL
PSA FREE FLD-MCNC: NORMAL %
PSA FREE SERPL-MCNC: <0.01 NG/ML
PSA SERPL-MCNC: <0.01 NG/ML
RBC # BLD: 3.33 M/UL
RBC # FLD: 14.6 %
SODIUM SERPL-SCNC: 136 MMOL/L
TRIGL SERPL-MCNC: 143 MG/DL
TSH SERPL-ACNC: 1.85 UIU/ML
VIT B12 SERPL-MCNC: 468 PG/ML
WBC # FLD AUTO: 7.72 K/UL

## 2025-01-23 RX ORDER — ACETAMINOPHEN AND CODEINE PHOSPHATE 300; 15 MG/1; MG/1
300-15 TABLET ORAL
Qty: 20 | Refills: 0 | Status: ACTIVE | COMMUNITY
Start: 2025-01-23 | End: 1900-01-01

## 2025-01-24 NOTE — ASU PATIENT PROFILE, ADULT - FALL HARM RISK CONCLUSION
Over the past few admissions we have been able to decrease estimated dry weight to around 53 kg and we will challenged that today on January 24.  There is also likely an aspect of total body weight decrease as well as decrease in fluid weight.  I believe a few admissions ago the patient's weight had been as high as 73 kg and the patient had required HD/UF on almost daily basis during that time.  Will plan on HD UF today on January 24 challenging dry weight again.  EF: 20% by recent echocardiogram in January 21 with global hypokinesis.     Universal Safety Interventions

## 2025-01-25 NOTE — PATIENT PROFILE ADULT - FALL HARM RISK - HARM RISK INTERVENTIONS
"MD Notification    Notified Person: MD    Notified Person Name: Zev pantoja     Notification Date/Time: 0408    Notification Interaction:    4:08 am  Writer's message to provider \"FYI: Pt is running a temp of 101.8. BP is 87/54 w/ a map of 65. I did give dilaudid and pt normally has lower BPs. Fluids running @100 ml/hr. O2 was in high 88-89 put him on on 1 L NC o2 up to 92%. also, it triggered the lactic protocol, so I accepted it\"  Provider called back     Purpose of Notification: Fever and low BP     Orders Received: Put in orders for LR bolus and zosyn      " Assistance with ambulation/Communicate Risk of Fall with Harm to all staff/Reinforce activity limits and safety measures with patient and family/Tailored Fall Risk Interventions/Visual Cue: Yellow wristband and red socks/Bed in lowest position, wheels locked, appropriate side rails in place/Call bell, personal items and telephone in reach/Instruct patient to call for assistance before getting out of bed or chair/Non-slip footwear when patient is out of bed/[place] to call system/Physically safe environment - no spills, clutter or unnecessary equipment/Purposeful Proactive Rounding/Room/bathroom lighting operational, light cord in reach

## 2025-03-04 ENCOUNTER — RX RENEWAL (OUTPATIENT)
Age: 70
End: 2025-03-04

## 2025-03-04 ENCOUNTER — APPOINTMENT (OUTPATIENT)
Dept: NEPHROLOGY | Facility: CLINIC | Age: 70
End: 2025-03-04
Payer: MEDICARE

## 2025-03-04 VITALS
OXYGEN SATURATION: 98 % | DIASTOLIC BLOOD PRESSURE: 83 MMHG | SYSTOLIC BLOOD PRESSURE: 154 MMHG | HEIGHT: 70 IN | TEMPERATURE: 97.9 F | HEART RATE: 67 BPM

## 2025-03-04 VITALS — DIASTOLIC BLOOD PRESSURE: 76 MMHG | SYSTOLIC BLOOD PRESSURE: 138 MMHG

## 2025-03-04 DIAGNOSIS — E87.20 ACIDOSIS, UNSPECIFIED: ICD-10-CM

## 2025-03-04 DIAGNOSIS — Z93.6 OTHER ARTIFICIAL OPENINGS OF URINARY TRACT STATUS: ICD-10-CM

## 2025-03-04 DIAGNOSIS — N18.4 CHRONIC KIDNEY DISEASE, STAGE 4 (SEVERE): ICD-10-CM

## 2025-03-04 DIAGNOSIS — I10 ESSENTIAL (PRIMARY) HYPERTENSION: ICD-10-CM

## 2025-03-04 PROCEDURE — G2211 COMPLEX E/M VISIT ADD ON: CPT

## 2025-03-04 PROCEDURE — 99215 OFFICE O/P EST HI 40 MIN: CPT

## 2025-03-06 LAB
25(OH)D3 SERPL-MCNC: 52.2 NG/ML
ALBUMIN SERPL ELPH-MCNC: 3.5 G/DL
ANION GAP SERPL CALC-SCNC: 15 MMOL/L
BASOPHILS # BLD AUTO: 0.03 K/UL
BASOPHILS NFR BLD AUTO: 0.3 %
BUN SERPL-MCNC: 49 MG/DL
CALCIUM SERPL-MCNC: 10 MG/DL
CALCIUM SERPL-MCNC: 10 MG/DL
CHLORIDE SERPL-SCNC: 104 MMOL/L
CO2 SERPL-SCNC: 21 MMOL/L
CREAT SERPL-MCNC: 4.36 MG/DL
EGFR: 14 ML/MIN/1.73M2
EOSINOPHIL # BLD AUTO: 0.25 K/UL
EOSINOPHIL NFR BLD AUTO: 2.5 %
GLUCOSE SERPL-MCNC: 107 MG/DL
HCT VFR BLD CALC: 35.7 %
HGB BLD-MCNC: 11.1 G/DL
IMM GRANULOCYTES NFR BLD AUTO: 0.7 %
LYMPHOCYTES # BLD AUTO: 1.78 K/UL
LYMPHOCYTES NFR BLD AUTO: 17.7 %
MAGNESIUM SERPL-MCNC: 2.5 MG/DL
MAN DIFF?: NORMAL
MCHC RBC-ENTMCNC: 30.7 PG
MCHC RBC-ENTMCNC: 31.1 G/DL
MCV RBC AUTO: 98.9 FL
MONOCYTES # BLD AUTO: 0.82 K/UL
MONOCYTES NFR BLD AUTO: 8.1 %
NEUTROPHILS # BLD AUTO: 7.12 K/UL
NEUTROPHILS NFR BLD AUTO: 70.7 %
PARATHYROID HORMONE INTACT: 176 PG/ML
PHOSPHATE SERPL-MCNC: 3.7 MG/DL
PLATELET # BLD AUTO: 236 K/UL
POTASSIUM SERPL-SCNC: 4.4 MMOL/L
RBC # BLD: 3.61 M/UL
RBC # FLD: 13.9 %
SODIUM SERPL-SCNC: 140 MMOL/L
WBC # FLD AUTO: 10.07 K/UL

## 2025-04-07 NOTE — HISTORY OF PRESENT ILLNESS
[de-identified] : Patient is here today following up after a Closed fracture of neck of right humerus . The patient states that he has been immobilizing himself in arm sling but has also mentioned that he has been using arm for activities such as eating, cutting meat, and has been raising his hand above his head.  Patient has had no complications or issues during immobilization, no trauma to the area, no pain at this time. No new complaints. He would like to begin PT on his right arm. 
Alert and oriented to person, place and time

## 2025-04-16 ENCOUNTER — OUTPATIENT (OUTPATIENT)
Dept: OUTPATIENT SERVICES | Facility: HOSPITAL | Age: 70
LOS: 1 days | End: 2025-04-16
Payer: COMMERCIAL

## 2025-04-16 ENCOUNTER — APPOINTMENT (OUTPATIENT)
Dept: UROLOGY | Facility: CLINIC | Age: 70
End: 2025-04-16
Payer: MEDICARE

## 2025-04-16 VITALS
HEART RATE: 67 BPM | DIASTOLIC BLOOD PRESSURE: 83 MMHG | OXYGEN SATURATION: 99 % | RESPIRATION RATE: 16 BRPM | SYSTOLIC BLOOD PRESSURE: 146 MMHG

## 2025-04-16 DIAGNOSIS — Z98.89 OTHER SPECIFIED POSTPROCEDURAL STATES: Chronic | ICD-10-CM

## 2025-04-16 DIAGNOSIS — R35.0 FREQUENCY OF MICTURITION: ICD-10-CM

## 2025-04-16 DIAGNOSIS — Z85.51 PERSONAL HISTORY OF MALIGNANT NEOPLASM OF BLADDER: Chronic | ICD-10-CM

## 2025-04-16 DIAGNOSIS — Z93.6 OTHER ARTIFICIAL OPENINGS OF URINARY TRACT STATUS: ICD-10-CM

## 2025-04-16 DIAGNOSIS — Z93.6 OTHER ARTIFICIAL OPENINGS OF URINARY TRACT STATUS: Chronic | ICD-10-CM

## 2025-04-16 DIAGNOSIS — N13.30 UNSPECIFIED HYDRONEPHROSIS: ICD-10-CM

## 2025-04-16 DIAGNOSIS — N32.9 BLADDER DISORDER, UNSPECIFIED: Chronic | ICD-10-CM

## 2025-04-16 DIAGNOSIS — Z93.59 OTHER CYSTOSTOMY STATUS: Chronic | ICD-10-CM

## 2025-04-16 PROCEDURE — 50435 EXCHANGE NEPHROSTOMY CATH: CPT | Mod: 50

## 2025-04-16 PROCEDURE — 50435 EXCHANGE NEPHROSTOMY CATH: CPT

## 2025-04-16 PROCEDURE — C1769: CPT

## 2025-05-06 ENCOUNTER — APPOINTMENT (OUTPATIENT)
Dept: NEPHROLOGY | Facility: CLINIC | Age: 70
End: 2025-05-06

## 2025-05-06 ENCOUNTER — EMERGENCY (EMERGENCY)
Facility: HOSPITAL | Age: 70
LOS: 1 days | End: 2025-05-06
Attending: STUDENT IN AN ORGANIZED HEALTH CARE EDUCATION/TRAINING PROGRAM | Admitting: STUDENT IN AN ORGANIZED HEALTH CARE EDUCATION/TRAINING PROGRAM
Payer: COMMERCIAL

## 2025-05-06 ENCOUNTER — INPATIENT (INPATIENT)
Facility: HOSPITAL | Age: 70
LOS: 5 days | Discharge: HOME HEALTH | DRG: 392 | End: 2025-05-12
Attending: STUDENT IN AN ORGANIZED HEALTH CARE EDUCATION/TRAINING PROGRAM | Admitting: HOSPITALIST
Payer: MEDICARE

## 2025-05-06 VITALS
HEART RATE: 66 BPM | DIASTOLIC BLOOD PRESSURE: 84 MMHG | TEMPERATURE: 97 F | SYSTOLIC BLOOD PRESSURE: 181 MMHG | RESPIRATION RATE: 15 BRPM | OXYGEN SATURATION: 98 %

## 2025-05-06 VITALS
RESPIRATION RATE: 18 BRPM | OXYGEN SATURATION: 99 % | SYSTOLIC BLOOD PRESSURE: 167 MMHG | HEART RATE: 71 BPM | TEMPERATURE: 97 F | DIASTOLIC BLOOD PRESSURE: 83 MMHG | WEIGHT: 240.08 LBS | HEIGHT: 70 IN

## 2025-05-06 VITALS
OXYGEN SATURATION: 95 % | SYSTOLIC BLOOD PRESSURE: 163 MMHG | DIASTOLIC BLOOD PRESSURE: 77 MMHG | TEMPERATURE: 98 F | RESPIRATION RATE: 16 BRPM | WEIGHT: 240.08 LBS | HEIGHT: 70 IN | HEART RATE: 68 BPM

## 2025-05-06 DIAGNOSIS — C61 MALIGNANT NEOPLASM OF PROSTATE: ICD-10-CM

## 2025-05-06 DIAGNOSIS — Z85.51 PERSONAL HISTORY OF MALIGNANT NEOPLASM OF BLADDER: Chronic | ICD-10-CM

## 2025-05-06 DIAGNOSIS — I10 ESSENTIAL (PRIMARY) HYPERTENSION: ICD-10-CM

## 2025-05-06 DIAGNOSIS — N32.9 BLADDER DISORDER, UNSPECIFIED: Chronic | ICD-10-CM

## 2025-05-06 DIAGNOSIS — Z93.6 OTHER ARTIFICIAL OPENINGS OF URINARY TRACT STATUS: Chronic | ICD-10-CM

## 2025-05-06 DIAGNOSIS — Z93.59 OTHER CYSTOSTOMY STATUS: Chronic | ICD-10-CM

## 2025-05-06 DIAGNOSIS — Z29.9 ENCOUNTER FOR PROPHYLACTIC MEASURES, UNSPECIFIED: ICD-10-CM

## 2025-05-06 DIAGNOSIS — T83.122A DISPLACEMENT OF INDWELLING URETERAL STENT, INITIAL ENCOUNTER: ICD-10-CM

## 2025-05-06 DIAGNOSIS — Z98.89 OTHER SPECIFIED POSTPROCEDURAL STATES: Chronic | ICD-10-CM

## 2025-05-06 DIAGNOSIS — N39.0 URINARY TRACT INFECTION, SITE NOT SPECIFIED: ICD-10-CM

## 2025-05-06 DIAGNOSIS — D64.9 ANEMIA, UNSPECIFIED: ICD-10-CM

## 2025-05-06 DIAGNOSIS — N18.5 CHRONIC KIDNEY DISEASE, STAGE 5: ICD-10-CM

## 2025-05-06 DIAGNOSIS — N99.528 OTHER COMPLICATION OF INCONTINENT EXTERNAL STOMA OF URINARY TRACT: ICD-10-CM

## 2025-05-06 DIAGNOSIS — G47.00 INSOMNIA, UNSPECIFIED: ICD-10-CM

## 2025-05-06 LAB
ALBUMIN SERPL ELPH-MCNC: 2.9 G/DL — LOW (ref 3.3–5)
ALP SERPL-CCNC: 106 U/L — SIGNIFICANT CHANGE UP (ref 40–120)
ALT FLD-CCNC: 12 U/L — SIGNIFICANT CHANGE UP (ref 10–45)
ANION GAP SERPL CALC-SCNC: 14 MMOL/L — SIGNIFICANT CHANGE UP (ref 5–17)
ANION GAP SERPL CALC-SCNC: 9 MMOL/L — SIGNIFICANT CHANGE UP (ref 5–17)
APPEARANCE UR: CLEAR — SIGNIFICANT CHANGE UP
APTT BLD: 32.1 SEC — SIGNIFICANT CHANGE UP (ref 26.1–36.8)
AST SERPL-CCNC: 23 U/L — SIGNIFICANT CHANGE UP (ref 10–40)
BACTERIA # UR AUTO: ABNORMAL /HPF
BASOPHILS # BLD AUTO: 0.03 K/UL — SIGNIFICANT CHANGE UP (ref 0–0.2)
BASOPHILS NFR BLD AUTO: 0.4 % — SIGNIFICANT CHANGE UP (ref 0–2)
BILIRUB SERPL-MCNC: 0.6 MG/DL — SIGNIFICANT CHANGE UP (ref 0.2–1.2)
BILIRUB UR-MCNC: NEGATIVE — SIGNIFICANT CHANGE UP
BLD GP AB SCN SERPL QL: SIGNIFICANT CHANGE UP
BUN SERPL-MCNC: 47 MG/DL — HIGH (ref 7–23)
BUN SERPL-MCNC: 50 MG/DL — HIGH (ref 7–23)
CALCIUM SERPL-MCNC: 9.7 MG/DL — SIGNIFICANT CHANGE UP (ref 8.4–10.5)
CALCIUM SERPL-MCNC: 9.9 MG/DL — SIGNIFICANT CHANGE UP (ref 8.4–10.5)
CHLORIDE SERPL-SCNC: 105 MMOL/L — SIGNIFICANT CHANGE UP (ref 96–108)
CHLORIDE SERPL-SCNC: 106 MMOL/L — SIGNIFICANT CHANGE UP (ref 96–108)
CO2 SERPL-SCNC: 21 MMOL/L — LOW (ref 22–31)
CO2 SERPL-SCNC: 24 MMOL/L — SIGNIFICANT CHANGE UP (ref 22–31)
COLOR SPEC: YELLOW — SIGNIFICANT CHANGE UP
CREAT SERPL-MCNC: 4.56 MG/DL — HIGH (ref 0.5–1.3)
CREAT SERPL-MCNC: 4.66 MG/DL — HIGH (ref 0.5–1.3)
DIFF PNL FLD: ABNORMAL
EGFR: 13 ML/MIN/1.73M2 — LOW
EOSINOPHIL # BLD AUTO: 0.15 K/UL — SIGNIFICANT CHANGE UP (ref 0–0.5)
EOSINOPHIL NFR BLD AUTO: 1.8 % — SIGNIFICANT CHANGE UP (ref 0–6)
GLUCOSE SERPL-MCNC: 114 MG/DL — HIGH (ref 70–99)
GLUCOSE SERPL-MCNC: 187 MG/DL — HIGH (ref 70–99)
GLUCOSE UR QL: NEGATIVE MG/DL — SIGNIFICANT CHANGE UP
HCT VFR BLD CALC: 33.3 % — LOW (ref 39–50)
HGB BLD-MCNC: 10.7 G/DL — LOW (ref 13–17)
IMM GRANULOCYTES NFR BLD AUTO: 0.6 % — SIGNIFICANT CHANGE UP (ref 0–0.9)
INR BLD: 0.92 RATIO — SIGNIFICANT CHANGE UP (ref 0.85–1.16)
KETONES UR-MCNC: NEGATIVE MG/DL — SIGNIFICANT CHANGE UP
LEUKOCYTE ESTERASE UR-ACNC: ABNORMAL
LIDOCAIN IGE QN: 15 U/L — LOW (ref 16–77)
LYMPHOCYTES # BLD AUTO: 1.33 K/UL — SIGNIFICANT CHANGE UP (ref 1–3.3)
LYMPHOCYTES # BLD AUTO: 16 % — SIGNIFICANT CHANGE UP (ref 13–44)
MCHC RBC-ENTMCNC: 29.4 PG — SIGNIFICANT CHANGE UP (ref 27–34)
MCHC RBC-ENTMCNC: 32.1 G/DL — SIGNIFICANT CHANGE UP (ref 32–36)
MCV RBC AUTO: 91.5 FL — SIGNIFICANT CHANGE UP (ref 80–100)
MONOCYTES # BLD AUTO: 0.71 K/UL — SIGNIFICANT CHANGE UP (ref 0–0.9)
MONOCYTES NFR BLD AUTO: 8.5 % — SIGNIFICANT CHANGE UP (ref 2–14)
NEUTROPHILS # BLD AUTO: 6.05 K/UL — SIGNIFICANT CHANGE UP (ref 1.8–7.4)
NEUTROPHILS NFR BLD AUTO: 72.7 % — SIGNIFICANT CHANGE UP (ref 43–77)
NITRITE UR-MCNC: NEGATIVE — SIGNIFICANT CHANGE UP
NRBC BLD AUTO-RTO: 0 /100 WBCS — SIGNIFICANT CHANGE UP (ref 0–0)
PH UR: 7.5 — SIGNIFICANT CHANGE UP (ref 5–8)
PLATELET # BLD AUTO: 183 K/UL — SIGNIFICANT CHANGE UP (ref 150–400)
POTASSIUM SERPL-MCNC: 4.6 MMOL/L — SIGNIFICANT CHANGE UP (ref 3.5–5.3)
POTASSIUM SERPL-MCNC: 5.4 MMOL/L — HIGH (ref 3.5–5.3)
POTASSIUM SERPL-SCNC: 4.6 MMOL/L — SIGNIFICANT CHANGE UP (ref 3.5–5.3)
POTASSIUM SERPL-SCNC: 5.4 MMOL/L — HIGH (ref 3.5–5.3)
PROT SERPL-MCNC: 7.5 G/DL — SIGNIFICANT CHANGE UP (ref 6–8.3)
PROT UR-MCNC: 30 MG/DL
PROTHROM AB SERPL-ACNC: 10.9 SEC — SIGNIFICANT CHANGE UP (ref 9.9–13.4)
RBC # BLD: 3.64 M/UL — LOW (ref 4.2–5.8)
RBC # FLD: 13.7 % — SIGNIFICANT CHANGE UP (ref 10.3–14.5)
RBC CASTS # UR COMP ASSIST: 2 /HPF — SIGNIFICANT CHANGE UP (ref 0–4)
SODIUM SERPL-SCNC: 138 MMOL/L — SIGNIFICANT CHANGE UP (ref 135–145)
SODIUM SERPL-SCNC: 141 MMOL/L — SIGNIFICANT CHANGE UP (ref 135–145)
SP GR SPEC: 1.01 — SIGNIFICANT CHANGE UP (ref 1–1.03)
UROBILINOGEN FLD QL: 0.2 MG/DL — SIGNIFICANT CHANGE UP (ref 0.2–1)
WBC # BLD: 8.32 K/UL — SIGNIFICANT CHANGE UP (ref 3.8–10.5)
WBC # FLD AUTO: 8.32 K/UL — SIGNIFICANT CHANGE UP (ref 3.8–10.5)
WBC UR QL: 10 /HPF — HIGH (ref 0–5)

## 2025-05-06 PROCEDURE — 74176 CT ABD & PELVIS W/O CONTRAST: CPT | Mod: MC

## 2025-05-06 PROCEDURE — 99223 1ST HOSP IP/OBS HIGH 75: CPT | Mod: GC

## 2025-05-06 PROCEDURE — 99285 EMERGENCY DEPT VISIT HI MDM: CPT

## 2025-05-06 PROCEDURE — 85610 PROTHROMBIN TIME: CPT

## 2025-05-06 PROCEDURE — 80053 COMPREHEN METABOLIC PANEL: CPT

## 2025-05-06 PROCEDURE — 96374 THER/PROPH/DIAG INJ IV PUSH: CPT

## 2025-05-06 PROCEDURE — 99285 EMERGENCY DEPT VISIT HI MDM: CPT | Mod: 25

## 2025-05-06 PROCEDURE — 87086 URINE CULTURE/COLONY COUNT: CPT

## 2025-05-06 PROCEDURE — 87077 CULTURE AEROBIC IDENTIFY: CPT

## 2025-05-06 PROCEDURE — 87186 SC STD MICRODIL/AGAR DIL: CPT

## 2025-05-06 PROCEDURE — 36415 COLL VENOUS BLD VENIPUNCTURE: CPT

## 2025-05-06 PROCEDURE — 85730 THROMBOPLASTIN TIME PARTIAL: CPT

## 2025-05-06 PROCEDURE — 74018 RADEX ABDOMEN 1 VIEW: CPT

## 2025-05-06 PROCEDURE — 81001 URINALYSIS AUTO W/SCOPE: CPT

## 2025-05-06 PROCEDURE — 86900 BLOOD TYPING SEROLOGIC ABO: CPT

## 2025-05-06 PROCEDURE — 83690 ASSAY OF LIPASE: CPT

## 2025-05-06 PROCEDURE — 74176 CT ABD & PELVIS W/O CONTRAST: CPT | Mod: 26

## 2025-05-06 PROCEDURE — 85025 COMPLETE CBC W/AUTO DIFF WBC: CPT

## 2025-05-06 PROCEDURE — 74018 RADEX ABDOMEN 1 VIEW: CPT | Mod: 26

## 2025-05-06 PROCEDURE — 86850 RBC ANTIBODY SCREEN: CPT

## 2025-05-06 PROCEDURE — 93010 ELECTROCARDIOGRAM REPORT: CPT

## 2025-05-06 PROCEDURE — 86901 BLOOD TYPING SEROLOGIC RH(D): CPT

## 2025-05-06 RX ORDER — ENZALUTAMIDE 40 MG/1
160 CAPSULE ORAL DAILY
Refills: 0 | Status: DISCONTINUED | OUTPATIENT
Start: 2025-05-06 | End: 2025-05-12

## 2025-05-06 RX ORDER — SODIUM BICARBONATE 1 MEQ/ML
1300 SYRINGE (ML) INTRAVENOUS THREE TIMES A DAY
Refills: 0 | Status: DISCONTINUED | OUTPATIENT
Start: 2025-05-06 | End: 2025-05-12

## 2025-05-06 RX ORDER — AMLODIPINE BESYLATE 10 MG/1
5 TABLET ORAL
Refills: 0 | Status: DISCONTINUED | OUTPATIENT
Start: 2025-05-06 | End: 2025-05-07

## 2025-05-06 RX ORDER — MELATONIN 5 MG
3 TABLET ORAL AT BEDTIME
Refills: 0 | Status: DISCONTINUED | OUTPATIENT
Start: 2025-05-06 | End: 2025-05-12

## 2025-05-06 RX ORDER — CLONAZEPAM 0.5 MG/1
1 TABLET ORAL THREE TIMES A DAY
Refills: 0 | Status: DISCONTINUED | OUTPATIENT
Start: 2025-05-06 | End: 2025-05-07

## 2025-05-06 RX ORDER — CALCITRIOL 0.5 UG/1
0.25 CAPSULE, GELATIN COATED ORAL
Refills: 0 | Status: DISCONTINUED | OUTPATIENT
Start: 2025-05-06 | End: 2025-05-07

## 2025-05-06 RX ORDER — DIPHENHYDRAMINE HCL 12.5MG/5ML
25 ELIXIR ORAL AT BEDTIME
Refills: 0 | Status: DISCONTINUED | OUTPATIENT
Start: 2025-05-06 | End: 2025-05-12

## 2025-05-06 RX ORDER — CEFTRIAXONE 500 MG/1
1000 INJECTION, POWDER, FOR SOLUTION INTRAMUSCULAR; INTRAVENOUS ONCE
Refills: 0 | Status: DISCONTINUED | OUTPATIENT
Start: 2025-05-06 | End: 2025-05-07

## 2025-05-06 RX ORDER — ACETAMINOPHEN 500 MG/5ML
650 LIQUID (ML) ORAL EVERY 6 HOURS
Refills: 0 | Status: DISCONTINUED | OUTPATIENT
Start: 2025-05-06 | End: 2025-05-12

## 2025-05-06 RX ORDER — FUROSEMIDE 10 MG/ML
40 INJECTION INTRAMUSCULAR; INTRAVENOUS
Refills: 0 | Status: DISCONTINUED | OUTPATIENT
Start: 2025-05-06 | End: 2025-05-07

## 2025-05-06 RX ORDER — TRAZODONE HCL 100 MG
150 TABLET ORAL AT BEDTIME
Refills: 0 | Status: DISCONTINUED | OUTPATIENT
Start: 2025-05-06 | End: 2025-05-07

## 2025-05-06 RX ADMIN — Medication 4 MILLIGRAM(S): at 14:41

## 2025-05-06 RX ADMIN — Medication 25 MILLIGRAM(S): at 23:16

## 2025-05-06 RX ADMIN — Medication 150 MILLIGRAM(S): at 23:30

## 2025-05-06 RX ADMIN — AMLODIPINE BESYLATE 5 MILLIGRAM(S): 10 TABLET ORAL at 23:16

## 2025-05-06 RX ADMIN — CLONAZEPAM 1 MILLIGRAM(S): 0.5 TABLET ORAL at 23:16

## 2025-05-06 RX ADMIN — Medication 1300 MILLIGRAM(S): at 23:16

## 2025-05-06 NOTE — ED PROVIDER NOTE - CLINICAL SUMMARY MEDICAL DECISION MAKING FREE TEXT BOX
Patient concerning for dislodged left nephurostomy from left and UTI will treat patient with antibiotics consult IR and urology and admit patient.

## 2025-05-06 NOTE — H&P ADULT - PROBLEM SELECTOR PLAN 1
- Pt with history of bladder cancer s/p cyctectomy with ileal conduit c/b recurrent uretero-ileal strictures requiring bilateral upside-down nephroureteral stents presenting with flank pain and Xray KUB showing dislodged right ureteral stent to the level of L5 and CT AP showing loss of previously visualize left ureteral stent    PLAN:  - Urology c/s, recommend IR evaluation for left upside down nephroureteral tube placement and right upside down nephroureteral tube exchange  - IR c/s, tentatively planning for right nephrostomy vs right nephrouretererostomy stent placement and left nephroureterostomy stent exchange later this week - Pt with history of bladder cancer s/p cyctectomy with ileal conduit c/b recurrent uretero-ileal strictures requiring bilateral upside-down nephroureteral stents presenting with flank pain and Xray KUB showing dislodged right ureteral stent to the level of L5 and CT AP showing loss of previously visualize left ureteral stent    PLAN:  - Urology c/s, recommend IR evaluation for left upside down nephroureteral tube placement and right upside down nephroureteral tube exchange  - IR c/s, tentatively planning for right nephrostomy vs right nephrouretererostomy stent placement and left nephroureterostomy stent exchange later this week  - monitor BMP/UOP/sx and will require interdisciplinary IR/uro discussion in AM

## 2025-05-06 NOTE — H&P ADULT - PROBLEM SELECTOR PLAN 9
DVT PPx: heparin subq  E: replete K<4, Mg<2  Diet: Renal  PT/OT: pending   Code Status: Full  Dispo: pending medical improvement - HSQ VTE PPx  - fall, aspiration precaution  - DASH, low K diet pending RN dysphagia screen and uro/IR recs  - dispo pending course, PT eval   - f/u low albumin as outpatient - HSQ VTE PPx  - fall, aspiration precaution  - DASH, low K diet pending uro/IR recs  - dispo pending course  - f/u low albumin as outpatient - reportedly on high dose trazodone, unable to clarify w/ pt (refusing further interview) will c/w 150mg QHS tonight and will need to clarify in AM

## 2025-05-06 NOTE — PATIENT PROFILE ADULT - FALL HARM RISK - HARM RISK INTERVENTIONS

## 2025-05-06 NOTE — PATIENT PROFILE ADULT - NSPROMEDSADMININFO_GEN_A_NUR
09/14/22                            Singh Heaton  [address] [place] [id number]    To Whom It May Concern:    This is to certify Singh Heaton was evaluated with Marvin Bailey DO on 09/14/22 and may return to school on Thursday.     RESTRICTIONS: No gym/sports until clearance by your doctor or provider. Please allow time between classes and use of elevator until cleared.           Marvin Bailey DO  [facility]  [address] [facility] [address]            
no concerns

## 2025-05-06 NOTE — H&P ADULT - NSHPADDITIONALINFOADULT_GEN_ALL_CORE
Urgent medical problems addressed overnight, comprehensive care to be assumed by day colleagues during course of admission.    Please refer to detailed radiology reports above, provide to patient upon discharge for presentation to PCP at which time abnormal findings not addressed inpatient can be followed up.    Case assigned to me overnight by hospitalist in charge Dr. Guevara

## 2025-05-06 NOTE — H&P ADULT - PROBLEM SELECTOR PLAN 3
- Continue Trazodone. - normocytic chronic anemia Hgb 10.7, MCV 91  - likely AOCD vs LOUIS    PLAN:  - send iron studies (Fe, TIBC, ferritin)  - peripheral smear, hemolysis labs (LDH, haptoglobin, bilirubin), reticulocyte count  - B12, folate, methylmalonate, reticulocyte count - stable from 10/31/2024, CBC daily and outpatient f/u

## 2025-05-06 NOTE — H&P ADULT - NSHPREVIEWOFSYSTEMS_GEN_ALL_CORE
CONSTITUTIONAL - No fever, No weight change, No lightheadedness  SKIN - No rash  HEMATOLOGIC - No abnormal bleeding or bruising  EYES - No eye pain, No blurred vision  ENT - No change in hearing, No sore throat, No neck pain, No rhinorrhea, No ear pain  RESPIRATORY - No shortness of breath, No cough  CARDIAC -No chest pain, No palpitations  GI - No abdominal pain, No nausea, No vomiting, No diarrhea, No constipation  - No dysuria, no frequency, no hematuria.   MUSCULOSKELETAL - No joint pain, No swelling, No back pain  NEUROLOGIC - No numbness, No focal weakness, No headache, No dizziness CONSTITUTIONAL - No fever, No weight change, No lightheadedness  SKIN - No rash  HEMATOLOGIC - No abnormal bleeding or bruising  EYES - No eye pain, No blurred vision  ENT - No change in hearing, No sore throat, No neck pain, No rhinorrhea, No ear pain  RESPIRATORY - No shortness of breath, No cough  CARDIAC -No chest pain, No palpitations  GI - No abdominal pain, No nausea, No vomiting, No diarrhea, No constipation  - No dysuria, no frequency, no hematuria +as per HPI   MUSCULOSKELETAL - No joint pain, No swelling, No back pain  NEUROLOGIC - No numbness, No focal weakness, No headache, No dizziness

## 2025-05-06 NOTE — ED ADULT TRIAGE NOTE - CHIEF COMPLAINT QUOTE
PAtient presents to ED with complaint of lower abdominal pain x 1 day. States that he has an " ostomy and thinks it malfunctioned".

## 2025-05-06 NOTE — ED ADULT NURSE REASSESSMENT NOTE - NS ED NURSE REASSESS COMMENT FT1
Report received from RUBIO Pacheco. Pt is well-appearing at this time and is in no acute distress. Pt presents at baseline mental status, AAOx4 . Plan of care and monitoring discussed with pt. Bed locked and lowered for safety. Safety and comfort maintained. VS as documented. MARTIN contacted to get an off tele placed on pt, pt NSR on CM. Report given to RUBIO Lopez.

## 2025-05-06 NOTE — H&P ADULT - PROBLEM SELECTOR PLAN 6
- Continue Xtandi  pt will need to bring home meds as this is not available in the hospital.  ??? - On [id number] TID    PLAN:  - c/w home meds

## 2025-05-06 NOTE — H&P ADULT - PROBLEM SELECTOR PLAN 5
Continue home amlodipine  Losartan - On trazadone 150mg qhs and diphenhydramine 25mg qhs    PLAN:  - c/w home meds

## 2025-05-06 NOTE — ED ADULT NURSE NOTE - OBJECTIVE STATEMENT
70 y/o M with pmh of HTN, depression, anxiety, CKD 5, prostate cancer s/p radical prostatectomy, bladder ca s/p cystectomy and ileal conduit,  b/l nephrostomy stents presenting for dislodgement of left nephrostomy tube transferred from [place]. Patient believes his left urostomy tube was dislodged on Saturday patient began having left-sided flank pain yesterday worsening today. CT  which showed dislodged stent from left nephrostomy site.  Patient was given morphine and received lab workup which showed positive UTI.  Pt has IV flushed and patent MPRN

## 2025-05-06 NOTE — ED ADULT NURSE NOTE - OBJECTIVE STATEMENT
71y/o male presents to the ED from home c/o lower bad pain x 1 day. States that he has an " ostomy and thinks it malfunctioned" . Pt is AOx4, patent airway, clear lung sounds, tender abdomen upon palpation, strong peripheral pulses, skin is warm dry and intact, ambulates independently at baseline. Patient denies fever, chills, n/v, weakness, diarrhea/constipation, numbness/tingling, in no respiratory distress, no chest pain, Patient safety provided with call bell within reach and bed in the lowest position. 69y/o male presents to the ED from home c/o lower abd pain x 1 day. States that he has an " ostomy and thinks it malfunctioned" . Pt is AOx4, patent airway, clear lung sounds, tender abdomen upon palpation, strong peripheral pulses, skin is warm dry and intact, ambulates independently at baseline. Patient denies fever, chills, n/v, weakness, diarrhea/constipation, numbness/tingling, in no respiratory distress, no chest pain, Patient safety provided with call bell within reach and bed in the lowest position.

## 2025-05-06 NOTE — CONSULT NOTE ADULT - SUBJECTIVE AND OBJECTIVE BOX
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     INCOMPLETE    PAST MEDICAL & SURGICAL HISTORY:  Prostate Cancer  HTN (Hypertension)  Anxiety  Major depressive disorder  UTI (urinary tract infection)  Urinary (tract) obstruction  Urethral stricture  Other calculus in bladder  Obese  Diabetes mellitus  Malignant neoplasm of bladder  Unspecified hydronephrosis  S/P Appendectomy  40 years ago  H/O cystoscopy  - multiple  last cystoscopy, laser litholapaxy on 2018  History of prostatectomy  robotic, , s/p radiation  Suprapubic catheter  2015  History of bladder cancer  bladder removal May 10, 2018  S/P ileal conduit  May 2018  Nephrostomy status  Left, 2018  Bladder disease  Bladder Removed    MEDICATIONS  (STANDING):  cefTRIAXone   IVPB 1000 milliGRAM(s) IV Intermittent once    FAMILY HISTORY:  FH: colon cancer    Allergies  No Known Allergies    SOCIAL HISTORY:    REVIEW OF SYSTEMS: Otherwise negative as stated in HPI    Physical Exam  Vital signs  T(C): 36.6 (25 @ 15:57), Max: 36.6 (25 @ 15:57)  HR: 68 (25 @ 15:57)  BP: 163/77 (25 @ 15:57)  SpO2: 95% (25 @ 15:57)    Output    Gen:   Pulm:  Abd:  :      LABS:                       10.7   8.32  )-----------( 183      ( 06 May 2025 13:54 )             33.3           138  |  105  |  50[H]  ----------------------------<  114[H]  5.4[H]   |  24  |  4.66[H]    Ca    9.7      06 May 2025 13:54    TPro  7.5  /  Alb  2.9[L]  /  TBili  0.6  /  DBili  x   /  AST  23  /  ALT  12  /  AlkPhos  106  -    PT/INR - ( 06 May 2025 13:54 )   PT: 10.9 sec;   INR: 0.92 ratio    PTT - ( 06 May 2025 13:54 )  PTT:32.1 sec    Urinalysis Basic - ( 06 May 2025 13:54 )  Color: Yellow / Appearance: Clear / S.009 / pH: x  Gluc: 114 mg/dL / Ketone: Negative mg/dL  / Bili: Negative / Urobili: 0.2 mg/dL   Blood: x / Protein: 30 mg/dL / Nitrite: Negative   Leuk Esterase: Large / RBC: 2 /HPF / WBC 10 /HPF   Sq Epi: x / Non Sq Epi: x / Bacteria: Moderate /HPF     RADIOLOGY:  < from: CT Abdomen and Pelvis No Cont (25 @ 13:45) >  ACC: [id number] EXAM:  CT ABDOMEN AND PELVIS   ORDERED BY:  AMY CASTRO     PROCEDURE DATE:  2025      INTERPRETATION:  CLINICAL INFORMATION: This largest bilateral   nephroureteral stents.    COMPARISON: 10/29/2024.    CONTRAST/COMPLICATIONS:  IV Contrast: NONE  Oral Contrast: NONE    PROCEDURE:  CT of the Abdomen and Pelvis was performed.  Sagittal and coronal reformats were performed.    FINDINGS:  LOWER CHEST: Gynecomastia, similar to the prior study. Trace right   pleural effusion and bibasilar predominantly right-sided reticular   scarring/atelectasis.    LIVER: Within normal limits.  BILE DUCTS: Normal caliber.  GALLBLADDER: Within normal limits.  SPLEEN: Within normal limits.  PANCREAS: Within normal limits.  ADRENALS: Within normal limits.  KIDNEYS/URETERS: Severe bilateral hydroureteronephrosis, stable on the   right and progressed on the left. Severe cortical atrophy involving the   left kidney and probable multifocal cortical scarring right kidney.   Redemonstration of an ileal conduit. Previously seen left ureteral stent   is no longer visualized. There is evidence of a new stent within the mid   to distal right ureter stent extending into the right lower quadrant   percutaneous ureterostomy stent remainsin place.    BLADDER: Prior surgical excision.  REPRODUCTIVE ORGANS: Prostatectomy. Stable postsurgical collection.    BOWEL: No bowel obstruction. Appendix is not visualized. Right lower   quadrant ileal conduit.  PERITONEUM/RETROPERITONEUM: No evidence of ascites.  VESSELS: Atherosclerotic changes.  LYMPH NODES: No lymphadenopathy.  ABDOMINAL WALL: Postsurgical changes. Right lower quadrant ileal conduit.  BONES: Sclerotic and erosive changes involving the pubic symphysis,   possibly postradiation change.    IMPRESSION:  Previously seen left ureteral stent is no longer identified. There has   been interval progression of left-sided hydronephrosis.  There is a new right ureteral stent and stable right-sided hydronephrosis.  Redemonstration of ileal conduit.  Additional findings as above.    --- End of Report ---    RONNELL LIZ MD; Attending Radiologist  This document has been electronically signed. 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 Pt reports L stent came out Saturday night when sleeping. Subsequently developed L flank pain yesterday and went to ER today, where he received morphine and had resolution of pain. Denies fever/chills, nausea/vomiting, abd pain, R flank pain, hematuria, decreased UOP or other acute complaints. Follows with Dr. Watkins.    PAST MEDICAL & SURGICAL HISTORY:  Prostate Cancer  HTN (Hypertension)  Anxiety  Major depressive disorder  UTI (urinary tract infection)  Urinary (tract) obstruction  Urethral stricture  Other calculus in bladder  Obese  Diabetes mellitus  Malignant neoplasm of bladder  Unspecified hydronephrosis  S/P Appendectomy  40 years ago  H/O cystoscopy  - multiple  last cystoscopy, laser litholapaxy on 2018  History of prostatectomy  robotic, , s/p radiation  Suprapubic catheter  2015  History of bladder cancer  bladder removal May 10, 2018  S/P ileal conduit  May 2018  Nephrostomy status  Left, 2018  Bladder disease  Bladder Removed    MEDICATIONS  (STANDING):  cefTRIAXone   IVPB 1000 milliGRAM(s) IV Intermittent once    FAMILY HISTORY:  FH: colon cancer    Allergies  No Known Allergies    SOCIAL HISTORY:    REVIEW OF SYSTEMS: Otherwise negative as stated in HPI    Physical Exam  Vital signs  T(C): 36.6 (25 @ 15:57), Max: 36.6 (25 @ 15:57)  HR: 68 (25 @ 15:57)  BP: 163/77 (25 @ 15:57)  SpO2: 95% (25 @ 15:57)    Output    Gen: NAD  Pulm: Unlabored breathing  Abd: soft, NT. no CVAT BL. +IC in place with pink stoma draining clear yellow urine. +R upside down PCNU seen in urostomy bag      LABS:                       10.7   8.32  )-----------( 183      ( 06 May 2025 13:54 )             33.3           138  |  105  |  50[H]  ----------------------------<  114[H]  5.4[H]   |  24  |  4.66[H]    Ca    9.7      06 May 2025 13:54    TPro  7.5  /  Alb  2.9[L]  /  TBili  0.6  /  DBili  x   /  AST  23  /  ALT  12  /  AlkPhos  106      PT/INR - ( 06 May 2025 13:54 )   PT: 10.9 sec;   INR: 0.92 ratio    PTT - ( 06 May 2025 13:54 )  PTT:32.1 sec    Urinalysis Basic - ( 06 May 2025 13:54 )  Color: Yellow / Appearance: Clear / S.009 / pH: x  Gluc: 114 mg/dL / Ketone: Negative mg/dL  / Bili: Negative / Urobili: 0.2 mg/dL   Blood: x / Protein: 30 mg/dL / Nitrite: Negative   Leuk Esterase: Large / RBC: 2 /HPF / WBC 10 /HPF   Sq Epi: x / Non Sq Epi: x / Bacteria: Moderate /HPF     RADIOLOGY:  < from: CT Abdomen and Pelvis No Cont (25 @ 13:45) >  ACC: [id number] EXAM:  CT ABDOMEN AND PELVIS   ORDERED BY:  AMY CASTRO     PROCEDURE DATE:  2025      INTERPRETATION:  CLINICAL INFORMATION: This largest bilateral   nephroureteral stents.    COMPARISON: 10/29/2024.    CONTRAST/COMPLICATIONS:  IV Contrast: NONE  Oral Contrast: NONE    PROCEDURE:  CT of the Abdomen and Pelvis was performed.  Sagittal and coronal reformats were performed.    FINDINGS:  LOWER CHEST: Gynecomastia, similar to the prior study. Trace right   pleural effusion and bibasilar predominantly right-sided reticular   scarring/atelectasis.    LIVER: Within normal limits.  BILE DUCTS: Normal caliber.  GALLBLADDER: Within normal limits.  SPLEEN: Within normal limits.  PANCREAS: Within normal limits.  ADRENALS: Within normal limits.  KIDNEYS/URETERS: Severe bilateral hydroureteronephrosis, stable on the   right and progressed on the left. Severe cortical atrophy involving the   left kidney and probable multifocal cortical scarring right kidney.   Redemonstration of an ileal conduit. Previously seen left ureteral stent   is no longer visualized. There is evidence of a new stent within the mid   to distal right ureter stent extending into the right lower quadrant   percutaneous ureterostomy stent remains in place.    BLADDER: Prior surgical excision.  REPRODUCTIVE ORGANS: Prostatectomy. Stable postsurgical collection.    BOWEL: No bowel obstruction. Appendix is not visualized. Right lower   quadrant ileal conduit.  PERITONEUM/RETROPERITONEUM: No evidence of ascites.  VESSELS: Atherosclerotic changes.  LYMPH NODES: No lymphadenopathy.  ABDOMINAL WALL: Postsurgical changes. Right lower quadrant ileal conduit.  BONES: Sclerotic and erosive changes involving the pubic symphysis,   possibly postradiation change.    IMPRESSION:  Previously seen left ureteral stent is no longer identified. There has   been interval progression of left-sided hydronephrosis.  There is a new right ureteral stent and stable right-sided hydronephrosis.  Redemonstration of ileal conduit.  Additional findings as above.    --- End of Report ---    RONNELL LIZ MD; Attending Radiologist  This document has been electronically signed. May  6 2025  2:19PM    < end of copied text >

## 2025-05-06 NOTE — H&P ADULT - NSHPPHYSICALEXAM_GEN_ALL_CORE
PHYSICAL EXAM    Vital Signs Last 24 Hrs  T(C): 36.7 (06 May 2025 19:30), Max: 36.7 (06 May 2025 19:30)  T(F): 98 (06 May 2025 19:30), Max: 98 (06 May 2025 19:30)  HR: 72 (06 May 2025 19:30) (66 - 72)  BP: 157/72 (06 May 2025 19:30) (157/72 - 181/84)  BP(mean): --  RR: 18 (06 May 2025 19:30) (15 - 18)  SpO2: 98% (06 May 2025 19:30) (95% - 99%)    Parameters below as of 06 May 2025 19:30  Patient On (Oxygen Delivery Method): room air          GENERAL: NAD, lying comfortably in bed   HEAD:  Atraumatic, Normocephalic  EYES: EOMI b/l, PERRLA b/l, conjunctiva and sclera clear  NECK: Supple, No JVD, No LAD   CHEST/LUNG: Clear to auscultation bilaterally; No wheeze or rhonchi  HEART: Regular rate and rhythm; S1 and S2 present, No murmurs, rubs, or gallops  ABDOMEN: Soft, Nontender, Nondistended; Bowel sounds present  EXTREMITIES:  2+ Peripheral Pulses, No clubbing, cyanosis, or edema  NEURO: AAOx3, non-focal   SKIN: No rashes or lesions PHYSICAL EXAM    Vital Signs Last 24 Hrs  T(C): 36.7 (06 May 2025 19:30), Max: 36.7 (06 May 2025 19:30)  T(F): 98 (06 May 2025 19:30), Max: 98 (06 May 2025 19:30)  HR: 72 (06 May 2025 19:30) (66 - 72)  BP: 157/72 (06 May 2025 19:30) (157/72 - 181/84)  BP(mean): --  RR: 18 (06 May 2025 19:30) (15 - 18)  SpO2: 98% (06 May 2025 19:30) (95% - 99%)    Parameters below as of 06 May 2025 19:30  Patient On (Oxygen Delivery Method): room air          GENERAL: NAD, lying comfortably in bed   HEAD:  Atraumatic, Normocephalic  EYES: EOMI b/l, PERRLA b/l, conjunctiva and sclera clear  NECK: Supple, No JVD, No LAD   CHEST/LUNG: Clear to auscultation bilaterally; No wheeze or rhonchi  HEART: Regular rate and rhythm; S1 and S2 present, No murmurs, rubs, or gallops  ABDOMEN: Soft, Nontender, Nondistended; Bowel sounds present, urostomy site clean, no erythema, pus, drainage  EXTREMITIES:  2+ Peripheral Pulses, No clubbing, cyanosis, or edema  NEURO: AAOx3, non-focal   SKIN: No rashes or lesions

## 2025-05-06 NOTE — H&P ADULT - PROBLEM SELECTOR PLAN 2
- UA remarkable for large leukocyte esterase, moderate bacteria, and negative nitrites  - s/p ceftriaxone in ED    PLAN:  - c/w ceftriaxone - UA remarkable for large leukocyte esterase, moderate bacteria, and negative nitrites  - s/p ceftriaxone in ED  - history of ESBL UTI + MRSA UTI    PLAN:  - Start Ertapenam for empiric coverage  - MRSA PCR  - Can consider vanc if decompensates

## 2025-05-06 NOTE — ED ADULT NURSE NOTE - HIV OFFER
75M h/o lymphoma in remission, CAD, nonrheumatic mitral regurg, CHF, first-degree AV block, aortic arthrosclerosis admitted to [facility] with SAH. Initially presented initially to outside hospital ED with complaints of bilateral neck pain that happened approximately 2 hours prior to arrival.  Reports that the pain started immediately after having a bowel movement.  He does have associated headache that radiates up to his temples. Patient is on baby ASA. Also reports some associated weakness with walking. States he woke up this morning feeling normal without any pain or weakness. Denies fever/chills, vision/hearing changes, dysphagia, dysarthria, vomiting, new-onset weakness or sensory change, bowel/bladder changes. CTH with prepontine SAH; CTA negative for vessel lesions.      Previously Declined (within the last year)

## 2025-05-06 NOTE — H&P ADULT - NSHPSOCIALHISTORY_GEN_ALL_CORE
Lives with his partner, Denies smoking, alcohol, or drugs Lives with his partner, Denies smoking, alcohol, or drugs. Ambulates independently, reports that he is retired but refuses to discuss prior occupation

## 2025-05-06 NOTE — ED PROVIDER NOTE - PHYSICAL EXAMINATION
GENERAL: NAD, lying in bed comfortably  HEAD:  Atraumatic, normocephalic  EYES: conjunctiva and sclera clear  HEART: Regular rate and rhythm, no murmurs, rubs, or gallops  LUNGS: Unlabored respirations.  Clear to auscultation bilaterally, no crackles, wheezing, or rhonchi  ABDOMEN: Soft, nontender, nondistended, +BS, urostomy site right side abd with nephurostomy in nephrostomy bag  EXTREMITIES: 2+ peripheral pulses bilaterally. No clubbing, cyanosis, or edema  NERVOUS SYSTEM:  A&Ox3, moving all extremities, no focal deficits   SKIN: No rashes or lesions

## 2025-05-06 NOTE — ED PROVIDER NOTE - OBJECTIVE STATEMENT
69 yo m ho HTN, depression, anxiety, CKD 4/5, prostate cancer s/p radical prostatectomy, bladder ca s/p cystectomy and ileal conduit, and uretero-ilial stricture s/p  bilateral upside down nephroureteral stents, recent dislodged tube Oct 2024 at [facility] presents with abd pain and tube dislodgement. Admits 2 days ago accidentally dislodged 1 tube from urostomy and now 1 tube remaining partially dislodged. Urine output wnl now. Denies nausea, vomiting, hematuria    Fu with Dr Esteban? urology but does not want to fu with him again. Also did not want to return to [facility]   Saw his nephrologist and was advised to come to ER

## 2025-05-06 NOTE — ED PROVIDER NOTE - ATTENDING CONTRIBUTION TO CARE
Patient was transported here from [facility].  Notably he is 70 is a history of CKD but not on dialysis and has bladder cancer and prostate cancer removal of prostate recurrent and now with ileal conduit.  Stents to both sides and the left-sided stent fell out when he had a fall.  No other injuries.  To [facility] they did a CT scan and it showed a left ureteral stent is no longer identified and there is associated left-sided hydronephrosis.  His creatinine there was 4.6 which is similar to prior creatinine.  At this time we will place an IR consult IV antibiotic for his cystitis and admit to the hospital.  The other hospital send a culture.

## 2025-05-06 NOTE — H&P ADULT - HISTORY OF PRESENT ILLNESS
In the ED, VSS. Labs notable for  71 y/o M with PMH HTN, depression, anxiety, CKD 4/5, prostate cancer s/p radical prostatectomy (on Xtandi) and bladder cancer s/p cyctectomy with ileal conduit c/b recurrent uretero-ileal strictures requiring bilateral upside-down nephroureteral stents presenting with dislodged right nephroureteral stent and malpositioned left nephroureteral stent after accidental dislodgement of one stent from his urostomy on 4/16 with his outpatient urologist. Patient complains of occasional abdomnial pain and left-sided flank pain, for which he has been taking tylenol with improvement. He denies fever, chills, nausea, vomiting, hematuria.     In the ED, VSS, labs notable for Hgb 10.7, Cr 4.56. UA large amount leukocyte esterase, moderate bacteria, negative nitrites. CT abdomen and pelvis remarkable for previously seen left ureteral stent no longer identified. There has been interval progression of left-sided hydronephrosis. There is a new right ureteral stent and stable right-sided hydronephrosis.Redemonstration of ileal conduit.Xray KUB showing dislodged right ureteral stent to the level of L5.       71 y/o M with PMH HTN, depression, anxiety, CKD 5, prostate cancer s/p radical prostatectomy (on Xtandi) and bladder cancer s/p cyctectomy with ileal conduit c/b recurrent uretero-ileal strictures requiring bilateral upside-down nephroureteral stents presenting to [facility] ED after being evaluated at [facility] and found to have dislodged right nephroureteral stent and malpositioned left nephroureteral stent. He was given morphine at [place] with improvement in his pain.  Patient was seen by his outpatient urologist on 4/16 and his stents were replaced at that time. On Saturday 5/6, patient began experiencing abdominal pain and left sided flank pain and believes that this is when one of his stents were accidently dislodged. No trauma at the time. He denies chest pain, SOB, fever, chills, night sweats, nausea, vomiting, hematuria,  dysuria, weight loss, recent travel, or sick contacts.     In the ED, VSS, labs notable for Hgb 10.7, Cr 4.56. UA large amount leukocyte esterase, moderate bacteria, negative nitrites. CT abdomen and pelvis remarkable for previously seen left ureteral stent no longer identified. There has been interval progression of left-sided hydronephrosis. There is a new right ureteral stent and stable right-sided hydronephrosis.Redemonstration of ileal conduit.Xray KUB showing dislodged right ureteral stent to the level of L5. Patient was give 1g IV ceftriaxone; evaluated by urology and interventional radiology teams and admitted to medicine for further management.        71 y/o M with PMH HTN, depression, anxiety, CKD 5, prostate cancer s/p radical prostatectomy (on Xtandi) and bladder cancer s/p cyctectomy with ileal conduit c/b recurrent uretero-ileal strictures requiring bilateral upside-down nephroureteral stents presenting to [facility] ED after being evaluated at [facility] and found to have dislodged right nephroureteral stent and malpositioned left nephroureteral stent. He was given morphine at [place] with improvement in his pain.  Patient was seen by his outpatient urologist on 4/16 and his stents were replaced at that time. On Saturday 5/6, patient began experiencing abdominal pain and left sided flank pain and believes that this is when one of his stents were accidently dislodged. No trauma at the time. He denies chest pain, SOB, fever, chills, night sweats, nausea, vomiting, hematuria,  dysuria, weight loss, recent travel, or sick contacts.     In the ED, VSS, labs notable for Hgb 10.7, Cr 4.56. UA large amount leukocyte esterase, moderate bacteria, negative nitrites. CT abdomen and pelvis remarkable for previously seen left ureteral stent no longer identified. There has been interval progression of left-sided hydronephrosis. There is a new right ureteral stent and stable right-sided hydronephrosis.Redemonstration of ileal conduit. Xray KUB showing dislodged right ureteral stent to the level of L5. Patient was give 1g IV ceftriaxone; evaluated by urology and interventional radiology teams and admitted to medicine for further management.

## 2025-05-06 NOTE — H&P ADULT - ASSESSMENT
71 y/o M with pmh of HTN, depression, anxiety, CKD 5, prostate cancer s/p radical prostatectomy, bladder ca s/p cystectomy and ileal conduit, and uretero-ilial stricture s/p  bilateral upside down nephroureteral stents presents to the ED with dislodged right nephroureteral tube and malpositioned left nephroureteral tube admitted for  right nephroureteral tube replacement and left nephroureteral tube exchange.   71 y/o M with PMH HTN, depression, anxiety, CKD 4/5, prostate cancer s/p radical prostatectomy (on Xtandi) and bladder cancer s/p cyctectomy with ileal conduit c/b recurrent uretero-ileal strictures requiring bilateral upside-down nephroureteral stents presenting with dislodged right nephroureteral stent and malpositioned left nephroureteral admitted to exchange. 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

## 2025-05-06 NOTE — ED PROVIDER NOTE - CONSULTANT FREE TEXT FOR MDM DISCUSSED CASE WITH QUESTION
urology Dr Contreras    IR Dr Tirado    @ [facility] via [facility]     Urology Dr Love,

## 2025-05-06 NOTE — ED PROVIDER NOTE - CLINICAL SUMMARY MEDICAL DECISION MAKING FREE TEXT BOX
71 yo m ho HTN, depression, anxiety, CKD 4/5, prostate cancer s/p radical prostatectomy, bladder ca s/p cystectomy and ileal conduit, and uretero-ilial stricture s/p  bilateral upside down nephroureteral stents, recent dislodged tube Oct 2024 at [facility] presents with abd pain and tube dislodgement. Admits 2 days ago accidentally dislodged 1 tube from urostomy and now 1 tube remaining partially dislodged. Urine output wnl now. Denies nausea, vomiting, hematuria    Fu with Dr Esteban? urology but does not want to fu with him again. Also did not want to return to [facility]   Saw his nephrologist and was advised to come to ER  Exam as stated   Inititally consulted urology - rec XR, confirmed dislodged tube, only 1 visible  Consulted [facility] IR Dr Tirado initially and urology Dr Contreras. Aware and agreeable to transfer. Will follow [facility]

## 2025-05-06 NOTE — H&P ADULT - NSHPLABSRESULTS_GEN_ALL_CORE
.  LABS:                         10.7   8.32  )-----------( 183      ( 06 May 2025 13:54 )             33.3     05-06    141  |  106  |  47[H]  ----------------------------<  187[H]  4.6   |  21[L]  |  4.56[H]    Ca    9.9      06 May 2025 17:46    TPro  7.5  /  Alb  2.9[L]  /  TBili  0.6  /  DBili  x   /  AST  23  /  ALT  12  /  AlkPhos  106  05-06    PT/INR - ( 06 May 2025 13:54 )   PT: 10.9 sec;   INR: 0.92 ratio         PTT - ( 06 May 2025 13:54 )  PTT:32.1 sec  Urinalysis Basic - ( 06 May 2025 17:46 )    Color: x / Appearance: x / SG: x / pH: x  Gluc: 187 mg/dL / Ketone: x  / Bili: x / Urobili: x   Blood: x / Protein: x / Nitrite: x   Leuk Esterase: x / RBC: x / WBC x   Sq Epi: x / Non Sq Epi: x / Bacteria: x      PROCEDURE:  CT of the Abdomen and Pelvis was performed.  Sagittal and coronal reformats were performed.    FINDINGS:  LOWER CHEST: Gynecomastia, similar to the prior study. Trace right   pleural effusion and bibasilar predominantly right-sided reticular   scarring/atelectasis.    LIVER: Within normal limits.  BILE DUCTS: Normal caliber.  GALLBLADDER: Within normal limits.  SPLEEN: Within normal limits.  PANCREAS: Within normal limits.  ADRENALS: Within normal limits.  KIDNEYS/URETERS: Severe bilateral hydroureteronephrosis, stable on the   right and progressed on the left. Severe cortical atrophy involving the   left kidney and probable multifocal cortical scarring right kidney.   Redemonstration of an ileal conduit. Previously seen left ureteral stent   is no longer visualized. There is evidence of a new stent within the mid   to distal right ureter stent extending into the right lower quadrant   percutaneous ureterostomy stent remainsin place.    BLADDER: Prior surgical excision.  REPRODUCTIVE ORGANS: Prostatectomy. Stable postsurgical collection.    BOWEL: No bowel obstruction. Appendix is not visualized. Right lower   quadrant ileal conduit.  PERITONEUM/RETROPERITONEUM: No evidence of ascites.  VESSELS: Atherosclerotic changes.  LYMPH NODES: No lymphadenopathy.  ABDOMINAL WALL: Postsurgical changes. Right lower quadrant ileal conduit.  BONES: Sclerotic and erosive changes involving the pubic symphysis,   possibly postradiation change.    IMPRESSION:  Previously seen left ureteral stent is no longer identified. There has   been interval progression of left-sided hydronephrosis.  There is a new right ureteral stent and stable right-sided hydronephrosis.  Redemonstration of ileal conduit.  Additional findings as above.      INTERPRETATION:  XR ABDOMEN KUB    INDICATION:  dislodged nephroureteral tube ?.    TECHNIQUE:  Supine AP view of the abdomen    FINDINGS:  Right ureteral stent was dislodged and the pigtail portion of   the stent is located over the L5 body.    IMPRESSION:  Dislodged right ureteral stent to the level of L5. < from: Xray Kidney Ureter Bladder (05.06.25 @ 12:08) >    FINDINGS:  Right ureteral stent was dislodged and the pigtail portion of   the stent is located over the L5 body.    IMPRESSION:  Dislodged right ureteral stent to the level of L5.    < end of copied text >    < from: CT Abdomen and Pelvis No Cont (05.06.25 @ 13:45) >    LOWER CHEST: Gynecomastia, similar to the prior study. Trace right   pleural effusion and bibasilar predominantly right-sided reticular   scarring/atelectasis.    LIVER: Within normal limits.  BILE DUCTS: Normal caliber.  GALLBLADDER: Within normal limits.  SPLEEN: Within normal limits.  PANCREAS: Within normal limits.  ADRENALS: Within normal limits.  KIDNEYS/URETERS: Severe bilateral hydroureteronephrosis, stable on the   right and progressed on the left. Severe cortical atrophy involving the   left kidney and probable multifocal cortical scarring right kidney.   Redemonstration of an ileal conduit. Previously seen left ureteral stent   is no longer visualized. There is evidence of a new stent within the mid   to distal right ureter stent extending into the right lower quadrant   percutaneous ureterostomy stent remainsin place.    BLADDER: Prior surgical excision.  REPRODUCTIVE ORGANS: Prostatectomy. Stable postsurgical collection.    BOWEL: No bowel obstruction. Appendix is not visualized. Right lower   quadrant ileal conduit.  PERITONEUM/RETROPERITONEUM: No evidence of ascites.  VESSELS: Atherosclerotic changes.  LYMPH NODES: No lymphadenopathy.  ABDOMINAL WALL: Postsurgical changes. Right lower quadrant ileal conduit.  BONES: Sclerotic and erosive changes involving the pubic symphysis,   possibly postradiation change.    IMPRESSION:  Previously seen left ureteral stent is no longer identified. There has   been interval progression of left-sided hydronephrosis.  There is a new right ureteral stent and stable right-sided hydronephrosis.  Redemonstration of ileal conduit.  Additional findings as above.    < end of copied text >    EKG as per attending attestation

## 2025-05-06 NOTE — CONSULT NOTE ADULT - ASSESSMENT
-----------------------------------------------------------  Interventional Radiology Brief Consult Note  -----------------------------------------------------------    Reason for Referral: malpositioned left and completely dislodged right upside down nephroureteral stents    Clinical Summary: 70y Male with history of bladder CA and ileal conduit with bilateral upside down nephroureteral tubes who presents with completely dislodged right nephroureteral tube and malpositioned left nephroureteral tube. IR is consulted for new right nephroureteral tube placement and left nephroureteral tube exchange.    Vitals:  T(F): 97.9 (05-06-25 @ 15:57), Max: 97.9 (05-06-25 @ 15:57)  HR: 68 (05-06-25 @ 15:57) (66 - 71)  BP: 163/77 (05-06-25 @ 15:57) (163/77 - 181/84)  RR: 16 (05-06-25 @ 15:57) (15 - 18)  SpO2: 95% (05-06-25 @ 15:57) (95% - 99%)    Labs:           10.7  8.32)-----(183     (05-06-25 @ 13:54)         33.3     138 | 105 | 50  --------------------< 114     (05-06-25 @ 13:54)  5.4 | 24 | 4.66       PT: 10.9 05-06-25 @ 13:54  aPTT: 32.1 05-06-25 @ 13:54   INR: 0.92 05-06-25 @ 13:54    Imaging: CT abdomen and pelvis 5/6/25 was reviewed     Assessment: 70y Male with history of bladder CA and ileal conduit with bilateral upside down nephroureteral tubes who presents with completely dislodged right nephroureterostomy stent and malpositioned left nephroureterostomy stent that IR is consulted to replace.    Recommendations:  - CT abdomen and pelvis 5/6 was reviewed.   - Patient will need urology consult/input.   - Clarify patient's coagulation status and what (if any) anticoagulation/antiplatelets patient is on.   - IR following. Can tentatively plan for right nephrostomy vs right nephrouretererostomy stent placement and left nephroureterostomy stent exchange later this week pending labs and urology input.     --  Cassy Rodriguez MD  Interventional Radiology Resident ([id number])  Available on [employer] TEAMS    For EMERGENT inquiries/questions:  IR Pager ([facility]): [phone]  IR Pager (J): [phone] ; [phone]    For non-emergent consults/questions:   Please place a sunrise order "Consult- Interventional Radiology" with an appropriate callback number    For questions about scheduling during appropriate work hours, call IR :  [facility]: [phone]  [facility]: [phone]    For outpatient IR booking:  [facility]: [phone]  [facility]: [phone]

## 2025-05-06 NOTE — ED PROVIDER NOTE - PROGRESS NOTE DETAILS
Urology recommended IR  for placement as placed by service not urology  IR consult placed electronically IR requested urology for possible percutaneous nephrostomy tubes nephrology reconsulted and will follow

## 2025-05-06 NOTE — H&P ADULT - PROBLEM SELECTOR PLAN 8
- On Xtandi 160mg qd    PLAN:  - c/w home meds, but pt will need to bring home meds as this is not available in the hospital - On Xtandi 160mg qd    PLAN:  - c/w home meds, but pt will need to bring home meds as this is not available in the hospital (confer w/ uro in AM)

## 2025-05-06 NOTE — ED PROVIDER NOTE - CHIEF COMPLAINT
07/16/21 1308   Engagement   Intervention Group   Topic Detail OT [employer] group-copper tooling day 2 for creativity, focus, following directions, problem solving, social engagement, and symptom management   Attendance Did not attend      The patient is a 70y Male complaining of

## 2025-05-06 NOTE — ED PROVIDER NOTE - PHYSICAL EXAMINATION
CONSTITUTIONAL: NAD  SKIN: Warm dry  HEAD: NCAT  EYES: NL inspection  ENT: MMM  CARD: RRR  RESP: Unlabored respirations  ABD: S/NT no R/G, +urostomy with yellow urine, plastic wire visible through stoma site   NEURO: Grossly unremarkable  PSYCH: Cooperative, appropriate.

## 2025-05-06 NOTE — H&P ADULT - ATTENDING COMMENTS
70M PMH obesity, HTN, CKD5, depression, anxiety, complicated urologic history, s/p RALP for prostate cancer with adjuvant radiation complicated by bladder neck contracture s/p multiple procedures eventually ended up managing with SP Tube, subsequently ended up developing 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 Pt reports L stent came out Saturday night when sleeping. Subsequently developed L flank pain yesterday and went to  ER today, where he received morphine and had resolution of pain.    - BP 150s-180s/70s-80s  - H/H 10.7/33.3 (normocytic); K 5.4 -> 4.6, BUN/SCr 47/4.56 (GFR 13), Ca2+ 10.8 (corrected  for albumin 2.9); UA protein, small blood (2 RBC), large LE, 10 WBC, mod bacteria   - EKG NSR 62BPM, nonspecific ST/TW abn    #displaced ureteral stent  XR KUB dislodged R ureteral stent to level L5  CT A/P IC L ureteral stent no longer identified, interval progression L hydronephrosis. New R ureteral stent and stable R hydronephrosis, redemonstrated ileal conduit   Uro evaluated, recommends IR evaluation for left upside down nephroureteral tube placement and right upside down nephroureteral tube exchange.  IR evaluated, recommend tentatively plan for right nephrostomy vs right nephrouretererostomy stent placement and left nephroureterostomy stent exchange later this week pending labs and urology input  - monitor BMP/UOP/sx and will require interdisciplinary IR/uro discussion in AM     #UTI  UA c/f infxn though afebrile and w/o leukocytosis  hx ESBL E.coli 10/28/2024 (sens to ertapenem)  - start ertapenem pending Cx to narrow, appreciate uro recs    #normocytic anemia  - stable from 10/31/2024, CBC daily and outpatient f/u    #HTN  - c/w home amlodipine, lasix monitor BP    #CKD5  - monitor BMP/UOP, dose per renal fn, avoid toxins  - Ca2+ mild elevation, hold home calcitriol and obtain iPTH  - K 5.4 -> 4.6, repeat BMP in AM and low K diet    #depression & anxiety  - c/w home trazodone, klonopin (w/ hold parameters, ISTOP in chart)     #prostate CA  - c/w home Xtandi (can confer w/ uro in AM)     #PPx  - HSQ VTE PPx  - fall, aspiration precaution  - DASH, low K diet pending RN dysphagia screen and uro/IR recs  - dispo pending course, PT eval   - f/u low albumin as outpatient    case and plan d/w PGY-1 resident Janice 70M PMH obesity, HTN, CKD5, depression, anxiety, complicated urologic history, s/p RALP for prostate cancer with adjuvant radiation complicated by bladder neck contracture s/p multiple procedures eventually ended up managing with SP Tube, subsequently ended up developing 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 Pt reports L stent came out Saturday night when sleeping. Subsequently developed L flank pain yesterday and went to  ER today, where he received morphine and had resolution of pain.    - BP 150s-180s/70s-80s  - H/H 10.7/33.3 (normocytic); K 5.4 -> 4.6, BUN/SCr 47/4.56 (GFR 13), Ca2+ 10.8 (corrected  for albumin 2.9); UA protein, small blood (2 RBC), large LE, 10 WBC, mod bacteria   - EKG NSR 62BPM, nonspecific ST/TW abn    #displaced ureteral stent  XR KUB dislodged R ureteral stent to level L5  CT A/P IC L ureteral stent no longer identified, interval progression L hydronephrosis. New R ureteral stent and stable R hydronephrosis, redemonstrated ileal conduit   Uro evaluated, recommends IR evaluation for left upside down nephroureteral tube placement and right upside down nephroureteral tube exchange.  IR evaluated, recommend tentatively plan for right nephrostomy vs right nephrouretererostomy stent placement and left nephroureterostomy stent exchange later this week pending labs and urology input  - monitor BMP/UOP/sx and will require interdisciplinary IR/uro discussion in AM     #UTI  UA c/f infxn though afebrile and w/o leukocytosis  hx ESBL E.coli 10/28/2024 (sens to ertapenem)  - start ertapenem pending Cx to narrow, appreciate uro recs (has hx MRSA UCx prior, will hold vanc and obtain MRSA swab though low threshold to initiate if decompensates)     #normocytic anemia  - stable from 10/31/2024, CBC daily and outpatient f/u    #HTN  - c/w home amlodipine, lasix monitor BP    #CKD5  - monitor BMP/UOP, dose per renal fn, avoid toxins  - Ca2+ mild elevation, hold home calcitriol and obtain iPTH  - K 5.4 -> 4.6, repeat BMP in AM and low K diet    #depression & anxiety  - c/w home trazodone, klonopin (w/ hold parameters, ISTOP in chart)     #prostate CA  - c/w home Xtandi (can confer w/ uro in AM)     #PPx  - HSQ VTE PPx  - fall, aspiration precaution  - DASH, low K diet pending RN dysphagia screen and uro/IR recs  - dispo pending course, PT eval   - f/u low albumin as outpatient    case and plan d/w PGY-1 resident Janice 70M PMH obesity, HTN, CKD5, depression, anxiety, complicated urologic history, s/p RALP for prostate cancer with adjuvant radiation complicated by bladder neck contracture s/p multiple procedures eventually ended up managing with SP Tube, subsequently ended up developing low grade Ta urothelial cancer with squamous diff s/p cystectomy with ileal conduit c/b uretero-ileal stricture initially managed endoscopically, ultimately, ended up getting bilateral nephrostomy tubes which were converted to upside down Neph-u stents last exchanged on 4/16/25 with Dr Hoenig, transferred from  ED for IR and uro evaluation i/s/o displaced ureteral stents. AOx4, reports baseline health when night PTA described rolling in bed w/ sensation of L tube displacement and development of L flank pain prompting  ED presentation (flank pain now resolved). Denies trauma to area/fall (as previously described in ED attending attestation), f/c, HA, lightheadedness, dizziness, CP, palpitations, cough, SOB, n/v/d, constipation, melena, hematochezia, or other complaint however otherwise requests interviewer to leave room as he communicates that he has difficulty sleeping and would prefer to be interviewed/examined later in the morning despite my communication that I am unable to assess his wellbeing/risk without further interview/examination, he repeatedly refuses and again requests that I leave room. He is aware that if he were to change his mind, RN will contact me to return.    - BP 150s-180s/70s-80s  - H/H 10.7/33.3 (normocytic); K 5.4 -> 4.6, BUN/SCr 47/4.56 (GFR 13), Ca2+ 10.8 (corrected  for albumin 2.9); UA protein, small blood (2 RBC), large LE, 10 WBC, mod bacteria   - EKG NSR 62BPM, nonspecific ST/TW abn    #displaced ureteral stent  XR KUB dislodged R ureteral stent to level L5  CT A/P IC L ureteral stent no longer identified, interval progression L hydronephrosis. New R ureteral stent and stable R hydronephrosis, redemonstrated ileal conduit   Uro evaluated, recommends IR evaluation for left upside down nephroureteral tube placement and right upside down nephroureteral tube exchange.  IR evaluated, recommend tentatively plan for right nephrostomy vs right nephrouretererostomy stent placement and left nephroureterostomy stent exchange later this week pending labs and urology input  - monitor BMP/UOP/sx and will require interdisciplinary IR/uro discussion in AM     #UTI  UA c/f infxn though afebrile and w/o leukocytosis  hx ESBL E.coli 10/28/2024 (sens to ertapenem)  - start ertapenem (pt refusing at this time, prefers to receive later in AM) pending Cx to narrow, appreciate uro recs (has hx MRSA UCx prior, will hold vanc and obtain MRSA swab though low threshold to initiate if decompensates)     #normocytic anemia  - stable from 10/31/2024, CBC daily and outpatient f/u    #HTN  - c/w home amlodipine, lasix monitor BP    #CKD5  - monitor BMP/UOP, dose per renal fn, avoid toxins  - Ca2+ mild elevation, hold home calcitriol and obtain iPTH  - K 5.4 -> 4.6, repeat BMP in AM and low K diet (pt was ordered for tele by ED though is refusing, will d/c order and reevaluate BMP in AM)    #depression & anxiety  - c/w home trazodone, klonopin (w/ hold parameters, ISTOP in chart)     #prostate CA  - c/w home Xtandi (can confer w/ uro in AM)     #PPx  - HSQ VTE PPx  - fall, aspiration precaution  - DASH, low K diet pending RN dysphagia screen and uro/IR recs  - dispo pending course, PT eval   - f/u low albumin as outpatient    case and plan d/w PGY-1 resident Janice 70M PMH obesity, HTN, CKD5, depression, anxiety, complicated urologic history, s/p RALP for prostate cancer with adjuvant radiation complicated by bladder neck contracture s/p multiple procedures eventually ended up managing with SP Tube, subsequently ended up developing low grade Ta urothelial cancer with squamous diff s/p cystectomy with ileal conduit c/b uretero-ileal stricture initially managed endoscopically, ultimately, ended up getting bilateral nephrostomy tubes which were converted to upside down Neph-u stents last exchanged on 4/16/25 with Dr Hoenig, transferred from  ED for IR and uro evaluation i/s/o displaced ureteral stents. AOx4, reports baseline health when night PTA described rolling in bed w/ sensation of L tube displacement and development of L flank pain prompting  ED presentation (flank pain now resolved). Denies trauma to area/fall (contrary to what was previously described in ED attending attestation), f/c, HA, lightheadedness, dizziness, CP, palpitations, cough, SOB, n/v/d, constipation, melena, hematochezia, or other complaint however otherwise requests interviewer to leave room as he communicates that he has difficulty sleeping and would prefer to be interviewed/examined later in the morning despite my communication that I am unable to assess his wellbeing/risk without further interview/examination, he repeatedly refuses and again requests that I leave room. He is aware that if he were to change his mind, RN will contact me to return.    - BP 150s-180s/70s-80s  - H/H 10.7/33.3 (normocytic); K 5.4 -> 4.6, BUN/SCr 47/4.56 (GFR 13), Ca2+ 10.8 (corrected  for albumin 2.9); UA protein, small blood (2 RBC), large LE, 10 WBC, mod bacteria   - EKG NSR 62BPM, nonspecific ST/TW abn    #displaced ureteral stent  XR KUB dislodged R ureteral stent to level L5  CT A/P IC L ureteral stent no longer identified, interval progression L hydronephrosis. New R ureteral stent and stable R hydronephrosis, redemonstrated ileal conduit   Uro evaluated, recommends IR evaluation for left upside down nephroureteral tube placement and right upside down nephroureteral tube exchange.  IR evaluated, recommend tentatively plan for right nephrostomy vs right nephrouretererostomy stent placement and left nephroureterostomy stent exchange later this week pending labs and urology input  - monitor BMP/UOP/sx and will require interdisciplinary IR/uro discussion in AM     #UTI  UA c/f infxn though afebrile and w/o leukocytosis  hx ESBL E.coli 10/28/2024 (sens to ertapenem)  - start ertapenem (pt refusing at this time, prefers to receive later in AM) pending Cx to narrow, appreciate uro recs (has hx MRSA UCx prior, will hold vanc and obtain MRSA swab though low threshold to initiate if decompensates)     #normocytic anemia  - stable from 10/31/2024, CBC daily and outpatient f/u    #HTN  - c/w home amlodipine, lasix monitor BP    #CKD5  - monitor BMP/UOP, dose per renal fn, avoid toxins  - Ca2+ mild elevation, hold home calcitriol and obtain iPTH  - K 5.4 -> 4.6, repeat BMP in AM and low K diet (pt was ordered for tele by ED though is refusing, will d/c order and reevaluate BMP in AM)    #depression & anxiety  - c/w home trazodone, klonopin (w/ hold parameters, ISTOP in chart)     #prostate CA  - c/w home Xtandi (can confer w/ uro in AM)     #PPx  - HSQ VTE PPx  - fall, aspiration precaution  - DASH, low K diet pending RN dysphagia screen and uro/IR recs  - dispo pending course, PT eval   - f/u low albumin as outpatient    case and plan d/w PGY-1 resident Janice 70M PMH obesity, HTN, CKD5, depression, anxiety, complicated urologic history, s/p RALP for prostate cancer with adjuvant radiation complicated by bladder neck contracture s/p multiple procedures eventually ended up managing with SP Tube, subsequently ended up developing low grade Ta urothelial cancer with squamous diff s/p cystectomy with ileal conduit c/b uretero-ileal stricture initially managed endoscopically, ultimately, ended up getting bilateral nephrostomy tubes which were converted to upside down Neph-u stents last exchanged on 4/16/25 with Dr Hoenig, transferred from  ED for IR and uro evaluation i/s/o displaced ureteral stents. AOx4, reports baseline health when night PTA described rolling in bed w/ sensation of L tube displacement and development of L flank pain prompting  ED presentation (flank pain now resolved). Denies trauma to area/fall (contrary to what was previously described in ED attending attestation), f/c, HA, lightheadedness, dizziness, CP, palpitations, cough, SOB, n/v/d, constipation, melena, hematochezia, or other complaint however otherwise requests interviewer to leave room as he communicates that he has difficulty sleeping and would prefer to be interviewed/examined later in the morning despite my communication that I am unable to assess his wellbeing/risk without further interview/examination, he repeatedly refuses and again requests that I leave room. He is aware that if he were to change his mind, RN will contact me to return.    - BP 150s-180s/70s-80s  - H/H 10.7/33.3 (normocytic); K 5.4 -> 4.6, BUN/SCr 47/4.56 (GFR 13), Ca2+ 10.8 (corrected  for albumin 2.9); UA protein, small blood (2 RBC), large LE, 10 WBC, mod bacteria   - EKG NSR 62BPM, nonspecific ST/TW abn    #displaced ureteral stent  XR KUB dislodged R ureteral stent to level L5  CT A/P IC L ureteral stent no longer identified, interval progression L hydronephrosis. New R ureteral stent and stable R hydronephrosis, redemonstrated ileal conduit   Uro evaluated, recommends IR evaluation for left upside down nephroureteral tube placement and right upside down nephroureteral tube exchange.  IR evaluated, recommend tentatively plan for right nephrostomy vs right nephrouretererostomy stent placement and left nephroureterostomy stent exchange later this week pending labs and urology input  - monitor BMP/UOP/sx and will require interdisciplinary IR/uro discussion in AM     #UTI  UA c/f infxn though afebrile and w/o leukocytosis  hx ESBL E.coli 10/28/2024 (sens to ertapenem)  - start ertapenem (pt refusing at this time, prefers to receive later in AM) pending Cx (per ED attending attestation was collected at OSH) to narrow, appreciate uro recs (has hx MRSA UCx prior, will hold vanc and obtain MRSA swab though low threshold to initiate if decompensates)     #normocytic anemia  - stable from 10/31/2024, CBC daily and outpatient f/u    #HTN  - c/w home amlodipine, lasix monitor BP    #CKD5  - monitor BMP/UOP, dose per renal fn, avoid toxins  - Ca2+ mild elevation, hold home calcitriol and obtain iPTH  - K 5.4 -> 4.6, repeat BMP in AM and low K diet (pt was ordered for tele by ED though is refusing, will d/c order and reevaluate BMP in AM)    #depression & anxiety  - c/w home trazodone, klonopin (w/ hold parameters, ISTOP in chart)     #prostate CA  - c/w home Xtandi (can confer w/ uro in AM)     #PPx  - HSQ VTE PPx  - fall, aspiration precaution  - DASH, low K diet pending RN dysphagia screen and uro/IR recs  - dispo pending course, PT eval   - f/u low albumin as outpatient    case and plan d/w PGY-1 resident Janice 70M PMH obesity, HTN, CKD5, depression, anxiety, complicated urologic history, s/p RALP for prostate cancer with adjuvant radiation complicated by bladder neck contracture s/p multiple procedures eventually ended up managing with SP Tube, subsequently ended up developing low grade Ta urothelial cancer with squamous diff s/p cystectomy with ileal conduit c/b uretero-ileal stricture initially managed endoscopically, ultimately, ended up getting bilateral nephrostomy tubes which were converted to upside down Neph-u stents last exchanged on 4/16/25 with Dr Hoenig, transferred from  ED for IR and uro evaluation i/s/o displaced ureteral stents. AOx4, reports baseline health when night PTA described rolling in bed w/ sensation of L tube displacement and development of L flank pain prompting  ED presentation (flank pain now resolved). Denies trauma to area/fall (contrary to what was previously described in ED attending attestation), f/c, HA, lightheadedness, dizziness, CP, palpitations, cough, SOB, n/v/d, constipation, melena, hematochezia, or other complaint however otherwise requests interviewer to leave room as he communicates that he has difficulty sleeping and would prefer to be interviewed/examined later in the morning despite my communication that I am unable to assess his wellbeing/risk without further interview/examination, he repeatedly refuses and again requests that I leave room. He is aware that if he were to change his mind, RN will contact me to return.    - BP 150s-180s/70s-80s  - H/H 10.7/33.3 (normocytic); K 5.4 -> 4.6, BUN/SCr 47/4.56 (GFR 13), Ca2+ 10.8 (corrected  for albumin 2.9); UA protein, small blood (2 RBC), large LE, 10 WBC, mod bacteria   - EKG NSR 62BPM, nonspecific ST/TW abn    #displaced ureteral stent  XR KUB dislodged R ureteral stent to level L5  CT A/P IC L ureteral stent no longer identified, interval progression L hydronephrosis. New R ureteral stent and stable R hydronephrosis, redemonstrated ileal conduit   Uro evaluated, recommends IR evaluation for left upside down nephroureteral tube placement and right upside down nephroureteral tube exchange.  IR evaluated, recommend tentatively plan for right nephrostomy vs right nephrouretererostomy stent placement and left nephroureterostomy stent exchange later this week pending labs and urology input  - monitor BMP/UOP/sx and will require interdisciplinary IR/uro discussion in AM     #UTI  UA c/f infxn though afebrile and w/o leukocytosis  hx ESBL E.coli 10/28/2024 (sens to ertapenem)  - start ertapenem (pt refusing at this time, prefers to receive later in AM) pending Cx (per ED attending attestation was collected at OSH) to narrow, appreciate uro recs (has hx MRSA UCx prior, will hold vanc and obtain MRSA swab though low threshold to initiate if decompensates)     #normocytic anemia  - stable from 10/31/2024, CBC daily and outpatient f/u    #HTN  - c/w home amlodipine, lasix monitor BP    #CKD5  - monitor BMP/UOP, dose per renal fn, avoid toxins  - Ca2+ mild elevation, hold home calcitriol and obtain iPTH  - K 5.4 -> 4.6, repeat BMP in AM and low K diet (pt was ordered for tele by ED though is refusing, will d/c order and reevaluate BMP in AM)    #depression & anxiety  - c/w home trazodone, klonopin (w/ hold parameters, ISTOP in chart)     #prostate CA  - c/w home Xtandi (can confer w/ uro in AM)     #medication management  - reportedly on high dose trazodone, unable to clarify w/ pt (refusing further interview) will c/w 150mg QHS tonight and will need to clarify in AM     #PPx  - HSQ VTE PPx  - fall, aspiration precaution  - DASH, low K diet pending RN dysphagia screen and uro/IR recs  - dispo pending course, PT eval   - f/u low albumin as outpatient    case and plan d/w PGY-1 resident Janice

## 2025-05-06 NOTE — H&P ADULT - PROBLEM SELECTOR PLAN 4
- continue clonazepam. - Pt with CKD 5, not on HD, makes urine  -  Cr 4.56,eGFR13  - follows with outpatient nephrologist   - On Furosemide 40mg q48h    PLAN:  - avoid NSAIDs, ACE/ARBs, nephrotoxic drugs, and contrast  - daily weight, strict I/O  - monitor Cr - Pt with CKD 5, not on HD, makes urine  -  Cr 4.56,eGFR13  - follows with outpatient nephrologist     PLAN:  - avoid NSAIDs, ACE/ARBs, nephrotoxic drugs, and contrast  - daily weight, strict I/O  - monitor Cr

## 2025-05-06 NOTE — CONSULT NOTE ADULT - ASSESSMENT
INCOMPLETE 71 yo M with complicated urologic history, s/p RALP for prostate cancer with adjuvant radiation complicated by bladder neck contracture s/p multiple procedures eventually ended up managing with SP Tube, subsequently ended up developing 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  AF VSS. WBC 8.3, Cr 4.66 (around baseline), K 5.5. CT shows L hydro without L stent and stable R hydro with evidence of new R stent in mid to distal ureter.    Recs:  - please have IR evaluate patient for replacement of L PCNU    to be discussed with Dr. Hoenig 71 yo M with complicated urologic history, s/p RALP for prostate cancer with adjuvant radiation complicated by bladder neck contracture s/p multiple procedures eventually ended up managing with SP Tube, subsequently ended up developing 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  AF VSS. WBC 8.3, Cr 4.66 (around baseline), K 5.5. CT shows L hydro without L stent and stable R hydro with evidence of new R stent in mid to distal ureter.    Recs:  - IR evaluation for left upside down nephroureteral tube placement and right upside down nephroureteral tube exchange  - monitor electrolytes    discussed with Dr. Hoenig    [facility]  [address] [place] [address], [address] [address] [phone]

## 2025-05-06 NOTE — ED PROVIDER NOTE - OBJECTIVE STATEMENT
70 y/o M with pmh of HTN, depression, anxiety, CKD 5, prostate cancer s/p radical prostatectomy, bladder ca s/p cystectomy and ileal conduit,  b/l nephurosotmy stents presenting for dislodgement of left nephrurostomy tube transferred from [place]  Patient believes his left urostomy tube was dislodged on Saturday patient began having left-sided flank pain yesterday worsening today.  Was seen at [facility] received a CAT scan which showed dislodged stent from left nephurostomy site.  Patient was given morphine and received lab workup which showed positive UTI.  Patient denies chest pain shortness of breath nausea vomiting any abdominal pain at this current time.  Patient denies blood in the urostomy.

## 2025-05-06 NOTE — ED ADULT NURSE REASSESSMENT NOTE - NS ED NURSE REASSESS COMMENT FT1
1728 antibiotics given as ordered Pt requested to place is wallet and money in the safe dropped and paperwork given to pt.  Pt declined getting shasta gown ans is eating was told to hold off for any procedure that urology may do but pt kept eating Urology at bedside  MPRN

## 2025-05-06 NOTE — H&P ADULT - PROBLEM SELECTOR PLAN 10
- HSQ VTE PPx  - fall, aspiration precaution  - DASH, low K diet pending uro/IR recs  - dispo pending course  - f/u low albumin as outpatient

## 2025-05-07 ENCOUNTER — APPOINTMENT (OUTPATIENT)
Age: 70
End: 2025-05-07

## 2025-05-07 DIAGNOSIS — Z79.899 OTHER LONG TERM (CURRENT) DRUG THERAPY: ICD-10-CM

## 2025-05-07 LAB
ALBUMIN SERPL ELPH-MCNC: 3.5 G/DL — SIGNIFICANT CHANGE UP (ref 3.3–5)
ALP SERPL-CCNC: 105 U/L — SIGNIFICANT CHANGE UP (ref 40–120)
ALT FLD-CCNC: 7 U/L — LOW (ref 10–45)
ANION GAP SERPL CALC-SCNC: 14 MMOL/L — SIGNIFICANT CHANGE UP (ref 5–17)
AST SERPL-CCNC: 6 U/L — LOW (ref 10–40)
BILIRUB SERPL-MCNC: 0.4 MG/DL — SIGNIFICANT CHANGE UP (ref 0.2–1.2)
BUN SERPL-MCNC: 48 MG/DL — HIGH (ref 7–23)
CALCIUM SERPL-MCNC: 9.5 MG/DL — SIGNIFICANT CHANGE UP (ref 8.4–10.5)
CALCIUM SERPL-MCNC: 9.5 MG/DL — SIGNIFICANT CHANGE UP (ref 8.4–10.5)
CHLORIDE SERPL-SCNC: 107 MMOL/L — SIGNIFICANT CHANGE UP (ref 96–108)
CO2 SERPL-SCNC: 19 MMOL/L — LOW (ref 22–31)
CREAT SERPL-MCNC: 4.77 MG/DL — HIGH (ref 0.5–1.3)
EGFR: 12 ML/MIN/1.73M2 — LOW
EGFR: 12 ML/MIN/1.73M2 — LOW
GLUCOSE SERPL-MCNC: 142 MG/DL — HIGH (ref 70–99)
HCT VFR BLD CALC: 33 % — LOW (ref 39–50)
HGB BLD-MCNC: 10.6 G/DL — LOW (ref 13–17)
MAGNESIUM SERPL-MCNC: 2.4 MG/DL — SIGNIFICANT CHANGE UP (ref 1.6–2.6)
MCHC RBC-ENTMCNC: 29.7 PG — SIGNIFICANT CHANGE UP (ref 27–34)
MCHC RBC-ENTMCNC: 32.1 G/DL — SIGNIFICANT CHANGE UP (ref 32–36)
MCV RBC AUTO: 92.4 FL — SIGNIFICANT CHANGE UP (ref 80–100)
NRBC BLD AUTO-RTO: 0 /100 WBCS — SIGNIFICANT CHANGE UP (ref 0–0)
PHOSPHATE SERPL-MCNC: 3.7 MG/DL — SIGNIFICANT CHANGE UP (ref 2.5–4.5)
PLATELET # BLD AUTO: 167 K/UL — SIGNIFICANT CHANGE UP (ref 150–400)
POTASSIUM SERPL-MCNC: 4.4 MMOL/L — SIGNIFICANT CHANGE UP (ref 3.5–5.3)
POTASSIUM SERPL-SCNC: 4.4 MMOL/L — SIGNIFICANT CHANGE UP (ref 3.5–5.3)
PROT SERPL-MCNC: 7 G/DL — SIGNIFICANT CHANGE UP (ref 6–8.3)
PTH-INTACT FLD-MCNC: 171 PG/ML — HIGH (ref 15–65)
RBC # BLD: 3.57 M/UL — LOW (ref 4.2–5.8)
RBC # FLD: 13.8 % — SIGNIFICANT CHANGE UP (ref 10.3–14.5)
SODIUM SERPL-SCNC: 140 MMOL/L — SIGNIFICANT CHANGE UP (ref 135–145)
WBC # BLD: 8.22 K/UL — SIGNIFICANT CHANGE UP (ref 3.8–10.5)
WBC # FLD AUTO: 8.22 K/UL — SIGNIFICANT CHANGE UP (ref 3.8–10.5)

## 2025-05-07 PROCEDURE — 99232 SBSQ HOSP IP/OBS MODERATE 35: CPT | Mod: GC

## 2025-05-07 PROCEDURE — 99232 SBSQ HOSP IP/OBS MODERATE 35: CPT

## 2025-05-07 RX ORDER — HYDROMORPHONE/SOD CHLOR,ISO/PF 2 MG/10 ML
0.25 SYRINGE (ML) INJECTION EVERY 6 HOURS
Refills: 0 | Status: DISCONTINUED | OUTPATIENT
Start: 2025-05-07 | End: 2025-05-12

## 2025-05-07 RX ORDER — ERTAPENEM SODIUM 1 G/1
INJECTION, POWDER, LYOPHILIZED, FOR SOLUTION INTRAMUSCULAR; INTRAVENOUS
Refills: 0 | Status: DISCONTINUED | OUTPATIENT
Start: 2025-05-07 | End: 2025-05-07

## 2025-05-07 RX ORDER — OXYCODONE HYDROCHLORIDE 30 MG/1
2.5 TABLET ORAL EVERY 6 HOURS
Refills: 0 | Status: DISCONTINUED | OUTPATIENT
Start: 2025-05-07 | End: 2025-05-07

## 2025-05-07 RX ORDER — ERTAPENEM SODIUM 1 G/1
500 INJECTION, POWDER, LYOPHILIZED, FOR SOLUTION INTRAMUSCULAR; INTRAVENOUS ONCE
Refills: 0 | Status: DISCONTINUED | OUTPATIENT
Start: 2025-05-07 | End: 2025-05-07

## 2025-05-07 RX ORDER — HEPARIN SODIUM 1000 [USP'U]/ML
5000 INJECTION INTRAVENOUS; SUBCUTANEOUS EVERY 12 HOURS
Refills: 0 | Status: DISCONTINUED | OUTPATIENT
Start: 2025-05-07 | End: 2025-05-07

## 2025-05-07 RX ORDER — AMLODIPINE BESYLATE 10 MG/1
5 TABLET ORAL
Refills: 0 | Status: DISCONTINUED | OUTPATIENT
Start: 2025-05-07 | End: 2025-05-12

## 2025-05-07 RX ORDER — CLONAZEPAM 0.5 MG/1
1 TABLET ORAL THREE TIMES A DAY
Refills: 0 | Status: DISCONTINUED | OUTPATIENT
Start: 2025-05-07 | End: 2025-05-12

## 2025-05-07 RX ORDER — HEPARIN SODIUM 1000 [USP'U]/ML
5000 INJECTION INTRAVENOUS; SUBCUTANEOUS EVERY 8 HOURS
Refills: 0 | Status: COMPLETED | OUTPATIENT
Start: 2025-05-07 | End: 2025-05-07

## 2025-05-07 RX ORDER — ACETAMINOPHEN 500 MG/5ML
1000 LIQUID (ML) ORAL ONCE
Refills: 0 | Status: COMPLETED | OUTPATIENT
Start: 2025-05-07 | End: 2025-05-07

## 2025-05-07 RX ORDER — POLYETHYLENE GLYCOL 3350 17 G/17G
17 POWDER, FOR SOLUTION ORAL DAILY
Refills: 0 | Status: DISCONTINUED | OUTPATIENT
Start: 2025-05-07 | End: 2025-05-12

## 2025-05-07 RX ORDER — HYDROMORPHONE/SOD CHLOR,ISO/PF 2 MG/10 ML
0.25 SYRINGE (ML) INJECTION ONCE
Refills: 0 | Status: DISCONTINUED | OUTPATIENT
Start: 2025-05-07 | End: 2025-05-07

## 2025-05-07 RX ORDER — FUROSEMIDE 10 MG/ML
40 INJECTION INTRAMUSCULAR; INTRAVENOUS
Refills: 0 | Status: DISCONTINUED | OUTPATIENT
Start: 2025-05-07 | End: 2025-05-10

## 2025-05-07 RX ORDER — TRAZODONE HCL 100 MG
300 TABLET ORAL AT BEDTIME
Refills: 0 | Status: DISCONTINUED | OUTPATIENT
Start: 2025-05-07 | End: 2025-05-12

## 2025-05-07 RX ORDER — OXYCODONE HYDROCHLORIDE 30 MG/1
5 TABLET ORAL EVERY 6 HOURS
Refills: 0 | Status: DISCONTINUED | OUTPATIENT
Start: 2025-05-07 | End: 2025-05-12

## 2025-05-07 RX ORDER — SENNA 187 MG
2 TABLET ORAL AT BEDTIME
Refills: 0 | Status: DISCONTINUED | OUTPATIENT
Start: 2025-05-07 | End: 2025-05-12

## 2025-05-07 RX ADMIN — POLYETHYLENE GLYCOL 3350 17 GRAM(S): 17 POWDER, FOR SOLUTION ORAL at 12:03

## 2025-05-07 RX ADMIN — Medication 0.25 MILLIGRAM(S): at 13:25

## 2025-05-07 RX ADMIN — Medication 1300 MILLIGRAM(S): at 22:18

## 2025-05-07 RX ADMIN — AMLODIPINE BESYLATE 5 MILLIGRAM(S): 10 TABLET ORAL at 08:27

## 2025-05-07 RX ADMIN — HEPARIN SODIUM 5000 UNIT(S): 1000 INJECTION INTRAVENOUS; SUBCUTANEOUS at 08:28

## 2025-05-07 RX ADMIN — Medication 2 TABLET(S): at 22:19

## 2025-05-07 RX ADMIN — Medication 1 APPLICATION(S): at 13:39

## 2025-05-07 RX ADMIN — CLONAZEPAM 1 MILLIGRAM(S): 0.5 TABLET ORAL at 13:38

## 2025-05-07 RX ADMIN — ENZALUTAMIDE 160 MILLIGRAM(S): 40 CAPSULE ORAL at 12:06

## 2025-05-07 RX ADMIN — HEPARIN SODIUM 5000 UNIT(S): 1000 INJECTION INTRAVENOUS; SUBCUTANEOUS at 13:39

## 2025-05-07 RX ADMIN — Medication 0.25 MILLIGRAM(S): at 14:25

## 2025-05-07 RX ADMIN — Medication 300 MILLIGRAM(S): at 22:21

## 2025-05-07 RX ADMIN — AMLODIPINE BESYLATE 5 MILLIGRAM(S): 10 TABLET ORAL at 20:54

## 2025-05-07 RX ADMIN — CLONAZEPAM 1 MILLIGRAM(S): 0.5 TABLET ORAL at 22:19

## 2025-05-07 RX ADMIN — Medication 25 MILLIGRAM(S): at 13:38

## 2025-05-07 RX ADMIN — OXYCODONE HYDROCHLORIDE 2.5 MILLIGRAM(S): 30 TABLET ORAL at 13:03

## 2025-05-07 RX ADMIN — CLONAZEPAM 1 MILLIGRAM(S): 0.5 TABLET ORAL at 08:27

## 2025-05-07 RX ADMIN — Medication 25 MILLIGRAM(S): at 22:18

## 2025-05-07 RX ADMIN — Medication 1000 MILLIGRAM(S): at 09:53

## 2025-05-07 RX ADMIN — Medication 1300 MILLIGRAM(S): at 08:27

## 2025-05-07 RX ADMIN — Medication 400 MILLIGRAM(S): at 08:53

## 2025-05-07 RX ADMIN — Medication 1300 MILLIGRAM(S): at 13:39

## 2025-05-07 RX ADMIN — OXYCODONE HYDROCHLORIDE 2.5 MILLIGRAM(S): 30 TABLET ORAL at 12:03

## 2025-05-07 NOTE — PROGRESS NOTE ADULT - ASSESSMENT
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  AF VSS. WBC 8.3, Cr 4.66 (around baseline), K 5.5. CT shows L hydro without L stent and stable R hydro with evidence of new R stent in mid to distal ureter.    Recs:  - Follow up IR for right exchange of NU and replacement of L NU  - Trend creatinine  - Monitor electrolytes   - Please send formal urine culture by catheterizing ileal conduit     Discussed with Dr. 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  AF VSS. WBC 8.3, Cr 4.66 (around baseline), K 5.5. CT shows L hydro without L stent and stable R hydro with evidence of new R stent in mid to distal ureter.    Recs:  - Follow up IR for BL placements of percutaneous nephroureteral tubes   - Trend creatinine  - Monitor electrolytes   - Please send formal urine culture by catheterizing ileal conduit     Discussed with Dr. Hoenig  [facility]  [address] [place] [address], [address]  [place] [address] [phone]

## 2025-05-07 NOTE — PROGRESS NOTE ADULT - ASSESSMENT
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

## 2025-05-07 NOTE — PROGRESS NOTE ADULT - PROBLEM SELECTOR PLAN 7
Hypertensive.     -C/w home amlodipine 5 mg po qd Hypertensive.     -C/w home amlodipine 5 mg po BID   -Initiate hydralazine 25 mg po TID for SBP <160 for IR proecedure

## 2025-05-07 NOTE — PROGRESS NOTE ADULT - SUBJECTIVE AND OBJECTIVE BOX
INPATIENT MEDICINE PROGRESS NOTE:   Authored by Destiny aMrino MD     HISTORY OF PRESENT ILLNESS:    NAEON. AFebrile HR 66. SBP 180s. RA.     Seen and examined at bedside, patient reports feeling alright and that the lower abdominal pain and flank pain is better. Asking where his medications are and says he takes 4 tablets of trazodone of 150 mg po tablets. Lives with girlfriend and 2 young non biological grandkids and has 2 adult children ([place] and in [place]).     PHYSICAL EXAM:  Vital Signs Last 24 Hrs  T(C): 36.7 (06 May 2025 19:47), Max: 36.7 (06 May 2025 19:30)  T(F): 98.1 (06 May 2025 19:47), Max: 98.1 (06 May 2025 19:47)  HR: 66 (06 May 2025 19:47) (66 - 72)  BP: 182/83 (06 May 2025 19:47) (157/72 - 182/83)  BP(mean): --  RR: 18 (06 May 2025 19:47) (15 - 18)  SpO2: 98% (06 May 2025 19:47) (95% - 99%)    Parameters below as of 06 May 2025 19:47  Patient On (Oxygen Delivery Method): room air        General: NAD, sitting upright eating breakfast, obese habitus   Neuro: AAOx3, 5/5  strength b/l, 5/5 hip flexion/extension b/l, spontaneity, verbal   HEENT: NC/AT, PERRLA b/l, EOMI, no supraclavicular/submanidbular lymphadenopathy   Chest: nonTTP   Heart: S1/S2, RRR   Lungs: CTA b/l, nonlabored breathing   Abd: soft, nonTTP, nondistended has urostomy bag with minimal clear urine with some transparent tissue near stoma  Ext: active/passive strengh intact, no pretibial edema   Back: nonTTP, negative CVA tenderness b/l   Skin: no lesions   Psych: reactive affect, hyperverbal, linear and goal directed thought  Lines/tubes/drains: has urostomy with bag     I&O's Summary      LABS:                        10.7   8.32  )-----------( 183      ( 06 May 2025 13:54 )             33.3     05-06    141  |  106  |  47[H]  ----------------------------<  187[H]  4.6   |  21[L]  |  4.56[H]    Ca    9.9      06 May 2025 17:46    TPro  7.5  /  Alb  2.9[L]  /  TBili  0.6  /  DBili  x   /  AST  23  /  ALT  12  /  AlkPhos  106  05-06    CAPILLARY BLOOD GLUCOSE        PT/INR - ( 06 May 2025 13:54 )   PT: 10.9 sec;   INR: 0.92 ratio         PTT - ( 06 May 2025 13:54 )  PTT:32.1 sec      Urinalysis Basic - ( 06 May 2025 17:46 )    Color: x / Appearance: x / SG: x / pH: x  Gluc: 187 mg/dL / Ketone: x  / Bili: x / Urobili: x   Blood: x / Protein: x / Nitrite: x   Leuk Esterase: x / RBC: x / WBC x   Sq Epi: x / Non Sq Epi: x / Bacteria: x        Urinalysis with Rflx Culture (collected 06 May 2025 13:54)        MEDICATIONS  (STANDING):  amLODIPine   Tablet 5 milliGRAM(s) Oral <User Schedule>  clonazePAM  Tablet 1 milliGRAM(s) Oral three times a day  diphenhydrAMINE 25 milliGRAM(s) Oral at bedtime  enzalutamide 160 milliGRAM(s) Oral daily  ertapenem  IVPB 500 milliGRAM(s) IV Intermittent once  ertapenem  IVPB      furosemide    Tablet 40 milliGRAM(s) Oral <User Schedule>  heparin   Injectable 5000 Unit(s) SubCutaneous every 8 hours  sodium bicarbonate 1300 milliGRAM(s) Oral three times a day  traZODone 150 milliGRAM(s) Oral at bedtime    MEDICATIONS  (PRN):  acetaminophen     Tablet .. 650 milliGRAM(s) Oral every 6 hours PRN Temp greater or equal to 38C (100.4F), Mild Pain (1 - 3)  melatonin 3 milliGRAM(s) Oral at bedtime PRN Insomnia

## 2025-05-07 NOTE — PROGRESS NOTE ADULT - PROBLEM SELECTOR PLAN 5
-C/w trazodone 600 mg po qhs   -C/w diphenhydramien 25 mg po qhs -C/w trazodone 300 mg po qhs given 600 mg qhs has unclear evidence for efficacy   -C/w diphenhydramine 25 mg po qhs

## 2025-05-07 NOTE — CHART NOTE - NSCHARTNOTEFT_GEN_A_CORE
Prescription Information      PDI Filter:    PDI	Current Rx	Drug Type	Rx Written	Rx Dispensed	Drug	Quantity	Days Supply	Prescriber Name	Prescriber ASHLEY #	Payment Method	Dispenser  A	Y	B	04/13/2025	04/14/2025	clonazepam 1 mg tablet	90	30	Giedt, Parish JAY NP	[id number]	Medicare	Preffered Pharmacy [facility]  A	N	B	03/17/2025	03/17/2025	clonazepam 1 mg tablet	90	30	Giedt, Parish JAY NP	[id number]	Medicare	Preffered Pharmacy [facility]  A	N	B	02/17/2025	02/18/2025	clonazepam 1 mg tablet	90	30	Giedt, Parish JAY NP	[id number]	Medicare	Preffered Pharmacy [facility]  A	N	O	01/23/2025	01/24/2025	acetaminophen-cod #2 tablet	20	10	Jenifer Lilly	[id number]	Medicare	Preffered Pharmacy [facility]  A	N	B	01/21/2025	01/21/2025	clonazepam 1 mg tablet	90	30	Giedt, Parish JAY NP	[id number]	Medicare	Preffered Pharmacy [facility]  A	N	B	12/23/2024	12/23/2024	clonazepam 1 mg tablet	90	30	Giedt, Parish JAY NP	[id number]	Medicare	Preffered Pharmacy [facility]  A	N	B	11/27/2024	11/27/2024	clonazepam 1 mg tablet	90	30	Giedt, Parish JAY NP	[id number]	Medicare	Preffered Pharmacy [facility]  A	N	O	11/07/2024	11/08/2024	tramadol hcl 50 mg tablet	6	3	Fiona Arcos	[id number]	Medicare	Preffered Pharmacy [facility]  A	N	B	10/23/2024	10/29/2024	clonazepam 1 mg tablet	90	30	Giedt, Parish JAY NP	[id number]	Medicare	Preffered Pharmacy [facility]  A	N	B	10/02/2024	10/02/2024	clonazepam 1 mg tablet	90	30	Giedt, Parish JAY NP	[id number]	Medicare	Preffered Pharmacy [facility]  A	N	B	09/03/2024	09/03/2024	clonazepam 1 mg tablet	90	30	Giedt, Parish JAY NP	[id number]	Medicare	Preffered Pharmacy [facility]  A	N	B	07/31/2024	07/31/2024	clonazepam 1 mg tablet	90	30	Giedt, Parish JAY NP	[id number]	Medicare	Preffered Pharmacy [facility]	N	B	07/03/2024	07/05/2024	clonazepam 1 mg tablet	90	30	Giedt, Praish JAY NP	[id number]	Medicare	Preffered Pharmacy [facility]	N	B	06/06/2024	06/06/2024	clonazepam 1 mg tablet	90	30	Giedt, Parish JAY NP	[id number]	Medicare	Preffered Pharmacy [facility]	B	05/15/2024	05/15/2024	clonazepam 1 mg tablet	90	30	Giedt, Parish JAY NP	[id number]	[facility]	N	B	05/02/2024	05/10/2024	clonazepam 1 mg tablet	90	30	Giedt, Parish JAY NP	[id number]	Medicare	Preffered Pharmacy [facility]

## 2025-05-07 NOTE — PROGRESS NOTE ADULT - PROBLEM SELECTOR PLAN 9
-Will clarify trazodone home dose with pharmacy; per patient takes 150 mg x 4 tablets everyday due to insomnia -Will clarify trazodone home dose with pharmacy; per patient takes 150 mg x 4 tablets, however uptodate cites limited evidence for efficacy for 600 mg

## 2025-05-07 NOTE — PHYSICAL THERAPY INITIAL EVALUATION ADULT - PERTINENT HX OF CURRENT PROBLEM, REHAB EVAL
70 y.o. M PMH HTN, depression, anxiety, CKD 5, prostate cancer s/p radical prostatectomy (on Xtandi) and bladder cancer s/p cyctectomy with ileal conduit c/b recurrent uretero-ileal strictures requiring bilateral upside-down nephroureteral stents presenting to [facility] ED after being evaluated at [facility] and found to have dislodged right nephroureteral stent and malpositioned left nephroureteral stent. He was given morphine at [facility] with improvement in his pain.  Patient was seen by his outpatient urologist on 4/16 and his stents were replaced at that time. On Saturday 5/6, patient began experiencing abdominal pain and left sided flank pain and believes that this is when one of his stents were accidently dislodged. No trauma at the time. He denies chest pain, SOB, fever, chills, night sweats, nausea, vomiting, hematuria,  dysuria, weight loss, recent travel, or sick contacts.   In the ED, VSS, labs notable for Hgb 10.7, Cr 4.56. UA large amount leukocyte esterase, moderate bacteria, negative nitrites. CT abdomen and pelvis remarkable for previously seen left ureteral stent no longer identified. There has been interval progression of left-sided hydronephrosis. There is a new right ureteral stent and stable right-sided hydronephrosis. Redemonstration of ileal conduit. Xray KUB showing dislodged right ureteral stent to the level of L5. Patient was give 1g IV ceftriaxone; evaluated by urology and interventional radiology teams and admitted to medicine for further management.

## 2025-05-07 NOTE — PROGRESS NOTE ADULT - PROBLEM SELECTOR PLAN 2
Patient with urostomy and ileal conduit with upside nephroureteral tubes. UA with large LE and moderate bacteria. No SIRS nor urinary tract like sx.     -History of ESBL and MRSA; however current sample likely contamined due to colonized  -Obtain urine cx   -Would consider d/c ertapenem given likely absense of acute infection   -S/p ceftriaxone x 1 in ED   -F/u MRSA PCR Patient with urostomy and ileal conduit with upside nephroureteral tubes. UA with large LE and moderate bacteria. No SIRS nor urinary tract like sx.     -History of ESBL and MRSA; however current sample likely contamined due to colonized  -Obtain urine cx during IR procedure given patient declining at bedside   -D/c ertapenem given likely absence of acute infection   -S/p ceftriaxone x 1 in ED   -F/u MRSA PCR

## 2025-05-07 NOTE — PROGRESS NOTE ADULT - PROBLEM SELECTOR PLAN 10
Code: FULL   DVT ppx; heparin 5000 U sq TID   Diet: DASH low K   DIspo: likely home, pending clinical course Code: FULL   DVT ppx; heparin 5000 U sq TID; hold in PM pre-procedurally   Diet: Regular   DIspo: likely home, pending clinical course

## 2025-05-07 NOTE — PROGRESS NOTE ADULT - SUBJECTIVE AND OBJECTIVE BOX
Subjective  Patient seen and examined at bedside. Flank pain improved.     Objective    Vital signs  T(F): , Max: 98.1 (05-06-25 @ 19:47)  HR: 66 (05-07-25 @ 08:26)  BP: 182/93 (05-07-25 @ 08:26)  SpO2: 98% (05-06-25 @ 19:47)  Wt(kg): --    Output     OUT:    Voided (mL): 1200 mL  Total OUT: 1200 mL    Total NET: -1200 mL          Gen: NAD  Abd: soft, nontender, nondistended  : IC draining CYU, right upside down NT disloged     Labs      05-06 @ 17:46    WBC --    / Hct --    / SCr 4.56     05-06 @ 13:54    WBC 8.32  / Hct 33.3  / SCr 4.66         Urinalysis with Rflx Culture (collected 05-06-25 @ 13:54)       Subjective  Patient seen and examined at bedside. Flank pain improved.     Objective    Vital signs  T(F): , Max: 98.1 (05-06-25 @ 19:47)  HR: 66 (05-07-25 @ 08:26)  BP: 182/93 (05-07-25 @ 08:26)  SpO2: 98% (05-06-25 @ 19:47)  Wt(kg): --    Output     OUT:    Voided (mL): 1200 mL  Total OUT: 1200 mL    Total NET: -1200 mL          Gen: NAD  Abd: soft, nontender, nondistended  : IC draining CYU, right upside down NT disloged   resp: wnl  psych: normal affect  neuro: no focal deficit    Labs      05-06 @ 17:46    WBC --    / Hct --    / SCr 4.56     05-06 @ 13:54    WBC 8.32  / Hct 33.3  / SCr 4.66         Urinalysis with Rflx Culture (collected 05-06-25 @ 13:54)

## 2025-05-07 NOTE — PROGRESS NOTE ADULT - PROBLEM SELECTOR PLAN 4
History of CKD5 without dialysis; currently has urostomy with ileal conduit. Non oligouric. Unclea rbaseline SCr. ON admission Scr 4.56. No fluid overload. No urgent/emergent indications of hemodialysis.     -Monitor BUN/Cr   -Daily weight, strict I/O

## 2025-05-07 NOTE — PROGRESS NOTE ADULT - PROBLEM SELECTOR PLAN 1
History of bladder cancer s/p cyctectomy with ileal conduit c/b recurrent uretero-ileal strictures requiring bilateral upside-down nephroureteral stents presenting with flank pain and Xray KUB showing dislodged right ureteral stent to the level of L5 and CT AP showing loss of previously visualize left ureteral stent. Clinically monitor.     - Appreciate urology, recommend IR evaluation for left upside down nephroureteral tube placement and right upside down nephroureteral tube exchange  -Pending IR tentative plan for right nephrostomy vs right nephrouretererostomy stent placement and left nephroureterostomy stent exchange later this week  - monitor BMP/UOP/sx and will require interdisciplinary IR/uro discussion in AM History of bladder cancer s/p cyctectomy with ileal conduit c/b recurrent uretero-ileal strictures requiring bilateral upside-down nephroureteral stents presenting with flank pain and Xray KUB showing dislodged right ureteral stent to the level of L5 and CT AP showing loss of previously visualize left ureteral stent. Clinically monitor.     - Appreciate urology, recommend IR evaluation for left upside down nephroureteral tube placement and right upside down nephroureteral tube exchange  -Pending IR 5/8 plan for right nephrostomy vs right nephrouretererostomy stent placement and left nephroureterostomy stent exchange

## 2025-05-07 NOTE — PROGRESS NOTE ADULT - PROBLEM SELECTOR PLAN 8
-C/w home enzalatumide 160 mg po qd (will need to bring from home due to non-formulary) -C/w home enzalatumide 160 mg po qd

## 2025-05-07 NOTE — PROVIDER CONTACT NOTE (OTHER) - BACKGROUND
Pt admitted for dislodgement of L nephrostomy tube and UTI Pt admitted for dislodgement of Rnephrostomy tube and UTI

## 2025-05-08 LAB
-  AMOXICILLIN/CLAVULANIC ACID: SIGNIFICANT CHANGE UP
-  AMPICILLIN/SULBACTAM: SIGNIFICANT CHANGE UP
-  AMPICILLIN: SIGNIFICANT CHANGE UP
-  AZTREONAM: SIGNIFICANT CHANGE UP
-  CEFAZOLIN: SIGNIFICANT CHANGE UP
-  CEFEPIME: SIGNIFICANT CHANGE UP
-  CEFOXITIN: SIGNIFICANT CHANGE UP
-  CEFTRIAXONE: SIGNIFICANT CHANGE UP
-  CEFUROXIME: SIGNIFICANT CHANGE UP
-  CIPROFLOXACIN: SIGNIFICANT CHANGE UP
-  ERTAPENEM: SIGNIFICANT CHANGE UP
-  GENTAMICIN: SIGNIFICANT CHANGE UP
-  LEVOFLOXACIN: SIGNIFICANT CHANGE UP
-  MEROPENEM: SIGNIFICANT CHANGE UP
-  PIPERACILLIN/TAZOBACTAM: SIGNIFICANT CHANGE UP
-  TOBRAMYCIN: SIGNIFICANT CHANGE UP
-  TRIMETHOPRIM/SULFAMETHOXAZOLE: SIGNIFICANT CHANGE UP
ANION GAP SERPL CALC-SCNC: 16 MMOL/L — SIGNIFICANT CHANGE UP (ref 5–17)
APTT BLD: 34.7 SEC — SIGNIFICANT CHANGE UP (ref 26.1–36.8)
BLD GP AB SCN SERPL QL: NEGATIVE — SIGNIFICANT CHANGE UP
BUN SERPL-MCNC: 44 MG/DL — HIGH (ref 7–23)
CALCIUM SERPL-MCNC: 9.7 MG/DL — SIGNIFICANT CHANGE UP (ref 8.4–10.5)
CHLORIDE SERPL-SCNC: 106 MMOL/L — SIGNIFICANT CHANGE UP (ref 96–108)
CO2 SERPL-SCNC: 18 MMOL/L — LOW (ref 22–31)
CREAT SERPL-MCNC: 4.79 MG/DL — HIGH (ref 0.5–1.3)
EGFR: 12 ML/MIN/1.73M2 — LOW
EGFR: 12 ML/MIN/1.73M2 — LOW
GLUCOSE SERPL-MCNC: 155 MG/DL — HIGH (ref 70–99)
HCT VFR BLD CALC: 35.8 % — LOW (ref 39–50)
HGB BLD-MCNC: 11.5 G/DL — LOW (ref 13–17)
INR BLD: 1.03 RATIO — SIGNIFICANT CHANGE UP (ref 0.85–1.16)
MCHC RBC-ENTMCNC: 29.3 PG — SIGNIFICANT CHANGE UP (ref 27–34)
MCHC RBC-ENTMCNC: 32.1 G/DL — SIGNIFICANT CHANGE UP (ref 32–36)
MCV RBC AUTO: 91.3 FL — SIGNIFICANT CHANGE UP (ref 80–100)
METHOD TYPE: SIGNIFICANT CHANGE UP
MRSA PCR RESULT.: SIGNIFICANT CHANGE UP
NRBC BLD AUTO-RTO: 0 /100 WBCS — SIGNIFICANT CHANGE UP (ref 0–0)
PLATELET # BLD AUTO: 168 K/UL — SIGNIFICANT CHANGE UP (ref 150–400)
POTASSIUM SERPL-MCNC: 4.5 MMOL/L — SIGNIFICANT CHANGE UP (ref 3.5–5.3)
POTASSIUM SERPL-SCNC: 4.5 MMOL/L — SIGNIFICANT CHANGE UP (ref 3.5–5.3)
PROTHROM AB SERPL-ACNC: 11.8 SEC — SIGNIFICANT CHANGE UP (ref 9.9–13.4)
RBC # BLD: 3.92 M/UL — LOW (ref 4.2–5.8)
RBC # FLD: 14 % — SIGNIFICANT CHANGE UP (ref 10.3–14.5)
RH IG SCN BLD-IMP: POSITIVE — SIGNIFICANT CHANGE UP
S AUREUS DNA NOSE QL NAA+PROBE: SIGNIFICANT CHANGE UP
SODIUM SERPL-SCNC: 140 MMOL/L — SIGNIFICANT CHANGE UP (ref 135–145)
WBC # BLD: 13.4 K/UL — HIGH (ref 3.8–10.5)
WBC # FLD AUTO: 13.4 K/UL — HIGH (ref 3.8–10.5)

## 2025-05-08 PROCEDURE — 50433 PLMT NEPHROURETERAL CATHETER: CPT | Mod: 50

## 2025-05-08 PROCEDURE — 99232 SBSQ HOSP IP/OBS MODERATE 35: CPT | Mod: GC

## 2025-05-08 RX ORDER — SODIUM CHLORIDE 9 G/1000ML
1000 INJECTION, SOLUTION INTRAVENOUS
Refills: 0 | Status: DISCONTINUED | OUTPATIENT
Start: 2025-05-08 | End: 2025-05-09

## 2025-05-08 RX ORDER — ERTAPENEM SODIUM 1 G/1
500 INJECTION, POWDER, LYOPHILIZED, FOR SOLUTION INTRAMUSCULAR; INTRAVENOUS EVERY 24 HOURS
Refills: 0 | Status: DISCONTINUED | OUTPATIENT
Start: 2025-05-08 | End: 2025-05-12

## 2025-05-08 RX ADMIN — SODIUM CHLORIDE 75 MILLILITER(S): 9 INJECTION, SOLUTION INTRAVENOUS at 22:58

## 2025-05-08 RX ADMIN — Medication 25 MILLIGRAM(S): at 17:34

## 2025-05-08 RX ADMIN — Medication 25 MILLIGRAM(S): at 21:27

## 2025-05-08 RX ADMIN — AMLODIPINE BESYLATE 5 MILLIGRAM(S): 10 TABLET ORAL at 08:25

## 2025-05-08 RX ADMIN — CLONAZEPAM 1 MILLIGRAM(S): 0.5 TABLET ORAL at 21:32

## 2025-05-08 RX ADMIN — POLYETHYLENE GLYCOL 3350 17 GRAM(S): 17 POWDER, FOR SOLUTION ORAL at 17:33

## 2025-05-08 RX ADMIN — OXYCODONE HYDROCHLORIDE 5 MILLIGRAM(S): 30 TABLET ORAL at 08:45

## 2025-05-08 RX ADMIN — Medication 1300 MILLIGRAM(S): at 21:27

## 2025-05-08 RX ADMIN — AMLODIPINE BESYLATE 5 MILLIGRAM(S): 10 TABLET ORAL at 21:31

## 2025-05-08 RX ADMIN — Medication 25 MILLIGRAM(S): at 05:06

## 2025-05-08 RX ADMIN — Medication 2 TABLET(S): at 21:27

## 2025-05-08 RX ADMIN — Medication 25 MILLIGRAM(S): at 21:28

## 2025-05-08 RX ADMIN — Medication 300 MILLIGRAM(S): at 21:32

## 2025-05-08 RX ADMIN — Medication 1300 MILLIGRAM(S): at 05:06

## 2025-05-08 RX ADMIN — CLONAZEPAM 1 MILLIGRAM(S): 0.5 TABLET ORAL at 05:06

## 2025-05-08 RX ADMIN — OXYCODONE HYDROCHLORIDE 5 MILLIGRAM(S): 30 TABLET ORAL at 09:45

## 2025-05-08 RX ADMIN — Medication 1 APPLICATION(S): at 17:33

## 2025-05-08 RX ADMIN — ERTAPENEM SODIUM 100 MILLIGRAM(S): 1 INJECTION, POWDER, LYOPHILIZED, FOR SOLUTION INTRAMUSCULAR; INTRAVENOUS at 08:25

## 2025-05-08 RX ADMIN — ENZALUTAMIDE 160 MILLIGRAM(S): 40 CAPSULE ORAL at 17:46

## 2025-05-08 RX ADMIN — FUROSEMIDE 40 MILLIGRAM(S): 10 INJECTION INTRAMUSCULAR; INTRAVENOUS at 08:25

## 2025-05-08 RX ADMIN — Medication 1300 MILLIGRAM(S): at 17:34

## 2025-05-08 NOTE — PROCEDURE NOTE - PROCEDURE FINDINGS AND DETAILS
left: 8f x 22cm neph u, purulent urine  right: 8f x 20cm neph u, blood tinged urine    partially dislodged r retrograde tube removed over wire

## 2025-05-08 NOTE — ADVANCED PRACTICE NURSE CONSULT - RECOMMEDATIONS
Will recommend:  1. Encourage self care -participation w/ ostomy care.  2. Empty pouch when 1/3-1/2 full (currently attached to a carreno bag)  3. Change pouching system every 3-4 days & prn leakage  4. Contact ostomy specialists if questions, concerns/issues .  5. Supplies: Pt prefers to use own supplies Coloplast one piece convex urostomy pouch brought from home. (available at bedside)

## 2025-05-08 NOTE — PROGRESS NOTE ADULT - PROBLEM SELECTOR PLAN 2
Patient with urostomy and ileal conduit with upside nephroureteral tubes. UA with large LE and moderate bacteria. No SIRS nor urinary tract like sx.     -History of ESBL and MRSA; however current sample likely contamined due to colonized  -Re-start ertapenem, pending conduit culture with IR  -S/p ceftriaxone x 1 in ED   -F/u MRSA PCR

## 2025-05-08 NOTE — PROCEDURE NOTE - PLAN
to bag drainage for now.    when urine clears more and there are no longer symptoms of concern for acute uti can consider capping tubes

## 2025-05-08 NOTE — PRE PROCEDURE NOTE - PRE PROCEDURE EVALUATION
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    Patient status post numerous retrograde nephrostomy tubes via the ileal conduit with recurrent dislodgement.      presenting with dislodged L nephro-u stent and L flank pain.  AF VSS. WBC 8.3, Cr 4.66 (around baseline), K 5.5. CT shows L hydro without L stent and stable R hydro with evidence of new R stent in mid to distal ureter.    Discussed with patient and urologist, at this point given recurrent issues with existing drainage, plan for bilateral nephroureteral tube placement.    Explained to patient can potentially consider interval capping to not have bilateral percutaneous drainage bags, while maintaining positioning with nephroureteral tubes. .       Allergies: No Known Allergies      Medications (Abx/Cardiac/Anticoagulation/Blood Products)  amLODIPine   Tablet: 5 milliGRAM(s) Oral (05-06 @ 23:16)  amLODIPine   Tablet: 5 milliGRAM(s) Oral (05-08 @ 08:25)  ertapenem  IVPB: 100 mL/Hr IV Intermittent (05-08 @ 08:25)  furosemide    Tablet: 40 milliGRAM(s) Oral (05-08 @ 08:25)  heparin   Injectable: 5000 Unit(s) SubCutaneous (05-07 @ 13:39)  hydrALAZINE: 25 milliGRAM(s) Oral (05-08 @ 05:06)      Home Medications  amLODIPine 5 mg oral tablet: 1 tab(s) orally 2 times a day  calcitriol 0.25 mcg oral capsule: 1 cap(s) orally 3 times a week Monday, Thursday, Saturday  diphenhydrAMINE 25 mg oral tablet: 1 tab(s) orally once a day (at bedtime)  furosemide 40 mg oral tablet: 1 tab(s) orally 3 times a week  KlonoPIN 1 mg oral tablet: 1 tab(s) orally 3 times a day  sodium bicarbonate 650 mg oral tablet: 2 tab(s) orally 3 times a day  traZODone 150 mg oral tablet: 4 tab(s) orally once a day (at bedtime)  Xtandi 40 mg oral capsule: 4 cap(s) orally once a day      -WBC 13.40 / HgB 11.5 / Hct 35.8 / Plt 168  -Na 140 / Cl 106 / BUN 44 / Glucose 155  -K 4.5 / CO2 18 / Cr 4.79  -ALT -- / Alk Phos -- / T.Bili --  -INR1.03    -Risks/Benefits/alternatives explained with the patient and/or healthcare proxy and witnessed informed consent obtained.

## 2025-05-08 NOTE — PROGRESS NOTE ADULT - PROBLEM SELECTOR PLAN 9
-Will clarify trazodone home dose with pharmacy; per patient takes 150 mg x 4 tablets, however uptodate cites limited evidence for efficacy for 600 mg  -Will do 300mg qhs inpatient

## 2025-05-08 NOTE — PROGRESS NOTE ADULT - PROBLEM SELECTOR PLAN 1
History of bladder cancer s/p cyctectomy with ileal conduit c/b recurrent uretero-ileal strictures requiring bilateral upside-down nephroureteral stents presenting with flank pain and Xray KUB showing dislodged right ureteral stent to the level of L5 and CT AP showing loss of previously visualize left ureteral stent. Clinically monitor.     - Appreciate urology, recommend IR evaluation for left upside down nephroureteral tube placement and right upside down nephroureteral tube exchange  -Pending IR 5/8 plan for right nephrostomy vs right nephrouretererostomy stent placement and left nephroureterostomy stent exchange

## 2025-05-08 NOTE — PROGRESS NOTE ADULT - PROBLEM SELECTOR PLAN 10
Code: FULL   DVT ppx; heparin 5000 U sq TID; hold in PM pre-procedurally   Diet: Regular   DIspo: likely home, pending clinical course

## 2025-05-08 NOTE — PROGRESS NOTE ADULT - SUBJECTIVE AND OBJECTIVE BOX
DATE OF SERVICE: 05-08-25 @ 07:33    Patient is a 70y old  Male who presents with a chief complaint of Displaced nephroureteral tubes (07 May 2025 12:01)      SUBJECTIVE / OVERNIGHT EVENTS: Ucx with Proteus, Group B strep, E coli - ertapenem re-initiated. Blood pressure well controlled overnight. Plan for removal of right upside ureteral stent and BL perc nephroureteral tube placement with IR today.     MEDICATIONS  (STANDING):  amLODIPine   Tablet 5 milliGRAM(s) Oral <User Schedule>  chlorhexidine 2% Cloths 1 Application(s) Topical daily  clonazePAM  Tablet 1 milliGRAM(s) Oral three times a day  diphenhydrAMINE 25 milliGRAM(s) Oral at bedtime  enzalutamide 160 milliGRAM(s) Oral daily  ertapenem  IVPB 500 milliGRAM(s) IV Intermittent every 24 hours  furosemide    Tablet 40 milliGRAM(s) Oral <User Schedule>  hydrALAZINE 25 milliGRAM(s) Oral three times a day  polyethylene glycol 3350 17 Gram(s) Oral daily  senna 2 Tablet(s) Oral at bedtime  sodium bicarbonate 1300 milliGRAM(s) Oral three times a day  traZODone 300 milliGRAM(s) Oral at bedtime    MEDICATIONS  (PRN):  acetaminophen     Tablet .. 650 milliGRAM(s) Oral every 6 hours PRN Temp greater or equal to 38C (100.4F), Mild Pain (1 - 3)  HYDROmorphone  Injectable 0.25 milliGRAM(s) IV Push every 6 hours PRN Severe Pain (7 - 10)  melatonin 3 milliGRAM(s) Oral at bedtime PRN Insomnia  oxyCODONE    IR 5 milliGRAM(s) Oral every 6 hours PRN Moderate Pain (4 - 6)      Vital Signs Last 24 Hrs  T(C): 36.9 (08 May 2025 04:39), Max: 36.9 (07 May 2025 21:07)  T(F): 98.4 (08 May 2025 04:39), Max: 98.5 (07 May 2025 21:07)  HR: 90 (08 May 2025 04:39) (66 - 92)  BP: 132/93 (08 May 2025 04:39) (110/86 - 193/82)  BP(mean): --  RR: 18 (08 May 2025 04:39) (18 - 18)  SpO2: 96% (08 May 2025 04:39) (96% - 97%)    Parameters below as of 08 May 2025 04:39  Patient On (Oxygen Delivery Method): room air      CAPILLARY BLOOD GLUCOSE        I&O's Summary    07 May 2025 07:01  -  08 May 2025 07:00  --------------------------------------------------------  IN: 480 mL / OUT: 1200 mL / NET: -720 mL        PHYSICAL EXAM:  General: NAD, sitting upright eating breakfast, obese habitus   Neuro: AAOx3, 5/5  strength b/l, 5/5 hip flexion/extension b/l, spontaneity, verbal   HEENT: NC/AT, PERRLA b/l, EOMI, no supraclavicular/submanidbular lymphadenopathy   Chest: nonTTP   Heart: S1/S2, RRR   Lungs: CTA b/l, nonlabored breathing   Abd: soft, nonTTP, nondistended has urostomy bag with minimal clear urine with some transparent tissue near stoma  Ext: active/passive strengh intact, no pretibial edema   Back: nonTTP, negative CVA tenderness b/l   Skin: no lesions   Psych: reactive affect, hyperverbal, linear and goal directed thought  Lines/tubes/drains: has urostomy with bag     LABS:                        11.5   13.40 )-----------( 168      ( 08 May 2025 04:07 )             35.8     05-08    140  |  106  |  44[H]  ----------------------------<  155[H]  4.5   |  18[L]  |  4.79[H]    Ca    9.7      08 May 2025 04:07  Phos  3.7     05-07  Mg     2.4     05-07    TPro  7.0  /  Alb  3.5  /  TBili  0.4  /  DBili  x   /  AST  6[L]  /  ALT  7[L]  /  AlkPhos  105  05-07    PT/INR - ( 08 May 2025 04:07 )   PT: 11.8 sec;   INR: 1.03 ratio         PTT - ( 08 May 2025 04:07 )  PTT:34.7 sec      Urinalysis Basic - ( 08 May 2025 04:07 )    Color: x / Appearance: x / SG: x / pH: x  Gluc: 155 mg/dL / Ketone: x  / Bili: x / Urobili: x   Blood: x / Protein: x / Nitrite: x   Leuk Esterase: x / RBC: x / WBC x   Sq Epi: x / Non Sq Epi: x / Bacteria: x        RADIOLOGY & ADDITIONAL TESTS:    Imaging Personally Reviewed:    Consultant(s) Notes Reviewed:      Care Discussed with Consultants/Other Providers:

## 2025-05-08 NOTE — ADVANCED PRACTICE NURSE CONSULT - ASSESSMENT
Chart reviewed &events noted. In at bedside to f/u consult request re: drain management/pouching issues. .Pt is unknown to ostomy service with existing ileal conduit. Chart reviewed & events noted. Pt in bed awake, A&O x 3 "waiting for a procedure". Pt reports he is independent with ostomy care. On assessment pouching system intact ,wearing Coloplast pre cut 1 piece system convex system, + function for clear yellow urine. Stoma pink& viable, stent x1 noted. Pt declined to use hospital's pouching formulary by [employer] "prefers geno use my own". Pt brought own supplies & available at bedside.   Discussed w/ staff RN.  Pt placed on floor care for ostomy, no needs at this time. Please change pouching system 2x /week. Empty pouch when pouch 1/3- 1/2 full (currently attached to carreno bag) or as needed. Remains available for reconsultation.

## 2025-05-08 NOTE — PROGRESS NOTE ADULT - PROBLEM SELECTOR PLAN 5
-C/w trazodone 300 mg po qhs given 600 mg qhs has unclear evidence for efficacy   -C/w diphenhydramine 25 mg po qhs

## 2025-05-08 NOTE — PROGRESS NOTE ADULT - PROBLEM SELECTOR PLAN 7
Hypertensive.     -C/w home amlodipine 5 mg po BID   -Initiate hydralazine 25 mg po TID for SBP <160 for IR proecedure

## 2025-05-08 NOTE — PROGRESS NOTE ADULT - ASSESSMENT
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

## 2025-05-09 LAB
-  AMOXICILLIN/CLAVULANIC ACID: SIGNIFICANT CHANGE UP
-  AMPICILLIN/SULBACTAM: SIGNIFICANT CHANGE UP
-  AMPICILLIN: SIGNIFICANT CHANGE UP
-  AZTREONAM: SIGNIFICANT CHANGE UP
-  CEFAZOLIN: SIGNIFICANT CHANGE UP
-  CEFEPIME: SIGNIFICANT CHANGE UP
-  CEFOXITIN: SIGNIFICANT CHANGE UP
-  CEFTRIAXONE: SIGNIFICANT CHANGE UP
-  CEFUROXIME: SIGNIFICANT CHANGE UP
-  CIPROFLOXACIN: SIGNIFICANT CHANGE UP
-  ERTAPENEM: SIGNIFICANT CHANGE UP
-  GENTAMICIN: SIGNIFICANT CHANGE UP
-  IMIPENEM: SIGNIFICANT CHANGE UP
-  LEVOFLOXACIN: SIGNIFICANT CHANGE UP
-  MEROPENEM: SIGNIFICANT CHANGE UP
-  NITROFURANTOIN: SIGNIFICANT CHANGE UP
-  PIPERACILLIN/TAZOBACTAM: SIGNIFICANT CHANGE UP
-  TIGECYCLINE: SIGNIFICANT CHANGE UP
-  TOBRAMYCIN: SIGNIFICANT CHANGE UP
-  TRIMETHOPRIM/SULFAMETHOXAZOLE: SIGNIFICANT CHANGE UP
ALBUMIN SERPL ELPH-MCNC: 3 G/DL — LOW (ref 3.3–5)
ALP SERPL-CCNC: 91 U/L — SIGNIFICANT CHANGE UP (ref 40–120)
ALT FLD-CCNC: 6 U/L — LOW (ref 10–45)
ANION GAP SERPL CALC-SCNC: 17 MMOL/L — SIGNIFICANT CHANGE UP (ref 5–17)
AST SERPL-CCNC: 7 U/L — LOW (ref 10–40)
BASOPHILS # BLD AUTO: 0.01 K/UL — SIGNIFICANT CHANGE UP (ref 0–0.2)
BASOPHILS NFR BLD AUTO: 0.1 % — SIGNIFICANT CHANGE UP (ref 0–2)
BILIRUB SERPL-MCNC: 0.4 MG/DL — SIGNIFICANT CHANGE UP (ref 0.2–1.2)
BUN SERPL-MCNC: 63 MG/DL — HIGH (ref 7–23)
CALCIUM SERPL-MCNC: 9.5 MG/DL — SIGNIFICANT CHANGE UP (ref 8.4–10.5)
CHLORIDE SERPL-SCNC: 107 MMOL/L — SIGNIFICANT CHANGE UP (ref 96–108)
CO2 SERPL-SCNC: 17 MMOL/L — LOW (ref 22–31)
CREAT SERPL-MCNC: 6.04 MG/DL — HIGH (ref 0.5–1.3)
CULTURE RESULTS: ABNORMAL
EGFR: 9 ML/MIN/1.73M2 — LOW
EGFR: 9 ML/MIN/1.73M2 — LOW
EOSINOPHIL # BLD AUTO: 0 K/UL — SIGNIFICANT CHANGE UP (ref 0–0.5)
EOSINOPHIL NFR BLD AUTO: 0 % — SIGNIFICANT CHANGE UP (ref 0–6)
GLUCOSE SERPL-MCNC: 167 MG/DL — HIGH (ref 70–99)
HCT VFR BLD CALC: 33.6 % — LOW (ref 39–50)
HGB BLD-MCNC: 10.5 G/DL — LOW (ref 13–17)
IMM GRANULOCYTES NFR BLD AUTO: 0.7 % — SIGNIFICANT CHANGE UP (ref 0–0.9)
LYMPHOCYTES # BLD AUTO: 0.62 K/UL — LOW (ref 1–3.3)
LYMPHOCYTES # BLD AUTO: 5.2 % — LOW (ref 13–44)
MAGNESIUM SERPL-MCNC: 2.6 MG/DL — SIGNIFICANT CHANGE UP (ref 1.6–2.6)
MCHC RBC-ENTMCNC: 29.3 PG — SIGNIFICANT CHANGE UP (ref 27–34)
MCHC RBC-ENTMCNC: 31.3 G/DL — LOW (ref 32–36)
MCV RBC AUTO: 93.9 FL — SIGNIFICANT CHANGE UP (ref 80–100)
METHOD TYPE: SIGNIFICANT CHANGE UP
MONOCYTES # BLD AUTO: 0.55 K/UL — SIGNIFICANT CHANGE UP (ref 0–0.9)
MONOCYTES NFR BLD AUTO: 4.6 % — SIGNIFICANT CHANGE UP (ref 2–14)
NEUTROPHILS # BLD AUTO: 10.69 K/UL — HIGH (ref 1.8–7.4)
NEUTROPHILS NFR BLD AUTO: 89.4 % — HIGH (ref 43–77)
NRBC BLD AUTO-RTO: 0 /100 WBCS — SIGNIFICANT CHANGE UP (ref 0–0)
ORGANISM # SPEC MICROSCOPIC CNT: ABNORMAL
ORGANISM # SPEC MICROSCOPIC CNT: ABNORMAL
ORGANISM # SPEC MICROSCOPIC CNT: SIGNIFICANT CHANGE UP
PHOSPHATE SERPL-MCNC: 5.5 MG/DL — HIGH (ref 2.5–4.5)
PLATELET # BLD AUTO: 155 K/UL — SIGNIFICANT CHANGE UP (ref 150–400)
POTASSIUM SERPL-MCNC: 4.3 MMOL/L — SIGNIFICANT CHANGE UP (ref 3.5–5.3)
POTASSIUM SERPL-SCNC: 4.3 MMOL/L — SIGNIFICANT CHANGE UP (ref 3.5–5.3)
PROT SERPL-MCNC: 7.4 G/DL — SIGNIFICANT CHANGE UP (ref 6–8.3)
RBC # BLD: 3.58 M/UL — LOW (ref 4.2–5.8)
RBC # FLD: 14.4 % — SIGNIFICANT CHANGE UP (ref 10.3–14.5)
SODIUM SERPL-SCNC: 141 MMOL/L — SIGNIFICANT CHANGE UP (ref 135–145)
SPECIMEN SOURCE: SIGNIFICANT CHANGE UP
WBC # BLD: 11.95 K/UL — HIGH (ref 3.8–10.5)
WBC # FLD AUTO: 11.95 K/UL — HIGH (ref 3.8–10.5)

## 2025-05-09 PROCEDURE — 99231 SBSQ HOSP IP/OBS SF/LOW 25: CPT

## 2025-05-09 PROCEDURE — 99232 SBSQ HOSP IP/OBS MODERATE 35: CPT | Mod: GC

## 2025-05-09 PROCEDURE — 99223 1ST HOSP IP/OBS HIGH 75: CPT

## 2025-05-09 PROCEDURE — G0545: CPT

## 2025-05-09 RX ORDER — HEPARIN SODIUM 1000 [USP'U]/ML
5000 INJECTION INTRAVENOUS; SUBCUTANEOUS EVERY 12 HOURS
Refills: 0 | Status: DISCONTINUED | OUTPATIENT
Start: 2025-05-09 | End: 2025-05-09

## 2025-05-09 RX ADMIN — OXYCODONE HYDROCHLORIDE 5 MILLIGRAM(S): 30 TABLET ORAL at 14:04

## 2025-05-09 RX ADMIN — Medication 2 TABLET(S): at 20:58

## 2025-05-09 RX ADMIN — ERTAPENEM SODIUM 100 MILLIGRAM(S): 1 INJECTION, POWDER, LYOPHILIZED, FOR SOLUTION INTRAMUSCULAR; INTRAVENOUS at 08:42

## 2025-05-09 RX ADMIN — Medication 25 MILLIGRAM(S): at 05:28

## 2025-05-09 RX ADMIN — ENZALUTAMIDE 160 MILLIGRAM(S): 40 CAPSULE ORAL at 14:14

## 2025-05-09 RX ADMIN — Medication 1 APPLICATION(S): at 14:05

## 2025-05-09 RX ADMIN — AMLODIPINE BESYLATE 5 MILLIGRAM(S): 10 TABLET ORAL at 20:57

## 2025-05-09 RX ADMIN — Medication 25 MILLIGRAM(S): at 20:59

## 2025-05-09 RX ADMIN — Medication 1300 MILLIGRAM(S): at 20:58

## 2025-05-09 RX ADMIN — AMLODIPINE BESYLATE 5 MILLIGRAM(S): 10 TABLET ORAL at 08:42

## 2025-05-09 RX ADMIN — CLONAZEPAM 1 MILLIGRAM(S): 0.5 TABLET ORAL at 20:59

## 2025-05-09 RX ADMIN — CLONAZEPAM 1 MILLIGRAM(S): 0.5 TABLET ORAL at 05:28

## 2025-05-09 RX ADMIN — Medication 1300 MILLIGRAM(S): at 14:05

## 2025-05-09 RX ADMIN — Medication 1300 MILLIGRAM(S): at 05:28

## 2025-05-09 RX ADMIN — POLYETHYLENE GLYCOL 3350 17 GRAM(S): 17 POWDER, FOR SOLUTION ORAL at 14:04

## 2025-05-09 RX ADMIN — CLONAZEPAM 1 MILLIGRAM(S): 0.5 TABLET ORAL at 14:06

## 2025-05-09 RX ADMIN — Medication 300 MILLIGRAM(S): at 20:58

## 2025-05-09 NOTE — PROGRESS NOTE ADULT - ASSESSMENT
Interventional Radiology Follow-Up Note    This is a 70y Male s/p bilateral PCN-U placement on  in Interventional Radiology with Dr. Garcia.     S: Patient seen and examined @ bedside. No complaints offered.     Medication:  amLODIPine   Tablet: ()  ertapenem  IVPB: ()  furosemide    Tablet: ()  heparin   Injectable: ()  hydrALAZINE: ()    Vitals:   T(F): 97.6, Max: 99.5 (14:00)  HR: 70  BP: 122/71  RR: 18  SpO2: 95%    Physical Exam:  General: Nontoxic, in NAD, A&O x3.  Abdomen: soft, NTND, no peritoneal signs.  Drain Device: Drains intact attached to gravity bags. Dressing clean, dry, intact.    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  AF VSS. WBC 8.3, Cr 4.66 (around baseline), K 5.5. CT shows L hydro without L stent and stable R hydro with evidence of new R stent in mid to distal ureter. Now sp BL PCNU placement     -continue global management per primary team  -monitor h/h; transfuse as needed  -trend vs/labs  -flush drains with 5cc NS daily forward only; DO NOT aspirate  -change dressing q3 days or when dressing is saturated  -can consider capping drains when output is clear/ UTI is cleared  -f/u with Dr. Hoenig Urology when discharged for further management       Any questions or concerns regarding above please reach out to IR:   -Available on [employer] teams  -During working hours (7a-5p): call -[phone]  -Emergent issues after 5pm: page: [phone]  -Non-emergent consults: Please place a [place] order "IR Consult" with an appropriate callback number  -Scheduling questions: [phone]  -Clinic/Outpatient [phone]      FELICITAS Owens- BC  Available on teams

## 2025-05-09 NOTE — PROGRESS NOTE ADULT - ASSESSMENT
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

## 2025-05-09 NOTE — PROGRESS NOTE ADULT - SUBJECTIVE AND OBJECTIVE BOX
INPATIENT MEDICINE PROGRESS NOTE:   Authored by Destiny Marino MD     HISTORY OF PRESENT ILLNESS:    NAEON. AFebrile, HR 50s-70s. SBPs 110-150s. RA.     SEen and examined at bedside, patient wants to go home due to feeling better however explained nature of his infection and worried. He will stay. Denies pain. Wondering if he got the infection from not changing the urostomy bag sufficient. Angry with family who are mad at him.     PHYSICAL EXAM:  Vital Signs Last 24 Hrs  T(C): 36.4 (09 May 2025 05:27), Max: 37.5 (08 May 2025 14:00)  T(F): 97.6 (09 May 2025 05:27), Max: 99.5 (08 May 2025 14:00)  HR: 70 (09 May 2025 05:27) (70 - 94)  BP: 122/71 (09 May 2025 05:27) (117/61 - 157/64)  BP(mean): 83 (08 May 2025 16:00) (76 - 107)  RR: 18 (09 May 2025 05:27) (14 - 23)  SpO2: 95% (09 May 2025 05:27) (92% - 97%)    Parameters below as of 09 May 2025 05:27  Patient On (Oxygen Delivery Method): room air        General: NAD, sitting upright, obese habitus   Neuro: AAOx3, 5/5  strength b/l, 5/5 hip flexion/extension b/l, spontaneity, verbal   HEENT: NC/AT, PERRLA b/l, EOMI, no supraclavicular/submanidbular lymphadenopathy   Chest: nonTTP   Heart: S1/S2, RRR   Lungs: CTA b/l, nonlabored breathing   Abd: soft, nonTTP, nondistended has urostomy bag with minimal clear urine with some transparent tissue near stoma  Ext: active/passive strengh intact, no pretibial edema   Back: nonTTP, negative CVA tenderness b/l, wound dressing over percutaneous kidney tubes nonTTP   Skin: no lesions   Psych: reactive affect, hyperverbal, linear and goal directed thought  Lines/tubes/drains: has urostomy with bag with minimal urine, L nephrostomy tube with hematuria, R nephrostomy tube without drainage     I&O's Summary    08 May 2025 07:01  -  09 May 2025 07:00  --------------------------------------------------------  IN: 0 mL / OUT: 2610 mL / NET: -2610 mL        LABS:                        11.5   13.40 )-----------( 168      ( 08 May 2025 04:07 )             35.8     05-08    140  |  106  |  44[H]  ----------------------------<  155[H]  4.5   |  18[L]  |  4.79[H]    Ca    9.7      08 May 2025 04:07  Phos  3.7     05-07  Mg     2.4     05-07    TPro  7.0  /  Alb  3.5  /  TBili  0.4  /  DBili  x   /  AST  6[L]  /  ALT  7[L]  /  AlkPhos  105  05-07    CAPILLARY BLOOD GLUCOSE        PT/INR - ( 08 May 2025 04:07 )   PT: 11.8 sec;   INR: 1.03 ratio         PTT - ( 08 May 2025 04:07 )  PTT:34.7 sec      Urinalysis Basic - ( 08 May 2025 04:07 )    Color: x / Appearance: x / SG: x / pH: x  Gluc: 155 mg/dL / Ketone: x  / Bili: x / Urobili: x   Blood: x / Protein: x / Nitrite: x   Leuk Esterase: x / RBC: x / WBC x   Sq Epi: x / Non Sq Epi: x / Bacteria: x        Culture - Urine (collected 06 May 2025 13:54)  Source: Kidney None  Preliminary Report (07 May 2025 23:14):    >100,000 CFU/ml Proteus mirabilis    50,000 - 99,000 CFU/mL Escherichia coli    >100,000 CFU/ml Streptococcus agalactiae (Group B)    Group B streptococci are susceptible to ampicillin,    penicillin and cefazolin, but may be resistant to    erythromycin andclindamycin.  Organism: Proteus mirabilis (08 May 2025 19:10)  Organism: Proteus mirabilis (08 May 2025 19:10)    Urinalysis with Rflx Culture (collected 06 May 2025 13:54)        MEDICATIONS  (STANDING):  amLODIPine   Tablet 5 milliGRAM(s) Oral <User Schedule>  chlorhexidine 2% Cloths 1 Application(s) Topical daily  clonazePAM  Tablet 1 milliGRAM(s) Oral three times a day  diphenhydrAMINE 25 milliGRAM(s) Oral at bedtime  enzalutamide 160 milliGRAM(s) Oral daily  ertapenem  IVPB 500 milliGRAM(s) IV Intermittent every 24 hours  furosemide    Tablet 40 milliGRAM(s) Oral <User Schedule>  hydrALAZINE 25 milliGRAM(s) Oral three times a day  polyethylene glycol 3350 17 Gram(s) Oral daily  senna 2 Tablet(s) Oral at bedtime  sodium bicarbonate 1300 milliGRAM(s) Oral three times a day  traZODone 300 milliGRAM(s) Oral at bedtime    MEDICATIONS  (PRN):  acetaminophen     Tablet .. 650 milliGRAM(s) Oral every 6 hours PRN Temp greater or equal to 38C (100.4F), Mild Pain (1 - 3)  HYDROmorphone  Injectable 0.25 milliGRAM(s) IV Push every 6 hours PRN Severe Pain (7 - 10)  melatonin 3 milliGRAM(s) Oral at bedtime PRN Insomnia  oxyCODONE    IR 5 milliGRAM(s) Oral every 6 hours PRN Moderate Pain (4 - 6)

## 2025-05-09 NOTE — CONSULT NOTE ADULT - SUBJECTIVE AND OBJECTIVE BOX
Patient is a 70y old  Male who presents with a chief complaint of Displaced nephroureteral tubes (09 May 2025 10:13)    HPI:  71 y/o man with HTN, Obesity (BMI = 34.5) depression, anxiety, CKD 5, prostate cancer s/p radical robotic prostatectomy in 2011 (on Xtandi) , s/p radiation and bladder cancer s/p cystectomy May, 2018 with ileal conduit c/b recurrent uretero-ileal strictures requiring bilateral upside-down nephroureteral stents presenting to [facility] ED and admitted 5/6/25  after being evaluated at [facility] and found to have dislodged right nephroureteral stent and malpositioned left nephroureteral stent. He was given morphine at [place] with improvement in his pain.  Patient was seen by his outpatient urologist on 4/16 and his stents were replaced at that time. On Saturday 5/6, patient began experiencing abdominal pain and left sided flank pain and believes that this is when one of his stents were accidently dislodged. No trauma at the time. He denies chest pain, SOB, fever, chills, night sweats, nausea, vomiting, hematuria,  dysuria, weight loss, recent travel, or sick contacts.     In the ED, VSS, labs notable for Hgb 10.7, Cr 4.56. UA large amount leukocyte esterase, moderate bacteria, negative nitrites. CT abdomen and pelvis remarkable for previously seen left ureteral stent no longer identified. There has been interval progression of left-sided hydronephrosis. There is a new right ureteral stent and stable right-sided hydronephrosis.Redemonstration of ileal conduit. Xray KUB showing dislodged right ureteral stent to the level of L5. Patient was give 1g IV ceftriaxone; evaluated by urology and interventional radiology teams and admitted to medicine for further management.    (06 May 2025 19:37)  dislodged nerphroureteral stent  5/8 IR procedure: left: 8f x 22cm neph u, purulent urine  right: 8f x 20cm neph u, blood tinged urine  partially dislodged r retrograde tube removed over wire    Recent hospitalizations  10/28 --> 10/31/24 dislodged nephroureteral stent    10/28/24 Urine Cx: >100k E coi +ESBL Susc Bactrim, Cipro, Levo, Ertapenem    10/24 --> 10/25 24 dislodged nephroureteral stent - signed out AMA  11/20/24  Left AVF placement  3/28 --> 4/3/ 23 dislodged nephroureteral stent, course complicated by hypotension  Rx: Zosyn      PAST MEDICAL & SURGICAL HISTORY:  Prostate Cancer  HTN (Hypertension)  Anxiety  Major depressive disorder  UTI (urinary tract infection)  Urinary (tract) obstruction  Urethral stricture  Other calculus in bladder  Obese  Diabetes mellitus 10.31.24 @ 07:45) A1C with Estimated Average Glucose Result: 5.7:   Malignant neoplasm of bladder  Unspecified hydronephrosis  S/P Appendectomy  40 years ago  H/O cystoscopy  - multiple  last cystoscopy, laser litholapaxy on 1/2018  History of prostatectomy  robotic, 2011, s/p radiation  Suprapubic catheter  8/2015    History of bladder cancer  bladder removal May 10, 2018    S/P ileal conduit  May 2018    Nephrostomy status  Left, 8/1/2018    Social history: no tobEtoh  lives with partner,   retired    FAMILY HISTORY:  FH: colon cancer    REVIEW OF SYSTEMS:  CONSTITUTIONAL: No weakness, fevers or chills  EYES/ENT: No visual changes;  No vertigo or throat pain   NECK: No pain or stiffness  RESPIRATORY: No cough, wheezing, hemoptysis; No shortness of breath  CARDIOVASCULAR: No chest pain or palpitations  GASTROINTESTINAL: No abdominal or epigastric pain. No nausea, vomiting, or hematemesis; No diarrhea or constipation. No melena or hematochezia.  GENITOURINARY: No dysuria, frequency or hematuria  NEUROLOGICAL: No numbness or weakness  SKIN: No itching, burning, rashes, or lesions   All other review of systems is negative unless indicated above    Allergies  No Known Allergies    Antimicrobials:  ertapenem  IVPB 500 milliGRAM(s) IV Intermittent every 24 hours      Vital Signs Last 24 Hrs  T(C): 36.1 (09 May 2025 10:32), Max: 36.4 (09 May 2025 05:27)  T(F): 97 (09 May 2025 10:32), Max: 97.6 (09 May 2025 05:27)  HR: 67 (09 May 2025 10:32) (67 - 85)  BP: 102/71 (09 May 2025 10:32) (102/71 - 122/71)  BP(mean): 83 (08 May 2025 16:00) (83 - 83)  RR: 18 (09 May 2025 10:32) (14 - 18)  SpO2: 97% (09 May 2025 10:32) (95% - 97%)    Parameters below as of 09 May 2025 10:32  Patient On (Oxygen Delivery Method): room air        PHYSICAL EXAM:  General: NAD, Non-toxic  Neurology: A&Ox3, nonfocal  Respiratory: Clear to auscultation bilaterally  CV: RRR, S1S2, no murmurs, rubs or gallops  Abdominal: bilateral gynecomastia, prominent abd, nephrostomy tube right flank, and left lower late abd, Soft, Non-tender  Extremities: No edema, + peripheral pulses  Line Sites: Clear  Skin: No rash                        10.5   11.95 )-----------( 155      ( 09 May 2025 10:24 )             33.6   WBC Count: 11.95 (05-09 @ 10:24)  WBC Count: 13.40 (05-08 @ 04:07)  WBC Count: 8.22 (05-07 @ 09:40)  WBC Count: 8.32 (05-06 @ 13:54)      05-09    141  |  107  |  63[H]  ----------------------------<  167[H]  4.3   |  17[L]  |  6.04[H]    Ca    9.5      09 May 2025 10:24  Phos  5.5     05-09  Mg     2.6     05-09    TPro  7.4  /  Alb  3.0[L]  /  TBili  0.4  /  DBili  x   /  AST  7[L]  /  ALT  6[L]  /  AlkPhos  91  05-09    MICROBIOLOGY:  Kidney None  05-06-25   >100,000 CFU/ml Proteus mirabilis  50,000 - 99,000 CFU/mL Escherichia coli  >100,000 CFU/ml Streptococcus agalactiae (Group B)    Culture - Urine (05.06.25 @ 13:54)   - Ceftriaxone: S <=1  - Levofloxacin: S <=0.5  - Meropenem: S <=1  - Piperacillin/Tazobactam: S <=8  - Tobramycin: S <=2  - Trimethoprim/Sulfamethoxazole: S <=0.5/9.5  - Amoxicillin/Clavulanic Acid: S <=8/4  - Ampicillin: S <=8 These ampicillin results predict results for amoxicillin  - Ampicillin/Sulbactam: S <=4/2  - Aztreonam: S <=4  - Cefazolin: S <=2 For uncomplicated UTI with K. pneumoniae, E. coli, or P. mirablis: ERVIN <=16 is sensitive and ERVIN >=32 is resistant. This also predicts results for oral agents cefaclor, cefdinir, cefpodoxime, cefprozil, cefuroxime axetil, cephalexin and locarbef for uncomplicated UTI. Note that some isolates may be susceptible to these agents while testing resistant to cefazolin.  - Cefepime: S <=2  - Cefoxitin: S <=8  - Cefuroxime: S <=4  - Ciprofloxacin: S <=0.25  - Ertapenem: S <=0.5  - Gentamicin: S <=2  Specimen Source: Kidney None  Culture Results:   >100,000 CFU/ml Proteus mirabilis   50,000 - 99,000 CFU/mL Escherichia coli   >100,000 CFU/ml Streptococcus agalactiae (Group B)   Culture - Urine (10.28.24 @ 18:25)   - Ampicillin: R >16 These ampicillin results predict results for amoxicillin  - Gentamicin: R >8  - Ciprofloxacin: S <=0.25  - Cefepime: R 16  - Cefazolin: R >16 For uncomplicated UTI with K. pneumoniae, E. coli, or P. mirablis: ERVIN <=16 is sensitive and ERVIN >=32 is resistant. This also predicts results for oral agents cefaclor, cefdinir, cefpodoxime, cefprozil, cefuroxime axetil, cephalexin and locarbef for uncomplicated UTI. Note that some isolates may be susceptible to these agents while testing resistant to cefazolin.  - Aztreonam: R >16  - Ampicillin/Sulbactam: I 16/8  - Tobramycin: R >8  - Trimethoprim/Sulfamethoxazole: S <=0.5/9.5  - Piperacillin/Tazobactam: SDD 16  - Meropenem: S <=1  - Nitrofurantoin: I 64 Should not be used to treat pyelonephritis  - Imipenem: S <=1  - Levofloxacin: S <=0.5  - Ceftriaxone: R >32  - Cefuroxime: R >16  - Ertapenem: S <=0.5  Specimen Source: .Urine  Culture Results: >100,000 CFU/ml Escherichia coli ESBL  Radiology:  < from: CT Abdomen and Pelvis No Cont (05.06.25 @ 13:45) >  FINDINGS:  LOWER CHEST: Gynecomastia, similar to the prior study. Trace right   pleural effusion and bibasilar predominantly right-sided reticular   scarring/atelectasis.    LIVER: Within normal limits.  BILE DUCTS: Normal caliber.  GALLBLADDER: Within normal limits.  SPLEEN: Within normal limits.  PANCREAS: Within normal limits.  ADRENALS: Within normal limits.  KIDNEYS/URETERS: Severe bilateral hydroureteronephrosis, stable on the   right and progressed on the left. Severe cortical atrophy involving the   left kidney and probable multifocal cortical scarring right kidney.   Redemonstration of an ileal conduit. Previously seen left ureteral stent   is no longer visualized. There is evidence of a new stent within the mid   to distal right ureter stent extending into the right lower quadrant   percutaneous ureterostomy stent remainsin place.    BLADDER: Prior surgical excision.  REPRODUCTIVE ORGANS: Prostatectomy. Stable postsurgical collection.    BOWEL: No bowel obstruction. Appendix is not visualized. Right lower   quadrant ileal conduit.  PERITONEUM/RETROPERITONEUM: No evidence of ascites.  VESSELS: Atherosclerotic changes.  LYMPH NODES: No lymphadenopathy.  ABDOMINAL WALL: Postsurgical changes. Right lower quadrant ileal conduit.  BONES: Sclerotic and erosive changes involving the pubic symphysis,   possibly postradiation change.    IMPRESSION:  Previously seen left ureteral stent is no longer identified. There has   been interval progression of left-sided hydronephrosis.  There is a new right ureteral stent and stable right-sided hydronephrosis.  Redemonstration of ileal conduit.  Additional findings as above.    < end of copied text >      Boone Romo MD; Division of Infectious Disease; Pager: [phone]; nights and weekends: [phone]

## 2025-05-09 NOTE — PROGRESS NOTE ADULT - PROBLEM SELECTOR PLAN 1
History of bladder cancer s/p cyctectomy with ileal conduit c/b recurrent uretero-ileal strictures requiring bilateral upside-down nephroureteral stents presenting with flank pain and Xray KUB showing dislodged right ureteral stent to the level of L5 and CT AP showing loss of previously visualize left ureteral stent. Clinically monitor.     - Appreciate urology recommendations   -IR 5/8 percutaneous nephrostomy tube placement

## 2025-05-09 NOTE — PROGRESS NOTE ADULT - SUBJECTIVE AND OBJECTIVE BOX
Subjective  Patient seen and examined at bedside    Objective    Vital signs  T(F): , Max: 99.5 (05-08-25 @ 14:00)  HR: 70 (05-09-25 @ 05:27)  BP: 122/71 (05-09-25 @ 05:27)  SpO2: 95% (05-09-25 @ 05:27)  Wt(kg): --    Output     OUT:    Nephrostomy Tube (mL): 300 mL    Nephrostomy Tube (mL): 710 mL    Voided (mL): 1600 mL  Total OUT: 2610 mL    Total NET: -2610 mL          Gen: NAD  Abd: soft, nontender, nondistended  : right PCNU draining CYU; left PCNU draining punch colored urine     Labs      05-08 @ 04:07    WBC 13.40 / Hct 35.8  / SCr 4.79     05-07 @ 09:40    WBC 8.22  / Hct 33.0  / SCr 4.77         Culture - Urine (collected 05-06-25 @ 13:54)  Source: Kidney None  Preliminary Report (05-07-25 @ 23:14):    >100,000 CFU/ml Proteus mirabilis    50,000 - 99,000 CFU/mL Escherichia coli    >100,000 CFU/ml Streptococcus agalactiae (Group B)    Group B streptococci are susceptible to ampicillin,    penicillin and cefazolin, but may be resistant to    erythromycin andclindamycin.  Organism: Proteus mirabilis (05-08-25 @ 19:10)  Organism: Proteus mirabilis (05-08-25 @ 19:10)      Method Type: ERVIN      -  Amoxicillin/Clavulanic Acid: S <=8/4      -  Ampicillin: S <=8 These ampicillin results predict results for amoxicillin      -  Ampicillin/Sulbactam: S <=4/2      -  Aztreonam: S <=4      -  Cefazolin: S <=2 For uncomplicated UTI with K. pneumoniae, E. coli, or P. mirablis: ERVIN <=16 is sensitive and ERVIN >=32 is resistant. This also predicts results for oral agents cefaclor, cefdinir, cefpodoxime, cefprozil, cefuroxime axetil, cephalexin and locarbef for uncomplicated UTI. Note that some isolates may be susceptible to these agents while testing resistant to cefazolin.      -  Cefepime: S <=2      -  Cefoxitin: S <=8      -  Ceftriaxone: S <=1      -  Cefuroxime: S <=4      -  Ciprofloxacin: S <=0.25      -  Ertapenem: S <=0.5      -  Gentamicin: S <=2      -  Levofloxacin: S <=0.5      -  Meropenem: S <=1      -  Piperacillin/Tazobactam: S <=8      -  Tobramycin: S <=2      -  Trimethoprim/Sulfamethoxazole: S <=0.5/9.5    Urinalysis with Rflx Culture (collected 05-06-25 @ 13:54)

## 2025-05-09 NOTE — PROGRESS NOTE ADULT - PROBLEM SELECTOR PLAN 2
Patient with urostomy and ileal conduit with upside nephroureteral tubes. UA with large LE and moderate bacteria. Purulent L kidney urine observed intraoperatively. No SIRS nor urinary tract like sx.     -History of ESBL and MRSA; however current sample likely contamined due to colonized  -C/w ertapenem 500 mg IV qd (started 5/8) pending ID   -Conduit urine cx growing proteus mirabilis, strep agalactiae, Ecolic; pending abx sensivities   -Pending ID consult for abx choice   -S/p ceftriaxone x 1 in ED   -MRSA PCR negative

## 2025-05-09 NOTE — PROGRESS NOTE ADULT - PROBLEM SELECTOR PLAN 10
Code: FULL   DVT ppx; heparin 5000 U sq TID; hold in PM pre-procedurally   Diet: Regular   DIspo: likely home, pending clinical course Code: FULL   DVT ppx: hold due to hematuria    Diet: Regular   DIspo: likely home, pending clinical course

## 2025-05-09 NOTE — PROGRESS NOTE ADULT - ASSESSMENT
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  AF VSS. WBC 8.3, Cr 4.66 (around baseline), K 5.5. CT shows L hydro without L stent and stable R hydro with evidence of new R stent in mid to distal ureter. Now sp BL PCNU placement 5/8    Recs:  - Monitor urine color from left PCNU  - Hydration as tolerated  - Trend creatinine   - Trend creatinine  - ABx for proteus in the urine   - Patient to follow up as an outpatient with Dr. Hoenig     Discussed with Dr. Hoenig  [facility]  [address] [place] [address], [address] [address] [phone]

## 2025-05-09 NOTE — PROGRESS NOTE ADULT - PROBLEM SELECTOR PLAN 7
Hypertensive.     -Uptitrate home amlodipine 5 mg po BID to 15 mg po qd   -D/c hydralazine 25 mg po TID for SBP <160 for IR proecedure Hypertensive.     -C/whome amlodipine 5 mg po BID   -D/c hydralazine 25 mg po TID for SBP <160 for IR proecedure

## 2025-05-09 NOTE — CONSULT NOTE ADULT - ASSESSMENT
71 y/o man with HTN, Obesity (BMI = 34.5) depression, anxiety, CKD 5, prostate cancer s/p radical robotic prostatectomy in 2011 (on Xtandi) , s/p radiation and bladder cancer s/p cystectomy May, 2018 with ileal conduit c/b recurrent uretero-ileal strictures requiring bilateral upside-down nephroureteral stents presenting to [facility] ED and admitted 5/6/25  after being evaluated at [facility] and found to have dislodged right nephroureteral stent and malpositioned left nephroureteral stent.  He underwent IR procedure, Stent exchange in which purulent urine noted on left  5/6 urine cx growing pan susceptible Proteus mirabilis, Strep agalactiae and 50 - 99k E coli with out susceptibilities available    It is likely that pt is harboring the 10/28/24 urinary E coli isolate which was +ESBL susc to Ertapenem, Levoflox, Cipro, TMP-Sx    NKA, Cr Clear around 10 cc/min    Antibiotics  Ceftriaxone 5/6  Ertapenem 5/8    Obstruction relieved, non toxic - I suggest short course of antibiotics    Suggest  Ertapenem until discharged from hospital  When ready for discharge:  PO Keflex 500 mg po daily  PLUS PO Cipro 250  mg po daily  (to cover +ESBL E coli pending susceptibility to other agents)    continue abx through 5/15/25    discussed with resident -   ID will check pt and review the E coli susceptiblities and adjust abx

## 2025-05-10 DIAGNOSIS — N17.9 ACUTE KIDNEY FAILURE, UNSPECIFIED: ICD-10-CM

## 2025-05-10 LAB
ANION GAP SERPL CALC-SCNC: 16 MMOL/L — SIGNIFICANT CHANGE UP (ref 5–17)
BUN SERPL-MCNC: 79 MG/DL — HIGH (ref 7–23)
CALCIUM SERPL-MCNC: 9.2 MG/DL — SIGNIFICANT CHANGE UP (ref 8.4–10.5)
CHLORIDE SERPL-SCNC: 106 MMOL/L — SIGNIFICANT CHANGE UP (ref 96–108)
CO2 SERPL-SCNC: 17 MMOL/L — LOW (ref 22–31)
CREAT SERPL-MCNC: 6.2 MG/DL — HIGH (ref 0.5–1.3)
EGFR: 9 ML/MIN/1.73M2 — LOW
EGFR: 9 ML/MIN/1.73M2 — LOW
GLUCOSE SERPL-MCNC: 158 MG/DL — HIGH (ref 70–99)
HCT VFR BLD CALC: 28.9 % — LOW (ref 39–50)
HGB BLD-MCNC: 9.1 G/DL — LOW (ref 13–17)
MCHC RBC-ENTMCNC: 29.1 PG — SIGNIFICANT CHANGE UP (ref 27–34)
MCHC RBC-ENTMCNC: 31.5 G/DL — LOW (ref 32–36)
MCV RBC AUTO: 92.3 FL — SIGNIFICANT CHANGE UP (ref 80–100)
NRBC BLD AUTO-RTO: 0 /100 WBCS — SIGNIFICANT CHANGE UP (ref 0–0)
PLATELET # BLD AUTO: 165 K/UL — SIGNIFICANT CHANGE UP (ref 150–400)
POTASSIUM SERPL-MCNC: 3.6 MMOL/L — SIGNIFICANT CHANGE UP (ref 3.5–5.3)
POTASSIUM SERPL-SCNC: 3.6 MMOL/L — SIGNIFICANT CHANGE UP (ref 3.5–5.3)
RBC # BLD: 3.13 M/UL — LOW (ref 4.2–5.8)
RBC # FLD: 14.3 % — SIGNIFICANT CHANGE UP (ref 10.3–14.5)
SODIUM SERPL-SCNC: 139 MMOL/L — SIGNIFICANT CHANGE UP (ref 135–145)
SODIUM UR-SCNC: 70 MMOL/L — SIGNIFICANT CHANGE UP
WBC # BLD: 9.31 K/UL — SIGNIFICANT CHANGE UP (ref 3.8–10.5)
WBC # FLD AUTO: 9.31 K/UL — SIGNIFICANT CHANGE UP (ref 3.8–10.5)

## 2025-05-10 PROCEDURE — 99232 SBSQ HOSP IP/OBS MODERATE 35: CPT

## 2025-05-10 PROCEDURE — 99222 1ST HOSP IP/OBS MODERATE 55: CPT

## 2025-05-10 RX ORDER — BENZOCAINE 220 MG/G
1 SPRAY, METERED PERIODONTAL
Refills: 0 | Status: DISCONTINUED | OUTPATIENT
Start: 2025-05-10 | End: 2025-05-10

## 2025-05-10 RX ADMIN — Medication 25 MILLIGRAM(S): at 21:23

## 2025-05-10 RX ADMIN — OXYCODONE HYDROCHLORIDE 5 MILLIGRAM(S): 30 TABLET ORAL at 09:55

## 2025-05-10 RX ADMIN — OXYCODONE HYDROCHLORIDE 5 MILLIGRAM(S): 30 TABLET ORAL at 08:55

## 2025-05-10 RX ADMIN — CLONAZEPAM 1 MILLIGRAM(S): 0.5 TABLET ORAL at 06:06

## 2025-05-10 RX ADMIN — Medication 1 APPLICATION(S): at 12:49

## 2025-05-10 RX ADMIN — OXYCODONE HYDROCHLORIDE 5 MILLIGRAM(S): 30 TABLET ORAL at 21:22

## 2025-05-10 RX ADMIN — Medication 1300 MILLIGRAM(S): at 13:56

## 2025-05-10 RX ADMIN — Medication 1300 MILLIGRAM(S): at 06:06

## 2025-05-10 RX ADMIN — CLONAZEPAM 1 MILLIGRAM(S): 0.5 TABLET ORAL at 13:56

## 2025-05-10 RX ADMIN — OXYCODONE HYDROCHLORIDE 5 MILLIGRAM(S): 30 TABLET ORAL at 21:52

## 2025-05-10 RX ADMIN — ENZALUTAMIDE 160 MILLIGRAM(S): 40 CAPSULE ORAL at 12:50

## 2025-05-10 RX ADMIN — CLONAZEPAM 1 MILLIGRAM(S): 0.5 TABLET ORAL at 21:22

## 2025-05-10 RX ADMIN — FUROSEMIDE 40 MILLIGRAM(S): 10 INJECTION INTRAMUSCULAR; INTRAVENOUS at 08:55

## 2025-05-10 RX ADMIN — POLYETHYLENE GLYCOL 3350 17 GRAM(S): 17 POWDER, FOR SOLUTION ORAL at 08:55

## 2025-05-10 RX ADMIN — ERTAPENEM SODIUM 100 MILLIGRAM(S): 1 INJECTION, POWDER, LYOPHILIZED, FOR SOLUTION INTRAMUSCULAR; INTRAVENOUS at 08:55

## 2025-05-10 RX ADMIN — Medication 1300 MILLIGRAM(S): at 21:22

## 2025-05-10 RX ADMIN — AMLODIPINE BESYLATE 5 MILLIGRAM(S): 10 TABLET ORAL at 08:55

## 2025-05-10 RX ADMIN — AMLODIPINE BESYLATE 5 MILLIGRAM(S): 10 TABLET ORAL at 20:11

## 2025-05-10 RX ADMIN — Medication 300 MILLIGRAM(S): at 21:22

## 2025-05-10 NOTE — CONSULT NOTE ADULT - SUBJECTIVE AND OBJECTIVE BOX
[facility] DIVISION OF KIDNEY DISEASES AND HYPERTENSION -- INITIAL CONSULT NOTE  --------------------------------------------------------------------------------  HPI: 70M PMH obesity, HTN, CKD5 (LUE AVF created 11/2023 by Dr. 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 Nephrology consulted for BARON on CKD.     Pt seen and examined at bedside. He appears very frustrated and anxious regarding his hospital course. He appears to have poor insight into what is going on with his health. His Cr on admission was stable at his baseline of 4.5-4.8, but then increased to 6-6.2 on 5/9-10/25. Pt noted to be hypertensive on presentation with SBP 180s, which acutely dropped to low 100s. His hydral was discontinued. Also of note, his L PCN is draining significantly less urine compared to the right with less than 200cc in last 24 hrs compared to over 1L from the right side. Unclear if this had to do with increased purulence, but bedside RN stated he was able to flush both PCNs.        PAST HISTORY  --------------------------------------------------------------------------------  PAST MEDICAL & SURGICAL HISTORY:  Prostate Cancer  CKD  HTN (Hypertension)  Anxiety  Major depressive disorder  Urinary (tract) obstruction  Urethral stricture  Other calculus in bladder  Diabetes mellitus  Malignant neoplasm of bladder  Unspecified hydronephrosis  S/P Appendectomy  H/O cystoscopy  - multiple  last cystoscopy, laser litholapaxy on 1/2018  History of prostatectomy  robotic, 2011, s/p radiation  Suprapubic catheter  8/2015  History of bladder cancer  bladder removal May 10, 2018  S/P ileal conduit  May 2018  Nephrostomy status  Left, 8/1/2018  Bladder Removed      FAMILY HISTORY:  FH: colon cancer      PAST SOCIAL HISTORY:    ALLERGIES & MEDICATIONS  --------------------------------------------------------------------------------  Allergies    No Known Allergies      Standing Inpatient Medications  amLODIPine   Tablet 5 milliGRAM(s) Oral <User Schedule>  chlorhexidine 2% Cloths 1 Application(s) Topical daily  clonazePAM  Tablet 1 milliGRAM(s) Oral three times a day  diphenhydrAMINE 25 milliGRAM(s) Oral at bedtime  enzalutamide 160 milliGRAM(s) Oral daily  ertapenem  IVPB 500 milliGRAM(s) IV Intermittent every 24 hours  polyethylene glycol 3350 17 Gram(s) Oral daily  senna 2 Tablet(s) Oral at bedtime  sodium bicarbonate 1300 milliGRAM(s) Oral three times a day  traZODone 300 milliGRAM(s) Oral at bedtime    PRN Inpatient Medications  acetaminophen     Tablet .. 650 milliGRAM(s) Oral every 6 hours PRN  HYDROmorphone  Injectable 0.25 milliGRAM(s) IV Push every 6 hours PRN  melatonin 3 milliGRAM(s) Oral at bedtime PRN  oxyCODONE    IR 5 milliGRAM(s) Oral every 6 hours PRN      REVIEW OF SYSTEMS  --------------------------------------------------------------------------------  Gen: No fever  Respiratory: No dyspnea  CV: No chest pain  GI: No diarrhea, nausea, or vomiting  : No dysuria or hematuria  Skin: No rashes  MSK: No edema  Neuro: No dizziness/lightheadedness    All other systems were reviewed and are negative, except as noted.    VITALS/PHYSICAL EXAM  --------------------------------------------------------------------------------  T(C): 36.5 (05-10-25 @ 11:51), Max: 36.6 (05-10-25 @ 06:11)  HR: 62 (05-10-25 @ 13:55) (62 - 86)  BP: 125/71 (05-10-25 @ 13:55) (115/71 - 146/74)  RR: 18 (05-10-25 @ 11:51) (18 - 18)  SpO2: 96% (05-10-25 @ 11:51) (96% - 99%)  Wt(kg): --        05-09-25 @ 07:01  -  05-10-25 @ 07:00  --------------------------------------------------------  IN: 0 mL / OUT: 1245 mL / NET: -1245 mL    05-10-25 @ 07:01  -  05-10-25 @ 14:54  --------------------------------------------------------  IN: 520 mL / OUT: 1150 mL / NET: -630 mL      PHYSICAL EXAM:  Gen: NAD  Neuro: non-focal  HEENT: anicteric  Pulm: CTA B/L  CV: +S1S2  Abd: soft, non-distended, non-tender, +ileal conduit  : +B/L PCNs  Extremities: no edema  Skin: Warm  Dialysis access: LUE AVF w/ good thrill    LABS/STUDIES  --------------------------------------------------------------------------------              9.1    9.31  >-----------<  165      [05-10-25 @ 10:51]              28.9     139  |  106  |  79  ----------------------------<  158      [05-10-25 @ 10:51]  3.6   |  17  |  6.20        Ca     9.2     [05-10-25 @ 10:51]      Mg     2.6     [05-09-25 @ 10:24]      Phos  5.5     [05-09-25 @ 10:24]    TPro  7.4  /  Alb  3.0  /  TBili  0.4  /  DBili  x   /  AST  7   /  ALT  6   /  AlkPhos  91  [05-09-25 @ 10:24]      Creatinine Trend:  SCr 6.20 [05-10 @ 10:51]  SCr 6.04 [05-09 @ 10:24]  SCr 4.79 [05-08 @ 04:07]  SCr 4.77 [05-07 @ 09:40]  SCr 4.56 [05-06 @ 17:46]    PTH -- (Ca 9.5)      [05-07-25 @ 09:40]   171

## 2025-05-10 NOTE — PROGRESS NOTE ADULT - ASSESSMENT
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

## 2025-05-10 NOTE — PROGRESS NOTE ADULT - PROBLEM SELECTOR PLAN 10
Code: FULL   DVT ppx: hold due to hematuria    Diet: Regular   DIspo: likely home, pending clinical course

## 2025-05-10 NOTE — CONSULT NOTE ADULT - ATTENDING COMMENTS
s/p PCN  #lexie on ckd stage 1V  cr increased to 6s in the past 48 hours  notable relative hypotension ?atn  check u na but pt is on lasix  cont to monitor bun/cr trend  # s/p pcn  left PCN with <200 cc UO compared to R   d/w RN and pcn is flushing  would consult IR to see if pcn is dislodged  would check repeat CT scan a/p  #met acidosis  monitor bicarb trend  on sodium bicarb tabs  #vol status stable  BP improving today  hold Lasix

## 2025-05-10 NOTE — PROGRESS NOTE ADULT - PROBLEM SELECTOR PLAN 2
Patient with urostomy and ileal conduit with upside nephroureteral tubes. UA with large LE and moderate bacteria. Purulent L kidney urine observed intraoperatively. No SIRS nor urinary tract like sx.     -History of ESBL and MRSA; however current sample likely contamined due to colonized  -C/w ertapenem 500 mg IV qd (started 5/8) pending ID   -Conduit urine cx growing proteus mirabilis, strep agalactiae, Ecolic; pending abx sensivities   -Appreciate ID recommendations to c/w ertapenem while IP; and when ready for discharge cephalexin  500 mg po qd and  ciprofloxacin 250 po q12 until 5/15/2025   -S/p ceftriaxone x 1 in ED   -MRSA PCR negative Patient with urostomy and ileal conduit with upside nephroureteral tubes. UA with large LE and moderate bacteria. Purulent L kidney urine observed intraoperatively. No SIRS nor urinary tract like sx.     -History of ESBL and MRSA  -C/w ertapenem 500 mg IV qd (started 5/8)   -Conduit urine cx growing proteus mirabilis, strep agalactiae, Ecolic; abx sensivities available   -Appreciate ID recommendations to c/w ertapenem while IP; and when ready for discharge cephalexin  500 mg po qd and  ciprofloxacin 250 po qduntil 5/15/2025   -S/p ceftriaxone x 1 in ED   -MRSA PCR negative

## 2025-05-10 NOTE — PROGRESS NOTE ADULT - PROBLEM SELECTOR PLAN 7
Hypertensive.     -C/whome amlodipine 5 mg po BID   -D/c hydralazine 25 mg po TID for SBP <160 for IR proecedure

## 2025-05-10 NOTE — PROGRESS NOTE ADULT - PROBLEM SELECTOR PLAN 4
History of CKD5 without dialysis; currently has urostomy with ileal conduit. Non oligouric. Unclea rbaseline SCr. ON admission Scr 4.56, worsening Scr to 6.04 No fluid overload. Hyperphosphatemia. No urgent/emergent indications of hemodialysis. DDx: UTI vs ATN.     -Monitor BUN/Cr   -Daily weight, strict I/O  -Can consider nephrology consultation for potential hemodialysis needs History of CKD5 without dialysis; currently has urostomy with ileal conduit. Non oligouric. Unclea rbaseline SCr. ON admission Scr 4.56, worsening Scr to 6.04 No fluid overload. Hyperphosphatemia. No urgent/emergent indications of hemodialysis. DDx: UTI vs ATN vs less likely proximal obstruction     -Monitor BUN/Cr   -Daily weight, strict I/O  -Pending nephrology consult for worsening Scr

## 2025-05-10 NOTE — CONSULT NOTE ADULT - CONSULT REASON
partially dislodged left and completely dislodged right upside down nephroureteral stents
ureteral stent dislodgement
dislodged nephroureteral stents
complicated UTI
BARON on CKD

## 2025-05-10 NOTE — PROGRESS NOTE ADULT - SUBJECTIVE AND OBJECTIVE BOX
INPATIENT MEDICINE PROGRESS NOTE:   Authored by Destiny Marino MD     HISTORY OF PRESENT ILLNESS:    NAEON. AFebrile, HR 60s-80s. SBPs 100-130s. RA. R tube 600 mL and L tube 100 mL.   SEen and examined at bedside sitting up in the chair, patient reports having some pain again and wanting to walk. ASking how long he needs to be here however and also upset family is unable to visit. Describes some issues they have been having. Howver understanding.    PHYSICAL EXAM:  Vital Signs Last 24 Hrs  T(C): 36.6 (10 May 2025 06:11), Max: 36.6 (10 May 2025 06:11)  T(F): 97.9 (10 May 2025 06:11), Max: 97.9 (10 May 2025 06:11)  HR: 86 (10 May 2025 08:53) (67 - 86)  BP: 135/72 (10 May 2025 08:53) (102/71 - 135/81)  BP(mean): --  RR: 18 (10 May 2025 08:53) (18 - 18)  SpO2: 99% (10 May 2025 08:53) (96% - 99%)    Parameters below as of 10 May 2025 08:53  Patient On (Oxygen Delivery Method): room air      General: NAD, sitting upright, obese habitus   Neuro: AAOx3, 5/5  strength b/l, 5/5 hip flexion/extension b/l, spontaneity, verbal   HEENT: NC/AT, PERRLA b/l, EOMI, no supraclavicular/submanidbular lymphadenopathy   Chest: nonTTP   Heart: S1/S2, RRR   Lungs: CTA b/l, nonlabored breathing   Abd: soft, nonTTP, nondistended has urostomy bag with minimal clear urine with some transparent tissue near stoma  Ext: active/passive strengh intact, no pretibial edema   Back: nonTTP, negative CVA tenderness b/l, wound dressing over percutaneous kidney tubes nonTTP   Skin: no lesions   Psych: reactive affect, hyperverbal, linear and goal directed thought  Lines/tubes/drains: has urostomy with bag with minimal urine, L nephrostomy tube with yellow urine, R nephrostomy tube with hematuria       I&O's Summary    09 May 2025 07:01  -  10 May 2025 07:00  --------------------------------------------------------  IN: 0 mL / OUT: 1245 mL / NET: -1245 mL    10 May 2025 07:01  -  10 May 2025 09:41  --------------------------------------------------------  IN: 0 mL / OUT: 700 mL / NET: -700 mL        LABS:                        10.5   11.95 )-----------( 155      ( 09 May 2025 10:24 )             33.6     05-09    141  |  107  |  63[H]  ----------------------------<  167[H]  4.3   |  17[L]  |  6.04[H]    Ca    9.5      09 May 2025 10:24  Phos  5.5     05-09  Mg     2.6     05-09    TPro  7.4  /  Alb  3.0[L]  /  TBili  0.4  /  DBili  x   /  AST  7[L]  /  ALT  6[L]  /  AlkPhos  91  05-09    CAPILLARY BLOOD GLUCOSE              Urinalysis Basic - ( 09 May 2025 10:24 )    Color: x / Appearance: x / SG: x / pH: x  Gluc: 167 mg/dL / Ketone: x  / Bili: x / Urobili: x   Blood: x / Protein: x / Nitrite: x   Leuk Esterase: x / RBC: x / WBC x   Sq Epi: x / Non Sq Epi: x / Bacteria: x          MEDICATIONS  (STANDING):  amLODIPine   Tablet 5 milliGRAM(s) Oral <User Schedule>  chlorhexidine 2% Cloths 1 Application(s) Topical daily  clonazePAM  Tablet 1 milliGRAM(s) Oral three times a day  diphenhydrAMINE 25 milliGRAM(s) Oral at bedtime  enzalutamide 160 milliGRAM(s) Oral daily  ertapenem  IVPB 500 milliGRAM(s) IV Intermittent every 24 hours  furosemide    Tablet 40 milliGRAM(s) Oral <User Schedule>  polyethylene glycol 3350 17 Gram(s) Oral daily  senna 2 Tablet(s) Oral at bedtime  sodium bicarbonate 1300 milliGRAM(s) Oral three times a day  traZODone 300 milliGRAM(s) Oral at bedtime    MEDICATIONS  (PRN):  acetaminophen     Tablet .. 650 milliGRAM(s) Oral every 6 hours PRN Temp greater or equal to 38C (100.4F), Mild Pain (1 - 3)  HYDROmorphone  Injectable 0.25 milliGRAM(s) IV Push every 6 hours PRN Severe Pain (7 - 10)  melatonin 3 milliGRAM(s) Oral at bedtime PRN Insomnia  oxyCODONE    IR 5 milliGRAM(s) Oral every 6 hours PRN Moderate Pain (4 - 6)

## 2025-05-10 NOTE — CONSULT NOTE ADULT - ASSESSMENT
70M PMH obesity, HTN, CKD5 (LUE AVF created 11/2023 by Dr. 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 Nephrology consulted for BARON on CKD.

## 2025-05-10 NOTE — PROGRESS NOTE ADULT - PROBLEM SELECTOR PLAN 1
History of bladder cancer s/p cyctectomy with ileal conduit c/b recurrent uretero-ileal strictures requiring bilateral upside-down nephroureteral stents presenting with flank pain and Xray KUB showing dislodged right ureteral stent to the level of L5 and CT AP showing loss of previously visualize left ureteral stent. Clinically monitor.     - Appreciate urology recommendations   -IR 5/8 percutaneous nephrostomy tube placement; appreciate recommendations to flush drains with 5cc NS daily forward only; DO NOT aspira, change dressing q3 days or when dressing is saturate, and can consider capping drains when output is clear/ UTI is cleared  -F/u with Dr. Hoenig Urology when discharged for further management

## 2025-05-10 NOTE — CONSULT NOTE ADULT - PROBLEM SELECTOR RECOMMENDATION 9
Pt with BARON on advanced CKD likely due to relative hypotension. Baseline Cr 4.5-4.8, increased to 6.2 today. Pt with BARON on advanced CKD likely due to relative hypotension. Baseline Cr 4.5-4.8, increased to 6.2 today. Agree with holding hydral. Can also hold PO Lasix for now, pt appears euvolemic on exam. Would get CT A/P without contrast to evaluate PCN tube placement and following up with IR due to decreased output from left PCN Pt with BARON on advanced CKD likely due to relative hypotension. Baseline Cr 4.5-4.8, increased to 6.2 today. Agree with holding hydral. Can also hold PO Lasix for now, pt appears euvolemic on exam. Would get CT A/P without contrast to evaluate PCN tube placement and following up with IR due to decreased output from left PCN. ABx per ID for UTI. Currently, no indication for HD. Will continue to follow.

## 2025-05-11 LAB
ANION GAP SERPL CALC-SCNC: 14 MMOL/L — SIGNIFICANT CHANGE UP (ref 5–17)
BASOPHILS # BLD AUTO: 0.01 K/UL — SIGNIFICANT CHANGE UP (ref 0–0.2)
BASOPHILS NFR BLD AUTO: 0.1 % — SIGNIFICANT CHANGE UP (ref 0–2)
BUN SERPL-MCNC: 74 MG/DL — HIGH (ref 7–23)
CALCIUM SERPL-MCNC: 9.5 MG/DL — SIGNIFICANT CHANGE UP (ref 8.4–10.5)
CHLORIDE SERPL-SCNC: 108 MMOL/L — SIGNIFICANT CHANGE UP (ref 96–108)
CO2 SERPL-SCNC: 18 MMOL/L — LOW (ref 22–31)
CREAT SERPL-MCNC: 5.8 MG/DL — HIGH (ref 0.5–1.3)
EGFR: 10 ML/MIN/1.73M2 — LOW
EGFR: 10 ML/MIN/1.73M2 — LOW
EOSINOPHIL # BLD AUTO: 0.19 K/UL — SIGNIFICANT CHANGE UP (ref 0–0.5)
EOSINOPHIL NFR BLD AUTO: 2.8 % — SIGNIFICANT CHANGE UP (ref 0–6)
GLUCOSE SERPL-MCNC: 99 MG/DL — SIGNIFICANT CHANGE UP (ref 70–99)
HCT VFR BLD CALC: 32.3 % — LOW (ref 39–50)
HGB BLD-MCNC: 10.1 G/DL — LOW (ref 13–17)
IMM GRANULOCYTES NFR BLD AUTO: 1.2 % — HIGH (ref 0–0.9)
LYMPHOCYTES # BLD AUTO: 1.05 K/UL — SIGNIFICANT CHANGE UP (ref 1–3.3)
LYMPHOCYTES # BLD AUTO: 15.6 % — SIGNIFICANT CHANGE UP (ref 13–44)
MAGNESIUM SERPL-MCNC: 2.6 MG/DL — SIGNIFICANT CHANGE UP (ref 1.6–2.6)
MCHC RBC-ENTMCNC: 29 PG — SIGNIFICANT CHANGE UP (ref 27–34)
MCHC RBC-ENTMCNC: 31.3 G/DL — LOW (ref 32–36)
MCV RBC AUTO: 92.8 FL — SIGNIFICANT CHANGE UP (ref 80–100)
MONOCYTES # BLD AUTO: 0.59 K/UL — SIGNIFICANT CHANGE UP (ref 0–0.9)
MONOCYTES NFR BLD AUTO: 8.8 % — SIGNIFICANT CHANGE UP (ref 2–14)
NEUTROPHILS # BLD AUTO: 4.81 K/UL — SIGNIFICANT CHANGE UP (ref 1.8–7.4)
NEUTROPHILS NFR BLD AUTO: 71.5 % — SIGNIFICANT CHANGE UP (ref 43–77)
NRBC BLD AUTO-RTO: 0 /100 WBCS — SIGNIFICANT CHANGE UP (ref 0–0)
PHOSPHATE SERPL-MCNC: 3.6 MG/DL — SIGNIFICANT CHANGE UP (ref 2.5–4.5)
PLATELET # BLD AUTO: 194 K/UL — SIGNIFICANT CHANGE UP (ref 150–400)
POTASSIUM SERPL-MCNC: 3.9 MMOL/L — SIGNIFICANT CHANGE UP (ref 3.5–5.3)
POTASSIUM SERPL-SCNC: 3.9 MMOL/L — SIGNIFICANT CHANGE UP (ref 3.5–5.3)
RBC # BLD: 3.48 M/UL — LOW (ref 4.2–5.8)
RBC # FLD: 14.5 % — SIGNIFICANT CHANGE UP (ref 10.3–14.5)
SODIUM SERPL-SCNC: 140 MMOL/L — SIGNIFICANT CHANGE UP (ref 135–145)
WBC # BLD: 6.73 K/UL — SIGNIFICANT CHANGE UP (ref 3.8–10.5)
WBC # FLD AUTO: 6.73 K/UL — SIGNIFICANT CHANGE UP (ref 3.8–10.5)

## 2025-05-11 PROCEDURE — 74176 CT ABD & PELVIS W/O CONTRAST: CPT | Mod: 26

## 2025-05-11 PROCEDURE — G0545: CPT

## 2025-05-11 PROCEDURE — 99232 SBSQ HOSP IP/OBS MODERATE 35: CPT

## 2025-05-11 PROCEDURE — 99233 SBSQ HOSP IP/OBS HIGH 50: CPT | Mod: GC

## 2025-05-11 RX ORDER — FUROSEMIDE 10 MG/ML
40 INJECTION INTRAMUSCULAR; INTRAVENOUS
Refills: 0 | Status: DISCONTINUED | OUTPATIENT
Start: 2025-05-11 | End: 2025-05-12

## 2025-05-11 RX ORDER — BISACODYL 5 MG
10 TABLET, DELAYED RELEASE (ENTERIC COATED) ORAL ONCE
Refills: 0 | Status: COMPLETED | OUTPATIENT
Start: 2025-05-11 | End: 2025-05-11

## 2025-05-11 RX ADMIN — Medication 1300 MILLIGRAM(S): at 06:37

## 2025-05-11 RX ADMIN — Medication 1300 MILLIGRAM(S): at 13:39

## 2025-05-11 RX ADMIN — Medication 1300 MILLIGRAM(S): at 21:24

## 2025-05-11 RX ADMIN — Medication 10 MILLIGRAM(S): at 15:47

## 2025-05-11 RX ADMIN — CLONAZEPAM 1 MILLIGRAM(S): 0.5 TABLET ORAL at 06:37

## 2025-05-11 RX ADMIN — Medication 1 APPLICATION(S): at 11:44

## 2025-05-11 RX ADMIN — Medication 25 MILLIGRAM(S): at 21:24

## 2025-05-11 RX ADMIN — ENZALUTAMIDE 160 MILLIGRAM(S): 40 CAPSULE ORAL at 11:44

## 2025-05-11 RX ADMIN — AMLODIPINE BESYLATE 5 MILLIGRAM(S): 10 TABLET ORAL at 20:17

## 2025-05-11 RX ADMIN — FUROSEMIDE 40 MILLIGRAM(S): 10 INJECTION INTRAMUSCULAR; INTRAVENOUS at 12:51

## 2025-05-11 RX ADMIN — OXYCODONE HYDROCHLORIDE 5 MILLIGRAM(S): 30 TABLET ORAL at 20:46

## 2025-05-11 RX ADMIN — Medication 300 MILLIGRAM(S): at 21:24

## 2025-05-11 RX ADMIN — AMLODIPINE BESYLATE 5 MILLIGRAM(S): 10 TABLET ORAL at 08:10

## 2025-05-11 RX ADMIN — OXYCODONE HYDROCHLORIDE 5 MILLIGRAM(S): 30 TABLET ORAL at 06:37

## 2025-05-11 RX ADMIN — CLONAZEPAM 1 MILLIGRAM(S): 0.5 TABLET ORAL at 21:25

## 2025-05-11 RX ADMIN — ERTAPENEM SODIUM 100 MILLIGRAM(S): 1 INJECTION, POWDER, LYOPHILIZED, FOR SOLUTION INTRAMUSCULAR; INTRAVENOUS at 06:37

## 2025-05-11 RX ADMIN — CLONAZEPAM 1 MILLIGRAM(S): 0.5 TABLET ORAL at 13:42

## 2025-05-11 RX ADMIN — OXYCODONE HYDROCHLORIDE 5 MILLIGRAM(S): 30 TABLET ORAL at 20:16

## 2025-05-11 NOTE — DISCHARGE NOTE PROVIDER - NSDCCPTREATMENT_GEN_ALL_CORE_FT
PRINCIPAL PROCEDURE  Procedure: Insertion of nephroureteral stent  Findings and Treatment: On 5/8 placement of b/l percutaneous nenphroureteral stents with IR with L purulent urine observed and R sided hematuria.      SECONDARY PROCEDURE  Procedure: CT scan  Findings and Treatment: LOWER CHEST: Gynecomastia, similar to the prior study. Trace right   pleural effusion and bibasilar predominantly right-sided reticular   scarring/atelectasis.  LIVER: Within normal limits.  BILE DUCTS: Normal caliber.  GALLBLADDER: Within normal limits.  SPLEEN: Within normal limits.  PANCREAS: Within normal limits.  ADRENALS: Within normal limits.  KIDNEYS/URETERS: Severe bilateral hydroureteronephrosis, stable on the   right and progressed on the left. Severe cortical atrophy involving the   left kidney and probable multifocal cortical scarring right kidney.   Redemonstration of an ileal conduit. Previously seen left ureteral stent   is no longer visualized. There is evidence of a new stent within the mid   to distal right ureter stent extending into the right lower quadrant   percutaneous ureterostomy stent remains in place.  BLADDER: Prior surgical excision.  REPRODUCTIVE ORGANS: Prostatectomy. Stable postsurgical collection.  BOWEL: No bowel obstruction. Appendix is not visualized. Right lower   quadrant ileal conduit.  PERITONEUM/RETROPERITONEUM: No evidence of ascites.  VESSELS: Atherosclerotic changes.  LYMPH NODES: No lymphadenopathy.  ABDOMINAL WALL: Postsurgical changes. Right lower quadrant ileal conduit.  BONES: Sclerotic and erosive changes involving the pubic symphysis,   possibly postradiation change.       PRINCIPAL PROCEDURE  Procedure: Insertion of nephroureteral stent  Findings and Treatment: On 5/8 placement of b/l percutaneous nenphroureteral stents with IR with L purulent urine observed and R sided hematuria.      SECONDARY PROCEDURE  Procedure: CT scan  Findings and Treatment: LOWER CHEST: Gynecomastia, similar to the prior study. Trace right   pleural effusion and bibasilar predominantly right-sided reticular   scarring/atelectasis.  LIVER: Within normal limits.  BILE DUCTS: Normal caliber.  GALLBLADDER: Within normal limits.  SPLEEN: Within normal limits.  PANCREAS: Within normal limits.  ADRENALS: Within normal limits.  KIDNEYS/URETERS: Severe bilateral hydroureteronephrosis, stable on the   right and progressed on the left. Severe cortical atrophy involving the   left kidney and probable multifocal cortical scarring right kidney.   Redemonstration of an ileal conduit. Previously seen left ureteral stent   is no longer visualized. There is evidence of a new stent within the mid   to distal right ureter stent extending into the right lower quadrant   percutaneous ureterostomy stent remains in place.  BLADDER: Prior surgical excision.  REPRODUCTIVE ORGANS: Prostatectomy. Stable postsurgical collection.  BOWEL: No bowel obstruction. Appendix is not visualized. Right lower   quadrant ileal conduit.  PERITONEUM/RETROPERITONEUM: No evidence of ascites.  VESSELS: Atherosclerotic changes.  LYMPH NODES: No lymphadenopathy.  ABDOMINAL WALL: Postsurgical changes. Right lower quadrant ileal conduit.  BONES: Sclerotic and erosive changes involving the pubic symphysis,   possibly postradiation change.      Procedure: CT scan of abdomen and pelvis  Findings and Treatment:   ACC: [id number] EXAM:  CT ABDOMEN AND PELVIS   ORDERED BY:  KYLAH ALTAMIRANO   PROCEDURE DATE:  05/11/2025    INTERPRETATION:  CLINICAL INFORMATION: Acute kidney injury. Status post   cystectomy with ileal conduit.  COMPARISON: CT abdomen pelvis5/6/2025  IMPRESSION:  1. No significant change in 4.1 x 3.8 cm fluid collection at the   prostatectomy site. Sclerotic and erosive changes at the pubic symphysis   are again noted. Underlying infection at these sites is possible.  2. Status post bilateral percutaneous nephroureteral stents with tips   terminating in the distal ureters/ileal conduit 7.5 cm proximal to the   right lower abdominal wall. Complete/near complete resolution of   hydronephrosis.  3. Heavy stool burden. Correlate for constipation. Mild rectal wall   thickening suggestive of proctitis. Correlate clinically.  4. Trace anterior pericardial effusion.       PRINCIPAL PROCEDURE  Procedure: Insertion of nephroureteral stent  Findings and Treatment: On 5/8 placement of with IR with L purulent urine observed and R sided hematuria.  You had a bilateral percutaneous nephroureteral drains placed by Dr. Garcia on 5/8  in [facility] ([facility]).   Monitor site for any sign or symptoms of infection (painful red skin, green/ yellow foul smelling discharge from the insertion site, fever, chills, leakage around drain site).   Flush drains with 5mL NS daily. If you meet resistance upon flushing, STOP and contact IR.   Empty drainage bag or bulb daily and record output. Once output is <10mL in a 24hr period or if there is a sudden decrease in output please contact IR to schedule  an appointment.  Drain care:  -Disconnect tubing (tube attached to bag/ bulb)  from the catheter (catheter going into skin)  -Clean catheter with alcohol swab  -Twist on the flush syringe to the catheter (be sure to push the air out of syringe)  -Flush catheter with 5mL (DO NOT pull back on syringe). If you meet resistance upon flushing, STOP and contact IR.   -Disconnect syringe from catheter and reconnect drainage bag or bulb.  Dressing care:  -Wash hands thoroughly with water and soap for at least 20 seconds.   -take off old dressing and clean around drain site with gauze soaked with warm water  -After cleaning the site, dry and replace dressing with a new gauze and tegaderm.   -Place one piece of gauze underneath the drain and another piece of gauze on top of drain.   -Apply tegaderm (clear dressing) on top.  -Change dressing every 3 days or when soiled      SECONDARY PROCEDURE  Procedure: CT scan  Findings and Treatment: LOWER CHEST: Gynecomastia, similar to the prior study. Trace right   pleural effusion and bibasilar predominantly right-sided reticular   scarring/atelectasis.  LIVER: Within normal limits.  BILE DUCTS: Normal caliber.  GALLBLADDER: Within normal limits.  SPLEEN: Within normal limits.  PANCREAS: Within normal limits.  ADRENALS: Within normal limits.  KIDNEYS/URETERS: Severe bilateral hydroureteronephrosis, stable on the   right and progressed on the left. Severe cortical atrophy involving the   left kidney and probable multifocal cortical scarring right kidney.   Redemonstration of an ileal conduit. Previously seen left ureteral stent   is no longer visualized. There is evidence of a new stent within the mid   to distal right ureter stent extending into the right lower quadrant   percutaneous ureterostomy stent remains in place.  BLADDER: Prior surgical excision.  REPRODUCTIVE ORGANS: Prostatectomy. Stable postsurgical collection.  BOWEL: No bowel obstruction. Appendix is not visualized. Right lower   quadrant ileal conduit.  PERITONEUM/RETROPERITONEUM: No evidence of ascites.  VESSELS: Atherosclerotic changes.  LYMPH NODES: No lymphadenopathy.  ABDOMINAL WALL: Postsurgical changes. Right lower quadrant ileal conduit.  BONES: Sclerotic and erosive changes involving the pubic symphysis,   possibly postradiation change.      Procedure: CT scan of abdomen and pelvis  Findings and Treatment:   ACC: [id number] EXAM:  CT ABDOMEN AND PELVIS   ORDERED BY:  KYLAH ALTAMIRANO   PROCEDURE DATE:  05/11/2025    INTERPRETATION:  CLINICAL INFORMATION: Acute kidney injury. Status post   cystectomy with ileal conduit.  COMPARISON: CT abdomen pelvis5/6/2025  IMPRESSION:  1. No significant change in 4.1 x 3.8 cm fluid collection at the   prostatectomy site. Sclerotic and erosive changes at the pubic symphysis   are again noted. Underlying infection at these sites is possible.  2. Status post bilateral percutaneous nephroureteral stents with tips   terminating in the distal ureters/ileal conduit 7.5 cm proximal to the   right lower abdominal wall. Complete/near complete resolution of   hydronephrosis.  3. Heavy stool burden. Correlate for constipation. Mild rectal wall   thickening suggestive of proctitis. Correlate clinically.  4. Trace anterior pericardial effusion.

## 2025-05-11 NOTE — DISCHARGE NOTE PROVIDER - HOSPITAL COURSE
HPI:  69 y/o M with PMH HTN, depression, anxiety, CKD 5, prostate cancer s/p radical prostatectomy (on Xtandi) and bladder cancer s/p cyctectomy with ileal conduit c/b recurrent uretero-ileal strictures requiring bilateral upside-down nephroureteral stents presenting to [facility] ED after being evaluated at [facility] and found to have dislodged right nephroureteral stent and malpositioned left nephroureteral stent. He was given morphine at [facility] with improvement in his pain.  Patient was seen by his outpatient urologist on 4/16 and his stents were replaced at that time. On Saturday 5/6, patient began experiencing abdominal pain and left sided flank pain and believes that this is when one of his stents were accidently dislodged. No trauma at the time. He denies chest pain, SOB, fever, chills, night sweats, nausea, vomiting, hematuria,  dysuria, weight loss, recent travel, or sick contacts.     In the ED, VSS, labs notable for Hgb 10.7, Cr 4.56. UA large amount leukocyte esterase, moderate bacteria, negative nitrites. CT abdomen and pelvis remarkable for previously seen left ureteral stent no longer identified. There has been interval progression of left-sided hydronephrosis. There is a new right ureteral stent and stable right-sided hydronephrosis.Redemonstration of ileal conduit. Xray KUB showing dislodged right ureteral stent to the level of L5. Patient was give 1g IV ceftriaxone; evaluated by urology and interventional radiology teams and admitted to medicine for further management.        (06 May 2025 19:37)    Hospital Course:  On arrival to the floor, patient remained with intermittent back pain that hydromorphone helped. Patient went to IR to get percutaneous nephroureteral tubes bilaterally and was found to have intraoperative purulent R urine and hematuria on L side. Tubes remained with good drainage. ID was consulted and continued with ertapenem while inpatient with po abx for discharge for total abx until 5/15. Urine culture grew ecoli, proteus mirabilis, strep agalatiae. BUN an Cr kept increasing and nephrology was consulted. CTAP was obtained to assess kidneys and showed... SCr on admission was 4.56 and although increased to 6, continued to... and did not need hemodialysis.     Important Medication Changes and Reason:  DOWNTITRATED home trazodone 600 mg to 300 mg po qhs for insomnia   STARTED ciprofloxacin 250 mg po qd until 5/15/2025 for UTI   STARTED cephalexin 500 mg po qd until 5/15/2025 for UTI     Active or Pending Issues Requiring Follow-up:  1) CKD- repeat BMP in 1-2 wks to assess BUN/Cr to establish new baseline/downtrending nature   2) percutaneous nephroureteral stents- f/u with IR and urology to determine their management     Advanced Directives:   [X] Full code  [ ] DNR  [ ] Hospice    Discharge Diagnoses:  Pyelonephritis 2/2 polymicrobial ecoli, strep agalactaiae, and proteus mirabilis iso ileal conduit and previous L sided partially dislogded nephrouretal stent       HPI:  71 y/o M with PMH HTN, depression, anxiety, CKD 5, prostate cancer s/p radical prostatectomy (on Xtandi) and bladder cancer s/p cyctectomy with ileal conduit c/b recurrent uretero-ileal strictures requiring bilateral upside-down nephroureteral stents presenting to [facility] ED after being evaluated at [facility] and found to have dislodged right nephroureteral stent and malpositioned left nephroureteral stent. He was given morphine at [place] with improvement in his pain.  Patient was seen by his outpatient urologist on 4/16 and his stents were replaced at that time. On Saturday 5/6, patient began experiencing abdominal pain and left sided flank pain and believes that this is when one of his stents were accidently dislodged. No trauma at the time. He denies chest pain, SOB, fever, chills, night sweats, nausea, vomiting, hematuria,  dysuria, weight loss, recent travel, or sick contacts.     In the ED, VSS, labs notable for Hgb 10.7, Cr 4.56. UA large amount leukocyte esterase, moderate bacteria, negative nitrites. CT abdomen and pelvis remarkable for previously seen left ureteral stent no longer identified. There has been interval progression of left-sided hydronephrosis. There is a new right ureteral stent and stable right-sided hydronephrosis.Redemonstration of ileal conduit. Xray KUB showing dislodged right ureteral stent to the level of L5. Patient was give 1g IV ceftriaxone; evaluated by urology and interventional radiology teams and admitted to medicine for further management.        (06 May 2025 19:37)    Hospital Course:  On arrival to the floor, patient remained with intermittent back pain that hydromorphone helped. Patient went to IR to get percutaneous nephroureteral tubes bilaterally and was found to have intraoperative purulent R urine and hematuria on L side. Tubes remained with good drainage. ID was consulted and continued with ertapenem while inpatient with po abx for discharge for total abx until 5/15. Urine culture grew ecoli, proteus mirabilis, strep agalatiae. BUN and Cr kept increasing and nephrology was consulted. CTAP was obtained to assess kidneys and showed resolution of his hydronephrosis. SCr on admission was 4.56 and although increased to 6, however after treatment of his UTI and PCNs the sCr started to downtrend and he did not need hemodialysis.     Important Medication Changes and Reason:  DOWNTITRATED home trazodone 600 mg to 300 mg po qhs for insomnia   STARTED Furosemide 40m mg po qd Sunday/Tuesday/Thursday   STARTED ciprofloxacin 250 mg po qd until 5/15/2025 for UTI   STARTED cephalexin 500 mg po qd until 5/15/2025 for UTI     Active or Pending Issues Requiring Follow-up:  1) CKD- repeat BMP in 1-2 wks to assess BUN/Cr to establish new baseline/downtrending nature   2) percutaneous nephroureteral stents- f/u with IR and urology to determine their management     Advanced Directives:   [X] Full code  [ ] DNR  [ ] Hospice    Discharge Diagnoses:  Pyelonephritis 2/2 polymicrobial ecoli, strep agalactaiae, and proteus mirabilis iso ileal conduit and previous L sided partially dislogded nephrouretal stent  Acute Kidney Injury      HPI:  71 y/o M with PMH HTN, depression, anxiety, CKD 5, prostate cancer s/p radical prostatectomy (on Xtandi) and bladder cancer s/p cyctectomy with ileal conduit c/b recurrent uretero-ileal strictures requiring bilateral upside-down nephroureteral stents presenting to [facility] ED after being evaluated at [facility] and found to have dislodged right nephroureteral stent and malpositioned left nephroureteral stent. He was given morphine at [place] with improvement in his pain.  Patient was seen by his outpatient urologist on 4/16 and his stents were replaced at that time. On Saturday 5/6, patient began experiencing abdominal pain and left sided flank pain and believes that this is when one of his stents were accidently dislodged. No trauma at the time. He denies chest pain, SOB, fever, chills, night sweats, nausea, vomiting, hematuria,  dysuria, weight loss, recent travel, or sick contacts.     In the ED, VSS, labs notable for Hgb 10.7, Cr 4.56. UA large amount leukocyte esterase, moderate bacteria, negative nitrites. CT abdomen and pelvis remarkable for previously seen left ureteral stent no longer identified. There has been interval progression of left-sided hydronephrosis. There is a new right ureteral stent and stable right-sided hydronephrosis. Redemonstration of ileal conduit. Xray KUB showing dislodged right ureteral stent to the level of L5. Patient was give 1g IV ceftriaxone; evaluated by urology and interventional radiology teams and admitted to medicine for further management.        (06 May 2025 19:37)    Hospital Course:  On arrival to the floor, patient remained with intermittent back pain that hydromorphone helped. Patient went to IR to get percutaneous nephroureteral tubes bilaterally and was found to have intraoperative purulent R urine and hematuria on L side. Tubes remained with good drainage. ID was consulted and continued with ertapenem while inpatient with po abx for discharge for total abx until 5/15. Urine culture grew ecoli, proteus mirabilis, strep agalatiae. BUN and Cr kept increasing and nephrology was consulted. CTAP was obtained to assess kidneys and showed resolution of his hydronephrosis. SCr on admission was 4.56 and although increased to 6, however after treatment of his UTI and PCNs the sCr started to downtrend and he did not need hemodialysis.     Important Medication Changes and Reason:  DOWNTITRATED home trazodone 600 mg to 300 mg po qhs for insomnia   STARTED Furosemide 40m mg po qd Sunday/Tuesday/Thursday   STARTED ciprofloxacin 250 mg po qd until 5/15/2025 for UTI   STARTED cephalexin 500 mg po qd until 5/15/2025 for UTI     Active or Pending Issues Requiring Follow-up:  1) CKD- repeat BMP in 1-2 wks to assess BUN/Cr to establish new baseline/downtrending nature   2) percutaneous nephroureteral stents- f/u with IR and urology to determine their management     Advanced Directives:   [X] Full code  [ ] DNR  [ ] Hospice    Discharge Diagnoses:  Pyelonephritis 2/2 polymicrobial ecoli, strep agalactaiae, and proteus mirabilis iso ileal conduit and previous L sided partially dislogded nephrouretal stent  Acute Kidney Injury

## 2025-05-11 NOTE — PROGRESS NOTE ADULT - SUBJECTIVE AND OBJECTIVE BOX
INPATIENT MEDICINE PROGRESS NOTE:   Authored by Destiny Marino MD     HISTORY OF PRESENT ILLNESS:    NAEON. AFebrile, HR 60-80s. -140s. RA> R tube 2000 mL hematuria. L tube 300 mL clear yellow urine. Urostomy bag that got undone 95 mL.     Seen and examined at bedside, patient still asking if he has an infection. Denies pain. Reports having been walking and eating. Unsure why hospital doesn't have same bag he has at home for urostomy care.     PHYSICAL EXAM:  Vital Signs Last 24 Hrs  T(C): 37 (11 May 2025 08:03), Max: 37 (11 May 2025 08:03)  T(F): 98.6 (11 May 2025 08:03), Max: 98.6 (11 May 2025 08:03)  HR: 76 (11 May 2025 08:03) (62 - 86)  BP: 178/95 (11 May 2025 08:03) (125/71 - 178/95)  BP(mean): --  RR: 19 (11 May 2025 08:03) (18 - 19)  SpO2: 96% (11 May 2025 08:03) (96% - 99%)    Parameters below as of 11 May 2025 08:03  Patient On (Oxygen Delivery Method): room air        General: NAD, sitting upright, obese habitus   Neuro: AAOx3, 5/5  strength b/l, 5/5 hip flexion/extension b/l, spontaneity, verbal   HEENT: NC/AT, PERRLA b/l, EOMI, no supraclavicular/submanidbular lymphadenopathy   Chest: nonTTP   Heart: S1/S2, RRR   Lungs: CTA b/l, nonlabored breathing   Abd: soft, nonTTP, nondistended has urostomy bag disconnected from stoma   Ext: active/passive strengh intact, no pretibial edema   Back: nonTTP, negative CVA tenderness b/l, wound dressing over percutaneous kidney tubes nonTTP   Skin: no lesions   Psych: reactive affect, hyperverbal, linear and goal directed thought  Lines/tubes/drains: has urostomy with bag with minimal urine,R nephrostomy tube with hematuria , L nephrostomy tube with hematuria       I&O's Summary    10 May 2025 07:01  -  11 May 2025 07:00  --------------------------------------------------------  IN: 570 mL / OUT: 2395 mL / NET: -1825 mL    11 May 2025 07:01  -  11 May 2025 08:29  --------------------------------------------------------  IN: 0 mL / OUT: 500 mL / NET: -500 mL        LABS:                        9.1    9.31  )-----------( 165      ( 10 May 2025 10:51 )             28.9     05-10    139  |  106  |  79[H]  ----------------------------<  158[H]  3.6   |  17[L]  |  6.20[H]    Ca    9.2      10 May 2025 10:51  Phos  5.5     05-09  Mg     2.6     05-09    TPro  7.4  /  Alb  3.0[L]  /  TBili  0.4  /  DBili  x   /  AST  7[L]  /  ALT  6[L]  /  AlkPhos  91  05-09    CAPILLARY BLOOD GLUCOSE              Urinalysis Basic - ( 10 May 2025 10:51 )    Color: x / Appearance: x / SG: x / pH: x  Gluc: 158 mg/dL / Ketone: x  / Bili: x / Urobili: x   Blood: x / Protein: x / Nitrite: x   Leuk Esterase: x / RBC: x / WBC x   Sq Epi: x / Non Sq Epi: x / Bacteria: x          MEDICATIONS  (STANDING):  amLODIPine   Tablet 5 milliGRAM(s) Oral <User Schedule>  chlorhexidine 2% Cloths 1 Application(s) Topical daily  clonazePAM  Tablet 1 milliGRAM(s) Oral three times a day  diphenhydrAMINE 25 milliGRAM(s) Oral at bedtime  enzalutamide 160 milliGRAM(s) Oral daily  ertapenem  IVPB 500 milliGRAM(s) IV Intermittent every 24 hours  polyethylene glycol 3350 17 Gram(s) Oral daily  senna 2 Tablet(s) Oral at bedtime  sodium bicarbonate 1300 milliGRAM(s) Oral three times a day  traZODone 300 milliGRAM(s) Oral at bedtime    MEDICATIONS  (PRN):  acetaminophen     Tablet .. 650 milliGRAM(s) Oral every 6 hours PRN Temp greater or equal to 38C (100.4F), Mild Pain (1 - 3)  HYDROmorphone  Injectable 0.25 milliGRAM(s) IV Push every 6 hours PRN Severe Pain (7 - 10)  melatonin 3 milliGRAM(s) Oral at bedtime PRN Insomnia  oxyCODONE    IR 5 milliGRAM(s) Oral every 6 hours PRN Moderate Pain (4 - 6)

## 2025-05-11 NOTE — DISCHARGE NOTE PROVIDER - CARE PROVIDER_API CALL
Jenifer Lilly  [facility] Medicine  [address] [facility], [address] [address]  Phone: [phone]  Fax: [phone]  Established Patient  Follow Up Time:    Jenifer Lilly  Family Medicine  [address] [facility], [address]  [place] [address]  Phone: [phone]  Fax: [phone]  Established Patient  Follow Up Time:     Vidhi Kemp  Nephrology  [address] [place] [address], [address]  [place] [address]  Phone: [phone]  Fax: [phone]  Established Patient  Follow Up Time: 1 week   Jenifer Lilly  [facility] Medicine  [address] [facility], [address]  [place] [address]  Phone: [phone]  Fax: [phone]  Established Patient  Follow Up Time:     Vidhi Kemp  Nephrology  [address] [place] [address], [address]  [place] [address]  Phone: [phone]  Fax: [phone]  Established Patient  Follow Up Time: 1 week    Hoenig, David Mayer  Urology  [address] [place] [address]- [place] [address]  Phone: [phone]  Fax: [phone]  Follow Up Time:

## 2025-05-11 NOTE — PROGRESS NOTE ADULT - PROBLEM SELECTOR PLAN 1
Pt with BARON on advanced CKD likely due to relative hypotension. Baseline Cr 4.5-4.8, increased to 6.2 (5/10). Awaiting labs for today. Held hydral and PO Lasix. SBP now 150-170s, and pt has some LE edema. Can increase Amlodipine to 10mg and restart home Lasix dose and see if he tolerates. Awaiting CT A/P without contrast to evaluate PCN tube placement and following up with IR due to decreased output from left PCN. ABx per ID for UTI. Urology f/u for leaking urostomy site. Currently, no indication for HD. Will continue to follow. Pt with BARON on advanced CKD likely due to relative hypotension. Baseline Cr 4.5-4.8, increased to 6.2 (5/10). Awaiting labs for today. Held hydral and PO Lasix. SBP now 150-170s, and pt has some LE edema. restart home Lasix dose and see if he tolerates. Awaiting CT A/P without contrast to evaluate PCN tube placement and following up with IR due to decreased output from left PCN. ABx per ID for UTI. Urology f/u for leaking urostomy site. Currently, no indication for HD. Will continue to follow.

## 2025-05-11 NOTE — PROGRESS NOTE ADULT - SUBJECTIVE AND OBJECTIVE BOX
Follow Up:      Inverval History/ROS:Patient is a 70y old  Male who presents with a chief complaint of Displaced nephroureteral tubes (10 May 2025 14:54)    No fever    Allergies    No Known Allergies    Intolerances        ANTIMICROBIALS:  ertapenem  IVPB 500 every 24 hours      OTHER MEDS:  acetaminophen     Tablet .. 650 milliGRAM(s) Oral every 6 hours PRN  amLODIPine   Tablet 5 milliGRAM(s) Oral <User Schedule>  chlorhexidine 2% Cloths 1 Application(s) Topical daily  clonazePAM  Tablet 1 milliGRAM(s) Oral three times a day  diphenhydrAMINE 25 milliGRAM(s) Oral at bedtime  enzalutamide 160 milliGRAM(s) Oral daily  HYDROmorphone  Injectable 0.25 milliGRAM(s) IV Push every 6 hours PRN  melatonin 3 milliGRAM(s) Oral at bedtime PRN  oxyCODONE    IR 5 milliGRAM(s) Oral every 6 hours PRN  polyethylene glycol 3350 17 Gram(s) Oral daily  senna 2 Tablet(s) Oral at bedtime  sodium bicarbonate 1300 milliGRAM(s) Oral three times a day  traZODone 300 milliGRAM(s) Oral at bedtime      Vital Signs Last 24 Hrs  T(C): 36.5 (10 May 2025 11:51), Max: 36.5 (10 May 2025 11:51)  T(F): 97.7 (10 May 2025 11:51), Max: 97.7 (10 May 2025 11:51)  HR: 62 (10 May 2025 13:55) (62 - 86)  BP: 125/71 (10 May 2025 13:55) (125/71 - 146/74)  BP(mean): --  RR: 18 (10 May 2025 11:51) (18 - 18)  SpO2: 96% (10 May 2025 11:51) (96% - 99%)    Parameters below as of 10 May 2025 11:51  Patient On (Oxygen Delivery Method): room air        PHYSICAL EXAM:  General: [ ] non-toxic  HEAD/EYES: [ ] PERRL [ ] white sclera [ ] icterus  ENT:  [ ] normal [ ] supple [ ] thrush [ ] pharyngeal exudate  Cardiovascular:   [ ] murmur [ ] normal [ ] PPM/AICD  Respiratory:  [ ] clear to ausculation bilaterally  GI:  [ ] soft, non-tender, normal bowel sounds  :  [ ] carreno [ ] no CVA tenderness   Musculoskeletal:  [ ] no synovitis  Neurologic:  [ ] non-focal exam   Skin:  [ ] no rash  Lymph: [ ] no lymphadenopathy  Psychiatric:  [ ] appropriate affect [ ] alert & oriented  Lines:  [ ] no phlebitis [ ] central line                                9.1    9.31  )-----------( 165      ( 10 May 2025 10:51 )             28.9       05-10    139  |  106  |  79[H]  ----------------------------<  158[H]  3.6   |  17[L]  |  6.20[H]    Ca    9.2      10 May 2025 10:51  Phos  5.5     05-09  Mg     2.6     05-09    TPro  7.4  /  Alb  3.0[L]  /  TBili  0.4  /  DBili  x   /  AST  7[L]  /  ALT  6[L]  /  AlkPhos  91  05-09      Urinalysis Basic - ( 10 May 2025 10:51 )    Color: x / Appearance: x / SG: x / pH: x  Gluc: 158 mg/dL / Ketone: x  / Bili: x / Urobili: x   Blood: x / Protein: x / Nitrite: x   Leuk Esterase: x / RBC: x / WBC x   Sq Epi: x / Non Sq Epi: x / Bacteria: x        MICROBIOLOGY:Culture Results:   >100,000 CFU/ml Proteus mirabilis  50,000 - 99,000 CFU/mL Escherichia coli  >100,000 CFU/ml Streptococcus agalactiae (Group B)  Group B streptococci are susceptible to ampicillin,  penicillin and cefazolin, but may be resistant to  erythromycin andclindamycin. (05-06-25 @ 13:54)      RADIOLOGY:

## 2025-05-11 NOTE — DISCHARGE NOTE PROVIDER - PROVIDER TOKENS
PROVIDER:[TOKEN:[[id number]],ESTABLISHEDPATIENT:[T]] PROVIDER:[TOKEN:[[id number]],ESTABLISHEDPATIENT:[T]],PROVIDER:[TOKEN:[[id number]],FOLLOWUP:[1 week],ESTABLISHEDPATIENT:[T]] PROVIDER:[TOKEN:[[id number]],ESTABLISHEDPATIENT:[T]],PROVIDER:[TOKEN:[[id number]],FOLLOWUP:[1 week],ESTABLISHEDPATIENT:[T]],PROVIDER:[TOKEN:[[id number]]]

## 2025-05-11 NOTE — DISCHARGE NOTE PROVIDER - NSDCFUADDAPPT_GEN_ALL_CORE_FT
APPTS ARE READY TO BE MADE: [X] YES    Best Family or Patient Contact (if needed):    Additional Information about above appointments (if needed):    1: Please see your PCP within 1-2 wks of discharge to discuss recent hospitalization.  2: Please see the urologist at earlier appointment than scheduled to discuss most recent hospitalization and management of percutaneous nephroureteral stents.   3:     Other comments or requests:    APPTS ARE READY TO BE MADE: [X] YES    Best Family or Patient Contact (if needed):    Additional Information about above appointments (if needed):    1: Please see your PCP within 1-2 wks of discharge to discuss recent hospitalization.  2: Please see the urologist at earlier appointment than scheduled to discuss most recent hospitalization and management of percutaneous nephroureteral stents/percutaneous nephrostomy tubes.  3: Please see your nephrologist, Dr. Vidhi Luna, June 9th at 10:40AM    Other comments or requests:    APPTS ARE READY TO BE MADE: [X] YES    Best Family or Patient Contact (if needed):    Additional Information about above appointments (if needed):    1: Please see your PCP within 1-2 wks of discharge to discuss recent hospitalization.  2: Please see the urologist at earlier appointment than scheduled to discuss most recent hospitalization and management of percutaneous nephroureteral stents/percutaneous nephrostomy tubes.  3: Please see your nephrologist, Dr. Vidhi Luna, June 9th at 10:40AM    Please follow up with Interventional radiology (IR) in 3 months for routine evaluation of your nephroureteral drains, unless instructed by urology to be evaluated sooner. Please call IR booking office at [phone] to schedule. Please feel free to contact us at [phone] if any problems arise. After 6PM, Monday through Friday, on weekends and on holidays, please call [phone] and ask for the radiology resident on call to be paged.     Other comments or requests:    APPTS ARE READY TO BE MADE: [X] YES    Best Family or Patient Contact (if needed):    Additional Information about above appointments (if needed):    1: Please see your PCP within 1-2 wks of discharge to discuss recent hospitalization.  2: Please see the urologist at earlier appointment than scheduled to discuss most recent hospitalization and management of percutaneous nephroureteral stents/percutaneous nephrostomy tubes.  3: Please see your nephrologist, Dr. Vidhi Luna, June 9th at 10:40AM    Please follow up with Interventional radiology (IR) in 3 months for routine evaluation of your nephroureteral drains, unless instructed by urology to be evaluated sooner. Please call IR booking office at [phone] to schedule. Please feel free to contact us at [phone] if any problems arise. After 6PM, Monday through Friday, on weekends and on holidays, please call [phone] and ask for the radiology resident on call to be paged.     Other comments or requests:   Prior to outreaching the patient, it was visible that the patient has secured a follow up appointment which was not scheduled by our team. Patient was previously scheduled for an appointment on 05/28/25 at [address] [place] [address].    Prior to outreaching the patient, it was visible that the patient has secured a follow up appointment which was not scheduled by our team. Patient was previously scheduled for an appointment on 06/09/25 at [address] [place] [address] with Dr. Vidhi Kemp.    Patient was outreached but did not answer. A voicemail was left for the patient to return our call.

## 2025-05-11 NOTE — CHART NOTE - NSCHARTNOTEFT_GEN_A_CORE
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 No significant change in 4.1 x 3.8 cm fluid collection at the   prostatectomy site. Sclerotic and erosive changes at the pubic symphysis   are again noted. Underlying infection at these sites is possible.  2. Status post bilateral percutaneous nephroureteral stents with tips   terminating in the distal ureters/ileal conduit 7.5 cm proximal to the   right lower abdominal wall. Complete/near complete resolution of   hydronephrosis.  3. Heavy stool burden. Correlate for constipation. Mild rectal wall   thickening suggestive of proctitis. Correlate clinically.  4. Trace anterior pericardial effusion.    Recs:  - BL PCNU in place with resolution of hydronephrosis; left PCNU putting out less likely 2/2 to left renal atrophy compared to right   - No intervention for fluid collection in postprostatectomy bed, not rim enhancing and stable  - Hydration as tolerated  - Trend creatinine - fu nephrology recs, creatinine slowly downtrending   - ABx for proteus in the urine   - Outpatient follow up with Dr. Hoenig on discharge    Discussed with Dr. Hoenig  [facility]  [address] [place] [address], [place] [id number]  [place] [address] [phone]

## 2025-05-11 NOTE — PROGRESS NOTE ADULT - PROBLEM SELECTOR PLAN 4
History of CKD5 without dialysis; currently has urostomy with ileal conduit. Non oligouric. Unclea rbaseline SCr. ON admission Scr 4.56, worsening Scr most recently 6.04 No fluid overload. Hyperphosphatemia. No urgent/emergent indications of hemodialysis. DDx: UTI vs ATN vs less likely proximal obstruction     -Monitor BUN/Cr   -Daily weight, strict I/O  -Appreciate nephrology recommendations   -Pending CTAP to assess L kidney   -Per IR, can consider tube study if concern for blockage

## 2025-05-11 NOTE — CONSULT NOTE ADULT - SUBJECTIVE AND OBJECTIVE BOX
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    CT Previously seen left ureteral stent is no longer identified. There has been interval progression of left-sided hydronephrosis. There is a new right ureteral stent and stable right-sided hydronephrosis.  US- IMPRESSION:  Dislodged right ureteral stent to the level of L5.    IR consulted for left upside down nephroureteral tube placement and right upside down nephroureteral tube exchange.      Allergies: No Known Allergies    Medications (Abx/Cardiac/Anticoagulation/Blood Products)    amLODIPine   Tablet: 5 milliGRAM(s) Oral (05-07 @ 08:27)  amLODIPine   Tablet: 5 milliGRAM(s) Oral (05-06 @ 23:16)  heparin   Injectable: 5000 Unit(s) SubCutaneous (05-07 @ 08:28)    Data:  177.8  108.9  T(C): 36.7  HR: 66  BP: 182/93  RR: 18  SpO2: 98%    -WBC 8.22 / HgB 10.6 / Hct 33.0 / Plt 167  -Na 140 / Cl 107 / BUN 48 / Glucose 142  -K 4.4 / CO2 19 / Cr 4.77  -ALT 7 / Alk Phos 105 / T.Bili 0.4  -INR 0.92 / PTT 32.1    Radiology: reviewed    Assessment/Plan:   69 yo M with complicated urologic history, s/p RALP for prostate cancer with adjuvant radiation complicated by bladder neck contracture s/p multiple procedures eventually ended up managing with SP Tube, subsequently ended up developing 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    CT Previously seen left ureteral stent is no longer identified. There has been interval progression of left-sided hydronephrosis. There is a new right ureteral stent and stable right-sided hydronephrosis.  US- IMPRESSION:  Dislodged right ureteral stent to the level of L5.    IR consulted for left upside down nephroureteral tube placement and right upside down nephroureteral tube exchange.    - Discussed with urology and plan will be to abandoned internal stents and transition to BL PCNs.   - case reviewed with MD Tirado and approved for tomorrow 5/8  - please place IR procedure for "removal of right upside ureteral stent and BL perc nephrostomy tube placement" order under MD Tirado  - STAT labs in AM (cbc,coags, bmp, maintain active T&S)  - For high risk bleeding tejal-procedural please hold Heparin SQ  for 8-12 hours pre-procedure (HOLD AM DOSE DAY OF PROCEDURE) with tentative resumption 12-24 hours post-procedure if no concern for bleeding.  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    CT Previously seen left ureteral stent is no longer identified. There has been interval progression of left-sided hydronephrosis. There is a new right ureteral stent and stable right-sided hydronephrosis.  US- IMPRESSION:  Dislodged right ureteral stent to the level of L5.    IR consulted for left upside down nephroureteral tube placement and right upside down nephroureteral tube exchange.      Allergies: No Known Allergies    Medications (Abx/Cardiac/Anticoagulation/Blood Products)    amLODIPine   Tablet: 5 milliGRAM(s) Oral (05-07 @ 08:27)  amLODIPine   Tablet: 5 milliGRAM(s) Oral (05-06 @ 23:16)  heparin   Injectable: 5000 Unit(s) SubCutaneous (05-07 @ 08:28)    Data:  177.8  108.9  T(C): 36.7  HR: 66  BP: 182/93  RR: 18  SpO2: 98%    -WBC 8.22 / HgB 10.6 / Hct 33.0 / Plt 167  -Na 140 / Cl 107 / BUN 48 / Glucose 142  -K 4.4 / CO2 19 / Cr 4.77  -ALT 7 / Alk Phos 105 / T.Bili 0.4  -INR 0.92 / PTT 32.1    Radiology: reviewed    Assessment/Plan:   71 yo M with complicated urologic history, s/p RALP for prostate cancer with adjuvant radiation complicated by bladder neck contracture s/p multiple procedures eventually ended up managing with SP Tube, subsequently ended up developing 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    CT Previously seen left ureteral stent is no longer identified. There has been interval progression of left-sided hydronephrosis. There is a new right ureteral stent and stable right-sided hydronephrosis.  US- IMPRESSION:  Dislodged right ureteral stent to the level of L5.    IR consulted for left upside down nephroureteral tube placement and right upside down nephroureteral tube exchange.    - Discussed with urology and plan will be to abandoned internal stents and transition to BL PCNUs.   - case reviewed with MD Tirado and approved for tomorrow 5/8  - please place IR procedure for "removal of right upside ureteral stent and BL perc nephroureteral tube placement" order under MD Tirado  - STAT labs in AM (cbc,coags, bmp, maintain active T&S)  - For high risk bleeding tejal-procedural please hold Heparin SQ  for 8-12 hours pre-procedure (HOLD AM DOSE DAY OF PROCEDURE) with tentative resumption 12-24 hours post-procedure if no concern for bleeding.  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    CT Previously seen left ureteral stent is no longer identified. There has been interval progression of left-sided hydronephrosis. There is a new right ureteral stent and stable right-sided hydronephrosis.  US- IMPRESSION:  Dislodged right ureteral stent to the level of L5.    IR consulted for left upside down nephroureteral tube placement and right upside down nephroureteral tube exchange.      Allergies: No Known Allergies    Medications (Abx/Cardiac/Anticoagulation/Blood Products)    amLODIPine   Tablet: 5 milliGRAM(s) Oral (05-07 @ 08:27)  amLODIPine   Tablet: 5 milliGRAM(s) Oral (05-06 @ 23:16)  heparin   Injectable: 5000 Unit(s) SubCutaneous (05-07 @ 08:28)    Data:  177.8  108.9  T(C): 36.7  HR: 66  BP: 182/93  RR: 18  SpO2: 98%    -WBC 8.22 / HgB 10.6 / Hct 33.0 / Plt 167  -Na 140 / Cl 107 / BUN 48 / Glucose 142  -K 4.4 / CO2 19 / Cr 4.77  -ALT 7 / Alk Phos 105 / T.Bili 0.4  -INR 0.92 / PTT 32.1    Radiology: reviewed    Assessment/Plan:   69 yo M with complicated urologic history, s/p RALP for prostate cancer with adjuvant radiation complicated by bladder neck contracture s/p multiple procedures eventually ended up managing with SP Tube, subsequently ended up developing 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    CT Previously seen left ureteral stent is no longer identified. There has been interval progression of left-sided hydronephrosis. There is a new right ureteral stent and stable right-sided hydronephrosis.  US- IMPRESSION:  Dislodged right ureteral stent to the level of L5.    IR consulted for left upside down nephroureteral tube placement and right upside down nephroureteral tube exchange.    - Discussed with urology and plan will be to abandoned internal stents and transition to BL PCNUs.   - case reviewed with MD Tirado and approved for tomorrow 5/8  - please place IR procedure for "removal of right upside ureteral stent and BL perc nephroureteral tube placement" order under MD Tirado  -Will send new cultures per Urology request  - STAT labs in AM (cbc,coags, bmp, maintain active T&S)  - For high risk bleeding tejal-procedural please hold Heparin SQ  for 8-12 hours pre-procedure (HOLD AM DOSE DAY OF PROCEDURE) with tentative resumption 12-24 hours post-procedure if no concern for bleeding.  - Goal SBP < 160 given high risk bleeding procedure  - NPO after midnight tonight  - d/w primary team Walk in

## 2025-05-11 NOTE — DISCHARGE NOTE PROVIDER - CARE PROVIDERS DIRECT ADDRESSES
,[email or web address] ,[email or web address]

## 2025-05-11 NOTE — DISCHARGE NOTE PROVIDER - NSDCMRMEDTOKEN_GEN_ALL_CORE_FT
amLODIPine 5 mg oral tablet: 1 tab(s) orally 2 times a day  calcitriol 0.25 mcg oral capsule: 1 cap(s) orally 3 times a week Monday, Thursday, Saturday  diphenhydrAMINE 25 mg oral tablet: 1 tab(s) orally once a day (at bedtime)  furosemide 40 mg oral tablet: 1 tab(s) orally 3 times a week  KlonoPIN 1 mg oral tablet: 1 tab(s) orally 3 times a day  sodium bicarbonate 650 mg oral tablet: 2 tab(s) orally 3 times a day  traZODone 150 mg oral tablet: 4 tab(s) orally once a day (at bedtime)  Xtandi 40 mg oral capsule: 4 cap(s) orally once a day   amLODIPine 5 mg oral tablet: 1 tab(s) orally 2 times a day  calcitriol 0.25 mcg oral capsule: 1 cap(s) orally 3 times a week Monday, Thursday, Saturday  cephalexin 500 mg oral capsule: 1 cap(s) orally once a day Take once a day until 05/15  ciprofloxacin 250 mg oral tablet: 1 tab(s) orally once a day Take once daily until 05/15  furosemide 40 mg oral tablet: 1 tab(s) orally 3 times a week Take on Sunday, Tuesday, Thursday  KlonoPIN 1 mg oral tablet: 1 tab(s) orally 3 times a day  polyethylene glycol 3350 oral powder for reconstitution: 17 gram(s) orally once a day STOP if you have diarrhea  senna leaf extract oral tablet: 2 tab(s) orally once a day (at bedtime) STOP if you have diarrhea  sodium bicarbonate 650 mg oral tablet: 2 tab(s) orally 3 times a day  traZODone 300 mg oral tablet: 1 tab(s) orally once a day (at bedtime)  Xtandi 40 mg oral capsule: 4 cap(s) orally once a day   amLODIPine 5 mg oral tablet: 1 tab(s) orally 2 times a day  calcitriol 0.25 mcg oral capsule: 1 cap(s) orally 3 times a week Monday, Thursday, Saturday  cephalexin 500 mg oral capsule: 1 cap(s) orally once a day Take once a day until 05/15  ciprofloxacin 250 mg oral tablet: 1 tab(s) orally once a day Take once daily until 05/15  furosemide 40 mg oral tablet: 1 tab(s) orally 3 times a week Take on Sunday, Tuesday, Thursday  KlonoPIN 1 mg oral tablet: 1 tab(s) orally 3 times a day  polyethylene glycol 3350 oral powder for reconstitution: 17 gram(s) orally once a day STOP if you have diarrhea  senna leaf extract oral tablet: 2 tab(s) orally once a day (at bedtime) STOP if you have diarrhea  sodium bicarbonate 650 mg oral tablet: 2 tab(s) orally 3 times a day  traZODone 100 mg oral tablet: 3 tab(s) orally once a day (at bedtime)  Xtandi 40 mg oral capsule: 4 cap(s) orally once a day

## 2025-05-11 NOTE — PROGRESS NOTE ADULT - ASSESSMENT
69 y/o man with HTN, Obesity (BMI = 34.5) depression, anxiety, CKD 5, prostate cancer s/p radical robotic prostatectomy in 2011 (on Xtandi) , s/p radiation and bladder cancer s/p cystectomy May, 2018 with ileal conduit c/b recurrent uretero-ileal strictures requiring bilateral upside-down nephroureteral stents presenting to [facility] ED and admitted 5/6/25  after being evaluated at [facility] and found to have dislodged right nephroureteral stent and malpositioned left nephroureteral stent.    He underwent IR procedure, Stent exchange in which purulent urine noted on left    5/6 urine cx growing pan susceptible Proteus mirabilis, Strep agalactiae and 50 - 99k E coli with out susceptibilities available  E coli is MDR with suspectibility to Ertapenem, Levoflox, Cipro, TMP-Sx    NKA, Cr Clear around 10 cc/min    Antibiotics  Ceftriaxone 5/6  Ertapenem 5/8    Obstruction relieved, non toxic - I suggest short course of antibiotics    Suggest  Ertapenem until discharged from hospital  When ready for discharge:  PO Keflex 500 mg po daily  PLUS PO Cipro 250  mg po daily  (to cover +ESBL E coli pending susceptibility to other agents)    continue abx through 5/15/25

## 2025-05-11 NOTE — PROGRESS NOTE ADULT - PROBLEM SELECTOR PLAN 2
Patient with urostomy and ileal conduit with upside nephroureteral tubes. UA with large LE and moderate bacteria. Purulent L kidney urine observed intraoperatively. No SIRS nor urinary tract like sx.     -History of ESBL and MRSA  -C/w ertapenem 500 mg IV qd (started 5/8)   -Conduit urine cx growing proteus mirabilis, strep agalactiae, Ecolic; abx sensivities available   -Appreciate ID recommendations to c/w ertapenem while IP; and when ready for discharge cephalexin  500 mg po qd and  ciprofloxacin 250 po qd until 5/15/2025   -S/p ceftriaxone x 1 in ED   -MRSA PCR negative

## 2025-05-11 NOTE — PROGRESS NOTE ADULT - ASSESSMENT
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  AF VSS. WBC 8.3, Cr 4.66 (around baseline), K 5.5. CT shows L hydro without L stent and stable R hydro with evidence of new R stent in mid to distal ureter. Now sp BL PCNU placement 5/8 now with BARON to 6.2    Recs:  - FU CTAP to eval for PCNU placement   - Urine color from L PCNU improved   - Hydration as tolerated  - Trend creatinine   - ABx for proteus in the urine   - Urology to follow     Discussed with Dr. Hoenig  [facility]  [address] [place] [address], [address]  [place] [address] [phone]

## 2025-05-11 NOTE — DISCHARGE NOTE PROVIDER - NSDCFUSCHEDAPPT_GEN_ALL_CORE_FT
Hoenig, David  [facility]  UROLOGY [address] [place] [address] R  Scheduled Appointment: 07/16/2025    [facility]  UROLOGY [address] [place] [address]  Scheduled Appointment: 07/16/2025     Vidhi Kemp  [facility]  NEPHRO [address]  Scheduled Appointment: 06/09/2025    Hoenig, David  [facility]  UROLOGY [address] [place] [address] R  Scheduled Appointment: 07/16/2025    [facility]  UROLOGY [address] [place] [address]  Scheduled Appointment: 07/16/2025

## 2025-05-11 NOTE — DISCHARGE NOTE PROVIDER - INSTRUCTIONS
Diet, Regular:   No Concentrated Potassium  Low Sodium  No Concentrated Phosphorus (05-06-25 @ 22:18) [Active]

## 2025-05-11 NOTE — PROGRESS NOTE ADULT - SUBJECTIVE AND OBJECTIVE BOX
[facility] DIVISION OF KIDNEY DISEASES AND HYPERTENSION --    Reason for consult: BARON on CKD    24 hour events/subjective: Patient seen and examined at bedside. Reports leaking at urostomy site. No other acute complaints.      PAST HISTORY  --------------------------------------------------------------------------------  No significant changes to PMH, PSH, FHx, SHx, unless otherwise noted    ALLERGIES & MEDICATIONS  --------------------------------------------------------------------------------  Allergies    No Known Allergies      Standing Inpatient Medications  amLODIPine   Tablet 5 milliGRAM(s) Oral <User Schedule>  chlorhexidine 2% Cloths 1 Application(s) Topical daily  clonazePAM  Tablet 1 milliGRAM(s) Oral three times a day  diphenhydrAMINE 25 milliGRAM(s) Oral at bedtime  enzalutamide 160 milliGRAM(s) Oral daily  ertapenem  IVPB 500 milliGRAM(s) IV Intermittent every 24 hours  polyethylene glycol 3350 17 Gram(s) Oral daily  senna 2 Tablet(s) Oral at bedtime  sodium bicarbonate 1300 milliGRAM(s) Oral three times a day  traZODone 300 milliGRAM(s) Oral at bedtime    PRN Inpatient Medications  acetaminophen     Tablet .. 650 milliGRAM(s) Oral every 6 hours PRN  HYDROmorphone  Injectable 0.25 milliGRAM(s) IV Push every 6 hours PRN  melatonin 3 milliGRAM(s) Oral at bedtime PRN  oxyCODONE    IR 5 milliGRAM(s) Oral every 6 hours PRN      REVIEW OF SYSTEMS  --------------------------------------------------------------------------------  Gen: No fever  Respiratory: No dyspnea  CV: No chest pain  GI: No diarrhea, nausea, or vomiting  : No dysuria or hematuria  Skin: No rashes  Neuro: No dizziness/lightheadedness    All other systems were reviewed and are negative, except as noted.    VITALS/PHYSICAL EXAM  --------------------------------------------------------------------------------  T(C): 36.8 (05-11-25 @ 11:04), Max: 37 (05-11-25 @ 08:03)  HR: 71 (05-11-25 @ 11:04) (62 - 76)  BP: 149/91 (05-11-25 @ 11:04) (125/71 - 178/95)  RR: 18 (05-11-25 @ 11:04) (18 - 19)  SpO2: 96% (05-11-25 @ 11:04) (96% - 96%)  Wt(kg): --        05-10-25 @ 07:01  -  05-11-25 @ 07:00  --------------------------------------------------------  IN: 570 mL / OUT: 2395 mL / NET: -1825 mL    05-11-25 @ 07:01  -  05-11-25 @ 11:41  --------------------------------------------------------  IN: 280 mL / OUT: 500 mL / NET: -220 mL      PHYSICAL EXAM:  Gen: NAD  Neuro: non-focal  HEENT: anicteric  Pulm: CTA B/L  CV: +S1S2  Abd: soft, non-distended, non-tender, +ileal conduit  : +B/L PCNs  Extremities: no edema  Skin: Warm  Dialysis access: JULIA HOPKINSF w/ good thrill    LABS/STUDIES  --------------------------------------------------------------------------------              9.1    9.31  >-----------<  165      [05-10-25 @ 10:51]              28.9     139  |  106  |  79  ----------------------------<  158      [05-10-25 @ 10:51]  3.6   |  17  |  6.20        Ca     9.2     [05-10-25 @ 10:51]    Creatinine Trend:  SCr 6.20 [05-10 @ 10:51]  SCr 6.04 [05-09 @ 10:24]  SCr 4.79 [05-08 @ 04:07]  SCr 4.77 [05-07 @ 09:40]  SCr 4.56 [05-06 @ 17:46]    Urine Sodium 70      [05-10-25 @ 18:51]    PTH -- (Ca 9.5)      [05-07-25 @ 09:40]   171

## 2025-05-11 NOTE — PROGRESS NOTE ADULT - SUBJECTIVE AND OBJECTIVE BOX
Subjective  Patient seen and examined at bedside.     Objective    Vital signs  T(F): , Max: 97.7 (05-10-25 @ 11:51)  HR: 62 (05-10-25 @ 13:55)  BP: 125/71 (05-10-25 @ 13:55)  SpO2: 96% (05-10-25 @ 11:51)  Wt(kg): --    Output     OUT:    Nephrostomy Tube (mL): 175 mL    Nephrostomy Tube (mL): 1070 mL  Total OUT: 1245 mL    Total NET: -1245 mL      OUT:    Nephrostomy Tube (mL): 2000 mL    Nephrostomy Tube (mL): 300 mL    Urostomy (mL): 95 mL  Total OUT: 2395 mL    Total NET: -2395 mL          Gen: NAD  Abd: soft, nontender, nondistended  : LPCNU draining pink urine; R PCNU draining CYU    Labs      05-10 @ 10:51    WBC 9.31  / Hct 28.9  / SCr 6.20     05-09 @ 10:24    WBC 11.95 / Hct 33.6  / SCr 6.04         Urinalysis with Rflx Culture (collected 05-06-25 @ 13:54)    Culture - Urine (collected 05-06-25 @ 13:54)  Source: Kidney None  Final Report (05-09-25 @ 21:25):    >100,000 CFU/ml Proteus mirabilis    50,000 - 99,000 CFU/mL Escherichia coli    >100,000 CFU/ml Streptococcus agalactiae (Group B)    Group B streptococci are susceptible to ampicillin,    penicillin and cefazolin, but may be resistant to    erythromycin andclindamycin.  Organism: Proteus mirabilis  Escherichia coli (05-09-25 @ 21:25)  Organism: Escherichia coli (05-09-25 @ 21:25)      -  Levofloxacin: S <=0.5      -  Tobramycin: S <=2      -  Nitrofurantoin: S <=32 Should not be used to treat pyelonephritis      -  Aztreonam: S <=4      -  Gentamicin: S <=2      -  Cefazolin: R >16 For uncomplicated UTI with K. pneumoniae, E. coli, or P. mirablis: ERVIN <=16 is sensitive and ERVIN >=32 is resistant. This also predicts results for oral agents cefaclor, cefdinir, cefpodoxime, cefprozil, cefuroxime axetil, cephalexin and locarbef for uncomplicated UTI. Note that some isolates may be susceptible to these agents while testing resistant to cefazolin.      -  Cefepime: S <=2      -  Piperacillin/Tazobactam: S <=8      -  Ciprofloxacin: S <=0.25      -  Imipenem: S <=1      -  Ceftriaxone: R 32      -  Ampicillin: R >16 These ampicillin results predict results for amoxicillin      Method Type: ERVIN      -  Meropenem: S <=1      -  Ampicillin/Sulbactam: I 16/8      -  Cefoxitin: R >16      -  Cefuroxime: R >16      -  Amoxicillin/Clavulanic Acid: R >16/8      -  Trimethoprim/Sulfamethoxazole: S <=0.5/9.5      -  Ertapenem: S <=0.5      -  Tigecycline: S <=2 Interpretations based on FDA breakpoints  Organism: Proteus mirabilis (05-09-25 @ 21:25)      -  Levofloxacin: S <=0.5      -  Tobramycin: S <=2      -  Aztreonam: S <=4      -  Gentamicin: S <=2      -  Cefazolin: S <=2 For uncomplicated UTI with K. pneumoniae, E. coli, or P. mirablis: ERVIN <=16 is sensitive and ERVIN >=32 is resistant. This also predicts results for oral agents cefaclor, cefdinir, cefpodoxime, cefprozil, cefuroxime axetil, cephalexin and locarbef for uncomplicated UTI. Note that some isolates may be susceptible to these agents while testing resistant to cefazolin.      -  Cefepime: S <=2      -  Piperacillin/Tazobactam: S <=8      -  Ciprofloxacin: S <=0.25      -  Ceftriaxone: S <=1      -  Ampicillin: S <=8 These ampicillin results predict results for amoxicillin      Method Type: ERVIN      -  Meropenem: S <=1      -  Ampicillin/Sulbactam: S <=4/2      -  Cefoxitin: S <=8      -  Cefuroxime: S <=4      -  Amoxicillin/Clavulanic Acid: S <=8/4      -  Trimethoprim/Sulfamethoxazole: S <=0.5/9.5      -  Ertapenem: S <=0.5

## 2025-05-11 NOTE — DISCHARGE NOTE PROVIDER - NSDCCPCAREPLAN_GEN_ALL_CORE_FT
PRINCIPAL DISCHARGE DIAGNOSIS  Diagnosis: Pyelonephritis  Assessment and Plan of Treatment: You came because you were having increasing back pain and we saw on imaging that the L upside nephrouteretal tube you had was dislodged. A urinanalysis was consistent with infection and when we took you with IR we saw there was purulent urine on the L sided that showed us gross infection and R side bloody urine. You were on ertapenem, a strong antibiotic the whole time and you did well. Your kidney function numbers were going up and because there were multiple factors contributing to its injury, we consulted the nephrologists and we were recommended to get a repeat image of your kidneys and showed that was...      SECONDARY DISCHARGE DIAGNOSES  Diagnosis: Ureteral stent displacement  Assessment and Plan of Treatment:      PRINCIPAL DISCHARGE DIAGNOSIS  Diagnosis: Pyelonephritis  Assessment and Plan of Treatment: You came because you were having increasing back pain and we saw on imaging that the L upside nephrouteretal tube you had was dislodged. A urinanalysis was consistent with infection and when we took you with IR we saw there was purulent urine on the L sided that showed us gross infection and R side bloody urine. You were on ertapenem, a strong antibiotic the whole time and you did well. Your kidney function numbers were going up and because there were multiple factors contributing to its injury, we consulted the nephrologists and we were recommended to get a repeat image of your kidneys and showed that was...We felt confident that your kidneys were doing better and the infection started clearing. We planned to complete the antibiotic therapy until the 5/15/2025. IF you develop fever >100.3 F or chills, nausea/vomit, abdominal pain, shaking, confusion, loss of consciousness, lightheadness, cream urinary output, difficulty breathing, please return to the ED.      SECONDARY DISCHARGE DIAGNOSES  Diagnosis: Displacement of other urinary stents, sequela  Assessment and Plan of Treatment: WE placed new kidney/ureteral stents to monitor the output of the urine. Please follow-up with the urologist Dr. Hoenig. To care care of these, please flush the drains with 5 cc NS everyday and change the dressing every 3 days or if saturated. When the output is clear urine on both sides, we can consider capping them, you are free to call the urologist office to follow-up on this as well.        PRINCIPAL DISCHARGE DIAGNOSIS  Diagnosis: Pyelonephritis  Assessment and Plan of Treatment: You came because you were having increasing back pain and we saw on imaging that the L upside nephrouteretal tube you had was dislodged. A urinanalysis was consistent with infection and when we took you with IR we saw there was purulent urine on the L sided that showed us gross infection and R side bloody urine. You were on ertapenem, a strong antibiotic the whole time and you did well. Your kidney function numbers were going up and because there were multiple factors contributing to its injury. We felt confident that your kidneys were doing better and the infection started clearing. We planned to complete the antibiotic therapy until the 5/15/2025. Imaging (CT scan) showed that your kidney swelling resolved with the replacement of the percutaneous nephrostomy tubes. IF you develop fever >100.3 F or chills, nausea/vomit, abdominal pain, shaking, confusion, loss of consciousness, lightheadness, cream urinary output, difficulty breathing, please return to the ED. Please see your urologist and nephrologist on discharge within 1 week if possible. You should see your nephrologist especially to monitor for resolution of your kidney injury - appointment with Dr. Vidhi Luan, June 9th at 10:40AM. Please see your primary care physician within 1 week if posisble.      SECONDARY DISCHARGE DIAGNOSES  Diagnosis: Displacement of other urinary stents, sequela  Assessment and Plan of Treatment: WE placed new kidney/ureteral stents to monitor the output of the urine. Bilateral percutaneous nephrostomy tubes were placed. Please follow-up with the urologist Dr. Hoenig. To care care of these, please flush the drains with 5 cc NS everyday and change the dressing every 3 days or if saturated. When the output is clear urine on both sides, we can consider capping them, you are free to call the urologist office to follow-up on this as well.        PRINCIPAL DISCHARGE DIAGNOSIS  Diagnosis: Pyelonephritis  Assessment and Plan of Treatment: You came because you were having increasing back pain and we saw on imaging that the L upside nephrouteretal tube you had was dislodged. A urinanalysis was consistent with infection and when we took you with IR we saw there was purulent urine on the L sided that showed us gross infection and R side bloody urine. You were on ertapenem, a strong antibiotic the whole time and you did well. Your kidney function numbers were going up and because there were multiple factors contributing to its injury. We felt confident that your kidneys were doing better and the infection started clearing. We planned to complete the antibiotic therapy until the 5/15/2025 - please take your antibiotics as prescribed. Imaging (CT scan) showed that your kidney swelling resolved with the replacement of the percutaneous nephrostomy tubes. IF you develop fever >100.3 F or chills, nausea/vomit, abdominal pain, shaking, confusion, loss of consciousness, lightheadness, cream urinary output, difficulty breathing, please return to the ED. Please see your urologist and nephrologist on discharge within 1 week if possible. You should see your nephrologist especially to monitor for resolution of your kidney injury - appointment with Dr. Vidhi Luna, June 9th at 10:40AM. Please see your primary care physician within 1 week if posisble.      SECONDARY DISCHARGE DIAGNOSES  Diagnosis: Displacement of other urinary stents, sequela  Assessment and Plan of Treatment: WE placed new kidney/ureteral stents to monitor the output of the urine. Bilateral percutaneous nephrostomy tubes were placed. Please follow-up with the urologist Dr. Hoenig. To care care of these, please flush the drains with 5 cc NS everyday and change the dressing every 3 days or if saturated. When the output is clear urine on both sides, we can consider capping them, you are free to call the urologist office to follow-up on this as well.

## 2025-05-12 ENCOUNTER — TRANSCRIPTION ENCOUNTER (OUTPATIENT)
Age: 70
End: 2025-05-12

## 2025-05-12 VITALS
DIASTOLIC BLOOD PRESSURE: 78 MMHG | HEART RATE: 68 BPM | SYSTOLIC BLOOD PRESSURE: 157 MMHG | RESPIRATION RATE: 18 BRPM | TEMPERATURE: 97 F | OXYGEN SATURATION: 96 %

## 2025-05-12 LAB
ANION GAP SERPL CALC-SCNC: 15 MMOL/L — SIGNIFICANT CHANGE UP (ref 5–17)
BASOPHILS # BLD AUTO: 0.03 K/UL — SIGNIFICANT CHANGE UP (ref 0–0.2)
BASOPHILS NFR BLD AUTO: 0.4 % — SIGNIFICANT CHANGE UP (ref 0–2)
BUN SERPL-MCNC: 68 MG/DL — HIGH (ref 7–23)
CALCIUM SERPL-MCNC: 9.5 MG/DL — SIGNIFICANT CHANGE UP (ref 8.4–10.5)
CHLORIDE SERPL-SCNC: 108 MMOL/L — SIGNIFICANT CHANGE UP (ref 96–108)
CO2 SERPL-SCNC: 18 MMOL/L — LOW (ref 22–31)
CREAT SERPL-MCNC: 5.32 MG/DL — HIGH (ref 0.5–1.3)
EGFR: 11 ML/MIN/1.73M2 — LOW
EGFR: 11 ML/MIN/1.73M2 — LOW
EOSINOPHIL # BLD AUTO: 0.13 K/UL — SIGNIFICANT CHANGE UP (ref 0–0.5)
EOSINOPHIL NFR BLD AUTO: 1.8 % — SIGNIFICANT CHANGE UP (ref 0–6)
GLUCOSE SERPL-MCNC: 146 MG/DL — HIGH (ref 70–99)
HCT VFR BLD CALC: 33.7 % — LOW (ref 39–50)
HGB BLD-MCNC: 10.6 G/DL — LOW (ref 13–17)
IMM GRANULOCYTES NFR BLD AUTO: 2.6 % — HIGH (ref 0–0.9)
LYMPHOCYTES # BLD AUTO: 0.77 K/UL — LOW (ref 1–3.3)
LYMPHOCYTES # BLD AUTO: 10.6 % — LOW (ref 13–44)
MAGNESIUM SERPL-MCNC: 2.6 MG/DL — SIGNIFICANT CHANGE UP (ref 1.6–2.6)
MCHC RBC-ENTMCNC: 29.5 PG — SIGNIFICANT CHANGE UP (ref 27–34)
MCHC RBC-ENTMCNC: 31.5 G/DL — LOW (ref 32–36)
MCV RBC AUTO: 93.9 FL — SIGNIFICANT CHANGE UP (ref 80–100)
MONOCYTES # BLD AUTO: 0.61 K/UL — SIGNIFICANT CHANGE UP (ref 0–0.9)
MONOCYTES NFR BLD AUTO: 8.4 % — SIGNIFICANT CHANGE UP (ref 2–14)
NEUTROPHILS # BLD AUTO: 5.53 K/UL — SIGNIFICANT CHANGE UP (ref 1.8–7.4)
NEUTROPHILS NFR BLD AUTO: 76.2 % — SIGNIFICANT CHANGE UP (ref 43–77)
NRBC BLD AUTO-RTO: 0 /100 WBCS — SIGNIFICANT CHANGE UP (ref 0–0)
PHOSPHATE SERPL-MCNC: 3.9 MG/DL — SIGNIFICANT CHANGE UP (ref 2.5–4.5)
PLATELET # BLD AUTO: 177 K/UL — SIGNIFICANT CHANGE UP (ref 150–400)
POTASSIUM SERPL-MCNC: 4 MMOL/L — SIGNIFICANT CHANGE UP (ref 3.5–5.3)
POTASSIUM SERPL-SCNC: 4 MMOL/L — SIGNIFICANT CHANGE UP (ref 3.5–5.3)
RBC # BLD: 3.59 M/UL — LOW (ref 4.2–5.8)
RBC # FLD: 14.3 % — SIGNIFICANT CHANGE UP (ref 10.3–14.5)
SODIUM SERPL-SCNC: 141 MMOL/L — SIGNIFICANT CHANGE UP (ref 135–145)
WBC # BLD: 7.26 K/UL — SIGNIFICANT CHANGE UP (ref 3.8–10.5)
WBC # FLD AUTO: 7.26 K/UL — SIGNIFICANT CHANGE UP (ref 3.8–10.5)

## 2025-05-12 PROCEDURE — 84100 ASSAY OF PHOSPHORUS: CPT

## 2025-05-12 PROCEDURE — 50433 PLMT NEPHROURETERAL CATHETER: CPT

## 2025-05-12 PROCEDURE — 84300 ASSAY OF URINE SODIUM: CPT

## 2025-05-12 PROCEDURE — 99232 SBSQ HOSP IP/OBS MODERATE 35: CPT

## 2025-05-12 PROCEDURE — 80048 BASIC METABOLIC PNL TOTAL CA: CPT

## 2025-05-12 PROCEDURE — 82310 ASSAY OF CALCIUM: CPT

## 2025-05-12 PROCEDURE — 85730 THROMBOPLASTIN TIME PARTIAL: CPT

## 2025-05-12 PROCEDURE — 83735 ASSAY OF MAGNESIUM: CPT

## 2025-05-12 PROCEDURE — 74176 CT ABD & PELVIS W/O CONTRAST: CPT

## 2025-05-12 PROCEDURE — C1887: CPT

## 2025-05-12 PROCEDURE — 85027 COMPLETE CBC AUTOMATED: CPT

## 2025-05-12 PROCEDURE — C2625: CPT

## 2025-05-12 PROCEDURE — 86900 BLOOD TYPING SEROLOGIC ABO: CPT

## 2025-05-12 PROCEDURE — 99239 HOSP IP/OBS DSCHRG MGMT >30: CPT

## 2025-05-12 PROCEDURE — 99231 SBSQ HOSP IP/OBS SF/LOW 25: CPT

## 2025-05-12 PROCEDURE — C1769: CPT

## 2025-05-12 PROCEDURE — 86901 BLOOD TYPING SEROLOGIC RH(D): CPT

## 2025-05-12 PROCEDURE — 87641 MR-STAPH DNA AMP PROBE: CPT

## 2025-05-12 PROCEDURE — 83970 ASSAY OF PARATHORMONE: CPT

## 2025-05-12 PROCEDURE — 80053 COMPREHEN METABOLIC PANEL: CPT

## 2025-05-12 PROCEDURE — C1894: CPT

## 2025-05-12 PROCEDURE — 85610 PROTHROMBIN TIME: CPT

## 2025-05-12 PROCEDURE — 85025 COMPLETE CBC W/AUTO DIFF WBC: CPT

## 2025-05-12 PROCEDURE — G0545: CPT

## 2025-05-12 PROCEDURE — 93005 ELECTROCARDIOGRAM TRACING: CPT

## 2025-05-12 PROCEDURE — 87640 STAPH A DNA AMP PROBE: CPT

## 2025-05-12 PROCEDURE — 86850 RBC ANTIBODY SCREEN: CPT

## 2025-05-12 PROCEDURE — C9399: CPT

## 2025-05-12 PROCEDURE — 36415 COLL VENOUS BLD VENIPUNCTURE: CPT

## 2025-05-12 PROCEDURE — 99285 EMERGENCY DEPT VISIT HI MDM: CPT

## 2025-05-12 RX ORDER — ACETAMINOPHEN 500 MG/5ML
1000 LIQUID (ML) ORAL ONCE
Refills: 0 | Status: COMPLETED | OUTPATIENT
Start: 2025-05-12 | End: 2025-05-12

## 2025-05-12 RX ORDER — SENNA 187 MG
2 TABLET ORAL
Qty: 28 | Refills: 0
Start: 2025-05-12 | End: 2025-05-25

## 2025-05-12 RX ORDER — POLYETHYLENE GLYCOL 3350 17 G/17G
17 POWDER, FOR SOLUTION ORAL
Qty: 1 | Refills: 0
Start: 2025-05-12 | End: 2025-05-25

## 2025-05-12 RX ORDER — FUROSEMIDE 10 MG/ML
1 INJECTION INTRAMUSCULAR; INTRAVENOUS
Refills: 0 | DISCHARGE

## 2025-05-12 RX ORDER — TRAZODONE HCL 100 MG
1 TABLET ORAL
Qty: 30 | Refills: 1
Start: 2025-05-12 | End: 2025-07-10

## 2025-05-12 RX ORDER — FUROSEMIDE 10 MG/ML
1 INJECTION INTRAMUSCULAR; INTRAVENOUS
Qty: 13 | Refills: 1
Start: 2025-05-12 | End: 2025-07-10

## 2025-05-12 RX ORDER — TRAZODONE HCL 100 MG
3 TABLET ORAL
Qty: 42 | Refills: 0
Start: 2025-05-12 | End: 2025-05-25

## 2025-05-12 RX ORDER — CIPROFLOXACIN HCL 250 MG
1 TABLET ORAL
Qty: 3 | Refills: 0
Start: 2025-05-12 | End: 2025-05-14

## 2025-05-12 RX ORDER — FUROSEMIDE 10 MG/ML
1 INJECTION INTRAMUSCULAR; INTRAVENOUS
Qty: 0 | Refills: 0 | DISCHARGE
Start: 2025-05-12

## 2025-05-12 RX ORDER — CEPHALEXIN 250 MG/1
1 CAPSULE ORAL
Qty: 3 | Refills: 0
Start: 2025-05-12 | End: 2025-05-14

## 2025-05-12 RX ORDER — SODIUM BICARBONATE 1 MEQ/ML
2 SYRINGE (ML) INTRAVENOUS
Qty: 0 | Refills: 0 | DISCHARGE
Start: 2025-05-12

## 2025-05-12 RX ADMIN — OXYCODONE HYDROCHLORIDE 5 MILLIGRAM(S): 30 TABLET ORAL at 11:40

## 2025-05-12 RX ADMIN — ENZALUTAMIDE 160 MILLIGRAM(S): 40 CAPSULE ORAL at 11:36

## 2025-05-12 RX ADMIN — OXYCODONE HYDROCHLORIDE 5 MILLIGRAM(S): 30 TABLET ORAL at 05:00

## 2025-05-12 RX ADMIN — Medication 1000 MILLIGRAM(S): at 06:30

## 2025-05-12 RX ADMIN — Medication 1 APPLICATION(S): at 11:35

## 2025-05-12 RX ADMIN — Medication 650 MILLIGRAM(S): at 13:10

## 2025-05-12 RX ADMIN — ERTAPENEM SODIUM 100 MILLIGRAM(S): 1 INJECTION, POWDER, LYOPHILIZED, FOR SOLUTION INTRAMUSCULAR; INTRAVENOUS at 08:52

## 2025-05-12 RX ADMIN — Medication 400 MILLIGRAM(S): at 06:07

## 2025-05-12 RX ADMIN — CLONAZEPAM 1 MILLIGRAM(S): 0.5 TABLET ORAL at 13:09

## 2025-05-12 RX ADMIN — Medication 1300 MILLIGRAM(S): at 05:09

## 2025-05-12 RX ADMIN — OXYCODONE HYDROCHLORIDE 5 MILLIGRAM(S): 30 TABLET ORAL at 10:44

## 2025-05-12 RX ADMIN — Medication 1300 MILLIGRAM(S): at 13:09

## 2025-05-12 RX ADMIN — AMLODIPINE BESYLATE 5 MILLIGRAM(S): 10 TABLET ORAL at 08:52

## 2025-05-12 RX ADMIN — CLONAZEPAM 1 MILLIGRAM(S): 0.5 TABLET ORAL at 05:08

## 2025-05-12 RX ADMIN — Medication 650 MILLIGRAM(S): at 14:01

## 2025-05-12 RX ADMIN — OXYCODONE HYDROCHLORIDE 5 MILLIGRAM(S): 30 TABLET ORAL at 04:34

## 2025-05-12 NOTE — PROGRESS NOTE ADULT - PROBLEM SELECTOR PLAN 9
-Will clarify trazodone home dose with pharmacy; per patient takes 150 mg x 4 tablets, however uptodate cites limited evidence for efficacy for 600 mg    > Will do trazodone 300mg qhs inpatient

## 2025-05-12 NOTE — CHART NOTE - NSCHARTNOTEFT_GEN_A_CORE
Patient refused bed alarm at the beginning of shift. Per nursing, patient got out of bed around 5 am to get a blanket. He fell onto his left side. Patient seen and examined at bedside. Denies pain with inhalation. Has mild TTP in lower posterior ribs. Denies head strike. Given tylenol, instructed not to get out of bed again.

## 2025-05-12 NOTE — PROGRESS NOTE ADULT - PROBLEM SELECTOR PLAN 2
- Patient with urostomy and ileal conduit with upside nephroureteral tubes, now s/p IR 5/8 percutaneous nephrostomy tube placement.   - UA with large LE and moderate bacteria. Purulent L kidney urine observed intraoperatively.   - S/p ceftriaxone x 1 in ED   - MRSA PCR negative  - No SIRS nor urinary tract like sx.   - History of ESBL and MRSA    > c/w ertapenem 500 mg IV qd (started 5/8-  > Conduit UCx: growing proteus mirabilis, strep agalactiae, Ecolic; abx sensivities available   > f/u ID recommendations: c/w ertapenem while IP; and when ready for discharge cephalexin  500 mg po qd and  ciprofloxacin 250 po qd until 5/15/2025

## 2025-05-12 NOTE — PROGRESS NOTE ADULT - PROBLEM SELECTOR PLAN 10
Code: FULL   DVT ppx: hold due to hematuria    Diet: Regular   DIspo: likely home, pending clinical course Code: FULL   DVT ppx: hold due to hematuria    Diet: Regular   DIspo: medically ready to d/c home

## 2025-05-12 NOTE — PROGRESS NOTE ADULT - SUBJECTIVE AND OBJECTIVE BOX
SUBJECTIVE / OVERNIGHT EVENTS:  Pt seen and examined at bedside. Re-initiated lasix yesterday and given suppositry, ofirmev overnight for pain. Per nursing, patient got out of bed around 5 am to get a blanket. He fell onto his left side without head strike and was given tylenol.     MEDICATIONS  (STANDING):  amLODIPine   Tablet 5 milliGRAM(s) Oral <User Schedule>  chlorhexidine 2% Cloths 1 Application(s) Topical daily  clonazePAM  Tablet 1 milliGRAM(s) Oral three times a day  diphenhydrAMINE 25 milliGRAM(s) Oral at bedtime  enzalutamide 160 milliGRAM(s) Oral daily  ertapenem  IVPB 500 milliGRAM(s) IV Intermittent every 24 hours  furosemide    Tablet 40 milliGRAM(s) Oral <User Schedule>  polyethylene glycol 3350 17 Gram(s) Oral daily  senna 2 Tablet(s) Oral at bedtime  sodium bicarbonate 1300 milliGRAM(s) Oral three times a day  traZODone 300 milliGRAM(s) Oral at bedtime    MEDICATIONS  (PRN):  acetaminophen     Tablet .. 650 milliGRAM(s) Oral every 6 hours PRN Temp greater or equal to 38C (100.4F), Mild Pain (1 - 3)  HYDROmorphone  Injectable 0.25 milliGRAM(s) IV Push every 6 hours PRN Severe Pain (7 - 10)  melatonin 3 milliGRAM(s) Oral at bedtime PRN Insomnia  oxyCODONE    IR 5 milliGRAM(s) Oral every 6 hours PRN Moderate Pain (4 - 6)        05-11-25 @ 07:01  -  05-12-25 @ 07:00  --------------------------------------------------------  IN: 760 mL / OUT: 2925 mL / NET: -2165 mL        PHYSICAL EXAM:  Vital Signs Last 24 Hrs  T(C): 36.6 (12 May 2025 04:47), Max: 37 (11 May 2025 08:03)  T(F): 97.9 (12 May 2025 04:47), Max: 98.6 (11 May 2025 08:03)  HR: 69 (12 May 2025 04:47) (69 - 76)  BP: 139/64 (12 May 2025 04:47) (137/65 - 178/95)  BP(mean): --  RR: 18 (12 May 2025 04:47) (18 - 19)  SpO2: 96% (12 May 2025 04:47) (93% - 96%)    Parameters below as of 12 May 2025 04:47  Patient On (Oxygen Delivery Method): room air        CAPILLARY BLOOD GLUCOSE        I&O's Summary    11 May 2025 07:01  -  12 May 2025 07:00  --------------------------------------------------------  IN: 760 mL / OUT: 2925 mL / NET: -2165 mL    General: NAD, sitting upright, obese habitus   Neuro: AAOx3, 5/5  strength b/l, 5/5 hip flexion/extension b/l, spontaneity, verbal   HEENT: NC/AT, PERRLA b/l, EOMI, no supraclavicular/submanidbular lymphadenopathy   Chest: nonTTP   Heart: S1/S2, RRR   Lungs: CTA b/l, nonlabored breathing   Abd: soft, nonTTP, nondistended has urostomy bag disconnected from stoma   Ext: active/passive strengh intact, no pretibial edema   Back: nonTTP, negative CVA tenderness b/l, wound dressing over percutaneous kidney tubes nonTTP   Skin: no lesions   Psych: reactive affect, hyperverbal, linear and goal directed thought  Lines/tubes/drains: has urostomy with bag with minimal urine,R nephrostomy tube with hematuria , L nephrostomy tube with hematuria     LABS:                        10.6   7.26  )-----------( 177      ( 12 May 2025 06:52 )             33.7     05-12    141  |  108  |  68[H]  ----------------------------<  146[H]  4.0   |  18[L]  |  5.32[H]    Ca    9.5      12 May 2025 06:54  Phos  3.9     05-12  Mg     2.6     05-12            Urinalysis Basic - ( 12 May 2025 06:54 )    Color: x / Appearance: x / SG: x / pH: x  Gluc: 146 mg/dL / Ketone: x  / Bili: x / Urobili: x   Blood: x / Protein: x / Nitrite: x   Leuk Esterase: x / RBC: x / WBC x   Sq Epi: x / Non Sq Epi: x / Bacteria: x          IMAGING:    [X] All pertinent imaging reviewed by me SUBJECTIVE / OVERNIGHT EVENTS:  Pt seen and examined at bedside. Re-initiated lasix yesterday and given suppositry, ofirmev overnight for pain. Per nursing, patient got out of bed around 5 am to get a blanket. He fell onto his left side without head strike and was given tylenol. No complaints this morning. Had some back pain today given PRN meds.    MEDICATIONS  (STANDING):  amLODIPine   Tablet 5 milliGRAM(s) Oral <User Schedule>  chlorhexidine 2% Cloths 1 Application(s) Topical daily  clonazePAM  Tablet 1 milliGRAM(s) Oral three times a day  diphenhydrAMINE 25 milliGRAM(s) Oral at bedtime  enzalutamide 160 milliGRAM(s) Oral daily  ertapenem  IVPB 500 milliGRAM(s) IV Intermittent every 24 hours  furosemide    Tablet 40 milliGRAM(s) Oral <User Schedule>  polyethylene glycol 3350 17 Gram(s) Oral daily  senna 2 Tablet(s) Oral at bedtime  sodium bicarbonate 1300 milliGRAM(s) Oral three times a day  traZODone 300 milliGRAM(s) Oral at bedtime    MEDICATIONS  (PRN):  acetaminophen     Tablet .. 650 milliGRAM(s) Oral every 6 hours PRN Temp greater or equal to 38C (100.4F), Mild Pain (1 - 3)  HYDROmorphone  Injectable 0.25 milliGRAM(s) IV Push every 6 hours PRN Severe Pain (7 - 10)  melatonin 3 milliGRAM(s) Oral at bedtime PRN Insomnia  oxyCODONE    IR 5 milliGRAM(s) Oral every 6 hours PRN Moderate Pain (4 - 6)        05-11-25 @ 07:01  -  05-12-25 @ 07:00  --------------------------------------------------------  IN: 760 mL / OUT: 2925 mL / NET: -2165 mL        PHYSICAL EXAM:  Vital Signs Last 24 Hrs  T(C): 36.6 (12 May 2025 04:47), Max: 37 (11 May 2025 08:03)  T(F): 97.9 (12 May 2025 04:47), Max: 98.6 (11 May 2025 08:03)  HR: 69 (12 May 2025 04:47) (69 - 76)  BP: 139/64 (12 May 2025 04:47) (137/65 - 178/95)  BP(mean): --  RR: 18 (12 May 2025 04:47) (18 - 19)  SpO2: 96% (12 May 2025 04:47) (93% - 96%)    Parameters below as of 12 May 2025 04:47  Patient On (Oxygen Delivery Method): room air        CAPILLARY BLOOD GLUCOSE        I&O's Summary    11 May 2025 07:01  -  12 May 2025 07:00  --------------------------------------------------------  IN: 760 mL / OUT: 2925 mL / NET: -2165 mL    General: NAD, sitting upright, obese habitus   Neuro: AAOx3, 5/5  strength b/l, 5/5 hip flexion/extension b/l, spontaneity, verbal   HEENT: NC/AT, PERRLA b/l, EOMI, no supraclavicular/submanidbular lymphadenopathy   Chest: nonTTP   Heart: S1/S2, RRR   Lungs: CTA b/l, nonlabored breathing   Abd: soft, nonTTP, nondistended has urostomy bag disconnected from stoma   Ext: active/passive strengh intact, no pretibial edema   Back: nonTTP, negative CVA tenderness b/l, wound dressing over percutaneous kidney tubes nonTTP   Skin: no lesions   Psych: reactive affect, hyperverbal, linear and goal directed thought  Lines/tubes/drains: has urostomy with bag with minimal urine,R nephrostomy tube with hematuria , L nephrostomy tube with hematuria     LABS:                        10.6   7.26  )-----------( 177      ( 12 May 2025 06:52 )             33.7     05-12    141  |  108  |  68[H]  ----------------------------<  146[H]  4.0   |  18[L]  |  5.32[H]    Ca    9.5      12 May 2025 06:54  Phos  3.9     05-12  Mg     2.6     05-12            Urinalysis Basic - ( 12 May 2025 06:54 )    Color: x / Appearance: x / SG: x / pH: x  Gluc: 146 mg/dL / Ketone: x  / Bili: x / Urobili: x   Blood: x / Protein: x / Nitrite: x   Leuk Esterase: x / RBC: x / WBC x   Sq Epi: x / Non Sq Epi: x / Bacteria: x          IMAGING:    [X] All pertinent imaging reviewed by me

## 2025-05-12 NOTE — PROGRESS NOTE ADULT - REASON FOR ADMISSION
Displaced nephroureteral tubes

## 2025-05-12 NOTE — PROGRESS NOTE ADULT - PROVIDER SPECIALTY LIST ADULT
Infectious Disease
Nephrology
Intervent Radiology
Urology
Infectious Disease
Intervent Radiology
Urology
Nephrology
Urology
Internal Medicine

## 2025-05-12 NOTE — PROVIDER CONTACT NOTE (OTHER) - BACKGROUND
70M PMH obesity, HTN, CKD5, depression, anxiety, complicated urologic history, s/p RALP for prostate cancer with adjuvant radiation complicated by bladder neck contracture

## 2025-05-12 NOTE — DISCHARGE NOTE NURSING/CASE MANAGEMENT/SOCIAL WORK - NSDCFUADDAPPT_GEN_ALL_CORE_FT
APPTS ARE READY TO BE MADE: [X] YES    Best Family or Patient Contact (if needed):    Additional Information about above appointments (if needed):    1: Please see your PCP within 1-2 wks of discharge to discuss recent hospitalization.  2: Please see the urologist at earlier appointment than scheduled to discuss most recent hospitalization and management of percutaneous nephroureteral stents/percutaneous nephrostomy tubes.  3: Please see your nephrologist, Dr. Vidhi Luna, June 9th at 10:40AM    Other comments or requests:

## 2025-05-12 NOTE — PROGRESS NOTE ADULT - SUBJECTIVE AND OBJECTIVE BOX
[facility] DIVISION OF KIDNEY DISEASE AND HYPERTENSION  [phone]    RENAL FOLLOW UP NOTE- NEPHROHOSPITALIST  --------------------------------------------------------------------------------  Patient seen and examined this morning, d/c planning now in progress    PAST HISTORY  --------------------------------------------------------------------------------  No significant changes to PMH, PSH, FHx, SHx, unless otherwise noted    ALLERGIES & MEDICATIONS  --------------------------------------------------------------------------------  Allergies    No Known Allergies    Intolerances      Standing Inpatient Medications  amLODIPine   Tablet 5 milliGRAM(s) Oral <User Schedule>  chlorhexidine 2% Cloths 1 Application(s) Topical daily  clonazePAM  Tablet 1 milliGRAM(s) Oral three times a day  enzalutamide 160 milliGRAM(s) Oral daily  ertapenem  IVPB 500 milliGRAM(s) IV Intermittent every 24 hours  furosemide    Tablet 40 milliGRAM(s) Oral <User Schedule>  polyethylene glycol 3350 17 Gram(s) Oral daily  senna 2 Tablet(s) Oral at bedtime  sodium bicarbonate 1300 milliGRAM(s) Oral three times a day  traZODone 300 milliGRAM(s) Oral at bedtime    PRN Inpatient Medications  acetaminophen     Tablet .. 650 milliGRAM(s) Oral every 6 hours PRN  HYDROmorphone  Injectable 0.25 milliGRAM(s) IV Push every 6 hours PRN  melatonin 3 milliGRAM(s) Oral at bedtime PRN  oxyCODONE    IR 5 milliGRAM(s) Oral every 6 hours PRN      FOCUSED REVIEW OF SYSTEMS  --------------------------------------------------------------------------------  denies fevers/rigors  denies CP/palpitations  denies SOB/cough  denies N/V/abd pain      VITALS/PHYSICAL EXAM  --------------------------------------------------------------------------------  T(C): 36.3 (05-12-25 @ 11:54), Max: 36.8 (05-11-25 @ 20:21)  HR: 68 (05-12-25 @ 11:54) (68 - 72)  BP: 157/78 (05-12-25 @ 11:54) (137/65 - 158/97)  RR: 18 (05-12-25 @ 11:54) (18 - 18)  SpO2: 96% (05-12-25 @ 11:54) (93% - 98%)  Wt(kg): --        05-11-25 @ 07:01  -  05-12-25 @ 07:00  --------------------------------------------------------  IN: 760 mL / OUT: 2925 mL / NET: -2165 mL    05-12-25 @ 07:01  -  05-12-25 @ 13:28  --------------------------------------------------------  IN: 480 mL / OUT: 600 mL / NET: -120 mL      Physical Exam:  	Gen: NAD, lying in bed  	Pulm: CTA B/L ant/lat fields  	CV: RRR, S1S2  	Abd: +BS, soft, nontender/nondistended.   	: No suprapubic tenderness.  b/l PCN. +urostomy          Extremity: No LE edema  	Access: L forearm AVF + thrill      LABS/STUDIES  --------------------------------------------------------------------------------              10.6   7.26  >-----------<  177      [05-12-25 @ 06:52]              33.7     141  |  108  |  68  ----------------------------<  146      [05-12-25 @ 06:54]  4.0   |  18  |  5.32        Ca     9.5     [05-12-25 @ 06:54]      Mg     2.6     [05-12-25 @ 06:54]      Phos  3.9     [05-12-25 @ 06:54]              Creatinine Trend:  SCr 5.32 [05-12 @ 06:54]  SCr 5.80 [05-11 @ 11:40]  SCr 6.20 [05-10 @ 10:51]  SCr 6.04 [05-09 @ 10:24]  SCr 4.79 [05-08 @ 04:07]              Urinalysis - [05-12-25 @ 06:54]      Color  / Appearance  / SG  / pH       Gluc 146 / Ketone   / Bili  / Urobili        Blood  / Protein  / Leuk Est  / Nitrite       RBC  / WBC  / Hyaline  / Gran  / Sq Epi  / Non Sq Epi  / Bacteria     Urine Sodium 70      [05-10-25 @ 18:51]    PTH -- (Ca 9.5)      [05-07-25 @ 09:40]   171

## 2025-05-12 NOTE — PROGRESS NOTE ADULT - PROBLEM SELECTOR PROBLEM 1
Acute kidney injury superimposed on CKD
Acute kidney injury superimposed on CKD
Displacement of ureteral stent

## 2025-05-12 NOTE — DISCHARGE NOTE NURSING/CASE MANAGEMENT/SOCIAL WORK - NSDCVIVACCINE_GEN_ALL_CORE_FT
influenza, injectable, quadrivalent, preservative free; 23-Nov-2019 14:01; Maryanne Snider (RN); T3 Search; 3bs44 (Exp. Date: 30-Jun-2020); IntraMuscular; Deltoid Left.; 0.5 milliLiter(s); VIS (VIS Published: 15-Aug-2019, VIS Presented: 23-Nov-2019);

## 2025-05-12 NOTE — PROGRESS NOTE ADULT - ASSESSMENT
69 y/o man with HTN, Obesity (BMI = 34.5) depression, anxiety, CKD 5, prostate cancer s/p radical robotic prostatectomy in 2011 (on Xtandi) , s/p radiation and bladder cancer s/p cystectomy May, 2018 with ileal conduit c/b recurrent uretero-ileal strictures requiring bilateral upside-down nephroureteral stents presenting to [facility] ED and admitted 5/6/25  after being evaluated at [facility] and found to have dislodged right nephroureteral stent and malpositioned left nephroureteral stent.  Admitted 5/6/25 and underwent IR procedure, Stent exchange in which purulent urine noted on left    5/6 urine cx growing pan susceptible Proteus mirabilis, Strep agalactiae and 50 - 99k E coli   E coli is MDR with suspectibility to Ertapenem, Levoflox, Cipro, TMP-Sx    NKA, Cr Clear around 10 cc/min'    Antibiotics  Ceftriaxone 5/6  Ertapenem 5/8    Obstruction relieved,  remains non toxic  5.12 IR follow up appreciated    Suggest  Ertapenem until discharged from hospital  When ready for discharge:  PO Keflex 500 mg po daily  PLUS PO Cipro 250  mg po daily  (to cover +ESBL E coli pending susceptibility to other agents)    continue abx through 5/15/25

## 2025-05-12 NOTE — PROGRESS NOTE ADULT - ASSESSMENT
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

## 2025-05-12 NOTE — PROGRESS NOTE ADULT - PROBLEM SELECTOR PLAN 1
- History of bladder cancer s/p cyctectomy with ileal conduit c/b recurrent uretero-ileal strictures requiring bilateral upside-down nephroureteral stents presenting with flank pain and Xray KUB showing dislodged right ureteral stent to the level of L5 and CT AP showing loss of previously visualize left ureteral stent.   - IR 5/8 percutaneous nephrostomy tube placement    > f/u urology recommendations   > c/w flushing drains with 5cc NS daily forward only; DO NOT aspirate, change dressing q3 days or when dressing is saturate, and can consider capping drains when output is clear/ UTI is cleared  > outpatient f/u with Dr. Hoenig Urology when discharged for further management

## 2025-05-12 NOTE — PROGRESS NOTE ADULT - PROBLEM SELECTOR PLAN 6
-C/w clonazepam 1 mg po TID
> c/w clonazepam 1 mg po TID

## 2025-05-12 NOTE — PROGRESS NOTE ADULT - NSPROGADDITIONALINFOA_GEN_ALL_CORE
Please do not hesitate to TEAMS Dr. Walters if further input needed during the day.  For questions Weekdays after 5PM and on weekends, please page the Renal Fellow on call.    Patient will need follow-up with [facility] Division of Kidney Disease and Hypertension with his primary nephrologist, Dr. Vidhi Luna.  His missed appointment (due to hospitalization) has been rescheduled to June 9th at 10:40AM.  Primary team notified and asked to please convey to patient    [facility] DIVISION OF KIDNEY DISEASE AND HYPERTENSION    [address] [place] [address] [phone]

## 2025-05-12 NOTE — PROVIDER CONTACT NOTE (OTHER) - ASSESSMENT
Pt is A&O x 4. VSS as per flowhseets. Pt has been NSR on tele no events. No s/s of distress noted at this time.
VSS. No s/s of bleeding. Pt denies cp, denies SOB, denies pain. Pt a&ox4 refusing bed alarm at beginning of shift, aware of risks/educated on importance. RN and PCA rounded on pt an hour before event. Pt pressed call bell, RN went to assist pt, pt found on knees holding onto side of the bed. PT denies head strike, stated that he was trying to get up for a blanket and slid off the bed onto his knees/side.

## 2025-05-12 NOTE — PROGRESS NOTE ADULT - PROBLEM SELECTOR PLAN 5
> c/w trazodone 300 mg po qhs given 600 mg qhs has unclear evidence for efficacy   > c/w diphenhydramine 25 mg po qhs > c/w trazodone 300 mg po qhs given 600 mg qhs has unclear evidence for efficacy   > HOLD diphenhydramine 25 mg po qhs (05/12)

## 2025-05-12 NOTE — PROGRESS NOTE ADULT - PROBLEM SELECTOR PLAN 1
Pt with BARON on advanced CKD likely due to relative hypotension. Baseline Cr 4.5-4.8, increased to 6.2 (5/10).  -Creatinine improving to 5.3 today  -CT demonstrating PCNs in good position  -patient tolerating reintroduction of home lasix  -Metabolic acidosis iso advanced CKD: serum bicarb acceptable at 18, continue bicarb tabs as ordered  Patient is not uremic, potassium WNL, and is responsive to diuretics-- no urgent indication for initiation of renal replacement therapy    No renal objection to d/c planning

## 2025-05-12 NOTE — DISCHARGE NOTE NURSING/CASE MANAGEMENT/SOCIAL WORK - NSDCPEFALRISK_GEN_ALL_CORE
For information on Fall & Injury Prevention, visit: [email or web address] OR  [email or web address] OR  https://www.cdc.gov/steadi/patient.html

## 2025-05-12 NOTE — PHARMACOTHERAPY INTERVENTION NOTE - COMMENTS
As part of the [facility] initiative at [facility], a review of the patient's medications was conducted, focusing on the Medications domain, which includes PIMs screening and reduction of high-risk/psychotropic medications in the elderly. The patient's inpatient medication list was screened and reviewed:    MEDICATIONS  (STANDING):  amLODIPine   Tablet 5 milliGRAM(s) Oral <User Schedule>  chlorhexidine 2% Cloths 1 Application(s) Topical daily  clonazePAM  Tablet 1 milliGRAM(s) Oral three times a day  enzalutamide 160 milliGRAM(s) Oral daily  ertapenem  IVPB 500 milliGRAM(s) IV Intermittent every 24 hours  furosemide    Tablet 40 milliGRAM(s) Oral <User Schedule>  polyethylene glycol 3350 17 Gram(s) Oral daily  senna 2 Tablet(s) Oral at bedtime  sodium bicarbonate 1300 milliGRAM(s) Oral three times a day  traZODone 300 milliGRAM(s) Oral at bedtime    MEDICATIONS  (PRN):  acetaminophen     Tablet .. 650 milliGRAM(s) Oral every 6 hours PRN Temp greater or equal to 38C (100.4F), Mild Pain (1 - 3)  HYDROmorphone  Injectable 0.25 milliGRAM(s) IV Push every 6 hours PRN Severe Pain (7 - 10)  melatonin 3 milliGRAM(s) Oral at bedtime PRN Insomnia  oxyCODONE    IR 5 milliGRAM(s) Oral every 6 hours PRN Moderate Pain (4 - 6)      Recommending to discontinue diphenhydramine 25 mg orally at bedtime for insomnia. Patient has melatonin as needed and is currently on clonazepam. Diphenhydramine can cause harm in patient with history of prostate problem and cause urinary retention in elderly.    Adam (Israel Luu) Crow - PharmD, BCPS  Transitions of Care Pharmacist  Available on [employer] Teams (Preferred)

## 2025-05-12 NOTE — PROGRESS NOTE ADULT - PROBLEM SELECTOR PLAN 4
- History of CKD5 without dialysis; currently has urostomy with ileal conduit. Non oligouric. Unclear baseline SCr. ON admission Scr 4.56, worsening Scr most recently 6.04   - No fluid overload. Hyperphosphatemia. No urgent/emergent indications of hemodialysis.   - CTAP now with resolution of hydronephrosis  - DDx: relative hypotension 2/2 UTI vs ATN vs less likely proximal obstruction     > c/w restarted Lasix 40mg PO QD (sun/tues/thurs)  > Monitor BUN/Cr   > Daily weight, strict I/O  > f/u nephrology recommendations   > Per IR, can consider tube study if concern for blockage

## 2025-05-12 NOTE — DISCHARGE NOTE NURSING/CASE MANAGEMENT/SOCIAL WORK - PATIENT PORTAL LINK FT
You can access the FollowMyHealth Patient Portal offered by [facility] by registering at the following website: [email or web address]. By joining [employer]’s FollowMyHealth portal, you will also be able to view your health information using other applications (apps) compatible with our system.

## 2025-05-12 NOTE — PROGRESS NOTE ADULT - ATTENDING COMMENTS
Pt seen and examined  films reviewed  planning bilateral PCN-NU, ideally- pt unable to reliably keep upside down  nephrostomy in place  d/w pt plans
s/p PCN  #lexie on ckd stage 1V  cr increased to 6s in the past 48 hours, peaked yesterday  notable relative hypotension -atn  downtrending creatinine today  cont to monitor bun/cr trend  # s/p pcn  left PCN with decreased UO compared to R   d/w RN and pcn is flushing  would consult IR to see if pcn is dislodged  would check repeat CT scan a/p  urology follow up  #met acidosis  monitor bicarb trend  on sodium bicarb tabs  #vol status stable  BP elevated today  ok to restart Lasix  add labetalol if needed if BP remains elevated
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 s/p cystectomy with ileal conduit c/b uretero-ileal stricture initially managed endoscopically, ultimately, ended up getting bilateral nephrostomy tubes which were converted to upside down Neph-u stents last exchanged on 4/16/25 with Dr Hoenig, transferred from  ED for IR and uro evaluation i/s/o displaced ureteral stents suspect complicated UTI resumed abx with leukocytosis and + urine culture, s/p IR procedure 5/8 with b/l nephrostomy tubes placed (left: 8f x 22cm neph u, purulent urine, right: 8f x 20cm neph u, blood tinged urine) and partially dislodged r retrograde tube removed over wire.    1. Displacement of ureteral stent: History of bladder cancer s/p cyctectomy with ileal conduit c/b recurrent uretero-ileal strictures requiring bilateral upside-down nephroureteral stents presenting with flank pain and Xray KUB showing dislodged right ureteral stent to the level of L5 and CT AP showing loss of previously visualize left ureteral stent. Clinically monitor. Appreciate urology, recommend IR evaluation for left upside down nephroureteral tube placement and right upside down nephroureteral tube exchange  -Patient with urostomy and ileal conduit with upside nephroureteral tubes. UA with large LE and moderate bacteria. History of ESBL and MRSA; however current sample likely contamined due to colonization.  Urine culture: >100,000 CFU/ml Proteus mirabilis, 50,000 - 99,000 CFU/mL Escherichia coli,>100,000 CFU/ml Streptococcus agalactiae (Group B)   F/u urine cx during IR procedure given patient declining at bedside   -with leukocytosis and purulent urine during IR procedure, resumed ertapenem  -dilaudid prn severe pain (responded well to 0.25mg iv)  -IR procedure 5/8 with b/l nephrostomy tubes placed (left: 8f x 22cm neph u, purulent urine, right: 8f x 20cm neph u, blood tinged urine) and partially dislodged r retrograde tube removed over wire.  per IR, when urine clears more and there are no longer symptoms of concern for acute uti can consider capping tubes    2. Normocytic anemia: stable from 10/31/2024, CBC daily and outpatient f/u.    3. Stage 5 chronic kidney disease not on chronic dialysis: History of CKD5 without dialysis; currently has urostomy with ileal conduit. Non oligouric. Unclea rbaseline SCr. ON admission Scr 4.56 (baseline). No fluid overload. No urgent/emergent indications of hemodialysis.   -Monitor BUN/Cr   -Daily weight, strict I/O.    4.  Insomnia: C/w trazodone 300 mg po qhs given 600 mg qhs has unclear evidence for efficacy   -C/w diphenhydramine 25 mg po qhs.    5. Anxiety and depression.   -C/w clonazepam 1 mg po TID.    6. Hypertension: uncontrolled despite improved pain control   -C/w home amlodipine 5 mg po BID   -Initiate hydralazine 25 mg po TID for SBP <160 for IR procedure.    7. Prostate cancer.   -C/w home enzalatumide 160 mg po qd.    Dispo: likely home, pending clinical course post procedure in 24-48 hours.  F/u PT recs  contact: TEAMS .
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 s/p cystectomy with ileal conduit c/b uretero-ileal stricture initially managed endoscopically, ultimately, ended up getting bilateral nephrostomy tubes which were converted to upside down Neph-u stents last exchanged on 4/16/25 with Dr Hoenig, transferred from  ED for IR and uro evaluation i/s/o displaced ureteral stents suspect complicated UTI resumed abx with leukocytosis and + urine culture, s/p IR procedure 5/8 with b/l nephrostomy tubes placed (left: 8f x 22cm neph u, purulent urine, right: 8f x 20cm neph u, blood tinged urine) and partially dislodged r retrograde tube removed over wire pending ID c/s.  - now with worsening renal function - obtain nephro consult - obtain CT a/p to further eval tubes
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 Currently with output and CT with evidence of resolution of hydro. Stable for d/c on cipro/keflex to end 5/15.
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 s/p cystectomy with ileal conduit c/b uretero-ileal stricture initially managed endoscopically, ultimately, ended up getting bilateral nephrostomy tubes which were converted to upside down Neph-u stents last exchanged on 4/16/25 with Dr Hoenig, transferred from  ED for IR and uro evaluation i/s/o displaced ureteral stents suspect complicated UTI resumed abx with leukocytosis and + urine culture, s/p IR procedure 5/8 with b/l nephrostomy tubes placed (left: 8f x 22cm neph u, purulent urine, right: 8f x 20cm neph u, blood tinged urine) and partially dislodged r retrograde tube removed over wire pending ID c/s.    1. Displacement of ureteral stent: History of bladder cancer s/p cyctectomy with ileal conduit c/b recurrent uretero-ileal strictures requiring bilateral upside-down nephroureteral stents presenting with flank pain and Xray KUB showing dislodged right ureteral stent to the level of L5 and CT AP showing loss of previously visualize left ureteral stent. UA with large LE and moderate bacteria. History of ESBL and MRSA; however current sample likely contamined due to colonization.    -s/p IR procedure 5/8 with b/l nephrostomy tubes placed (left: 8f x 22cm neph u, purulent urine, right: 8f x 20cm neph u, blood tinged urine) and partially dislodged r retrograde tube removed over wire.  Pain now RESOLVED  Urine culture: >100,000 CFU/ml Proteus mirabilis, 50,000 - 99,000 CFU/mL Escherichia coli,>100,000 CFU/ml Streptococcus agalactiae (Group B)   -with leukocytosis and purulent urine during IR procedure, resumed ertapenem  -dilaudid prn severe pain (responded well to 0.25mg iv)  per IR, when urine clears more and there are no longer symptoms of concern for acute uti can consider capping tubes  given complex anatomy, consult ID to assist with abx choice/duration    2. Normocytic anemia: stable from 10/31/2024, CBC daily and outpatient f/u.  With hematuria from left nephrostomy tube, hold dvt ppx    3. Stage 5 chronic kidney disease not on chronic dialysis: History of CKD5 without dialysis; currently has urostomy with ileal conduit. Non oligouric. Unclea rbaseline SCr. ON admission Scr 4.56 (baseline). No fluid overload. No urgent/emergent indications of hemodialysis.   -Monitor BUN/Cr   -Daily weight, strict I/O.    4.  Insomnia: C/w trazodone 300 mg po qhs given 600 mg qhs has unclear evidence for efficacy   -C/w diphenhydramine 25 mg po qhs.    5. Anxiety and depression.   -C/w clonazepam 1 mg po TID.    6. Hypertension: uncontrolled despite improved pain control   -C/w home amlodipine 5 mg po BID   -hold hydralazine    7. Prostate cancer.   -C/w home enzalatumide 160 mg po qd.    Dispo: home once abx guidance given by ID.  Per PT [facility] 24, no need for eval  contact: TEAMS .
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 s/p cystectomy with ileal conduit c/b uretero-ileal stricture initially managed endoscopically, ultimately, ended up getting bilateral nephrostomy tubes which were converted to upside down Neph-u stents last exchanged on 4/16/25 with Dr Hoenig, transferred from  ED for IR and uro evaluation i/s/o displaced ureteral stents suspect complicated UTI resumed abx with leukocytosis and + urine culture, s/p IR procedure 5/8 with b/l nephrostomy tubes placed (left: 8f x 22cm neph u, purulent urine, right: 8f x 20cm neph u, blood tinged urine) and partially dislodged r retrograde tube removed over wire pending ID c/s.  - now with worsening renal function - obtain nephro consult - obtain CT a/p to further eval tubes   - restart lasix per nephro
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     1. Displacement of ureteral stent: History of bladder cancer s/p cyctectomy with ileal conduit c/b recurrent uretero-ileal strictures requiring bilateral upside-down nephroureteral stents presenting with flank pain and Xray KUB showing dislodged right ureteral stent to the level of L5 and CT AP showing loss of previously visualize left ureteral stent. Clinically monitor.   - Appreciate urology, recommend IR evaluation for left upside down nephroureteral tube placement and right upside down nephroureteral tube exchange  -Pending IR 5/8 plan for right nephrostomy vs right nephrouretererostomy stent placement and left nephroureterostomy stent exchange.  -Patient with urostomy and ileal conduit with upside nephroureteral tubes. UA with large LE and moderate bacteria. No SIRS nor urinary tract like sx.   -History of ESBL and MRSA; however current sample likely contamined due to colonized  -Obtain urine cx during IR procedure given patient declining at bedside   -D/c ertapenem given likely absence of acute infection (uti ruled out)  -S/p ceftriaxone x 1 in ED   -F/u MRSA PCR  -dilaudid prn severe pain (responded well to 0.25mg iv)    2. Normocytic anemia: stable from 10/31/2024, CBC daily and outpatient f/u.    3. Stage 5 chronic kidney disease not on chronic dialysis: History of CKD5 without dialysis; currently has urostomy with ileal conduit. Non oligouric. Unclea rbaseline SCr. ON admission Scr 4.56 (baseline). No fluid overload. No urgent/emergent indications of hemodialysis.   -Monitor BUN/Cr   -Daily weight, strict I/O.    4.  Insomnia: C/w trazodone 300 mg po qhs given 600 mg qhs has unclear evidence for efficacy   -C/w diphenhydramine 25 mg po qhs.    5. Anxiety and depression.   -C/w clonazepam 1 mg po TID.    6. Hypertension: uncontrolled despite improved pain control   -C/w home amlodipine 5 mg po BID   -Initiate hydralazine 25 mg po TID for SBP <160 for IR procedure.    7. Prostate cancer.   -C/w home enzalatumide 160 mg po qd.    Dispo: likely home, pending clinical course post procedure in 24-48 hours  contact: TEAMS

## 2025-05-12 NOTE — PROGRESS NOTE ADULT - TIME BILLING
- Ordering, reviewing, and interpreting labs, testing, and imaging.  - Independently obtaining a review of systems and performing a physical exam  - Reviewing consultant documentation/recommendations in addition to discussing plan of care with consultants.  - Counselling and educating patient and family regarding interpretation of aforementioned items and plan of care.
reviewing emr, labs, imaging, coordination of care, discussion with patient, documentation. Time spent excludes teaching services.
- Ordering, reviewing, and interpreting labs, testing, and imaging.  - Independently obtaining a review of systems and performing a physical exam  - Reviewing consultant documentation/recommendations in addition to discussing plan of care with consultants.  - Counselling and educating patient and family regarding interpretation of aforementioned items and plan of care.

## 2025-05-12 NOTE — PROGRESS NOTE ADULT - SUBJECTIVE AND OBJECTIVE BOX
Follow Up:  uti    Interval History/ROS:  denies pain    Allergies  No Known Allergies    ANTIMICROBIALS:  ertapenem  IVPB 500 every 24 hours      OTHER MEDS:  MEDICATIONS  (STANDING):  acetaminophen     Tablet .. 650 every 6 hours PRN  amLODIPine   Tablet 5 <User Schedule>  clonazePAM  Tablet 1 three times a day  enzalutamide 160 daily  furosemide    Tablet 40 <User Schedule>  HYDROmorphone  Injectable 0.25 every 6 hours PRN  melatonin 3 at bedtime PRN  oxyCODONE    IR 5 every 6 hours PRN  polyethylene glycol 3350 17 daily  senna 2 at bedtime  traZODone 300 at bedtime      Vital Signs Last 24 Hrs  T(C): 36.3 (12 May 2025 11:54), Max: 36.8 (11 May 2025 20:21)  T(F): 97.4 (12 May 2025 11:54), Max: 98.2 (11 May 2025 20:21)  HR: 68 (12 May 2025 11:54) (68 - 72)  BP: 157/78 (12 May 2025 11:54) (137/65 - 158/97)  BP(mean): --  RR: 18 (12 May 2025 11:54) (18 - 18)  SpO2: 96% (12 May 2025 11:54) (93% - 98%)    Parameters below as of 12 May 2025 11:54  Patient On (Oxygen Delivery Method): room air        PHYSICAL EXAM:  General:  NAD, Non-toxic  Neurology: A&Ox3, nonfocal  Respiratory: Clear to auscultation bilaterally  CV: RRR, S1S2, no murmurs, rubs or gallops  Abdominal: prominent Non-tender,  Extremities: No edema  Line Sites: Clear  Skin: No rash                          10.6   7.26  )-----------( 177      ( 12 May 2025 06:52 )             33.7   WBC Count: 7.26 (05-12 @ 06:52)  WBC Count: 6.73 (05-11 @ 11:40)  WBC Count: 9.31 (05-10 @ 10:51)  WBC Count: 11.95 (05-09 @ 10:24)  WBC Count: 13.40 (05-08 @ 04:07)    05-12    141  |  108  |  68[H]  ----------------------------<  146[H]  4.0   |  18[L]  |  5.32[H]  Creatinine: 5.32 (05-12 @ 06:54)    Creatinine: 5.80 (05-11 @ 11:40)    Creatinine: 6.20 (05-10 @ 10:51)    Creatinine: 6.04 (05-09 @ 10:24)    Creatinine: 4.79 (05-08 @ 04:07)      Ca    9.5      12 May 2025 06:54  Phos  3.9     05-12  Mg     2.6     05-12          MICROBIOLOGY:  Kidney None  05-06-25   >100,000 CFU/ml Proteus mirabilis  50,000 - 99,000 CFU/mL Escherichia coli  >100,000 CFU/ml Streptococcus agalactiae (Group B)    RADIOLOGY:  < from: CT Abdomen and Pelvis No Cont (05.11.25 @ 13:08) >  IMPRESSION:  1. No significant change in 4.1 x 3.8 cm fluid collection at the   prostatectomy site. Sclerotic and erosive changes at the pubic symphysis   are again noted. Underlying infection at these sites is possible.  2. Status post bilateral percutaneous nephroureteral stents with tips   terminating in the distal ureters/ileal conduit 7.5 cm proximal to the   right lower abdominal wall. Complete/near complete resolution of   hydronephrosis.  3. Heavy stool burden. Correlate for constipation. Mild rectal wall   thickening suggestive of proctitis. Correlate clinically.  4. Trace anterior pericardial effusion.    < end of copied text >      Boone Romo MD; Division of Infectious Disease; Pager: [phone]; nights and weekends: [phone]

## 2025-05-12 NOTE — PROGRESS NOTE ADULT - SUBJECTIVE AND OBJECTIVE BOX
NOTE IN PROGRESS    Interventional Radiology Follow-Up Note    This is a 70y Male s/p bilateral PCN-U placement on  in Interventional Radiology with Dr. Garcia.     S:   Medication:     amLODIPine   Tablet: ()  ertapenem  IVPB: ()  furosemide    Tablet: ()    Vitals:   T(F): 97.9, Max: 98.2 (20:21)  HR: 72  BP: 158/97  RR: 18  SpO2: 98%    Physical Exam:  General: Nontoxic, in NAD, A&O x3.  Abdomen: soft, NTND, no peritoneal signs.    Drain Device: Drain intact attached to gravity bag/POOJA with ___output. Dressing clean, dry, intact. Drain flushed w 5cc NS w/o difficulty. No pericatheter leakage noted, +fluid return noted in tubing.       24hr Drain output:   R PCN: 2700ml  L PCN: 225ml    LABS:                            10.6   7.26  )-----------( 177      ( 12 May 2025 06:52 )             33.7         141  |  108  |  68[H]  ----------------------------<  146[H]  4.0   |  18[L]  |  5.32[H]    Ca    9.5      12 May 2025 06:54  Phos  3.9       Mg     2.6                   Assessment/Plan:   69 yo M with complicated urologic history, s/p RALP for prostate cancer with adjuvant radiation complicated by bladder neck contracture s/p multiple procedures eventually ended up managing with SP Tube, subsequently ended up developing 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  AF VSS. WBC 8.3, Cr 4.66 (around baseline), K 5.5. CT shows L hydro without L stent and stable R hydro with evidence of new R stent in mid to distal ureter. Now s/p BL PCNU placement  with MD Garcia.      -urology following: BL PCNU in place with resolution of hydronephrosis; left PCNU putting out less likely 2/2 to left renal atrophy compared to right  -can consider capping drains when output is clear/ UTI is cleared  -f/u with Dr. Hoenig Urology when discharged for further management  -continue global management per primary team  -H/H stable, continue to monitor h/h  -Hemodynamically stable, continue to trend vs/labs  -WBC WNL, remains on ertapenem afebrile. Cx + polymicrobial.   -flush drain with 5cc NS daily forward only; DO NOT aspirate  -change dressing q3 days or when dressing is saturated      Any questions or concerns regarding above please reach out to IR:   -Available on microsoft teams  -During working hours (7a-5p): call [phone]  -Emergent issues after 5pm: page: [phone]  -Non-emergent consults: Please place a [place] order "IR Consult" with an appropriate callback number  -Scheduling questions: [phone]  -Clinic/Outpatient [phone]    Niyah Ayala DNP-[facility]  Interventional Radiology  Available on [employer] teams        Interventional Radiology Follow-Up Note    This is a 70y Male s/p bilateral PCN-U placement on  in Interventional Radiology with Dr. Garcia.     S: Patient seen and examined @ bedside earlier today. c/o leaking urostomy site; no complaints regarding PCNs, denies pain.    Medication:  amLODIPine   Tablet: ()  ertapenem  IVPB: ()  furosemide    Tablet: ()    Vitals:   T(F): 97.9, Max: 98.2 (20:21)  HR: 72  BP: 158/97  RR: 18  SpO2: 98%    Physical Exam:  General: Nontoxic, in NAD, A&O x3.  Abdomen: soft, NTND, no peritoneal signs.  Drain Device: Drains intact attached to gravity bags. Right PCN with serous output Left PCN with dark red output. Dressings clean, dry, intact. Drains both flushed w 5cc NS w/o difficulty. No pericatheter leakage noted, +fluid return noted in tubing.       24hr Drain output:   R PCN: 2700ml  L PCN: 225ml    LABS:                            10.6   7.26  )-----------( 177      ( 12 May 2025 06:52 )             33.7         141  |  108  |  68[H]  ----------------------------<  146[H]  4.0   |  18[L]  |  5.32[H]    Ca    9.5      12 May 2025 06:54  Phos  3.9       Mg     2.6             < from: CT Abdomen and Pelvis No Cont (25 @ 13:08) >  FINDINGS:  LOWER CHEST: Trace bilateral pleural effusion described fat. Trace   anterior pericardial effusion. Bilateral gynecomastia.    LIVER: Within normal limits.  BILE DUCTS: Normal caliber.  GALLBLADDER: Cholelithiasis.  SPLEEN: Small hypodense lesion in the lower pole, unchanged, probably   benign.  PANCREAS: Within normal limits.  ADRENALS: Within normal limits.  KIDNEYS/URETERS: Status post bilateral percutaneous nephroureteral stents   with tips terminating in the distal ureters/ileal conduit 7.5 cm proximal   to the right lower abdominal wall. Nohydronephrosis. Mild bilateral   periureteral stranding. Bilateral cortical scarring.    BLADDER: Cystectomy.  REPRODUCTIVE ORGANS: Prostatectomy. No significant change in 4.1 x 3.8 cm   fluid collection at the prostatectomy site.    BOWEL: Heavy stool burden. No bowel obstruction. Right lower quadrant   small bowel anastomosis. Mild rectal wall thickening.  PERITONEUM/RETROPERITONEUM: No extraluminal gas.  VESSELS: Atherosclerotic changes.  LYMPH NODES: Again noted are prominent upper retroperitoneal lymph nodes,   most likely reactive.  ABDOMINAL WALL: Postsurgical changes with tethering of small bowel at   scar within the lower midline abdominal wall (3:143).  BONES: Stable sclerotic and erosive changes at the pubic symphysis.   Chronic compression deformity at T9.    IMPRESSION:  1. No significant change in 4.1 x 3.8 cm fluid collection at the   prostatectomy site. Sclerotic and erosive changes at the pubic symphysis   are again noted. Underlying infection at these sites is possible.  2. Status post bilateral percutaneous nephroureteral stents with tips   terminating in the distal ureters/ileal conduit 7.5 cm proximal to the   right lower abdominal wall. Complete/near complete resolution of   hydronephrosis.  3. Heavy stool burden. Correlate for constipation. Mild rectal wall   thickening suggestive of proctitis. Correlate clinically.  4. Trace anterior pericardial effusion.    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  AF VSS. WBC 8.3, Cr 4.66 (around baseline), K 5.5. CT shows L hydro without L stent and stable R hydro with evidence of new R stent in mid to distal ureter. Now s/p BL PCNU placement  with MD Garcia.      -urology following: BL PCNU in place with resolution of hydronephrosis; left PCNU putting out less likely 2/2 to left renal atrophy compared to right  -5/11 CT reviewed with MD Garcia, drains in appropriate position and agree with urology as more left renal atrophy is likely cause of difference in output compared to right.   -can consider capping drains when output is clears / UTI is cleared  -f/u with Dr. Hoenig Urology when discharged for further management  -continue global management per primary team  -H/H stable, continue to monitor h/h  -Hemodynamically stable, continue to trend vs/labs  -WBC WNL, remains on ertapenem afebrile. Cx + polymicrobial.   -flush drain with 5cc NS daily forward only; DO NOT aspirate  -change dressing q3 days or when dressing is saturated  -Please follow up with Interventional radiology (IR) in 3 months for routine evaluation of your nephrourostomy drains, unless instructed by urology to be evaluated sooner. Please call IR booking office at [phone] to schedule. Please feel free to contact us at [phone] if any problems arise. After 6PM, Monday through Friday, on weekends and on holidays, please call [phone] and ask for the radiology resident on call to be paged.   -case d/w MD Garcia      Any questions or concerns regarding above please reach out to IR:   -Available on micro[employer] teams  -During working hours (7a-5p): call [phone]  -Emergent issues after 5pm: page: [phone]  -Non-emergent consults: Please place a [place] order "IR Consult" with an appropriate callback number  -Scheduling questions: [phone]  -Clinic/Outpatient [phone]    Niyah Ayala DNP-[facility]  Interventional Radiology  Available on [employer] teams        Interventional Radiology Follow-Up Note    This is a 70y Male s/p bilateral PCN-U placement on  in Interventional Radiology with Dr. Garcia.     S: Patient seen and examined @ bedside earlier today. c/o leaking urostomy site; no complaints regarding PCNs, denies pain.    Medication:  amLODIPine   Tablet: ()  ertapenem  IVPB: ()  furosemide    Tablet: ()    Vitals:   T(F): 97.9, Max: 98.2 (20:21)  HR: 72  BP: 158/97  RR: 18  SpO2: 98%    Physical Exam:  General: Nontoxic, in NAD, A&O x3.  Abdomen: soft, NTND, no peritoneal signs.  Drain Device: Drains intact attached to gravity bags. Right PCN with serous output Left PCN with dark red output. Dressings clean, dry, intact. Drains both flushed w 5cc NS w/o difficulty. No pericatheter leakage noted, +fluid return noted in tubing.       24hr Drain output:   R PCN: 2700ml  L PCN: 225ml    LABS:                            10.6   7.26  )-----------( 177      ( 12 May 2025 06:52 )             33.7         141  |  108  |  68[H]  ----------------------------<  146[H]  4.0   |  18[L]  |  5.32[H]    Ca    9.5      12 May 2025 06:54  Phos  3.9       Mg     2.6             < from: CT Abdomen and Pelvis No Cont (25 @ 13:08) >  FINDINGS:  LOWER CHEST: Trace bilateral pleural effusion described fat. Trace   anterior pericardial effusion. Bilateral gynecomastia.    LIVER: Within normal limits.  BILE DUCTS: Normal caliber.  GALLBLADDER: Cholelithiasis.  SPLEEN: Small hypodense lesion in the lower pole, unchanged, probably   benign.  PANCREAS: Within normal limits.  ADRENALS: Within normal limits.  KIDNEYS/URETERS: Status post bilateral percutaneous nephroureteral stents   with tips terminating in the distal ureters/ileal conduit 7.5 cm proximal   to the right lower abdominal wall. Nohydronephrosis. Mild bilateral   periureteral stranding. Bilateral cortical scarring.    BLADDER: Cystectomy.  REPRODUCTIVE ORGANS: Prostatectomy. No significant change in 4.1 x 3.8 cm   fluid collection at the prostatectomy site.    BOWEL: Heavy stool burden. No bowel obstruction. Right lower quadrant   small bowel anastomosis. Mild rectal wall thickening.  PERITONEUM/RETROPERITONEUM: No extraluminal gas.  VESSELS: Atherosclerotic changes.  LYMPH NODES: Again noted are prominent upper retroperitoneal lymph nodes,   most likely reactive.  ABDOMINAL WALL: Postsurgical changes with tethering of small bowel at   scar within the lower midline abdominal wall (3:143).  BONES: Stable sclerotic and erosive changes at the pubic symphysis.   Chronic compression deformity at T9.    IMPRESSION:  1. No significant change in 4.1 x 3.8 cm fluid collection at the   prostatectomy site. Sclerotic and erosive changes at the pubic symphysis   are again noted. Underlying infection at these sites is possible.  2. Status post bilateral percutaneous nephroureteral stents with tips   terminating in the distal ureters/ileal conduit 7.5 cm proximal to the   right lower abdominal wall. Complete/near complete resolution of   hydronephrosis.  3. Heavy stool burden. Correlate for constipation. Mild rectal wall   thickening suggestive of proctitis. Correlate clinically.  4. Trace anterior pericardial effusion.    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  AF VSS. WBC 8.3, Cr 4.66 (around baseline), K 5.5. CT shows L hydro without L stent and stable R hydro with evidence of new R stent in mid to distal ureter. Now s/p BL PCNU placement  with MD Garcia.      -urology following: BL PCNU in place with resolution of hydronephrosis; left PCNU putting out less likely 2/2 to left renal atrophy compared to right  -5/11 CT reviewed with MD Garcia, drains in appropriate position and agree with urology as more left renal atrophy is likely cause of difference in output compared to right.   -can consider capping drains when output is clears / UTI is cleared  -f/u with Dr. Hoenig Urology when discharged for further management  -continue global management per primary team  -H/H stable, continue to monitor h/h  -Hemodynamically stable, continue to trend vs/labs  -WBC WNL, remains on ertapenem afebrile. Cx + polymicrobial.  -SCr downtrending 5.32 < 5.8 < 6.2   -flush drain with 5cc NS daily forward only; DO NOT aspirate  -change dressing q3 days or when dressing is saturated  -Please follow up with Interventional radiology (IR) in 3 months for routine evaluation of your nephrourostomy drains, unless instructed by urology to be evaluated sooner. Please call IR booking office at [phone] to schedule. Please feel free to contact us at [phone] if any problems arise. After 6PM, Monday through Friday, on weekends and on holidays, please call [phone] and ask for the radiology resident on call to be paged.   -case d/w MD Garcia      Any questions or concerns regarding above please reach out to IR:   -Available on micro[employer] teams  -During working hours (7a-5p): call [phone]  -Emergent issues after 5pm: page: [phone]  -Non-emergent consults: Please place a [place] order "IR Consult" with an appropriate callback number  -Scheduling questions: [phone]  -Clinic/Outpatient [phone]    Niyah Ayala DNP-AGACNP  Interventional Radiology  Available on [employer] teams

## 2025-05-12 NOTE — DISCHARGE NOTE NURSING/CASE MANAGEMENT/SOCIAL WORK - FINANCIAL ASSISTANCE
[facility] provides services at a reduced cost to those who are determined to be eligible through [facility]’s financial assistance program. Information regarding [facility]’s financial assistance program can be found by going to [email or web address] or by calling [phone].

## 2025-05-12 NOTE — PROGRESS NOTE ADULT - PROBLEM SELECTOR PLAN 3
- stable from 10/31/2024, CBC daily and outpatient f/u
- stable from 10/31/2024    > CBC daily and outpatient f/u

## 2025-05-12 NOTE — PROVIDER CONTACT NOTE (OTHER) - ACTION/TREATMENT ORDERED:
provider notified, provider came to bedside assessed pt, IV Tylenol ordered for pain. pending further orders/scans. care ongoing
DARIUS Camacho aware. No further interventions at this time as per MD. Plan of care ongoing.

## 2025-05-13 ENCOUNTER — TRANSCRIPTION ENCOUNTER (OUTPATIENT)
Age: 70
End: 2025-05-13

## 2025-05-15 ENCOUNTER — TRANSCRIPTION ENCOUNTER (OUTPATIENT)
Age: 70
End: 2025-05-15

## 2025-05-20 ENCOUNTER — NON-APPOINTMENT (OUTPATIENT)
Age: 70
End: 2025-05-20

## 2025-05-21 ENCOUNTER — APPOINTMENT (OUTPATIENT)
Dept: UROLOGY | Facility: CLINIC | Age: 70
End: 2025-05-21

## 2025-05-27 ENCOUNTER — TRANSCRIPTION ENCOUNTER (OUTPATIENT)
Age: 70
End: 2025-05-27

## 2025-05-27 DIAGNOSIS — N39.0 URINARY TRACT INFECTION, SITE NOT SPECIFIED: ICD-10-CM

## 2025-05-27 RX ORDER — CIPROFLOXACIN HYDROCHLORIDE 250 MG/1
250 TABLET, FILM COATED ORAL
Qty: 6 | Refills: 0 | Status: ACTIVE | COMMUNITY
Start: 2025-05-27 | End: 1900-01-01

## 2025-05-28 ENCOUNTER — APPOINTMENT (OUTPATIENT)
Dept: UROLOGY | Facility: CLINIC | Age: 70
End: 2025-05-28

## 2025-05-28 VITALS
DIASTOLIC BLOOD PRESSURE: 57 MMHG | HEART RATE: 71 BPM | OXYGEN SATURATION: 98 % | SYSTOLIC BLOOD PRESSURE: 151 MMHG | TEMPERATURE: 97.8 F

## 2025-05-28 PROCEDURE — 96402 CHEMO HORMON ANTINEOPL SQ/IM: CPT

## 2025-05-28 PROCEDURE — 99214 OFFICE O/P EST MOD 30 MIN: CPT | Mod: 25

## 2025-05-28 RX ORDER — LEUPROLIDE ACETATE 30 MG/.5ML
30 INJECTION, SUSPENSION, EXTENDED RELEASE SUBCUTANEOUS
Refills: 0 | Status: COMPLETED | OUTPATIENT
Start: 2025-05-28

## 2025-05-28 RX ADMIN — LEUPROLIDE ACETATE 0 MG: 30 INJECTION, SUSPENSION, EXTENDED RELEASE SUBCUTANEOUS at 00:00

## 2025-06-02 ENCOUNTER — TRANSCRIPTION ENCOUNTER (OUTPATIENT)
Age: 70
End: 2025-06-02

## 2025-06-03 ENCOUNTER — TRANSCRIPTION ENCOUNTER (OUTPATIENT)
Age: 70
End: 2025-06-03

## 2025-06-09 ENCOUNTER — APPOINTMENT (OUTPATIENT)
Dept: NEPHROLOGY | Facility: CLINIC | Age: 70
End: 2025-06-09
Payer: MEDICARE

## 2025-06-09 ENCOUNTER — RX RENEWAL (OUTPATIENT)
Age: 70
End: 2025-06-09

## 2025-06-09 ENCOUNTER — TRANSCRIPTION ENCOUNTER (OUTPATIENT)
Age: 70
End: 2025-06-09

## 2025-06-09 VITALS — DIASTOLIC BLOOD PRESSURE: 82 MMHG | SYSTOLIC BLOOD PRESSURE: 140 MMHG

## 2025-06-09 VITALS
BODY MASS INDEX: 31.5 KG/M2 | WEIGHT: 220 LBS | SYSTOLIC BLOOD PRESSURE: 173 MMHG | DIASTOLIC BLOOD PRESSURE: 77 MMHG | OXYGEN SATURATION: 97 % | TEMPERATURE: 97.1 F | HEIGHT: 70 IN | HEART RATE: 95 BPM

## 2025-06-09 LAB
25(OH)D3 SERPL-MCNC: 51.3 NG/ML
ALBUMIN SERPL ELPH-MCNC: 3.9 G/DL
ANION GAP SERPL CALC-SCNC: 18 MMOL/L
BASOPHILS # BLD AUTO: 0.02 K/UL
BASOPHILS NFR BLD AUTO: 0.2 %
BUN SERPL-MCNC: 46 MG/DL
CALCIUM SERPL-MCNC: 9.9 MG/DL
CALCIUM SERPL-MCNC: 9.9 MG/DL
CHLORIDE SERPL-SCNC: 105 MMOL/L
CO2 SERPL-SCNC: 20 MMOL/L
CREAT SERPL-MCNC: 4.03 MG/DL
EGFRCR SERPLBLD CKD-EPI 2021: 15 ML/MIN/1.73M2
EOSINOPHIL # BLD AUTO: 0.27 K/UL
EOSINOPHIL NFR BLD AUTO: 2.3 %
GLUCOSE SERPL-MCNC: 117 MG/DL
HCT VFR BLD CALC: 31.7 %
HGB BLD-MCNC: 9.4 G/DL
IMM GRANULOCYTES NFR BLD AUTO: 1 %
LYMPHOCYTES # BLD AUTO: 1.44 K/UL
LYMPHOCYTES NFR BLD AUTO: 12.3 %
MAN DIFF?: NORMAL
MCHC RBC-ENTMCNC: 28.7 PG
MCHC RBC-ENTMCNC: 29.7 G/DL
MCV RBC AUTO: 96.9 FL
MONOCYTES # BLD AUTO: 0.81 K/UL
MONOCYTES NFR BLD AUTO: 6.9 %
NEUTROPHILS # BLD AUTO: 9.02 K/UL
NEUTROPHILS NFR BLD AUTO: 77.3 %
PARATHYROID HORMONE INTACT: 210 PG/ML
PHOSPHATE SERPL-MCNC: 3.6 MG/DL
PLATELET # BLD AUTO: 287 K/UL
POTASSIUM SERPL-SCNC: 4.6 MMOL/L
RBC # BLD: 3.27 M/UL
RBC # FLD: 15.8 %
SODIUM SERPL-SCNC: 142 MMOL/L
WBC # FLD AUTO: 11.68 K/UL

## 2025-06-09 PROCEDURE — 99215 OFFICE O/P EST HI 40 MIN: CPT

## 2025-06-09 PROCEDURE — G2211 COMPLEX E/M VISIT ADD ON: CPT

## 2025-06-10 ENCOUNTER — NON-APPOINTMENT (OUTPATIENT)
Age: 70
End: 2025-06-10

## 2025-06-10 RX ORDER — AMOXICILLIN AND CLAVULANATE POTASSIUM 500; 125 MG/1; MG/1
500-125 TABLET, FILM COATED ORAL
Qty: 1 | Refills: 0 | Status: ACTIVE | COMMUNITY
Start: 2025-06-05 | End: 1900-01-01

## 2025-06-13 ENCOUNTER — NON-APPOINTMENT (OUTPATIENT)
Age: 70
End: 2025-06-13

## 2025-06-28 ENCOUNTER — INPATIENT (INPATIENT)
Facility: HOSPITAL | Age: 70
LOS: 2 days | Discharge: HOME HEALTH | DRG: 700 | End: 2025-07-01
Attending: STUDENT IN AN ORGANIZED HEALTH CARE EDUCATION/TRAINING PROGRAM | Admitting: STUDENT IN AN ORGANIZED HEALTH CARE EDUCATION/TRAINING PROGRAM
Payer: MEDICARE

## 2025-06-28 ENCOUNTER — NON-APPOINTMENT (OUTPATIENT)
Age: 70
End: 2025-06-28

## 2025-06-28 VITALS
DIASTOLIC BLOOD PRESSURE: 73 MMHG | SYSTOLIC BLOOD PRESSURE: 153 MMHG | RESPIRATION RATE: 17 BRPM | TEMPERATURE: 98 F | OXYGEN SATURATION: 97 % | HEIGHT: 70 IN | WEIGHT: 259.93 LBS | HEART RATE: 66 BPM

## 2025-06-28 DIAGNOSIS — N32.9 BLADDER DISORDER, UNSPECIFIED: Chronic | ICD-10-CM

## 2025-06-28 DIAGNOSIS — T83.122A DISPLACEMENT OF INDWELLING URETERAL STENT, INITIAL ENCOUNTER: ICD-10-CM

## 2025-06-28 DIAGNOSIS — Z98.89 OTHER SPECIFIED POSTPROCEDURAL STATES: Chronic | ICD-10-CM

## 2025-06-28 DIAGNOSIS — Z93.6 OTHER ARTIFICIAL OPENINGS OF URINARY TRACT STATUS: Chronic | ICD-10-CM

## 2025-06-28 DIAGNOSIS — Z29.9 ENCOUNTER FOR PROPHYLACTIC MEASURES, UNSPECIFIED: ICD-10-CM

## 2025-06-28 DIAGNOSIS — I10 ESSENTIAL (PRIMARY) HYPERTENSION: ICD-10-CM

## 2025-06-28 DIAGNOSIS — D64.9 ANEMIA, UNSPECIFIED: ICD-10-CM

## 2025-06-28 DIAGNOSIS — N18.5 CHRONIC KIDNEY DISEASE, STAGE 5: ICD-10-CM

## 2025-06-28 DIAGNOSIS — T83.022A DISPLACEMENT OF NEPHROSTOMY CATHETER, INITIAL ENCOUNTER: ICD-10-CM

## 2025-06-28 DIAGNOSIS — Z85.51 PERSONAL HISTORY OF MALIGNANT NEOPLASM OF BLADDER: Chronic | ICD-10-CM

## 2025-06-28 DIAGNOSIS — Z93.59 OTHER CYSTOSTOMY STATUS: Chronic | ICD-10-CM

## 2025-06-28 LAB
ALBUMIN SERPL ELPH-MCNC: 3.6 G/DL — SIGNIFICANT CHANGE UP (ref 3.3–5)
ALP SERPL-CCNC: 110 U/L — SIGNIFICANT CHANGE UP (ref 40–120)
ALT FLD-CCNC: 7 U/L — LOW (ref 10–45)
ANION GAP SERPL CALC-SCNC: 14 MMOL/L — SIGNIFICANT CHANGE UP (ref 5–17)
APTT BLD: 35 SEC — SIGNIFICANT CHANGE UP (ref 26.1–36.8)
AST SERPL-CCNC: 6 U/L — LOW (ref 10–40)
BASOPHILS # BLD AUTO: 0.02 K/UL — SIGNIFICANT CHANGE UP (ref 0–0.2)
BASOPHILS NFR BLD AUTO: 0.2 % — SIGNIFICANT CHANGE UP (ref 0–2)
BILIRUB SERPL-MCNC: 0.4 MG/DL — SIGNIFICANT CHANGE UP (ref 0.2–1.2)
BUN SERPL-MCNC: 59 MG/DL — HIGH (ref 7–23)
CALCIUM SERPL-MCNC: 10.1 MG/DL — SIGNIFICANT CHANGE UP (ref 8.4–10.5)
CHLORIDE SERPL-SCNC: 104 MMOL/L — SIGNIFICANT CHANGE UP (ref 96–108)
CO2 SERPL-SCNC: 19 MMOL/L — LOW (ref 22–31)
CREAT SERPL-MCNC: 3.92 MG/DL — HIGH (ref 0.5–1.3)
EGFR: 16 ML/MIN/1.73M2 — LOW
EGFR: 16 ML/MIN/1.73M2 — LOW
EOSINOPHIL # BLD AUTO: 0.19 K/UL — SIGNIFICANT CHANGE UP (ref 0–0.5)
EOSINOPHIL NFR BLD AUTO: 2.1 % — SIGNIFICANT CHANGE UP (ref 0–6)
GLUCOSE SERPL-MCNC: 150 MG/DL — HIGH (ref 70–99)
HCT VFR BLD CALC: 30.1 % — LOW (ref 39–50)
HGB BLD-MCNC: 9.5 G/DL — LOW (ref 13–17)
IMM GRANULOCYTES # BLD AUTO: 0.08 K/UL — HIGH (ref 0–0.07)
IMM GRANULOCYTES NFR BLD AUTO: 0.9 % — SIGNIFICANT CHANGE UP (ref 0–0.9)
INR BLD: 0.95 RATIO — SIGNIFICANT CHANGE UP (ref 0.85–1.16)
LYMPHOCYTES # BLD AUTO: 1.11 K/UL — SIGNIFICANT CHANGE UP (ref 1–3.3)
LYMPHOCYTES NFR BLD AUTO: 12.4 % — LOW (ref 13–44)
MCHC RBC-ENTMCNC: 29.1 PG — SIGNIFICANT CHANGE UP (ref 27–34)
MCHC RBC-ENTMCNC: 31.6 G/DL — LOW (ref 32–36)
MCV RBC AUTO: 92.3 FL — SIGNIFICANT CHANGE UP (ref 80–100)
MONOCYTES # BLD AUTO: 0.7 K/UL — SIGNIFICANT CHANGE UP (ref 0–0.9)
MONOCYTES NFR BLD AUTO: 7.8 % — SIGNIFICANT CHANGE UP (ref 2–14)
NEUTROPHILS # BLD AUTO: 6.85 K/UL — SIGNIFICANT CHANGE UP (ref 1.8–7.4)
NEUTROPHILS NFR BLD AUTO: 76.6 % — SIGNIFICANT CHANGE UP (ref 43–77)
NRBC # BLD AUTO: 0 K/UL — SIGNIFICANT CHANGE UP (ref 0–0)
NRBC # FLD: 0 K/UL — SIGNIFICANT CHANGE UP (ref 0–0)
NRBC BLD AUTO-RTO: 0 /100 WBCS — SIGNIFICANT CHANGE UP (ref 0–0)
PLATELET # BLD AUTO: 192 K/UL — SIGNIFICANT CHANGE UP (ref 150–400)
PMV BLD: 9.9 FL — SIGNIFICANT CHANGE UP (ref 7–13)
POTASSIUM SERPL-MCNC: 4 MMOL/L — SIGNIFICANT CHANGE UP (ref 3.5–5.3)
POTASSIUM SERPL-SCNC: 4 MMOL/L — SIGNIFICANT CHANGE UP (ref 3.5–5.3)
PROT SERPL-MCNC: 7.4 G/DL — SIGNIFICANT CHANGE UP (ref 6–8.3)
PROTHROM AB SERPL-ACNC: 10.8 SEC — SIGNIFICANT CHANGE UP (ref 9.9–13.4)
RBC # BLD: 3.26 M/UL — LOW (ref 4.2–5.8)
RBC # FLD: 15.2 % — HIGH (ref 10.3–14.5)
SODIUM SERPL-SCNC: 137 MMOL/L — SIGNIFICANT CHANGE UP (ref 135–145)
WBC # BLD: 8.95 K/UL — SIGNIFICANT CHANGE UP (ref 3.8–10.5)
WBC # FLD AUTO: 8.95 K/UL — SIGNIFICANT CHANGE UP (ref 3.8–10.5)

## 2025-06-28 PROCEDURE — 85730 THROMBOPLASTIN TIME PARTIAL: CPT

## 2025-06-28 PROCEDURE — 99285 EMERGENCY DEPT VISIT HI MDM: CPT

## 2025-06-28 PROCEDURE — 85025 COMPLETE CBC W/AUTO DIFF WBC: CPT

## 2025-06-28 PROCEDURE — 99223 1ST HOSP IP/OBS HIGH 75: CPT

## 2025-06-28 PROCEDURE — 36415 COLL VENOUS BLD VENIPUNCTURE: CPT

## 2025-06-28 PROCEDURE — 86901 BLOOD TYPING SEROLOGIC RH(D): CPT

## 2025-06-28 PROCEDURE — 74176 CT ABD & PELVIS W/O CONTRAST: CPT

## 2025-06-28 PROCEDURE — 80053 COMPREHEN METABOLIC PANEL: CPT

## 2025-06-28 PROCEDURE — 86850 RBC ANTIBODY SCREEN: CPT

## 2025-06-28 PROCEDURE — 85610 PROTHROMBIN TIME: CPT

## 2025-06-28 PROCEDURE — 74176 CT ABD & PELVIS W/O CONTRAST: CPT | Mod: 26

## 2025-06-28 PROCEDURE — 86900 BLOOD TYPING SEROLOGIC ABO: CPT

## 2025-06-28 PROCEDURE — 93010 ELECTROCARDIOGRAM REPORT: CPT

## 2025-06-28 RX ORDER — AMLODIPINE BESYLATE 10 MG/1
5 TABLET ORAL DAILY
Refills: 0 | Status: DISCONTINUED | OUTPATIENT
Start: 2025-06-28 | End: 2025-07-01

## 2025-06-28 RX ORDER — CLONAZEPAM 0.5 MG/1
1 TABLET ORAL ONCE
Refills: 0 | Status: DISCONTINUED | OUTPATIENT
Start: 2025-06-28 | End: 2025-06-28

## 2025-06-28 RX ORDER — TRAZODONE HCL 100 MG
300 TABLET ORAL AT BEDTIME
Refills: 0 | Status: DISCONTINUED | OUTPATIENT
Start: 2025-06-28 | End: 2025-07-01

## 2025-06-28 RX ORDER — CLONAZEPAM 0.5 MG/1
1 TABLET ORAL THREE TIMES A DAY
Refills: 0 | Status: DISCONTINUED | OUTPATIENT
Start: 2025-06-28 | End: 2025-07-01

## 2025-06-28 RX ORDER — SODIUM BICARBONATE 1 MEQ/ML
1300 SYRINGE (ML) INTRAVENOUS THREE TIMES A DAY
Refills: 0 | Status: DISCONTINUED | OUTPATIENT
Start: 2025-06-28 | End: 2025-07-01

## 2025-06-28 RX ORDER — ACETAMINOPHEN 500 MG/5ML
650 LIQUID (ML) ORAL EVERY 6 HOURS
Refills: 0 | Status: DISCONTINUED | OUTPATIENT
Start: 2025-06-28 | End: 2025-07-01

## 2025-06-28 RX ORDER — ENZALUTAMIDE 40 MG/1
160 CAPSULE ORAL DAILY
Refills: 0 | Status: DISCONTINUED | OUTPATIENT
Start: 2025-06-28 | End: 2025-06-29

## 2025-06-28 RX ORDER — MELATONIN 5 MG
3 TABLET ORAL AT BEDTIME
Refills: 0 | Status: DISCONTINUED | OUTPATIENT
Start: 2025-06-28 | End: 2025-07-01

## 2025-06-28 RX ORDER — HEPARIN SODIUM 1000 [USP'U]/ML
7500 INJECTION INTRAVENOUS; SUBCUTANEOUS EVERY 12 HOURS
Refills: 0 | Status: DISCONTINUED | OUTPATIENT
Start: 2025-06-28 | End: 2025-06-29

## 2025-06-28 RX ORDER — POLYETHYLENE GLYCOL 3350 17 G/17G
17 POWDER, FOR SOLUTION ORAL DAILY
Refills: 0 | Status: DISCONTINUED | OUTPATIENT
Start: 2025-06-28 | End: 2025-07-01

## 2025-06-28 RX ORDER — SENNA 187 MG
2 TABLET ORAL AT BEDTIME
Refills: 0 | Status: DISCONTINUED | OUTPATIENT
Start: 2025-06-28 | End: 2025-07-01

## 2025-06-28 RX ORDER — MAGNESIUM, ALUMINUM HYDROXIDE 200-200 MG
30 TABLET,CHEWABLE ORAL EVERY 4 HOURS
Refills: 0 | Status: DISCONTINUED | OUTPATIENT
Start: 2025-06-28 | End: 2025-07-01

## 2025-06-28 RX ORDER — ONDANSETRON HCL/PF 4 MG/2 ML
4 VIAL (ML) INJECTION EVERY 8 HOURS
Refills: 0 | Status: DISCONTINUED | OUTPATIENT
Start: 2025-06-28 | End: 2025-07-01

## 2025-06-28 RX ORDER — CALCITRIOL 0.5 UG/1
0.25 CAPSULE, GELATIN COATED ORAL
Refills: 0 | Status: DISCONTINUED | OUTPATIENT
Start: 2025-06-28 | End: 2025-06-29

## 2025-06-28 RX ORDER — FUROSEMIDE 10 MG/ML
40 INJECTION INTRAMUSCULAR; INTRAVENOUS
Refills: 0 | Status: DISCONTINUED | OUTPATIENT
Start: 2025-06-28 | End: 2025-07-01

## 2025-06-28 RX ADMIN — CLONAZEPAM 1 MILLIGRAM(S): 0.5 TABLET ORAL at 13:29

## 2025-06-28 NOTE — H&P ADULT - NSHPPHYSICALEXAM_GEN_ALL_CORE
Vital Signs Last 24 Hrs  T(C): 36.3 (28 Jun 2025 18:40), Max: 36.8 (28 Jun 2025 12:44)  T(F): 97.3 (28 Jun 2025 18:40), Max: 98.3 (28 Jun 2025 12:44)  HR: 61 (28 Jun 2025 18:40) (61 - 67)  BP: 173/77 (28 Jun 2025 18:40) (153/73 - 173/77)  BP(mean): --  RR: 18 (28 Jun 2025 18:40) (17 - 18)  SpO2: 98% (28 Jun 2025 18:40) (97% - 98%)    Parameters below as of 28 Jun 2025 18:40  Patient On (Oxygen Delivery Method): room air        CONSTITUTIONAL: Well-groomed, in no apparent distress  EYES: No conjunctival or scleral injection, non-icteric  ENMT: No external nasal lesions; MMM  RESPIRATORY: Breathing comfortably; lungs CTA without wheeze/rhonchi/rales  CARDIOVASCULAR: +S1S2, RRR; no lower extremity edema  GASTROINTESTINAL: No tenderness, +BS throughout, no rebound/guarding  NEUROLOGIC: No gross focal neurological deficits, AAOX3  PSYCHIATRIC: mood and affect appropriate; appropriate insight and judgment  : suture for L stent displaced.

## 2025-06-28 NOTE — ED PROVIDER NOTE - ATTENDING CONTRIBUTION TO CARE
Attending ROSALINDA Rudolph I performed a history and physical exam of the patient and discussed their management with the resident. I reviewed the resident's note and agree with the documented findings and plan of care, except as noted.

## 2025-06-28 NOTE — ED PROVIDER NOTE - CLINICAL SUMMARY MEDICAL DECISION MAKING FREE TEXT BOX
70-year-old male with history of HTN, depression, anxiety, CKD 5, prostate cancer s/p radical prostatectomy and bladder cancer s/p cystectomy with ileal conduit C/B recurrent ureteral ileal strictures requiring bilateral OpSite on nephroureteral stents presenting to the [facility] ED with concern for displacement of left nephroureteral stent.  Labs, CTAP ordered.  Will require IR consultation.

## 2025-06-28 NOTE — H&P ADULT - NSHPLABSRESULTS_GEN_ALL_CORE
9.5    8.95  )-----------( 192      ( 28 Jun 2025 15:14 )             30.1       06-28    137  |  104  |  59[H]  ----------------------------<  150[H]  4.0   |  19[L]  |  3.92[H]    Ca    10.1      28 Jun 2025 15:14    TPro  7.4  /  Alb  3.6  /  TBili  0.4  /  DBili  x   /  AST  6[L]  /  ALT  7[L]  /  AlkPhos  110  06-28              Urinalysis Basic - ( 28 Jun 2025 15:14 )    Color: x / Appearance: x / SG: x / pH: x  Gluc: 150 mg/dL / Ketone: x  / Bili: x / Urobili: x   Blood: x / Protein: x / Nitrite: x   Leuk Esterase: x / RBC: x / WBC x   Sq Epi: x / Non Sq Epi: x / Bacteria: x        PT/INR - ( 28 Jun 2025 15:14 )   PT: 10.8 sec;   INR: 0.95 ratio         PTT - ( 28 Jun 2025 15:14 )  PTT:35.0 sec          CAPILLARY BLOOD GLUCOSE

## 2025-06-28 NOTE — ED ADULT NURSE NOTE - OBJECTIVE STATEMENT
Pt presents to the ED BIBA from assisted living A&Ox4 co L flank pain. Hx Pt presents to the ED BIBA from assisted living A&Ox4 co L flank pain. Hx Nephrostomy tube placement, anxiety, bladder cancer, HTN, prostate ca.. As per EMS, pt referred to ER by assisted living facility due to L nephrostomy tube falling out of place, resulting in L flank pain and abdominal pain. Unclear when nrphrostomy tube dislodged. Pt denying any chest pain, shortness of breath, nvd, fevers, chills, Upon arrival, pt very anxious and requesting Klonopin to help calm him down.

## 2025-06-28 NOTE — H&P ADULT - PROBLEM SELECTOR PLAN 5
VTE ppx: sq hep (check and discontinue for 6/29 evening and 6/30 morning)  GI ppx: n/i     Med rec: done w/ pt by bedside   Diet: regular. NPO@mn on 6/29

## 2025-06-28 NOTE — ED PROVIDER NOTE - PHYSICAL EXAMINATION
GENERAL: well appearing  HEAD: normocephalic, atraumatic  HEENT: normal conjunctiva, oral mucosa moist  CARDIAC: regular rate and rhythm  PULM: speaking in full sentences, no increased work of breathing  GI: abdomen nondistended, soft, nontender, ileal conduit draining fluid  : no suprapubic tenderness, b/l nephroureteral stents in place with L nephroureteral stent with displaced suture  NEURO: moving all 4 extremities, answering questions appropriately  MSK: no peripheral edema  SKIN: extremities warm

## 2025-06-28 NOTE — H&P ADULT - PROBLEM SELECTOR PLAN 3
Yes -still making urine   -eGFR <16, dose meds per kidney function -still making urine   -eGFR <16, dose meds per kidney function  -c/w bicarb and calcitriol, lasix

## 2025-06-28 NOTE — H&P ADULT - HISTORY OF PRESENT ILLNESS
70M PMHx htn, depression, anxiety, ckd5, prostate and bladder ca s/p prostectomy, cystectomy, c/b recurrent uretero-ileal strictures s/p nephroureteral stent. Was recently admitted here 5/6-12 dislodged nephroureteral stents c/b infection, dc'd after IR exchange and abx.   Presents today after visiting nurse had a concern for the left nephroureteral stent displacement, rec to come to ED for eval. Denied any pain, f/c, cp, sob, abd pain, n/v/d.     In the ED, was mildly hypertensive otherwise vitals stable.   CBC w/ wbc 8.9, hgb 9.5 (baseline 9-10), plt 192.   Coag wnl.   CMP w/ Na 137, K 4.0, SCr 3.92 (was around 5 last admission), LFT unremarkable.   CT a/p with b/l percutaneous nephroreteral stents terminating w/in the ileal conduit.    IR c/s in the ED, plan for left PCNU exchange on Monday 6/30, npo @[facility] on 6/29, hold AM anticoagulation.

## 2025-06-28 NOTE — ED ADULT NURSE REASSESSMENT NOTE - NS ED NURSE REASSESS COMMENT FT1
Pt is a difficult stick, poor vasculature to R arm. Attempted IV placement to R arm. Do not use precautions to L arm due to fistula. ER providers made aware. Pending USIV.

## 2025-06-28 NOTE — ED PROVIDER NOTE - PROGRESS NOTE DETAILS
[facility] PGY3 - Patient lab work notable for hemoglobin 9.5 (at baseline), creatinine 3.92, BUN 59, GFR 16.  CT abdomen pelvis showing bilateral percutaneous nephroureteral stents terminating within the ileal conduit approximately 6.5 cm proximal to the right lower abdominal wall.  Evaluated by interventional radiology.  Recommending tube exchange on Monday 6/30.  Admit to medicine for IR nephroureteral tube exchange.

## 2025-06-28 NOTE — CONSULT NOTE ADULT - ASSESSMENT
Interventional Radiology    Evaluate for Procedure: Dislodged left PCNU    HPI: 71 yo M with complicated urologic history, s/p RALP for prostate cancer with adjuvant radiation complicated by bladder neck contracture s/p multiple procedures eventually ended up managing with SP Tube, subsequently ended up developing low grade Ta urothelial cancer with squamous diff s/p cystectomy with ileal conduit c/b uretero-ileal stricture now with bilateral PCNU p/w dislodged left PCNU.    Allergies: No Known Allergies    Medications (Abx/Cardiac/Anticoagulation/Blood Products)      Data:  T(C): 36.8  HR: 66  BP: 153/73  RR: 17  SpO2: 97%    -WBC 8.95 / HgB 9.5 / Hct 30.1 / Plt 192  -Na 137 / Cl 104 / BUN 59 / Glucose 150  -K 4.0 / CO2 19 / Cr 3.92  -ALT 7 / Alk Phos 110 / T.Bili 0.4  -INR 0.95 / PTT 35.0      Radiology: Reviewed    Assessment/Plan:   71 yo M with complicated urologic history, s/p RALP for prostate cancer with adjuvant radiation complicated by bladder neck contracture s/p multiple procedures eventually ended up managing with SP Tube, subsequently ended up developing low grade Ta urothelial cancer with squamous diff s/p cystectomy with ileal conduit c/b uretero-ileal stricture now with bilateral PCNU p/w dislodged left PCNU.    --On CT, left PCNU retracted with proximal marker within the subcutaneous tissues but distal portion remains within bladder. Continue to bag drainage.   -- IR will plan to perform left PCNU exchange Monday 6/30.  -- NPO at midnight on 6/29. Will determine need for sedation day of procedure.  -- hold a.m. anticoagulation and obtain AM labs on 6/30.  -- please place IR procedure request order under Dr. Garcia.     --  Lc Tirado, PGY-5  Vascular and Interventional Radiology   Available on Microsoft Teams    - Non-emergent consults: Place IR consult order in [place]  - Emergent issues (pager): [facility] [phone]; [facility] [phone]; [phone]  - Scheduling questions: [facility] [phone]; [facility] [phone]  - Clinic/outpatient booking: [facility] [phone]; [facility] [phone] Interventional Radiology    Evaluate for Procedure: Dislodged left PCNU    HPI: 71 yo M with complicated urologic history, s/p RALP for prostate cancer with adjuvant radiation complicated by bladder neck contracture s/p multiple procedures eventually ended up managing with SP Tube, subsequently ended up developing low grade Ta urothelial cancer with squamous diff s/p cystectomy with ileal conduit c/b uretero-ileal stricture now with bilateral PCNU p/w dislodged left PCNU.    Allergies: No Known Allergies    Medications (Abx/Cardiac/Anticoagulation/Blood Products)      Data:  T(C): 36.8  HR: 66  BP: 153/73  RR: 17  SpO2: 97%    -WBC 8.95 / HgB 9.5 / Hct 30.1 / Plt 192  -Na 137 / Cl 104 / BUN 59 / Glucose 150  -K 4.0 / CO2 19 / Cr 3.92  -ALT 7 / Alk Phos 110 / T.Bili 0.4  -INR 0.95 / PTT 35.0      Radiology: Reviewed    Assessment/Plan:   71 yo M with complicated urologic history, s/p RALP for prostate cancer with adjuvant radiation complicated by bladder neck contracture s/p multiple procedures eventually ended up managing with SP Tube, subsequently ended up developing low grade Ta urothelial cancer with squamous diff s/p cystectomy with ileal conduit c/b uretero-ileal stricture now with bilateral PCNU p/w dislodged left PCNU.    --On CT, left PCNU retracted with proximal marker within the subcutaneous tissues. distal tip likely retracted from ileal conduit preventing internal drainage, however remains across ureter and collecting system with no hydronephrosis. maintain to bag drainage to maintain decompression  -- IR will plan to perform left PCNU exchange Monday 6/30.  -- NPO at midnight on 6/29. Will determine need for sedation day of procedure.  -- hold a.m. anticoagulation and obtain AM labs on 6/30.  -- please place IR procedure request order under Dr. Garcia.     --  Lc Tirado, PGY-5  Vascular and Interventional Radiology   Available on Microsoft Teams    - Non-emergent consults: Place IR consult order in [place]  - Emergent issues (pager): [facility] [phone]; [facility] [phone]; [id number]  - Scheduling questions: [facility] [phone]; [facility] [phone]  - Clinic/outpatient booking: [facility] [phone]; [facility] [phone]

## 2025-06-28 NOTE — ED PROVIDER NOTE - OBJECTIVE STATEMENT
70-year-old male with history of HTN, depression, anxiety, CKD 5, prostate cancer s/p radical prostatectomy and bladder cancer s/p cystectomy with ileal conduit C/B recurrent ureteral ileal strictures requiring bilateral OpSite on nephroureteral stents presenting to the [facility] ED with concern for displacement of left nephroureteral stent.  Per patient he has a home health nurse that helps him to change the bags on his nephroureteral stents.  There was concern that this suture had become dislodged and that the tube itself was displaced he was instructed (ED for further evaluation.  He otherwise has no significant headache, chest pain, shortness of breath, N/V/D/abdominal pain, dysuria, peripheral edema, fever/chills.  NKDA.

## 2025-06-29 LAB
ANION GAP SERPL CALC-SCNC: 14 MMOL/L — SIGNIFICANT CHANGE UP (ref 5–17)
BASOPHILS # BLD AUTO: 0.02 K/UL — SIGNIFICANT CHANGE UP (ref 0–0.2)
BASOPHILS NFR BLD AUTO: 0.3 % — SIGNIFICANT CHANGE UP (ref 0–2)
BUN SERPL-MCNC: 59 MG/DL — HIGH (ref 7–23)
CALCIUM SERPL-MCNC: 9.7 MG/DL — SIGNIFICANT CHANGE UP (ref 8.4–10.5)
CHLORIDE SERPL-SCNC: 107 MMOL/L — SIGNIFICANT CHANGE UP (ref 96–108)
CO2 SERPL-SCNC: 19 MMOL/L — LOW (ref 22–31)
CREAT SERPL-MCNC: 4.11 MG/DL — HIGH (ref 0.5–1.3)
EGFR: 15 ML/MIN/1.73M2 — LOW
EGFR: 15 ML/MIN/1.73M2 — LOW
EOSINOPHIL # BLD AUTO: 0.21 K/UL — SIGNIFICANT CHANGE UP (ref 0–0.5)
EOSINOPHIL NFR BLD AUTO: 2.7 % — SIGNIFICANT CHANGE UP (ref 0–6)
GLUCOSE SERPL-MCNC: 144 MG/DL — HIGH (ref 70–99)
HCT VFR BLD CALC: 28.9 % — LOW (ref 39–50)
HGB BLD-MCNC: 9 G/DL — LOW (ref 13–17)
IMM GRANULOCYTES # BLD AUTO: 0.07 K/UL — SIGNIFICANT CHANGE UP (ref 0–0.07)
IMM GRANULOCYTES NFR BLD AUTO: 0.9 % — SIGNIFICANT CHANGE UP (ref 0–0.9)
LYMPHOCYTES # BLD AUTO: 1.25 K/UL — SIGNIFICANT CHANGE UP (ref 1–3.3)
LYMPHOCYTES NFR BLD AUTO: 16.3 % — SIGNIFICANT CHANGE UP (ref 13–44)
MAGNESIUM SERPL-MCNC: 2.4 MG/DL — SIGNIFICANT CHANGE UP (ref 1.6–2.6)
MCHC RBC-ENTMCNC: 28.6 PG — SIGNIFICANT CHANGE UP (ref 27–34)
MCHC RBC-ENTMCNC: 31.1 G/DL — LOW (ref 32–36)
MCV RBC AUTO: 91.7 FL — SIGNIFICANT CHANGE UP (ref 80–100)
MONOCYTES # BLD AUTO: 0.62 K/UL — SIGNIFICANT CHANGE UP (ref 0–0.9)
MONOCYTES NFR BLD AUTO: 8.1 % — SIGNIFICANT CHANGE UP (ref 2–14)
NEUTROPHILS # BLD AUTO: 5.52 K/UL — SIGNIFICANT CHANGE UP (ref 1.8–7.4)
NEUTROPHILS NFR BLD AUTO: 71.7 % — SIGNIFICANT CHANGE UP (ref 43–77)
NRBC # BLD AUTO: 0 K/UL — SIGNIFICANT CHANGE UP (ref 0–0)
NRBC # FLD: 0 K/UL — SIGNIFICANT CHANGE UP (ref 0–0)
NRBC BLD AUTO-RTO: 0 /100 WBCS — SIGNIFICANT CHANGE UP (ref 0–0)
PHOSPHATE SERPL-MCNC: 3.3 MG/DL — SIGNIFICANT CHANGE UP (ref 2.5–4.5)
PLATELET # BLD AUTO: 181 K/UL — SIGNIFICANT CHANGE UP (ref 150–400)
PMV BLD: 10.1 FL — SIGNIFICANT CHANGE UP (ref 7–13)
POTASSIUM SERPL-MCNC: 4.1 MMOL/L — SIGNIFICANT CHANGE UP (ref 3.5–5.3)
POTASSIUM SERPL-SCNC: 4.1 MMOL/L — SIGNIFICANT CHANGE UP (ref 3.5–5.3)
RBC # BLD: 3.15 M/UL — LOW (ref 4.2–5.8)
RBC # FLD: 15.2 % — HIGH (ref 10.3–14.5)
SODIUM SERPL-SCNC: 140 MMOL/L — SIGNIFICANT CHANGE UP (ref 135–145)
WBC # BLD: 7.69 K/UL — SIGNIFICANT CHANGE UP (ref 3.8–10.5)
WBC # FLD AUTO: 7.69 K/UL — SIGNIFICANT CHANGE UP (ref 3.8–10.5)

## 2025-06-29 PROCEDURE — 85025 COMPLETE CBC W/AUTO DIFF WBC: CPT

## 2025-06-29 PROCEDURE — 36415 COLL VENOUS BLD VENIPUNCTURE: CPT

## 2025-06-29 PROCEDURE — 74176 CT ABD & PELVIS W/O CONTRAST: CPT

## 2025-06-29 PROCEDURE — 85610 PROTHROMBIN TIME: CPT

## 2025-06-29 PROCEDURE — 85730 THROMBOPLASTIN TIME PARTIAL: CPT

## 2025-06-29 PROCEDURE — 99222 1ST HOSP IP/OBS MODERATE 55: CPT

## 2025-06-29 PROCEDURE — 86900 BLOOD TYPING SEROLOGIC ABO: CPT

## 2025-06-29 PROCEDURE — 80053 COMPREHEN METABOLIC PANEL: CPT

## 2025-06-29 PROCEDURE — 86901 BLOOD TYPING SEROLOGIC RH(D): CPT

## 2025-06-29 PROCEDURE — 93005 ELECTROCARDIOGRAM TRACING: CPT

## 2025-06-29 PROCEDURE — 83735 ASSAY OF MAGNESIUM: CPT

## 2025-06-29 PROCEDURE — 80048 BASIC METABOLIC PNL TOTAL CA: CPT

## 2025-06-29 PROCEDURE — 86850 RBC ANTIBODY SCREEN: CPT

## 2025-06-29 PROCEDURE — 84100 ASSAY OF PHOSPHORUS: CPT

## 2025-06-29 PROCEDURE — 99233 SBSQ HOSP IP/OBS HIGH 50: CPT

## 2025-06-29 RX ORDER — CALCITRIOL 0.5 UG/1
0.25 CAPSULE, GELATIN COATED ORAL
Refills: 0 | Status: DISCONTINUED | OUTPATIENT
Start: 2025-06-29 | End: 2025-07-01

## 2025-06-29 RX ADMIN — HEPARIN SODIUM 7500 UNIT(S): 1000 INJECTION INTRAVENOUS; SUBCUTANEOUS at 08:26

## 2025-06-29 RX ADMIN — Medication 1300 MILLIGRAM(S): at 13:19

## 2025-06-29 RX ADMIN — AMLODIPINE BESYLATE 5 MILLIGRAM(S): 10 TABLET ORAL at 08:25

## 2025-06-29 RX ADMIN — CALCITRIOL 0.25 MICROGRAM(S): 0.5 CAPSULE, GELATIN COATED ORAL at 17:26

## 2025-06-29 RX ADMIN — FUROSEMIDE 40 MILLIGRAM(S): 10 INJECTION INTRAMUSCULAR; INTRAVENOUS at 10:49

## 2025-06-29 RX ADMIN — CLONAZEPAM 1 MILLIGRAM(S): 0.5 TABLET ORAL at 10:48

## 2025-06-29 RX ADMIN — Medication 1300 MILLIGRAM(S): at 08:24

## 2025-06-29 RX ADMIN — CLONAZEPAM 1 MILLIGRAM(S): 0.5 TABLET ORAL at 01:56

## 2025-06-29 NOTE — CONSULT NOTE ADULT - ATTENDING COMMENTS
Dislodged left nephroureteral tube  New left nephroureteral tube placed by IR  Outpatient follow-up Dislodged left nephroureteral tube  New tube to be replaced  Outpatient follow-up with Dr. Hoenig

## 2025-06-29 NOTE — PROGRESS NOTE ADULT - ASSESSMENT
70M PMH obesity, HTN, CKD5, depression, anxiety, complicated urologic history, s/p RALP for prostate cancer with adjuvant radiation complicated by bladder neck contracture s/p multiple procedures eventually ended up managing with SP Tube, subsequently ended up developing low grade Ta urothelial cancer with squamous diff s/p cystectomy with ileal conduit c/b uretero-ileal stricture initially managed endoscopically, ultimately, ended up getting bilateral nephrostomy tubes which were converted to upside down Neph-u stents last exchanged on 4/16/25 with Dr Hoenig, presenting for dislodged PCNU

## 2025-06-29 NOTE — PROVIDER CONTACT NOTE (OTHER) - SITUATION
Pt found to be taking own medications despite education & being told not to. Pt had refused to give home medications to day nurse. Pt took his own amlodopine, sodium bicarbonate, & trazadone.

## 2025-06-29 NOTE — PATIENT PROFILE ADULT - FUNCTIONAL ASSESSMENT - BASIC MOBILITY 6.
4-calculated by average/Not able to assess (calculate score using [facility] averaging method)

## 2025-06-29 NOTE — CONSULT NOTE ADULT - ASSESSMENT
70 year old male with PMHx of bladder cancer s/p cystoprostatectomy and ileal conduit, c/b ureteroileal strictures managed with b/l PCNUs, concern for dislodged L PCNU    -IR to replaced L PCNU 6/30, NPO after midnight  -no further acute  intervention  -outpatient follow up with Dr Hoenig  -case d/w Dr Álvarez, imaging reviewed 70 year old male with PMHx of bladder cancer s/p cystoprostatectomy and ileal conduit, c/b ureteroileal strictures managed with b/l PCNUs, concern for dislodged L PCNU    -IR to replaced L PCNU 6/30  -no further acute  intervention  -outpatient follow up with Dr Hoenig  -Patient seen with Dr Álvarez 70 year old male with PMHx of bladder cancer s/p cystoprostatectomy and ileal conduit, c/b ureteroileal strictures managed with b/l PCNUs, concern for dislodged L PCNU    -IR to replace L PCNU 6/30  -no further acute  intervention  -outpatient follow up with Dr Hoenig  -Patient seen with Dr Álvarez

## 2025-06-29 NOTE — PATIENT PROFILE ADULT - FALL HARM RISK - HARM RISK INTERVENTIONS

## 2025-06-29 NOTE — PROGRESS NOTE ADULT - SUBJECTIVE AND OBJECTIVE BOX
[facility] Division of Hospital Medicine  Shabana Ludwig MD  Available via MS Teams      SUBJECTIVE / OVERNIGHT EVENTS: Pt feels upset with needing more medical intervention. Urology at bedside changing dressing    ADDITIONAL REVIEW OF SYSTEMS: ROS negative    MEDICATIONS  (STANDING):  amLODIPine   Tablet 5 milliGRAM(s) Oral daily  calcitriol   Capsule 0.25 MICROGram(s) Oral <User Schedule>  enzalutamide 160 milliGRAM(s) Oral daily  furosemide    Tablet 40 milliGRAM(s) Oral <User Schedule>  heparin   Injectable 7500 Unit(s) SubCutaneous every 12 hours  polyethylene glycol 3350 17 Gram(s) Oral daily  senna 2 Tablet(s) Oral at bedtime  sodium bicarbonate 1300 milliGRAM(s) Oral three times a day  traZODone 300 milliGRAM(s) Oral at bedtime    MEDICATIONS  (PRN):  acetaminophen     Tablet .. 650 milliGRAM(s) Oral every 6 hours PRN Temp greater or equal to 38C (100.4F), Mild Pain (1 - 3)  aluminum hydroxide/magnesium hydroxide/simethicone Suspension 30 milliLiter(s) Oral every 4 hours PRN Dyspepsia  clonazePAM  Tablet 1 milliGRAM(s) Oral three times a day PRN anxiety  melatonin 3 milliGRAM(s) Oral at bedtime PRN Insomnia  ondansetron Injectable 4 milliGRAM(s) IV Push every 8 hours PRN Nausea and/or Vomiting      I&O's Summary    28 Jun 2025 07:01  -  29 Jun 2025 07:00  --------------------------------------------------------  IN: 0 mL / OUT: 325 mL / NET: -325 mL    29 Jun 2025 07:01  -  29 Jun 2025 15:58  --------------------------------------------------------  IN: 0 mL / OUT: 400 mL / NET: -400 mL        PHYSICAL EXAM:  Vital Signs Last 24 Hrs  T(C): 36.3 (28 Jun 2025 18:40), Max: 36.8 (28 Jun 2025 18:05)  T(F): 97.3 (28 Jun 2025 18:40), Max: 98.2 (28 Jun 2025 18:05)  HR: 61 (28 Jun 2025 18:40) (61 - 67)  BP: 173/77 (28 Jun 2025 18:40) (164/76 - 173/77)  BP(mean): --  RR: 18 (28 Jun 2025 18:40) (18 - 18)  SpO2: 98% (28 Jun 2025 18:40) (98% - 98%)    Parameters below as of 28 Jun 2025 18:40  Patient On (Oxygen Delivery Method): room air      CONSTITUTIONAL: Tearful  RESPIRATORY: Normal respiratory effort; lungs are clear to auscultation bilaterally  CARDIOVASCULAR: Regular rate and rhythm, normal S1 and S2 Peripheral pulses are 2+ bilaterally  ABDOMEN: Nontender to palpation, normoactive bowel sounds  MSK: nephrostomy tubes noted   PSYCH: A+O to person, place, and time  SKIN: No rashes    LABS:                        9.0    7.69  )-----------( 181      ( 29 Jun 2025 11:25 )             28.9     06-29    140  |  107  |  59[H]  ----------------------------<  144[H]  4.1   |  19[L]  |  4.11[H]    Ca    9.7      29 Jun 2025 11:25  Phos  3.3     06-29  Mg     2.4     06-29    TPro  7.4  /  Alb  3.6  /  TBili  0.4  /  DBili  x   /  AST  6[L]  /  ALT  7[L]  /  AlkPhos  110  06-28    PT/INR - ( 28 Jun 2025 15:14 )   PT: 10.8 sec;   INR: 0.95 ratio         PTT - ( 28 Jun 2025 15:14 )  PTT:35.0 sec      Urinalysis Basic - ( 29 Jun 2025 11:25 )    Color: x / Appearance: x / SG: x / pH: x  Gluc: 144 mg/dL / Ketone: x  / Bili: x / Urobili: x   Blood: x / Protein: x / Nitrite: x   Leuk Esterase: x / RBC: x / WBC x   Sq Epi: x / Non Sq Epi: x / Bacteria: x            RADIOLOGY & ADDITIONAL TESTS:  New Imaging Personally Reviewed Today:  New Electrocardiogram Personally Reviewed Today:  Other Results Reviewed Today:   Prior or Outpatient Records Reviewed Today with Summary:    COORDINATION OF CARE:  Consultant Communication and Details of Discussion (where applicable):

## 2025-06-29 NOTE — CONSULT NOTE ADULT - SUBJECTIVE AND OBJECTIVE BOX
UROLOGY CONSULT NOTE    HPI:  This is a 70y old Male with PMHx of HTN, depression, DM, prostate cancer, bladder cancer s/p cystoprostatectomy, ileal conduit c/b ureteroileal strictures managed with b/l PCNUs who presents with concern for left PCNU dislodgement.  The patient's visiting RN called the service concerned that the stitch for the L PCNU was broken and that the tube may be dislodged.  The patient was sent to the ER and was admitted to medicine.  Currently feeling anxious that he has to stay in the hospital and worried that his kidneys may fail.  Denies flank pain, abdominal pain.      PAST MEDICAL HISTORY    Prostate Cancer    HTN (Hypertension)    Anxiety    Major depressive disorder    UTI (urinary tract infection)    Urinary (tract) obstruction    Urethral stricture    Other calculus in bladder    Obese    Diabetes mellitus    Malignant neoplasm of bladder    Unspecified hydronephrosis        PAST SURGICAL HISTORY    S/P Appendectomy    H/O cystoscopy    History of prostatectomy    Suprapubic catheter    History of bladder cancer    S/P ileal conduit    Nephrostomy status    Bladder disease        FAMILY HISTORY    No pertinent family history in first degree relatives    FH: colon cancer        SOCIAL HISTORY    noncontributory    HOME MEDICATIONS    amLODIPine 5 mg oral tablet: 1 tab(s) orally 2 times a day (28 Jun 2025 22:42)  calcitriol 0.25 mcg oral capsule: 1 cap(s) orally 3 times a week Monday, Thursday, Saturday (28 Jun 2025 22:42)  KlonoPIN 1 mg oral tablet: 1 tab(s) orally 3 times a day (28 Jun 2025 22:42)  sodium bicarbonate 650 mg oral tablet: 2 tab(s) orally 3 times a day (28 Jun 2025 22:42)  Xtandi 40 mg oral capsule: 4 cap(s) orally once a day (28 Jun 2025 22:42)      DRUG ALLERGIES    No Known Allergies      REVIEW OF SYSTEMS: Pertinent positives and negatives as stated in HPI, otherwise negative      VITAL SIGNS    T(F): --  HR: --  BP: --  RR: --  SpO2: --    I's & O's      28 Jun 2025 07:01  -  29 Jun 2025 07:00  --------------------------------------------------------  IN: 0 mL / OUT: 325 mL / NET: -325 mL        PHYSICAL EXAM    Gen: Well groomed, well dressed, well nourished  Abd: Soft, NT/ND; stoma red  Back: new dressing placed over L PCNU site.  no skin changes or swelling.  Ext: No edema present b/l      LABS:                        9.5    8.95  )-----------( 192               30.1     137  |  104  |  59  ----------------------------<  150  4.0   |  19  |  3.92    Ca    10.1    TPro  7.4  /  Alb  3.6  /  TBili  0.4  /  DBili  x   /  AST  6   /  ALT  7   /  AlkPhos  110    PT: 10.8 sec;   INR: 0.95 ratio  PTT:35.0 sec      Urinalysis Basic:    Color: x / Appearance: x / SG: x / pH: x  Gluc: 150 mg/dL / Ketone: x  / Bili: x / Urobili: x   Blood: x / Protein: x / Nitrite: x   Leuk Esterase: x / RBC: x / WBC x   Sq Epi: x / Non Sq Epi: x / Bacteria: x      IMAGING:    CT: Bilateral percutaneous nephroureteral stents terminating within the ileal   conduit approximately 6.5 cm proximal to the right lower abdominal wall,   similarly seen on 5/11/2025. No hydronephrosis.    Interval decrease in size of the fluid collection seen at the   prostatectomy site, now measuring 3.7 x 3.3 cm.    Constipation and moderate rectal fecal impaction.

## 2025-06-29 NOTE — PROVIDER CONTACT NOTE (OTHER) - BACKGROUND
PMHx HTN, depression, anxiety, CKD 5, prostate cancer s/p radical prostatectomy and bladder cancer s/p cystectomy with ileal conduit & Parviz nephrostomy tubes. Here for dislodgement of L PCNU

## 2025-06-29 NOTE — PROGRESS NOTE ADULT - PROBLEM SELECTOR PLAN 1
s/p RALP for prostate cancer with adjuvant radiation complicated by bladder neck contracture s/p multiple procedures eventually ended up managing with SP Tube, subsequently ended up developing low grade Ta urothelial cancer with squamous diff s/p cystectomy with ileal conduit c/b uretero-ileal stricture initially managed endoscopically, ultimately, ended up getting bilateral nephrostomy tubes which were converted to upside down Neph-u stents   CT a/p with b/l percutaneous nephroreteral stents terminating w/in the ileal conduit.    Plan  -IR to exchange on 6/30.  -NPO@MN on 6/29, holding AC  -Continue with XTandi, see onc note s/p RALP for prostate cancer with adjuvant radiation complicated by bladder neck contracture s/p multiple procedures eventually ended up managing with SP Tube, subsequently ended up developing low grade Ta urothelial cancer with squamous diff s/p cystectomy with ileal conduit c/b uretero-ileal stricture initially managed endoscopically, ultimately, ended up getting bilateral nephrostomy tubes which were converted to upside down Neph-u stents   CT a/p with b/l percutaneous nephroreteral stents terminating w/in the ileal conduit.    Plan  -IR to exchange on 6/30.  -NPO@MN on 6/29, holding AC  -Hold Festus see, onc note- will have to follow up with OP urologist to continue

## 2025-06-29 NOTE — PROVIDER CONTACT NOTE (OTHER) - ASSESSMENT
Pt is A&Ox4. Told pt not to take medications. Pt stated "It doesn't matter I am going home tomorrow"

## 2025-06-29 NOTE — CHART NOTE - NSCHARTNOTEFT_GEN_A_CORE
Patient on enzalutamide that is prescribed by urology for Dr. Hoenig (urology).   He is admitted with plan for L PCNU 6/30.   Oncology was asked regarding this medication.  This is a medication being managed by urology. Therefore, I would defer to urology regarding inpatient approval   For formal approval, please check with patient's urologist or urology team for further recommendations.   Oncology standpoint, I don't see any contraindication to hold this medication.     Mirza Marques MD MS  Hematology/Oncology Fellow PGY-4  [employer] at [facility] | [facility] | [facility]  Available on Teams

## 2025-06-30 ENCOUNTER — TRANSCRIPTION ENCOUNTER (OUTPATIENT)
Age: 70
End: 2025-06-30

## 2025-06-30 LAB
ALBUMIN SERPL ELPH-MCNC: 3.2 G/DL — LOW (ref 3.3–5)
ALP SERPL-CCNC: 99 U/L — SIGNIFICANT CHANGE UP (ref 40–120)
ALT FLD-CCNC: <5 U/L — LOW (ref 10–45)
ANION GAP SERPL CALC-SCNC: 14 MMOL/L — SIGNIFICANT CHANGE UP (ref 5–17)
APTT BLD: 30.8 SEC — SIGNIFICANT CHANGE UP (ref 26.1–36.8)
AST SERPL-CCNC: <5 U/L — LOW (ref 10–40)
BILIRUB SERPL-MCNC: 0.2 MG/DL — SIGNIFICANT CHANGE UP (ref 0.2–1.2)
BUN SERPL-MCNC: 59 MG/DL — HIGH (ref 7–23)
CALCIUM SERPL-MCNC: 9.6 MG/DL — SIGNIFICANT CHANGE UP (ref 8.4–10.5)
CHLORIDE SERPL-SCNC: 108 MMOL/L — SIGNIFICANT CHANGE UP (ref 96–108)
CO2 SERPL-SCNC: 18 MMOL/L — LOW (ref 22–31)
CREAT SERPL-MCNC: 4.19 MG/DL — HIGH (ref 0.5–1.3)
EGFR: 14 ML/MIN/1.73M2 — LOW
EGFR: 14 ML/MIN/1.73M2 — LOW
GLUCOSE SERPL-MCNC: 118 MG/DL — HIGH (ref 70–99)
HCT VFR BLD CALC: 29.2 % — LOW (ref 39–50)
HGB BLD-MCNC: 9.2 G/DL — LOW (ref 13–17)
INR BLD: 0.89 RATIO — SIGNIFICANT CHANGE UP (ref 0.85–1.16)
MAGNESIUM SERPL-MCNC: 2.4 MG/DL — SIGNIFICANT CHANGE UP (ref 1.6–2.6)
MCHC RBC-ENTMCNC: 29.3 PG — SIGNIFICANT CHANGE UP (ref 27–34)
MCHC RBC-ENTMCNC: 31.5 G/DL — LOW (ref 32–36)
MCV RBC AUTO: 93 FL — SIGNIFICANT CHANGE UP (ref 80–100)
NRBC # BLD AUTO: 0 K/UL — SIGNIFICANT CHANGE UP (ref 0–0)
NRBC # FLD: 0 K/UL — SIGNIFICANT CHANGE UP (ref 0–0)
NRBC BLD AUTO-RTO: 0 /100 WBCS — SIGNIFICANT CHANGE UP (ref 0–0)
PHOSPHATE SERPL-MCNC: 3.2 MG/DL — SIGNIFICANT CHANGE UP (ref 2.5–4.5)
PLATELET # BLD AUTO: 186 K/UL — SIGNIFICANT CHANGE UP (ref 150–400)
PMV BLD: 10.5 FL — SIGNIFICANT CHANGE UP (ref 7–13)
POTASSIUM SERPL-MCNC: 4 MMOL/L — SIGNIFICANT CHANGE UP (ref 3.5–5.3)
POTASSIUM SERPL-SCNC: 4 MMOL/L — SIGNIFICANT CHANGE UP (ref 3.5–5.3)
PROT SERPL-MCNC: 6.7 G/DL — SIGNIFICANT CHANGE UP (ref 6–8.3)
PROTHROM AB SERPL-ACNC: 10.2 SEC — SIGNIFICANT CHANGE UP (ref 9.9–13.4)
RBC # BLD: 3.14 M/UL — LOW (ref 4.2–5.8)
RBC # FLD: 15.2 % — HIGH (ref 10.3–14.5)
SODIUM SERPL-SCNC: 140 MMOL/L — SIGNIFICANT CHANGE UP (ref 135–145)
WBC # BLD: 7.39 K/UL — SIGNIFICANT CHANGE UP (ref 3.8–10.5)
WBC # FLD AUTO: 7.39 K/UL — SIGNIFICANT CHANGE UP (ref 3.8–10.5)

## 2025-06-30 PROCEDURE — C2625: CPT

## 2025-06-30 PROCEDURE — 85027 COMPLETE CBC AUTOMATED: CPT

## 2025-06-30 PROCEDURE — 80048 BASIC METABOLIC PNL TOTAL CA: CPT

## 2025-06-30 PROCEDURE — 75984 XRAY CONTROL CATHETER CHANGE: CPT | Mod: 26

## 2025-06-30 PROCEDURE — 36415 COLL VENOUS BLD VENIPUNCTURE: CPT

## 2025-06-30 PROCEDURE — 86900 BLOOD TYPING SEROLOGIC ABO: CPT

## 2025-06-30 PROCEDURE — 84100 ASSAY OF PHOSPHORUS: CPT

## 2025-06-30 PROCEDURE — 85025 COMPLETE CBC W/AUTO DIFF WBC: CPT

## 2025-06-30 PROCEDURE — 85730 THROMBOPLASTIN TIME PARTIAL: CPT

## 2025-06-30 PROCEDURE — 83735 ASSAY OF MAGNESIUM: CPT

## 2025-06-30 PROCEDURE — C1769: CPT

## 2025-06-30 PROCEDURE — 99232 SBSQ HOSP IP/OBS MODERATE 35: CPT

## 2025-06-30 PROCEDURE — 80053 COMPREHEN METABOLIC PANEL: CPT

## 2025-06-30 PROCEDURE — 74176 CT ABD & PELVIS W/O CONTRAST: CPT

## 2025-06-30 PROCEDURE — 93005 ELECTROCARDIOGRAM TRACING: CPT

## 2025-06-30 PROCEDURE — C1887: CPT

## 2025-06-30 PROCEDURE — 86850 RBC ANTIBODY SCREEN: CPT

## 2025-06-30 PROCEDURE — 50688 CHANGE OF URETER TUBE/STENT: CPT | Mod: LT

## 2025-06-30 PROCEDURE — 85610 PROTHROMBIN TIME: CPT

## 2025-06-30 PROCEDURE — 86901 BLOOD TYPING SEROLOGIC RH(D): CPT

## 2025-06-30 RX ORDER — CEFTRIAXONE 500 MG/1
1000 INJECTION, POWDER, FOR SOLUTION INTRAMUSCULAR; INTRAVENOUS EVERY 24 HOURS
Refills: 0 | Status: DISCONTINUED | OUTPATIENT
Start: 2025-06-30 | End: 2025-07-01

## 2025-06-30 RX ADMIN — Medication 1300 MILLIGRAM(S): at 05:57

## 2025-06-30 RX ADMIN — Medication 1300 MILLIGRAM(S): at 21:29

## 2025-06-30 RX ADMIN — CEFTRIAXONE 100 MILLIGRAM(S): 500 INJECTION, POWDER, FOR SOLUTION INTRAMUSCULAR; INTRAVENOUS at 11:41

## 2025-06-30 RX ADMIN — CLONAZEPAM 1 MILLIGRAM(S): 0.5 TABLET ORAL at 18:55

## 2025-06-30 RX ADMIN — Medication 2 TABLET(S): at 21:29

## 2025-06-30 RX ADMIN — CLONAZEPAM 1 MILLIGRAM(S): 0.5 TABLET ORAL at 07:26

## 2025-06-30 RX ADMIN — Medication 1300 MILLIGRAM(S): at 14:54

## 2025-06-30 RX ADMIN — Medication 3 MILLIGRAM(S): at 21:29

## 2025-06-30 RX ADMIN — Medication 300 MILLIGRAM(S): at 21:28

## 2025-06-30 RX ADMIN — AMLODIPINE BESYLATE 5 MILLIGRAM(S): 10 TABLET ORAL at 05:57

## 2025-06-30 RX ADMIN — Medication 650 MILLIGRAM(S): at 19:11

## 2025-06-30 NOTE — PROGRESS NOTE ADULT - SUBJECTIVE AND OBJECTIVE BOX
Subjective  feels okay     Objective    Vital signs  T(F): , Max: 98 (06-29-25 @ 20:16)  HR: 60 (06-30-25 @ 05:35)  BP: 146/71 (06-30-25 @ 05:35)  SpO2: 94% (06-30-25 @ 05:35)  Wt(kg): --    Output     OUT:    Nephrostomy Tube (mL): 100 mL    Nephrostomy Tube (mL): 0 mL    Urostomy (mL): 500 mL  Total OUT: 600 mL    Total NET: -600 mL          Gen: NAD  Abd: soft, nontender, nondistended  : IC pink     Labs      06-30 @ 07:18    WBC 7.39  / Hct 29.2  / SCr --       06-30 @ 07:08    WBC --    / Hct --    / SCr 4.19           Urine Cx: ?  Blood Cx: ?    Imaging

## 2025-06-30 NOTE — PROGRESS NOTE ADULT - TIME BILLING
- Ordering, reviewing, and interpreting labs, testing, and imaging.  - Independently obtaining a review of systems and performing a physical exam  - Reviewing consultant documentation/recommendations in addition to discussing plan of care with consultants.  - Counselling and educating patient regarding interpretation of aforementioned items and plan of care.
Review of tests, imaging, labs, documents, medical management, coordination of care and counseling.

## 2025-06-30 NOTE — PROGRESS NOTE ADULT - PROBLEM SELECTOR PLAN 1
s/p RALP for prostate cancer with adjuvant radiation complicated by bladder neck contracture s/p multiple procedures eventually ended up managing with SP Tube, subsequently ended up developing low grade Ta urothelial cancer with squamous diff s/p cystectomy with ileal conduit c/b uretero-ileal stricture initially managed endoscopically, ultimately, ended up getting bilateral nephrostomy tubes which were converted to upside down Neph-u stents   CT a/p with b/l percutaneous nephroreteral stents terminating w/in the ileal conduit.    Plan  -IR to exchange on 6/30.  -NPO@MN on 6/29, holding AC  -Hold Festus see, onc note- will have to follow up with OP urologist to continue s/p RALP for prostate cancer with adjuvant radiation complicated by bladder neck contracture s/p multiple procedures eventually ended up managing with SP Tube, subsequently ended up developing low grade Ta urothelial cancer with squamous diff s/p cystectomy with ileal conduit c/b uretero-ileal stricture initially managed endoscopically, ultimately, ended up getting bilateral nephrostomy tubes which were converted to upside down Neph-u stents   CT a/p with b/l percutaneous nephroreteral stents terminating w/in the ileal conduit.    Plan  -IR to exchange on 6/30 (today).  -NPO@MN on 6/29, holding AC  -Hold Festus see, onc note- will have to follow up with OP urologist to continue

## 2025-06-30 NOTE — PROGRESS NOTE ADULT - PROBLEM SELECTOR PLAN 5
VTE ppx: sq hep (discontinued  for 6/29 evening and 6/30 morning)  Diet: regular, NPO@mn on 6/29 VTE ppx: sq hep (discontinued  for 6/29 evening and 6/30 morning for IR today)  Diet: regular, NPO@mn on 6/29

## 2025-06-30 NOTE — DISCHARGE NOTE PROVIDER - NSDCFUSCHEDAPPT_GEN_ALL_CORE_FT
Hoenig, David  [facility]  UROLOGY [address] [place] [address] R  Scheduled Appointment: 07/16/2025    [facility]  UROLOGY [address] [place] [address] R  Scheduled Appointment: 07/16/2025    Vidhi Kemp  [facility]  NEPHRO [address]  Scheduled Appointment: 07/29/2025

## 2025-06-30 NOTE — DISCHARGE NOTE PROVIDER - NSDCFUADDAPPT_GEN_ALL_CORE_FT
Please schedule an appointment with Interventional Radiology in 3 months for a routine exchange of your nephrostomy tube. You may call the IR booking office @ [phone]. Please feel free to contact us at [phone] if any problems arise. After 6PM, Monday through Friday, on weekends and on holidays, please call [phone] and ask for the radiology resident on call to be paged.

## 2025-06-30 NOTE — PROGRESS NOTE ADULT - PROBLEM SELECTOR PLAN 2
2/2 CKD  -stable at [facility] 9 6/29  monitor cbc daily 2/2 CKD  -stable at around 9  monitor cbc daily

## 2025-06-30 NOTE — DISCHARGE NOTE PROVIDER - NSDCCPTREATMENT_GEN_ALL_CORE_FT
PRINCIPAL PROCEDURE  Procedure: Exchange, nephrostomy or pyelostomy tube  Findings and Treatment: You had a your left nephroureteral catheter exchanged by Dr. Castillo on 6/30in [facility] ([facility]).   Monitor site for any sign or symptoms of infection (painful red skin, green/ yellow foul smelling discharge from the insertion site, fever, chills, leakage around drain site).   Flush drain with 5mL NS daily. If you meet resistance upon flushing, STOP and contact IR.   Empty drainage bag daily and record output. Once output is <10mL in a 24hr period or if there is a sudden decrease in output please contact IR to schedule  an appointment.  Drain care:  -Disconnect tubing (tube attached to bag)  from the catheter (catheter going into skin)  -Clean catheter with alcohol swab  -Twist on the flush syringe to the catheter (be sure to push the air out of syringe)  -Flush catheter with 5mL (DO NOT pull back on syringe). If you meet resistance upon flushing, STOP and contact IR.   -Disconnect syringe from catheter and reconnect drainage bag.  Dressing care:  -Wash hands thoroughly with water and soap for at least 20 seconds.   -take off old dressing and clean around drain site with gauze soaked with warm water  -After cleaning the site, dry and replace dressing with a new gauze and tegaderm.   -Place one piece of gauze underneath the drain and another piece of gauze on top of drain.   -Apply tegaderm (clear dressing) on top.  -Change dressing every 3 days or when soiled     PRINCIPAL PROCEDURE  Procedure: Exchange, nephrostomy or pyelostomy tube  Findings and Treatment: You had a your left nephroureteral catheter exchanged by Dr. Castillo on 6/30 in [facility] ([facility]).   Monitor site for any sign or symptoms of infection (painful red skin, green/ yellow foul smelling discharge from the insertion site, fever, chills, leakage around drain site).   Flush drain with 5mL NS daily. If you meet resistance upon flushing, STOP and contact IR.   Empty drainage bag daily and record output. Once output is <10mL in a 24hr period or if there is a sudden decrease in output please contact IR to schedule  an appointment.  Drain care:  -Disconnect tubing (tube attached to bag)  from the catheter (catheter going into skin)  -Clean catheter with alcohol swab  -Twist on the flush syringe to the catheter (be sure to push the air out of syringe)  -Flush catheter with 5mL (DO NOT pull back on syringe). If you meet resistance upon flushing, STOP and contact IR.   -Disconnect syringe from catheter and reconnect drainage bag.  Dressing care:  -Wash hands thoroughly with water and soap for at least 20 seconds.   -take off old dressing and clean around drain site with gauze soaked with warm water  -After cleaning the site, dry and replace dressing with a new gauze and tegaderm.   -Place one piece of gauze underneath the drain and another piece of gauze on top of drain.   -Apply tegaderm (clear dressing) on top.  -Change dressing every 3 days or when soiled

## 2025-06-30 NOTE — DISCHARGE NOTE PROVIDER - CARE PROVIDER_API CALL
Abdelrahman Castillo  Radiology Vascular & Interventional Radiology  [address] [place] [id number]  Phone: [phone]  Fax: [phone]  Follow Up Time: Routine

## 2025-06-30 NOTE — PRE PROCEDURE NOTE - PRE PROCEDURE EVALUATION
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      Allergies: No Known Allergies    Medications (Abx/Cardiac/Anticoagulation/Blood Products)  amLODIPine   Tablet: 5 milliGRAM(s) Oral (06-30 @ 05:57)  furosemide    Tablet: 40 milliGRAM(s) Oral (06-29 @ 10:49)  heparin   Injectable: 7500 Unit(s) SubCutaneous (06-29 @ 08:26)    Data:    T(C): 36.6  HR: 60  BP: 146/71  RR: 18  SpO2: 94%    Exam  General: No acute distress  Chest: Non labored breathing    -WBC 7.39 / HgB 9.2 / Hct 29.2 / Plt 186  -Na 140 / Cl 108 / BUN 59 / Glucose 118  -K 4.0 / CO2 18 / Cr 4.19  -ALT <5 / Alk Phos 99 / T.Bili 0.2  -INR0.89    Imaging: CT C/AP reviewed    Plan: 70y Male presents for Left percutaneous nephroureterostomy exchange.  -Risks/Benefits/alternatives explained with the patient and witnessed informed consent obtained.    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      Allergies: No Known Allergies    Medications (Abx/Cardiac/Anticoagulation/Blood Products)  amLODIPine   Tablet: 5 milliGRAM(s) Oral (06-30 @ 05:57)  furosemide    Tablet: 40 milliGRAM(s) Oral (06-29 @ 10:49)  heparin   Injectable: 7500 Unit(s) SubCutaneous (06-29 @ 08:26)    Data:    T(C): 36.6  HR: 60  BP: 146/71  RR: 18  SpO2: 94%    Exam  General: No acute distress  Chest: Non labored breathing    -WBC 7.39 / HgB 9.2 / Hct 29.2 / Plt 186  -Na 140 / Cl 108 / BUN 59 / Glucose 118  -K 4.0 / CO2 18 / Cr 4.19  -ALT <5 / Alk Phos 99 / T.Bili 0.2  -INR0.89    Imaging: CT C/AP reviewed    Plan: 70y Male presents for Left percutaneous nephroureterostomy exchange.  Procedure and risks discussed with patient and he is agreeable to proceed.   -Risks/Benefits/alternatives explained with the patient and witnessed informed consent obtained.

## 2025-06-30 NOTE — DISCHARGE NOTE PROVIDER - NSDCMRMEDTOKEN_GEN_ALL_CORE_FT
amLODIPine 5 mg oral tablet: 1 tab(s) orally 2 times a day  calcitriol 0.25 mcg oral capsule: 1 cap(s) orally 3 times a week Monday, Thursday, Saturday  furosemide 40 mg oral tablet: 1 tab(s) orally 3 times a week Take on Sunday, Tuesday, Thursday  KlonoPIN 1 mg oral tablet: 1 tab(s) orally 3 times a day  polyethylene glycol 3350 oral powder for reconstitution: 17 gram(s) orally once a day STOP if you have diarrhea  senna leaf extract oral tablet: 2 tab(s) orally once a day (at bedtime) STOP if you have diarrhea  sodium bicarbonate 650 mg oral tablet: 3 tab(s) orally 3 times a day  traZODone 100 mg oral tablet: 3 tab(s) orally once a day (at bedtime)  Xtandi 40 mg oral capsule: 4 cap(s) orally once a day

## 2025-06-30 NOTE — DISCHARGE NOTE PROVIDER - HOSPITAL COURSE
HPI:  70M PMHx htn, depression, anxiety, ckd5, prostate and bladder ca s/p prostectomy, cystectomy, c/b recurrent uretero-ileal strictures s/p nephroureteral stent. Was recently admitted here 5/6-12 dislodged nephroureteral stents c/b infection, dc'd after IR exchange and abx.   Presents today after visiting nurse had a concern for the left nephroureteral stent displacement, rec to come to ED for eval. Denied any pain, f/c, cp, sob, abd pain, n/v/d.     In the ED, was mildly hypertensive otherwise vitals stable.   CBC w/ wbc 8.9, hgb 9.5 (baseline 9-10), plt 192.   Coag wnl.   CMP w/ Na 137, K 4.0, SCr 3.92 (was around 5 last admission), LFT unremarkable.   CT a/p with b/l percutaneous nephroreteral stents terminating w/in the ileal conduit.    IR c/s in the ED, plan for left PCNU exchange on Monday 6/30, npo @[facility] on 6/29, hold AM anticoagulation.  (28 Jun 2025 22:06)    Hospital Course: PCN exchange 6/30.      Important Medication Changes and Reason: None    Active or Pending Issues Requiring Follow-up: None    Advanced Directives:   [X] Full code  [ ] DNR  [ ] Hospice    Discharge Diagnoses:  HTN  Depression  CKD 5  PCN dislodged  Prostate CA

## 2025-06-30 NOTE — PROGRESS NOTE ADULT - ASSESSMENT
70 year old male with PMHx of bladder cancer s/p cystoprostatectomy and ileal conduit, c/b ureteroileal strictures managed with b/l PCNUs, concern for dislodged L PCNU    Plan  -IR to replaced L PCNU 6/30,   -NPO  - trend Cr  -no further acute  intervention  -outpatient follow up with Dr Hoenig  -sandrine d/w Dr Álvarez,      [facility]  [address] [place], [place] [id number] [phone]   70 year old male with PMHx of bladder cancer s/p cystoprostatectomy and ileal conduit, c/b ureteroileal strictures managed with b/l PCNUs, concern for dislodged L PCNU    Plan  -IR to replaced L PCNU 6/30-> s/p L NT replacement   -NPO  - trend Cr  -no further acute  intervention. Please re-call urology for any further questions or concerns   -outpatient follow up with Dr Hoenig  -case d/w Dr Álvarez,      [facility]  [address] [place] [address]  [phone]

## 2025-06-30 NOTE — DISCHARGE NOTE PROVIDER - NSDCCPCAREPLAN_GEN_ALL_CORE_FT
PRINCIPAL DISCHARGE DIAGNOSIS  Diagnosis: Nephrostomy tube displaced  Assessment and Plan of Treatment: You had a your left nephrostomy tube exchanged by Dr. Castillo on 6/30 in [facility] ([facility]).   Monitor site for any sign or symptoms of infection (painful red skin, green/ yellow foul smelling discharge from the insertion site, fever, chills, leakage around drain site).   Flush drain with 5mL NS daily. If you meet resistance upon flushing, STOP and contact IR.   Empty drainage bag daily and record output. Once output is <10mL in a 24hr period or if there is a sudden decrease in output please contact IR to schedule an appointment.  Drain care:  -Disconnect tubing (tube attached to bag)  from the catheter (catheter going into skin)  -Clean catheter with alcohol swab  -Twist on the flush syringe to the catheter (be sure to push the air out of syringe)  -Flush catheter with 5mL (DO NOT pull back on syringe). If you meet resistance upon flushing, STOP and contact IR.   -Disconnect syringe from catheter and reconnect drainage bag.  Dressing care:  -Wash hands thoroughly with water and soap for at least 20 seconds.   -take off old dressing and clean around drain site with gauze soaked with warm water  -After cleaning the site, dry and replace dressing with a new gauze and tegaderm.   -Place one piece of gauze underneath the drain and another piece of gauze on top of drain.   -Apply tegaderm (clear dressing) on top.  -Change dressing every 3 days or when soiled

## 2025-06-30 NOTE — PROGRESS NOTE ADULT - SUBJECTIVE AND OBJECTIVE BOX
[facility] Division of Hospital Medicine  You Asencio DO  Reachable on [employer] Teams    Patient is a 70y old  Male who presents with a chief complaint of     SUBJECTIVE / OVERNIGHT EVENTS:  ADDITIONAL REVIEW OF SYSTEMS:    MEDICATIONS  (STANDING):  amLODIPine   Tablet 5 milliGRAM(s) Oral daily  calcitriol   Capsule 0.25 MICROGram(s) Oral <User Schedule>  cefTRIAXone   IVPB 1000 milliGRAM(s) IV Intermittent every 24 hours  furosemide    Tablet 40 milliGRAM(s) Oral <User Schedule>  polyethylene glycol 3350 17 Gram(s) Oral daily  senna 2 Tablet(s) Oral at bedtime  sodium bicarbonate 1300 milliGRAM(s) Oral three times a day  traZODone 300 milliGRAM(s) Oral at bedtime    MEDICATIONS  (PRN):  acetaminophen     Tablet .. 650 milliGRAM(s) Oral every 6 hours PRN Temp greater or equal to 38C (100.4F), Mild Pain (1 - 3)  aluminum hydroxide/magnesium hydroxide/simethicone Suspension 30 milliLiter(s) Oral every 4 hours PRN Dyspepsia  clonazePAM  Tablet 1 milliGRAM(s) Oral three times a day PRN anxiety  melatonin 3 milliGRAM(s) Oral at bedtime PRN Insomnia  ondansetron Injectable 4 milliGRAM(s) IV Push every 8 hours PRN Nausea and/or Vomiting      CAPILLARY BLOOD GLUCOSE        I&O's Summary    29 Jun 2025 07:01  -  30 Jun 2025 07:00  --------------------------------------------------------  IN: 1080 mL / OUT: 600 mL / NET: 480 mL        PHYSICAL EXAM:  Vital Signs Last 24 Hrs  T(C): 36.1 (30 Jun 2025 11:15), Max: 36.7 (29 Jun 2025 20:16)  T(F): 97 (30 Jun 2025 11:15), Max: 98 (29 Jun 2025 20:16)  HR: 64 (30 Jun 2025 12:45) (53 - 65)  BP: 185/75 (30 Jun 2025 12:45) (142/80 - 185/75)  BP(mean): 108 (30 Jun 2025 12:45) (105 - 116)  RR: 22 (30 Jun 2025 12:45) (13 - 22)  SpO2: 100% (30 Jun 2025 12:45) (94% - 100%)    Parameters below as of 30 Jun 2025 11:15  Patient On (Oxygen Delivery Method): room air      CONSTITUTIONAL: NAD, well-developed, well-groomed  EYES: PERRLA; conjunctiva and sclera clear  ENMT: Moist oral mucosa, no pharyngeal injection or exudates; normal dentition  NECK: Supple, no palpable masses; no thyromegaly  RESPIRATORY: Normal respiratory effort; lungs are clear to auscultation bilaterally  CARDIOVASCULAR: Regular rate and rhythm, normal S1 and S2, no murmur/rub/gallop; No lower extremity edema; Peripheral pulses are 2+ bilaterally  ABDOMEN: Nontender to palpation, normoactive bowel sounds, no rebound/guarding; No hepatosplenomegaly  MUSCULOSKELETAL:  Normal gait; no clubbing or cyanosis of digits; no joint swelling or tenderness to palpation  PSYCH: A+O to person, place, and time; affect appropriate  NEUROLOGY: CN 2-12 are intact and symmetric; no gross sensory deficits   SKIN: No rashes; no palpable lesions    LABS:                        9.2    7.39  )-----------( 186      ( 30 Jun 2025 07:18 )             29.2     06-30    140  |  108  |  59[H]  ----------------------------<  118[H]  4.0   |  18[L]  |  4.19[H]    Ca    9.6      30 Jun 2025 07:08  Phos  3.2     06-30  Mg     2.4     06-30    TPro  6.7  /  Alb  3.2[L]  /  TBili  0.2  /  DBili  x   /  AST  <5[L]  /  ALT  <5[L]  /  AlkPhos  99  06-30    PT/INR - ( 30 Jun 2025 07:12 )   PT: 10.2 sec;   INR: 0.89 ratio         PTT - ( 30 Jun 2025 07:12 )  PTT:30.8 sec      Urinalysis Basic - ( 30 Jun 2025 07:08 )    Color: x / Appearance: x / SG: x / pH: x  Gluc: 118 mg/dL / Ketone: x  / Bili: x / Urobili: x   Blood: x / Protein: x / Nitrite: x   Leuk Esterase: x / RBC: x / WBC x   Sq Epi: x / Non Sq Epi: x / Bacteria: x          RADIOLOGY & ADDITIONAL TESTS:  Results Reviewed:   Imaging Personally Reviewed:  Electrocardiogram Personally Reviewed:    COORDINATION OF CARE:  Care Discussed with Consultants/Other Providers [Y/N]:  Prior or Outpatient Records Reviewed [Y/N]:   [facility] Division of Hospital Medicine  You Asencio DO  Reachable on [employer] Teams    Patient is a 70y old  Male who presents with a chief complaint of     SUBJECTIVE / OVERNIGHT EVENTS: No acute events overnight. Patient seen and examined at bedside this morning, endorses leaking from nephrostomy.    REVIEW OF SYSTEMS:    CONSTITUTIONAL: No weakness, fevers or chills  EYES/ENT: No visual changes;  No vertigo or throat pain   NECK: No pain or stiffness  RESPIRATORY: No cough, wheezing, hemoptysis; No shortness of breath  CARDIOVASCULAR: No chest pain or palpitations  GASTROINTESTINAL: No abdominal or epigastric pain. No nausea, vomiting, or hematemesis; No diarrhea or constipation. No melena or hematochezia.  GENITOURINARY: Endorses leaking from nephrostomy  NEUROLOGICAL: No numbness or weakness  SKIN: No itching, burning, rashes, or lesions  MSK: No joint pain, no back pain  HEME: No easy bleeding, no easy bruising  All other review of systems is negative unless indicated above.    MEDICATIONS  (STANDING):  amLODIPine   Tablet 5 milliGRAM(s) Oral daily  calcitriol   Capsule 0.25 MICROGram(s) Oral <User Schedule>  cefTRIAXone   IVPB 1000 milliGRAM(s) IV Intermittent every 24 hours  furosemide    Tablet 40 milliGRAM(s) Oral <User Schedule>  polyethylene glycol 3350 17 Gram(s) Oral daily  senna 2 Tablet(s) Oral at bedtime  sodium bicarbonate 1300 milliGRAM(s) Oral three times a day  traZODone 300 milliGRAM(s) Oral at bedtime    MEDICATIONS  (PRN):  acetaminophen     Tablet .. 650 milliGRAM(s) Oral every 6 hours PRN Temp greater or equal to 38C (100.4F), Mild Pain (1 - 3)  aluminum hydroxide/magnesium hydroxide/simethicone Suspension 30 milliLiter(s) Oral every 4 hours PRN Dyspepsia  clonazePAM  Tablet 1 milliGRAM(s) Oral three times a day PRN anxiety  melatonin 3 milliGRAM(s) Oral at bedtime PRN Insomnia  ondansetron Injectable 4 milliGRAM(s) IV Push every 8 hours PRN Nausea and/or Vomiting      CAPILLARY BLOOD GLUCOSE        I&O's Summary    29 Jun 2025 07:01  -  30 Jun 2025 07:00  --------------------------------------------------------  IN: 1080 mL / OUT: 600 mL / NET: 480 mL        PHYSICAL EXAM:  Vital Signs Last 24 Hrs  T(C): 36.1 (30 Jun 2025 11:15), Max: 36.7 (29 Jun 2025 20:16)  T(F): 97 (30 Jun 2025 11:15), Max: 98 (29 Jun 2025 20:16)  HR: 64 (30 Jun 2025 12:45) (53 - 65)  BP: 185/75 (30 Jun 2025 12:45) (142/80 - 185/75)  BP(mean): 108 (30 Jun 2025 12:45) (105 - 116)  RR: 22 (30 Jun 2025 12:45) (13 - 22)  SpO2: 100% (30 Jun 2025 12:45) (94% - 100%)    Parameters below as of 30 Jun 2025 11:15  Patient On (Oxygen Delivery Method): room air      CONSTITUTIONAL: Chronically ill appearing male laying in bed in NAD  RESPIRATORY: Normal respiratory effort; lungs are clear to auscultation bilaterally  CARDIOVASCULAR: Regular rate and rhythm, normal S1 and S2, no murmur/rub/gallop  ABDOMEN: Nontender to palpation, nondistended, soft  GENITOURINARY: +Nephrostomy  PSYCH: A+O to person, place, and time; affect appropriate    LABS:                        9.2    7.39  )-----------( 186      ( 30 Jun 2025 07:18 )             29.2     06-30    140  |  108  |  59[H]  ----------------------------<  118[H]  4.0   |  18[L]  |  4.19[H]    Ca    9.6      30 Jun 2025 07:08  Phos  3.2     06-30  Mg     2.4     06-30    TPro  6.7  /  Alb  3.2[L]  /  TBili  0.2  /  DBili  x   /  AST  <5[L]  /  ALT  <5[L]  /  AlkPhos  99  06-30    PT/INR - ( 30 Jun 2025 07:12 )   PT: 10.2 sec;   INR: 0.89 ratio         PTT - ( 30 Jun 2025 07:12 )  PTT:30.8 sec      Urinalysis Basic - ( 30 Jun 2025 07:08 )    Color: x / Appearance: x / SG: x / pH: x  Gluc: 118 mg/dL / Ketone: x  / Bili: x / Urobili: x   Blood: x / Protein: x / Nitrite: x   Leuk Esterase: x / RBC: x / WBC x   Sq Epi: x / Non Sq Epi: x / Bacteria: x

## 2025-06-30 NOTE — DISCHARGE NOTE PROVIDER - CARE PROVIDERS DIRECT ADDRESSES
[email or web address]

## 2025-06-30 NOTE — PROGRESS NOTE ADULT - ATTENDING COMMENTS
New left nephroureteral tube placed  By interventional radiology  Outpatient follow-up with Dr. David Hoenig

## 2025-07-01 ENCOUNTER — TRANSCRIPTION ENCOUNTER (OUTPATIENT)
Age: 70
End: 2025-07-01

## 2025-07-01 VITALS
RESPIRATION RATE: 18 BRPM | SYSTOLIC BLOOD PRESSURE: 135 MMHG | HEART RATE: 86 BPM | OXYGEN SATURATION: 99 % | DIASTOLIC BLOOD PRESSURE: 84 MMHG | TEMPERATURE: 98 F

## 2025-07-01 LAB
ANION GAP SERPL CALC-SCNC: 16 MMOL/L — SIGNIFICANT CHANGE UP (ref 5–17)
BUN SERPL-MCNC: 58 MG/DL — HIGH (ref 7–23)
CALCIUM SERPL-MCNC: 9.8 MG/DL — SIGNIFICANT CHANGE UP (ref 8.4–10.5)
CHLORIDE SERPL-SCNC: 106 MMOL/L — SIGNIFICANT CHANGE UP (ref 96–108)
CO2 SERPL-SCNC: 19 MMOL/L — LOW (ref 22–31)
CREAT SERPL-MCNC: 4.15 MG/DL — HIGH (ref 0.5–1.3)
EGFR: 15 ML/MIN/1.73M2 — LOW
EGFR: 15 ML/MIN/1.73M2 — LOW
GLUCOSE SERPL-MCNC: 179 MG/DL — HIGH (ref 70–99)
HCT VFR BLD CALC: 30.2 % — LOW (ref 39–50)
HGB BLD-MCNC: 9.3 G/DL — LOW (ref 13–17)
MAGNESIUM SERPL-MCNC: 2.3 MG/DL — SIGNIFICANT CHANGE UP (ref 1.6–2.6)
MCHC RBC-ENTMCNC: 28.7 PG — SIGNIFICANT CHANGE UP (ref 27–34)
MCHC RBC-ENTMCNC: 30.8 G/DL — LOW (ref 32–36)
MCV RBC AUTO: 93.2 FL — SIGNIFICANT CHANGE UP (ref 80–100)
NRBC # BLD AUTO: 0 K/UL — SIGNIFICANT CHANGE UP (ref 0–0)
NRBC # FLD: 0 K/UL — SIGNIFICANT CHANGE UP (ref 0–0)
NRBC BLD AUTO-RTO: 0 /100 WBCS — SIGNIFICANT CHANGE UP (ref 0–0)
PHOSPHATE SERPL-MCNC: 3.2 MG/DL — SIGNIFICANT CHANGE UP (ref 2.5–4.5)
PLATELET # BLD AUTO: 174 K/UL — SIGNIFICANT CHANGE UP (ref 150–400)
PMV BLD: 10.4 FL — SIGNIFICANT CHANGE UP (ref 7–13)
POTASSIUM SERPL-MCNC: 4.2 MMOL/L — SIGNIFICANT CHANGE UP (ref 3.5–5.3)
POTASSIUM SERPL-SCNC: 4.2 MMOL/L — SIGNIFICANT CHANGE UP (ref 3.5–5.3)
RBC # BLD: 3.24 M/UL — LOW (ref 4.2–5.8)
RBC # FLD: 15 % — HIGH (ref 10.3–14.5)
SODIUM SERPL-SCNC: 141 MMOL/L — SIGNIFICANT CHANGE UP (ref 135–145)
WBC # BLD: 8.63 K/UL — SIGNIFICANT CHANGE UP (ref 3.8–10.5)
WBC # FLD AUTO: 8.63 K/UL — SIGNIFICANT CHANGE UP (ref 3.8–10.5)

## 2025-07-01 PROCEDURE — 85027 COMPLETE CBC AUTOMATED: CPT

## 2025-07-01 PROCEDURE — 85730 THROMBOPLASTIN TIME PARTIAL: CPT

## 2025-07-01 PROCEDURE — 85610 PROTHROMBIN TIME: CPT

## 2025-07-01 PROCEDURE — 80053 COMPREHEN METABOLIC PANEL: CPT

## 2025-07-01 PROCEDURE — C1887: CPT

## 2025-07-01 PROCEDURE — C1769: CPT

## 2025-07-01 PROCEDURE — 84100 ASSAY OF PHOSPHORUS: CPT

## 2025-07-01 PROCEDURE — 86901 BLOOD TYPING SEROLOGIC RH(D): CPT

## 2025-07-01 PROCEDURE — 50688 CHANGE OF URETER TUBE/STENT: CPT

## 2025-07-01 PROCEDURE — 75984 XRAY CONTROL CATHETER CHANGE: CPT

## 2025-07-01 PROCEDURE — 85025 COMPLETE CBC W/AUTO DIFF WBC: CPT

## 2025-07-01 PROCEDURE — C2625: CPT

## 2025-07-01 PROCEDURE — 86850 RBC ANTIBODY SCREEN: CPT

## 2025-07-01 PROCEDURE — 83735 ASSAY OF MAGNESIUM: CPT

## 2025-07-01 PROCEDURE — 80048 BASIC METABOLIC PNL TOTAL CA: CPT

## 2025-07-01 PROCEDURE — 86900 BLOOD TYPING SEROLOGIC ABO: CPT

## 2025-07-01 PROCEDURE — 99285 EMERGENCY DEPT VISIT HI MDM: CPT

## 2025-07-01 PROCEDURE — 93005 ELECTROCARDIOGRAM TRACING: CPT

## 2025-07-01 PROCEDURE — 36415 COLL VENOUS BLD VENIPUNCTURE: CPT

## 2025-07-01 PROCEDURE — 74176 CT ABD & PELVIS W/O CONTRAST: CPT

## 2025-07-01 PROCEDURE — 99239 HOSP IP/OBS DSCHRG MGMT >30: CPT

## 2025-07-01 RX ADMIN — Medication 1300 MILLIGRAM(S): at 05:22

## 2025-07-01 RX ADMIN — CALCITRIOL 0.25 MICROGRAM(S): 0.5 CAPSULE, GELATIN COATED ORAL at 09:07

## 2025-07-01 RX ADMIN — Medication 1300 MILLIGRAM(S): at 14:02

## 2025-07-01 RX ADMIN — CLONAZEPAM 1 MILLIGRAM(S): 0.5 TABLET ORAL at 09:07

## 2025-07-01 RX ADMIN — FUROSEMIDE 40 MILLIGRAM(S): 10 INJECTION INTRAMUSCULAR; INTRAVENOUS at 09:07

## 2025-07-01 RX ADMIN — AMLODIPINE BESYLATE 5 MILLIGRAM(S): 10 TABLET ORAL at 05:23

## 2025-07-01 NOTE — PROGRESS NOTE ADULT - NSPROGADDITIONALINFOA_GEN_ALL_CORE
Medically stable for DC home today. Discussed with patient, 6 Bernard LAUREN. Total time spent DC planning 41 minutes.
Discussed with patient, Kev Wagner ACP.

## 2025-07-01 NOTE — DISCHARGE NOTE NURSING/CASE MANAGEMENT/SOCIAL WORK - NSDCVIVACCINE_GEN_ALL_CORE_FT
influenza, injectable, quadrivalent, preservative free; 23-Nov-2019 14:01; Maryanne Snider (RN); to be; 3bs44 (Exp. Date: 30-Jun-2020); IntraMuscular; Deltoid Left.; 0.5 milliLiter(s); VIS (VIS Published: 15-Aug-2019, VIS Presented: 23-Nov-2019);

## 2025-07-01 NOTE — DISCHARGE NOTE NURSING/CASE MANAGEMENT/SOCIAL WORK - NSDCFUADDAPPT_GEN_ALL_CORE_FT
Please schedule an appointment with Interventional Radiology in 3 months for a routine exchange of your nephrostomy tube. You may call the IR booking office @ [phone]. Please feel free to contact us at [phone] if any problems arise. After 6PM, Monday through Friday, on weekends and on holidays, please call [phone] and ask for the radiology resident on call to be paged.

## 2025-07-01 NOTE — PROGRESS NOTE ADULT - PROBLEM SELECTOR PLAN 3
-still making urine   -eGFR <16, dose meds per kidney function  -c/w bicarb and calcitriol, lasix

## 2025-07-01 NOTE — PROGRESS NOTE ADULT - PROBLEM SELECTOR PLAN 1
s/p RALP for prostate cancer with adjuvant radiation complicated by bladder neck contracture s/p multiple procedures eventually ended up managing with SP Tube, subsequently ended up developing low grade Ta urothelial cancer with squamous diff s/p cystectomy with ileal conduit c/b uretero-ileal stricture initially managed endoscopically, ultimately, ended up getting bilateral nephrostomy tubes which were converted to upside down Neph-u stents   CT a/p with b/l percutaneous nephroureteral stents terminating w/in the ileal conduit.    Plan  -s/p IR to exchange on 6/30.

## 2025-07-01 NOTE — DISCHARGE NOTE NURSING/CASE MANAGEMENT/SOCIAL WORK - PATIENT PORTAL LINK FT
You can access the FollowMyHealth Patient Portal offered by [facility] by registering at the following website: [email or web address]. By joining [facility]’s FollowMyHealth portal, you will also be able to view your health information using other applications (apps) compatible with our system.

## 2025-07-01 NOTE — PROGRESS NOTE ADULT - SUBJECTIVE AND OBJECTIVE BOX
[facility] Division of Hospital Medicine  You Asencio DO  Reachable on [employer] Teams    Patient is a 70y old  Male who presents with a chief complaint of Dislodged PCN (30 Jun 2025 13:43)    SUBJECTIVE / OVERNIGHT EVENTS: No acute events overnight. Patient now s/p L PCN exchange. Patient seen and examined at bedside this morning, denies acute complaints.    REVIEW OF SYSTEMS:    CONSTITUTIONAL: No weakness, fevers or chills  EYES/ENT: No visual changes;  No vertigo or throat pain   NECK: No pain or stiffness  RESPIRATORY: No cough, wheezing, hemoptysis; No shortness of breath  CARDIOVASCULAR: No chest pain or palpitations  GASTROINTESTINAL: No abdominal or epigastric pain. No nausea, vomiting, or hematemesis; No diarrhea or constipation. No melena or hematochezia.  GENITOURINARY: No dysuria, frequency or hematuria  NEUROLOGICAL: No numbness or weakness  SKIN: No itching, burning, rashes, or lesions  MSK: No joint pain, no back pain  HEME: No easy bleeding, no easy bruising  All other review of systems is negative unless indicated above.    MEDICATIONS  (STANDING):  amLODIPine   Tablet 5 milliGRAM(s) Oral daily  calcitriol   Capsule 0.25 MICROGram(s) Oral <User Schedule>  furosemide    Tablet 40 milliGRAM(s) Oral <User Schedule>  polyethylene glycol 3350 17 Gram(s) Oral daily  senna 2 Tablet(s) Oral at bedtime  sodium bicarbonate 1300 milliGRAM(s) Oral three times a day  traZODone 300 milliGRAM(s) Oral at bedtime    MEDICATIONS  (PRN):  acetaminophen     Tablet .. 650 milliGRAM(s) Oral every 6 hours PRN Temp greater or equal to 38C (100.4F), Mild Pain (1 - 3)  aluminum hydroxide/magnesium hydroxide/simethicone Suspension 30 milliLiter(s) Oral every 4 hours PRN Dyspepsia  clonazePAM  Tablet 1 milliGRAM(s) Oral three times a day PRN anxiety  melatonin 3 milliGRAM(s) Oral at bedtime PRN Insomnia  ondansetron Injectable 4 milliGRAM(s) IV Push every 8 hours PRN Nausea and/or Vomiting      CAPILLARY BLOOD GLUCOSE        I&O's Summary    30 Jun 2025 07:01  -  01 Jul 2025 07:00  --------------------------------------------------------  IN: 720 mL / OUT: 870 mL / NET: -150 mL    01 Jul 2025 07:01  -  01 Jul 2025 14:55  --------------------------------------------------------  IN: 0 mL / OUT: 250 mL / NET: -250 mL        PHYSICAL EXAM:  Vital Signs Last 24 Hrs  T(C): 36.4 (01 Jul 2025 12:10), Max: 36.5 (30 Jun 2025 20:02)  T(F): 97.5 (01 Jul 2025 12:10), Max: 97.7 (30 Jun 2025 20:02)  HR: 59 (01 Jul 2025 12:10) (59 - 68)  BP: 149/70 (01 Jul 2025 12:10) (149/70 - 176/68)  BP(mean): --  RR: 18 (01 Jul 2025 12:10) (18 - 18)  SpO2: 97% (01 Jul 2025 12:10) (95% - 97%)    Parameters below as of 01 Jul 2025 12:10  Patient On (Oxygen Delivery Method): room air    CONSTITUTIONAL: Chronically ill appearing male laying in bed in NAD  RESPIRATORY: Normal respiratory effort; lungs are clear to auscultation bilaterally  CARDIOVASCULAR: Regular rate and rhythm, normal S1 and S2, no murmur/rub/gallop  ABDOMEN: Nontender to palpation, nondistended, soft  GENITOURINARY: +Nephrostomy bilaterally, +ileal conduit  PSYCH: A+O to person, place, and time; affect appropriate    LABS:                        9.3    8.63  )-----------( 174      ( 01 Jul 2025 10:32 )             30.2     07-01    141  |  106  |  58[H]  ----------------------------<  179[H]  4.2   |  19[L]  |  4.15[H]    Ca    9.8      01 Jul 2025 10:32  Phos  3.2     07-01  Mg     2.3     07-01    TPro  6.7  /  Alb  3.2[L]  /  TBili  0.2  /  DBili  x   /  AST  <5[L]  /  ALT  <5[L]  /  AlkPhos  99  06-30    PT/INR - ( 30 Jun 2025 07:12 )   PT: 10.2 sec;   INR: 0.89 ratio         PTT - ( 30 Jun 2025 07:12 )  PTT:30.8 sec      Urinalysis Basic - ( 01 Jul 2025 10:32 )    Color: x / Appearance: x / SG: x / pH: x  Gluc: 179 mg/dL / Ketone: x  / Bili: x / Urobili: x   Blood: x / Protein: x / Nitrite: x   Leuk Esterase: x / RBC: x / WBC x   Sq Epi: x / Non Sq Epi: x / Bacteria: x

## 2025-07-01 NOTE — PROGRESS NOTE ADULT - PROBLEM SELECTOR PLAN 5
VTE ppx: sq hep (discontinued  for 6/29 evening and 6/30 morning for IR, doesn't need further as discharge today)  Diet: regular

## 2025-07-01 NOTE — DISCHARGE NOTE NURSING/CASE MANAGEMENT/SOCIAL WORK - FINANCIAL ASSISTANCE
[facility] provides services at a reduced cost to those who are determined to be eligible through [facility]’s financial assistance program. Information regarding [facility]’s financial assistance program can be found by going to [email or web address] or by calling [phone].

## 2025-07-02 ENCOUNTER — INPATIENT (INPATIENT)
Facility: HOSPITAL | Age: 70
LOS: 1 days | Discharge: HOME | DRG: 700 | End: 2025-07-04
Attending: STUDENT IN AN ORGANIZED HEALTH CARE EDUCATION/TRAINING PROGRAM | Admitting: STUDENT IN AN ORGANIZED HEALTH CARE EDUCATION/TRAINING PROGRAM
Payer: MEDICARE

## 2025-07-02 VITALS
HEART RATE: 70 BPM | SYSTOLIC BLOOD PRESSURE: 156 MMHG | OXYGEN SATURATION: 99 % | RESPIRATION RATE: 18 BRPM | TEMPERATURE: 98 F | HEIGHT: 70 IN | DIASTOLIC BLOOD PRESSURE: 82 MMHG | WEIGHT: 240.08 LBS

## 2025-07-02 DIAGNOSIS — T83.022A DISPLACEMENT OF NEPHROSTOMY CATHETER, INITIAL ENCOUNTER: ICD-10-CM

## 2025-07-02 DIAGNOSIS — Z93.59 OTHER CYSTOSTOMY STATUS: Chronic | ICD-10-CM

## 2025-07-02 DIAGNOSIS — I10 ESSENTIAL (PRIMARY) HYPERTENSION: ICD-10-CM

## 2025-07-02 DIAGNOSIS — Z93.6 OTHER ARTIFICIAL OPENINGS OF URINARY TRACT STATUS: Chronic | ICD-10-CM

## 2025-07-02 DIAGNOSIS — N18.5 CHRONIC KIDNEY DISEASE, STAGE 5: ICD-10-CM

## 2025-07-02 DIAGNOSIS — Z29.9 ENCOUNTER FOR PROPHYLACTIC MEASURES, UNSPECIFIED: ICD-10-CM

## 2025-07-02 DIAGNOSIS — C67.9 MALIGNANT NEOPLASM OF BLADDER, UNSPECIFIED: ICD-10-CM

## 2025-07-02 DIAGNOSIS — Z98.89 OTHER SPECIFIED POSTPROCEDURAL STATES: Chronic | ICD-10-CM

## 2025-07-02 DIAGNOSIS — N32.9 BLADDER DISORDER, UNSPECIFIED: Chronic | ICD-10-CM

## 2025-07-02 DIAGNOSIS — Z85.51 PERSONAL HISTORY OF MALIGNANT NEOPLASM OF BLADDER: Chronic | ICD-10-CM

## 2025-07-02 LAB
ALBUMIN SERPL ELPH-MCNC: 3.3 G/DL — SIGNIFICANT CHANGE UP (ref 3.3–5)
ALP SERPL-CCNC: 109 U/L — SIGNIFICANT CHANGE UP (ref 40–120)
ALT FLD-CCNC: 5 U/L — LOW (ref 10–45)
ANION GAP SERPL CALC-SCNC: 14 MMOL/L — SIGNIFICANT CHANGE UP (ref 5–17)
APTT BLD: 33.5 SEC — SIGNIFICANT CHANGE UP (ref 26.1–36.8)
AST SERPL-CCNC: 5 U/L — LOW (ref 10–40)
BASOPHILS # BLD AUTO: 0.03 K/UL — SIGNIFICANT CHANGE UP (ref 0–0.2)
BASOPHILS NFR BLD AUTO: 0.3 % — SIGNIFICANT CHANGE UP (ref 0–2)
BILIRUB SERPL-MCNC: 0.2 MG/DL — SIGNIFICANT CHANGE UP (ref 0.2–1.2)
BLD GP AB SCN SERPL QL: NEGATIVE — SIGNIFICANT CHANGE UP
BUN SERPL-MCNC: 60 MG/DL — HIGH (ref 7–23)
CALCIUM SERPL-MCNC: 9.6 MG/DL — SIGNIFICANT CHANGE UP (ref 8.4–10.5)
CHLORIDE SERPL-SCNC: 105 MMOL/L — SIGNIFICANT CHANGE UP (ref 96–108)
CO2 SERPL-SCNC: 19 MMOL/L — LOW (ref 22–31)
CREAT SERPL-MCNC: 3.89 MG/DL — HIGH (ref 0.5–1.3)
EGFR: 16 ML/MIN/1.73M2 — LOW
EGFR: 16 ML/MIN/1.73M2 — LOW
EOSINOPHIL # BLD AUTO: 0.39 K/UL — SIGNIFICANT CHANGE UP (ref 0–0.5)
EOSINOPHIL NFR BLD AUTO: 3.9 % — SIGNIFICANT CHANGE UP (ref 0–6)
GLUCOSE SERPL-MCNC: 136 MG/DL — HIGH (ref 70–99)
HCT VFR BLD CALC: 28.9 % — LOW (ref 39–50)
HGB BLD-MCNC: 9 G/DL — LOW (ref 13–17)
IMM GRANULOCYTES # BLD AUTO: 0.1 K/UL — HIGH (ref 0–0.07)
IMM GRANULOCYTES NFR BLD AUTO: 1 % — HIGH (ref 0–0.9)
INR BLD: 0.94 RATIO — SIGNIFICANT CHANGE UP (ref 0.85–1.16)
LYMPHOCYTES # BLD AUTO: 1.67 K/UL — SIGNIFICANT CHANGE UP (ref 1–3.3)
LYMPHOCYTES NFR BLD AUTO: 16.7 % — SIGNIFICANT CHANGE UP (ref 13–44)
MCHC RBC-ENTMCNC: 28.9 PG — SIGNIFICANT CHANGE UP (ref 27–34)
MCHC RBC-ENTMCNC: 31.1 G/DL — LOW (ref 32–36)
MCV RBC AUTO: 92.9 FL — SIGNIFICANT CHANGE UP (ref 80–100)
MONOCYTES # BLD AUTO: 0.73 K/UL — SIGNIFICANT CHANGE UP (ref 0–0.9)
MONOCYTES NFR BLD AUTO: 7.3 % — SIGNIFICANT CHANGE UP (ref 2–14)
NEUTROPHILS # BLD AUTO: 7.11 K/UL — SIGNIFICANT CHANGE UP (ref 1.8–7.4)
NEUTROPHILS NFR BLD AUTO: 70.8 % — SIGNIFICANT CHANGE UP (ref 43–77)
NRBC # BLD AUTO: 0 K/UL — SIGNIFICANT CHANGE UP (ref 0–0)
NRBC # FLD: 0 K/UL — SIGNIFICANT CHANGE UP (ref 0–0)
NRBC BLD AUTO-RTO: 0 /100 WBCS — SIGNIFICANT CHANGE UP (ref 0–0)
PLATELET # BLD AUTO: 193 K/UL — SIGNIFICANT CHANGE UP (ref 150–400)
PMV BLD: 9.8 FL — SIGNIFICANT CHANGE UP (ref 7–13)
POTASSIUM SERPL-MCNC: 4.1 MMOL/L — SIGNIFICANT CHANGE UP (ref 3.5–5.3)
POTASSIUM SERPL-SCNC: 4.1 MMOL/L — SIGNIFICANT CHANGE UP (ref 3.5–5.3)
PROT SERPL-MCNC: 6.8 G/DL — SIGNIFICANT CHANGE UP (ref 6–8.3)
PROTHROM AB SERPL-ACNC: 10.7 SEC — SIGNIFICANT CHANGE UP (ref 9.9–13.4)
RBC # BLD: 3.11 M/UL — LOW (ref 4.2–5.8)
RBC # FLD: 15.2 % — HIGH (ref 10.3–14.5)
RH IG SCN BLD-IMP: POSITIVE — SIGNIFICANT CHANGE UP
SODIUM SERPL-SCNC: 138 MMOL/L — SIGNIFICANT CHANGE UP (ref 135–145)
WBC # BLD: 10.03 K/UL — SIGNIFICANT CHANGE UP (ref 3.8–10.5)
WBC # FLD AUTO: 10.03 K/UL — SIGNIFICANT CHANGE UP (ref 3.8–10.5)

## 2025-07-02 PROCEDURE — 85025 COMPLETE CBC W/AUTO DIFF WBC: CPT

## 2025-07-02 PROCEDURE — 74176 CT ABD & PELVIS W/O CONTRAST: CPT

## 2025-07-02 PROCEDURE — 99223 1ST HOSP IP/OBS HIGH 75: CPT

## 2025-07-02 PROCEDURE — 80053 COMPREHEN METABOLIC PANEL: CPT

## 2025-07-02 PROCEDURE — 85610 PROTHROMBIN TIME: CPT

## 2025-07-02 PROCEDURE — 86901 BLOOD TYPING SEROLOGIC RH(D): CPT

## 2025-07-02 PROCEDURE — 99285 EMERGENCY DEPT VISIT HI MDM: CPT

## 2025-07-02 PROCEDURE — 86900 BLOOD TYPING SEROLOGIC ABO: CPT

## 2025-07-02 PROCEDURE — 74176 CT ABD & PELVIS W/O CONTRAST: CPT | Mod: 26

## 2025-07-02 PROCEDURE — 86850 RBC ANTIBODY SCREEN: CPT

## 2025-07-02 PROCEDURE — 85730 THROMBOPLASTIN TIME PARTIAL: CPT

## 2025-07-02 RX ORDER — ENZALUTAMIDE 40 MG/1
160 CAPSULE ORAL DAILY
Refills: 0 | Status: DISCONTINUED | OUTPATIENT
Start: 2025-07-02 | End: 2025-07-04

## 2025-07-02 RX ORDER — FUROSEMIDE 10 MG/ML
40 INJECTION INTRAMUSCULAR; INTRAVENOUS
Refills: 0 | Status: DISCONTINUED | OUTPATIENT
Start: 2025-07-02 | End: 2025-07-04

## 2025-07-02 RX ORDER — TRAZODONE HCL 100 MG
100 TABLET ORAL AT BEDTIME
Refills: 0 | Status: DISCONTINUED | OUTPATIENT
Start: 2025-07-02 | End: 2025-07-04

## 2025-07-02 RX ORDER — SENNA 187 MG
2 TABLET ORAL AT BEDTIME
Refills: 0 | Status: DISCONTINUED | OUTPATIENT
Start: 2025-07-02 | End: 2025-07-04

## 2025-07-02 RX ORDER — CALCITRIOL 0.5 UG/1
0.25 CAPSULE, GELATIN COATED ORAL DAILY
Refills: 0 | Status: DISCONTINUED | OUTPATIENT
Start: 2025-07-02 | End: 2025-07-04

## 2025-07-02 RX ORDER — AMLODIPINE BESYLATE 10 MG/1
5 TABLET ORAL DAILY
Refills: 0 | Status: DISCONTINUED | OUTPATIENT
Start: 2025-07-02 | End: 2025-07-04

## 2025-07-02 RX ORDER — ONDANSETRON HCL/PF 4 MG/2 ML
4 VIAL (ML) INJECTION EVERY 8 HOURS
Refills: 0 | Status: DISCONTINUED | OUTPATIENT
Start: 2025-07-02 | End: 2025-07-04

## 2025-07-02 RX ORDER — CLONAZEPAM 0.5 MG/1
1 TABLET ORAL THREE TIMES A DAY
Refills: 0 | Status: DISCONTINUED | OUTPATIENT
Start: 2025-07-02 | End: 2025-07-04

## 2025-07-02 RX ORDER — SODIUM BICARBONATE 1 MEQ/ML
1950 SYRINGE (ML) INTRAVENOUS THREE TIMES A DAY
Refills: 0 | Status: DISCONTINUED | OUTPATIENT
Start: 2025-07-02 | End: 2025-07-04

## 2025-07-02 RX ORDER — ACETAMINOPHEN 500 MG/5ML
650 LIQUID (ML) ORAL EVERY 6 HOURS
Refills: 0 | Status: DISCONTINUED | OUTPATIENT
Start: 2025-07-02 | End: 2025-07-04

## 2025-07-02 RX ORDER — POLYETHYLENE GLYCOL 3350 17 G/17G
17 POWDER, FOR SOLUTION ORAL DAILY
Refills: 0 | Status: DISCONTINUED | OUTPATIENT
Start: 2025-07-02 | End: 2025-07-04

## 2025-07-02 RX ORDER — HEPARIN SODIUM 1000 [USP'U]/ML
5000 INJECTION INTRAVENOUS; SUBCUTANEOUS EVERY 8 HOURS
Refills: 0 | Status: DISCONTINUED | OUTPATIENT
Start: 2025-07-02 | End: 2025-07-03

## 2025-07-02 RX ORDER — MELATONIN 5 MG
3 TABLET ORAL AT BEDTIME
Refills: 0 | Status: DISCONTINUED | OUTPATIENT
Start: 2025-07-02 | End: 2025-07-04

## 2025-07-02 RX ADMIN — CLONAZEPAM 1 MILLIGRAM(S): 0.5 TABLET ORAL at 23:42

## 2025-07-02 NOTE — H&P ADULT - HISTORY OF PRESENT ILLNESS
Patient is a 70 year old male with a PMHx of  htn, depression, anxiety, ckd5, prostate and bladder ca s/p prostectomy, cystectomy, c/b recurrent uretero-ileal strictures s/p nephroureteral stent, recent admission for  concern of stent dislodgement  s/p PCN exchange 6/30/25 , now  presents   for left nephrostomy tube disfunction. Patient reports that the tubing got stuck around a doorknob at his house and got displaced. Patient denies fever/ dysuria/ abdominal pain/ nausea/ vomiting/ diarrhea.

## 2025-07-02 NOTE — H&P ADULT - PROBLEM SELECTOR PLAN 5
-renal dose medications  - monitor ins and outs  - monitor creatinine   - avoid nephrotoxins  -c/w lasix

## 2025-07-02 NOTE — ED ADULT NURSE REASSESSMENT NOTE - NS ED NURSE REASSESS COMMENT FT1
Received report from RUBIO Alfonso . Introduced self to Patient, resting comfortably, breathing unlabored,  in no current distress. Skin warm and dry and of color appropriate for ethnicity , moves all extremities, speech clear. side rails raised, call bell within reach, comfort and safety maintained. bed linen changed, ostomy bag placed over drain as MD order. pt declining vitals at the moment states " im too anxious please leave me alone". ED provider aware Received report from RUBIO Alfonso . Introduced self to Patient, resting comfortably, breathing unlabored,  in no current distress. Skin warm and dry and of color appropriate for ethnicity , moves all extremities, speech clear. side rails raised, call bell within reach, comfort and safety maintained. bed linen changed, ostomy bag placed over drain as MD order. pt declining vitals at the moment states " im too anxious please leave me alone". ED provider aware. pt requested to eat. verified with MD. meal provided.

## 2025-07-02 NOTE — H&P ADULT - NSHPLABSRESULTS_GEN_ALL_CORE
Labs personally reviewed:                          9.0    10.03 )-----------( 193      ( 02 Jul 2025 20:58 )             28.9     07-02    138  |  105  |  60[H]  ----------------------------<  136[H]  4.1   |  19[L]  |  3.89[H]    Ca    9.6      02 Jul 2025 20:58  Phos  3.2     07-01  Mg     2.3     07-01    TPro  6.8  /  Alb  3.3  /  TBili  0.2  /  DBili  x   /  AST  5[L]  /  ALT  5[L]  /  AlkPhos  109  07-02        LIVER FUNCTIONS - ( 02 Jul 2025 20:58 )  Alb: 3.3 g/dL / Pro: 6.8 g/dL / ALK PHOS: 109 U/L / ALT: 5 U/L / AST: 5 U/L / GGT: x           PT/INR - ( 02 Jul 2025 20:58 )   PT: 10.7 sec;   INR: 0.94 ratio         PTT - ( 02 Jul 2025 20:58 )  PTT:33.5 sec  Urinalysis Basic - ( 02 Jul 2025 20:58 )    Color: x / Appearance: x / SG: x / pH: x  Gluc: 136 mg/dL / Ketone: x  / Bili: x / Urobili: x   Blood: x / Protein: x / Nitrite: x   Leuk Esterase: x / RBC: x / WBC x   Sq Epi: x / Non Sq Epi: x / Bacteria: x      CAPILLARY BLOOD GLUCOSE          Imaging:    CT ap:  Limited noncontrast study.    Cystectomy with urinary divergence into right lower quadrant   urostomy/ileal conduit. Bilateral nephroureteral catheters are again   noted to terminate in the right hemipelvis, in adequate position. The   right-sided catheter terminates more proximally in the ileal conduit   region compared to the left catheter, unchanged in position compared to   6/28/2025. No hydronephrosis of either kidney. Bilateral renal collecting   system fullness and inflammatory changes noted surrounding the ureters.   Correlate for urinary tract infection. Air is noted within the right   renal collecting system, nonspecific.    Prostatectomy. Stable 3.7 x 3.7 cm fluid collection at the prostatectomy   site, in comparison with 6/28/2025 CT, previously 4.1 x 2.8 cm on   5/11/2025    Moderate stool burden of the rectum with mild wall thickening and slight   surrounding stranding. Correlate for fecal impaction/stercoral proctitis.      EKG

## 2025-07-02 NOTE — ED PROVIDER NOTE - PROGRESS NOTE DETAILS
Corrina haile PA-C: spoke with IR. recommend placing ostomy bag over malfunctioned nephrostomy tube to allow for drainage. will see patient in AM. consult placed. discussed with ED attending (Astrid Carreon MD-PGY1): Received signout from day team.  Patient is 70 with a history of cancer, CKD 5, nephrostomy tubes.  He unfortunately got a nephrostomy tube stuck in the door, yanking it out of the bag, it remains draining urine.  Plan for IR to see patient tomorrow and medicine admission.  CTAP ordered, labs ordered, spoke with medicine, and they are okay with admission.

## 2025-07-02 NOTE — H&P ADULT - PROBLEM SELECTOR PLAN 1
ER consulted IR , to see am , site draining into ostomy bag , renal function at baseline   - Follow up IR consult   - trend renal function   - monitor for signs of infection

## 2025-07-02 NOTE — H&P ADULT - NSHPPHYSICALEXAM_GEN_ALL_CORE
Vital Signs Last 24 Hrs  T(C): 36.5 (02 Jul 2025 16:58), Max: 36.5 (02 Jul 2025 16:58)  T(F): 97.7 (02 Jul 2025 16:58), Max: 97.7 (02 Jul 2025 16:58)  HR: 70 (02 Jul 2025 16:58) (70 - 70)  BP: 156/82 (02 Jul 2025 16:58) (156/82 - 156/82)  BP(mean): --  RR: 18 (02 Jul 2025 16:58) (18 - 18)  SpO2: 99% (02 Jul 2025 16:58) (99% - 99%)    Parameters below as of 02 Jul 2025 16:58  Patient On (Oxygen Delivery Method): room air Vital Signs Last 24 Hrs  T(C): 36.5 (02 Jul 2025 16:58), Max: 36.5 (02 Jul 2025 16:58)  T(F): 97.7 (02 Jul 2025 16:58), Max: 97.7 (02 Jul 2025 16:58)  HR: 70 (02 Jul 2025 16:58) (70 - 70)  BP: 156/82 (02 Jul 2025 16:58) (156/82 - 156/82)  BP(mean): --  RR: 18 (02 Jul 2025 16:58) (18 - 18)  SpO2: 99% (02 Jul 2025 16:58) (99% - 99%)    Parameters below as of 02 Jul 2025 16:58  Patient On (Oxygen Delivery Method): room air    GENERAL: + agitated and distressed , well-developed  HEAD:  Atraumatic, Normocephalic  ENT: EOMI, PERRLA, conjunctiva and sclera clear,  moist mucosa no pharyngeal erythema or exudates   NECK: supple , no JVD   CHEST/LUNG: Clear to auscultation bilaterally; No wheeze, equal breath sounds bilaterally   BACK: No spinal tenderness,  No CVA tenderness   HEART: Regular rate and rhythm; No murmurs, rubs, or gallops  ABDOMEN:  cysostomy present w/ yellow urine , PCN on R present left dislodged site cover with ostomy bag with yellow urine  , abd Soft, Nontender, Nondistended; Bowel sounds present  EXTREMITIES:  No clubbing, cyanosis, + pitting edema b/l   MSK: No joint swelling or effusions, ROM intact   PSYCH: + anxious , + agitated behavior/affect  NEUROLOGY: AAOx3, non-focal, cranial nerves intact  SKIN: Normal color, No rashes or lesions

## 2025-07-02 NOTE — ED PROVIDER NOTE - CLINICAL SUMMARY MEDICAL DECISION MAKING FREE TEXT BOX
Pt is a 69yo M here for nephrostomy tube displacement. Part of tube appears to be still in place and is actively draining. No signs of underlying infection. Urology consulted. Pt is a 69yo M here for nephrostomy tube displacement. Part of tube appears to be still in place and is actively draining. No signs of underlying infection. IR consulted. plan to admit to medicine for IR replacement. will obtain labs and imaging to assess nephrostomy tube placement.

## 2025-07-02 NOTE — ED ADULT NURSE NOTE - OBJECTIVE STATEMENT
Pt is 71 y/o male, presenting to the ED via EMS s/p displaced nephrostomy tube. PMH of HTN, depression, anxiety, CKD, prostate and bladder CA, cystectomy, and prostectomy. Pt was recently d/c'ed s/p nephrostomy tube placement. As per pt, L nephrostomy tubing got caught and was displaced prompting ED visit. Upon assessment, pt AxOx3, sitting up in stretcher and speaking in full sentences. Breathing spontaneously and unlabored, >95% RA. Abdomen soft and nontender to palpation.. Pt moves all extremities w/ = strength. Skin is warm, dry, and intact w/ + peripheral pulses. Pt denies SOB, chest pain, n/v/d, dizziness, vision changes, chills, and fever. Safety and comfort measures provided- bed in lowest position, locked, and blanket given.
not examined

## 2025-07-02 NOTE — ED PROVIDER NOTE - PHYSICAL EXAMINATION
Part of L nephrostomy tube detached. Sutures still in place. Actively draining. No erythema or pus.   R nephrostomy tube in place and draining clear urine.  Urostomy in place and draining clear urine.

## 2025-07-02 NOTE — H&P ADULT - ASSESSMENT
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

## 2025-07-02 NOTE — ED PROVIDER NOTE - OBJECTIVE STATEMENT
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 recently admitted here 5/6-12 dislodged nephroureteral stents c/b infection, dc'd after IR exchange and abx, followed by another exchange 6/30, presents to the ER for left nephrostomy tube disfunction. Patient reports that the tubing got stuck around a doorknob at his house and got displaced. Patient reports that he has bilateral nephrostomy tubes and a urostomy, and the nephrostomy tube on the left has been dislodged. Patient denies fever/ dysuria/ abdominal pain/ nausea/ vomiting/ diarrhea.

## 2025-07-02 NOTE — ED ADULT TRIAGE NOTE - BP NONINVASIVE SYSTOLIC (MM HG)
What Type Of Note Output Would You Prefer (Optional)?: Bullet Format
What Is The Reason For Today's Visit?: Annual Full Body Skin Examination with No Concerns
What Is The Reason For Today's Visit? (Being Monitored For X): the development of a new lesion
How Severe Are Your Spot(S)?: mild
156

## 2025-07-02 NOTE — ED PROVIDER NOTE - ATTENDING CONTRIBUTION TO CARE
69yo M, PMHx htn, depression, anxiety, ckd5, prostate and bladder ca s/p prostectomy, cystectomy, c/b recurrent uretero-ileal strictures s/p nephroureteral stent. recently admitted here 5/6-12 dislodged nephroureteral stents c/b infection, dc'd after IR exchange and abx, followed by another exchange 6/30, presents to the ER for left nephrostomy tube disfunction. Patient's nephrostomy tube on the left side accidentally got pulled along the wall door region earlier today urine was dripping along the floor patient otherwise asymptomatic was here couple days ago for exchange of nephrostomy is no CDU beds spoke to IR tonight perform another procedure today CT was ordered which was unremarkable looks like his tubing has been rooming renal function is at baseline IR will see him tomorrow to troubleshoot his left nephrostomy

## 2025-07-02 NOTE — H&P ADULT - NSHPREVIEWOFSYSTEMS_GEN_ALL_CORE
CONSTITUTIONAL: + weakness, no  fevers or chills  EYES/ENT: No visual changes;  No dysphagia  NECK: No pain or stiffness  RESPIRATORY: No cough, wheezing, hemoptysis; No shortness of breath  CARDIOVASCULAR: No chest pain or palpitations; No lower extremity edema  EXTREMITIES: no le edema, cyanosis, clubbing  GASTROINTESTINAL: No abdominal or epigastric pain. No nausea, vomiting, or hematemesis; No diarrhea or constipation. No melena or hematochezia.  BACK: No back pain  GENITOURINARY: No dysuria, frequency or hematuria  NEUROLOGICAL: No numbness or weakness  MSK: no joint swelling or pain  SKIN: No itching, burning, rashes, or lesions   PSYCH: no agitation  All other review of systems is negative unless indicated above.

## 2025-07-02 NOTE — H&P ADULT - PROBLEM SELECTOR PLAN 2
acutely worsened due to missed medications and stressful circumstances , expect improvement with resuming of Klonopin and once moved to less busy environment   - clonazepam 1mg Tid PRN  -c/w trazodone

## 2025-07-03 ENCOUNTER — TRANSCRIPTION ENCOUNTER (OUTPATIENT)
Age: 70
End: 2025-07-03

## 2025-07-03 LAB
ALBUMIN SERPL ELPH-MCNC: 3.3 G/DL — SIGNIFICANT CHANGE UP (ref 3.3–5)
ALP SERPL-CCNC: 115 U/L — SIGNIFICANT CHANGE UP (ref 40–120)
ALT FLD-CCNC: 6 U/L — LOW (ref 10–45)
ANION GAP SERPL CALC-SCNC: 16 MMOL/L — SIGNIFICANT CHANGE UP (ref 5–17)
APPEARANCE UR: ABNORMAL
AST SERPL-CCNC: 7 U/L — LOW (ref 10–40)
BACTERIA # UR AUTO: ABNORMAL /HPF
BILIRUB SERPL-MCNC: 0.2 MG/DL — SIGNIFICANT CHANGE UP (ref 0.2–1.2)
BILIRUB UR-MCNC: NEGATIVE — SIGNIFICANT CHANGE UP
BUN SERPL-MCNC: 62 MG/DL — HIGH (ref 7–23)
CALCIUM SERPL-MCNC: 10 MG/DL — SIGNIFICANT CHANGE UP (ref 8.4–10.5)
CAST: 27 /LPF — HIGH (ref 0–4)
CHLORIDE SERPL-SCNC: 106 MMOL/L — SIGNIFICANT CHANGE UP (ref 96–108)
CO2 SERPL-SCNC: 18 MMOL/L — LOW (ref 22–31)
COLOR SPEC: ABNORMAL
CREAT SERPL-MCNC: 4.12 MG/DL — HIGH (ref 0.5–1.3)
DIFF PNL FLD: ABNORMAL
EGFR: 15 ML/MIN/1.73M2 — LOW
EGFR: 15 ML/MIN/1.73M2 — LOW
GLUCOSE SERPL-MCNC: 129 MG/DL — HIGH (ref 70–99)
GLUCOSE UR QL: NEGATIVE MG/DL — SIGNIFICANT CHANGE UP
HCT VFR BLD CALC: 32.4 % — LOW (ref 39–50)
HGB BLD-MCNC: 10 G/DL — LOW (ref 13–17)
KETONES UR QL: NEGATIVE MG/DL — SIGNIFICANT CHANGE UP
LEUKOCYTE ESTERASE UR-ACNC: ABNORMAL
MCHC RBC-ENTMCNC: 28.7 PG — SIGNIFICANT CHANGE UP (ref 27–34)
MCHC RBC-ENTMCNC: 30.9 G/DL — LOW (ref 32–36)
MCV RBC AUTO: 93.1 FL — SIGNIFICANT CHANGE UP (ref 80–100)
NITRITE UR-MCNC: NEGATIVE — SIGNIFICANT CHANGE UP
NRBC # BLD AUTO: 0 K/UL — SIGNIFICANT CHANGE UP (ref 0–0)
NRBC # FLD: 0 K/UL — SIGNIFICANT CHANGE UP (ref 0–0)
NRBC BLD AUTO-RTO: 0 /100 WBCS — SIGNIFICANT CHANGE UP (ref 0–0)
PH UR: 7.5 — SIGNIFICANT CHANGE UP (ref 5–8)
PLATELET # BLD AUTO: 193 K/UL — SIGNIFICANT CHANGE UP (ref 150–400)
PMV BLD: 10.5 FL — SIGNIFICANT CHANGE UP (ref 7–13)
POTASSIUM SERPL-MCNC: 4.2 MMOL/L — SIGNIFICANT CHANGE UP (ref 3.5–5.3)
POTASSIUM SERPL-SCNC: 4.2 MMOL/L — SIGNIFICANT CHANGE UP (ref 3.5–5.3)
PROT SERPL-MCNC: 7.1 G/DL — SIGNIFICANT CHANGE UP (ref 6–8.3)
PROT UR-MCNC: 300 MG/DL
RBC # BLD: 3.48 M/UL — LOW (ref 4.2–5.8)
RBC # FLD: 15.1 % — HIGH (ref 10.3–14.5)
RBC CASTS # UR COMP ASSIST: 387 /HPF — HIGH (ref 0–4)
REVIEW: SIGNIFICANT CHANGE UP
SODIUM SERPL-SCNC: 140 MMOL/L — SIGNIFICANT CHANGE UP (ref 135–145)
SP GR SPEC: 1.01 — SIGNIFICANT CHANGE UP (ref 1–1.03)
SQUAMOUS # UR AUTO: 2 /HPF — SIGNIFICANT CHANGE UP (ref 0–5)
UROBILINOGEN FLD QL: 0.2 MG/DL — SIGNIFICANT CHANGE UP (ref 0.2–1)
WBC # BLD: 9.68 K/UL — SIGNIFICANT CHANGE UP (ref 3.8–10.5)
WBC # FLD AUTO: 9.68 K/UL — SIGNIFICANT CHANGE UP (ref 3.8–10.5)
WBC UR QL: 765 /HPF — HIGH (ref 0–5)

## 2025-07-03 PROCEDURE — 86850 RBC ANTIBODY SCREEN: CPT

## 2025-07-03 PROCEDURE — 85730 THROMBOPLASTIN TIME PARTIAL: CPT

## 2025-07-03 PROCEDURE — 75984 XRAY CONTROL CATHETER CHANGE: CPT | Mod: 26

## 2025-07-03 PROCEDURE — 87086 URINE CULTURE/COLONY COUNT: CPT

## 2025-07-03 PROCEDURE — 99232 SBSQ HOSP IP/OBS MODERATE 35: CPT

## 2025-07-03 PROCEDURE — 81001 URINALYSIS AUTO W/SCOPE: CPT

## 2025-07-03 PROCEDURE — 85027 COMPLETE CBC AUTOMATED: CPT

## 2025-07-03 PROCEDURE — 85025 COMPLETE CBC W/AUTO DIFF WBC: CPT

## 2025-07-03 PROCEDURE — 86900 BLOOD TYPING SEROLOGIC ABO: CPT

## 2025-07-03 PROCEDURE — C1769: CPT

## 2025-07-03 PROCEDURE — C1887: CPT

## 2025-07-03 PROCEDURE — 50688 CHANGE OF URETER TUBE/STENT: CPT | Mod: LT

## 2025-07-03 PROCEDURE — 36415 COLL VENOUS BLD VENIPUNCTURE: CPT

## 2025-07-03 PROCEDURE — 86901 BLOOD TYPING SEROLOGIC RH(D): CPT

## 2025-07-03 PROCEDURE — 80053 COMPREHEN METABOLIC PANEL: CPT

## 2025-07-03 PROCEDURE — C2625: CPT

## 2025-07-03 PROCEDURE — 74176 CT ABD & PELVIS W/O CONTRAST: CPT

## 2025-07-03 PROCEDURE — 85610 PROTHROMBIN TIME: CPT

## 2025-07-03 RX ORDER — FENTANYL CITRATE-0.9 % NACL/PF 100MCG/2ML
50 SYRINGE (ML) INTRAVENOUS
Refills: 0 | Status: DISCONTINUED | OUTPATIENT
Start: 2025-07-03 | End: 2025-07-03

## 2025-07-03 RX ORDER — CEFTRIAXONE 500 MG/1
1000 INJECTION, POWDER, FOR SOLUTION INTRAMUSCULAR; INTRAVENOUS ONCE
Refills: 0 | Status: DISCONTINUED | OUTPATIENT
Start: 2025-07-03 | End: 2025-07-04

## 2025-07-03 RX ORDER — FENTANYL CITRATE-0.9 % NACL/PF 100MCG/2ML
25 SYRINGE (ML) INTRAVENOUS
Refills: 0 | Status: DISCONTINUED | OUTPATIENT
Start: 2025-07-03 | End: 2025-07-03

## 2025-07-03 RX ORDER — ONDANSETRON HCL/PF 4 MG/2 ML
4 VIAL (ML) INJECTION ONCE
Refills: 0 | Status: DISCONTINUED | OUTPATIENT
Start: 2025-07-03 | End: 2025-07-03

## 2025-07-03 RX ORDER — SODIUM CHLORIDE 9 G/1000ML
1000 INJECTION, SOLUTION INTRAVENOUS
Refills: 0 | Status: DISCONTINUED | OUTPATIENT
Start: 2025-07-03 | End: 2025-07-03

## 2025-07-03 RX ADMIN — CLONAZEPAM 1 MILLIGRAM(S): 0.5 TABLET ORAL at 05:50

## 2025-07-03 RX ADMIN — FUROSEMIDE 40 MILLIGRAM(S): 10 INJECTION INTRAMUSCULAR; INTRAVENOUS at 05:56

## 2025-07-03 RX ADMIN — Medication 1950 MILLIGRAM(S): at 21:52

## 2025-07-03 RX ADMIN — Medication 650 MILLIGRAM(S): at 05:56

## 2025-07-03 RX ADMIN — Medication 2 TABLET(S): at 21:47

## 2025-07-03 RX ADMIN — Medication 1950 MILLIGRAM(S): at 05:49

## 2025-07-03 RX ADMIN — CALCITRIOL 0.25 MICROGRAM(S): 0.5 CAPSULE, GELATIN COATED ORAL at 14:02

## 2025-07-03 RX ADMIN — CLONAZEPAM 1 MILLIGRAM(S): 0.5 TABLET ORAL at 21:52

## 2025-07-03 RX ADMIN — Medication 1950 MILLIGRAM(S): at 14:02

## 2025-07-03 RX ADMIN — CLONAZEPAM 1 MILLIGRAM(S): 0.5 TABLET ORAL at 14:02

## 2025-07-03 RX ADMIN — Medication 650 MILLIGRAM(S): at 04:46

## 2025-07-03 RX ADMIN — Medication 100 MILLIGRAM(S): at 21:51

## 2025-07-03 RX ADMIN — AMLODIPINE BESYLATE 5 MILLIGRAM(S): 10 TABLET ORAL at 05:49

## 2025-07-03 NOTE — DISCHARGE NOTE PROVIDER - HOSPITAL COURSE
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 Patient reports that the tubing got stuck around a doorknob at his house and got displaced. Patient denies fever/ dysuria/ abdominal pain/ nausea/ vomiting/ diarrhea.  (02 Jul 2025 22:52)    Hospital Course:  Patient admitted for dislodged nephrostomy tube. Interventional radiology was consulted and nephrostomy tube was replaced 7/3    Important Medication Changes and Reason:  >N/A; continue all home medications as prescribed    Active or Pending Issues Requiring Follow-up:  >PCP follow up within one week for further outpatient management     Advanced Directives:   [X] Full code  [ ] DNR  [ ] Hospice    Discharge Diagnoses:  >Dislodged nephrostomy tube    Discharge/dispo/med rec discussed with medical attending Dr. Wright. Patient is medically cleared for discharge with outpatient follow up

## 2025-07-03 NOTE — PROGRESS NOTE ADULT - PROBLEM SELECTOR PLAN 1
IR consulted, plan for nephrostomy tube replacement today  - trend renal function   - monitor for signs of infection  - urine collected from nephrostomy / ileal conduit with pyuria is to be expected  - monitor off abx

## 2025-07-03 NOTE — DISCHARGE NOTE PROVIDER - NSDCFUSCHEDAPPT_GEN_ALL_CORE_FT
Hoenig, David  [facility]  UROLOGY [address]  Scheduled Appointment: 07/16/2025    [facility]  UROLOGY [address]  Scheduled Appointment: 07/16/2025    Vidhi Kemp  [facility]  NEPHRO [address]  Scheduled Appointment: 07/29/2025    [facility]  UROLOGY [address] [facility]  Scheduled Appointment: 10/01/2025    Renee Odonnell  [facility]  UROLOGY [address] [facility]  Scheduled Appointment: 10/01/2025

## 2025-07-03 NOTE — CONSULT NOTE ADULT - ASSESSMENT
70 year old male with  htn, depression, anxiety, ckd5, anemia, prostate and bladder ca s/p cystoprostatectomy and ileal conduit c/b uretero-ileal strictures managed by b/l percutaneous nephrostomy, left tube replacement 6/30 now  presents  for left nephrostomy tube dislodged.  r/o uti    labs  ua with pyuria   ct findings no hydronephrosis.  systemic fullness and inflammatory changes surrounding ureters     plan  urine collected from nephrostomy / ileal conduit with pyuria is to be expected  pt without s/s of infection  no fever, no leukocytosis  mental status at baseline   no abd pain or back pain    likely colonization and asymptomatic bacteriuria   maintain off antibx   for nephrostomy exchange  70 year old male with  htn, depression, anxiety, ckd5, anemia, prostate and bladder ca s/p cystoprostatectomy and ileal conduit c/b uretero-ileal strictures managed by b/l percutaneous nephrostomy, left tube replacement 6/30 now  presents  for left nephrostomy tube dislodged.  r/o uti    labs  ua with pyuria   ct findings no hydronephrosis.  systemic fullness and inflammatory changes surrounding ureters     plan  urine collected from nephrostomy / ileal conduit with pyuria is to be expected  pt without s/s of infection  no fever, no leukocytosis  mental status at baseline   no abd pain or back pain    likely colonization and asymptomatic bacteriuria   maintain off antibx   for nephrostomy exchange    Ebony Bower M.D.  [facility]  [phone]  After 5pm on weekdays and all day on weekends - please call [phone]  Available on [employer] teams    Thank you for consulting us and involving us in the management of this patients case. In addition to reviewing history, imaging, documents, labs, microbiology, took into account antibiotic stewardship, local antibiogram and infection control strategies and potential transmission issues at time of treatment decision making process.

## 2025-07-03 NOTE — PROGRESS NOTE ADULT - PROBLEM SELECTOR PLAN 5
- renal dose medications  - monitor ins and outs  - monitor creatinine   - avoid nephrotoxins  - c/w lasix  - repeat bmp to monitor for improvement in Cr

## 2025-07-03 NOTE — PRE-ANESTHESIA EVALUATION ADULT - RESPIRATORY RATE (BREATHS/MIN)
[facility]- Pediatric Dermatology  Dr. Rosie Granger, Dr. Angelia Castillo, Dr. Ann Jackson, RANDA Ryan Dr., Dr. Abida Mason & Dr. Alonso Cortes       Non Urgent  Nurse Triage Line; [phone]- Bria and Nola RENEE Care Coordinators      [facility] Pediatric Dermatology Specialty - [phone]      If you need a prescription refill, please contact your pharmacy. Refills are approved or denied by our Physicians during normal business hours, Monday through Fridays    Per office policy, refills will not be granted if you have not been seen within the past year (or sooner depending on your child's condition)      Scheduling Information:     Pediatric Appointment Scheduling and Call Center [phone]   Radiology Scheduling- [phone]     Sedation Unit Scheduling- [phone]    [facility] Scheduling- General [phone]; Pediatric Dermatology [phone]    Main  Services: [phone]   Guyanese: [phone]   South African: [phone]   Hmong/Omani/Nigerien: [phone]      
18

## 2025-07-03 NOTE — PROCEDURE NOTE - SEDATION
[address]  [address]  Authorization for Surgical Operation and Procedure     Date:___________                                                                                                         Time:__________  I hereby authorize Surgeon(s):  Indra Thakur MD, my physician and his/her assistants (if applicable), which may include medical students, residents, and/or fellows, to perform the following surgical operation/ procedure and administer such anesthesia as may be determined necessary by my physician:  Operation/Procedure name (s) Procedure(s):  RIGHT CRANIOTOMY FOR LESION RESECTION WITH ULTRASOUND on [address] Deshawn   2.   I recognize that during the surgical operation/procedure, unforeseen conditions may necessitate additional or different procedures than those listed above.  I, therefore, further authorize and request that the above-named surgeon, assistants, or designees perform such procedures as are, in their judgment, necessary and desirable.    3.   My surgeon/physician has discussed prior to my surgery the potential benefits, risks and side effects of this procedure; the likelihood of achieving goals; and potential problems that might occur during recuperation.  They also discussed reasonable alternatives to the procedure, including risks, benefits, and side effects related to the alternatives and risks related to not receiving this procedure.  I have had all my questions answered and I acknowledge that no guarantee has been made as to the result that may be obtained.    4.   Should the need arise during my operation/procedure, which includes change of level of care prior to discharge, I also consent to the administration of blood and/or blood products.  Further, I understand that despite careful testing and screening of blood or blood products by collecting agencies, I may still be subject to ill effects as a result of receiving a blood transfusion  and/or blood products.  The following are some, but not all, of the potential risks that can occur: fever and allergic reactions, hemolytic reactions, transmission of diseases such as Hepatitis, AIDS and Cytomegalovirus (CMV) and fluid overload.  In the event that I wish to have an autologous transfusion of my own blood, or a directed donor transfusion, I will discuss this with my physician.  Check only if Refusing Blood or Blood Products  I understand refusal of blood or blood products as deemed necessary by my physician may have serious consequences to my condition to include possible death. I hereby assume responsibility for my refusal and release the hospital, its personnel, and my physicians from any responsibility for the consequences of my refusal.          o  Refuse      5.   I authorize the use of any specimen, organs, tissues, body parts or foreign objects that may be removed from my body during the operation/procedure for diagnosis, research or teaching purposes and their subsequent disposal by hospital authorities.  I also authorize the release of specimen test results and/or written reports to my treating physician on the hospital medical staff or other referring or consulting physicians involved in my care, at the discretion of the Pathologist or my treating physician.    6.   I consent to the photographing or videotaping of the operations or procedures to be performed, including appropriate portions of my body for medical, scientific, or educational purposes, provided my identity is not revealed by the pictures or by descriptive texts accompanying them.  If the procedure has been photographed/videotaped, the surgeon will obtain the original picture, image, videotape or CD.  The hospital will not be responsible for storage, release or maintenance of the picture, image, tape or CD.    7.   I consent to the presence of a  or observers in the operating room as deemed necessary by my  Provided by Anesthesia Department physician or their designees.    8.   I recognize that in the event my procedure results in extended X-Ray/fluoroscopy time, I may develop a skin reaction.    9. If I have a Do Not Attempt Resuscitation (DNAR) order in place, that status will be suspended while in the operating room, procedural suite, and during the recovery period unless otherwise explicitly stated by me (or a person authorized to consent on my behalf). The surgeon or my attending physician will determine when the applicable recovery period ends for purposes of reinstating the DNAR order.  10. Patients having a sterilization procedure: I understand that if the procedure is successful the results will be permanent and it will therefore be impossible for me to inseminate, conceive, or bear children.  I also understand that the procedure is intended to result in sterility, although the result has not been guaranteed.   11. I acknowledge that my physician has explained sedation/analgesia administration to me including the risk and benefits I consent to the administration of sedation/analgesia as may be necessary or desirable in the judgment of my physician.    I CERTIFY THAT I HAVE READ AND FULLY UNDERSTAND THE ABOVE CONSENT TO OPERATION and/or OTHER PROCEDURE.    _________________________________________  __________________________________  Signature of Patient     Signature of Responsible Person         ___________________________________         Printed Name of Responsible Person           _________________________________                 Relationship to Patient  _________________________________________  ______________________________  Signature of Witness          Date  Time      Patient Name: Devora Hess     : 1972                 Printed: 2025     Medical Record #: [id number]                     Page 1 of [address] [place] [address]  [address]    Consent for  Anesthesia    IDevora agree to be cared for by an anesthesiologist, who is specially trained to monitor me and give me medicine to put me to sleep or keep me comfortable during my procedure    I understand that my anesthesiologist is not an employee or agent of [facility] or [employer]. He or she works for [employer].    As the patient asking for anesthesia services, I agree to:  Allow the anesthesiologist (anesthesia doctor) to give me medicine and do additional procedures as necessary. Some examples are: Starting or using an “IV” to give me medicine, fluids or blood during my procedure, and having a breathing tube placed to help me breathe when I’m asleep (intubation). In the event that my heart stops working properly, I understand that my anesthesiologist will make every effort to sustain my life, unless otherwise directed by [facility] Do Not Resuscitate documents.  Tell my anesthesia doctor before my procedure:  If I am pregnant.  The last time that I ate or drank.  All of the medicines I take (including prescriptions, herbal supplements, and pills I can buy without a prescription (including street drugs/illegal medications). Failure to inform my anesthesiologist about these medicines may increase my risk of anesthetic complications.  If I am allergic to anything or have had a reaction to anesthesia before.  I understand how the anesthesia medicine will help me (benefits).  I understand that with any type of anesthesia medicine there are risks:  The most common risks are: nausea, vomiting, sore throat, muscle soreness, damage to my eyes, mouth, or teeth (from breathing tube placement).  Rare risks include: remembering what happened during my procedure, allergic reactions to medications, injury to my airway, heart, lungs, vision, nerves, or muscles and in extremely rare instances death.  My doctor has explained to me other choices available to me for my care  (alternatives).  Pregnant Patients (“epidural”):  I understand that the risks of having an epidural (medicine given into my back to help control pain during labor), include itching, low blood pressure, difficulty urinating, headache or slowing of the baby’s heart. Very rare risks include infection, bleeding, seizure, irregular heart rhythms and nerve injury.  Regional Anesthesia (“spinal”, “epidural”, & “nerve blocks”):  I understand that rare but potential complications include headache, bleeding, infection, seizure, irregular heart rhythms, and nerve injury.    I can change my mind about having anesthesia services at any time before I get the medicine.    _____________________________________________________________________________  Patient (or Representative) Signature/Relationship to Patient  Date   Time    _____________________________________________________________________________   Name (if used)    Language/Organization   Time    _____________________________________________________________________________  Anesthesiologist Signature     Date   Time  I have discussed the procedure and information above with the patient (or patient’s representative) and answered their questions. The patient or their representative has agreed to have anesthesia services.    _____________________________________________________________________________  Witness        Date   Time  I have verified that the signature is that of the patient or patient’s representative, and that it was signed before the procedure  Patient Name: Devora Hess     : 1972                 Printed: 2025     Medical Record #: [id number]                     Page 2 of 2

## 2025-07-03 NOTE — PRE PROCEDURE NOTE - PRE PROCEDURE EVALUATION
Interventional Radiology    HPI: 70 year old male with a PMHx of  htn, depression, anxiety, ckd5, prostate and bladder ca s/p prostectomy, cystectomy, c/b recurrent uretero-ileal strictures s/p nephroureteral stent, recent admission for concern of stent dislodgement s/p PCN exchange 6/30/25 , now presents for left nephroureteral tube fracture. Patient reports that the tubing got stuck around a doorknob at his house and got displaced. Patient denies fever/ dysuria/ abdominal pain/ nausea/ vomiting/ diarrhea.     Allergies: No Known Allergies    Medications (Abx/Cardiac/Anticoagulation/Blood Products)  amLODIPine   Tablet: 5 milliGRAM(s) Oral (07-03 @ 05:49)  furosemide    Tablet: 40 milliGRAM(s) Oral (07-03 @ 05:56)    Data:  177.8  108.9  T(C): 36.8  HR: 62  BP: 155/89  RR: 18  SpO2: 97%    Exam  General: No acute distress    -WBC 9.68 / HgB 10.0 / Hct 32.4 / Plt 193  -Na 140 / Cl 106 / BUN 62 / Glucose 129  -K 4.2 / CO2 18 / Cr 4.12  -ALT 6 / Alk Phos 115 / T.Bili 0.2  -INR0.94    Imaging: Reviewed    Plan: 70y Male presents for left nephroureteral tube exchange  -Risks/Benefits/alternatives explained with the patient and/or healthcare proxy and witnessed informed consent obtained.

## 2025-07-03 NOTE — DISCHARGE NOTE PROVIDER - CARE PROVIDER_API CALL
Jenifer Lilly  [facility]  [address] [facility], [address] [address]  Phone: [phone]  Fax: [phone]  Follow Up Time: 1 week

## 2025-07-03 NOTE — DISCHARGE NOTE PROVIDER - ATTENDING DISCHARGE PHYSICAL EXAMINATION:
PHYSICAL EXAM:    Vital Signs Last 24 Hrs  T(C): 37.1 (04 Jul 2025 05:47), Max: 37.1 (04 Jul 2025 05:47)  T(F): 98.7 (04 Jul 2025 05:47), Max: 98.7 (04 Jul 2025 05:47)  HR: 62 (04 Jul 2025 05:47) (53 - 112)  BP: 165/78 (04 Jul 2025 05:47) (155/89 - 186/76)  BP(mean): 114 (03 Jul 2025 17:30) (106 - 130)  RR: 18 (04 Jul 2025 05:47) (16 - 18)  SpO2: 97% (04 Jul 2025 05:47) (97% - 100%)    General: No acute distress.  HEENT:  No scleral icterus or injection.   Neck: Supple.  Full ROM.  No JVD.    Heart: RRR.  Normal S1 and S2  Lungs: CTAB. No wheezes, crackles, or rhonchi.    Abdomen: BS+, soft, NT/ND, NT in place  Skin: Warm and dry.  No rashes.  Extremities: No edema, clubbing, or cyanosis  Musculoskeletal: No deformities  Neuro: Moving all extremities

## 2025-07-03 NOTE — PROGRESS NOTE ADULT - PROBLEM SELECTOR PLAN 2
acutely worsened due to missed medications and stressful circumstances , expect improvement with resuming of Klonopin and once moved to less busy environment   - clonazepam 1mg Tid PRN  - c/w trazodone

## 2025-07-03 NOTE — PROGRESS NOTE ADULT - PROBLEM SELECTOR PLAN 6
-heparin sub q for dvt ppx held for procedure     Constipation: mod stool burden on CT   -c/w Miralax and senna

## 2025-07-03 NOTE — PROCEDURE NOTE - PROCEDURE FINDINGS AND DETAILS
Existing left PCNU was cut, but held in place via stitch.  Replaced with new left PCNU. Existing left PCNU was damaged at hub, but remained in place via stitch.  Replaced with new left PCNU, distal pigtail in the ileal conduit, proximal in the left renal pelvis.  injected contrast eventually opacifies tube and ileal conduit

## 2025-07-03 NOTE — CONSULT NOTE ADULT - SUBJECTIVE AND OBJECTIVE BOX
[facility]  NEWTON Pedroza S. Shah, Y. Patel, G. Edmundo   [phone]  after hours and weekends [phone]    GAIL FORBES  70y, Male  [id number]    HPI--  HPI:  Patient is a 70 year old male with a PMHx of  htn, depression, anxiety, ckd5, prostate and bladder ca s/p prostectomy, cystectomy, c/b recurrent uretero-ileal strictures s/p nephroureteral stent, recent admission for  concern of stent dislodgement  s/p PCN exchange 6/30/25 , now  presents   for left nephrostomy tube disfunction. Patient reports that the tubing got stuck around a doorknob at his house and got displaced. Patient denies fever/ dysuria/ abdominal pain/ nausea/ vomiting/ diarrhea.  (02 Jul 2025 22:52)      PMH/PSH--  Prostate Cancer    HTN (Hypertension)    Anxiety    Major depressive disorder    UTI (urinary tract infection)    Urinary (tract) obstruction    Urethral stricture    Other calculus in bladder    Obese    Diabetes mellitus    Malignant neoplasm of bladder    Unspecified hydronephrosis    S/P Appendectomy    H/O cystoscopy    History of prostatectomy    Suprapubic catheter    History of bladder cancer    S/P ileal conduit    Nephrostomy status    Bladder disease        Allergies--      Medications--  Antibiotics:   Immunologic:   Other: acetaminophen     Tablet .. PRN  amLODIPine   Tablet  calcitriol   Capsule  clonazePAM  Tablet  enzalutamide  furosemide    Tablet  melatonin PRN  ondansetron Injectable PRN  polyethylene glycol 3350  senna  sodium bicarbonate  traZODone      Social History--  EtOH: denies ***  Tobacco: denies ***  Drug Use: denies ***    Family/Marital History--  No pertinent family history in first degree relatives    FH: colon cancer          Travel/Environmental/Occupational History:  *** insert T/E/O Hx ***    Review of Systems:  REVIEW OF SYSTEMS  General: no fever, no chills, no wt loss	  Ophthalmologic: no blurry vision  Respiratory and Thorax: no cough, no dyspnea  Cardiovascular: no chest pain, no palpitations  Gastrointestinal:  no nausea, no vomiting, diarrhea  Genitourinary: no dysuria, no urgency, no frequency	  Musculoskeletal: no myalgias	  Neurological:  no headache	    Physical Exam--  Vital Signs: T(F): 97.4 (07-03-25 @ 08:06), Max: 98 (07-03-25 @ 04:31)  HR: 63 (07-03-25 @ 08:06)  BP: 176/80 (07-03-25 @ 08:06)  RR: 19 (07-03-25 @ 08:06)  SpO2: 95% (07-03-25 @ 08:06)  Wt(kg): --  General: Nontoxic-appearing Male in no acute distress.  HEENT: AT/NC. PERRL. EOMI. Anicteric. Conjunctiva pink and moist. Oropharynx clear. Dentition fair.  Neck: Not rigid. No sense of mass.  Nodes: None palpable.  Lungs: Clear bilaterally without rales, wheezing or rhonchi  Heart: Regular rate and rhythm. No Murmur. No rub. No gallop. No palpable thrill.  Abdomen: Bowel sounds present and normoactive. Soft. Nondistended. Nontender. No sense of mass. No organomegaly.  Back: No spinal tenderness. No costovertebral angle tenderness.   Extremities: No cyanosis or clubbing. No edema.   Skin: Warm. Dry. Good turgor. No rash. No vasculitic stigmata.  Psychiatric: Appropriate affect and mood for situation.         Laboratory & Imaging Data--  CBC                        10.0   9.68  )-----------( 193      ( 03 Jul 2025 06:55 )             32.4       Chemistries  07-03    140  |  106  |  62[H]  ----------------------------<  129[H]  4.2   |  18[L]  |  4.12[H]    Ca    10.0      03 Jul 2025 06:55    TPro  7.1  /  Alb  3.3  /  TBili  0.2  /  DBili  x   /  AST  7[L]  /  ALT  6[L]  /  AlkPhos  115  07-03      Culture Data           [facility]  NEWTON Pedroza S. Shah, Y. Patel, G. Casimir   [phone]  after hours and weekends [phone]    GAIL FORBES  70y, Male  [id number]    HPI--  HPI:   70 year old male with  htn, depression, anxiety, ckd5, anemia, prostate and bladder ca s/p cystoprostatectomy and ileal conduit c/b uretero-ileal strictures managed by b/l percutaneous nephrostomy, left tube replacement 6/30 now  presents  for left nephrostomy tube dislodged. Patient reports that the tubing got stuck around a doorknob at his house and got displaced.   pt states no fever, no chills, no cough, no diarrhea, no sob, no vomiting,   pt admitted 6/28-7/1 for concern of left nephrostomy dislogded and replaced on 6/30      PMH/PSH--  Prostate Cancer  HTN (Hypertension)  Anxiety  Major depressive disorder  UTI (urinary tract infection)  Urinary (tract) obstruction  Urethral stricture  Other calculus in bladder  Obese  Diabetes mellitus  Malignant neoplasm of bladder  Unspecified hydronephrosis  S/P Appendectomy  H/O cystoscopy  History of prostatectomy  Suprapubic catheter  History of bladder cancer  S/P ileal conduit  Nephrostomy status  Bladder disease        Allergies--  nkda    Medications--  Antibiotics:   Immunologic:   Other: acetaminophen     Tablet .. PRN  amLODIPine   Tablet  calcitriol   Capsule  clonazePAM  Tablet  enzalutamide  furosemide    Tablet  melatonin PRN  ondansetron Injectable PRN  polyethylene glycol 3350  senna  sodium bicarbonate  traZODone      Social History--  EtOH: denies ***  Tobacco: denies ***  Drug Use: denies ***    Family/Marital History--  No pertinent family history in first degree relatives  FH: colon cancer    Travel/Environmental/Occupational History:  retired used to drive a truck      Review of Systems:  REVIEW OF SYSTEMS  General: no fever, no chills, no wt loss	  Ophthalmologic: no blurry vision  Respiratory and Thorax: no cough, no dyspnea  Cardiovascular: no chest pain, no palpitations  Gastrointestinal:  no nausea, no vomiting, diarrhea  Genitourinary: no dysuria, no urgency, no frequency	  Musculoskeletal: no myalgias	  Neurological:  no headache	    Physical Exam--  Vital Signs: T(F): 97.4 (07-03-25 @ 08:06), Max: 98 (07-03-25 @ 04:31)  HR: 63 (07-03-25 @ 08:06)  BP: 176/80 (07-03-25 @ 08:06)  RR: 19 (07-03-25 @ 08:06)  SpO2: 95% (07-03-25 @ 08:06)  Wt(kg): --  General: Nontoxic-appearing Male in no acute distress.  HEENT: AT/NC.   Neck: Not rigid. No sense of mass.  Nodes: None palpable.  Lungs: Clear bilaterally without rales, wheezing or rhonchi  Heart: Regular rate and rhythm.   Abdomen: Bowel sounds present and normoactive. Soft. Nondistended. Nontender. + ileal conduit ostomy  Back: b/l nephrostomy  Extremities: No cyanosis or clubbing. No edema.   Skin: Warm. Dry. Good turgor. No rash. No vasculitic stigmata.          Laboratory & Imaging Data--  CBC                        10.0   9.68  )-----------( 193      ( 03 Jul 2025 06:55 )             32.4       Chemistries  07-03    140  |  106  |  62[H]  ----------------------------<  129[H]  4.2   |  18[L]  |  4.12[H]    Ca    10.0      03 Jul 2025 06:55    TPro  7.1  /  Alb  3.3  /  TBili  0.2  /  DBili  x   /  AST  7[L]  /  ALT  6[L]  /  AlkPhos  115  07-03      Culture Data    < from: CT Abdomen and Pelvis No Cont (07.02.25 @ 19:33) >    IMPRESSION:    Limited noncontrast study.    Cystectomy with urinary divergence into right lower quadrant   urostomy/ileal conduit. Bilateral nephroureteral catheters are again   noted to terminate in the right hemipelvis, in adequate position. The   right-sided catheter terminates more proximally in the ileal conduit   region compared to the left catheter, unchanged in position compared to   6/28/2025. No hydronephrosis of either kidney. Bilateral renal collecting   system fullness and inflammatory changes noted surrounding the ureters.   Correlate for urinary tract infection. Air is noted within the right   renal collecting system, nonspecific.    Prostatectomy. Stable 3.7 x 3.7 cm fluid collection at the prostatectomy   site, in comparison with 6/28/2025 CT, previously 4.1 x 2.8 cmon   5/11/2025    Moderate stool burden of the rectum with mild wall thickening and slight   surrounding stranding. Correlate for fecal impaction/stercoral proctitis.    < end of copied text >

## 2025-07-03 NOTE — DISCHARGE NOTE PROVIDER - NSDCCPCAREPLAN_GEN_ALL_CORE_FT
PRINCIPAL DISCHARGE DIAGNOSIS  Diagnosis: Displacement of nephrostomy tube  Assessment and Plan of Treatment: You had your nephrostomy tube replaced by IR on 7/3  Flush drains with 5mL NS daily. If you meet resistance upon flushing, STOP and contact IR.   Empty drainage bag or bulb daily and record output. Once output is <10mL in a 24hr period or if there is a sudden decrease in output please contact IR to schedule  an appointment.  Drain care:  -Disconnect tubing (tube attached to bag/ bulb)  from the catheter (catheter going into skin)  -Clean catheter with alcohol swab  -Twist on the flush syringe to the catheter (be sure to push the air out of syringe)  -Flush catheter with 5mL (DO NOT pull back on syringe). If you meet resistance upon flushing, STOP and contact IR.   -Disconnect syringe from catheter and reconnect drainage bag or bulb.  Dressing care:  -Wash hands thoroughly with water and soap for at least 20 seconds.   -take off old dressing and clean around drain site with gauze soaked with warm water  -After cleaning the site, dry and replace dressing with a new gauze and tegaderm.   -Place one piece of gauze underneath the drain and another piece of gauze on top of drain.   -Apply tegaderm (clear dressing) on top.  -Change dressing every 3 days or when soiled

## 2025-07-03 NOTE — CONSULT NOTE ADULT - SUBJECTIVE AND OBJECTIVE BOX
Interventional Radiology    Evaluate for Procedure: Left nephroureteral replacement    HPI: 70 year old male with a PMHx of  htn, depression, anxiety, ckd5, prostate and bladder ca s/p prostectomy, cystectomy, c/b recurrent uretero-ileal strictures s/p nephroureteral stent, recent admission for concern of stent dislodgement s/p PCN exchange 6/30/25 , now presents for left nephroureteral tube fracture. Patient reports that the tubing got stuck around a doorknob at his house and got displaced. Patient denies fever/ dysuria/ abdominal pain/ nausea/ vomiting/ diarrhea.       Allergies: No Known Allergies    Medications (Abx/Cardiac/Anticoagulation/Blood Products)    amLODIPine   Tablet: 5 milliGRAM(s) Oral (07-03 @ 05:49)  furosemide    Tablet: 40 milliGRAM(s) Oral (07-01 @ 09:07)  furosemide    Tablet: 40 milliGRAM(s) Oral (07-03 @ 05:56)    Data:  177.8  108.9  T(C): 36.7  HR: 60  BP: 161/63  RR: 18  SpO2: 97%    -WBC 10.03 / HgB 9.0 / Hct 28.9 / Plt 193  -Na 138 / Cl 105 / BUN 60 / Glucose 136  -K 4.1 / CO2 19 / Cr 3.89  -ALT 5 / Alk Phos 109 / T.Bili 0.2  -INR 0.94 / PTT 33.5    Radiology:     Assessment/Plan: 70 year old male with a PMHx of  htn, depression, anxiety, ckd5, prostate and bladder ca s/p prostectomy, cystectomy, c/b recurrent uretero-ileal strictures s/p nephroureteral stent, recent admission for concern of stent dislodgement s/p PCN exchange 6/30/25 , now presents for left nephroureteral tube fracture. Patient reports that the tubing got stuck around a doorknob at his house and got displaced. Patient denies fever/ dysuria/ abdominal pain/ nausea/ vomiting/ diarrhea.     - case reviewed and approved for 7/3/25  - please place IR procedure order under BRIAN Vicente  - STAT labs in AM (cbc,coags, bmp, T&S)  - please make patient NPO  - d/w primary team Interventional Radiology    Evaluate for Procedure: Left nephroureteral replacement    HPI: 70 year old male with a PMHx of  htn, depression, anxiety, ckd5, prostate and bladder ca s/p prostectomy, cystectomy, c/b recurrent uretero-ileal strictures s/p nephroureteral stent, recent admission for concern of stent dislodgement s/p PCN exchange 6/30/25 , now presents for left nephroureteral tube fracture. Patient reports that the tubing got stuck around a doorknob at his house and got displaced. Patient denies fever/ dysuria/ abdominal pain/ nausea/ vomiting/ diarrhea.       Allergies: No Known Allergies    Medications (Abx/Cardiac/Anticoagulation/Blood Products)    amLODIPine   Tablet: 5 milliGRAM(s) Oral (07-03 @ 05:49)  furosemide    Tablet: 40 milliGRAM(s) Oral (07-01 @ 09:07)  furosemide    Tablet: 40 milliGRAM(s) Oral (07-03 @ 05:56)    Data:  177.8  108.9  T(C): 36.7  HR: 60  BP: 161/63  RR: 18  SpO2: 97%    Physical Exam:  General: NAD, AO x 3  Abdomen: NTND  Drain: Right PCNU attached to gravity bag with clear yellow urine present. Flushed with 10 cc of NS without evidence of pericatheter leaking, resistance or pain. Left PCNU fractured covered by ostomy bag.    -WBC 10.03 / HgB 9.0 / Hct 28.9 / Plt 193  -Na 138 / Cl 105 / BUN 60 / Glucose 136  -K 4.1 / CO2 19 / Cr 3.89  -ALT 5 / Alk Phos 109 / T.Bili 0.2  -INR 0.94 / PTT 33.5    Radiology:     Assessment/Plan: 70 year old male with a PMHx of  htn, depression, anxiety, ckd5, prostate and bladder ca s/p prostectomy, cystectomy, c/b recurrent uretero-ileal strictures s/p nephroureteral stent, recent admission for concern of stent dislodgement s/p PCN exchange 6/30/25 , now presents for left nephroureteral tube fracture. Patient reports that the tubing got stuck around a doorknob at his house and got displaced. Patient denies fever/ dysuria/ abdominal pain/ nausea/ vomiting/ diarrhea.     - case reviewed and approved for 7/3/25  - please place IR procedure order under BRIAN Vicente  - STAT labs in AM (cbc,coags, bmp, T&S)  - please make patient NPO  - d/w primary team

## 2025-07-04 ENCOUNTER — TRANSCRIPTION ENCOUNTER (OUTPATIENT)
Age: 70
End: 2025-07-04

## 2025-07-04 VITALS
RESPIRATION RATE: 18 BRPM | SYSTOLIC BLOOD PRESSURE: 165 MMHG | DIASTOLIC BLOOD PRESSURE: 78 MMHG | HEART RATE: 62 BPM | TEMPERATURE: 99 F | OXYGEN SATURATION: 97 %

## 2025-07-04 LAB
ANION GAP SERPL CALC-SCNC: 14 MMOL/L — SIGNIFICANT CHANGE UP (ref 5–17)
BUN SERPL-MCNC: 57 MG/DL — HIGH (ref 7–23)
CALCIUM SERPL-MCNC: 9.8 MG/DL — SIGNIFICANT CHANGE UP (ref 8.4–10.5)
CHLORIDE SERPL-SCNC: 108 MMOL/L — SIGNIFICANT CHANGE UP (ref 96–108)
CO2 SERPL-SCNC: 20 MMOL/L — LOW (ref 22–31)
CREAT SERPL-MCNC: 4.06 MG/DL — HIGH (ref 0.5–1.3)
EGFR: 15 ML/MIN/1.73M2 — LOW
EGFR: 15 ML/MIN/1.73M2 — LOW
GLUCOSE SERPL-MCNC: 131 MG/DL — HIGH (ref 70–99)
MAGNESIUM SERPL-MCNC: 2.6 MG/DL — SIGNIFICANT CHANGE UP (ref 1.6–2.6)
PHOSPHATE SERPL-MCNC: 3.6 MG/DL — SIGNIFICANT CHANGE UP (ref 2.5–4.5)
POTASSIUM SERPL-MCNC: 4.4 MMOL/L — SIGNIFICANT CHANGE UP (ref 3.5–5.3)
POTASSIUM SERPL-SCNC: 4.4 MMOL/L — SIGNIFICANT CHANGE UP (ref 3.5–5.3)
SODIUM SERPL-SCNC: 142 MMOL/L — SIGNIFICANT CHANGE UP (ref 135–145)

## 2025-07-04 PROCEDURE — C1769: CPT

## 2025-07-04 PROCEDURE — 86901 BLOOD TYPING SEROLOGIC RH(D): CPT

## 2025-07-04 PROCEDURE — 87077 CULTURE AEROBIC IDENTIFY: CPT

## 2025-07-04 PROCEDURE — 74176 CT ABD & PELVIS W/O CONTRAST: CPT

## 2025-07-04 PROCEDURE — 87086 URINE CULTURE/COLONY COUNT: CPT

## 2025-07-04 PROCEDURE — 86900 BLOOD TYPING SEROLOGIC ABO: CPT

## 2025-07-04 PROCEDURE — 85027 COMPLETE CBC AUTOMATED: CPT

## 2025-07-04 PROCEDURE — 81001 URINALYSIS AUTO W/SCOPE: CPT

## 2025-07-04 PROCEDURE — 83735 ASSAY OF MAGNESIUM: CPT

## 2025-07-04 PROCEDURE — 99239 HOSP IP/OBS DSCHRG MGMT >30: CPT

## 2025-07-04 PROCEDURE — C1887: CPT

## 2025-07-04 PROCEDURE — 80053 COMPREHEN METABOLIC PANEL: CPT

## 2025-07-04 PROCEDURE — 85025 COMPLETE CBC W/AUTO DIFF WBC: CPT

## 2025-07-04 PROCEDURE — 36415 COLL VENOUS BLD VENIPUNCTURE: CPT

## 2025-07-04 PROCEDURE — 80048 BASIC METABOLIC PNL TOTAL CA: CPT

## 2025-07-04 PROCEDURE — 85610 PROTHROMBIN TIME: CPT

## 2025-07-04 PROCEDURE — 84100 ASSAY OF PHOSPHORUS: CPT

## 2025-07-04 PROCEDURE — C2625: CPT

## 2025-07-04 PROCEDURE — 85730 THROMBOPLASTIN TIME PARTIAL: CPT

## 2025-07-04 PROCEDURE — 86850 RBC ANTIBODY SCREEN: CPT

## 2025-07-04 RX ADMIN — CLONAZEPAM 1 MILLIGRAM(S): 0.5 TABLET ORAL at 05:47

## 2025-07-04 RX ADMIN — Medication 1950 MILLIGRAM(S): at 05:46

## 2025-07-04 RX ADMIN — AMLODIPINE BESYLATE 5 MILLIGRAM(S): 10 TABLET ORAL at 05:47

## 2025-07-04 RX ADMIN — ENZALUTAMIDE 160 MILLIGRAM(S): 40 CAPSULE ORAL at 08:13

## 2025-07-04 NOTE — DISCHARGE NOTE NURSING/CASE MANAGEMENT/SOCIAL WORK - NSDCVIVACCINE_GEN_ALL_CORE_FT
influenza, injectable, quadrivalent, preservative free; 23-Nov-2019 14:01; Maryanne Snider (RN); [email or web address]; 3bs44 (Exp. Date: 30-Jun-2020); IntraMuscular; Deltoid Left.; 0.5 milliLiter(s); VIS (VIS Published: 15-Aug-2019, VIS Presented: 23-Nov-2019);

## 2025-07-04 NOTE — DISCHARGE NOTE NURSING/CASE MANAGEMENT/SOCIAL WORK - FINANCIAL ASSISTANCE
[facility] provides services at a reduced cost to those who are determined to be eligible through [facility]’s financial assistance program. Information regarding [facility]’s financial assistance program can be found by going to [email or web address] or by calling [phone].

## 2025-07-04 NOTE — DISCHARGE NOTE NURSING/CASE MANAGEMENT/SOCIAL WORK - PATIENT PORTAL LINK FT
You can access the FollowMyHealth Patient Portal offered by [facility] by registering at the following website: [email or web address]. By joining [employer]’s FollowMyHealth portal, you will also be able to view your health information using other applications (apps) compatible with our system.

## 2025-07-04 NOTE — PROGRESS NOTE ADULT - SUBJECTIVE AND OBJECTIVE BOX
PROGRESS NOTE:   Authored by Dr. Maico Wright MD, Available on MS Teams    Patient is a 70y old  Male who presents with a chief complaint of dislodged nephrostomy tube (03 Jul 2025 12:26)      SUBJECTIVE / OVERNIGHT EVENTS: Patient feels okay, no complaints. Nephrostomy tube dislodged     ADDITIONAL REVIEW OF SYSTEMS:    MEDICATIONS  (STANDING):  amLODIPine   Tablet 5 milliGRAM(s) Oral daily  calcitriol   Capsule 0.25 MICROGram(s) Oral daily  clonazePAM  Tablet 1 milliGRAM(s) Oral three times a day  enzalutamide 160 milliGRAM(s) Oral daily  furosemide    Tablet 40 milliGRAM(s) Oral <User Schedule>  polyethylene glycol 3350 17 Gram(s) Oral daily  senna 2 Tablet(s) Oral at bedtime  sodium bicarbonate 1950 milliGRAM(s) Oral three times a day  traZODone 100 milliGRAM(s) Oral at bedtime    MEDICATIONS  (PRN):  acetaminophen     Tablet .. 650 milliGRAM(s) Oral every 6 hours PRN Temp greater or equal to 38C (100.4F), Mild Pain (1 - 3)  melatonin 3 milliGRAM(s) Oral at bedtime PRN Insomnia  ondansetron Injectable 4 milliGRAM(s) IV Push every 8 hours PRN Nausea and/or Vomiting      CAPILLARY BLOOD GLUCOSE        I&O's Summary    03 Jul 2025 07:01  -  03 Jul 2025 13:11  --------------------------------------------------------  IN: 0 mL / OUT: 800 mL / NET: -800 mL        PHYSICAL EXAM:  Vital Signs Last 24 Hrs  T(C): 36.8 (03 Jul 2025 12:55), Max: 36.8 (03 Jul 2025 12:55)  T(F): 98.3 (03 Jul 2025 12:55), Max: 98.3 (03 Jul 2025 12:55)  HR: 62 (03 Jul 2025 12:55) (60 - 70)  BP: 155/89 (03 Jul 2025 12:55) (155/89 - 199/67)  BP(mean): 111 (02 Jul 2025 23:43) (111 - 111)  RR: 18 (03 Jul 2025 12:55) (18 - 19)  SpO2: 97% (03 Jul 2025 12:55) (95% - 99%)    Parameters below as of 03 Jul 2025 12:55  Patient On (Oxygen Delivery Method): room air        CONSTITUTIONAL: NAD  RESPIRATORY: Normal respiratory effort; lungs are clear to auscultation bilaterally  CARDIOVASCULAR: Regular rate and rhythm, normal S1 and S2, no murmur/rub/gallop; No lower extremity edema  ABDOMEN: Nontender to palpation, normoactive bowel sounds, no rebound/guarding  MUSCLOSKELETAL: no clubbing or cyanosis of digits  PSYCH: A+O to person, place, and time; affect appropriate    LABS:                        10.0   9.68  )-----------( 193      ( 03 Jul 2025 06:55 )             32.4     07-03    140  |  106  |  62[H]  ----------------------------<  129[H]  4.2   |  18[L]  |  4.12[H]    Ca    10.0      03 Jul 2025 06:55    TPro  7.1  /  Alb  3.3  /  TBili  0.2  /  DBili  x   /  AST  7[L]  /  ALT  6[L]  /  AlkPhos  115  07-03    PT/INR - ( 02 Jul 2025 20:58 )   PT: 10.7 sec;   INR: 0.94 ratio         PTT - ( 02 Jul 2025 20:58 )  PTT:33.5 sec      Urinalysis Basic - ( 03 Jul 2025 06:55 )    Color: x / Appearance: x / SG: x / pH: x  Gluc: 129 mg/dL / Ketone: x  / Bili: x / Urobili: x   Blood: x / Protein: x / Nitrite: x   Leuk Esterase: x / RBC: x / WBC x   Sq Epi: x / Non Sq Epi: x / Bacteria: x
[facility]  [place] Pedroza Y. Patel, S. Shah, [place]. [place]  [phone]  ([phone] - weekdays after 5pm and weekends)    Name: GAIL FORBES  Age/Gender: 70y Male  MRN: [id number]    Interval History:  Patient seen and examined this morning.   States he feels fine and wants to go home.   No new complaints noted.  Notes reviewed  No concerning overnight events  Afebrile   Allergies: No Known Allergies      Objective:  Vitals:   T(F): 98.7 (07-04-25 @ 05:47), Max: 98.7 (07-04-25 @ 05:47)  HR: 62 (07-04-25 @ 05:47) (53 - 112)  BP: 165/78 (07-04-25 @ 05:47) (155/89 - 186/76)  RR: 18 (07-04-25 @ 05:47) (16 - 18)  SpO2: 97% (07-04-25 @ 05:47) (97% - 100%)  Physical Examination:  General: no acute distress  HEENT: normocephalic, atraumatic, anicteric  Respiratory: no acc muscle use, breathing comfortably  Cardiovascular: S1 and S2 present  Gastrointestinal: nondistended, b/l PCNs  Extremities: no edema, no cyanosis  Skin: no visible rash    Laboratory Studies:  CBC:                       10.0   9.68  )-----------( 193      ( 03 Jul 2025 06:55 )             32.4     WBC Trend:  9.68 07-03-25 @ 06:55  10.03 07-02-25 @ 20:58  8.63 07-01-25 @ 10:32  7.39 06-30-25 @ 07:18  7.69 06-29-25 @ 11:25  8.95 06-28-25 @ 15:14    CMP: 07-04    142  |  108  |  57[H]  ----------------------------<  131[H]  4.4   |  20[L]  |  4.06[H]    Ca    9.8      04 Jul 2025 05:58  Phos  3.6     07-04  Mg     2.6     07-04    TPro  7.1  /  Alb  3.3  /  TBili  0.2  /  DBili  x   /  AST  7[L]  /  ALT  6[L]  /  AlkPhos  115  07-03    Creatinine: 4.06 mg/dL (07-04-25 @ 05:58)  Creatinine: 4.12 mg/dL (07-03-25 @ 06:55)  Creatinine: 3.89 mg/dL (07-02-25 @ 20:58)  Creatinine: 4.15 mg/dL (07-01-25 @ 10:32)  Creatinine: 4.19 mg/dL (06-30-25 @ 07:08)  Creatinine: 4.11 mg/dL (06-29-25 @ 11:25)  Creatinine: 3.92 mg/dL (06-28-25 @ 15:14)    LIVER FUNCTIONS - ( 03 Jul 2025 06:55 )  Alb: 3.3 g/dL / Pro: 7.1 g/dL / ALK PHOS: 115 U/L / ALT: 6 U/L / AST: 7 U/L / GGT: x           Microbiology: reviewed   Culture - Urine (collected 07-03-25 @ 00:42)  Source: Clean Catch Clean Catch (Midstream)  Preliminary Report (07-04-25 @ 07:47):    Culture positive, 50,000 - 99,000 CFU/mL . Identification to follow.    Radiology: reviewed     Medications:  acetaminophen     Tablet .. 650 milliGRAM(s) Oral every 6 hours PRN  amLODIPine   Tablet 5 milliGRAM(s) Oral daily  calcitriol   Capsule 0.25 MICROGram(s) Oral daily  cefTRIAXone   IVPB 1000 milliGRAM(s) IV Intermittent once  clonazePAM  Tablet 1 milliGRAM(s) Oral three times a day  enzalutamide 160 milliGRAM(s) Oral daily  furosemide    Tablet 40 milliGRAM(s) Oral <User Schedule>  melatonin 3 milliGRAM(s) Oral at bedtime PRN  ondansetron Injectable 4 milliGRAM(s) IV Push every 8 hours PRN  polyethylene glycol 3350 17 Gram(s) Oral daily  senna 2 Tablet(s) Oral at bedtime  sodium bicarbonate 1950 milliGRAM(s) Oral three times a day  traZODone 100 milliGRAM(s) Oral at bedtime    Current Antimicrobials:  cefTRIAXone   IVPB 1000 milliGRAM(s) IV Intermittent once    Prior/Completed Antimicrobials:

## 2025-07-04 NOTE — PROGRESS NOTE ADULT - ASSESSMENT
70 year old male with  htn, depression, anxiety, ckd5, anemia, prostate and bladder ca s/p cystoprostatectomy and ileal conduit c/b uretero-ileal strictures managed by b/l percutaneous nephrostomy, left tube replacement 6/30 now  presents  for left nephrostomy tube dislodged.  r/o uti    labs  ua with pyuria   ct findings no hydronephrosis. systemic fullness and inflammatory changes surrounding ureters     plan  urine collected from nephrostomy / ileal conduit with pyuria is to be expected  pt without s/s of infection  no fever, no leukocytosis  mental status at baseline   no abd pain or back pain  s/p nephrostomy exchange    likely colonization and asymptomatic bacteriuria   maintain off antibx       Teresa Mcadams M.D.  [facility]  Available on [employer] TE[employer] - *PREFERRED*  [phone]  After 5pm on weekdays and all day on weekends - please call [phone]     Thank you for consulting us and involving us in the management of this patients case. In addition to reviewing history, imaging, documents, labs, microbiology, took into account antibiotic stewardship, local antibiogram and infection control strategies and potential transmission issues at time of treatment decision making process.     
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

## 2025-07-05 ENCOUNTER — TRANSCRIPTION ENCOUNTER (OUTPATIENT)
Age: 70
End: 2025-07-05

## 2025-07-05 LAB
-  AMPICILLIN: SIGNIFICANT CHANGE UP
-  CIPROFLOXACIN: SIGNIFICANT CHANGE UP
-  DAPTOMYCIN: SIGNIFICANT CHANGE UP
-  GENTAMICIN: SIGNIFICANT CHANGE UP
-  LEVOFLOXACIN: SIGNIFICANT CHANGE UP
-  LINEZOLID: SIGNIFICANT CHANGE UP
-  NITROFURANTOIN: SIGNIFICANT CHANGE UP
-  OXACILLIN: SIGNIFICANT CHANGE UP
-  PENICILLIN: SIGNIFICANT CHANGE UP
-  RIFAMPIN: SIGNIFICANT CHANGE UP
-  TETRACYCLINE: SIGNIFICANT CHANGE UP
-  TRIMETHOPRIM/SULFAMETHOXAZOLE: SIGNIFICANT CHANGE UP
-  VANCOMYCIN: SIGNIFICANT CHANGE UP
CULTURE RESULTS: ABNORMAL
METHOD TYPE: SIGNIFICANT CHANGE UP
ORGANISM # SPEC MICROSCOPIC CNT: ABNORMAL
SPECIMEN SOURCE: SIGNIFICANT CHANGE UP

## 2025-07-07 LAB — ORGANISM # SPEC MICROSCOPIC CNT: ABNORMAL

## 2025-07-07 PROCEDURE — 80053 COMPREHEN METABOLIC PANEL: CPT

## 2025-07-07 PROCEDURE — 85027 COMPLETE CBC AUTOMATED: CPT

## 2025-07-07 PROCEDURE — 86900 BLOOD TYPING SEROLOGIC ABO: CPT

## 2025-07-07 PROCEDURE — 87077 CULTURE AEROBIC IDENTIFY: CPT

## 2025-07-07 PROCEDURE — C2625: CPT

## 2025-07-07 PROCEDURE — 75984 XRAY CONTROL CATHETER CHANGE: CPT

## 2025-07-07 PROCEDURE — 80048 BASIC METABOLIC PNL TOTAL CA: CPT

## 2025-07-07 PROCEDURE — 86901 BLOOD TYPING SEROLOGIC RH(D): CPT

## 2025-07-07 PROCEDURE — 85025 COMPLETE CBC W/AUTO DIFF WBC: CPT

## 2025-07-07 PROCEDURE — C1769: CPT

## 2025-07-07 PROCEDURE — 83735 ASSAY OF MAGNESIUM: CPT

## 2025-07-07 PROCEDURE — 85730 THROMBOPLASTIN TIME PARTIAL: CPT

## 2025-07-07 PROCEDURE — 74176 CT ABD & PELVIS W/O CONTRAST: CPT

## 2025-07-07 PROCEDURE — C1887: CPT

## 2025-07-07 PROCEDURE — 36415 COLL VENOUS BLD VENIPUNCTURE: CPT

## 2025-07-07 PROCEDURE — 86850 RBC ANTIBODY SCREEN: CPT

## 2025-07-07 PROCEDURE — 85610 PROTHROMBIN TIME: CPT

## 2025-07-07 PROCEDURE — 87186 SC STD MICRODIL/AGAR DIL: CPT

## 2025-07-07 PROCEDURE — 87086 URINE CULTURE/COLONY COUNT: CPT

## 2025-07-07 PROCEDURE — 84100 ASSAY OF PHOSPHORUS: CPT

## 2025-07-07 PROCEDURE — 99285 EMERGENCY DEPT VISIT HI MDM: CPT

## 2025-07-07 PROCEDURE — 81001 URINALYSIS AUTO W/SCOPE: CPT

## 2025-07-07 PROCEDURE — 50688 CHANGE OF URETER TUBE/STENT: CPT

## 2025-07-08 ENCOUNTER — NON-APPOINTMENT (OUTPATIENT)
Age: 70
End: 2025-07-08

## 2025-07-09 ENCOUNTER — TRANSCRIPTION ENCOUNTER (OUTPATIENT)
Age: 70
End: 2025-07-09

## 2025-07-10 PROBLEM — R19.7 DIARRHEA: Status: ACTIVE | Noted: 2025-07-10

## 2025-07-10 RX ORDER — LOPERAMIDE HYDROCHLORIDE 2 MG/1
2 CAPSULE ORAL TWICE DAILY
Qty: 30 | Refills: 0 | Status: ACTIVE | COMMUNITY
Start: 2025-07-10 | End: 1900-01-01

## 2025-07-16 ENCOUNTER — TRANSCRIPTION ENCOUNTER (OUTPATIENT)
Age: 70
End: 2025-07-16

## 2025-07-28 ENCOUNTER — TRANSCRIPTION ENCOUNTER (OUTPATIENT)
Age: 70
End: 2025-07-28

## 2025-07-29 ENCOUNTER — APPOINTMENT (OUTPATIENT)
Dept: NEPHROLOGY | Facility: CLINIC | Age: 70
End: 2025-07-29
Payer: MEDICARE

## 2025-07-29 VITALS
HEART RATE: 63 BPM | TEMPERATURE: 97.3 F | WEIGHT: 220 LBS | SYSTOLIC BLOOD PRESSURE: 162 MMHG | HEIGHT: 70 IN | BODY MASS INDEX: 31.5 KG/M2 | OXYGEN SATURATION: 100 % | DIASTOLIC BLOOD PRESSURE: 78 MMHG

## 2025-07-29 DIAGNOSIS — I10 ESSENTIAL (PRIMARY) HYPERTENSION: ICD-10-CM

## 2025-07-29 DIAGNOSIS — N18.4 CHRONIC KIDNEY DISEASE, STAGE 4 (SEVERE): ICD-10-CM

## 2025-07-29 DIAGNOSIS — E87.20 ACIDOSIS, UNSPECIFIED: ICD-10-CM

## 2025-07-29 PROCEDURE — G2211 COMPLEX E/M VISIT ADD ON: CPT

## 2025-07-29 PROCEDURE — 99215 OFFICE O/P EST HI 40 MIN: CPT

## 2025-07-30 ENCOUNTER — APPOINTMENT (OUTPATIENT)
Dept: UROLOGY | Facility: CLINIC | Age: 70
End: 2025-07-30
Payer: MEDICARE

## 2025-07-30 ENCOUNTER — OUTPATIENT (OUTPATIENT)
Dept: OUTPATIENT SERVICES | Facility: HOSPITAL | Age: 70
LOS: 1 days | End: 2025-07-30
Payer: COMMERCIAL

## 2025-07-30 ENCOUNTER — NON-APPOINTMENT (OUTPATIENT)
Age: 70
End: 2025-07-30

## 2025-07-30 VITALS
HEART RATE: 69 BPM | OXYGEN SATURATION: 100 % | SYSTOLIC BLOOD PRESSURE: 175 MMHG | TEMPERATURE: 97.7 F | DIASTOLIC BLOOD PRESSURE: 95 MMHG

## 2025-07-30 DIAGNOSIS — Z93.59 OTHER CYSTOSTOMY STATUS: Chronic | ICD-10-CM

## 2025-07-30 DIAGNOSIS — N99.89 OTHER POSTPROCEDURAL COMPLICATIONS AND DISORDERS OF GENITOURINARY SYSTEM: ICD-10-CM

## 2025-07-30 DIAGNOSIS — R35.0 FREQUENCY OF MICTURITION: ICD-10-CM

## 2025-07-30 DIAGNOSIS — N32.9 BLADDER DISORDER, UNSPECIFIED: Chronic | ICD-10-CM

## 2025-07-30 DIAGNOSIS — Z85.51 PERSONAL HISTORY OF MALIGNANT NEOPLASM OF BLADDER: Chronic | ICD-10-CM

## 2025-07-30 DIAGNOSIS — Z93.6 OTHER ARTIFICIAL OPENINGS OF URINARY TRACT STATUS: Chronic | ICD-10-CM

## 2025-07-30 DIAGNOSIS — Z93.6 OTHER ARTIFICIAL OPENINGS OF URINARY TRACT STATUS: ICD-10-CM

## 2025-07-30 DIAGNOSIS — Z98.89 OTHER SPECIFIED POSTPROCEDURAL STATES: Chronic | ICD-10-CM

## 2025-07-30 PROCEDURE — 50431 NJX PX NFROSGRM &/URTRGRM: CPT | Mod: 50

## 2025-07-30 PROCEDURE — 50431 NJX PX NFROSGRM &/URTRGRM: CPT

## 2025-08-05 RX ORDER — CLONAZEPAM 2 MG/1
TABLET ORAL
Refills: 0 | Status: ACTIVE | COMMUNITY

## 2025-08-07 DIAGNOSIS — N99.89 OTHER POSTPROCEDURAL COMPLICATIONS AND DISORDERS OF GENITOURINARY SYSTEM: ICD-10-CM

## 2025-08-07 DIAGNOSIS — Z93.6 OTHER ARTIFICIAL OPENINGS OF URINARY TRACT STATUS: ICD-10-CM

## 2025-09-17 ENCOUNTER — APPOINTMENT (OUTPATIENT)
Dept: UROLOGY | Facility: CLINIC | Age: 70
End: 2025-09-17

## (undated) DEVICE — SOL IRR POUR H2O 1500ML

## (undated) DEVICE — SUT SOFSILK 4-0 18" TIES

## (undated) DEVICE — PACK AV FISTULA

## (undated) DEVICE — SUT MONOSOF 4-0 18" P-12

## (undated) DEVICE — DRSG KERLIX ROLL 4.5"

## (undated) DEVICE — SYR LUER LOK 5CC

## (undated) DEVICE — LAP PAD W RING 18 X 18"

## (undated) DEVICE — DRAIN JACKSON PRATT 10MM FLAT 3/4 NO TROCAR

## (undated) DEVICE — DRAIN RESERVOIR FOR JACKSON PRATT 100CC CARDINAL

## (undated) DEVICE — SUT SOFSILK 2-0 18" TIES

## (undated) DEVICE — VENODYNE/SCD SLEEVE CALF MEDIUM

## (undated) DEVICE — SUT POLYSORB 3-0 30" V-20 UNDYED

## (undated) DEVICE — DRSG STERISTRIPS 0.5 X 4"

## (undated) DEVICE — SUT SURGIPRO II 6-0 30" CV-1 DA

## (undated) DEVICE — SUT BIOSYN 4-0 18" P-12

## (undated) DEVICE — DRSG KLING 6"

## (undated) DEVICE — SUT SOFSILK 3-0 18" TIES

## (undated) DEVICE — ELCTR GROUNDING PAD ADULT COVIDIEN

## (undated) DEVICE — DRSG CURITY GAUZE SPONGE 4 X 4" 12-PLY

## (undated) DEVICE — SOL IRR POUR NS 0.9% 1500ML

## (undated) DEVICE — CLAMP BULLDOG MIDI 45 DEGREE (GREEN) DISP